# Patient Record
Sex: MALE | Race: WHITE | NOT HISPANIC OR LATINO | Employment: OTHER | URBAN - NONMETROPOLITAN AREA
[De-identification: names, ages, dates, MRNs, and addresses within clinical notes are randomized per-mention and may not be internally consistent; named-entity substitution may affect disease eponyms.]

---

## 2022-01-04 ENCOUNTER — TELEPHONE (OUTPATIENT)
Dept: NEUROSURGERY | Facility: CLINIC | Age: 77
End: 2022-01-04

## 2022-01-04 NOTE — TELEPHONE ENCOUNTER
Caller: Hai Hernandez    Relationship to patient: Self    Best call back number:    Patient is needing: PATIENT REFUSED TO ANSWER MORE QUESTIONS ABOUT REGISTRATION WHILE ON PHONE.  PLEASE UPDATE INS, REG, AND SSN WHEN POSSIBLE

## 2022-01-05 ENCOUNTER — OFFICE VISIT (OUTPATIENT)
Dept: NEUROSURGERY | Facility: CLINIC | Age: 77
End: 2022-01-05

## 2022-01-05 VITALS — BODY MASS INDEX: 35 KG/M2 | WEIGHT: 258.4 LBS | HEIGHT: 72 IN

## 2022-01-05 DIAGNOSIS — D32.9 MENINGIOMA: Primary | ICD-10-CM

## 2022-01-05 PROCEDURE — 99204 OFFICE O/P NEW MOD 45 MIN: CPT | Performed by: NEUROLOGICAL SURGERY

## 2022-01-05 RX ORDER — LEVOTHYROXINE SODIUM 0.12 MG/1
125 TABLET ORAL DAILY
COMMUNITY

## 2022-01-05 RX ORDER — BIMATOPROST 0.01 %
1 DROPS OPHTHALMIC (EYE) NIGHTLY
COMMUNITY
Start: 2021-10-12

## 2022-01-05 RX ORDER — ERGOCALCIFEROL 1.25 MG/1
50000 CAPSULE ORAL WEEKLY
COMMUNITY
Start: 2021-11-30

## 2022-01-05 NOTE — PATIENT INSTRUCTIONS
"https://www.cancer.gov/rare-brain-spine-tumor/tumors/meningioma\">   Meningioma    A meningioma is a growth (tumor) that occurs in the meninges, which is the thin tissue that covers the brain and spinal cord. Meningiomas are usually benign. Benign means they are not cancerous and do not spread to other areas. In rare cases, a meningioma may become cancerous (malignant).  What are the causes?  This condition may be caused by:  · Being exposed to ionizing radiation. This type of energy occurs naturally, and it has artificial sources, such as X-rays and some medical devices.  · Neurofibromatosis 2. This is a genetic disorder that causes multiple soft tumors.  · Genetic mutation. This is a change in certain genes.  In many cases, the cause of this condition is not known.  What increases the risk?  The following factors may make you more likely to develop this condition:  · Having been exposed to radiation, especially as a child.  · Being a woman. There may be a greater risk associated with having female hormones. Older women have a higher risk of meningiomas than men or children. However, men have a higher risk of malignant meningiomas.  · Obesity.  What are the signs or symptoms?  Common symptoms of this condition include:  · Headaches.  · Nausea and vomiting.  · Vision changes.  · Changes to hearing.  · Loss of your sense of smell.  Other symptoms include:  · Seizures.  · Weakness or numbness on one side of your body, or in an arm or a leg.  · Problems with memory or thinking.  · Mood or personality changes.  Symptoms of this condition usually begin very slowly. The symptoms may depend on the size and location of your tumor.  How is this diagnosed?  This condition is diagnosed based on:  · Results of brain imaging tests, such as a CT scan or an MRI.  · Removal of a tissue sample of the tumor to look at under a microscope (biopsy). This may be done to confirm the diagnosis and to help determine the best treatment for " your condition.  How is this treated?  This condition may be treated with:  · Steroids. These are medicines for lowering brain swelling and improving symptoms.  · Radiation therapy. This therapy uses high-energy rays to shrink or kill your tumor.  · Surgery to remove as much of your tumor as possible.  You may not have treatment until your symptoms start to affect your daily activities. This is because meningiomas grow very slowly. Your health care provider may prefer to watch for growth of your tumor before starting treatment.  Follow these instructions at home:  · Take over-the-counter and prescription medicines only as told by your health care provider.  · Follow instructions from your health care provider about eating or drinking restrictions.  · Drink enough fluid to keep your urine pale yellow.  · Return to your normal activities as told by your health care provider. Ask your health care provider what activities are safe for you.  · Keep all follow-up visits as told by your health care provider. This is important. You may need regular visits to watch the growth of your tumor.  Contact a health care provider if:  · You have symptoms that come back.  · You have diarrhea.  · You vomit.  · You have pain in your abdomen (abdominal pain).  · You cannot eat or drink as much as you need.  · You are weaker or more tired than usual.  · You are losing weight without trying.  Get help right away if:  · Your diarrhea, vomiting, or abdominal pain does not go away, or you cannot eat or drink without vomiting.  · You have sudden changes in vision.  · You have trouble walking.  · You have a seizure.  · You have bleeding that does not stop.  · You have trouble breathing.  · You have a fever.  · You have new weakness or numbness on one side of your body.  Summary  · A meningioma is a growth (tumor) that occurs in the meninges, which is the thin tissue that covers the brain and spinal cord.  · Meningiomas are usually benign.  Benign means they are not cancerous and do not spread to other areas.  · Symptoms of this condition usually begin very slowly. The symptoms may depend on the size and location of your tumor.  · You may not need treatment until your symptoms start to affect your daily activities. Your tumor may be watched over time.  This information is not intended to replace advice given to you by your health care provider. Make sure you discuss any questions you have with your health care provider.  Document Revised: 12/21/2020 Document Reviewed: 12/21/2020  [employer]vier Patient Education © 2021 [employer]    BMI for Adults  What is BMI?  Body mass index (BMI) is a number that is calculated from a person's weight and height. BMI can help estimate how much of a person's weight is composed of fat. BMI does not measure body fat directly. Rather, it is an alternative to procedures that directly measure body fat, which can be difficult and expensive.  BMI can help identify people who may be at higher risk for certain medical problems.  What are BMI measurements used for?  BMI is used as a screening tool to identify possible weight problems. It helps determine whether a person is obese, overweight, a healthy weight, or underweight.  BMI is useful for:  · Identifying a weight problem that may be related to a medical condition or may increase the risk for medical problems.  · Promoting changes, such as changes in diet and exercise, to help reach a healthy weight. BMI screening can be repeated to see if these changes are working.  How is BMI calculated?  BMI involves measuring your weight in relation to your height. Both height and weight are measured, and the BMI is calculated from those numbers. This can be done either in English ([place] or metric measurements. Note that charts and online BMI calculators are available to help you find your BMI quickly and easily without having to do these calculations yourself.  To calculate your BMI in  "English ([place] measurements:    1. Measure your weight in pounds (lb).  2. Multiply the number of pounds by 703.  ? For example, for a person who weighs 180 lb, multiply that number by 703, which equals 126,540.  3. Measure your height in inches. Then multiply that number by itself to get a measurement called \"inches squared.\"  ? For example, for a person who is 70 inches tall, the \"inches squared\" measurement is 70 inches x 70 inches, which equals 4,900 inches squared.  4. Divide the total from step 2 (number of lb x 703) by the total from step 3 (inches squared): 126,540 ÷ 4,900 = 25.8. This is your BMI.    To calculate your BMI in metric measurements:  1. Measure your weight in kilograms (kg).  2. Measure your height in meters (m). Then multiply that number by itself to get a measurement called \"meters squared.\"  ? For example, for a person who is 1.75 m tall, the \"meters squared\" measurement is 1.75 m x 1.75 m, which is equal to 3.1 meters squared.  3. Divide the number of kilograms (your weight) by the meters squared number. In this example: 70 ÷ 3.1 = 22.6. This is your BMI.  What do the results mean?  BMI charts are used to identify whether you are underweight, normal weight, overweight, or obese. The following guidelines will be used:  · Underweight: BMI less than 18.5.  · Normal weight: BMI between 18.5 and 24.9.  · Overweight: BMI between 25 and 29.9.  · Obese: BMI of 30 or above.  Keep these notes in mind:  · Weight includes both fat and muscle, so someone with a muscular build, such as an athlete, may have a BMI that is higher than 24.9. In cases like these, BMI is not an accurate measure of body fat.  · To determine if excess body fat is the cause of a BMI of 25 or higher, further assessments may need to be done by a health care provider.  · BMI is usually interpreted in the same way for men and women.  Where to find more information  For more information about BMI, including tools to quickly calculate " "your BMI, go to these websites:  · Centers for Disease Control and Prevention: www.cdc.gov  · American Heart Association: www.heart.org  · National Heart, Lung, and Blood [place]: www.nhlbi.nih.gov  Summary  · Body mass index (BMI) is a number that is calculated from a person's weight and height.  · BMI may help estimate how much of a person's weight is composed of fat. BMI can help identify those who may be at higher risk for certain medical problems.  · BMI can be measured using English measurements or metric measurements.  · BMI charts are used to identify whether you are underweight, normal weight, overweight, or obese.  This information is not intended to replace advice given to you by your health care provider. Make sure you discuss any questions you have with your health care provider.  Document Revised: 09/09/2020 Document Reviewed: 07/17/2020  [employer] Patient Education © 2021 [employer]    https://www.nhlbi.nih.gov/files/docs/public/heart/dash_brief.pdf\">   DASH Eating Plan  DASH stands for Dietary Approaches to Stop Hypertension. The DASH eating plan is a healthy eating plan that has been shown to:  · Reduce high blood pressure (hypertension).  · Reduce your risk for type 2 diabetes, heart disease, and stroke.  · Help with weight loss.  What are tips for following this plan?  Reading food labels  · Check food labels for the amount of salt (sodium) per serving. Choose foods with less than 5 percent of the Daily Value of sodium. Generally, foods with less than 300 milligrams (mg) of sodium per serving fit into this eating plan.  · To find whole grains, look for the word \"whole\" as the first word in the ingredient list.  Shopping  · Buy products labeled as \"low-sodium\" or \"no salt added.\"  · Buy fresh foods. Avoid canned foods and pre-made or frozen meals.  Cooking  · Avoid adding salt when cooking. Use salt-free seasonings or herbs instead of table salt or sea salt. Check with your health care provider " or pharmacist before using salt substitutes.  · Do not espinosa foods. Cook foods using healthy methods such as baking, boiling, grilling, roasting, and broiling instead.  · Cook with heart-healthy oils, such as olive, canola, avocado, soybean, or sunflower oil.  Meal planning    · Eat a balanced diet that includes:  ? 4 or more servings of fruits and 4 or more servings of vegetables each day. Try to fill one-half of your plate with fruits and vegetables.  ? 6-8 servings of whole grains each day.  ? Less than 6 oz (170 g) of lean meat, poultry, or fish each day. A 3-oz (85-g) serving of meat is about the same size as a deck of cards. One egg equals 1 oz (28 g).  ? 2-3 servings of low-fat dairy each day. One serving is 1 cup (237 mL).  ? 1 serving of nuts, seeds, or beans 5 times each week.  ? 2-3 servings of heart-healthy fats. Healthy fats called omega-3 fatty acids are found in foods such as walnuts, flaxseeds, fortified milks, and eggs. These fats are also found in cold-water fish, such as sardines, salmon, and mackerel.  · Limit how much you eat of:  ? Canned or prepackaged foods.  ? Food that is high in trans fat, such as some fried foods.  ? Food that is high in saturated fat, such as fatty meat.  ? Desserts and other sweets, sugary drinks, and other foods with added sugar.  ? Full-fat dairy products.  · Do not salt foods before eating.  · Do not eat more than 4 egg yolks a week.  · Try to eat at least 2 vegetarian meals a week.  · Eat more home-cooked food and less restaurant, buffet, and fast food.    Lifestyle  · When eating at a restaurant, ask that your food be prepared with less salt or no salt, if possible.  · If you drink alcohol:  ? Limit how much you use to:  § 0-1 drink a day for women who are not pregnant.  § 0-2 drinks a day for men.  ? Be aware of how much alcohol is in your drink. In the [place] one drink equals one 12 oz bottle of beer (355 mL), one 5 oz glass of wine (148 mL), or one 1½ oz glass of  hard liquor (44 mL).  General information  · Avoid eating more than 2,300 mg of salt a day. If you have hypertension, you may need to reduce your sodium intake to 1,500 mg a day.  · Work with your health care provider to maintain a healthy body weight or to lose weight. Ask what an ideal weight is for you.  · Get at least 30 minutes of exercise that causes your heart to beat faster (aerobic exercise) most days of the week. Activities may include walking, swimming, or biking.  · Work with your health care provider or dietitian to adjust your eating plan to your individual calorie needs.  What foods should I eat?  Fruits  All fresh, dried, or frozen fruit. Canned fruit in natural juice (without added sugar).  Vegetables  Fresh or frozen vegetables (raw, steamed, roasted, or grilled). Low-sodium or reduced-sodium tomato and vegetable juice. Low-sodium or reduced-sodium tomato sauce and tomato paste. Low-sodium or reduced-sodium canned vegetables.  Grains  Whole-grain or whole-wheat bread. Whole-grain or whole-wheat pasta. Brown rice. Oatmeal. Quinoa. Bulgur. Whole-grain and low-sodium cereals. Kareen bread. Low-fat, low-sodium crackers. Whole-wheat flour tortillas.  Meats and other proteins  Skinless chicken or turkey. Ground chicken or turkey. Pork with fat trimmed off. Fish and seafood. Egg whites. Dried beans, peas, or lentils. Unsalted nuts, nut butters, and seeds. Unsalted canned beans. Lean cuts of beef with fat trimmed off. Low-sodium, lean precooked or cured meat, such as sausages or meat loaves.  Dairy  Low-fat (1%) or fat-free (skim) milk. Reduced-fat, low-fat, or fat-free cheeses. Nonfat, low-sodium ricotta or cottage cheese. Low-fat or nonfat yogurt. Low-fat, low-sodium cheese.  Fats and oils  Soft margarine without trans fats. Vegetable oil. Reduced-fat, low-fat, or light mayonnaise and salad dressings (reduced-sodium). Canola, safflower, olive, avocado, soybean, and sunflower oils. Avocado.  Seasonings and  condiments  Herbs. Spices. Seasoning mixes without salt.  Other foods  Unsalted popcorn and pretzels. Fat-free sweets.  The items listed above may not be a complete list of foods and beverages you can eat. Contact a dietitian for more information.  What foods should I avoid?  Fruits  Canned fruit in a light or heavy syrup. Fried fruit. Fruit in cream or butter sauce.  Vegetables  Creamed or fried vegetables. Vegetables in a cheese sauce. Regular canned vegetables (not low-sodium or reduced-sodium). Regular canned tomato sauce and paste (not low-sodium or reduced-sodium). Regular tomato and vegetable juice (not low-sodium or reduced-sodium). Pickles. Olives.  Grains  Baked goods made with fat, such as croissants, muffins, or some breads. Dry pasta or rice meal packs.  Meats and other proteins  Fatty cuts of meat. Ribs. Fried meat. Tang. Bologna, salami, and other precooked or cured meats, such as sausages or meat loaves. Fat from the back of a pig (fatback). Bratwurst. Salted nuts and seeds. Canned beans with added salt. Canned or smoked fish. Whole eggs or egg yolks. Chicken or turkey with skin.  Dairy  Whole or 2% milk, cream, and half-and-half. Whole or full-fat cream cheese. Whole-fat or sweetened yogurt. Full-fat cheese. Nondairy creamers. Whipped toppings. Processed cheese and cheese spreads.  Fats and oils  Butter. Stick margarine. Lard. Shortening. Ghee. Tang fat. Tropical oils, such as coconut, palm kernel, or palm oil.  Seasonings and condiments  Onion salt, garlic salt, seasoned salt, table salt, and sea salt. Worcestershire sauce. Tartar sauce. Barbecue sauce. Teriyaki sauce. Soy sauce, including reduced-sodium. Steak sauce. Canned and packaged gravies. Fish sauce. Oyster sauce. Cocktail sauce. Store-bought horseradish. Ketchup. Mustard. Meat flavorings and tenderizers. Bouillon cubes. Hot sauces. Pre-made or packaged marinades. Pre-made or packaged taco seasonings. Relishes. Regular salad  dressings.  Other foods  Salted popcorn and pretzels.  The items listed above may not be a complete list of foods and beverages you should avoid. Contact a dietitian for more information.  Where to find more information  · National Heart, Lung, and Blood [place]: www.nhlbi.nih.gov  · American Heart Association: www.heart.org  · Academy of Nutrition and Dietetics: www.eatright.org  · National Kidney Foundation: www.kidney.org  Summary  · The DASH eating plan is a healthy eating plan that has been shown to reduce high blood pressure (hypertension). It may also reduce your risk for type 2 diabetes, heart disease, and stroke.  · When on the DASH eating plan, aim to eat more fresh fruits and vegetables, whole grains, lean proteins, low-fat dairy, and heart-healthy fats.  · With the DASH eating plan, you should limit salt (sodium) intake to 2,300 mg a day. If you have hypertension, you may need to reduce your sodium intake to 1,500 mg a day.  · Work with your health care provider or dietitian to adjust your eating plan to your individual calorie needs.  This information is not intended to replace advice given to you by your health care provider. Make sure you discuss any questions you have with your health care provider.  Document Revised: 11/20/2020 Document Reviewed: 11/20/2020  [employer] Patient Education © 2021 [employer]

## 2022-01-05 NOTE — PROGRESS NOTES
Primary Care Provider: Elisa Chacko MD    Chief Complaint:   Chief Complaint   Patient presents with   • meningioma     New patient here for evaluation of meningioma. States he has intermittent pain around eyes, sees floaters and some loss of peripheal vision.       History of Present Illness  Hai Hernandez is a 76 y.o. male is being seen for consultation today at the request of Dr. Chacko.    Hai is a very pleasant 76-year-old male who presented with a past medical history of acephalic migraines, basal cell carcinoma and one episode of squamous cell carcinoma of the skin.  He was in his normal state of health until [place].  He woke with a terrible headache that was located mostly behind his left eye.  This radiated into his right retro-orbital region as well.  He treated conservatively with 2 days with Aleve.  He did notice decreased vision in his right eye.  He states that with closing of his right eye his vision improved.  He described this as a scotoma and cloudy area or wall on the right side of his vision.  He also had hallucinations within this area as well as flashing lights.  He states that his headaches were nonpulsatile in nature but will get worse with movement of his head sneezing or coughing.  As stated above he does have a history of acephalic migraines but is not on any preventative medications.  He reported to outside emergency room where a CT of the head with and without contrast showed a 2 x 1.5 cm mass consistent with meningioma.  All symptoms resolved within the last day.    Currently he complains of 0-10 pain his vision is normal today.    Review of Systems   Constitutional: Positive for activity change.   HENT: Positive for tinnitus.    Eyes: Positive for pain.   Respiratory: Negative.    Cardiovascular: Negative.    Gastrointestinal: Negative.    Endocrine: Negative.    Genitourinary: Negative.    Musculoskeletal: Negative.    Skin: Negative.    Allergic/Immunologic:  Negative.    Neurological: Positive for dizziness.   Hematological: Negative.    Psychiatric/Behavioral: Negative.        Past Medical History:   Diagnosis Date   • Hearing loss    • Pneumonia    • Thyroid disease        Past Surgical History:   Procedure Laterality Date   • NECK SURGERY     • SKIN CANCER EXCISION         Family History: family history includes Cancer in his brother and sister.    Social History:  reports that he has never smoked. He has never used smokeless tobacco.    Medications:    Current Outpatient Medications:   •  levothyroxine (SYNTHROID, LEVOTHROID) 137 MCG tablet, Take 137 mcg by mouth Daily., Disp: , Rfl:   •  Lumigan 0.01 % ophthalmic drops, , Disp: , Rfl:   •  vitamin D (ERGOCALCIFEROL) 1.25 MG (60740 UT) capsule capsule, Take 50,000 Units by mouth 1 (One) Time Per Week., Disp: , Rfl:     Allergies:  Patient has no known allergies.    Objective   Physical Exam  Constitutional:       Appearance: He is well-developed.   HENT:      Head: Normocephalic and atraumatic.   Eyes:      Extraocular Movements: EOM normal.      Conjunctiva/sclera: Conjunctivae normal.      Pupils: Pupils are equal, round, and reactive to light.      Funduscopic exam:     Right eye: No hemorrhage, exudate or papilledema.         Left eye: No hemorrhage, exudate or papilledema.   Cardiovascular:      Pulses:           Dorsalis pedis pulses are 2+ on the right side and 2+ on the left side.        Posterior tibial pulses are 2+ on the right side and 2+ on the left side.   Pulmonary:      Effort: Pulmonary effort is normal. No respiratory distress.   Musculoskeletal:      Cervical back: Normal range of motion and neck supple.   Skin:     General: Skin is warm.      Findings: No erythema or rash.   Neurological:      Mental Status: He is oriented to person, place, and time.      Coordination: Finger-Nose-Finger Test and Heel to Shin Test normal.      Gait: Gait is intact. Tandem walk normal.      Deep Tendon Reflexes:       Reflex Scores:       Tricep reflexes are 2+ on the right side and 2+ on the left side.       Bicep reflexes are 2+ on the right side and 2+ on the left side.       Brachioradialis reflexes are 2+ on the right side and 2+ on the left side.       Patellar reflexes are 2+ on the right side and 2+ on the left side.       Achilles reflexes are 2+ on the right side and 2+ on the left side.  Psychiatric:         Speech: Speech normal.       Neurologic Exam     Mental Status   Oriented to person, place, and time.   Registration: recalls 3 of 3 objects. Recall at 5 minutes: recalls 3 of 3 objects.   Attention: normal. Concentration: normal.   Speech: speech is normal   Level of consciousness: alert  Knowledge: consistent with education.     Cranial Nerves     CN II   Visual acuity: normal    CN III, IV, VI   Pupils are equal, round, and reactive to light.  Extraocular motions are normal.   Diplopia: none    CN V   Facial sensation intact.   Right corneal reflex: normal  Left corneal reflex: normal    CN VII   Right facial weakness: none  Left facial weakness: none    CN VIII   Hearing: intact    CN IX, X   Palate: symmetric  Right gag reflex: normal  Left gag reflex: normal    CN XI   Right trapezius strength: normal  Left trapezius strength: normal    CN XII   Tongue deviation: none    Motor Exam   Right arm tone: normal  Left arm tone: normal  Right arm pronator drift: absent  Left arm pronator drift: absent  Right leg tone: normal  Left leg tone: normal    Strength   Right deltoid: 5/5  Left deltoid: 5/5  Right biceps: 5/5  Left biceps: 5/5  Right triceps: 5/5  Left triceps: 5/5  Right interossei: 5/5  Left interossei: 5/5  Right iliopsoas: 5/5  Left iliopsoas: 5/5  Right quadriceps: 5/5  Left quadriceps: 5/5  Right anterior tibial: 5/5  Left anterior tibial: 5/5  Right gastroc: 5/5  Left gastroc: 5/5Right EHL 5/5   Left EHL 5/5     Sensory Exam   Light touch normal.   Proprioception normal.     Gait, Coordination,  and Reflexes     Gait  Gait: normal    Coordination   Finger to nose coordination: normal  Heel to shin coordination: normal  Tandem walking coordination: normal    Reflexes   Right brachioradialis: 2+  Left brachioradialis: 2+  Right biceps: 2+  Left biceps: 2+  Right triceps: 2+  Left triceps: 2+  Right patellar: 2+  Left patellar: 2+  Right achilles: 2+  Left achilles: 2+  Right plantar: normal  Left plantar: normal  Right Ozuna: absent  Left Ozuna: absent  Right ankle clonus: absent  Left ankle clonus: absent        Imaging: (independent review and interpretation)  No radiology results for the last 30 days.                [id number] right planum sphenoidale meningioma.  Mild surrounding edema based on noncontrast MRI.    ASSESSMENT and PLAN  Hai Hernandez is a 76 y.o. male with a significant comorbidity of acephalic migraines, thyroid disease status post radiation as a child, basal cell and squamous cell carcinoma of the skin. He presents with a new problem of acute onset of 3-day headache and right visual field changes resulting in outside hospital ER visit. Physical exam findings of neurologically intact with resolution of all symptoms.  His imaging shows 22 x 24 x 13.5 mm right planum sphenoidale mass most suggestive of meningioma.    Meningioma, right planum sphenoidale meningioma  Differential diagnosis includes meningioma.  Without biopsy cannot exclude other neoplasms including hemangiopericytoma.      Prognosis:  These are typically slow growing extra-axial tumors.  They are usually benign and arise from the arachnoid.  32% of incidentally discovered meningiomas do not grow over a 3-year follow-up.  Meningiomas with calcification and or hypodensity on T2 weighted MRI appear to be slower growing.  These lesions typically grow at a rate of 1 mm per year.  5-year survival rate is 91.3%.    Treatment: Treatment options include serial observation versus surgical resection versus radiation.    Surgical  indications include   - documented growth on serial imaging   - symptoms referrable to the lesion,   - or suspicion of increased grade (WHO grade 2: Choroidal, clear cell, atypical, WHO grade 3: Papillary, rapidly, anaplastic) in the tumor as determined by surrounding edema.  Perioperative morbidity rate is statistically significantly higher in patients older than 70 (23%) versus younger than 70 (3.5%)    Radiotherapy is considered for patients who are not surgical candidates are of deep seeded lesions or for recurrent meningiomas or for atypical or malignant meningiomas after subtotal resection of first recurrence.  Retrospective analysis with follow-up of 5 to 15 years from [facility] revealed recurrence rate of 4% and totally resected meningiomas, 60% for partially resected meningiomas without XRT and 32% for partially resected meningiomas with XRT.    - MRI of the brain with and without contrast.  Return to clinic same day for testing    Right eye vision changes  Differential diagnosis includes acephalic migraines, amaurosis fugax, or direct optic nerve compression  Hai presents with intermittent right thigh scotomas and visual hallucinations.  He does have a history of acephalic migraines.  He states that his vision changes are isolated to the right eye and improved with closing the right eye suggesting direct optic nerve compression but his imaging is most characteristic for meningioma which grows slowly.  Therefore this could be an alternative pathology.  Symptomology also sounds very similar to amaurosis fugax associated with carotid stenosis.  -CTA of the neck to rule out carotid stenosis    Obesity Counseling  Never smoker  Hai's Body mass index is 35.05 kg/m²..  We spent 1 minutes in weight management counseling including discussing current weight, current diet, and exercise patterns.  Additional we set goals for weight reduction.  Therefore above normal parameters. Recommendations include: educational  material and exercise counseling.       Diagnoses and all orders for this visit:    1. Meningioma (HCC) (Primary)    2. Body mass index (BMI) of 35.0 to 35.9        Return in about 3 months (around 4/5/2022) for TEST RESULTS-DR OLEA.    Thank you for this Consultation and the opportunity to participate in Hai's care.    Sincerely,  Martell Olea MD    I spent 33 minutes caring for Hai on this date of service. This time includes time spent by me in the following activities: preparing for the visit, reviewing tests, obtaining and/or reviewing a separately obtained history, performing a medically appropriate examination and/or evaluation, counseling and educating the patient/family/caregiver, ordering medications, tests, or procedures, referring and communicating with other health care professionals, documenting information in the medical record, independently interpreting results and communicating that information with the patient/family/caregiver, and/or care coordination.     Medical Decision Making (2/3)  Problem Points (2,3,4 or more)  Established Problem, stable = 1x2  New Problem, additional workup = 4x2  Data Points (2,3,4 or more)  Independent review of imaging or specimen = 2x2  Risk (Low, Mod, High)  Abrupt change in neurological status (High)    E/M = MDM 3 out of 3   or   TIME  New Level 5 - 07799 = High (4>, 4, High)   or   60+ minutes

## 2022-01-21 ENCOUNTER — HOSPITAL ENCOUNTER (OUTPATIENT)
Dept: MRI IMAGING | Facility: HOSPITAL | Age: 77
Discharge: HOME | End: 2022-01-21

## 2022-01-21 ENCOUNTER — HOSPITAL ENCOUNTER (OUTPATIENT)
Dept: CT IMAGING | Facility: HOSPITAL | Age: 77
Discharge: HOME | End: 2022-01-21

## 2022-01-21 DIAGNOSIS — D32.9 MENINGIOMA: ICD-10-CM

## 2022-01-21 PROCEDURE — 70498 CT ANGIOGRAPHY NECK: CPT

## 2022-01-21 PROCEDURE — A9577 INJ MULTIHANCE: HCPCS | Performed by: NEUROLOGICAL SURGERY

## 2022-01-21 PROCEDURE — 0 GADOBENATE DIMEGLUMINE 529 MG/ML SOLUTION: Performed by: NEUROLOGICAL SURGERY

## 2022-01-21 PROCEDURE — 82565 ASSAY OF CREATININE: CPT

## 2022-01-21 PROCEDURE — 70553 MRI BRAIN STEM W/O & W/DYE: CPT

## 2022-01-21 PROCEDURE — 0 IOPAMIDOL PER 1 ML: Performed by: NEUROLOGICAL SURGERY

## 2022-01-21 RX ADMIN — GADOBENATE DIMEGLUMINE 20 ML: 529 INJECTION, SOLUTION INTRAVENOUS at 16:43

## 2022-01-21 RX ADMIN — IOPAMIDOL 100 ML: 755 INJECTION, SOLUTION INTRAVENOUS at 15:29

## 2022-01-24 ENCOUNTER — TELEPHONE (OUTPATIENT)
Dept: NEUROSURGERY | Facility: CLINIC | Age: 77
End: 2022-01-24

## 2022-01-24 LAB — CREAT BLDA-MCNC: 1 MG/DL (ref 0.6–1.3)

## 2022-01-24 NOTE — TELEPHONE ENCOUNTER
Patient calling for results of MRI and CTA from last week. Advised I would call him after Dr Downs had reviewed them.

## 2022-02-01 ENCOUNTER — TELEPHONE (OUTPATIENT)
Dept: NEUROSURGERY | Facility: CLINIC | Age: 77
End: 2022-02-01

## 2022-02-01 NOTE — TELEPHONE ENCOUNTER
Patient left voicemail yesterday, asking for MRI results. I called him back this morning to let him know that Dr Downs did try to call him last week with the results. I left results on voicemail and advised he keep f/u and he may call with any additional questions

## 2022-02-08 ENCOUNTER — TELEPHONE (OUTPATIENT)
Dept: NEUROSURGERY | Facility: CLINIC | Age: 77
End: 2022-02-08

## 2022-02-08 NOTE — TELEPHONE ENCOUNTER
Caller: Hai Hernandez    Relationship to patient: Self    Best call back number: [phone]    Chief complaint: MENINGIOMA    Type of visit: FOLLOW UP EXTENDED    Requested date: NEXT AVAIL CANCELLATION     If rescheduling, when is the original appointment: 04/06/22     Additional notes: PATIENT STATES HE SHOULD BE FOLLOWING UP WITH DR OLEA 2 WEEKS AFTER MRI ON 01/21/22. PER STANISLAW, PATIENT IS ON THE CANCELLATION LIST AND SHE WILL CALL WHEN APPT IS AVAILABLE. I ADVISED PATIENT, WHO STATES IF HE DOES NOT ANSWER THE CALL TO PLEASE LEAVE A VOICE MESSAGE. DOCUMENTING PER PARISA.

## 2022-04-06 ENCOUNTER — OFFICE VISIT (OUTPATIENT)
Dept: NEUROSURGERY | Facility: CLINIC | Age: 77
End: 2022-04-06

## 2022-04-06 VITALS — HEIGHT: 72 IN | BODY MASS INDEX: 34.73 KG/M2 | WEIGHT: 256.4 LBS

## 2022-04-06 DIAGNOSIS — D32.9 MENINGIOMA: Primary | ICD-10-CM

## 2022-04-06 DIAGNOSIS — E66.09 CLASS 1 OBESITY DUE TO EXCESS CALORIES WITH SERIOUS COMORBIDITY AND BODY MASS INDEX (BMI) OF 34.0 TO 34.9 IN ADULT: ICD-10-CM

## 2022-04-06 DIAGNOSIS — Z78.9 NONSMOKER: ICD-10-CM

## 2022-04-06 PROCEDURE — G2212 PROLONG OUTPT/OFFICE VIS: HCPCS | Performed by: NEUROLOGICAL SURGERY

## 2022-04-06 PROCEDURE — 99215 OFFICE O/P EST HI 40 MIN: CPT | Performed by: NEUROLOGICAL SURGERY

## 2022-04-06 RX ORDER — CHLORHEXIDINE GLUCONATE 4 G/100ML
SOLUTION TOPICAL
Qty: 120 ML | Refills: 0 | Status: SHIPPED | OUTPATIENT
Start: 2022-04-06 | End: 2022-04-07

## 2022-04-06 NOTE — PATIENT INSTRUCTIONS
"https://www.nhlbi.nih.gov/files/docs/public/heart/dash_brief.pdf\">   DASH Eating Plan  DASH stands for Dietary Approaches to Stop Hypertension. The DASH eating plan is a healthy eating plan that has been shown to:  Reduce high blood pressure (hypertension).  Reduce your risk for type 2 diabetes, heart disease, and stroke.  Help with weight loss.  What are tips for following this plan?  Reading food labels  Check food labels for the amount of salt (sodium) per serving. Choose foods with less than 5 percent of the Daily Value of sodium. Generally, foods with less than 300 milligrams (mg) of sodium per serving fit into this eating plan.  To find whole grains, look for the word \"whole\" as the first word in the ingredient list.  Shopping  Buy products labeled as \"low-sodium\" or \"no salt added.\"  Buy fresh foods. Avoid canned foods and pre-made or frozen meals.  Cooking  Avoid adding salt when cooking. Use salt-free seasonings or herbs instead of table salt or sea salt. Check with your health care provider or pharmacist before using salt substitutes.  Do not espinosa foods. Cook foods using healthy methods such as baking, boiling, grilling, roasting, and broiling instead.  Cook with heart-healthy oils, such as olive, canola, avocado, soybean, or sunflower oil.  Meal planning    Eat a balanced diet that includes:  4 or more servings of fruits and 4 or more servings of vegetables each day. Try to fill one-half of your plate with fruits and vegetables.  6-8 servings of whole grains each day.  Less than 6 oz (170 g) of lean meat, poultry, or fish each day. A 3-oz (85-g) serving of meat is about the same size as a deck of cards. One egg equals 1 oz (28 g).  2-3 servings of low-fat dairy each day. One serving is 1 cup (237 mL).  1 serving of nuts, seeds, or beans 5 times each week.  2-3 servings of heart-healthy fats. Healthy fats called omega-3 fatty acids are found in foods such as walnuts, flaxseeds, fortified milks, and eggs. " These fats are also found in cold-water fish, such as sardines, salmon, and mackerel.  Limit how much you eat of:  Canned or prepackaged foods.  Food that is high in trans fat, such as some fried foods.  Food that is high in saturated fat, such as fatty meat.  Desserts and other sweets, sugary drinks, and other foods with added sugar.  Full-fat dairy products.  Do not salt foods before eating.  Do not eat more than 4 egg yolks a week.  Try to eat at least 2 vegetarian meals a week.  Eat more home-cooked food and less restaurant, buffet, and fast food.    Lifestyle  When eating at a restaurant, ask that your food be prepared with less salt or no salt, if possible.  If you drink alcohol:  Limit how much you use to:  0-1 drink a day for women who are not pregnant.  0-2 drinks a day for men.  Be aware of how much alcohol is in your drink. In the [place] one drink equals one 12 oz bottle of beer (355 mL), one 5 oz glass of wine (148 mL), or one 1½ oz glass of hard liquor (44 mL).  General information  Avoid eating more than 2,300 mg of salt a day. If you have hypertension, you may need to reduce your sodium intake to 1,500 mg a day.  Work with your health care provider to maintain a healthy body weight or to lose weight. Ask what an ideal weight is for you.  Get at least 30 minutes of exercise that causes your heart to beat faster (aerobic exercise) most days of the week. Activities may include walking, swimming, or biking.  Work with your health care provider or dietitian to adjust your eating plan to your individual calorie needs.  What foods should I eat?  Fruits  All fresh, dried, or frozen fruit. Canned fruit in natural juice (without added sugar).  Vegetables  Fresh or frozen vegetables (raw, steamed, roasted, or grilled). Low-sodium or reduced-sodium tomato and vegetable juice. Low-sodium or reduced-sodium tomato sauce and tomato paste. Low-sodium or reduced-sodium canned vegetables.  Grains  Whole-grain or  whole-wheat bread. Whole-grain or whole-wheat pasta. Brown rice. Oatmeal. Quinoa. Bulgur. Whole-grain and low-sodium cereals. Kareen bread. Low-fat, low-sodium crackers. Whole-wheat flour tortillas.  Meats and other proteins  Skinless chicken or turkey. Ground chicken or turkey. Pork with fat trimmed off. Fish and seafood. Egg whites. Dried beans, peas, or lentils. Unsalted nuts, nut butters, and seeds. Unsalted canned beans. Lean cuts of beef with fat trimmed off. Low-sodium, lean precooked or cured meat, such as sausages or meat loaves.  Dairy  Low-fat (1%) or fat-free (skim) milk. Reduced-fat, low-fat, or fat-free cheeses. Nonfat, low-sodium ricotta or cottage cheese. Low-fat or nonfat yogurt. Low-fat, low-sodium cheese.  Fats and oils  Soft margarine without trans fats. Vegetable oil. Reduced-fat, low-fat, or light mayonnaise and salad dressings (reduced-sodium). Canola, safflower, olive, avocado, soybean, and sunflower oils. Avocado.  Seasonings and condiments  Herbs. Spices. Seasoning mixes without salt.  Other foods  Unsalted popcorn and pretzels. Fat-free sweets.  The items listed above may not be a complete list of foods and beverages you can eat. Contact a dietitian for more information.  What foods should I avoid?  Fruits  Canned fruit in a light or heavy syrup. Fried fruit. Fruit in cream or butter sauce.  Vegetables  Creamed or fried vegetables. Vegetables in a cheese sauce. Regular canned vegetables (not low-sodium or reduced-sodium). Regular canned tomato sauce and paste (not low-sodium or reduced-sodium). Regular tomato and vegetable juice (not low-sodium or reduced-sodium). Pickles. Olives.  Grains  Baked goods made with fat, such as croissants, muffins, or some breads. Dry pasta or rice meal packs.  Meats and other proteins  Fatty cuts of meat. Ribs. Fried meat. Tang. Bologna, salami, and other precooked or cured meats, such as sausages or meat loaves. Fat from the back of a pig (fatback).  Bratwurst. Salted nuts and seeds. Canned beans with added salt. Canned or smoked fish. Whole eggs or egg yolks. Chicken or turkey with skin.  Dairy  Whole or 2% milk, cream, and half-and-half. Whole or full-fat cream cheese. Whole-fat or sweetened yogurt. Full-fat cheese. Nondairy creamers. Whipped toppings. Processed cheese and cheese spreads.  Fats and oils  Butter. Stick margarine. Lard. Shortening. Ghee. Tang fat. Tropical oils, such as coconut, palm kernel, or palm oil.  Seasonings and condiments  Onion salt, garlic salt, seasoned salt, table salt, and sea salt. Worcestershire sauce. Tartar sauce. Barbecue sauce. Teriyaki sauce. Soy sauce, including reduced-sodium. Steak sauce. Canned and packaged gravies. Fish sauce. Oyster sauce. Cocktail sauce. Store-bought horseradish. Ketchup. Mustard. Meat flavorings and tenderizers. Bouillon cubes. Hot sauces. Pre-made or packaged marinades. Pre-made or packaged taco seasonings. Relishes. Regular salad dressings.  Other foods  Salted popcorn and pretzels.  The items listed above may not be a complete list of foods and beverages you should avoid. Contact a dietitian for more information.  Where to find more information  National Heart, Lung, and Blood [place]: www.nhlbi.nih.gov  American Heart Association: www.heart.org  Academy of Nutrition and Dietetics: www.eatright.org  National Kidney Foundation: www.kidney.org  Summary  The DASH eating plan is a healthy eating plan that has been shown to reduce high blood pressure (hypertension). It may also reduce your risk for type 2 diabetes, heart disease, and stroke.  When on the DASH eating plan, aim to eat more fresh fruits and vegetables, whole grains, lean proteins, low-fat dairy, and heart-healthy fats.  With the DASH eating plan, you should limit salt (sodium) intake to 2,300 mg a day. If you have hypertension, you may need to reduce your sodium intake to 1,500 mg a day.  Work with your health care provider or  dietitian to adjust your eating plan to your individual calorie needs.  This information is not intended to replace advice given to you by your health care provider. Make sure you discuss any questions you have with your health care provider.  Document Revised: 11/20/2020 Document Reviewed: 11/20/2020  [employer] Patient Education © 2021 [employer]    Advance Care Planning and Advance Directives     You make decisions on a daily basis - decisions about where you want to live, your career, your home, your life. Perhaps one of the most important decisions you face is your choice for future medical care. Take time to talk with your family and your healthcare team and start planning today.  Advance Care Planning is a process that can help you:  Understand possible future healthcare decisions in light of your own experiences  Reflect on those decision in light of your goals and values  Discuss your decisions with those closest to you and the healthcare professionals that care for you  Make a plan by creating a document that reflects your wishes    Surrogate Decision Maker  In the event of a medical emergency, which has left you unable to communicate or to make your own decisions, you would need someone to make decisions for you.  It is important to discuss your preferences for medical treatment with this person while you are in good health.     Qualities of a surrogate decision maker:  Willing to take on this role and responsibility  Knows what you want for future medical care  Willing to follow your wishes even if they don't agree with them  Able to make difficult medical decisions under stressful circumstances    Advance Directives  These are legal documents you can create that will guide your healthcare team and decision maker(s) when needed. These documents can be stored in the electronic medical record.    Living Will - a legal document to guide your care if you have a terminal condition or a serious illness and  are unable to communicate. States vary by statute in document names/types, but most forms may include one or more of the following:        -  Directions regarding life-prolonging treatments        -  Directions regarding artificially provided nutrition/hydration        -  Choosing a healthcare decision maker        -  Direction regarding organ/tissue donation    Durable Power of  for Healthcare - this document names an -in-fact to make medical decisions for you, but it may also allow this person to make personal and financial decisions for you. Please seek the advice of an  if you need this type of document.    **Advance Directives are not required and no one may discriminate against you if you do not sign one.    Medical Orders  Many states allow specific forms/orders signed by your physician to record your wishes for medical treatment in your current state of health. This form, signed in personal communication with your physician, addresses resuscitation and other medical interventions that you may or may not want.      For more information or to schedule a time with a [facility] Advance Care Planning Facilitator contact: [email or web address] or call [phone] and someone will contact you directly.

## 2022-04-06 NOTE — PROGRESS NOTES
Chief complaint:   Chief Complaint   Patient presents with   • Meningioma     Pt is here for followup for Meningioma.          Subjective     HPI:   Interval History: Hai returns today for follow-up regarding MRI and known meningioma.  He has been doing well since I saw him last.  His pain today is 0-10.  He does have off-and-on headaches that are retro-orbital in nature and sometimes will be right and sometimes left.  He is in the interim had cataract surgery which improved his vision significantly.  However his vision in his right eye is not 20/20.  He is recently been seen by Dr. Gutiérrez.  His notes are not available for review right now.  He has been noting some decreased concentration.  Denies any weakness numbness tingling.  No seizure-like activity.  No other difficulties with memory.    Review of Systems   Constitutional: Negative.    Eyes: Positive for visual disturbance.   Respiratory: Negative.    Cardiovascular: Negative.    Gastrointestinal: Negative.    Endocrine: Negative.    Genitourinary: Negative.    Musculoskeletal: Negative.    Skin: Negative.    Allergic/Immunologic: Negative.    Neurological: Positive for headaches.   Hematological: Negative.    Psychiatric/Behavioral: Positive for confusion.       PFSH:  Past Medical History:   Diagnosis Date   • Hearing loss    • Pneumonia    • Thyroid disease        Past Surgical History:   Procedure Laterality Date   • NECK SURGERY     • SKIN CANCER EXCISION         Objective      Current Outpatient Medications   Medication Sig Dispense Refill   • levothyroxine (SYNTHROID, LEVOTHROID) 137 MCG tablet Take 137 mcg by mouth Daily.     • Lumigan 0.01 % ophthalmic drops      • vitamin D (ERGOCALCIFEROL) 1.25 MG (10678 UT) capsule capsule Take 50,000 Units by mouth 1 (One) Time Per Week.     • chlorhexidine (HIBICLENS) 4 % external liquid Shower each day with solution for 5 days beginning 5 days before surgery. 120 mL 0     No current facility-administered  "medications for this visit.       Vital Signs  Ht 182.9 cm (72\")   Wt 116 kg (256 lb 6.4 oz)   BMI 34.77 kg/m²   Physical Exam  Eyes:      Extraocular Movements: EOM normal.      Pupils: Pupils are equal, round, and reactive to light.   Neurological:      Mental Status: He is oriented to person, place, and time.      Coordination: Finger-Nose-Finger Test and Heel to Shin Test normal.      Gait: Gait is intact. Tandem walk normal.      Deep Tendon Reflexes:      Reflex Scores:       Tricep reflexes are 2+ on the right side and 2+ on the left side.       Bicep reflexes are 2+ on the right side and 2+ on the left side.       Brachioradialis reflexes are 2+ on the right side and 2+ on the left side.       Patellar reflexes are 2+ on the right side and 2+ on the left side.       Achilles reflexes are 2+ on the right side and 2+ on the left side.  Psychiatric:         Speech: Speech normal.       Neurologic Exam     Mental Status   Oriented to person, place, and time.   Registration: recalls 3 of 3 objects. Recall at 5 minutes: recalls 3 of 3 objects.   Attention: normal. Concentration: normal.   Speech: speech is normal   Level of consciousness: alert  Knowledge: consistent with education.     Cranial Nerves     CN II   Visual acuity: normal    CN III, IV, VI   Pupils are equal, round, and reactive to light.  Extraocular motions are normal.   Diplopia: none    CN V   Facial sensation intact.   Right corneal reflex: normal  Left corneal reflex: normal    CN VII   Right facial weakness: none  Left facial weakness: none    CN VIII   Hearing: intact    CN IX, X   Palate: symmetric  Right gag reflex: normal  Left gag reflex: normal    CN XI   Right trapezius strength: normal  Left trapezius strength: normal    CN XII   Tongue deviation: none    Motor Exam   Muscle bulk: normal  Right arm tone: normal  Left arm tone: normal  Right arm pronator drift: absent  Left arm pronator drift: absent  Right leg tone: normal  Left leg " tone: normal    Strength   Right deltoid: 5/5  Left deltoid: 5/5  Right biceps: 5/5  Left biceps: 5/5  Right triceps: 5/5  Left triceps: 5/5  Right interossei: 5/5  Left interossei: 5/5  Right iliopsoas: 5/5  Left iliopsoas: 5/5  Right quadriceps: 5/5  Left quadriceps: 5/5  Right anterior tibial: 5/5  Left anterior tibial: 5/5  Right gastroc: 5/5  Left gastroc: 5/5Right EHL 5/5   Left EHL 5/5     Sensory Exam   Light touch normal.   Proprioception normal.     Gait, Coordination, and Reflexes     Gait  Gait: normal    Coordination   Finger to nose coordination: normal  Heel to shin coordination: normal  Tandem walking coordination: normal    Reflexes   Right brachioradialis: 2+  Left brachioradialis: 2+  Right biceps: 2+  Left biceps: 2+  Right triceps: 2+  Left triceps: 2+  Right patellar: 2+  Left patellar: 2+  Right achilles: 2+  Left achilles: 2+  Right plantar: normal  Left plantar: normal  Right Ozuna: absent  Left Ozuna: absent  Right ankle clonus: absent  Left ankle clonus: absent    (12 bullet pts)    Results Review:   No radiology results for the last 30 days.                       [id number] right planum sphenoidale meningioma.  Mild surrounding edema based on noncontrast MRI.     ASSESSMENT and PLAN  Hai Hernandez is a 77 y.o. male with a significant comorbidity of acephalic migraines, thyroid disease status post radiation as a child, basal cell and squamous cell carcinoma of the skin. He presents in FU for known meningioma found on workup for right visual field changes. Physical exam findings of neurologically intact with resolution of all symptoms and mild decreased vision in right eye.  His imaging shows 22 x 24 x 13.5 mm right planum sphenoidale mass most suggestive of meningioma.     Meningioma, right planum sphenoidale meningioma  Right progressive vision loss  Differential diagnosis includes meningioma.  Without biopsy cannot exclude other neoplasms including hemangiopericytoma.        Prognosis:  These are typically slow growing extra-axial tumors.  They are usually benign and arise from the arachnoid.  32% of incidentally discovered meningiomas do not grow over a 3-year follow-up.  Meningiomas with calcification and or hypodensity on T2 weighted MRI appear to be slower growing.  These lesions typically grow at a rate of 1 mm per year.  5-year survival rate is 91.3%.     Treatment: Treatment options include serial observation versus surgical resection versus radiation.     Surgical indications include   - documented growth on serial imaging   - symptoms referrable to the lesion,   - or suspicion of increased grade (WHO grade 2: Choroidal, clear cell, atypical, WHO grade 3: Papillary, rapidly, anaplastic) in the tumor as determined by surrounding edema.  Perioperative morbidity rate is statistically significantly higher in patients older than 70 (23%) versus younger than 70 (3.5%)     Radiotherapy is considered for patients who are not surgical candidates are of deep seeded lesions or for recurrent meningiomas or for atypical or malignant meningiomas after subtotal resection of first recurrence.  Retrospective analysis with follow-up of 5 to 15 years from [facility] revealed recurrence rate of 4% and totally resected meningiomas, 60% for partially resected meningiomas without XRT and 32% for partially resected meningiomas with XRT.     - Return I discussed the risk benefits and possible complications of surgical intervention versus serial management with MRIs.  Unfortunately the proximity to the optic nerve he is not a SRS candidate.  Given his decreased vision in his right I am most concerned for neoplastic compression of his right optic nerve.  However I would like confirmation by Dr. Gutiérrez.  Regardless he still meets the criteria for surgery based on his perilesional edema which gives me concerned that this might be a WHO grade 2 atypical meningioma.  Furthermore his current meningioma lacks  calcifications which suggest a more rapid growth curve.  Based on these findings he would like to proceed with surgical intervention we will plan for right craniotomy with stereotactic resection and decompression of the optic nerve.  In the interim I would like to obtain Dr. Gutiérrez notes particularly in regards to any visual fields testing that might have occurred.     Obesity Counseling  Never smoker  Hai's Body mass index is 35.05 kg/m²..  We spent 1 minutes in weight management counseling including discussing current weight, current diet, and exercise patterns.  Additional we set goals for weight reduction.  Therefore above normal parameters. Recommendations include: educational material and exercise counseling.       1. Meningioma (HCC)    2. Class 1 obesity due to excess calories with serious comorbidity and body mass index (BMI) of 34.0 to 34.9 in adult    3. Nonsmoker        Recommendations:  Diagnoses and all orders for this visit:    1. Meningioma (HCC) (Primary)  -     Ambulatory Referral to Family Practice  -     COVID PRE-OP / PRE-PROCEDURE SCREENING ORDER (NO ISOLATION) - Swab, Nasopharynx; Future  -     Case Request; Standing  -     CBC (No Diff); Future  -     Comprehensive Metabolic Panel; Future  -     Type & Screen; Future  -     ECG 12 Lead; Future  -     XR Chest 1 View; Future  -     Case Request    2. Class 1 obesity due to excess calories with serious comorbidity and body mass index (BMI) of 34.0 to 34.9 in adult    3. Nonsmoker    Other orders  -     Follow Anesthesia Guidelines / Protocol; Future  -     Obtain Informed Consent; Future  -     Provide NPO Instructions to Patient; Future  -     Chlorhexidine Skin Prep; Future  -     chlorhexidine (HIBICLENS) 4 % external liquid; Shower each day with solution for 5 days beginning 5 days before surgery.  Dispense: 120 mL; Refill: 0        Return for FOLLOWUP AFTER SURGERY.    I spent 85 minutes caring for Hai on this date of service. This  time includes time spent by me in the following activities: preparing for the visit, reviewing tests, obtaining and/or reviewing a separately obtained history, performing a medically appropriate examination and/or evaluation, counseling and educating the patient/family/caregiver, ordering medications, tests, or procedures, referring and communicating with other health care professionals, documenting information in the medical record, independently interpreting results and communicating that information with the patient/family/caregiver, and/or care coordination.       Thank you, for allowing me to continue to participate in the care of this patient.    Sincerely,  Martell Downs MD

## 2022-04-07 RX ORDER — LISINOPRIL 20 MG/1
20 TABLET ORAL DAILY
COMMUNITY

## 2022-04-07 RX ORDER — CHLORHEXIDINE GLUCONATE 4 G/100ML
SOLUTION TOPICAL
Qty: 120 ML | Refills: 0 | Status: ON HOLD | OUTPATIENT
Start: 2022-04-07 | End: 2022-05-10

## 2022-04-18 RX ORDER — DEXAMETHASONE 4 MG/1
4 TABLET ORAL EVERY 12 HOURS
Qty: 60 TABLET | Refills: 0 | Status: ON HOLD | OUTPATIENT
Start: 2022-04-18 | End: 2022-05-10

## 2022-04-20 ENCOUNTER — PRE-ADMISSION TESTING (OUTPATIENT)
Dept: PREADMISSION TESTING | Facility: HOSPITAL | Age: 77
End: 2022-04-20

## 2022-04-20 ENCOUNTER — HOSPITAL ENCOUNTER (OUTPATIENT)
Dept: GENERAL RADIOLOGY | Facility: HOSPITAL | Age: 77
Discharge: HOME | End: 2022-04-20

## 2022-04-20 VITALS
BODY MASS INDEX: 35.96 KG/M2 | WEIGHT: 256.84 LBS | OXYGEN SATURATION: 97 % | RESPIRATION RATE: 20 BRPM | DIASTOLIC BLOOD PRESSURE: 87 MMHG | HEIGHT: 71 IN | SYSTOLIC BLOOD PRESSURE: 163 MMHG | HEART RATE: 102 BPM

## 2022-04-20 DIAGNOSIS — D32.9 MENINGIOMA: ICD-10-CM

## 2022-04-20 LAB
ALBUMIN SERPL-MCNC: 4 G/DL (ref 3.5–5.2)
ALBUMIN/GLOB SERPL: 1.1 G/DL
ALP SERPL-CCNC: 71 U/L (ref 39–117)
ALT SERPL W P-5'-P-CCNC: 21 U/L (ref 1–41)
ANION GAP SERPL CALCULATED.3IONS-SCNC: 15 MMOL/L (ref 5–15)
AST SERPL-CCNC: 22 U/L (ref 1–40)
BILIRUB SERPL-MCNC: 0.4 MG/DL (ref 0–1.2)
BUN SERPL-MCNC: 14 MG/DL (ref 8–23)
BUN/CREAT SERPL: 13.6 (ref 7–25)
CALCIUM SPEC-SCNC: 9.3 MG/DL (ref 8.6–10.5)
CHLORIDE SERPL-SCNC: 101 MMOL/L (ref 98–107)
CO2 SERPL-SCNC: 22 MMOL/L (ref 22–29)
CREAT SERPL-MCNC: 1.03 MG/DL (ref 0.76–1.27)
DEPRECATED RDW RBC AUTO: 41.7 FL (ref 37–54)
EGFRCR SERPLBLD CKD-EPI 2021: 74.8 ML/MIN/1.73
ERYTHROCYTE [DISTWIDTH] IN BLOOD BY AUTOMATED COUNT: 12.4 % (ref 12.3–15.4)
GLOBULIN UR ELPH-MCNC: 3.7 GM/DL
GLUCOSE SERPL-MCNC: 288 MG/DL (ref 65–99)
HCT VFR BLD AUTO: 45.8 % (ref 37.5–51)
HGB BLD-MCNC: 15.6 G/DL (ref 13–17.7)
MCH RBC QN AUTO: 31.5 PG (ref 26.6–33)
MCHC RBC AUTO-ENTMCNC: 34.1 G/DL (ref 31.5–35.7)
MCV RBC AUTO: 92.3 FL (ref 79–97)
PLATELET # BLD AUTO: 217 10*3/MM3 (ref 140–450)
PMV BLD AUTO: 9.9 FL (ref 6–12)
POTASSIUM SERPL-SCNC: 4.3 MMOL/L (ref 3.5–5.2)
PROT SERPL-MCNC: 7.7 G/DL (ref 6–8.5)
RBC # BLD AUTO: 4.96 10*6/MM3 (ref 4.14–5.8)
SODIUM SERPL-SCNC: 138 MMOL/L (ref 136–145)
WBC NRBC COR # BLD: 5.39 10*3/MM3 (ref 3.4–10.8)

## 2022-04-20 PROCEDURE — 80053 COMPREHEN METABOLIC PANEL: CPT

## 2022-04-20 PROCEDURE — 93010 ELECTROCARDIOGRAM REPORT: CPT | Performed by: INTERNAL MEDICINE

## 2022-04-20 PROCEDURE — 85027 COMPLETE CBC AUTOMATED: CPT

## 2022-04-20 PROCEDURE — 93005 ELECTROCARDIOGRAM TRACING: CPT

## 2022-04-20 PROCEDURE — 71045 X-RAY EXAM CHEST 1 VIEW: CPT

## 2022-04-20 PROCEDURE — 36415 COLL VENOUS BLD VENIPUNCTURE: CPT

## 2022-04-20 NOTE — DISCHARGE INSTRUCTIONS
Before you come to the hospital        Arrival time: AS DIRECTED BY OFFICE     YOU MAY TAKE THE FOLLOWING MEDICATION(S) THE MORNING OF SURGERY WITH A SIP OF WATER: NONE    **HOLD LISINOPRIL FOR 24 HOURS PRIOR TO SURGERY**     ALL OTHER HOME MEDICATION CHECK WITH YOUR PHYSICIAN (especially if you are taking diabetes medicines or blood thinners)    Do not take any Erectile Dysfunction medications (EX: CIALIS, VIAGRA) 24 hours prior to surgery      If you were given and instructed to use a germ- killing soap, use as directed the night before surgery and the morning of surgery before coming to the hospital.       Eating and drinking restrictions  (It is extremely important that you follow these guidelines to prevent delay or cancelation of their procedure)   Up to 2 hours prior to the time you are told to arrive to the hospital - you may continue to drink clear liquids, such as water, clear fruit juice (no pulp), black coffee, and plain tea.          Eating and drinking restrictions prior to scheduled arrival time  Clear liquids (water, apple juice-no pulp)                       2 hours   Breast milk                           4 hours   Milk or drinks that contain milk, full liquids           6 hours   Light meals or foods, such as toast or cereal                6 hours   Heavy foods, such as meat, fried foods or fatty foods   8 hours       Clear Liquids  Water and flavored water                                                                       Sugar water.  Ice or frozen ice pops.  Clear Fruit juices, such as cranberry juice and apple juice.  Black coffee.  Plain tea  Clear bouillon or broth.  Broth-based soups that have been strained.  Flavored gelatin.  Soda.  Gatorade or Powerade.  Full liquid examples  Juices that have pulp.  Frozen ice pops that contain fruit pieces.  Coffee with creamer  Milk.  Cream or cream-based soups.  Yogurt.              MANAGING PAIN AFTER SURGERY    We know you are probably wondering  what your pain will be like after surgery.  Following surgery it is unrealistic to expect you will not have pain.   Pain is how our bodies let us know that something is wrong or cautions us to be careful.  That said, our goal is to make your pain tolerable.    Methods we may use to treat your pain include (oral or IV medications, PCAs, epidurals, nerve blocks, etc.)   While some procedures require IV pain medications for a short time after surgery, transitioning to pain medications by mouth allows for better management of pain.   Your nurse will encourage you to take oral pain medications whenever possible.  IV medications work almost immediately, but only last a short while.  Taking medications by mouth allows for a more constant level of medication in your blood stream for a longer period of time.      Once your pain is out of control it is harder to get back under control.  It is important you are aware when your next dose of pain medication is due.  If you are admitted, your nurse may write the time of your next dose on the white board in your room to help you remember.      We are interested in your pain and encourage you to inform us about aggravating factors during your visit.   Many times a simple repositioning every few hours can make a big difference.    If your physician says it is okay, do not let your pain prevent you from getting out of bed. Be sure to call your nurse for assistance prior to getting up so you do not fall.      Before surgery, please decide your tolerable pain goal.  These faces help describe the pain ratings we use on a 0-10 scale.   Be prepared to tell us your goal and whether or not you take pain or anxiety medications at home.

## 2022-04-22 LAB
QT INTERVAL: 342 MS
QTC INTERVAL: 425 MS

## 2022-05-04 DIAGNOSIS — Z01.818 PREOP TESTING: Primary | ICD-10-CM

## 2022-05-05 ENCOUNTER — TRANSCRIBE ORDERS (OUTPATIENT)
Dept: ADMINISTRATIVE | Facility: HOSPITAL | Age: 77
End: 2022-05-05

## 2022-05-05 DIAGNOSIS — I10 UNCONTROLLED HYPERTENSION: ICD-10-CM

## 2022-05-05 DIAGNOSIS — I44.0 FIRST DEGREE ATRIOVENTRICULAR BLOCK: ICD-10-CM

## 2022-05-05 DIAGNOSIS — R06.02 SOB (SHORTNESS OF BREATH): ICD-10-CM

## 2022-05-05 DIAGNOSIS — Z01.818 OTHER SPECIFIED PRE-OPERATIVE EXAMINATION: Primary | ICD-10-CM

## 2022-05-06 ENCOUNTER — HOSPITAL ENCOUNTER (OUTPATIENT)
Dept: CARDIOLOGY | Facility: HOSPITAL | Age: 77
Discharge: HOME | End: 2022-05-06

## 2022-05-06 VITALS
DIASTOLIC BLOOD PRESSURE: 88 MMHG | WEIGHT: 255.73 LBS | BODY MASS INDEX: 35.8 KG/M2 | SYSTOLIC BLOOD PRESSURE: 153 MMHG | HEIGHT: 71 IN | HEART RATE: 55 BPM

## 2022-05-06 DIAGNOSIS — I44.0 FIRST DEGREE ATRIOVENTRICULAR BLOCK: ICD-10-CM

## 2022-05-06 DIAGNOSIS — R06.02 SOB (SHORTNESS OF BREATH): ICD-10-CM

## 2022-05-06 DIAGNOSIS — Z01.818 OTHER SPECIFIED PRE-OPERATIVE EXAMINATION: ICD-10-CM

## 2022-05-06 DIAGNOSIS — I10 UNCONTROLLED HYPERTENSION: ICD-10-CM

## 2022-05-06 LAB
BH CV NUCLEAR PRIOR STUDY: 3
BH CV REST NUCLEAR ISOTOPE DOSE: 11.2 MCI
BH CV STRESS BP STAGE 1: NORMAL
BH CV STRESS COMMENTS STAGE 1: NORMAL
BH CV STRESS DOSE REGADENOSON STAGE 1: 0.4
BH CV STRESS DURATION MIN STAGE 1: 0
BH CV STRESS DURATION SEC STAGE 1: 10
BH CV STRESS HR STAGE 1: 84
BH CV STRESS NUCLEAR ISOTOPE DOSE: 34 MCI
BH CV STRESS PROTOCOL 1: NORMAL
BH CV STRESS RECOVERY BP: NORMAL MMHG
BH CV STRESS RECOVERY HR: 64 BPM
BH CV STRESS STAGE 1: 1
LV EF NUC BP: 75 %
MAXIMAL PREDICTED HEART RATE: 143 BPM
PERCENT MAX PREDICTED HR: 58.74 %
STRESS BASELINE BP: NORMAL MMHG
STRESS BASELINE HR: 55 BPM
STRESS PERCENT HR: 69 %
STRESS POST EXERCISE DUR MIN: 0 MIN
STRESS POST EXERCISE DUR SEC: 10 SEC
STRESS POST PEAK BP: NORMAL MMHG
STRESS POST PEAK HR: 84 BPM
STRESS TARGET HR: 122 BPM

## 2022-05-06 PROCEDURE — 93017 CV STRESS TEST TRACING ONLY: CPT

## 2022-05-06 PROCEDURE — 25010000002 REGADENOSON 0.4 MG/5ML SOLUTION: Performed by: INTERNAL MEDICINE

## 2022-05-06 PROCEDURE — 93018 CV STRESS TEST I&R ONLY: CPT | Performed by: INTERNAL MEDICINE

## 2022-05-06 PROCEDURE — 78452 HT MUSCLE IMAGE SPECT MULT: CPT

## 2022-05-06 PROCEDURE — A9500 TC99M SESTAMIBI: HCPCS | Performed by: INTERNAL MEDICINE

## 2022-05-06 PROCEDURE — 78452 HT MUSCLE IMAGE SPECT MULT: CPT | Performed by: INTERNAL MEDICINE

## 2022-05-06 PROCEDURE — 0 TECHNETIUM SESTAMIBI: Performed by: INTERNAL MEDICINE

## 2022-05-06 RX ADMIN — TECHNETIUM TC 99M SESTAMIBI 1 DOSE: 1 INJECTION INTRAVENOUS at 11:04

## 2022-05-06 RX ADMIN — TECHNETIUM TC 99M SESTAMIBI 1 DOSE: 1 INJECTION INTRAVENOUS at 12:22

## 2022-05-06 RX ADMIN — REGADENOSON 0.4 MG: 0.08 INJECTION, SOLUTION INTRAVENOUS at 12:14

## 2022-05-10 ENCOUNTER — HOSPITAL ENCOUNTER (INPATIENT)
Facility: HOSPITAL | Age: 77
LOS: 41 days | Discharge: NURSING, REHAB OR LONG-TERM CARE | End: 2022-06-20
Attending: NEUROLOGICAL SURGERY | Admitting: NEUROLOGICAL SURGERY

## 2022-05-10 ENCOUNTER — ANESTHESIA EVENT (OUTPATIENT)
Dept: PERIOP | Facility: HOSPITAL | Age: 77
End: 2022-05-10

## 2022-05-10 ENCOUNTER — APPOINTMENT (OUTPATIENT)
Dept: GENERAL RADIOLOGY | Facility: HOSPITAL | Age: 77
End: 2022-05-10

## 2022-05-10 ENCOUNTER — APPOINTMENT (OUTPATIENT)
Dept: CT IMAGING | Facility: HOSPITAL | Age: 77
End: 2022-05-10

## 2022-05-10 ENCOUNTER — ANESTHESIA (OUTPATIENT)
Dept: PERIOP | Facility: HOSPITAL | Age: 77
End: 2022-05-10

## 2022-05-10 DIAGNOSIS — R13.10 DYSPHAGIA, UNSPECIFIED TYPE: Primary | ICD-10-CM

## 2022-05-10 DIAGNOSIS — G91.0 COMMUNICATING HYDROCEPHALUS: ICD-10-CM

## 2022-05-10 DIAGNOSIS — Z78.9 DECREASED ACTIVITIES OF DAILY LIVING (ADL): ICD-10-CM

## 2022-05-10 DIAGNOSIS — J96.01 ACUTE RESPIRATORY FAILURE WITH HYPOXIA: ICD-10-CM

## 2022-05-10 DIAGNOSIS — Z74.09 IMPAIRED MOBILITY: ICD-10-CM

## 2022-05-10 DIAGNOSIS — D32.9 MENINGIOMA: ICD-10-CM

## 2022-05-10 DIAGNOSIS — G40.901 STATUS EPILEPTICUS: ICD-10-CM

## 2022-05-10 DIAGNOSIS — T81.40XA POSTOPERATIVE INFECTION, UNSPECIFIED TYPE, INITIAL ENCOUNTER: ICD-10-CM

## 2022-05-10 DIAGNOSIS — Z78.9 DIFFICULT INTRAVENOUS ACCESS: ICD-10-CM

## 2022-05-10 DIAGNOSIS — Z01.818 PREOP TESTING: ICD-10-CM

## 2022-05-10 LAB
A-A DO2: 323.9 MMHG
ABO GROUP BLD: NORMAL
ARTERIAL PATENCY WRIST A: ABNORMAL
ATMOSPHERIC PRESS: 753 MMHG
BASE EXCESS BLDA CALC-SCNC: 0.3 MMOL/L (ref 0–2)
BDY SITE: ABNORMAL
BLD GP AB SCN SERPL QL: NEGATIVE
BODY TEMPERATURE: 37.7 C
CA-I BLD-MCNC: 4.28 MG/DL (ref 4.6–5.4)
COHGB MFR BLD: 1.4 % (ref 0–5)
HCO3 BLDA-SCNC: 24.1 MMOL/L (ref 20–26)
HCT VFR BLD CALC: 40.6 % (ref 38–51)
HGB BLDA-MCNC: 13.3 G/DL (ref 14–18)
INHALED O2 CONCENTRATION: 100 %
Lab: ABNORMAL
METHGB BLD QL: 0.9 % (ref 0–3)
MODALITY: ABNORMAL
NOTE: ABNORMAL
OXYHGB MFR BLDV: 98.3 % (ref 94–99)
PCO2 BLDA: 35.6 MM HG (ref 35–45)
PCO2 TEMP ADJ BLD: 36.7 MM HG (ref 35–45)
PH BLDA: 7.44 PH UNITS (ref 7.35–7.45)
PH, TEMP CORRECTED: 7.43 PH UNITS (ref 7.35–7.45)
PO2 BLDA: 347 MM HG (ref 83–108)
PO2 TEMP ADJ BLD: 350 MM HG (ref 83–108)
POTASSIUM BLDA-SCNC: 4.9 MMOL/L (ref 3.5–5.2)
RH BLD: POSITIVE
SAO2 % BLDCOA: >100.1 % (ref 94–99)
SARS-COV-2 RNA PNL SPEC NAA+PROBE: NOT DETECTED
SODIUM BLDA-SCNC: 133 MMOL/L (ref 136–145)
T&S EXPIRATION DATE: NORMAL
VENTILATOR MODE: ABNORMAL

## 2022-05-10 PROCEDURE — 25010000002 HYDRALAZINE PER 20 MG: Performed by: NURSE PRACTITIONER

## 2022-05-10 PROCEDURE — 25010000002 DEXAMETHASONE PER 1 MG: Performed by: ANESTHESIOLOGY

## 2022-05-10 PROCEDURE — 61781 SCAN PROC CRANIAL INTRA: CPT | Performed by: NEUROLOGICAL SURGERY

## 2022-05-10 PROCEDURE — 82375 ASSAY CARBOXYHB QUANT: CPT

## 2022-05-10 PROCEDURE — C1713 ANCHOR/SCREW BN/BN,TIS/BN: HCPCS | Performed by: NEUROLOGICAL SURGERY

## 2022-05-10 PROCEDURE — 83050 HGB METHEMOGLOBIN QUAN: CPT

## 2022-05-10 PROCEDURE — 25010000002 HYDROMORPHONE 1 MG/ML SOLUTION: Performed by: NURSE ANESTHETIST, CERTIFIED REGISTERED

## 2022-05-10 PROCEDURE — C1751 CATH, INF, PER/CENT/MIDLINE: HCPCS | Performed by: NURSE ANESTHETIST, CERTIFIED REGISTERED

## 2022-05-10 PROCEDURE — 25010000002 EPINEPHRINE 1 MG/ML SOLUTION 1 ML AMPULE: Performed by: NEUROLOGICAL SURGERY

## 2022-05-10 PROCEDURE — 0 LEVETIRACETAM IN NACL 0.75% 1000 MG/100ML SOLUTION: Performed by: NURSE ANESTHETIST, CERTIFIED REGISTERED

## 2022-05-10 PROCEDURE — 25010000002 DEXAMETHASONE PER 1 MG: Performed by: NURSE ANESTHETIST, CERTIFIED REGISTERED

## 2022-05-10 PROCEDURE — 87635 SARS-COV-2 COVID-19 AMP PRB: CPT | Performed by: NURSE PRACTITIONER

## 2022-05-10 PROCEDURE — 25010000002 CEFAZOLIN PER 500 MG: Performed by: NURSE PRACTITIONER

## 2022-05-10 PROCEDURE — 69990 MICROSURGERY ADD-ON: CPT | Performed by: NEUROLOGICAL SURGERY

## 2022-05-10 PROCEDURE — 32551 INSERTION OF CHEST TUBE: CPT | Performed by: SURGERY

## 2022-05-10 PROCEDURE — 25010000002 SODIUM CHLORIDE 0.9 % WITH KCL 20 MEQ 20-0.9 MEQ/L-% SOLUTION: Performed by: NURSE PRACTITIONER

## 2022-05-10 PROCEDURE — 86901 BLOOD TYPING SEROLOGIC RH(D): CPT | Performed by: NEUROLOGICAL SURGERY

## 2022-05-10 PROCEDURE — 88307 TISSUE EXAM BY PATHOLOGIST: CPT | Performed by: NEUROLOGICAL SURGERY

## 2022-05-10 PROCEDURE — 25010000002 LEVETIRACETAM IN NACL 0.82% 500 MG/100ML SOLUTION: Performed by: NURSE PRACTITIONER

## 2022-05-10 PROCEDURE — 88325 CONSLTJ COMPRE RVW REC REPRT: CPT

## 2022-05-10 PROCEDURE — 70450 CT HEAD/BRAIN W/O DYE: CPT

## 2022-05-10 PROCEDURE — 25010000002 IOPAMIDOL 61 % SOLUTION: Performed by: NEUROLOGICAL SURGERY

## 2022-05-10 PROCEDURE — 86850 RBC ANTIBODY SCREEN: CPT | Performed by: NEUROLOGICAL SURGERY

## 2022-05-10 PROCEDURE — 00C00ZZ EXTIRPATION OF MATTER FROM BRAIN, OPEN APPROACH: ICD-10-PCS | Performed by: NEUROLOGICAL SURGERY

## 2022-05-10 PROCEDURE — 25010000002 CEFAZOLIN PER 500 MG: Performed by: NEUROLOGICAL SURGERY

## 2022-05-10 PROCEDURE — 86900 BLOOD TYPING SEROLOGIC ABO: CPT | Performed by: NEUROLOGICAL SURGERY

## 2022-05-10 PROCEDURE — 61512 CRNEC TREPH EXC MNGIOMA STTL: CPT | Performed by: NEUROLOGICAL SURGERY

## 2022-05-10 PROCEDURE — 82805 BLOOD GASES W/O2 SATURATION: CPT

## 2022-05-10 PROCEDURE — 94799 UNLISTED PULMONARY SVC/PX: CPT

## 2022-05-10 PROCEDURE — 25010000002 DEXAMETHASONE PER 1 MG: Performed by: NURSE PRACTITIONER

## 2022-05-10 PROCEDURE — 25010000002 ONDANSETRON PER 1 MG: Performed by: NURSE ANESTHETIST, CERTIFIED REGISTERED

## 2022-05-10 PROCEDURE — 94761 N-INVAS EAR/PLS OXIMETRY MLT: CPT

## 2022-05-10 PROCEDURE — S0260 H&P FOR SURGERY: HCPCS | Performed by: NEUROLOGICAL SURGERY

## 2022-05-10 PROCEDURE — 70496 CT ANGIOGRAPHY HEAD: CPT

## 2022-05-10 PROCEDURE — 88321 CONSLTJ&REPRT SLD PREP ELSWR: CPT | Performed by: NEUROLOGICAL SURGERY

## 2022-05-10 PROCEDURE — 99024 POSTOP FOLLOW-UP VISIT: CPT | Performed by: NURSE PRACTITIONER

## 2022-05-10 PROCEDURE — 71045 X-RAY EXAM CHEST 1 VIEW: CPT

## 2022-05-10 PROCEDURE — 25010000002 PROPOFOL 10 MG/ML EMULSION: Performed by: NURSE ANESTHETIST, CERTIFIED REGISTERED

## 2022-05-10 PROCEDURE — C1769 GUIDE WIRE: HCPCS | Performed by: NEUROLOGICAL SURGERY

## 2022-05-10 PROCEDURE — 25010000002 PAPAVERINE PER 60 MG: Performed by: NEUROLOGICAL SURGERY

## 2022-05-10 PROCEDURE — C1889 IMPLANT/INSERT DEVICE, NOC: HCPCS | Performed by: NEUROLOGICAL SURGERY

## 2022-05-10 DEVICE — KT HEMOST ABS SURGIFOAM PORCN 1GRAM: Type: IMPLANTABLE DEVICE | Site: CRANIAL | Status: FUNCTIONAL

## 2022-05-10 DEVICE — SCRW NEURO LEFORTE SLF/DRL 1.5X4MM: Type: IMPLANTABLE DEVICE | Site: CRANIAL | Status: FUNCTIONAL

## 2022-05-10 DEVICE — IMPLANTABLE DEVICE: Type: IMPLANTABLE DEVICE | Site: CRANIAL | Status: FUNCTIONAL

## 2022-05-10 DEVICE — DURAGEN® PLUS DURAL REGENERATION MATRIX, 3 IN X 3 IN (7.5 CM X 7.5 CM)
Type: IMPLANTABLE DEVICE | Site: CRANIAL | Status: FUNCTIONAL
Brand: DURAGEN® PLUS

## 2022-05-10 RX ORDER — LIDOCAINE HYDROCHLORIDE 10 MG/ML
0.5 INJECTION, SOLUTION EPIDURAL; INFILTRATION; INTRACAUDAL; PERINEURAL ONCE AS NEEDED
Status: DISCONTINUED | OUTPATIENT
Start: 2022-05-10 | End: 2022-05-10 | Stop reason: HOSPADM

## 2022-05-10 RX ORDER — HYDRALAZINE HYDROCHLORIDE 20 MG/ML
10 INJECTION INTRAMUSCULAR; INTRAVENOUS EVERY 6 HOURS PRN
Status: DISCONTINUED | OUTPATIENT
Start: 2022-05-10 | End: 2022-06-20 | Stop reason: HOSPADM

## 2022-05-10 RX ORDER — SODIUM CHLORIDE 0.9 % (FLUSH) 0.9 %
3 SYRINGE (ML) INJECTION EVERY 12 HOURS SCHEDULED
Status: DISCONTINUED | OUTPATIENT
Start: 2022-05-10 | End: 2022-05-10 | Stop reason: HOSPADM

## 2022-05-10 RX ORDER — SODIUM CHLORIDE 0.9 % (FLUSH) 0.9 %
10 SYRINGE (ML) INJECTION AS NEEDED
Status: DISCONTINUED | OUTPATIENT
Start: 2022-05-10 | End: 2022-05-10 | Stop reason: HOSPADM

## 2022-05-10 RX ORDER — BUTALBITAL, ACETAMINOPHEN AND CAFFEINE 50; 325; 40 MG/1; MG/1; MG/1
1 TABLET ORAL EVERY 4 HOURS PRN
Status: DISCONTINUED | OUTPATIENT
Start: 2022-05-10 | End: 2022-06-10

## 2022-05-10 RX ORDER — SODIUM CHLORIDE 0.9 % (FLUSH) 0.9 %
3-10 SYRINGE (ML) INJECTION AS NEEDED
Status: DISCONTINUED | OUTPATIENT
Start: 2022-05-10 | End: 2022-05-10 | Stop reason: HOSPADM

## 2022-05-10 RX ORDER — PAPAVERINE HYDROCHLORIDE 30 MG/ML
INJECTION INTRAMUSCULAR; INTRAVENOUS AS NEEDED
Status: DISCONTINUED | OUTPATIENT
Start: 2022-05-10 | End: 2022-05-10 | Stop reason: HOSPADM

## 2022-05-10 RX ORDER — MANNITOL 20 G/100ML
INJECTION, SOLUTION INTRAVENOUS CONTINUOUS PRN
Status: DISCONTINUED | OUTPATIENT
Start: 2022-05-10 | End: 2022-05-10 | Stop reason: SURG

## 2022-05-10 RX ORDER — DEXAMETHASONE SODIUM PHOSPHATE 4 MG/ML
4 INJECTION, SOLUTION INTRA-ARTICULAR; INTRALESIONAL; INTRAMUSCULAR; INTRAVENOUS; SOFT TISSUE EVERY 6 HOURS
Status: DISCONTINUED | OUTPATIENT
Start: 2022-05-10 | End: 2022-05-11

## 2022-05-10 RX ORDER — OXYCODONE AND ACETAMINOPHEN 10; 325 MG/1; MG/1
1 TABLET ORAL ONCE AS NEEDED
Status: DISCONTINUED | OUTPATIENT
Start: 2022-05-10 | End: 2022-05-10 | Stop reason: HOSPADM

## 2022-05-10 RX ORDER — FENTANYL CITRATE 50 UG/ML
25 INJECTION, SOLUTION INTRAMUSCULAR; INTRAVENOUS
Status: DISCONTINUED | OUTPATIENT
Start: 2022-05-10 | End: 2022-05-10 | Stop reason: HOSPADM

## 2022-05-10 RX ORDER — SODIUM CHLORIDE 0.9 % (FLUSH) 0.9 %
3 SYRINGE (ML) INJECTION AS NEEDED
Status: DISCONTINUED | OUTPATIENT
Start: 2022-05-10 | End: 2022-05-10 | Stop reason: HOSPADM

## 2022-05-10 RX ORDER — LABETALOL HYDROCHLORIDE 5 MG/ML
10 INJECTION, SOLUTION INTRAVENOUS EVERY 6 HOURS PRN
Status: COMPLETED | OUTPATIENT
Start: 2022-05-10 | End: 2022-05-12

## 2022-05-10 RX ORDER — ROCURONIUM BROMIDE 10 MG/ML
INJECTION, SOLUTION INTRAVENOUS AS NEEDED
Status: DISCONTINUED | OUTPATIENT
Start: 2022-05-10 | End: 2022-05-10 | Stop reason: SURG

## 2022-05-10 RX ORDER — NALOXONE HCL 0.4 MG/ML
0.04 VIAL (ML) INJECTION AS NEEDED
Status: DISCONTINUED | OUTPATIENT
Start: 2022-05-10 | End: 2022-05-10 | Stop reason: HOSPADM

## 2022-05-10 RX ORDER — SODIUM CHLORIDE, SODIUM LACTATE, POTASSIUM CHLORIDE, CALCIUM CHLORIDE 600; 310; 30; 20 MG/100ML; MG/100ML; MG/100ML; MG/100ML
1000 INJECTION, SOLUTION INTRAVENOUS CONTINUOUS
Status: DISCONTINUED | OUTPATIENT
Start: 2022-05-10 | End: 2022-05-10

## 2022-05-10 RX ORDER — LEVOTHYROXINE SODIUM 0.12 MG/1
125 TABLET ORAL
Status: DISCONTINUED | OUTPATIENT
Start: 2022-05-11 | End: 2022-05-14

## 2022-05-10 RX ORDER — SODIUM CHLORIDE AND POTASSIUM CHLORIDE 150; 900 MG/100ML; MG/100ML
75 INJECTION, SOLUTION INTRAVENOUS CONTINUOUS
Status: DISCONTINUED | OUTPATIENT
Start: 2022-05-10 | End: 2022-05-11

## 2022-05-10 RX ORDER — LIDOCAINE HYDROCHLORIDE 40 MG/ML
SOLUTION TOPICAL AS NEEDED
Status: DISCONTINUED | OUTPATIENT
Start: 2022-05-10 | End: 2022-05-10 | Stop reason: SURG

## 2022-05-10 RX ORDER — ACETAMINOPHEN 325 MG/1
650 TABLET ORAL EVERY 4 HOURS PRN
Status: DISCONTINUED | OUTPATIENT
Start: 2022-05-10 | End: 2022-06-20 | Stop reason: HOSPADM

## 2022-05-10 RX ORDER — MAGNESIUM HYDROXIDE 1200 MG/15ML
LIQUID ORAL AS NEEDED
Status: DISCONTINUED | OUTPATIENT
Start: 2022-05-10 | End: 2022-05-10 | Stop reason: HOSPADM

## 2022-05-10 RX ORDER — OXYCODONE AND ACETAMINOPHEN 7.5; 325 MG/1; MG/1
1 TABLET ORAL EVERY 4 HOURS PRN
Status: ACTIVE | OUTPATIENT
Start: 2022-05-10 | End: 2022-05-17

## 2022-05-10 RX ORDER — LABETALOL HYDROCHLORIDE 5 MG/ML
5 INJECTION, SOLUTION INTRAVENOUS
Status: DISCONTINUED | OUTPATIENT
Start: 2022-05-10 | End: 2022-05-10 | Stop reason: HOSPADM

## 2022-05-10 RX ORDER — HYDROMORPHONE HYDROCHLORIDE 1 MG/ML
0.5 INJECTION, SOLUTION INTRAMUSCULAR; INTRAVENOUS; SUBCUTANEOUS
Status: DISCONTINUED | OUTPATIENT
Start: 2022-05-10 | End: 2022-05-10 | Stop reason: HOSPADM

## 2022-05-10 RX ORDER — ONDANSETRON 2 MG/ML
INJECTION INTRAMUSCULAR; INTRAVENOUS AS NEEDED
Status: DISCONTINUED | OUTPATIENT
Start: 2022-05-10 | End: 2022-05-10 | Stop reason: SURG

## 2022-05-10 RX ORDER — ONDANSETRON 2 MG/ML
4 INJECTION INTRAMUSCULAR; INTRAVENOUS EVERY 6 HOURS PRN
Status: DISCONTINUED | OUTPATIENT
Start: 2022-05-10 | End: 2022-06-20 | Stop reason: HOSPADM

## 2022-05-10 RX ORDER — NEOSTIGMINE METHYLSULFATE 5 MG/5 ML
SYRINGE (ML) INTRAVENOUS AS NEEDED
Status: DISCONTINUED | OUTPATIENT
Start: 2022-05-10 | End: 2022-05-10 | Stop reason: SURG

## 2022-05-10 RX ORDER — OXYCODONE AND ACETAMINOPHEN 10; 325 MG/1; MG/1
1 TABLET ORAL EVERY 4 HOURS PRN
Status: DISPENSED | OUTPATIENT
Start: 2022-05-10 | End: 2022-05-17

## 2022-05-10 RX ORDER — LISINOPRIL 20 MG/1
20 TABLET ORAL DAILY
Status: DISCONTINUED | OUTPATIENT
Start: 2022-05-10 | End: 2022-05-15

## 2022-05-10 RX ORDER — PROPOFOL 10 MG/ML
VIAL (ML) INTRAVENOUS AS NEEDED
Status: DISCONTINUED | OUTPATIENT
Start: 2022-05-10 | End: 2022-05-10 | Stop reason: SURG

## 2022-05-10 RX ORDER — LEVETIRACETAM 5 MG/ML
500 INJECTION INTRAVASCULAR EVERY 12 HOURS SCHEDULED
Status: DISCONTINUED | OUTPATIENT
Start: 2022-05-10 | End: 2022-05-13 | Stop reason: ALTCHOICE

## 2022-05-10 RX ORDER — LEVETIRACETAM 10 MG/ML
INJECTION INTRAVASCULAR AS NEEDED
Status: DISCONTINUED | OUTPATIENT
Start: 2022-05-10 | End: 2022-05-10 | Stop reason: SURG

## 2022-05-10 RX ORDER — DEXAMETHASONE SODIUM PHOSPHATE 4 MG/ML
INJECTION, SOLUTION INTRA-ARTICULAR; INTRALESIONAL; INTRAMUSCULAR; INTRAVENOUS; SOFT TISSUE AS NEEDED
Status: DISCONTINUED | OUTPATIENT
Start: 2022-05-10 | End: 2022-05-10 | Stop reason: SURG

## 2022-05-10 RX ORDER — ACETAMINOPHEN 650 MG/1
650 SUPPOSITORY RECTAL EVERY 4 HOURS PRN
Status: DISCONTINUED | OUTPATIENT
Start: 2022-05-10 | End: 2022-06-20 | Stop reason: HOSPADM

## 2022-05-10 RX ORDER — DROPERIDOL 2.5 MG/ML
0.62 INJECTION, SOLUTION INTRAMUSCULAR; INTRAVENOUS ONCE AS NEEDED
Status: DISCONTINUED | OUTPATIENT
Start: 2022-05-10 | End: 2022-05-10 | Stop reason: HOSPADM

## 2022-05-10 RX ORDER — ACETAMINOPHEN 500 MG
1000 TABLET ORAL ONCE
Status: COMPLETED | OUTPATIENT
Start: 2022-05-10 | End: 2022-05-10

## 2022-05-10 RX ORDER — DEXAMETHASONE SODIUM PHOSPHATE 4 MG/ML
4 INJECTION, SOLUTION INTRA-ARTICULAR; INTRALESIONAL; INTRAMUSCULAR; INTRAVENOUS; SOFT TISSUE ONCE AS NEEDED
Status: COMPLETED | OUTPATIENT
Start: 2022-05-10 | End: 2022-05-10

## 2022-05-10 RX ORDER — FLUMAZENIL 0.1 MG/ML
0.2 INJECTION INTRAVENOUS AS NEEDED
Status: DISCONTINUED | OUTPATIENT
Start: 2022-05-10 | End: 2022-05-10 | Stop reason: HOSPADM

## 2022-05-10 RX ORDER — BACITRACIN ZINC 500 [USP'U]/G
OINTMENT TOPICAL AS NEEDED
Status: DISCONTINUED | OUTPATIENT
Start: 2022-05-10 | End: 2022-05-10 | Stop reason: HOSPADM

## 2022-05-10 RX ORDER — ONDANSETRON 4 MG/1
4 TABLET, FILM COATED ORAL EVERY 6 HOURS PRN
Status: DISCONTINUED | OUTPATIENT
Start: 2022-05-10 | End: 2022-06-20 | Stop reason: HOSPADM

## 2022-05-10 RX ORDER — SODIUM CHLORIDE, SODIUM LACTATE, POTASSIUM CHLORIDE, CALCIUM CHLORIDE 600; 310; 30; 20 MG/100ML; MG/100ML; MG/100ML; MG/100ML
100 INJECTION, SOLUTION INTRAVENOUS CONTINUOUS
Status: DISCONTINUED | OUTPATIENT
Start: 2022-05-10 | End: 2022-05-10

## 2022-05-10 RX ORDER — LIDOCAINE HYDROCHLORIDE 20 MG/ML
INJECTION, SOLUTION EPIDURAL; INFILTRATION; INTRACAUDAL; PERINEURAL AS NEEDED
Status: DISCONTINUED | OUTPATIENT
Start: 2022-05-10 | End: 2022-05-10 | Stop reason: SURG

## 2022-05-10 RX ORDER — ONDANSETRON 2 MG/ML
4 INJECTION INTRAMUSCULAR; INTRAVENOUS AS NEEDED
Status: DISCONTINUED | OUTPATIENT
Start: 2022-05-10 | End: 2022-05-10 | Stop reason: HOSPADM

## 2022-05-10 RX ADMIN — HYDRALAZINE HYDROCHLORIDE 10 MG: 20 INJECTION INTRAMUSCULAR; INTRAVENOUS at 22:11

## 2022-05-10 RX ADMIN — MANNITOL: 20 INJECTION, SOLUTION INTRAVENOUS at 09:00

## 2022-05-10 RX ADMIN — REMIFENTANIL HYDROCHLORIDE 0.15 MCG/KG/MIN: 1 INJECTION, POWDER, LYOPHILIZED, FOR SOLUTION INTRAVENOUS at 08:17

## 2022-05-10 RX ADMIN — SODIUM CHLORIDE 7.5 MG/HR: 9 INJECTION, SOLUTION INTRAVENOUS at 19:54

## 2022-05-10 RX ADMIN — PROPOFOL 140 MG: 10 INJECTION, EMULSION INTRAVENOUS at 08:19

## 2022-05-10 RX ADMIN — LIDOCAINE HYDROCHLORIDE 60 MG: 20 INJECTION, SOLUTION EPIDURAL; INFILTRATION; INTRACAUDAL; PERINEURAL at 08:19

## 2022-05-10 RX ADMIN — ROCURONIUM BROMIDE 50 MG: 10 SOLUTION INTRAVENOUS at 08:19

## 2022-05-10 RX ADMIN — LIDOCAINE HYDROCHLORIDE 1 EACH: 40 SOLUTION TOPICAL at 08:21

## 2022-05-10 RX ADMIN — ROCURONIUM BROMIDE 25 MG: 10 SOLUTION INTRAVENOUS at 09:08

## 2022-05-10 RX ADMIN — ACETAMINOPHEN 1000 MG: 500 TABLET ORAL at 07:23

## 2022-05-10 RX ADMIN — OXYCODONE HYDROCHLORIDE AND ACETAMINOPHEN 1 TABLET: 10; 325 TABLET ORAL at 18:15

## 2022-05-10 RX ADMIN — DEXAMETHASONE SODIUM PHOSPHATE 10 MG: 4 INJECTION, SOLUTION INTRA-ARTICULAR; INTRALESIONAL; INTRAMUSCULAR; INTRAVENOUS; SOFT TISSUE at 09:00

## 2022-05-10 RX ADMIN — POTASSIUM CHLORIDE AND SODIUM CHLORIDE 75 ML/HR: 900; 150 INJECTION, SOLUTION INTRAVENOUS at 16:13

## 2022-05-10 RX ADMIN — SODIUM CHLORIDE 5 MG/HR: 9 INJECTION, SOLUTION INTRAVENOUS at 13:57

## 2022-05-10 RX ADMIN — HYDROMORPHONE HYDROCHLORIDE 1 MG: 1 INJECTION, SOLUTION INTRAMUSCULAR; INTRAVENOUS; SUBCUTANEOUS at 13:30

## 2022-05-10 RX ADMIN — IOPAMIDOL 100 ML: 612 INJECTION, SOLUTION INTRAVENOUS at 08:00

## 2022-05-10 RX ADMIN — SODIUM CHLORIDE, POTASSIUM CHLORIDE, SODIUM LACTATE AND CALCIUM CHLORIDE 1000 ML: 600; 310; 30; 20 INJECTION, SOLUTION INTRAVENOUS at 06:23

## 2022-05-10 RX ADMIN — Medication 2 G: at 08:33

## 2022-05-10 RX ADMIN — SODIUM CHLORIDE 7.5 MG/HR: 9 INJECTION, SOLUTION INTRAVENOUS at 17:48

## 2022-05-10 RX ADMIN — ONDANSETRON 4 MG: 2 INJECTION INTRAMUSCULAR; INTRAVENOUS at 13:06

## 2022-05-10 RX ADMIN — LISINOPRIL 20 MG: 20 TABLET ORAL at 16:17

## 2022-05-10 RX ADMIN — OXYCODONE HYDROCHLORIDE AND ACETAMINOPHEN 1 TABLET: 10; 325 TABLET ORAL at 22:11

## 2022-05-10 RX ADMIN — ROCURONIUM BROMIDE 10 MG: 10 SOLUTION INTRAVENOUS at 10:38

## 2022-05-10 RX ADMIN — HYDROMORPHONE HYDROCHLORIDE 0.5 MG: 1 INJECTION, SOLUTION INTRAMUSCULAR; INTRAVENOUS; SUBCUTANEOUS at 13:20

## 2022-05-10 RX ADMIN — CEFAZOLIN SODIUM 2 G: 10 INJECTION, POWDER, FOR SOLUTION INTRAVENOUS at 19:54

## 2022-05-10 RX ADMIN — Medication 2 G: at 12:31

## 2022-05-10 RX ADMIN — Medication 3 MG: at 13:33

## 2022-05-10 RX ADMIN — ROCURONIUM BROMIDE 5 MG: 10 SOLUTION INTRAVENOUS at 12:49

## 2022-05-10 RX ADMIN — LEVETIRACETAM 1000 MG: 10 INJECTION INTRAVENOUS at 09:55

## 2022-05-10 RX ADMIN — HYDROMORPHONE HYDROCHLORIDE 0.5 MG: 1 INJECTION, SOLUTION INTRAMUSCULAR; INTRAVENOUS; SUBCUTANEOUS at 13:19

## 2022-05-10 RX ADMIN — DEXAMETHASONE SODIUM PHOSPHATE 4 MG: 4 INJECTION, SOLUTION INTRAMUSCULAR; INTRAVENOUS at 07:23

## 2022-05-10 RX ADMIN — GLYCOPYRROLATE 0.4 MG: 0.2 INJECTION INTRAMUSCULAR; INTRAVENOUS at 13:33

## 2022-05-10 RX ADMIN — ROCURONIUM BROMIDE 10 MG: 10 SOLUTION INTRAVENOUS at 10:12

## 2022-05-10 RX ADMIN — ROCURONIUM BROMIDE 20 MG: 10 SOLUTION INTRAVENOUS at 12:28

## 2022-05-10 RX ADMIN — DEXAMETHASONE SODIUM PHOSPHATE 4 MG: 4 INJECTION, SOLUTION INTRA-ARTICULAR; INTRALESIONAL; INTRAMUSCULAR; INTRAVENOUS; SOFT TISSUE at 21:15

## 2022-05-10 RX ADMIN — DEXAMETHASONE SODIUM PHOSPHATE 4 MG: 4 INJECTION, SOLUTION INTRA-ARTICULAR; INTRALESIONAL; INTRAMUSCULAR; INTRAVENOUS; SOFT TISSUE at 15:25

## 2022-05-10 RX ADMIN — ROCURONIUM BROMIDE 5 MG: 10 SOLUTION INTRAVENOUS at 13:01

## 2022-05-10 RX ADMIN — LEVETIRACETAM 500 MG: 5 INJECTION INTRAVASCULAR at 20:01

## 2022-05-10 NOTE — ANESTHESIA PROCEDURE NOTES
Central Line      Patient reassessed immediately prior to procedure    Patient location during procedure: OR  Start time: 5/10/2022 8:25 AM  Indications: vascular access, MD/Surgeon request and central pressure monitoring  Staff  Anesthesiologist: Priyanka Fabian MD  Preanesthetic Checklist  Completed: patient identified, IV checked, site marked, risks and benefits discussed, surgical consent, monitors and equipment checked, pre-op evaluation and timeout performed  Central Line Prep  Sterile Tech:cap, gloves, gown, mask and sterile barriers  Prep: chloraprep  Patient monitoring: blood pressure monitoring, continuous pulse oximetry and EKG  Central Line Procedure  Laterality:left  Location:internal jugular  Catheter Type:triple lumen  Catheter Size:7 Fr  Guidance:ultrasound guided and wire visualized in collapsible vessel prior to dilation  PROCEDURE NOTE/ULTRASOUND INTERPRETATION.  Using ultrasound guidance the potential vascular sites for insertion of the catheter were visualized to determine the patency of the vessel to be used for vascular access.  After selecting the appropriate site for insertion, the needle was visualized under ultrasound being inserted into the internal jugular vein, followed by ultrasound confirmation of wire and catheter placement. There were no abnormalities seen on ultrasound; an image was taken; and the patient tolerated the procedure with no complications.   Assessment  Post procedure:biopatch applied, line sutured and occlusive dressing applied  Assessement:blood return through all ports, chest x-ray ordered and free fluid flow  Complications:no  Patient Tolerance:patient tolerated the procedure well with no apparent complications

## 2022-05-10 NOTE — ANESTHESIA POSTPROCEDURE EVALUATION
Patient: Hai Hernandez    Procedure Summary     Date: 05/10/22 Room / Location:  [facility] OR  /  [facility]    Anesthesia Start: 0813 Anesthesia Stop: 1351    Procedure: CRANIOTOMY FOR TUMOR STERIOTACTIC WITH BRAIN LAB, right (Right Head) Diagnosis:       Meningioma (HCC)      (Meningioma (HCC) [D32.9])    Surgeons: Martell Downs MD Provider: Carie Noonan CRNA    Anesthesia Type: general, Ciara ASA Status: 3          Anesthesia Type: general, Ciara    Vitals  Vitals Value Taken Time   /70 05/10/22 1435   Temp 97.3 °F (36.3 °C) 05/10/22 1440   Pulse 60 05/10/22 1441   Resp 14 05/10/22 1440   SpO2 92 % 05/10/22 1441   Vitals shown include unvalidated device data.        Post Anesthesia Care and Evaluation    PONV Status: none  Comments: Patient d/c from PACU prior to anes eval based on Kel score.  Please see RN notes for details of d/c criteria.    Blood pressure 134/70, pulse 61, temperature 97.3 °F (36.3 °C), temperature source Temporal, resp. rate 14, SpO2 93 %.

## 2022-05-10 NOTE — ANESTHESIA PREPROCEDURE EVALUATION
Anesthesia Evaluation     Patient summary reviewed   no history of anesthetic complications:  NPO Solid Status: > 8 hours             Airway   Mallampati: I  TM distance: >3 FB  Neck ROM: full  No difficulty expected  Dental          Pulmonary    (-) COPD, asthma, sleep apnea, not a smoker  Cardiovascular   Exercise tolerance: good (4-7 METS)    ECG reviewed    (+) hypertension,   (-) past MI, CAD, cardiac stents, hyperlipidemia      Neuro/Psych  (+) headaches,      ROS Comment: Meningioma  GI/Hepatic/Renal/Endo    (+) obesity,   thyroid problem   (-) liver disease, no renal disease, diabetes    Musculoskeletal     Abdominal    Substance History      OB/GYN          Other                        Anesthesia Plan    ASA 3     general and Ciara     intravenous induction     Anesthetic plan, all risks, benefits, and alternatives have been provided, discussed and informed consent has been obtained with: patient.        CODE STATUS:

## 2022-05-10 NOTE — ANESTHESIA PROCEDURE NOTES
Airway  Urgency: elective    Date/Time: 5/10/2022 8:21 AM  Airway not difficult    General Information and Staff    Patient location during procedure: OR  CRNA/CAA: Carie Noonan CRNA    Indications and Patient Condition  Indications for airway management: airway protection    Preoxygenated: yes  Mask difficulty assessment: 1 - vent by mask    Final Airway Details  Final airway type: endotracheal airway      Successful airway: ETT  Cuffed: yes   Successful intubation technique: direct laryngoscopy  Facilitating devices/methods: intubating stylet  Endotracheal tube insertion site: oral  Blade: Ramos  Blade size: 2 (3.5)  ETT size (mm): 7.5  Cormack-Lehane Classification: grade I - full view of glottis  Placement verified by: chest auscultation and capnometry   Measured from: lips  ETT/EBT  to lips (cm): 22  Number of attempts at approach: 1  Assessment: lips, teeth, and gum same as pre-op and atraumatic intubation

## 2022-05-11 ENCOUNTER — APPOINTMENT (OUTPATIENT)
Dept: MRI IMAGING | Facility: HOSPITAL | Age: 77
End: 2022-05-11

## 2022-05-11 LAB
ANION GAP SERPL CALCULATED.3IONS-SCNC: 10 MMOL/L (ref 5–15)
BASOPHILS # BLD AUTO: 0.05 10*3/MM3 (ref 0–0.2)
BASOPHILS NFR BLD AUTO: 0.3 % (ref 0–1.5)
BUN SERPL-MCNC: 26 MG/DL (ref 8–23)
BUN/CREAT SERPL: 28.3 (ref 7–25)
CALCIUM SPEC-SCNC: 8 MG/DL (ref 8.6–10.5)
CHLORIDE SERPL-SCNC: 100 MMOL/L (ref 98–107)
CO2 SERPL-SCNC: 23 MMOL/L (ref 22–29)
CREAT SERPL-MCNC: 0.92 MG/DL (ref 0.76–1.27)
DEPRECATED RDW RBC AUTO: 46 FL (ref 37–54)
EGFRCR SERPLBLD CKD-EPI 2021: 85.7 ML/MIN/1.73
EOSINOPHIL # BLD AUTO: 0 10*3/MM3 (ref 0–0.4)
EOSINOPHIL NFR BLD AUTO: 0 % (ref 0.3–6.2)
ERYTHROCYTE [DISTWIDTH] IN BLOOD BY AUTOMATED COUNT: 13 % (ref 12.3–15.4)
GLUCOSE BLDC GLUCOMTR-MCNC: 241 MG/DL (ref 70–130)
GLUCOSE BLDC GLUCOMTR-MCNC: 266 MG/DL (ref 70–130)
GLUCOSE BLDC GLUCOMTR-MCNC: 285 MG/DL (ref 70–130)
GLUCOSE SERPL-MCNC: 267 MG/DL (ref 65–99)
HCT VFR BLD AUTO: 40.2 % (ref 37.5–51)
HGB BLD-MCNC: 13.5 G/DL (ref 13–17.7)
LYMPHOCYTES # BLD AUTO: 0.59 10*3/MM3 (ref 0.7–3.1)
LYMPHOCYTES NFR BLD AUTO: 3 % (ref 19.6–45.3)
MCH RBC QN AUTO: 32.1 PG (ref 26.6–33)
MCHC RBC AUTO-ENTMCNC: 33.6 G/DL (ref 31.5–35.7)
MCV RBC AUTO: 95.5 FL (ref 79–97)
MONOCYTES # BLD AUTO: 1.39 10*3/MM3 (ref 0.1–0.9)
MONOCYTES NFR BLD AUTO: 7 % (ref 5–12)
NEUTROPHILS NFR BLD AUTO: 17.48 10*3/MM3 (ref 1.7–7)
NEUTROPHILS NFR BLD AUTO: 87.7 % (ref 42.7–76)
PLATELET # BLD AUTO: 114 10*3/MM3 (ref 140–450)
PMV BLD AUTO: 9.3 FL (ref 6–12)
POTASSIUM SERPL-SCNC: 4.8 MMOL/L (ref 3.5–5.2)
RBC # BLD AUTO: 4.21 10*6/MM3 (ref 4.14–5.8)
SODIUM SERPL-SCNC: 133 MMOL/L (ref 136–145)
WBC NRBC COR # BLD: 19.9 10*3/MM3 (ref 3.4–10.8)

## 2022-05-11 PROCEDURE — 97165 OT EVAL LOW COMPLEX 30 MIN: CPT | Performed by: OCCUPATIONAL THERAPIST

## 2022-05-11 PROCEDURE — 63710000001 DEXAMETHASONE PER 0.25 MG: Performed by: NURSE PRACTITIONER

## 2022-05-11 PROCEDURE — 99024 POSTOP FOLLOW-UP VISIT: CPT | Performed by: NURSE PRACTITIONER

## 2022-05-11 PROCEDURE — 63710000001 INSULIN LISPRO (HUMAN) PER 5 UNITS: Performed by: NURSE PRACTITIONER

## 2022-05-11 PROCEDURE — 85025 COMPLETE CBC W/AUTO DIFF WBC: CPT | Performed by: NURSE PRACTITIONER

## 2022-05-11 PROCEDURE — 80048 BASIC METABOLIC PNL TOTAL CA: CPT | Performed by: NURSE PRACTITIONER

## 2022-05-11 PROCEDURE — 25010000002 DEXAMETHASONE PER 1 MG: Performed by: NURSE PRACTITIONER

## 2022-05-11 PROCEDURE — 0 GADOBENATE DIMEGLUMINE 529 MG/ML SOLUTION: Performed by: NEUROLOGICAL SURGERY

## 2022-05-11 PROCEDURE — 25010000002 CEFAZOLIN PER 500 MG: Performed by: NURSE PRACTITIONER

## 2022-05-11 PROCEDURE — 70553 MRI BRAIN STEM W/O & W/DYE: CPT

## 2022-05-11 PROCEDURE — 25010000002 SODIUM CHLORIDE 0.9 % WITH KCL 20 MEQ 20-0.9 MEQ/L-% SOLUTION: Performed by: NURSE PRACTITIONER

## 2022-05-11 PROCEDURE — 97116 GAIT TRAINING THERAPY: CPT

## 2022-05-11 PROCEDURE — 25010000002 LEVETIRACETAM IN NACL 0.82% 500 MG/100ML SOLUTION: Performed by: NURSE PRACTITIONER

## 2022-05-11 PROCEDURE — 82962 GLUCOSE BLOOD TEST: CPT

## 2022-05-11 PROCEDURE — A9577 INJ MULTIHANCE: HCPCS | Performed by: NEUROLOGICAL SURGERY

## 2022-05-11 PROCEDURE — 97162 PT EVAL MOD COMPLEX 30 MIN: CPT

## 2022-05-11 RX ORDER — DEXAMETHASONE 4 MG/1
4 TABLET ORAL EVERY 6 HOURS SCHEDULED
Status: COMPLETED | OUTPATIENT
Start: 2022-05-11 | End: 2022-05-12

## 2022-05-11 RX ORDER — DEXAMETHASONE 4 MG/1
4 TABLET ORAL EVERY 8 HOURS SCHEDULED
Status: DISPENSED | OUTPATIENT
Start: 2022-05-12 | End: 2022-05-14

## 2022-05-11 RX ORDER — INSULIN LISPRO 100 [IU]/ML
0-9 INJECTION, SOLUTION INTRAVENOUS; SUBCUTANEOUS
Status: DISCONTINUED | OUTPATIENT
Start: 2022-05-11 | End: 2022-05-14

## 2022-05-11 RX ORDER — DEXAMETHASONE 0.5 MG/1
0.5 TABLET ORAL
Status: DISCONTINUED | OUTPATIENT
Start: 2022-05-19 | End: 2022-05-18

## 2022-05-11 RX ORDER — DEXTROSE MONOHYDRATE 25 G/50ML
25 INJECTION, SOLUTION INTRAVENOUS
Status: DISCONTINUED | OUTPATIENT
Start: 2022-05-11 | End: 2022-05-14

## 2022-05-11 RX ORDER — DEXAMETHASONE 2 MG/1
3 TABLET ORAL EVERY 12 HOURS SCHEDULED
Status: COMPLETED | OUTPATIENT
Start: 2022-05-14 | End: 2022-05-16

## 2022-05-11 RX ORDER — DEXAMETHASONE 2 MG/1
3 TABLET ORAL
Status: DISCONTINUED | OUTPATIENT
Start: 2022-05-17 | End: 2022-05-18

## 2022-05-11 RX ORDER — NICOTINE POLACRILEX 4 MG
15 LOZENGE BUCCAL
Status: DISCONTINUED | OUTPATIENT
Start: 2022-05-11 | End: 2022-05-14

## 2022-05-11 RX ADMIN — DEXAMETHASONE 4 MG: 4 TABLET ORAL at 18:18

## 2022-05-11 RX ADMIN — CEFAZOLIN SODIUM 2 G: 10 INJECTION, POWDER, FOR SOLUTION INTRAVENOUS at 12:50

## 2022-05-11 RX ADMIN — GADOBENATE DIMEGLUMINE 20 ML: 529 INJECTION, SOLUTION INTRAVENOUS at 12:07

## 2022-05-11 RX ADMIN — LABETALOL HYDROCHLORIDE 10 MG: 5 INJECTION INTRAVENOUS at 23:42

## 2022-05-11 RX ADMIN — LEVETIRACETAM 500 MG: 5 INJECTION INTRAVASCULAR at 08:40

## 2022-05-11 RX ADMIN — INSULIN LISPRO 4 UNITS: 100 INJECTION, SOLUTION INTRAVENOUS; SUBCUTANEOUS at 08:40

## 2022-05-11 RX ADMIN — DEXAMETHASONE SODIUM PHOSPHATE 4 MG: 4 INJECTION, SOLUTION INTRA-ARTICULAR; INTRALESIONAL; INTRAMUSCULAR; INTRAVENOUS; SOFT TISSUE at 10:30

## 2022-05-11 RX ADMIN — OXYCODONE HYDROCHLORIDE AND ACETAMINOPHEN 1 TABLET: 10; 325 TABLET ORAL at 02:07

## 2022-05-11 RX ADMIN — INSULIN LISPRO 4 UNITS: 100 INJECTION, SOLUTION INTRAVENOUS; SUBCUTANEOUS at 10:52

## 2022-05-11 RX ADMIN — OXYCODONE HYDROCHLORIDE AND ACETAMINOPHEN 1 TABLET: 10; 325 TABLET ORAL at 06:07

## 2022-05-11 RX ADMIN — POTASSIUM CHLORIDE AND SODIUM CHLORIDE 75 ML/HR: 900; 150 INJECTION, SOLUTION INTRAVENOUS at 04:46

## 2022-05-11 RX ADMIN — LEVETIRACETAM 500 MG: 5 INJECTION INTRAVASCULAR at 20:35

## 2022-05-11 RX ADMIN — LISINOPRIL 20 MG: 20 TABLET ORAL at 08:40

## 2022-05-11 RX ADMIN — OXYCODONE HYDROCHLORIDE AND ACETAMINOPHEN 1 TABLET: 10; 325 TABLET ORAL at 20:35

## 2022-05-11 RX ADMIN — DEXAMETHASONE SODIUM PHOSPHATE 4 MG: 4 INJECTION, SOLUTION INTRA-ARTICULAR; INTRALESIONAL; INTRAMUSCULAR; INTRAVENOUS; SOFT TISSUE at 04:23

## 2022-05-11 RX ADMIN — LEVOTHYROXINE SODIUM 125 MCG: 125 TABLET ORAL at 06:07

## 2022-05-11 RX ADMIN — CEFAZOLIN SODIUM 2 G: 10 INJECTION, POWDER, FOR SOLUTION INTRAVENOUS at 04:23

## 2022-05-12 LAB
ANION GAP SERPL CALCULATED.3IONS-SCNC: 10 MMOL/L (ref 5–15)
BASOPHILS # BLD AUTO: 0.04 10*3/MM3 (ref 0–0.2)
BASOPHILS NFR BLD AUTO: 0.2 % (ref 0–1.5)
BUN SERPL-MCNC: 22 MG/DL (ref 8–23)
BUN/CREAT SERPL: 24.4 (ref 7–25)
CALCIUM SPEC-SCNC: 8.4 MG/DL (ref 8.6–10.5)
CHLORIDE SERPL-SCNC: 95 MMOL/L (ref 98–107)
CO2 SERPL-SCNC: 25 MMOL/L (ref 22–29)
CREAT SERPL-MCNC: 0.9 MG/DL (ref 0.76–1.27)
DEPRECATED RDW RBC AUTO: 45.3 FL (ref 37–54)
EGFRCR SERPLBLD CKD-EPI 2021: 88 ML/MIN/1.73
EOSINOPHIL # BLD AUTO: 0 10*3/MM3 (ref 0–0.4)
EOSINOPHIL NFR BLD AUTO: 0 % (ref 0.3–6.2)
ERYTHROCYTE [DISTWIDTH] IN BLOOD BY AUTOMATED COUNT: 13 % (ref 12.3–15.4)
GLUCOSE BLDC GLUCOMTR-MCNC: 252 MG/DL (ref 70–130)
GLUCOSE BLDC GLUCOMTR-MCNC: 265 MG/DL (ref 70–130)
GLUCOSE BLDC GLUCOMTR-MCNC: 303 MG/DL (ref 70–130)
GLUCOSE BLDC GLUCOMTR-MCNC: 303 MG/DL (ref 70–130)
GLUCOSE SERPL-MCNC: 296 MG/DL (ref 65–99)
HCT VFR BLD AUTO: 39 % (ref 37.5–51)
HGB BLD-MCNC: 13.1 G/DL (ref 13–17.7)
LYMPHOCYTES # BLD AUTO: 0.6 10*3/MM3 (ref 0.7–3.1)
LYMPHOCYTES NFR BLD AUTO: 2.7 % (ref 19.6–45.3)
MCH RBC QN AUTO: 32 PG (ref 26.6–33)
MCHC RBC AUTO-ENTMCNC: 33.6 G/DL (ref 31.5–35.7)
MCV RBC AUTO: 95.1 FL (ref 79–97)
MONOCYTES # BLD AUTO: 1.1 10*3/MM3 (ref 0.1–0.9)
MONOCYTES NFR BLD AUTO: 4.9 % (ref 5–12)
NEUTROPHILS NFR BLD AUTO: 20.16 10*3/MM3 (ref 1.7–7)
NEUTROPHILS NFR BLD AUTO: 90 % (ref 42.7–76)
PLATELET # BLD AUTO: 103 10*3/MM3 (ref 140–450)
PMV BLD AUTO: 9.4 FL (ref 6–12)
POTASSIUM SERPL-SCNC: 4.9 MMOL/L (ref 3.5–5.2)
RBC # BLD AUTO: 4.1 10*6/MM3 (ref 4.14–5.8)
SODIUM SERPL-SCNC: 130 MMOL/L (ref 136–145)
WBC NRBC COR # BLD: 22.4 10*3/MM3 (ref 3.4–10.8)

## 2022-05-12 PROCEDURE — 63710000001 DEXAMETHASONE PER 0.25 MG: Performed by: NURSE PRACTITIONER

## 2022-05-12 PROCEDURE — 63710000001 INSULIN LISPRO (HUMAN) PER 5 UNITS: Performed by: NURSE PRACTITIONER

## 2022-05-12 PROCEDURE — 97535 SELF CARE MNGMENT TRAINING: CPT

## 2022-05-12 PROCEDURE — 80048 BASIC METABOLIC PNL TOTAL CA: CPT | Performed by: NURSE PRACTITIONER

## 2022-05-12 PROCEDURE — 97110 THERAPEUTIC EXERCISES: CPT

## 2022-05-12 PROCEDURE — 97116 GAIT TRAINING THERAPY: CPT

## 2022-05-12 PROCEDURE — 25010000002 LEVETIRACETAM IN NACL 0.82% 500 MG/100ML SOLUTION: Performed by: NURSE PRACTITIONER

## 2022-05-12 PROCEDURE — 25010000002 HYDRALAZINE PER 20 MG: Performed by: NURSE PRACTITIONER

## 2022-05-12 PROCEDURE — 82962 GLUCOSE BLOOD TEST: CPT

## 2022-05-12 PROCEDURE — 85025 COMPLETE CBC W/AUTO DIFF WBC: CPT | Performed by: NURSE PRACTITIONER

## 2022-05-12 RX ADMIN — HYDRALAZINE HYDROCHLORIDE 10 MG: 20 INJECTION INTRAMUSCULAR; INTRAVENOUS at 10:50

## 2022-05-12 RX ADMIN — INSULIN LISPRO 6 UNITS: 100 INJECTION, SOLUTION INTRAVENOUS; SUBCUTANEOUS at 08:46

## 2022-05-12 RX ADMIN — BUTALBITAL, ACETAMINOPHEN AND CAFFEINE 1 TABLET: 50; 325; 40 TABLET ORAL at 16:43

## 2022-05-12 RX ADMIN — LEVETIRACETAM 500 MG: 5 INJECTION INTRAVASCULAR at 08:46

## 2022-05-12 RX ADMIN — LABETALOL HYDROCHLORIDE 10 MG: 5 INJECTION INTRAVENOUS at 15:41

## 2022-05-12 RX ADMIN — DEXAMETHASONE 4 MG: 4 TABLET ORAL at 11:54

## 2022-05-12 RX ADMIN — DEXAMETHASONE 4 MG: 4 TABLET ORAL at 05:43

## 2022-05-12 RX ADMIN — INSULIN LISPRO 6 UNITS: 100 INJECTION, SOLUTION INTRAVENOUS; SUBCUTANEOUS at 11:54

## 2022-05-12 RX ADMIN — HYDRALAZINE HYDROCHLORIDE 10 MG: 20 INJECTION INTRAMUSCULAR; INTRAVENOUS at 16:43

## 2022-05-12 RX ADMIN — HYDRALAZINE HYDROCHLORIDE 10 MG: 20 INJECTION INTRAMUSCULAR; INTRAVENOUS at 23:07

## 2022-05-12 RX ADMIN — DEXAMETHASONE 4 MG: 4 TABLET ORAL at 17:31

## 2022-05-12 RX ADMIN — DEXAMETHASONE 4 MG: 4 TABLET ORAL at 00:09

## 2022-05-12 RX ADMIN — LISINOPRIL 20 MG: 20 TABLET ORAL at 08:46

## 2022-05-12 RX ADMIN — LEVETIRACETAM 500 MG: 5 INJECTION INTRAVASCULAR at 21:46

## 2022-05-12 RX ADMIN — LEVOTHYROXINE SODIUM 125 MCG: 125 TABLET ORAL at 05:43

## 2022-05-12 RX ADMIN — LABETALOL HYDROCHLORIDE 10 MG: 5 INJECTION INTRAVENOUS at 07:55

## 2022-05-12 RX ADMIN — INSULIN LISPRO 6 UNITS: 100 INJECTION, SOLUTION INTRAVENOUS; SUBCUTANEOUS at 17:31

## 2022-05-13 LAB
ANION GAP SERPL CALCULATED.3IONS-SCNC: 11 MMOL/L (ref 5–15)
BASOPHILS # BLD AUTO: 0.03 10*3/MM3 (ref 0–0.2)
BASOPHILS NFR BLD AUTO: 0.2 % (ref 0–1.5)
BUN SERPL-MCNC: 22 MG/DL (ref 8–23)
BUN/CREAT SERPL: 32.4 (ref 7–25)
CALCIUM SPEC-SCNC: 8.5 MG/DL (ref 8.6–10.5)
CHLORIDE SERPL-SCNC: 95 MMOL/L (ref 98–107)
CO2 SERPL-SCNC: 26 MMOL/L (ref 22–29)
CREAT SERPL-MCNC: 0.68 MG/DL (ref 0.76–1.27)
DEPRECATED RDW RBC AUTO: 44.2 FL (ref 37–54)
EGFRCR SERPLBLD CKD-EPI 2021: 95.7 ML/MIN/1.73
EOSINOPHIL # BLD AUTO: 0 10*3/MM3 (ref 0–0.4)
EOSINOPHIL NFR BLD AUTO: 0 % (ref 0.3–6.2)
ERYTHROCYTE [DISTWIDTH] IN BLOOD BY AUTOMATED COUNT: 12.9 % (ref 12.3–15.4)
GLUCOSE BLDC GLUCOMTR-MCNC: 254 MG/DL (ref 70–130)
GLUCOSE BLDC GLUCOMTR-MCNC: 270 MG/DL (ref 70–130)
GLUCOSE BLDC GLUCOMTR-MCNC: 286 MG/DL (ref 70–130)
GLUCOSE SERPL-MCNC: 245 MG/DL (ref 65–99)
HCT VFR BLD AUTO: 38.2 % (ref 37.5–51)
HGB BLD-MCNC: 12.9 G/DL (ref 13–17.7)
IMM GRANULOCYTES # BLD AUTO: 0.28 10*3/MM3 (ref 0–0.05)
IMM GRANULOCYTES NFR BLD AUTO: 1.9 % (ref 0–0.5)
LYMPHOCYTES # BLD AUTO: 0.75 10*3/MM3 (ref 0.7–3.1)
LYMPHOCYTES NFR BLD AUTO: 5 % (ref 19.6–45.3)
MCH RBC QN AUTO: 31.9 PG (ref 26.6–33)
MCHC RBC AUTO-ENTMCNC: 33.8 G/DL (ref 31.5–35.7)
MCV RBC AUTO: 94.3 FL (ref 79–97)
MONOCYTES # BLD AUTO: 1.02 10*3/MM3 (ref 0.1–0.9)
MONOCYTES NFR BLD AUTO: 6.8 % (ref 5–12)
NEUTROPHILS NFR BLD AUTO: 13.01 10*3/MM3 (ref 1.7–7)
NEUTROPHILS NFR BLD AUTO: 86.1 % (ref 42.7–76)
NRBC BLD AUTO-RTO: 0 /100 WBC (ref 0–0.2)
PLATELET # BLD AUTO: 102 10*3/MM3 (ref 140–450)
PMV BLD AUTO: 9.8 FL (ref 6–12)
POTASSIUM SERPL-SCNC: 4.5 MMOL/L (ref 3.5–5.2)
RBC # BLD AUTO: 4.05 10*6/MM3 (ref 4.14–5.8)
SODIUM SERPL-SCNC: 132 MMOL/L (ref 136–145)
WBC NRBC COR # BLD: 15.09 10*3/MM3 (ref 3.4–10.8)

## 2022-05-13 PROCEDURE — 63710000001 DEXAMETHASONE PER 0.25 MG: Performed by: NURSE PRACTITIONER

## 2022-05-13 PROCEDURE — 25010000002 LEVETIRACETAM IN NACL 0.82% 500 MG/100ML SOLUTION: Performed by: NURSE PRACTITIONER

## 2022-05-13 PROCEDURE — 85025 COMPLETE CBC W/AUTO DIFF WBC: CPT | Performed by: NURSE PRACTITIONER

## 2022-05-13 PROCEDURE — 82962 GLUCOSE BLOOD TEST: CPT

## 2022-05-13 PROCEDURE — 97110 THERAPEUTIC EXERCISES: CPT

## 2022-05-13 PROCEDURE — 80048 BASIC METABOLIC PNL TOTAL CA: CPT | Performed by: NURSE PRACTITIONER

## 2022-05-13 PROCEDURE — 63710000001 INSULIN LISPRO (HUMAN) PER 5 UNITS: Performed by: NURSE PRACTITIONER

## 2022-05-13 PROCEDURE — 97116 GAIT TRAINING THERAPY: CPT

## 2022-05-13 RX ORDER — DEXAMETHASONE 2 MG/1
TABLET ORAL
Qty: 14 TABLET | Refills: 0 | Status: SHIPPED | OUTPATIENT
Start: 2022-05-13 | End: 2022-05-19

## 2022-05-13 RX ORDER — PANTOPRAZOLE SODIUM 40 MG/1
40 TABLET, DELAYED RELEASE ORAL DAILY
Qty: 14 TABLET | Refills: 0 | Status: SHIPPED | OUTPATIENT
Start: 2022-05-13

## 2022-05-13 RX ORDER — LEVETIRACETAM 500 MG/1
500 TABLET ORAL 2 TIMES DAILY
Status: DISCONTINUED | OUTPATIENT
Start: 2022-05-13 | End: 2022-05-14

## 2022-05-13 RX ORDER — BUTALBITAL, ACETAMINOPHEN AND CAFFEINE 50; 325; 40 MG/1; MG/1; MG/1
1 TABLET ORAL EVERY 4 HOURS PRN
Qty: 25 TABLET | Refills: 0 | Status: SHIPPED | OUTPATIENT
Start: 2022-05-13

## 2022-05-13 RX ORDER — LABETALOL HYDROCHLORIDE 5 MG/ML
10 INJECTION, SOLUTION INTRAVENOUS ONCE
Status: COMPLETED | OUTPATIENT
Start: 2022-05-13 | End: 2022-05-13

## 2022-05-13 RX ORDER — LEVETIRACETAM 500 MG/1
500 TABLET ORAL 2 TIMES DAILY
Qty: 60 TABLET | Refills: 0 | Status: SHIPPED | OUTPATIENT
Start: 2022-05-13 | End: 2022-06-12

## 2022-05-13 RX ORDER — OXYCODONE AND ACETAMINOPHEN 7.5; 325 MG/1; MG/1
1 TABLET ORAL EVERY 6 HOURS PRN
Qty: 28 TABLET | Refills: 0 | Status: SHIPPED | OUTPATIENT
Start: 2022-05-13 | End: 2022-05-17

## 2022-05-13 RX ADMIN — INSULIN LISPRO 6 UNITS: 100 INJECTION, SOLUTION INTRAVENOUS; SUBCUTANEOUS at 12:25

## 2022-05-13 RX ADMIN — LISINOPRIL 20 MG: 20 TABLET ORAL at 08:43

## 2022-05-13 RX ADMIN — LEVETIRACETAM 500 MG: 500 TABLET, FILM COATED ORAL at 20:37

## 2022-05-13 RX ADMIN — LEVETIRACETAM 500 MG: 5 INJECTION INTRAVASCULAR at 08:43

## 2022-05-13 RX ADMIN — LEVOTHYROXINE SODIUM 125 MCG: 125 TABLET ORAL at 06:03

## 2022-05-13 RX ADMIN — DEXAMETHASONE 4 MG: 4 TABLET ORAL at 15:15

## 2022-05-13 RX ADMIN — DEXAMETHASONE 4 MG: 4 TABLET ORAL at 06:03

## 2022-05-13 RX ADMIN — LABETALOL HYDROCHLORIDE 10 MG: 5 INJECTION INTRAVENOUS at 01:10

## 2022-05-13 RX ADMIN — INSULIN LISPRO 6 UNITS: 100 INJECTION, SOLUTION INTRAVENOUS; SUBCUTANEOUS at 08:43

## 2022-05-13 RX ADMIN — DEXAMETHASONE 4 MG: 4 TABLET ORAL at 20:37

## 2022-05-13 RX ADMIN — INSULIN LISPRO 6 UNITS: 100 INJECTION, SOLUTION INTRAVENOUS; SUBCUTANEOUS at 17:02

## 2022-05-14 ENCOUNTER — APPOINTMENT (OUTPATIENT)
Dept: NEUROLOGY | Facility: HOSPITAL | Age: 77
End: 2022-05-14

## 2022-05-14 ENCOUNTER — APPOINTMENT (OUTPATIENT)
Dept: GENERAL RADIOLOGY | Facility: HOSPITAL | Age: 77
End: 2022-05-14

## 2022-05-14 ENCOUNTER — APPOINTMENT (OUTPATIENT)
Dept: CT IMAGING | Facility: HOSPITAL | Age: 77
End: 2022-05-14

## 2022-05-14 PROBLEM — G40.109 LOCALIZATION-RELATED FOCAL EPILEPSY WITH SIMPLE PARTIAL SEIZURES: Status: ACTIVE | Noted: 2022-05-14

## 2022-05-14 PROBLEM — G40.901 STATUS EPILEPTICUS: Status: ACTIVE | Noted: 2022-05-14

## 2022-05-14 LAB
ALBUMIN SERPL-MCNC: 3.2 G/DL (ref 3.5–5.2)
ALBUMIN/GLOB SERPL: 1.1 G/DL
ALP SERPL-CCNC: 44 U/L (ref 39–117)
ALT SERPL W P-5'-P-CCNC: 120 U/L (ref 1–41)
ANION GAP SERPL CALCULATED.3IONS-SCNC: 10 MMOL/L (ref 5–15)
ANION GAP SERPL CALCULATED.3IONS-SCNC: 9 MMOL/L (ref 5–15)
ARTERIAL PATENCY WRIST A: POSITIVE
AST SERPL-CCNC: 23 U/L (ref 1–40)
ATMOSPHERIC PRESS: 747 MMHG
BASE EXCESS BLDA CALC-SCNC: 2.7 MMOL/L (ref 0–2)
BASOPHILS # BLD AUTO: 0.03 10*3/MM3 (ref 0–0.2)
BASOPHILS NFR BLD AUTO: 0.2 % (ref 0–1.5)
BDY SITE: ABNORMAL
BILIRUB SERPL-MCNC: 1.3 MG/DL (ref 0–1.2)
BODY TEMPERATURE: 37 C
BUN SERPL-MCNC: 28 MG/DL (ref 8–23)
BUN SERPL-MCNC: 28 MG/DL (ref 8–23)
BUN/CREAT SERPL: 32.9 (ref 7–25)
BUN/CREAT SERPL: 34.6 (ref 7–25)
CALCIUM SPEC-SCNC: 8.3 MG/DL (ref 8.6–10.5)
CALCIUM SPEC-SCNC: 8.5 MG/DL (ref 8.6–10.5)
CHLORIDE SERPL-SCNC: 97 MMOL/L (ref 98–107)
CHLORIDE SERPL-SCNC: 99 MMOL/L (ref 98–107)
CO2 SERPL-SCNC: 25 MMOL/L (ref 22–29)
CO2 SERPL-SCNC: 25 MMOL/L (ref 22–29)
CREAT SERPL-MCNC: 0.81 MG/DL (ref 0.76–1.27)
CREAT SERPL-MCNC: 0.85 MG/DL (ref 0.76–1.27)
DEPRECATED RDW RBC AUTO: 45.6 FL (ref 37–54)
EGFRCR SERPLBLD CKD-EPI 2021: 89.5 ML/MIN/1.73
EGFRCR SERPLBLD CKD-EPI 2021: 90.8 ML/MIN/1.73
EOSINOPHIL # BLD AUTO: 0 10*3/MM3 (ref 0–0.4)
EOSINOPHIL NFR BLD AUTO: 0 % (ref 0.3–6.2)
ERYTHROCYTE [DISTWIDTH] IN BLOOD BY AUTOMATED COUNT: 12.9 % (ref 12.3–15.4)
GLOBULIN UR ELPH-MCNC: 2.8 GM/DL
GLUCOSE BLDC GLUCOMTR-MCNC: 199 MG/DL (ref 70–130)
GLUCOSE BLDC GLUCOMTR-MCNC: 220 MG/DL (ref 70–130)
GLUCOSE BLDC GLUCOMTR-MCNC: 255 MG/DL (ref 70–130)
GLUCOSE SERPL-MCNC: 223 MG/DL (ref 65–99)
GLUCOSE SERPL-MCNC: 250 MG/DL (ref 65–99)
HCO3 BLDA-SCNC: 28.3 MMOL/L (ref 20–26)
HCT VFR BLD AUTO: 40.1 % (ref 37.5–51)
HGB BLD-MCNC: 12.9 G/DL (ref 13–17.7)
IMM GRANULOCYTES # BLD AUTO: 0.18 10*3/MM3 (ref 0–0.05)
IMM GRANULOCYTES NFR BLD AUTO: 1.5 % (ref 0–0.5)
INHALED O2 CONCENTRATION: 50 %
LYMPHOCYTES # BLD AUTO: 0.61 10*3/MM3 (ref 0.7–3.1)
LYMPHOCYTES NFR BLD AUTO: 5.1 % (ref 19.6–45.3)
Lab: ABNORMAL
MAGNESIUM SERPL-MCNC: 2.1 MG/DL (ref 1.6–2.4)
MCH RBC QN AUTO: 31.2 PG (ref 26.6–33)
MCHC RBC AUTO-ENTMCNC: 32.2 G/DL (ref 31.5–35.7)
MCV RBC AUTO: 97.1 FL (ref 79–97)
MODALITY: ABNORMAL
MONOCYTES # BLD AUTO: 0.92 10*3/MM3 (ref 0.1–0.9)
MONOCYTES NFR BLD AUTO: 7.6 % (ref 5–12)
NEUTROPHILS NFR BLD AUTO: 10.29 10*3/MM3 (ref 1.7–7)
NEUTROPHILS NFR BLD AUTO: 85.6 % (ref 42.7–76)
NRBC BLD AUTO-RTO: 0 /100 WBC (ref 0–0.2)
OSMOLALITY SERPL: 293 MOSM/KG (ref 280–301)
OSMOLALITY SERPL: 298 MOSM/KG (ref 280–301)
OSMOLALITY SERPL: 299 MOSM/KG (ref 280–301)
OSMOLALITY UR: 914 MOSM/KG (ref 50–1400)
PCO2 BLDA: 46.7 MM HG (ref 35–45)
PCO2 TEMP ADJ BLD: 46.7 MM HG (ref 35–45)
PEEP RESPIRATORY: 5 CM[H2O]
PH BLDA: 7.39 PH UNITS (ref 7.35–7.45)
PH, TEMP CORRECTED: 7.39 PH UNITS (ref 7.35–7.45)
PHENYTOIN SERPL-MCNC: 11.6 MCG/ML (ref 10–20)
PLATELET # BLD AUTO: 109 10*3/MM3 (ref 140–450)
PMV BLD AUTO: 9.6 FL (ref 6–12)
PO2 BLDA: 132 MM HG (ref 83–108)
PO2 TEMP ADJ BLD: 132 MM HG (ref 83–108)
POTASSIUM SERPL-SCNC: 4.3 MMOL/L (ref 3.5–5.2)
POTASSIUM SERPL-SCNC: 4.4 MMOL/L (ref 3.5–5.2)
PROT SERPL-MCNC: 6 G/DL (ref 6–8.5)
RBC # BLD AUTO: 4.13 10*6/MM3 (ref 4.14–5.8)
SAO2 % BLDCOA: 99.3 % (ref 94–99)
SET MECH RESP RATE: 14
SODIUM SERPL-SCNC: 131 MMOL/L (ref 136–145)
SODIUM SERPL-SCNC: 133 MMOL/L (ref 136–145)
SODIUM SERPL-SCNC: 134 MMOL/L (ref 136–145)
SODIUM SERPL-SCNC: 134 MMOL/L (ref 136–145)
SODIUM SERPL-SCNC: 138 MMOL/L (ref 136–145)
SODIUM UR-SCNC: 113 MMOL/L
TSH SERPL DL<=0.05 MIU/L-ACNC: 0.04 UIU/ML (ref 0.27–4.2)
VENTILATOR MODE: AC
VT ON VENT VENT: 550 ML
WBC NRBC COR # BLD: 12.03 10*3/MM3 (ref 3.4–10.8)

## 2022-05-14 PROCEDURE — 25010000002 FOSPHENYTOIN 500 MG PE/10ML SOLUTION 10 ML VIAL: Performed by: NEUROLOGICAL SURGERY

## 2022-05-14 PROCEDURE — 63710000001 INSULIN LISPRO (HUMAN) PER 5 UNITS: Performed by: NEUROLOGICAL SURGERY

## 2022-05-14 PROCEDURE — 0 LEVETIRACETAM IN NACL 0.75% 1000 MG/100ML SOLUTION: Performed by: NEUROLOGICAL SURGERY

## 2022-05-14 PROCEDURE — 93005 ELECTROCARDIOGRAM TRACING: CPT | Performed by: NEUROLOGICAL SURGERY

## 2022-05-14 PROCEDURE — 94799 UNLISTED PULMONARY SVC/PX: CPT

## 2022-05-14 PROCEDURE — 02HV33Z INSERTION OF INFUSION DEVICE INTO SUPERIOR VENA CAVA, PERCUTANEOUS APPROACH: ICD-10-PCS | Performed by: NEUROLOGICAL SURGERY

## 2022-05-14 PROCEDURE — 36600 WITHDRAWAL OF ARTERIAL BLOOD: CPT

## 2022-05-14 PROCEDURE — 70450 CT HEAD/BRAIN W/O DYE: CPT

## 2022-05-14 PROCEDURE — 99024 POSTOP FOLLOW-UP VISIT: CPT | Performed by: NEUROLOGICAL SURGERY

## 2022-05-14 PROCEDURE — 25010000002 LORAZEPAM PER 2 MG: Performed by: NEUROLOGICAL SURGERY

## 2022-05-14 PROCEDURE — 31500 INSERT EMERGENCY AIRWAY: CPT | Performed by: INTERNAL MEDICINE

## 2022-05-14 PROCEDURE — 70498 CT ANGIOGRAPHY NECK: CPT

## 2022-05-14 PROCEDURE — 5A1955Z RESPIRATORY VENTILATION, GREATER THAN 96 CONSECUTIVE HOURS: ICD-10-PCS | Performed by: INTERNAL MEDICINE

## 2022-05-14 PROCEDURE — 25010000002 FOSPHENYTOIN 100 MG PE/2ML SOLUTION 2 ML VIAL: Performed by: PSYCHIATRY & NEUROLOGY

## 2022-05-14 PROCEDURE — 95819 EEG AWAKE AND ASLEEP: CPT

## 2022-05-14 PROCEDURE — 0W9B30Z DRAINAGE OF LEFT PLEURAL CAVITY WITH DRAINAGE DEVICE, PERCUTANEOUS APPROACH: ICD-10-PCS | Performed by: THORACIC SURGERY (CARDIOTHORACIC VASCULAR SURGERY)

## 2022-05-14 PROCEDURE — 83935 ASSAY OF URINE OSMOLALITY: CPT | Performed by: NEUROLOGICAL SURGERY

## 2022-05-14 PROCEDURE — 84443 ASSAY THYROID STIM HORMONE: CPT | Performed by: PHYSICIAN ASSISTANT

## 2022-05-14 PROCEDURE — 25010000002 LEVETIRACETAM IN NACL 0.82% 500 MG/100ML SOLUTION: Performed by: PSYCHIATRY & NEUROLOGY

## 2022-05-14 PROCEDURE — 84300 ASSAY OF URINE SODIUM: CPT | Performed by: NEUROLOGICAL SURGERY

## 2022-05-14 PROCEDURE — 25010000002 PROPOFOL 10 MG/ML EMULSION: Performed by: PSYCHIATRY & NEUROLOGY

## 2022-05-14 PROCEDURE — 70496 CT ANGIOGRAPHY HEAD: CPT

## 2022-05-14 PROCEDURE — 83930 ASSAY OF BLOOD OSMOLALITY: CPT | Performed by: NEUROLOGICAL SURGERY

## 2022-05-14 PROCEDURE — 25010000002 LEVETIRACETAM IN NACL 0.82% 500 MG/100ML SOLUTION: Performed by: NEUROLOGICAL SURGERY

## 2022-05-14 PROCEDURE — 94002 VENT MGMT INPAT INIT DAY: CPT

## 2022-05-14 PROCEDURE — 25010000002 PROPOFOL 1000 MG/100ML EMULSION

## 2022-05-14 PROCEDURE — C1751 CATH, INF, PER/CENT/MIDLINE: HCPCS

## 2022-05-14 PROCEDURE — 97535 SELF CARE MNGMENT TRAINING: CPT

## 2022-05-14 PROCEDURE — 25010000002 LORAZEPAM PER 2 MG

## 2022-05-14 PROCEDURE — 80053 COMPREHEN METABOLIC PANEL: CPT | Performed by: NURSE PRACTITIONER

## 2022-05-14 PROCEDURE — 93010 ELECTROCARDIOGRAM REPORT: CPT | Performed by: INTERNAL MEDICINE

## 2022-05-14 PROCEDURE — 85025 COMPLETE CBC W/AUTO DIFF WBC: CPT | Performed by: NURSE PRACTITIONER

## 2022-05-14 PROCEDURE — 82803 BLOOD GASES ANY COMBINATION: CPT

## 2022-05-14 PROCEDURE — 82962 GLUCOSE BLOOD TEST: CPT

## 2022-05-14 PROCEDURE — 83735 ASSAY OF MAGNESIUM: CPT | Performed by: PHYSICIAN ASSISTANT

## 2022-05-14 PROCEDURE — 99292 CRITICAL CARE ADDL 30 MIN: CPT | Performed by: PSYCHIATRY & NEUROLOGY

## 2022-05-14 PROCEDURE — 92610 EVALUATE SWALLOWING FUNCTION: CPT

## 2022-05-14 PROCEDURE — 0BH18EZ INSERTION OF ENDOTRACHEAL AIRWAY INTO TRACHEA, VIA NATURAL OR ARTIFICIAL OPENING ENDOSCOPIC: ICD-10-PCS | Performed by: INTERNAL MEDICINE

## 2022-05-14 PROCEDURE — 25010000002 LORAZEPAM PER 2 MG: Performed by: PSYCHIATRY & NEUROLOGY

## 2022-05-14 PROCEDURE — 95711 VEEG 2-12 HR UNMONITORED: CPT

## 2022-05-14 PROCEDURE — 99291 CRITICAL CARE FIRST HOUR: CPT | Performed by: PSYCHIATRY & NEUROLOGY

## 2022-05-14 PROCEDURE — 80185 ASSAY OF PHENYTOIN TOTAL: CPT | Performed by: PHYSICIAN ASSISTANT

## 2022-05-14 PROCEDURE — 0 IOPAMIDOL PER 1 ML: Performed by: NEUROLOGICAL SURGERY

## 2022-05-14 PROCEDURE — 25010000002 SUCCINYLCHOLINE PER 20 MG: Performed by: INTERNAL MEDICINE

## 2022-05-14 PROCEDURE — 74018 RADEX ABDOMEN 1 VIEW: CPT

## 2022-05-14 PROCEDURE — 95718 EEG PHYS/QHP 2-12 HR W/VEEG: CPT | Performed by: PSYCHIATRY & NEUROLOGY

## 2022-05-14 PROCEDURE — 97530 THERAPEUTIC ACTIVITIES: CPT

## 2022-05-14 PROCEDURE — 63710000001 DEXAMETHASONE PER 0.25 MG: Performed by: NURSE PRACTITIONER

## 2022-05-14 PROCEDURE — 84295 ASSAY OF SERUM SODIUM: CPT | Performed by: NEUROLOGICAL SURGERY

## 2022-05-14 PROCEDURE — 71045 X-RAY EXAM CHEST 1 VIEW: CPT

## 2022-05-14 RX ORDER — 3% SODIUM CHLORIDE 3 G/100ML
40 INJECTION, SOLUTION INTRAVENOUS CONTINUOUS
Status: DISCONTINUED | OUTPATIENT
Start: 2022-05-14 | End: 2022-05-15 | Stop reason: DRUGHIGH

## 2022-05-14 RX ORDER — INSULIN LISPRO 100 [IU]/ML
0-14 INJECTION, SOLUTION INTRAVENOUS; SUBCUTANEOUS
Status: DISCONTINUED | OUTPATIENT
Start: 2022-05-14 | End: 2022-05-18

## 2022-05-14 RX ORDER — LACOSAMIDE 10 MG/ML
150 INJECTION, SOLUTION INTRAVENOUS EVERY 12 HOURS SCHEDULED
Status: DISCONTINUED | OUTPATIENT
Start: 2022-05-14 | End: 2022-05-15

## 2022-05-14 RX ORDER — ETOMIDATE 2 MG/ML
10 INJECTION INTRAVENOUS ONCE
Status: COMPLETED | OUTPATIENT
Start: 2022-05-14 | End: 2022-05-14

## 2022-05-14 RX ORDER — LORAZEPAM 2 MG/ML
INJECTION INTRAMUSCULAR
Status: COMPLETED
Start: 2022-05-14 | End: 2022-05-14

## 2022-05-14 RX ORDER — CHLORHEXIDINE GLUCONATE 0.12 MG/ML
15 RINSE ORAL EVERY 12 HOURS SCHEDULED
Status: DISCONTINUED | OUTPATIENT
Start: 2022-05-14 | End: 2022-06-20 | Stop reason: HOSPADM

## 2022-05-14 RX ORDER — NOREPINEPHRINE BIT/0.9 % NACL 8 MG/250ML
.02-.3 INFUSION BOTTLE (ML) INTRAVENOUS
Status: DISCONTINUED | OUTPATIENT
Start: 2022-05-14 | End: 2022-05-18

## 2022-05-14 RX ORDER — LEVETIRACETAM 5 MG/ML
500 INJECTION INTRAVASCULAR ONCE
Status: COMPLETED | OUTPATIENT
Start: 2022-05-14 | End: 2022-05-14

## 2022-05-14 RX ORDER — FOSPHENYTOIN SODIUM 50 MG/ML
15 INJECTION, SOLUTION INTRAMUSCULAR; INTRAVENOUS ONCE
Status: DISCONTINUED | OUTPATIENT
Start: 2022-05-14 | End: 2022-05-14

## 2022-05-14 RX ORDER — LEVETIRACETAM 10 MG/ML
1000 INJECTION INTRAVASCULAR ONCE
Status: COMPLETED | OUTPATIENT
Start: 2022-05-14 | End: 2022-05-14

## 2022-05-14 RX ORDER — LORAZEPAM 2 MG/ML
1 INJECTION INTRAMUSCULAR ONCE
Status: COMPLETED | OUTPATIENT
Start: 2022-05-14 | End: 2022-05-14

## 2022-05-14 RX ORDER — PHENYTOIN 50 MG/1
100 TABLET, CHEWABLE ORAL EVERY 8 HOURS SCHEDULED
Status: DISCONTINUED | OUTPATIENT
Start: 2022-05-14 | End: 2022-05-15

## 2022-05-14 RX ORDER — PROPOFOL 10 MG/ML
INJECTION, EMULSION INTRAVENOUS
Status: COMPLETED
Start: 2022-05-14 | End: 2022-05-14

## 2022-05-14 RX ORDER — LIDOCAINE HYDROCHLORIDE 10 MG/ML
1 INJECTION, SOLUTION EPIDURAL; INFILTRATION; INTRACAUDAL; PERINEURAL ONCE
Status: COMPLETED | OUTPATIENT
Start: 2022-05-14 | End: 2022-05-14

## 2022-05-14 RX ORDER — 3% SODIUM CHLORIDE 3 G/100ML
40 INJECTION, SOLUTION INTRAVENOUS CONTINUOUS
Status: DISCONTINUED | OUTPATIENT
Start: 2022-05-14 | End: 2022-05-14

## 2022-05-14 RX ORDER — LACOSAMIDE 10 MG/ML
100 INJECTION, SOLUTION INTRAVENOUS EVERY 12 HOURS
Status: DISCONTINUED | OUTPATIENT
Start: 2022-05-14 | End: 2022-05-14

## 2022-05-14 RX ORDER — LEVETIRACETAM 5 MG/ML
500 INJECTION INTRAVASCULAR EVERY 12 HOURS SCHEDULED
Status: DISCONTINUED | OUTPATIENT
Start: 2022-05-14 | End: 2022-06-05

## 2022-05-14 RX ORDER — FOSPHENYTOIN SODIUM 50 MG/ML
INJECTION, SOLUTION INTRAMUSCULAR; INTRAVENOUS
Status: CANCELLED | OUTPATIENT
Start: 2022-05-14

## 2022-05-14 RX ORDER — IPRATROPIUM BROMIDE AND ALBUTEROL SULFATE 2.5; .5 MG/3ML; MG/3ML
3 SOLUTION RESPIRATORY (INHALATION) EVERY 4 HOURS PRN
Status: DISCONTINUED | OUTPATIENT
Start: 2022-05-14 | End: 2022-06-20 | Stop reason: HOSPADM

## 2022-05-14 RX ORDER — NICOTINE POLACRILEX 4 MG
15 LOZENGE BUCCAL
Status: DISCONTINUED | OUTPATIENT
Start: 2022-05-14 | End: 2022-06-20 | Stop reason: HOSPADM

## 2022-05-14 RX ORDER — FAMOTIDINE 10 MG/ML
20 INJECTION, SOLUTION INTRAVENOUS 2 TIMES DAILY
Status: DISCONTINUED | OUTPATIENT
Start: 2022-05-14 | End: 2022-05-16

## 2022-05-14 RX ORDER — DEXTROSE MONOHYDRATE 25 G/50ML
25 INJECTION, SOLUTION INTRAVENOUS
Status: DISCONTINUED | OUTPATIENT
Start: 2022-05-14 | End: 2022-06-20 | Stop reason: HOSPADM

## 2022-05-14 RX ORDER — LACOSAMIDE 10 MG/ML
50 INJECTION, SOLUTION INTRAVENOUS ONCE
Status: COMPLETED | OUTPATIENT
Start: 2022-05-14 | End: 2022-05-14

## 2022-05-14 RX ORDER — SUCCINYLCHOLINE CHLORIDE 20 MG/ML
25 INJECTION INTRAMUSCULAR; INTRAVENOUS ONCE
Status: COMPLETED | OUTPATIENT
Start: 2022-05-14 | End: 2022-05-14

## 2022-05-14 RX ADMIN — PROPOFOL 60 MCG/KG/MIN: 10 INJECTION, EMULSION INTRAVENOUS at 21:25

## 2022-05-14 RX ADMIN — LORAZEPAM 1 MG: 2 INJECTION INTRAMUSCULAR; INTRAVENOUS at 11:12

## 2022-05-14 RX ADMIN — LEVETIRACETAM 1000 MG: 1000 INJECTION, SOLUTION INTRAVENOUS at 05:28

## 2022-05-14 RX ADMIN — INSULIN LISPRO 8 UNITS: 100 INJECTION, SOLUTION INTRAVENOUS; SUBCUTANEOUS at 12:24

## 2022-05-14 RX ADMIN — FAMOTIDINE 20 MG: 10 INJECTION INTRAVENOUS at 21:08

## 2022-05-14 RX ADMIN — LACOSAMIDE 100 MG: 10 INJECTION INTRAVENOUS at 12:51

## 2022-05-14 RX ADMIN — LEVETIRACETAM 500 MG: 5 INJECTION INTRAVASCULAR at 20:57

## 2022-05-14 RX ADMIN — ETOMIDATE 10 MG: 2 INJECTION, SOLUTION INTRAVENOUS at 16:05

## 2022-05-14 RX ADMIN — DEXAMETHASONE 4 MG: 4 TABLET ORAL at 05:30

## 2022-05-14 RX ADMIN — PROPOFOL 50 MCG/KG/MIN: 10 INJECTION, EMULSION INTRAVENOUS at 16:15

## 2022-05-14 RX ADMIN — SODIUM CHLORIDE 500 ML: 9 INJECTION, SOLUTION INTRAVENOUS at 18:18

## 2022-05-14 RX ADMIN — LACOSAMIDE 150 MG: 10 INJECTION INTRAVENOUS at 23:52

## 2022-05-14 RX ADMIN — DEXAMETHASONE 3 MG: 2 TABLET ORAL at 21:28

## 2022-05-14 RX ADMIN — PROPOFOL 55 MCG/KG/MIN: 10 INJECTION, EMULSION INTRAVENOUS at 18:31

## 2022-05-14 RX ADMIN — FOSPHENYTOIN SODIUM 100 MG PE: 50 INJECTION, SOLUTION INTRAMUSCULAR; INTRAVENOUS at 21:08

## 2022-05-14 RX ADMIN — IOPAMIDOL 100 ML: 755 INJECTION, SOLUTION INTRAVENOUS at 06:29

## 2022-05-14 RX ADMIN — Medication 0.02 MCG/KG/MIN: at 19:40

## 2022-05-14 RX ADMIN — PROPOFOL 60 MCG/KG/MIN: 10 INJECTION, EMULSION INTRAVENOUS at 23:17

## 2022-05-14 RX ADMIN — LORAZEPAM 1 MG: 2 INJECTION INTRAMUSCULAR; INTRAVENOUS at 11:08

## 2022-05-14 RX ADMIN — SODIUM CHLORIDE 70 ML/HR: 3 INJECTION, SOLUTION INTRAVENOUS at 17:58

## 2022-05-14 RX ADMIN — LEVETIRACETAM 500 MG: 5 INJECTION INTRAVASCULAR at 11:25

## 2022-05-14 RX ADMIN — FOSPHENYTOIN SODIUM 200 MG PE: 50 INJECTION, SOLUTION INTRAMUSCULAR; INTRAVENOUS at 15:19

## 2022-05-14 RX ADMIN — CHLORHEXIDINE GLUCONATE 15 ML: 1.2 RINSE ORAL at 21:08

## 2022-05-14 RX ADMIN — LIDOCAINE HYDROCHLORIDE ANHYDROUS 1 ML: 10 INJECTION, SOLUTION INFILTRATION at 17:00

## 2022-05-14 RX ADMIN — LEVOTHYROXINE SODIUM 125 MCG: 125 TABLET ORAL at 05:30

## 2022-05-14 RX ADMIN — LORAZEPAM 1 MG: 2 INJECTION INTRAMUSCULAR; INTRAVENOUS at 09:04

## 2022-05-14 RX ADMIN — SODIUM CHLORIDE 1680 MG PE: 9 INJECTION, SOLUTION INTRAVENOUS at 11:15

## 2022-05-14 RX ADMIN — SUCCINYLCHOLINE CHLORIDE 25 MG: 20 INJECTION, SOLUTION INTRAMUSCULAR; INTRAVENOUS at 16:07

## 2022-05-14 RX ADMIN — INSULIN LISPRO 5 UNITS: 100 INJECTION, SOLUTION INTRAVENOUS; SUBCUTANEOUS at 18:21

## 2022-05-14 RX ADMIN — SODIUM CHLORIDE 40 ML/HR: 3 INJECTION, SOLUTION INTRAVENOUS at 12:20

## 2022-05-14 RX ADMIN — FOSPHENYTOIN SODIUM 100 MG PE: 50 INJECTION, SOLUTION INTRAMUSCULAR; INTRAVENOUS at 18:17

## 2022-05-14 RX ADMIN — PHENYTOIN 100 MG: 50 TABLET, CHEWABLE ORAL at 21:08

## 2022-05-14 RX ADMIN — LACOSAMIDE 50 MG: 10 INJECTION INTRAVENOUS at 15:13

## 2022-05-15 ENCOUNTER — APPOINTMENT (OUTPATIENT)
Dept: CT IMAGING | Facility: HOSPITAL | Age: 77
End: 2022-05-15

## 2022-05-15 ENCOUNTER — APPOINTMENT (OUTPATIENT)
Dept: NEUROLOGY | Facility: HOSPITAL | Age: 77
End: 2022-05-15

## 2022-05-15 ENCOUNTER — APPOINTMENT (OUTPATIENT)
Dept: GENERAL RADIOLOGY | Facility: HOSPITAL | Age: 77
End: 2022-05-15

## 2022-05-15 LAB
ANION GAP SERPL CALCULATED.3IONS-SCNC: 9 MMOL/L (ref 5–15)
ARTERIAL PATENCY WRIST A: POSITIVE
ATMOSPHERIC PRESS: 748 MMHG
BASE EXCESS BLDA CALC-SCNC: -1 MMOL/L (ref 0–2)
BASOPHILS # BLD AUTO: 0.02 10*3/MM3 (ref 0–0.2)
BASOPHILS NFR BLD AUTO: 0.2 % (ref 0–1.5)
BDY SITE: ABNORMAL
BODY TEMPERATURE: 37 C
BUN SERPL-MCNC: 30 MG/DL (ref 8–23)
BUN/CREAT SERPL: 35.3 (ref 7–25)
CALCIUM SPEC-SCNC: 8.2 MG/DL (ref 8.6–10.5)
CHLORIDE SERPL-SCNC: 106 MMOL/L (ref 98–107)
CO2 SERPL-SCNC: 23 MMOL/L (ref 22–29)
CREAT SERPL-MCNC: 0.85 MG/DL (ref 0.76–1.27)
DEPRECATED RDW RBC AUTO: 46.5 FL (ref 37–54)
EGFRCR SERPLBLD CKD-EPI 2021: 89.5 ML/MIN/1.73
EOSINOPHIL # BLD AUTO: 0 10*3/MM3 (ref 0–0.4)
EOSINOPHIL NFR BLD AUTO: 0 % (ref 0.3–6.2)
ERYTHROCYTE [DISTWIDTH] IN BLOOD BY AUTOMATED COUNT: 12.9 % (ref 12.3–15.4)
GLUCOSE BLDC GLUCOMTR-MCNC: 196 MG/DL (ref 70–130)
GLUCOSE BLDC GLUCOMTR-MCNC: 218 MG/DL (ref 70–130)
GLUCOSE BLDC GLUCOMTR-MCNC: 262 MG/DL (ref 70–130)
GLUCOSE SERPL-MCNC: 265 MG/DL (ref 65–99)
HBA1C MFR BLD: 8.2 % (ref 4.8–5.6)
HCO3 BLDA-SCNC: 24.7 MMOL/L (ref 20–26)
HCT VFR BLD AUTO: 38.5 % (ref 37.5–51)
HGB BLD-MCNC: 12.4 G/DL (ref 13–17.7)
IMM GRANULOCYTES # BLD AUTO: 0.2 10*3/MM3 (ref 0–0.05)
IMM GRANULOCYTES NFR BLD AUTO: 1.8 % (ref 0–0.5)
INHALED O2 CONCENTRATION: 30 %
LYMPHOCYTES # BLD AUTO: 0.65 10*3/MM3 (ref 0.7–3.1)
LYMPHOCYTES NFR BLD AUTO: 5.8 % (ref 19.6–45.3)
Lab: ABNORMAL
MCH RBC QN AUTO: 31.7 PG (ref 26.6–33)
MCHC RBC AUTO-ENTMCNC: 32.2 G/DL (ref 31.5–35.7)
MCV RBC AUTO: 98.5 FL (ref 79–97)
MODALITY: ABNORMAL
MONOCYTES # BLD AUTO: 1.03 10*3/MM3 (ref 0.1–0.9)
MONOCYTES NFR BLD AUTO: 9.1 % (ref 5–12)
NEUTROPHILS NFR BLD AUTO: 83.1 % (ref 42.7–76)
NEUTROPHILS NFR BLD AUTO: 9.4 10*3/MM3 (ref 1.7–7)
NRBC BLD AUTO-RTO: 0 /100 WBC (ref 0–0.2)
OSMOLALITY SERPL: 301 MOSM/KG (ref 280–301)
OSMOLALITY SERPL: 304 MOSM/KG (ref 280–301)
OSMOLALITY SERPL: 307 MOSM/KG (ref 280–301)
OSMOLALITY SERPL: 309 MOSM/KG (ref 280–301)
PCO2 BLDA: 44.3 MM HG (ref 35–45)
PCO2 TEMP ADJ BLD: 44.3 MM HG (ref 35–45)
PEEP RESPIRATORY: 5 CM[H2O]
PH BLDA: 7.36 PH UNITS (ref 7.35–7.45)
PH, TEMP CORRECTED: 7.36 PH UNITS (ref 7.35–7.45)
PHENYTOIN SERPL-MCNC: 11.7 MCG/ML (ref 10–20)
PHENYTOIN SERPL-MCNC: 12.3 MCG/ML (ref 10–20)
PHENYTOIN SERPL-MCNC: 15.4 MCG/ML (ref 10–20)
PLATELET # BLD AUTO: 111 10*3/MM3 (ref 140–450)
PMV BLD AUTO: 9.3 FL (ref 6–12)
PO2 BLDA: 128 MM HG (ref 83–108)
PO2 TEMP ADJ BLD: 128 MM HG (ref 83–108)
POTASSIUM SERPL-SCNC: 4.7 MMOL/L (ref 3.5–5.2)
RBC # BLD AUTO: 3.91 10*6/MM3 (ref 4.14–5.8)
SAO2 % BLDCOA: 99.1 % (ref 94–99)
SET MECH RESP RATE: 14
SODIUM SERPL-SCNC: 138 MMOL/L (ref 136–145)
SODIUM SERPL-SCNC: 138 MMOL/L (ref 136–145)
SODIUM SERPL-SCNC: 141 MMOL/L (ref 136–145)
SODIUM SERPL-SCNC: 141 MMOL/L (ref 136–145)
VENTILATOR MODE: AC
VT ON VENT VENT: 550 ML
WBC NRBC COR # BLD: 11.3 10*3/MM3 (ref 3.4–10.8)

## 2022-05-15 PROCEDURE — 95714 VEEG EA 12-26 HR UNMNTR: CPT

## 2022-05-15 PROCEDURE — 71045 X-RAY EXAM CHEST 1 VIEW: CPT

## 2022-05-15 PROCEDURE — 95720 EEG PHY/QHP EA INCR W/VEEG: CPT | Performed by: PSYCHIATRY & NEUROLOGY

## 2022-05-15 PROCEDURE — 36600 WITHDRAWAL OF ARTERIAL BLOOD: CPT

## 2022-05-15 PROCEDURE — 99024 POSTOP FOLLOW-UP VISIT: CPT | Performed by: NEUROLOGICAL SURGERY

## 2022-05-15 PROCEDURE — 82803 BLOOD GASES ANY COMBINATION: CPT

## 2022-05-15 PROCEDURE — 70450 CT HEAD/BRAIN W/O DYE: CPT

## 2022-05-15 PROCEDURE — C1769 GUIDE WIRE: HCPCS

## 2022-05-15 PROCEDURE — 80185 ASSAY OF PHENYTOIN TOTAL: CPT | Performed by: PSYCHIATRY & NEUROLOGY

## 2022-05-15 PROCEDURE — 94799 UNLISTED PULMONARY SVC/PX: CPT

## 2022-05-15 PROCEDURE — 84295 ASSAY OF SERUM SODIUM: CPT | Performed by: NEUROLOGICAL SURGERY

## 2022-05-15 PROCEDURE — 82962 GLUCOSE BLOOD TEST: CPT

## 2022-05-15 PROCEDURE — 63710000001 INSULIN LISPRO (HUMAN) PER 5 UNITS: Performed by: NEUROLOGICAL SURGERY

## 2022-05-15 PROCEDURE — 99291 CRITICAL CARE FIRST HOUR: CPT | Performed by: PSYCHIATRY & NEUROLOGY

## 2022-05-15 PROCEDURE — 94003 VENT MGMT INPAT SUBQ DAY: CPT

## 2022-05-15 PROCEDURE — 25010000002 PROPOFOL 10 MG/ML EMULSION: Performed by: PSYCHIATRY & NEUROLOGY

## 2022-05-15 PROCEDURE — 83930 ASSAY OF BLOOD OSMOLALITY: CPT | Performed by: NEUROLOGICAL SURGERY

## 2022-05-15 PROCEDURE — 85025 COMPLETE CBC W/AUTO DIFF WBC: CPT | Performed by: NEUROLOGICAL SURGERY

## 2022-05-15 PROCEDURE — 80048 BASIC METABOLIC PNL TOTAL CA: CPT | Performed by: NEUROLOGICAL SURGERY

## 2022-05-15 PROCEDURE — 25010000002 FOSPHENYTOIN 100 MG PE/2ML SOLUTION 2 ML VIAL: Performed by: PSYCHIATRY & NEUROLOGY

## 2022-05-15 PROCEDURE — 83036 HEMOGLOBIN GLYCOSYLATED A1C: CPT | Performed by: INTERNAL MEDICINE

## 2022-05-15 PROCEDURE — 99223 1ST HOSP IP/OBS HIGH 75: CPT | Performed by: INTERNAL MEDICINE

## 2022-05-15 PROCEDURE — 25010000002 LEVETIRACETAM IN NACL 0.82% 500 MG/100ML SOLUTION: Performed by: NEUROLOGICAL SURGERY

## 2022-05-15 PROCEDURE — 94640 AIRWAY INHALATION TREATMENT: CPT

## 2022-05-15 PROCEDURE — 74018 RADEX ABDOMEN 1 VIEW: CPT

## 2022-05-15 RX ORDER — LIDOCAINE HYDROCHLORIDE 10 MG/ML
1 INJECTION, SOLUTION EPIDURAL; INFILTRATION; INTRACAUDAL; PERINEURAL ONCE
Status: COMPLETED | OUTPATIENT
Start: 2022-05-15 | End: 2022-05-15

## 2022-05-15 RX ORDER — 3% SODIUM CHLORIDE 3 G/100ML
60 INJECTION, SOLUTION INTRAVENOUS CONTINUOUS
Status: DISPENSED | OUTPATIENT
Start: 2022-05-15 | End: 2022-05-16

## 2022-05-15 RX ORDER — AMINO AC/PROTEIN HYDR/WHEY PRO 10G-100/30
1 LIQUID (ML) ORAL 3 TIMES DAILY
Status: DISCONTINUED | OUTPATIENT
Start: 2022-05-15 | End: 2022-05-18

## 2022-05-15 RX ORDER — LACOSAMIDE 10 MG/ML
50 INJECTION, SOLUTION INTRAVENOUS ONCE
Status: COMPLETED | OUTPATIENT
Start: 2022-05-15 | End: 2022-05-15

## 2022-05-15 RX ORDER — ALBUTEROL SULFATE 2.5 MG/3ML
2.5 SOLUTION RESPIRATORY (INHALATION)
Status: DISCONTINUED | OUTPATIENT
Start: 2022-05-15 | End: 2022-06-11

## 2022-05-15 RX ORDER — LACOSAMIDE 10 MG/ML
200 INJECTION, SOLUTION INTRAVENOUS EVERY 12 HOURS
Status: DISCONTINUED | OUTPATIENT
Start: 2022-05-15 | End: 2022-06-20 | Stop reason: HOSPADM

## 2022-05-15 RX ADMIN — PROPOFOL 60 MCG/KG/MIN: 10 INJECTION, EMULSION INTRAVENOUS at 09:13

## 2022-05-15 RX ADMIN — LACOSAMIDE 50 MG: 10 INJECTION INTRAVENOUS at 11:01

## 2022-05-15 RX ADMIN — PROPOFOL 70 MCG/KG/MIN: 10 INJECTION, EMULSION INTRAVENOUS at 17:40

## 2022-05-15 RX ADMIN — Medication 45 ML: at 20:30

## 2022-05-15 RX ADMIN — LEVETIRACETAM 500 MG: 5 INJECTION INTRAVASCULAR at 20:30

## 2022-05-15 RX ADMIN — INSULIN LISPRO 3 UNITS: 100 INJECTION, SOLUTION INTRAVENOUS; SUBCUTANEOUS at 17:39

## 2022-05-15 RX ADMIN — SODIUM CHLORIDE 60 ML/HR: 3 INJECTION, SOLUTION INTRAVENOUS at 16:04

## 2022-05-15 RX ADMIN — LACOSAMIDE 200 MG: 10 INJECTION, SOLUTION INTRAVENOUS at 20:29

## 2022-05-15 RX ADMIN — FOSPHENYTOIN SODIUM 200 MG PE: 50 INJECTION, SOLUTION INTRAMUSCULAR; INTRAVENOUS at 22:32

## 2022-05-15 RX ADMIN — LACOSAMIDE 150 MG: 10 INJECTION INTRAVENOUS at 09:11

## 2022-05-15 RX ADMIN — PROPOFOL 60 MCG/KG/MIN: 10 INJECTION, EMULSION INTRAVENOUS at 01:35

## 2022-05-15 RX ADMIN — PROPOFOL 60 MCG/KG/MIN: 10 INJECTION, EMULSION INTRAVENOUS at 06:24

## 2022-05-15 RX ADMIN — SODIUM CHLORIDE 500 ML: 9 INJECTION, SOLUTION INTRAVENOUS at 09:13

## 2022-05-15 RX ADMIN — DEXAMETHASONE 3 MG: 2 TABLET ORAL at 09:15

## 2022-05-15 RX ADMIN — FOSPHENYTOIN SODIUM 100 MG PE: 50 INJECTION, SOLUTION INTRAMUSCULAR; INTRAVENOUS at 06:09

## 2022-05-15 RX ADMIN — FOSPHENYTOIN SODIUM 100 MG PE: 50 INJECTION, SOLUTION INTRAMUSCULAR; INTRAVENOUS at 15:22

## 2022-05-15 RX ADMIN — PROPOFOL 65 MCG/KG/MIN: 10 INJECTION, EMULSION INTRAVENOUS at 15:33

## 2022-05-15 RX ADMIN — DEXAMETHASONE 3 MG: 2 TABLET ORAL at 20:29

## 2022-05-15 RX ADMIN — SODIUM CHLORIDE 40 ML/HR: 3 INJECTION, SOLUTION INTRAVENOUS at 06:03

## 2022-05-15 RX ADMIN — Medication 45 ML: at 17:41

## 2022-05-15 RX ADMIN — ALBUTEROL SULFATE 2.5 MG: 2.5 SOLUTION RESPIRATORY (INHALATION) at 23:08

## 2022-05-15 RX ADMIN — PROPOFOL 70 MCG/KG/MIN: 10 INJECTION, EMULSION INTRAVENOUS at 22:31

## 2022-05-15 RX ADMIN — INSULIN LISPRO 3 UNITS: 100 INJECTION, SOLUTION INTRAVENOUS; SUBCUTANEOUS at 13:06

## 2022-05-15 RX ADMIN — PROPOFOL 70 MCG/KG/MIN: 10 INJECTION, EMULSION INTRAVENOUS at 20:08

## 2022-05-15 RX ADMIN — CHLORHEXIDINE GLUCONATE 15 ML: 1.2 RINSE ORAL at 09:13

## 2022-05-15 RX ADMIN — ALBUTEROL SULFATE 2.5 MG: 2.5 SOLUTION RESPIRATORY (INHALATION) at 14:36

## 2022-05-15 RX ADMIN — SODIUM CHLORIDE 60 ML/HR: 3 INJECTION, SOLUTION INTRAVENOUS at 23:10

## 2022-05-15 RX ADMIN — FAMOTIDINE 20 MG: 10 INJECTION INTRAVENOUS at 20:30

## 2022-05-15 RX ADMIN — LEVETIRACETAM 500 MG: 5 INJECTION INTRAVASCULAR at 09:12

## 2022-05-15 RX ADMIN — PROPOFOL 60 MCG/KG/MIN: 10 INJECTION, EMULSION INTRAVENOUS at 13:02

## 2022-05-15 RX ADMIN — FOSPHENYTOIN SODIUM 300 MG PE: 50 INJECTION, SOLUTION INTRAMUSCULAR; INTRAVENOUS at 11:01

## 2022-05-15 RX ADMIN — PHENYTOIN 100 MG: 50 TABLET, CHEWABLE ORAL at 06:11

## 2022-05-15 RX ADMIN — FAMOTIDINE 20 MG: 10 INJECTION INTRAVENOUS at 09:11

## 2022-05-15 RX ADMIN — CHLORHEXIDINE GLUCONATE 15 ML: 1.2 RINSE ORAL at 20:29

## 2022-05-15 RX ADMIN — LIDOCAINE HYDROCHLORIDE 1 ML: 10 INJECTION, SOLUTION EPIDURAL; INFILTRATION; INTRACAUDAL; PERINEURAL at 14:07

## 2022-05-15 RX ADMIN — PROPOFOL 60 MCG/KG/MIN: 10 INJECTION, EMULSION INTRAVENOUS at 04:09

## 2022-05-15 RX ADMIN — PROPOFOL 60 MCG/KG/MIN: 10 INJECTION, EMULSION INTRAVENOUS at 10:27

## 2022-05-15 RX ADMIN — INSULIN LISPRO 8 UNITS: 100 INJECTION, SOLUTION INTRAVENOUS; SUBCUTANEOUS at 09:12

## 2022-05-16 ENCOUNTER — APPOINTMENT (OUTPATIENT)
Dept: NEUROLOGY | Facility: HOSPITAL | Age: 77
End: 2022-05-16

## 2022-05-16 ENCOUNTER — APPOINTMENT (OUTPATIENT)
Dept: GENERAL RADIOLOGY | Facility: HOSPITAL | Age: 77
End: 2022-05-16

## 2022-05-16 LAB
ALBUMIN SERPL-MCNC: 2.5 G/DL (ref 3.5–5.2)
ALBUMIN/GLOB SERPL: 1 G/DL
ALP SERPL-CCNC: 40 U/L (ref 39–117)
ALT SERPL W P-5'-P-CCNC: 69 U/L (ref 1–41)
ANION GAP SERPL CALCULATED.3IONS-SCNC: 7 MMOL/L (ref 5–15)
ARTERIAL PATENCY WRIST A: ABNORMAL
AST SERPL-CCNC: 20 U/L (ref 1–40)
ATMOSPHERIC PRESS: 748 MMHG
BASE EXCESS BLDA CALC-SCNC: -1.5 MMOL/L (ref 0–2)
BDY SITE: ABNORMAL
BILIRUB SERPL-MCNC: 0.5 MG/DL (ref 0–1.2)
BODY TEMPERATURE: 37 C
BUN SERPL-MCNC: 27 MG/DL (ref 8–23)
BUN/CREAT SERPL: 31.4 (ref 7–25)
CALCIUM SPEC-SCNC: 7.8 MG/DL (ref 8.6–10.5)
CHLORIDE SERPL-SCNC: 114 MMOL/L (ref 98–107)
CO2 SERPL-SCNC: 22 MMOL/L (ref 22–29)
CREAT SERPL-MCNC: 0.86 MG/DL (ref 0.76–1.27)
DEPRECATED RDW RBC AUTO: 48.5 FL (ref 37–54)
EGFRCR SERPLBLD CKD-EPI 2021: 89.2 ML/MIN/1.73
ERYTHROCYTE [DISTWIDTH] IN BLOOD BY AUTOMATED COUNT: 13.3 % (ref 12.3–15.4)
GLOBULIN UR ELPH-MCNC: 2.4 GM/DL
GLUCOSE BLDC GLUCOMTR-MCNC: 168 MG/DL (ref 70–130)
GLUCOSE BLDC GLUCOMTR-MCNC: 204 MG/DL (ref 70–130)
GLUCOSE BLDC GLUCOMTR-MCNC: 206 MG/DL (ref 70–130)
GLUCOSE BLDC GLUCOMTR-MCNC: 269 MG/DL (ref 70–130)
GLUCOSE SERPL-MCNC: 276 MG/DL (ref 65–99)
HCO3 BLDA-SCNC: 23.4 MMOL/L (ref 20–26)
HCT VFR BLD AUTO: 38 % (ref 37.5–51)
HGB BLD-MCNC: 12.2 G/DL (ref 13–17.7)
INHALED O2 CONCENTRATION: 40 %
LYMPHOCYTES # BLD MANUAL: 0.78 10*3/MM3 (ref 0.7–3.1)
LYMPHOCYTES NFR BLD MANUAL: 3.1 % (ref 5–12)
Lab: ABNORMAL
MAGNESIUM SERPL-MCNC: 1.8 MG/DL (ref 1.6–2.4)
MCH RBC QN AUTO: 31.8 PG (ref 26.6–33)
MCHC RBC AUTO-ENTMCNC: 32.1 G/DL (ref 31.5–35.7)
MCV RBC AUTO: 99 FL (ref 79–97)
MODALITY: ABNORMAL
MONOCYTES # BLD: 0.47 10*3/MM3 (ref 0.1–0.9)
NEUTROPHILS # BLD AUTO: 13.97 10*3/MM3 (ref 1.7–7)
NEUTROPHILS NFR BLD MANUAL: 90.8 % (ref 42.7–76)
NEUTS BAND NFR BLD MANUAL: 1 % (ref 0–5)
NEUTS VAC BLD QL SMEAR: ABNORMAL
OSMOLALITY SERPL: 305 MOSM/KG (ref 280–301)
OSMOLALITY SERPL: 311 MOSM/KG (ref 280–301)
OSMOLALITY SERPL: 318 MOSM/KG (ref 280–301)
OSMOLALITY SERPL: 323 MOSM/KG (ref 280–301)
PCO2 BLDA: 39 MM HG (ref 35–45)
PCO2 TEMP ADJ BLD: 39 MM HG (ref 35–45)
PEEP RESPIRATORY: 5 CM[H2O]
PH BLDA: 7.39 PH UNITS (ref 7.35–7.45)
PH, TEMP CORRECTED: 7.39 PH UNITS (ref 7.35–7.45)
PHENYTOIN SERPL-MCNC: 13.7 MCG/ML (ref 10–20)
PHENYTOIN SERPL-MCNC: 16.6 MCG/ML (ref 10–20)
PLATELET # BLD AUTO: 74 10*3/MM3 (ref 140–450)
PMV BLD AUTO: 9.7 FL (ref 6–12)
PO2 BLDA: 66.4 MM HG (ref 83–108)
PO2 TEMP ADJ BLD: 66.4 MM HG (ref 83–108)
POTASSIUM SERPL-SCNC: 4.3 MMOL/L (ref 3.5–5.2)
PROT SERPL-MCNC: 4.9 G/DL (ref 6–8.5)
RBC # BLD AUTO: 3.84 10*6/MM3 (ref 4.14–5.8)
RBC MORPH BLD: NORMAL
SAO2 % BLDCOA: 94.3 % (ref 94–99)
SET MECH RESP RATE: 14
SMALL PLATELETS BLD QL SMEAR: ABNORMAL
SODIUM SERPL-SCNC: 143 MMOL/L (ref 136–145)
SODIUM SERPL-SCNC: 143 MMOL/L (ref 136–145)
SODIUM SERPL-SCNC: 146 MMOL/L (ref 136–145)
SODIUM SERPL-SCNC: 148 MMOL/L (ref 136–145)
TOXIC GRANULATION: ABNORMAL
VARIANT LYMPHS NFR BLD MANUAL: 2 % (ref 0–5)
VARIANT LYMPHS NFR BLD MANUAL: 3.1 % (ref 19.6–45.3)
VENTILATOR MODE: AC
VT ON VENT VENT: 550 ML
WBC NRBC COR # BLD: 15.21 10*3/MM3 (ref 3.4–10.8)

## 2022-05-16 PROCEDURE — 94003 VENT MGMT INPAT SUBQ DAY: CPT

## 2022-05-16 PROCEDURE — 71045 X-RAY EXAM CHEST 1 VIEW: CPT

## 2022-05-16 PROCEDURE — 82962 GLUCOSE BLOOD TEST: CPT

## 2022-05-16 PROCEDURE — 84443 ASSAY THYROID STIM HORMONE: CPT | Performed by: INTERNAL MEDICINE

## 2022-05-16 PROCEDURE — 25010000002 LEVETIRACETAM IN NACL 0.82% 500 MG/100ML SOLUTION: Performed by: NEUROLOGICAL SURGERY

## 2022-05-16 PROCEDURE — 82803 BLOOD GASES ANY COMBINATION: CPT

## 2022-05-16 PROCEDURE — 80185 ASSAY OF PHENYTOIN TOTAL: CPT | Performed by: PSYCHIATRY & NEUROLOGY

## 2022-05-16 PROCEDURE — 84295 ASSAY OF SERUM SODIUM: CPT | Performed by: NEUROLOGICAL SURGERY

## 2022-05-16 PROCEDURE — 25010000002 THIAMINE PER 100 MG: Performed by: PSYCHIATRY & NEUROLOGY

## 2022-05-16 PROCEDURE — 80053 COMPREHEN METABOLIC PANEL: CPT | Performed by: PSYCHIATRY & NEUROLOGY

## 2022-05-16 PROCEDURE — 0 MAGNESIUM SULFATE 4 GM/100ML SOLUTION: Performed by: PSYCHIATRY & NEUROLOGY

## 2022-05-16 PROCEDURE — 85007 BL SMEAR W/DIFF WBC COUNT: CPT | Performed by: NEUROLOGICAL SURGERY

## 2022-05-16 PROCEDURE — 95716 VEEG EA 12-26HR CONT MNTR: CPT

## 2022-05-16 PROCEDURE — 25010000002 PROPOFOL 10 MG/ML EMULSION: Performed by: PSYCHIATRY & NEUROLOGY

## 2022-05-16 PROCEDURE — 83930 ASSAY OF BLOOD OSMOLALITY: CPT | Performed by: NEUROLOGICAL SURGERY

## 2022-05-16 PROCEDURE — 99024 POSTOP FOLLOW-UP VISIT: CPT | Performed by: NURSE PRACTITIONER

## 2022-05-16 PROCEDURE — 94799 UNLISTED PULMONARY SVC/PX: CPT

## 2022-05-16 PROCEDURE — 25010000002 FOSPHENYTOIN 100 MG PE/2ML SOLUTION 2 ML VIAL: Performed by: PSYCHIATRY & NEUROLOGY

## 2022-05-16 PROCEDURE — 63710000001 INSULIN LISPRO (HUMAN) PER 5 UNITS: Performed by: NEUROLOGICAL SURGERY

## 2022-05-16 PROCEDURE — 63710000001 INSULIN DETEMIR PER 5 UNITS: Performed by: INTERNAL MEDICINE

## 2022-05-16 PROCEDURE — 83735 ASSAY OF MAGNESIUM: CPT | Performed by: PSYCHIATRY & NEUROLOGY

## 2022-05-16 PROCEDURE — 85025 COMPLETE CBC W/AUTO DIFF WBC: CPT | Performed by: NEUROLOGICAL SURGERY

## 2022-05-16 PROCEDURE — 99291 CRITICAL CARE FIRST HOUR: CPT | Performed by: PSYCHIATRY & NEUROLOGY

## 2022-05-16 PROCEDURE — 95720 EEG PHY/QHP EA INCR W/VEEG: CPT | Performed by: PSYCHIATRY & NEUROLOGY

## 2022-05-16 PROCEDURE — 99233 SBSQ HOSP IP/OBS HIGH 50: CPT | Performed by: INTERNAL MEDICINE

## 2022-05-16 RX ORDER — MAGNESIUM SULFATE HEPTAHYDRATE 40 MG/ML
4 INJECTION, SOLUTION INTRAVENOUS ONCE
Status: COMPLETED | OUTPATIENT
Start: 2022-05-16 | End: 2022-05-16

## 2022-05-16 RX ORDER — PANTOPRAZOLE SODIUM 40 MG/10ML
40 INJECTION, POWDER, LYOPHILIZED, FOR SOLUTION INTRAVENOUS
Status: DISCONTINUED | OUTPATIENT
Start: 2022-05-16 | End: 2022-06-11

## 2022-05-16 RX ORDER — 3% SODIUM CHLORIDE 3 G/100ML
60 INJECTION, SOLUTION INTRAVENOUS CONTINUOUS
Status: DISCONTINUED | OUTPATIENT
Start: 2022-05-16 | End: 2022-05-16

## 2022-05-16 RX ADMIN — PROPOFOL 70 MCG/KG/MIN: 10 INJECTION, EMULSION INTRAVENOUS at 00:39

## 2022-05-16 RX ADMIN — LEVETIRACETAM 500 MG: 5 INJECTION INTRAVASCULAR at 09:38

## 2022-05-16 RX ADMIN — PROPOFOL 70 MCG/KG/MIN: 10 INJECTION, EMULSION INTRAVENOUS at 09:37

## 2022-05-16 RX ADMIN — FOSPHENYTOIN SODIUM 200 MG PE: 50 INJECTION, SOLUTION INTRAMUSCULAR; INTRAVENOUS at 06:03

## 2022-05-16 RX ADMIN — Medication 0.02 MCG/KG/MIN: at 07:05

## 2022-05-16 RX ADMIN — CHLORHEXIDINE GLUCONATE 15 ML: 1.2 RINSE ORAL at 09:38

## 2022-05-16 RX ADMIN — INSULIN LISPRO 5 UNITS: 100 INJECTION, SOLUTION INTRAVENOUS; SUBCUTANEOUS at 17:39

## 2022-05-16 RX ADMIN — LACOSAMIDE 200 MG: 10 INJECTION, SOLUTION INTRAVENOUS at 09:37

## 2022-05-16 RX ADMIN — CHLORHEXIDINE GLUCONATE 15 ML: 1.2 RINSE ORAL at 21:38

## 2022-05-16 RX ADMIN — PROPOFOL 70 MCG/KG/MIN: 10 INJECTION, EMULSION INTRAVENOUS at 07:23

## 2022-05-16 RX ADMIN — LEVETIRACETAM 500 MG: 5 INJECTION INTRAVASCULAR at 21:37

## 2022-05-16 RX ADMIN — PROPOFOL 70 MCG/KG/MIN: 10 INJECTION, EMULSION INTRAVENOUS at 21:04

## 2022-05-16 RX ADMIN — PROPOFOL 70 MCG/KG/MIN: 10 INJECTION, EMULSION INTRAVENOUS at 15:03

## 2022-05-16 RX ADMIN — PROPOFOL 70 MCG/KG/MIN: 10 INJECTION, EMULSION INTRAVENOUS at 18:53

## 2022-05-16 RX ADMIN — PROPOFOL 70 MCG/KG/MIN: 10 INJECTION, EMULSION INTRAVENOUS at 03:25

## 2022-05-16 RX ADMIN — ALBUTEROL SULFATE 2.5 MG: 2.5 SOLUTION RESPIRATORY (INHALATION) at 14:23

## 2022-05-16 RX ADMIN — ALBUTEROL SULFATE 2.5 MG: 2.5 SOLUTION RESPIRATORY (INHALATION) at 06:32

## 2022-05-16 RX ADMIN — INSULIN LISPRO 8 UNITS: 100 INJECTION, SOLUTION INTRAVENOUS; SUBCUTANEOUS at 09:37

## 2022-05-16 RX ADMIN — FOSPHENYTOIN SODIUM 200 MG PE: 50 INJECTION, SOLUTION INTRAMUSCULAR; INTRAVENOUS at 14:20

## 2022-05-16 RX ADMIN — DEXAMETHASONE 3 MG: 2 TABLET ORAL at 09:37

## 2022-05-16 RX ADMIN — ALBUTEROL SULFATE 2.5 MG: 2.5 SOLUTION RESPIRATORY (INHALATION) at 23:13

## 2022-05-16 RX ADMIN — PROPOFOL 70 MCG/KG/MIN: 10 INJECTION, EMULSION INTRAVENOUS at 16:42

## 2022-05-16 RX ADMIN — FAMOTIDINE 20 MG: 10 INJECTION INTRAVENOUS at 09:37

## 2022-05-16 RX ADMIN — Medication 45 ML: at 21:38

## 2022-05-16 RX ADMIN — Medication 45 ML: at 09:38

## 2022-05-16 RX ADMIN — LACOSAMIDE 200 MG: 10 INJECTION, SOLUTION INTRAVENOUS at 21:38

## 2022-05-16 RX ADMIN — THIAMINE HYDROCHLORIDE 200 MG: 100 INJECTION, SOLUTION INTRAMUSCULAR; INTRAVENOUS at 17:31

## 2022-05-16 RX ADMIN — SODIUM CHLORIDE 60 ML/HR: 3 INJECTION, SOLUTION INTRAVENOUS at 15:03

## 2022-05-16 RX ADMIN — PROPOFOL 70 MCG/KG/MIN: 10 INJECTION, EMULSION INTRAVENOUS at 22:51

## 2022-05-16 RX ADMIN — SODIUM CHLORIDE 60 ML/HR: 3 INJECTION, SOLUTION INTRAVENOUS at 05:11

## 2022-05-16 RX ADMIN — SODIUM CHLORIDE 200 MG PE: 9 INJECTION, SOLUTION INTRAVENOUS at 12:19

## 2022-05-16 RX ADMIN — Medication 45 ML: at 17:39

## 2022-05-16 RX ADMIN — INSULIN DETEMIR 10 UNITS: 100 INJECTION, SOLUTION SUBCUTANEOUS at 21:37

## 2022-05-16 RX ADMIN — INSULIN LISPRO 5 UNITS: 100 INJECTION, SOLUTION INTRAVENOUS; SUBCUTANEOUS at 13:43

## 2022-05-16 RX ADMIN — MAGNESIUM SULFATE HEPTAHYDRATE 4 G: 40 INJECTION, SOLUTION INTRAVENOUS at 15:03

## 2022-05-16 RX ADMIN — PROPOFOL 70 MCG/KG/MIN: 10 INJECTION, EMULSION INTRAVENOUS at 04:59

## 2022-05-16 RX ADMIN — PROPOFOL 70 MCG/KG/MIN: 10 INJECTION, EMULSION INTRAVENOUS at 12:32

## 2022-05-17 ENCOUNTER — APPOINTMENT (OUTPATIENT)
Dept: NEUROLOGY | Facility: HOSPITAL | Age: 77
End: 2022-05-17

## 2022-05-17 ENCOUNTER — APPOINTMENT (OUTPATIENT)
Dept: GENERAL RADIOLOGY | Facility: HOSPITAL | Age: 77
End: 2022-05-17

## 2022-05-17 ENCOUNTER — APPOINTMENT (OUTPATIENT)
Dept: CT IMAGING | Facility: HOSPITAL | Age: 77
End: 2022-05-17

## 2022-05-17 LAB
ALBUMIN SERPL-MCNC: 2.3 G/DL (ref 3.5–5.2)
ALBUMIN/GLOB SERPL: 0.9 G/DL
ALP SERPL-CCNC: 44 U/L (ref 39–117)
ALT SERPL W P-5'-P-CCNC: 57 U/L (ref 1–41)
ANION GAP SERPL CALCULATED.3IONS-SCNC: 7 MMOL/L (ref 5–15)
APPEARANCE CSF: ABNORMAL
ARTERIAL PATENCY WRIST A: ABNORMAL
AST SERPL-CCNC: 23 U/L (ref 1–40)
ATMOSPHERIC PRESS: 750 MMHG
BACTERIA UR QL AUTO: ABNORMAL /HPF
BASE EXCESS BLDA CALC-SCNC: -5.5 MMOL/L (ref 0–2)
BDY SITE: ABNORMAL
BILIRUB SERPL-MCNC: 0.4 MG/DL (ref 0–1.2)
BILIRUB UR QL STRIP: ABNORMAL
BODY TEMPERATURE: 37 C
BUN SERPL-MCNC: 34 MG/DL (ref 8–23)
BUN/CREAT SERPL: 27.9 (ref 7–25)
CALCIUM SPEC-SCNC: 8.2 MG/DL (ref 8.6–10.5)
CHLORIDE SERPL-SCNC: 117 MMOL/L (ref 98–107)
CLARITY UR: ABNORMAL
CO2 SERPL-SCNC: 23 MMOL/L (ref 22–29)
COLOR CSF: ABNORMAL
COLOR SPUN CSF: ABNORMAL
COLOR UR: ABNORMAL
CREAT SERPL-MCNC: 1.22 MG/DL (ref 0.76–1.27)
CYTO UR: NORMAL
CYTOLOGIST CVX/VAG CYTO: NORMAL
DEPRECATED RDW RBC AUTO: 53 FL (ref 37–54)
DX PRELIMINARY: NORMAL
EGFRCR SERPLBLD CKD-EPI 2021: 61.1 ML/MIN/1.73
EOSINOPHIL # BLD MANUAL: 0.16 10*3/MM3 (ref 0–0.4)
EOSINOPHIL NFR BLD MANUAL: 1 % (ref 0.3–6.2)
ERYTHROCYTE [DISTWIDTH] IN BLOOD BY AUTOMATED COUNT: 14 % (ref 12.3–15.4)
GLOBULIN UR ELPH-MCNC: 2.7 GM/DL
GLUCOSE BLDC GLUCOMTR-MCNC: 192 MG/DL (ref 70–130)
GLUCOSE BLDC GLUCOMTR-MCNC: 212 MG/DL (ref 70–130)
GLUCOSE BLDC GLUCOMTR-MCNC: 244 MG/DL (ref 70–130)
GLUCOSE BLDC GLUCOMTR-MCNC: 262 MG/DL (ref 70–130)
GLUCOSE CSF-MCNC: 152 MG/DL (ref 40–70)
GLUCOSE SERPL-MCNC: 237 MG/DL (ref 65–99)
GLUCOSE UR STRIP-MCNC: ABNORMAL MG/DL
HCO3 BLDA-SCNC: 20.8 MMOL/L (ref 20–26)
HCT VFR BLD AUTO: 38.8 % (ref 37.5–51)
HGB BLD-MCNC: 12.1 G/DL (ref 13–17.7)
HGB UR QL STRIP.AUTO: NEGATIVE
HYALINE CASTS UR QL AUTO: ABNORMAL /LPF
INHALED O2 CONCENTRATION: 50 %
KETONES UR QL STRIP: ABNORMAL
LAB AP CASE REPORT: NORMAL
LAB AP DIAGNOSIS COMMENT: NORMAL
LAB AP SYNOPTIC CHECKLIST: NORMAL
LEUKOCYTE ESTERASE UR QL STRIP.AUTO: ABNORMAL
LYMPHOCYTES # BLD MANUAL: 0.68 10*3/MM3 (ref 0.7–3.1)
LYMPHOCYTES NFR BLD MANUAL: 1 % (ref 5–12)
Lab: ABNORMAL
Lab: NORMAL
MCH RBC QN AUTO: 32 PG (ref 26.6–33)
MCHC RBC AUTO-ENTMCNC: 31.2 G/DL (ref 31.5–35.7)
MCV RBC AUTO: 102.6 FL (ref 79–97)
METHOD: ABNORMAL
MODALITY: ABNORMAL
MONOCYTES # BLD: 0.16 10*3/MM3 (ref 0.1–0.9)
NEUTROPHILS # BLD AUTO: 15.1 10*3/MM3 (ref 1.7–7)
NEUTROPHILS NFR BLD MANUAL: 91.7 % (ref 42.7–76)
NEUTS BAND NFR BLD MANUAL: 2.1 % (ref 0–5)
NITRITE UR QL STRIP: NEGATIVE
NUC CELL # CSF MANUAL: 2 /MM3 (ref 0–8)
OSMOLALITY SERPL: 321 MOSM/KG (ref 280–301)
OSMOLALITY SERPL: 324 MOSM/KG (ref 280–301)
OSMOLALITY SERPL: 324 MOSM/KG (ref 280–301)
OSMOLALITY SERPL: 325 MOSM/KG (ref 280–301)
PATH INTERP BLD-IMP: NORMAL
PATH REPORT.FINAL DX SPEC: NORMAL
PATH REPORT.GROSS SPEC: NORMAL
PCO2 BLDA: 42.7 MM HG (ref 35–45)
PCO2 TEMP ADJ BLD: 42.7 MM HG (ref 35–45)
PEEP RESPIRATORY: 8 CM[H2O]
PH BLDA: 7.29 PH UNITS (ref 7.35–7.45)
PH UR STRIP.AUTO: <=5 [PH] (ref 5–8)
PH, TEMP CORRECTED: 7.29 PH UNITS (ref 7.35–7.45)
PHENYTOIN SERPL-MCNC: 14 MCG/ML (ref 10–20)
PHENYTOIN SERPL-MCNC: 15.2 MCG/ML (ref 10–20)
PLATELET # BLD AUTO: 72 10*3/MM3 (ref 140–450)
PMV BLD AUTO: 10.1 FL (ref 6–12)
PO2 BLDA: 107 MM HG (ref 83–108)
PO2 TEMP ADJ BLD: 107 MM HG (ref 83–108)
POTASSIUM SERPL-SCNC: 4.3 MMOL/L (ref 3.5–5.2)
PROT CSF-MCNC: 41.8 MG/DL (ref 15–45)
PROT SERPL-MCNC: 5 G/DL (ref 6–8.5)
PROT UR QL STRIP: ABNORMAL
QT INTERVAL: 434 MS
QTC INTERVAL: 411 MS
RBC # BLD AUTO: 3.78 10*6/MM3 (ref 4.14–5.8)
RBC # CSF MANUAL: 5000 /MM3 (ref 0–0)
RBC # UR STRIP: ABNORMAL /HPF
RBC MORPH BLD: NORMAL
REF LAB TEST METHOD: ABNORMAL
SAO2 % BLDCOA: 98.4 % (ref 94–99)
SET MECH RESP RATE: 14
SMALL PLATELETS BLD QL SMEAR: ABNORMAL
SODIUM SERPL-SCNC: 146 MMOL/L (ref 136–145)
SODIUM SERPL-SCNC: 147 MMOL/L (ref 136–145)
SODIUM SERPL-SCNC: 147 MMOL/L (ref 136–145)
SODIUM SERPL-SCNC: 148 MMOL/L (ref 136–145)
SP GR UR STRIP: >1.03 (ref 1–1.03)
SPECIMEN VOL CSF: 24 ML
SQUAMOUS #/AREA URNS HPF: ABNORMAL /HPF
TOXIC GRANULATION: ABNORMAL
TSH SERPL DL<=0.05 MIU/L-ACNC: 0.2 UIU/ML (ref 0.27–4.2)
TUBE # CSF: 2
URATE CRY URNS QL MICRO: ABNORMAL /HPF
UROBILINOGEN UR QL STRIP: ABNORMAL
VARIANT LYMPHS NFR BLD MANUAL: 4.2 % (ref 19.6–45.3)
VENTILATOR MODE: AC
VT ON VENT VENT: 550 ML
WBC # UR STRIP: ABNORMAL /HPF
WBC NRBC COR # BLD: 16.11 10*3/MM3 (ref 3.4–10.8)

## 2022-05-17 PROCEDURE — 25010000002 PROPOFOL 10 MG/ML EMULSION: Performed by: PSYCHIATRY & NEUROLOGY

## 2022-05-17 PROCEDURE — 99233 SBSQ HOSP IP/OBS HIGH 50: CPT | Performed by: INTERNAL MEDICINE

## 2022-05-17 PROCEDURE — 99024 POSTOP FOLLOW-UP VISIT: CPT | Performed by: NURSE PRACTITIONER

## 2022-05-17 PROCEDURE — 84157 ASSAY OF PROTEIN OTHER: CPT | Performed by: NURSE PRACTITIONER

## 2022-05-17 PROCEDURE — 94799 UNLISTED PULMONARY SVC/PX: CPT

## 2022-05-17 PROCEDURE — 63710000001 INSULIN DETEMIR PER 5 UNITS: Performed by: INTERNAL MEDICINE

## 2022-05-17 PROCEDURE — 99291 CRITICAL CARE FIRST HOUR: CPT | Performed by: PSYCHIATRY & NEUROLOGY

## 2022-05-17 PROCEDURE — 25010000002 FOSPHENYTOIN 100 MG PE/2ML SOLUTION 2 ML VIAL: Performed by: PSYCHIATRY & NEUROLOGY

## 2022-05-17 PROCEDURE — 82803 BLOOD GASES ANY COMBINATION: CPT

## 2022-05-17 PROCEDURE — 82945 GLUCOSE OTHER FLUID: CPT | Performed by: NURSE PRACTITIONER

## 2022-05-17 PROCEDURE — 25010000002 THIAMINE PER 100 MG: Performed by: PSYCHIATRY & NEUROLOGY

## 2022-05-17 PROCEDURE — 87205 SMEAR GRAM STAIN: CPT | Performed by: NURSE PRACTITIONER

## 2022-05-17 PROCEDURE — 94003 VENT MGMT INPAT SUBQ DAY: CPT

## 2022-05-17 PROCEDURE — 63710000001 INSULIN LISPRO (HUMAN) PER 5 UNITS: Performed by: NEUROLOGICAL SURGERY

## 2022-05-17 PROCEDURE — 83930 ASSAY OF BLOOD OSMOLALITY: CPT | Performed by: NEUROLOGICAL SURGERY

## 2022-05-17 PROCEDURE — 71045 X-RAY EXAM CHEST 1 VIEW: CPT

## 2022-05-17 PROCEDURE — 87070 CULTURE OTHR SPECIMN AEROBIC: CPT | Performed by: NURSE PRACTITIONER

## 2022-05-17 PROCEDURE — 70450 CT HEAD/BRAIN W/O DYE: CPT

## 2022-05-17 PROCEDURE — 95720 EEG PHY/QHP EA INCR W/VEEG: CPT | Performed by: PSYCHIATRY & NEUROLOGY

## 2022-05-17 PROCEDURE — 95716 VEEG EA 12-26HR CONT MNTR: CPT

## 2022-05-17 PROCEDURE — 89050 BODY FLUID CELL COUNT: CPT | Performed by: NURSE PRACTITIONER

## 2022-05-17 PROCEDURE — 25010000002 LEVETIRACETAM IN NACL 0.82% 500 MG/100ML SOLUTION: Performed by: NEUROLOGICAL SURGERY

## 2022-05-17 PROCEDURE — 85007 BL SMEAR W/DIFF WBC COUNT: CPT | Performed by: INTERNAL MEDICINE

## 2022-05-17 PROCEDURE — 85025 COMPLETE CBC W/AUTO DIFF WBC: CPT | Performed by: NEUROLOGICAL SURGERY

## 2022-05-17 PROCEDURE — 82962 GLUCOSE BLOOD TEST: CPT

## 2022-05-17 PROCEDURE — 84295 ASSAY OF SERUM SODIUM: CPT | Performed by: NEUROLOGICAL SURGERY

## 2022-05-17 PROCEDURE — 80053 COMPREHEN METABOLIC PANEL: CPT | Performed by: PSYCHIATRY & NEUROLOGY

## 2022-05-17 PROCEDURE — 80185 ASSAY OF PHENYTOIN TOTAL: CPT | Performed by: PHYSICIAN ASSISTANT

## 2022-05-17 PROCEDURE — 81001 URINALYSIS AUTO W/SCOPE: CPT | Performed by: NEUROLOGICAL SURGERY

## 2022-05-17 PROCEDURE — 87015 SPECIMEN INFECT AGNT CONCNTJ: CPT | Performed by: NURSE PRACTITIONER

## 2022-05-17 PROCEDURE — 80185 ASSAY OF PHENYTOIN TOTAL: CPT | Performed by: PSYCHIATRY & NEUROLOGY

## 2022-05-17 PROCEDURE — 85060 BLOOD SMEAR INTERPRETATION: CPT | Performed by: INTERNAL MEDICINE

## 2022-05-17 PROCEDURE — 94761 N-INVAS EAR/PLS OXIMETRY MLT: CPT

## 2022-05-17 PROCEDURE — 25010000002 PROPOFOL 1000 MG/100ML EMULSION: Performed by: PSYCHIATRY & NEUROLOGY

## 2022-05-17 RX ORDER — LIDOCAINE HYDROCHLORIDE 10 MG/ML
10 INJECTION, SOLUTION INFILTRATION; PERINEURAL ONCE
Status: DISCONTINUED | OUTPATIENT
Start: 2022-05-17 | End: 2022-06-20 | Stop reason: HOSPADM

## 2022-05-17 RX ADMIN — INSULIN DETEMIR 20 UNITS: 100 INJECTION, SOLUTION SUBCUTANEOUS at 21:12

## 2022-05-17 RX ADMIN — INSULIN LISPRO 5 UNITS: 100 INJECTION, SOLUTION INTRAVENOUS; SUBCUTANEOUS at 08:29

## 2022-05-17 RX ADMIN — THIAMINE HYDROCHLORIDE 200 MG: 100 INJECTION, SOLUTION INTRAMUSCULAR; INTRAVENOUS at 08:28

## 2022-05-17 RX ADMIN — ALBUTEROL SULFATE 2.5 MG: 2.5 SOLUTION RESPIRATORY (INHALATION) at 07:02

## 2022-05-17 RX ADMIN — LACOSAMIDE 200 MG: 10 INJECTION, SOLUTION INTRAVENOUS at 20:57

## 2022-05-17 RX ADMIN — INSULIN LISPRO 5 UNITS: 100 INJECTION, SOLUTION INTRAVENOUS; SUBCUTANEOUS at 18:03

## 2022-05-17 RX ADMIN — LACOSAMIDE 200 MG: 10 INJECTION, SOLUTION INTRAVENOUS at 08:29

## 2022-05-17 RX ADMIN — FOSPHENYTOIN SODIUM 200 MG PE: 50 INJECTION, SOLUTION INTRAMUSCULAR; INTRAVENOUS at 15:14

## 2022-05-17 RX ADMIN — LEVETIRACETAM 500 MG: 5 INJECTION INTRAVASCULAR at 20:54

## 2022-05-17 RX ADMIN — INSULIN LISPRO 2 UNITS: 100 INJECTION, SOLUTION INTRAVENOUS; SUBCUTANEOUS at 12:00

## 2022-05-17 RX ADMIN — PROPOFOL 30 MCG/KG/MIN: 10 INJECTION, EMULSION INTRAVENOUS at 22:33

## 2022-05-17 RX ADMIN — PANTOPRAZOLE SODIUM 40 MG: 40 INJECTION, POWDER, FOR SOLUTION INTRAVENOUS at 06:28

## 2022-05-17 RX ADMIN — THIAMINE HYDROCHLORIDE 200 MG: 100 INJECTION, SOLUTION INTRAMUSCULAR; INTRAVENOUS at 00:12

## 2022-05-17 RX ADMIN — FOSPHENYTOIN SODIUM 200 MG PE: 50 INJECTION, SOLUTION INTRAMUSCULAR; INTRAVENOUS at 06:57

## 2022-05-17 RX ADMIN — PROPOFOL 70 MCG/KG/MIN: 10 INJECTION, EMULSION INTRAVENOUS at 01:02

## 2022-05-17 RX ADMIN — Medication 45 ML: at 08:28

## 2022-05-17 RX ADMIN — ALBUTEROL SULFATE 2.5 MG: 2.5 SOLUTION RESPIRATORY (INHALATION) at 14:33

## 2022-05-17 RX ADMIN — CHLORHEXIDINE GLUCONATE 15 ML: 1.2 RINSE ORAL at 08:29

## 2022-05-17 RX ADMIN — PROPOFOL 50 MCG/KG/MIN: 10 INJECTION, EMULSION INTRAVENOUS at 18:51

## 2022-05-17 RX ADMIN — THIAMINE HYDROCHLORIDE 200 MG: 100 INJECTION, SOLUTION INTRAMUSCULAR; INTRAVENOUS at 15:14

## 2022-05-17 RX ADMIN — PROPOFOL 50 MCG/KG/MIN: 10 INJECTION, EMULSION INTRAVENOUS at 15:49

## 2022-05-17 RX ADMIN — CHLORHEXIDINE GLUCONATE 15 ML: 1.2 RINSE ORAL at 20:49

## 2022-05-17 RX ADMIN — FOSPHENYTOIN SODIUM 200 MG PE: 50 INJECTION, SOLUTION INTRAMUSCULAR; INTRAVENOUS at 21:16

## 2022-05-17 RX ADMIN — Medication 45 ML: at 16:42

## 2022-05-17 RX ADMIN — ALBUTEROL SULFATE 2.5 MG: 2.5 SOLUTION RESPIRATORY (INHALATION) at 23:15

## 2022-05-17 RX ADMIN — Medication 45 ML: at 20:59

## 2022-05-17 RX ADMIN — PROPOFOL 70 MCG/KG/MIN: 10 INJECTION, EMULSION INTRAVENOUS at 09:05

## 2022-05-17 RX ADMIN — PROPOFOL 70 MCG/KG/MIN: 10 INJECTION, EMULSION INTRAVENOUS at 07:10

## 2022-05-17 RX ADMIN — PROPOFOL 70 MCG/KG/MIN: 10 INJECTION, EMULSION INTRAVENOUS at 11:21

## 2022-05-17 RX ADMIN — DEXAMETHASONE 3 MG: 2 TABLET ORAL at 08:28

## 2022-05-17 RX ADMIN — PROPOFOL 70 MCG/KG/MIN: 10 INJECTION, EMULSION INTRAVENOUS at 13:36

## 2022-05-17 RX ADMIN — PROPOFOL 70 MCG/KG/MIN: 10 INJECTION, EMULSION INTRAVENOUS at 04:52

## 2022-05-17 RX ADMIN — LEVETIRACETAM 500 MG: 5 INJECTION INTRAVASCULAR at 08:28

## 2022-05-17 RX ADMIN — PROPOFOL 70 MCG/KG/MIN: 10 INJECTION, EMULSION INTRAVENOUS at 03:41

## 2022-05-18 ENCOUNTER — APPOINTMENT (OUTPATIENT)
Dept: GENERAL RADIOLOGY | Facility: HOSPITAL | Age: 77
End: 2022-05-18

## 2022-05-18 ENCOUNTER — APPOINTMENT (OUTPATIENT)
Dept: NEUROLOGY | Facility: HOSPITAL | Age: 77
End: 2022-05-18

## 2022-05-18 PROBLEM — I10 ESSENTIAL HYPERTENSION: Status: ACTIVE | Noted: 2022-05-18

## 2022-05-18 PROBLEM — J96.00 ACUTE RESPIRATORY FAILURE: Status: ACTIVE | Noted: 2022-05-18

## 2022-05-18 PROBLEM — E11.65 TYPE 2 DIABETES MELLITUS WITH HYPERGLYCEMIA, WITHOUT LONG-TERM CURRENT USE OF INSULIN: Status: ACTIVE | Noted: 2022-05-18

## 2022-05-18 PROBLEM — D69.6 THROMBOCYTOPENIA: Status: ACTIVE | Noted: 2022-05-18

## 2022-05-18 PROBLEM — E03.9 HYPOTHYROIDISM (ACQUIRED): Status: ACTIVE | Noted: 2022-05-18

## 2022-05-18 LAB
ABO GROUP BLD: NORMAL
ALBUMIN SERPL-MCNC: 2.3 G/DL (ref 3.5–5.2)
ALBUMIN/GLOB SERPL: 0.8 G/DL
ALP SERPL-CCNC: 61 U/L (ref 39–117)
ALT SERPL W P-5'-P-CCNC: 49 U/L (ref 1–41)
ANION GAP SERPL CALCULATED.3IONS-SCNC: 5 MMOL/L (ref 5–15)
APTT PPP: 28.1 SECONDS (ref 24.1–35)
ARTERIAL PATENCY WRIST A: POSITIVE
AST SERPL-CCNC: 29 U/L (ref 1–40)
ATMOSPHERIC PRESS: 749 MMHG
BASE EXCESS BLDA CALC-SCNC: -0.6 MMOL/L (ref 0–2)
BASOPHILS # BLD AUTO: 0.04 10*3/MM3 (ref 0–0.2)
BASOPHILS NFR BLD AUTO: 0.3 % (ref 0–1.5)
BDY SITE: ABNORMAL
BILIRUB SERPL-MCNC: 0.4 MG/DL (ref 0–1.2)
BLD GP AB SCN SERPL QL: NEGATIVE
BODY TEMPERATURE: 37 C
BUN SERPL-MCNC: 32 MG/DL (ref 8–23)
BUN/CREAT SERPL: 31.7 (ref 7–25)
CALCIUM SPEC-SCNC: 8.3 MG/DL (ref 8.6–10.5)
CHLORIDE SERPL-SCNC: 115 MMOL/L (ref 98–107)
CO2 SERPL-SCNC: 26 MMOL/L (ref 22–29)
CREAT SERPL-MCNC: 1.01 MG/DL (ref 0.76–1.27)
DEPRECATED RDW RBC AUTO: 53.4 FL (ref 37–54)
EGFRCR SERPLBLD CKD-EPI 2021: 76.6 ML/MIN/1.73
EOSINOPHIL # BLD AUTO: 0.15 10*3/MM3 (ref 0–0.4)
EOSINOPHIL NFR BLD AUTO: 1.1 % (ref 0.3–6.2)
ERYTHROCYTE [DISTWIDTH] IN BLOOD BY AUTOMATED COUNT: 14 % (ref 12.3–15.4)
FIBRINOGEN PPP-MCNC: 654 MG/DL (ref 240–460)
FSP PPP LA-ACNC: ABNORMAL
GLOBULIN UR ELPH-MCNC: 2.8 GM/DL
GLUCOSE BLDC GLUCOMTR-MCNC: 166 MG/DL (ref 70–130)
GLUCOSE BLDC GLUCOMTR-MCNC: 168 MG/DL (ref 70–130)
GLUCOSE BLDC GLUCOMTR-MCNC: 173 MG/DL (ref 70–130)
GLUCOSE BLDC GLUCOMTR-MCNC: 200 MG/DL (ref 70–130)
GLUCOSE BLDC GLUCOMTR-MCNC: 217 MG/DL (ref 70–130)
GLUCOSE SERPL-MCNC: 249 MG/DL (ref 65–99)
HCO3 BLDA-SCNC: 24 MMOL/L (ref 20–26)
HCT VFR BLD AUTO: 36.4 % (ref 37.5–51)
HGB BLD-MCNC: 11.2 G/DL (ref 13–17.7)
IMM GRANULOCYTES # BLD AUTO: 0.49 10*3/MM3 (ref 0–0.05)
IMM GRANULOCYTES NFR BLD AUTO: 3.7 % (ref 0–0.5)
INHALED O2 CONCENTRATION: 30 %
INR PPP: 1.23 (ref 0.91–1.09)
LYMPHOCYTES # BLD AUTO: 0.62 10*3/MM3 (ref 0.7–3.1)
LYMPHOCYTES NFR BLD AUTO: 4.6 % (ref 19.6–45.3)
Lab: ABNORMAL
MCH RBC QN AUTO: 31.6 PG (ref 26.6–33)
MCHC RBC AUTO-ENTMCNC: 30.8 G/DL (ref 31.5–35.7)
MCV RBC AUTO: 102.8 FL (ref 79–97)
MODALITY: ABNORMAL
MONOCYTES # BLD AUTO: 0.96 10*3/MM3 (ref 0.1–0.9)
MONOCYTES NFR BLD AUTO: 7.2 % (ref 5–12)
NEUTROPHILS NFR BLD AUTO: 11.11 10*3/MM3 (ref 1.7–7)
NEUTROPHILS NFR BLD AUTO: 83.1 % (ref 42.7–76)
NRBC BLD AUTO-RTO: 0 /100 WBC (ref 0–0.2)
OSMOLALITY SERPL: 317 MOSM/KG (ref 280–301)
OSMOLALITY SERPL: 318 MOSM/KG (ref 280–301)
OSMOLALITY SERPL: 319 MOSM/KG (ref 280–301)
OSMOLALITY SERPL: 321 MOSM/KG (ref 280–301)
PCO2 BLDA: 38.4 MM HG (ref 35–45)
PCO2 TEMP ADJ BLD: 38.4 MM HG (ref 35–45)
PEEP RESPIRATORY: 8 CM[H2O]
PH BLDA: 7.4 PH UNITS (ref 7.35–7.45)
PH, TEMP CORRECTED: 7.4 PH UNITS (ref 7.35–7.45)
PHENYTOIN SERPL-MCNC: 15.8 MCG/ML (ref 10–20)
PLATELET # BLD AUTO: 82 10*3/MM3 (ref 140–450)
PMV BLD AUTO: 10.5 FL (ref 6–12)
PO2 BLDA: 113 MM HG (ref 83–108)
PO2 TEMP ADJ BLD: 113 MM HG (ref 83–108)
POTASSIUM SERPL-SCNC: 4.4 MMOL/L (ref 3.5–5.2)
PROT SERPL-MCNC: 5.1 G/DL (ref 6–8.5)
PROTHROMBIN TIME: 15 SECONDS (ref 11.9–14.6)
RBC # BLD AUTO: 3.54 10*6/MM3 (ref 4.14–5.8)
RH BLD: POSITIVE
SAO2 % BLDCOA: 99.3 % (ref 94–99)
SET MECH RESP RATE: 16
SODIUM SERPL-SCNC: 144 MMOL/L (ref 136–145)
SODIUM SERPL-SCNC: 146 MMOL/L (ref 136–145)
SODIUM SERPL-SCNC: 147 MMOL/L (ref 136–145)
SODIUM SERPL-SCNC: 147 MMOL/L (ref 136–145)
T&S EXPIRATION DATE: NORMAL
T4 FREE SERPL-MCNC: 0.31 NG/DL (ref 0.93–1.7)
VENTILATOR MODE: AC
VT ON VENT VENT: 550 ML
WBC NRBC COR # BLD: 13.37 10*3/MM3 (ref 3.4–10.8)

## 2022-05-18 PROCEDURE — 25010000002 FOSPHENYTOIN 100 MG PE/2ML SOLUTION 2 ML VIAL: Performed by: PSYCHIATRY & NEUROLOGY

## 2022-05-18 PROCEDURE — 94799 UNLISTED PULMONARY SVC/PX: CPT

## 2022-05-18 PROCEDURE — 71045 X-RAY EXAM CHEST 1 VIEW: CPT

## 2022-05-18 PROCEDURE — 82962 GLUCOSE BLOOD TEST: CPT

## 2022-05-18 PROCEDURE — 83930 ASSAY OF BLOOD OSMOLALITY: CPT | Performed by: NEUROLOGICAL SURGERY

## 2022-05-18 PROCEDURE — 84439 ASSAY OF FREE THYROXINE: CPT | Performed by: INTERNAL MEDICINE

## 2022-05-18 PROCEDURE — 84481 FREE ASSAY (FT-3): CPT | Performed by: INTERNAL MEDICINE

## 2022-05-18 PROCEDURE — 85610 PROTHROMBIN TIME: CPT | Performed by: INTERNAL MEDICINE

## 2022-05-18 PROCEDURE — 63710000001 INSULIN LISPRO (HUMAN) PER 5 UNITS: Performed by: NEUROLOGICAL SURGERY

## 2022-05-18 PROCEDURE — 95720 EEG PHY/QHP EA INCR W/VEEG: CPT | Performed by: PSYCHIATRY & NEUROLOGY

## 2022-05-18 PROCEDURE — 85362 FIBRIN DEGRADATION PRODUCTS: CPT | Performed by: INTERNAL MEDICINE

## 2022-05-18 PROCEDURE — 25010000002 LEVETIRACETAM IN NACL 0.82% 500 MG/100ML SOLUTION: Performed by: NEUROLOGICAL SURGERY

## 2022-05-18 PROCEDURE — 84295 ASSAY OF SERUM SODIUM: CPT | Performed by: NEUROLOGICAL SURGERY

## 2022-05-18 PROCEDURE — 25010000002 THIAMINE PER 100 MG: Performed by: PSYCHIATRY & NEUROLOGY

## 2022-05-18 PROCEDURE — 99222 1ST HOSP IP/OBS MODERATE 55: CPT | Performed by: NURSE PRACTITIONER

## 2022-05-18 PROCEDURE — P9035 PLATELET PHERES LEUKOREDUCED: HCPCS

## 2022-05-18 PROCEDURE — 85025 COMPLETE CBC W/AUTO DIFF WBC: CPT | Performed by: NEUROLOGICAL SURGERY

## 2022-05-18 PROCEDURE — 86901 BLOOD TYPING SEROLOGIC RH(D): CPT | Performed by: NURSE PRACTITIONER

## 2022-05-18 PROCEDURE — 86850 RBC ANTIBODY SCREEN: CPT | Performed by: NURSE PRACTITIONER

## 2022-05-18 PROCEDURE — 85384 FIBRINOGEN ACTIVITY: CPT | Performed by: INTERNAL MEDICINE

## 2022-05-18 PROCEDURE — 36600 WITHDRAWAL OF ARTERIAL BLOOD: CPT

## 2022-05-18 PROCEDURE — 99024 POSTOP FOLLOW-UP VISIT: CPT | Performed by: NURSE PRACTITIONER

## 2022-05-18 PROCEDURE — 85730 THROMBOPLASTIN TIME PARTIAL: CPT | Performed by: INTERNAL MEDICINE

## 2022-05-18 PROCEDURE — 95716 VEEG EA 12-26HR CONT MNTR: CPT

## 2022-05-18 PROCEDURE — 80053 COMPREHEN METABOLIC PANEL: CPT | Performed by: PSYCHIATRY & NEUROLOGY

## 2022-05-18 PROCEDURE — 94003 VENT MGMT INPAT SUBQ DAY: CPT

## 2022-05-18 PROCEDURE — 86022 PLATELET ANTIBODIES: CPT | Performed by: NURSE PRACTITIONER

## 2022-05-18 PROCEDURE — 86900 BLOOD TYPING SEROLOGIC ABO: CPT | Performed by: NURSE PRACTITIONER

## 2022-05-18 PROCEDURE — 94761 N-INVAS EAR/PLS OXIMETRY MLT: CPT

## 2022-05-18 PROCEDURE — 82803 BLOOD GASES ANY COMBINATION: CPT

## 2022-05-18 PROCEDURE — P9100 PATHOGEN TEST FOR PLATELETS: HCPCS

## 2022-05-18 PROCEDURE — 25010000002 PROPOFOL 10 MG/ML EMULSION: Performed by: PSYCHIATRY & NEUROLOGY

## 2022-05-18 PROCEDURE — 36430 TRANSFUSION BLD/BLD COMPNT: CPT

## 2022-05-18 PROCEDURE — 63710000001 INSULIN DETEMIR PER 5 UNITS: Performed by: INTERNAL MEDICINE

## 2022-05-18 PROCEDURE — 99233 SBSQ HOSP IP/OBS HIGH 50: CPT | Performed by: INTERNAL MEDICINE

## 2022-05-18 PROCEDURE — 80185 ASSAY OF PHENYTOIN TOTAL: CPT | Performed by: PSYCHIATRY & NEUROLOGY

## 2022-05-18 PROCEDURE — 63710000001 INSULIN REGULAR HUMAN PER 5 UNITS: Performed by: INTERNAL MEDICINE

## 2022-05-18 PROCEDURE — 99291 CRITICAL CARE FIRST HOUR: CPT | Performed by: PSYCHIATRY & NEUROLOGY

## 2022-05-18 RX ORDER — AMINO AC/PROTEIN HYDR/WHEY PRO 10G-100/30
1 LIQUID (ML) ORAL DAILY
Status: DISCONTINUED | OUTPATIENT
Start: 2022-05-19 | End: 2022-05-20

## 2022-05-18 RX ORDER — DEXAMETHASONE 2 MG/1
3 TABLET ORAL
Status: COMPLETED | OUTPATIENT
Start: 2022-05-18 | End: 2022-05-18

## 2022-05-18 RX ORDER — LISINOPRIL 5 MG/1
5 TABLET ORAL DAILY
Status: DISCONTINUED | OUTPATIENT
Start: 2022-05-18 | End: 2022-05-19

## 2022-05-18 RX ORDER — DEXAMETHASONE 0.5 MG/1
0.5 TABLET ORAL
Status: COMPLETED | OUTPATIENT
Start: 2022-05-19 | End: 2022-05-20

## 2022-05-18 RX ORDER — LISINOPRIL 10 MG/1
10 TABLET ORAL DAILY
Status: DISCONTINUED | OUTPATIENT
Start: 2022-05-18 | End: 2022-05-18

## 2022-05-18 RX ADMIN — INSULIN HUMAN 4 UNITS: 100 INJECTION, SOLUTION PARENTERAL at 12:29

## 2022-05-18 RX ADMIN — INSULIN HUMAN 5 UNITS: 100 INJECTION, SOLUTION PARENTERAL at 12:30

## 2022-05-18 RX ADMIN — PROPOFOL 30 MCG/KG/MIN: 10 INJECTION, EMULSION INTRAVENOUS at 07:55

## 2022-05-18 RX ADMIN — INSULIN HUMAN 5 UNITS: 100 INJECTION, SOLUTION PARENTERAL at 18:13

## 2022-05-18 RX ADMIN — Medication 45 ML: at 08:16

## 2022-05-18 RX ADMIN — FOSPHENYTOIN SODIUM 200 MG PE: 50 INJECTION, SOLUTION INTRAMUSCULAR; INTRAVENOUS at 14:30

## 2022-05-18 RX ADMIN — ALBUTEROL SULFATE 2.5 MG: 2.5 SOLUTION RESPIRATORY (INHALATION) at 23:33

## 2022-05-18 RX ADMIN — FOSPHENYTOIN SODIUM 200 MG PE: 50 INJECTION, SOLUTION INTRAMUSCULAR; INTRAVENOUS at 21:01

## 2022-05-18 RX ADMIN — PROPOFOL 30 MCG/KG/MIN: 10 INJECTION, EMULSION INTRAVENOUS at 03:55

## 2022-05-18 RX ADMIN — FOSPHENYTOIN SODIUM 200 MG PE: 50 INJECTION, SOLUTION INTRAMUSCULAR; INTRAVENOUS at 06:19

## 2022-05-18 RX ADMIN — LACOSAMIDE 200 MG: 10 INJECTION, SOLUTION INTRAVENOUS at 08:23

## 2022-05-18 RX ADMIN — LEVETIRACETAM 500 MG: 5 INJECTION INTRAVASCULAR at 08:16

## 2022-05-18 RX ADMIN — THIAMINE HYDROCHLORIDE 200 MG: 100 INJECTION, SOLUTION INTRAMUSCULAR; INTRAVENOUS at 01:00

## 2022-05-18 RX ADMIN — INSULIN HUMAN 5 UNITS: 100 INJECTION, SOLUTION PARENTERAL at 23:36

## 2022-05-18 RX ADMIN — MUPIROCIN 1 APPLICATION: 20 OINTMENT TOPICAL at 18:14

## 2022-05-18 RX ADMIN — INSULIN HUMAN 2 UNITS: 100 INJECTION, SOLUTION PARENTERAL at 18:12

## 2022-05-18 RX ADMIN — ALBUTEROL SULFATE 2.5 MG: 2.5 SOLUTION RESPIRATORY (INHALATION) at 07:14

## 2022-05-18 RX ADMIN — INSULIN DETEMIR 20 UNITS: 100 INJECTION, SOLUTION SUBCUTANEOUS at 20:36

## 2022-05-18 RX ADMIN — INSULIN LISPRO 5 UNITS: 100 INJECTION, SOLUTION INTRAVENOUS; SUBCUTANEOUS at 08:25

## 2022-05-18 RX ADMIN — ALBUTEROL SULFATE 2.5 MG: 2.5 SOLUTION RESPIRATORY (INHALATION) at 14:53

## 2022-05-18 RX ADMIN — MUPIROCIN 1 APPLICATION: 20 OINTMENT TOPICAL at 20:36

## 2022-05-18 RX ADMIN — THIAMINE HYDROCHLORIDE 200 MG: 100 INJECTION, SOLUTION INTRAMUSCULAR; INTRAVENOUS at 08:14

## 2022-05-18 RX ADMIN — PANTOPRAZOLE SODIUM 40 MG: 40 INJECTION, POWDER, FOR SOLUTION INTRAVENOUS at 06:21

## 2022-05-18 RX ADMIN — LISINOPRIL 5 MG: 5 TABLET ORAL at 10:30

## 2022-05-18 RX ADMIN — LACOSAMIDE 200 MG: 10 INJECTION, SOLUTION INTRAVENOUS at 20:35

## 2022-05-18 RX ADMIN — CHLORHEXIDINE GLUCONATE 15 ML: 1.2 RINSE ORAL at 20:35

## 2022-05-18 RX ADMIN — INSULIN HUMAN 2 UNITS: 100 INJECTION, SOLUTION PARENTERAL at 23:35

## 2022-05-18 RX ADMIN — DEXAMETHASONE 3 MG: 2 TABLET ORAL at 08:44

## 2022-05-18 RX ADMIN — LEVETIRACETAM 500 MG: 5 INJECTION INTRAVASCULAR at 20:35

## 2022-05-18 RX ADMIN — CHLORHEXIDINE GLUCONATE 15 ML: 1.2 RINSE ORAL at 08:23

## 2022-05-19 ENCOUNTER — APPOINTMENT (OUTPATIENT)
Dept: CT IMAGING | Facility: HOSPITAL | Age: 77
End: 2022-05-19

## 2022-05-19 ENCOUNTER — APPOINTMENT (OUTPATIENT)
Dept: GENERAL RADIOLOGY | Facility: HOSPITAL | Age: 77
End: 2022-05-19

## 2022-05-19 LAB
ALBUMIN SERPL-MCNC: 2.3 G/DL (ref 3.5–5.2)
ALBUMIN/GLOB SERPL: 0.8 G/DL
ALP SERPL-CCNC: 79 U/L (ref 39–117)
ALT SERPL W P-5'-P-CCNC: 44 U/L (ref 1–41)
ANION GAP SERPL CALCULATED.3IONS-SCNC: 6 MMOL/L (ref 5–15)
ARTERIAL PATENCY WRIST A: POSITIVE
AST SERPL-CCNC: 28 U/L (ref 1–40)
ATMOSPHERIC PRESS: 745 MMHG
BASE EXCESS BLDA CALC-SCNC: 2.7 MMOL/L (ref 0–2)
BDY SITE: ABNORMAL
BH BB BLOOD EXPIRATION DATE: NORMAL
BH BB BLOOD TYPE BARCODE: 6200
BH BB DISPENSE STATUS: NORMAL
BH BB PRODUCT CODE: NORMAL
BH BB UNIT NUMBER: NORMAL
BILIRUB SERPL-MCNC: 0.5 MG/DL (ref 0–1.2)
BODY TEMPERATURE: 37 C
BUN SERPL-MCNC: 29 MG/DL (ref 8–23)
BUN/CREAT SERPL: 40.3 (ref 7–25)
CALCIUM SPEC-SCNC: 8.2 MG/DL (ref 8.6–10.5)
CHLORIDE SERPL-SCNC: 113 MMOL/L (ref 98–107)
CO2 SERPL-SCNC: 25 MMOL/L (ref 22–29)
CREAT SERPL-MCNC: 0.72 MG/DL (ref 0.76–1.27)
DEPRECATED RDW RBC AUTO: 51.7 FL (ref 37–54)
EGFRCR SERPLBLD CKD-EPI 2021: 94.1 ML/MIN/1.73
EOSINOPHIL # BLD MANUAL: 0.11 10*3/MM3 (ref 0–0.4)
EOSINOPHIL NFR BLD MANUAL: 1 % (ref 0.3–6.2)
ERYTHROCYTE [DISTWIDTH] IN BLOOD BY AUTOMATED COUNT: 13.9 % (ref 12.3–15.4)
GLOBULIN UR ELPH-MCNC: 2.9 GM/DL
GLUCOSE BLDC GLUCOMTR-MCNC: 194 MG/DL (ref 70–130)
GLUCOSE BLDC GLUCOMTR-MCNC: 213 MG/DL (ref 70–130)
GLUCOSE BLDC GLUCOMTR-MCNC: 226 MG/DL (ref 70–130)
GLUCOSE BLDC GLUCOMTR-MCNC: 254 MG/DL (ref 70–130)
GLUCOSE SERPL-MCNC: 189 MG/DL (ref 65–99)
HCO3 BLDA-SCNC: 26.6 MMOL/L (ref 20–26)
HCT VFR BLD AUTO: 33.2 % (ref 37.5–51)
HGB BLD-MCNC: 10.3 G/DL (ref 13–17.7)
INHALED O2 CONCENTRATION: 30 %
LYMPHOCYTES # BLD MANUAL: 0.34 10*3/MM3 (ref 0.7–3.1)
LYMPHOCYTES NFR BLD MANUAL: 3.1 % (ref 5–12)
Lab: ABNORMAL
MCH RBC QN AUTO: 31.4 PG (ref 26.6–33)
MCHC RBC AUTO-ENTMCNC: 31 G/DL (ref 31.5–35.7)
MCV RBC AUTO: 101.2 FL (ref 79–97)
MODALITY: ABNORMAL
MONOCYTES # BLD: 0.34 10*3/MM3 (ref 0.1–0.9)
NEUTROPHILS # BLD AUTO: 10.24 10*3/MM3 (ref 1.7–7)
NEUTROPHILS NFR BLD MANUAL: 90.8 % (ref 42.7–76)
NEUTS BAND NFR BLD MANUAL: 2 % (ref 0–5)
OSMOLALITY SERPL: 307 MOSM/KG (ref 280–301)
OSMOLALITY SERPL: 308 MOSM/KG (ref 280–301)
OSMOLALITY SERPL: 309 MOSM/KG (ref 280–301)
OSMOLALITY SERPL: 309 MOSM/KG (ref 280–301)
OSMOLALITY SERPL: 311 MOSM/KG (ref 280–301)
PCO2 BLDA: 37.6 MM HG (ref 35–45)
PCO2 TEMP ADJ BLD: 37.6 MM HG (ref 35–45)
PH BLDA: 7.46 PH UNITS (ref 7.35–7.45)
PH, TEMP CORRECTED: 7.46 PH UNITS (ref 7.35–7.45)
PHENYTOIN SERPL-MCNC: 16.3 MCG/ML (ref 10–20)
PLATELET # BLD AUTO: 121 10*3/MM3 (ref 140–450)
PMV BLD AUTO: 10 FL (ref 6–12)
PO2 BLDA: 64.3 MM HG (ref 83–108)
PO2 TEMP ADJ BLD: 64.3 MM HG (ref 83–108)
POTASSIUM SERPL-SCNC: 3.6 MMOL/L (ref 3.5–5.2)
PROT SERPL-MCNC: 5.2 G/DL (ref 6–8.5)
RBC # BLD AUTO: 3.28 10*6/MM3 (ref 4.14–5.8)
RBC MORPH BLD: NORMAL
SAO2 % BLDCOA: 94.8 % (ref 94–99)
SET MECH RESP RATE: 16
SMALL PLATELETS BLD QL SMEAR: ABNORMAL
SODIUM SERPL-SCNC: 141 MMOL/L (ref 136–145)
SODIUM SERPL-SCNC: 141 MMOL/L (ref 136–145)
SODIUM SERPL-SCNC: 142 MMOL/L (ref 136–145)
SODIUM SERPL-SCNC: 144 MMOL/L (ref 136–145)
SODIUM SERPL-SCNC: 146 MMOL/L (ref 136–145)
T3FREE SERPL-MCNC: <0.4 PG/ML (ref 2–4.4)
UNIT  ABO: NORMAL
UNIT  RH: NORMAL
VARIANT LYMPHS NFR BLD MANUAL: 3.1 % (ref 19.6–45.3)
VENTILATOR MODE: AC
VT ON VENT VENT: 550 ML
WBC MORPH BLD: NORMAL
WBC NRBC COR # BLD: 11.03 10*3/MM3 (ref 3.4–10.8)

## 2022-05-19 PROCEDURE — 85007 BL SMEAR W/DIFF WBC COUNT: CPT | Performed by: NEUROLOGICAL SURGERY

## 2022-05-19 PROCEDURE — 80185 ASSAY OF PHENYTOIN TOTAL: CPT | Performed by: PSYCHIATRY & NEUROLOGY

## 2022-05-19 PROCEDURE — 82803 BLOOD GASES ANY COMBINATION: CPT

## 2022-05-19 PROCEDURE — 80053 COMPREHEN METABOLIC PANEL: CPT | Performed by: PSYCHIATRY & NEUROLOGY

## 2022-05-19 PROCEDURE — 71045 X-RAY EXAM CHEST 1 VIEW: CPT

## 2022-05-19 PROCEDURE — 94799 UNLISTED PULMONARY SVC/PX: CPT

## 2022-05-19 PROCEDURE — 99024 POSTOP FOLLOW-UP VISIT: CPT | Performed by: NURSE PRACTITIONER

## 2022-05-19 PROCEDURE — 99291 CRITICAL CARE FIRST HOUR: CPT | Performed by: PSYCHIATRY & NEUROLOGY

## 2022-05-19 PROCEDURE — 63710000001 INSULIN REGULAR HUMAN PER 5 UNITS: Performed by: INTERNAL MEDICINE

## 2022-05-19 PROCEDURE — 25010000002 LEVETIRACETAM IN NACL 0.82% 500 MG/100ML SOLUTION: Performed by: NEUROLOGICAL SURGERY

## 2022-05-19 PROCEDURE — 94003 VENT MGMT INPAT SUBQ DAY: CPT

## 2022-05-19 PROCEDURE — 83930 ASSAY OF BLOOD OSMOLALITY: CPT | Performed by: NEUROLOGICAL SURGERY

## 2022-05-19 PROCEDURE — 36600 WITHDRAWAL OF ARTERIAL BLOOD: CPT

## 2022-05-19 PROCEDURE — 84295 ASSAY OF SERUM SODIUM: CPT | Performed by: NEUROLOGICAL SURGERY

## 2022-05-19 PROCEDURE — 70450 CT HEAD/BRAIN W/O DYE: CPT

## 2022-05-19 PROCEDURE — 94761 N-INVAS EAR/PLS OXIMETRY MLT: CPT

## 2022-05-19 PROCEDURE — 82962 GLUCOSE BLOOD TEST: CPT

## 2022-05-19 PROCEDURE — 63710000001 INSULIN DETEMIR PER 5 UNITS: Performed by: INTERNAL MEDICINE

## 2022-05-19 PROCEDURE — 99233 SBSQ HOSP IP/OBS HIGH 50: CPT | Performed by: INTERNAL MEDICINE

## 2022-05-19 PROCEDURE — 25010000002 FOSPHENYTOIN 100 MG PE/2ML SOLUTION 2 ML VIAL: Performed by: PSYCHIATRY & NEUROLOGY

## 2022-05-19 PROCEDURE — 83519 RIA NONANTIBODY: CPT | Performed by: INTERNAL MEDICINE

## 2022-05-19 PROCEDURE — 94664 DEMO&/EVAL PT USE INHALER: CPT

## 2022-05-19 PROCEDURE — 85025 COMPLETE CBC W/AUTO DIFF WBC: CPT | Performed by: NEUROLOGICAL SURGERY

## 2022-05-19 PROCEDURE — 25010000002 HYDRALAZINE PER 20 MG: Performed by: NEUROLOGICAL SURGERY

## 2022-05-19 RX ORDER — CARVEDILOL 3.12 MG/1
3.12 TABLET ORAL 2 TIMES DAILY WITH MEALS
Status: DISCONTINUED | OUTPATIENT
Start: 2022-05-19 | End: 2022-05-20

## 2022-05-19 RX ORDER — LISINOPRIL 5 MG/1
5 TABLET ORAL ONCE
Status: COMPLETED | OUTPATIENT
Start: 2022-05-19 | End: 2022-05-19

## 2022-05-19 RX ORDER — LISINOPRIL 10 MG/1
10 TABLET ORAL
Status: DISCONTINUED | OUTPATIENT
Start: 2022-05-20 | End: 2022-05-20

## 2022-05-19 RX ADMIN — FOSPHENYTOIN SODIUM 200 MG PE: 50 INJECTION, SOLUTION INTRAMUSCULAR; INTRAVENOUS at 05:28

## 2022-05-19 RX ADMIN — LISINOPRIL 5 MG: 5 TABLET ORAL at 08:20

## 2022-05-19 RX ADMIN — FOSPHENYTOIN SODIUM 200 MG PE: 50 INJECTION, SOLUTION INTRAMUSCULAR; INTRAVENOUS at 13:00

## 2022-05-19 RX ADMIN — LISINOPRIL 5 MG: 5 TABLET ORAL at 12:59

## 2022-05-19 RX ADMIN — LEVETIRACETAM 500 MG: 5 INJECTION INTRAVASCULAR at 20:56

## 2022-05-19 RX ADMIN — INSULIN HUMAN 5 UNITS: 100 INJECTION, SOLUTION PARENTERAL at 13:01

## 2022-05-19 RX ADMIN — CHLORHEXIDINE GLUCONATE 15 ML: 1.2 RINSE ORAL at 08:20

## 2022-05-19 RX ADMIN — INSULIN HUMAN 5 UNITS: 100 INJECTION, SOLUTION PARENTERAL at 23:52

## 2022-05-19 RX ADMIN — INSULIN HUMAN 2 UNITS: 100 INJECTION, SOLUTION PARENTERAL at 05:58

## 2022-05-19 RX ADMIN — FOSPHENYTOIN SODIUM 200 MG PE: 50 INJECTION, SOLUTION INTRAMUSCULAR; INTRAVENOUS at 21:05

## 2022-05-19 RX ADMIN — SODIUM CHLORIDE 5 MG/HR: 9 INJECTION, SOLUTION INTRAVENOUS at 16:03

## 2022-05-19 RX ADMIN — INSULIN HUMAN 4 UNITS: 100 INJECTION, SOLUTION PARENTERAL at 23:52

## 2022-05-19 RX ADMIN — LACOSAMIDE 200 MG: 10 INJECTION, SOLUTION INTRAVENOUS at 21:10

## 2022-05-19 RX ADMIN — INSULIN HUMAN 4 UNITS: 100 INJECTION, SOLUTION PARENTERAL at 13:01

## 2022-05-19 RX ADMIN — PANTOPRAZOLE SODIUM 40 MG: 40 INJECTION, POWDER, FOR SOLUTION INTRAVENOUS at 05:29

## 2022-05-19 RX ADMIN — INSULIN HUMAN 6 UNITS: 100 INJECTION, SOLUTION PARENTERAL at 18:13

## 2022-05-19 RX ADMIN — HYDRALAZINE HYDROCHLORIDE 10 MG: 20 INJECTION INTRAMUSCULAR; INTRAVENOUS at 21:10

## 2022-05-19 RX ADMIN — MUPIROCIN 1 APPLICATION: 20 OINTMENT TOPICAL at 21:03

## 2022-05-19 RX ADMIN — Medication 45 ML: at 08:21

## 2022-05-19 RX ADMIN — ALBUTEROL SULFATE 2.5 MG: 2.5 SOLUTION RESPIRATORY (INHALATION) at 22:09

## 2022-05-19 RX ADMIN — SODIUM CHLORIDE 12.5 MG/HR: 9 INJECTION, SOLUTION INTRAVENOUS at 19:03

## 2022-05-19 RX ADMIN — ALBUTEROL SULFATE 2.5 MG: 2.5 SOLUTION RESPIRATORY (INHALATION) at 13:31

## 2022-05-19 RX ADMIN — HYDRALAZINE HYDROCHLORIDE 10 MG: 20 INJECTION INTRAMUSCULAR; INTRAVENOUS at 12:59

## 2022-05-19 RX ADMIN — INSULIN HUMAN 5 UNITS: 100 INJECTION, SOLUTION PARENTERAL at 05:58

## 2022-05-19 RX ADMIN — DEXAMETHASONE 0.5 MG: 0.5 TABLET ORAL at 08:20

## 2022-05-19 RX ADMIN — LEVETIRACETAM 500 MG: 5 INJECTION INTRAVASCULAR at 08:20

## 2022-05-19 RX ADMIN — CHLORHEXIDINE GLUCONATE 15 ML: 1.2 RINSE ORAL at 20:56

## 2022-05-19 RX ADMIN — MUPIROCIN 1 APPLICATION: 20 OINTMENT TOPICAL at 08:20

## 2022-05-19 RX ADMIN — SODIUM CHLORIDE 15 MG/HR: 9 INJECTION, SOLUTION INTRAVENOUS at 22:15

## 2022-05-19 RX ADMIN — LACOSAMIDE 200 MG: 10 INJECTION, SOLUTION INTRAVENOUS at 08:20

## 2022-05-19 RX ADMIN — INSULIN HUMAN 5 UNITS: 100 INJECTION, SOLUTION PARENTERAL at 18:14

## 2022-05-19 RX ADMIN — CARVEDILOL 3.12 MG: 3.12 TABLET, FILM COATED ORAL at 16:24

## 2022-05-19 RX ADMIN — SODIUM CHLORIDE 15 MG/HR: 9 INJECTION, SOLUTION INTRAVENOUS at 20:39

## 2022-05-19 RX ADMIN — INSULIN DETEMIR 20 UNITS: 100 INJECTION, SOLUTION SUBCUTANEOUS at 20:56

## 2022-05-19 RX ADMIN — ALBUTEROL SULFATE 2.5 MG: 2.5 SOLUTION RESPIRATORY (INHALATION) at 06:20

## 2022-05-20 ENCOUNTER — APPOINTMENT (OUTPATIENT)
Dept: GENERAL RADIOLOGY | Facility: HOSPITAL | Age: 77
End: 2022-05-20

## 2022-05-20 PROBLEM — I61.9 CEREBRAL PARENCHYMAL HEMORRHAGE: Status: ACTIVE | Noted: 2022-05-20

## 2022-05-20 LAB
ALBUMIN SERPL-MCNC: 2.3 G/DL (ref 3.5–5.2)
ALBUMIN/GLOB SERPL: 0.8 G/DL
ALP SERPL-CCNC: 82 U/L (ref 39–117)
ALT SERPL W P-5'-P-CCNC: 44 U/L (ref 1–41)
ANION GAP SERPL CALCULATED.3IONS-SCNC: 7 MMOL/L (ref 5–15)
ANISOCYTOSIS BLD QL: ABNORMAL
ARTERIAL PATENCY WRIST A: ABNORMAL
AST SERPL-CCNC: 28 U/L (ref 1–40)
ATMOSPHERIC PRESS: 740 MMHG
BACTERIA SPEC AEROBE CULT: NORMAL
BASE EXCESS BLDA CALC-SCNC: 2.3 MMOL/L (ref 0–2)
BDY SITE: ABNORMAL
BILIRUB SERPL-MCNC: 0.5 MG/DL (ref 0–1.2)
BODY TEMPERATURE: 37 C
BUN SERPL-MCNC: 32 MG/DL (ref 8–23)
BUN/CREAT SERPL: 41 (ref 7–25)
CALCIUM SPEC-SCNC: 8.1 MG/DL (ref 8.6–10.5)
CHLORIDE SERPL-SCNC: 112 MMOL/L (ref 98–107)
CO2 SERPL-SCNC: 26 MMOL/L (ref 22–29)
CREAT SERPL-MCNC: 0.78 MG/DL (ref 0.76–1.27)
DEPRECATED RDW RBC AUTO: 50.1 FL (ref 37–54)
DEPRECATED RDW RBC AUTO: 50.7 FL (ref 37–54)
EGFRCR SERPLBLD CKD-EPI 2021: 91.9 ML/MIN/1.73
ERYTHROCYTE [DISTWIDTH] IN BLOOD BY AUTOMATED COUNT: 13.7 % (ref 12.3–15.4)
ERYTHROCYTE [DISTWIDTH] IN BLOOD BY AUTOMATED COUNT: 14 % (ref 12.3–15.4)
GLOBULIN UR ELPH-MCNC: 3 GM/DL
GLUCOSE BLDC GLUCOMTR-MCNC: 128 MG/DL (ref 70–130)
GLUCOSE BLDC GLUCOMTR-MCNC: 137 MG/DL (ref 70–130)
GLUCOSE BLDC GLUCOMTR-MCNC: 145 MG/DL (ref 70–130)
GLUCOSE BLDC GLUCOMTR-MCNC: 167 MG/DL (ref 70–130)
GLUCOSE BLDC GLUCOMTR-MCNC: 174 MG/DL (ref 70–130)
GLUCOSE BLDC GLUCOMTR-MCNC: 204 MG/DL (ref 70–130)
GLUCOSE SERPL-MCNC: 155 MG/DL (ref 65–99)
GRAM STN SPEC: NORMAL
GRAM STN SPEC: NORMAL
HCO3 BLDA-SCNC: 27.9 MMOL/L (ref 20–26)
HCT VFR BLD AUTO: 33.5 % (ref 37.5–51)
HCT VFR BLD AUTO: 34.9 % (ref 37.5–51)
HGB BLD-MCNC: 10.6 G/DL (ref 13–17.7)
HGB BLD-MCNC: 11.2 G/DL (ref 13–17.7)
INHALED O2 CONCENTRATION: 30 %
LYMPHOCYTES # BLD MANUAL: 0.12 10*3/MM3 (ref 0.7–3.1)
LYMPHOCYTES NFR BLD MANUAL: 7.1 % (ref 5–12)
Lab: ABNORMAL
MACROCYTES BLD QL SMEAR: ABNORMAL
MCH RBC QN AUTO: 31.5 PG (ref 26.6–33)
MCH RBC QN AUTO: 31.8 PG (ref 26.6–33)
MCHC RBC AUTO-ENTMCNC: 31.6 G/DL (ref 31.5–35.7)
MCHC RBC AUTO-ENTMCNC: 32.1 G/DL (ref 31.5–35.7)
MCV RBC AUTO: 99.1 FL (ref 79–97)
MCV RBC AUTO: 99.4 FL (ref 79–97)
METAMYELOCYTES NFR BLD MANUAL: 1 % (ref 0–0)
MODALITY: ABNORMAL
MONOCYTES # BLD: 0.89 10*3/MM3 (ref 0.1–0.9)
MYELOCYTES NFR BLD MANUAL: 1 % (ref 0–0)
NEUTROPHILS # BLD AUTO: 11.21 10*3/MM3 (ref 1.7–7)
NEUTROPHILS NFR BLD MANUAL: 88.9 % (ref 42.7–76)
NEUTS BAND NFR BLD MANUAL: 1 % (ref 0–5)
NRBC BLD AUTO-RTO: 0 /100 WBC (ref 0–0.2)
OSMOLALITY SERPL: 305 MOSM/KG (ref 280–301)
PCO2 BLDA: 46.8 MM HG (ref 35–45)
PCO2 TEMP ADJ BLD: 46.8 MM HG (ref 35–45)
PEEP RESPIRATORY: 5 CM[H2O]
PF4 HEPARIN CMPLX IGG SERPL IA: 0.1 OD (ref 0–0.4)
PH BLDA: 7.38 PH UNITS (ref 7.35–7.45)
PH, TEMP CORRECTED: 7.38 PH UNITS (ref 7.35–7.45)
PHENYTOIN SERPL-MCNC: 19.1 MCG/ML (ref 10–20)
PLATELET # BLD AUTO: 117 10*3/MM3 (ref 140–450)
PLATELET # BLD AUTO: 119 10*3/MM3 (ref 140–450)
PMV BLD AUTO: 9.5 FL (ref 6–12)
PMV BLD AUTO: 9.5 FL (ref 6–12)
PO2 BLDA: 88.9 MM HG (ref 83–108)
PO2 TEMP ADJ BLD: 88.9 MM HG (ref 83–108)
POIKILOCYTOSIS BLD QL SMEAR: ABNORMAL
POTASSIUM SERPL-SCNC: 3.9 MMOL/L (ref 3.5–5.2)
PROT SERPL-MCNC: 5.3 G/DL (ref 6–8.5)
QT INTERVAL: 288 MS
QTC INTERVAL: 389 MS
RBC # BLD AUTO: 3.37 10*6/MM3 (ref 4.14–5.8)
RBC # BLD AUTO: 3.52 10*6/MM3 (ref 4.14–5.8)
SAO2 % BLDCOA: 97.2 % (ref 94–99)
SET MECH RESP RATE: 16
SMALL PLATELETS BLD QL SMEAR: ABNORMAL
SODIUM SERPL-SCNC: 145 MMOL/L (ref 136–145)
TOXIC GRANULATION: ABNORMAL
VARIANT LYMPHS NFR BLD MANUAL: 1 % (ref 19.6–45.3)
VENTILATOR MODE: AC
VT ON VENT VENT: 550 ML
WBC NRBC COR # BLD: 12.47 10*3/MM3 (ref 3.4–10.8)
WBC NRBC COR # BLD: 13.93 10*3/MM3 (ref 3.4–10.8)

## 2022-05-20 PROCEDURE — 93005 ELECTROCARDIOGRAM TRACING: CPT | Performed by: INTERNAL MEDICINE

## 2022-05-20 PROCEDURE — 25010000002 LEVETIRACETAM IN NACL 0.82% 500 MG/100ML SOLUTION: Performed by: NEUROLOGICAL SURGERY

## 2022-05-20 PROCEDURE — 94664 DEMO&/EVAL PT USE INHALER: CPT

## 2022-05-20 PROCEDURE — 99024 POSTOP FOLLOW-UP VISIT: CPT | Performed by: NEUROLOGICAL SURGERY

## 2022-05-20 PROCEDURE — 94761 N-INVAS EAR/PLS OXIMETRY MLT: CPT

## 2022-05-20 PROCEDURE — 93010 ELECTROCARDIOGRAM REPORT: CPT | Performed by: INTERNAL MEDICINE

## 2022-05-20 PROCEDURE — 85025 COMPLETE CBC W/AUTO DIFF WBC: CPT | Performed by: NEUROLOGICAL SURGERY

## 2022-05-20 PROCEDURE — 85027 COMPLETE CBC AUTOMATED: CPT | Performed by: NEUROLOGICAL SURGERY

## 2022-05-20 PROCEDURE — 83930 ASSAY OF BLOOD OSMOLALITY: CPT | Performed by: NEUROLOGICAL SURGERY

## 2022-05-20 PROCEDURE — 99232 SBSQ HOSP IP/OBS MODERATE 35: CPT | Performed by: NURSE PRACTITIONER

## 2022-05-20 PROCEDURE — 94799 UNLISTED PULMONARY SVC/PX: CPT

## 2022-05-20 PROCEDURE — 80185 ASSAY OF PHENYTOIN TOTAL: CPT | Performed by: PSYCHIATRY & NEUROLOGY

## 2022-05-20 PROCEDURE — 99233 SBSQ HOSP IP/OBS HIGH 50: CPT | Performed by: PSYCHIATRY & NEUROLOGY

## 2022-05-20 PROCEDURE — 85007 BL SMEAR W/DIFF WBC COUNT: CPT | Performed by: NEUROLOGICAL SURGERY

## 2022-05-20 PROCEDURE — 80053 COMPREHEN METABOLIC PANEL: CPT | Performed by: PSYCHIATRY & NEUROLOGY

## 2022-05-20 PROCEDURE — 25010000002 FOSPHENYTOIN 100 MG PE/2ML SOLUTION 2 ML VIAL: Performed by: PSYCHIATRY & NEUROLOGY

## 2022-05-20 PROCEDURE — 63710000001 INSULIN REGULAR HUMAN PER 5 UNITS: Performed by: INTERNAL MEDICINE

## 2022-05-20 PROCEDURE — 94003 VENT MGMT INPAT SUBQ DAY: CPT

## 2022-05-20 PROCEDURE — 63710000001 INSULIN DETEMIR PER 5 UNITS: Performed by: INTERNAL MEDICINE

## 2022-05-20 PROCEDURE — 82803 BLOOD GASES ANY COMBINATION: CPT

## 2022-05-20 PROCEDURE — 71045 X-RAY EXAM CHEST 1 VIEW: CPT

## 2022-05-20 PROCEDURE — 25010000002 FUROSEMIDE PER 20 MG: Performed by: INTERNAL MEDICINE

## 2022-05-20 PROCEDURE — 36600 WITHDRAWAL OF ARTERIAL BLOOD: CPT

## 2022-05-20 PROCEDURE — 82962 GLUCOSE BLOOD TEST: CPT

## 2022-05-20 PROCEDURE — 25010000002 HYDRALAZINE PER 20 MG: Performed by: NEUROLOGICAL SURGERY

## 2022-05-20 PROCEDURE — 99233 SBSQ HOSP IP/OBS HIGH 50: CPT | Performed by: INTERNAL MEDICINE

## 2022-05-20 RX ORDER — LIOTHYRONINE SODIUM 25 UG/1
25 TABLET ORAL DAILY
Status: DISCONTINUED | OUTPATIENT
Start: 2022-05-21 | End: 2022-06-20 | Stop reason: HOSPADM

## 2022-05-20 RX ORDER — LIOTHYRONINE SODIUM 10 UG/ML
25 INJECTION, SOLUTION INTRAVENOUS DAILY
Status: DISCONTINUED | OUTPATIENT
Start: 2022-05-20 | End: 2022-05-20

## 2022-05-20 RX ORDER — LEVOTHYROXINE SODIUM ANHYDROUS 100 UG/5ML
50 INJECTION, POWDER, LYOPHILIZED, FOR SOLUTION INTRAVENOUS
Status: DISCONTINUED | OUTPATIENT
Start: 2022-05-20 | End: 2022-05-24

## 2022-05-20 RX ORDER — CARVEDILOL 6.25 MG/1
12.5 TABLET ORAL 2 TIMES DAILY WITH MEALS
Status: DISCONTINUED | OUTPATIENT
Start: 2022-05-20 | End: 2022-05-21

## 2022-05-20 RX ORDER — LABETALOL HYDROCHLORIDE 5 MG/ML
10 INJECTION, SOLUTION INTRAVENOUS EVERY 6 HOURS PRN
Status: DISCONTINUED | OUTPATIENT
Start: 2022-05-20 | End: 2022-06-20 | Stop reason: HOSPADM

## 2022-05-20 RX ORDER — LISINOPRIL 20 MG/1
20 TABLET ORAL
Status: DISCONTINUED | OUTPATIENT
Start: 2022-05-20 | End: 2022-05-21

## 2022-05-20 RX ORDER — AMINO AC/PROTEIN HYDR/WHEY PRO 10G-100/30
1 LIQUID (ML) ORAL 2 TIMES DAILY
Status: DISCONTINUED | OUTPATIENT
Start: 2022-05-20 | End: 2022-05-29

## 2022-05-20 RX ORDER — FUROSEMIDE 10 MG/ML
20 INJECTION INTRAMUSCULAR; INTRAVENOUS ONCE
Status: COMPLETED | OUTPATIENT
Start: 2022-05-20 | End: 2022-05-20

## 2022-05-20 RX ADMIN — Medication 45 ML: at 20:02

## 2022-05-20 RX ADMIN — LACOSAMIDE 200 MG: 10 INJECTION, SOLUTION INTRAVENOUS at 08:35

## 2022-05-20 RX ADMIN — INSULIN HUMAN 8 UNITS: 100 INJECTION, SOLUTION PARENTERAL at 17:14

## 2022-05-20 RX ADMIN — HYDRALAZINE HYDROCHLORIDE 10 MG: 20 INJECTION INTRAMUSCULAR; INTRAVENOUS at 04:02

## 2022-05-20 RX ADMIN — SODIUM CHLORIDE 15 MG/HR: 9 INJECTION, SOLUTION INTRAVENOUS at 04:09

## 2022-05-20 RX ADMIN — CHLORHEXIDINE GLUCONATE 15 ML: 1.2 RINSE ORAL at 20:02

## 2022-05-20 RX ADMIN — SODIUM CHLORIDE 15 MG/HR: 9 INJECTION, SOLUTION INTRAVENOUS at 00:12

## 2022-05-20 RX ADMIN — SODIUM CHLORIDE 15 MG/HR: 9 INJECTION, SOLUTION INTRAVENOUS at 02:56

## 2022-05-20 RX ADMIN — CARVEDILOL 12.5 MG: 6.25 TABLET, FILM COATED ORAL at 09:30

## 2022-05-20 RX ADMIN — INSULIN HUMAN 8 UNITS: 100 INJECTION, SOLUTION PARENTERAL at 23:15

## 2022-05-20 RX ADMIN — LEVETIRACETAM 500 MG: 5 INJECTION INTRAVASCULAR at 20:01

## 2022-05-20 RX ADMIN — LEVOTHYROXINE SODIUM ANHYDROUS 50 MCG: 100 INJECTION, POWDER, LYOPHILIZED, FOR SOLUTION INTRAVENOUS at 09:30

## 2022-05-20 RX ADMIN — DEXAMETHASONE 0.5 MG: 0.5 TABLET ORAL at 09:38

## 2022-05-20 RX ADMIN — LACOSAMIDE 200 MG: 10 INJECTION, SOLUTION INTRAVENOUS at 20:02

## 2022-05-20 RX ADMIN — LIOTHYRONINE SODIUM 25 MCG: 10 INJECTION, SOLUTION INTRAVENOUS at 09:30

## 2022-05-20 RX ADMIN — ALBUTEROL SULFATE 2.5 MG: 2.5 SOLUTION RESPIRATORY (INHALATION) at 06:05

## 2022-05-20 RX ADMIN — FOSPHENYTOIN SODIUM 200 MG PE: 50 INJECTION, SOLUTION INTRAMUSCULAR; INTRAVENOUS at 05:01

## 2022-05-20 RX ADMIN — INSULIN HUMAN 2 UNITS: 100 INJECTION, SOLUTION PARENTERAL at 05:00

## 2022-05-20 RX ADMIN — INSULIN HUMAN 5 UNITS: 100 INJECTION, SOLUTION PARENTERAL at 05:00

## 2022-05-20 RX ADMIN — INSULIN DETEMIR 25 UNITS: 100 INJECTION, SOLUTION SUBCUTANEOUS at 20:00

## 2022-05-20 RX ADMIN — LABETALOL HYDROCHLORIDE 10 MG: 5 INJECTION INTRAVENOUS at 04:50

## 2022-05-20 RX ADMIN — SODIUM CHLORIDE 12.5 MG/HR: 9 INJECTION, SOLUTION INTRAVENOUS at 08:49

## 2022-05-20 RX ADMIN — ALBUTEROL SULFATE 2.5 MG: 2.5 SOLUTION RESPIRATORY (INHALATION) at 12:51

## 2022-05-20 RX ADMIN — INSULIN HUMAN 2 UNITS: 100 INJECTION, SOLUTION PARENTERAL at 17:14

## 2022-05-20 RX ADMIN — INSULIN HUMAN 8 UNITS: 100 INJECTION, SOLUTION PARENTERAL at 13:10

## 2022-05-20 RX ADMIN — CHLORHEXIDINE GLUCONATE 15 ML: 1.2 RINSE ORAL at 08:35

## 2022-05-20 RX ADMIN — PANTOPRAZOLE SODIUM 40 MG: 40 INJECTION, POWDER, FOR SOLUTION INTRAVENOUS at 05:01

## 2022-05-20 RX ADMIN — MUPIROCIN 1 APPLICATION: 20 OINTMENT TOPICAL at 08:35

## 2022-05-20 RX ADMIN — FOSPHENYTOIN SODIUM 200 MG PE: 50 INJECTION, SOLUTION INTRAMUSCULAR; INTRAVENOUS at 21:15

## 2022-05-20 RX ADMIN — ALBUTEROL SULFATE 2.5 MG: 2.5 SOLUTION RESPIRATORY (INHALATION) at 23:00

## 2022-05-20 RX ADMIN — CARVEDILOL 12.5 MG: 6.25 TABLET, FILM COATED ORAL at 17:14

## 2022-05-20 RX ADMIN — Medication 45 ML: at 09:35

## 2022-05-20 RX ADMIN — LISINOPRIL 20 MG: 20 TABLET ORAL at 09:30

## 2022-05-20 RX ADMIN — FOSPHENYTOIN SODIUM 200 MG PE: 50 INJECTION, SOLUTION INTRAMUSCULAR; INTRAVENOUS at 14:23

## 2022-05-20 RX ADMIN — FUROSEMIDE 20 MG: 10 INJECTION INTRAMUSCULAR; INTRAVENOUS at 08:36

## 2022-05-20 RX ADMIN — LEVETIRACETAM 500 MG: 5 INJECTION INTRAVASCULAR at 08:35

## 2022-05-20 RX ADMIN — MUPIROCIN 1 APPLICATION: 20 OINTMENT TOPICAL at 20:02

## 2022-05-20 RX ADMIN — SODIUM CHLORIDE 10 MG/HR: 9 INJECTION, SOLUTION INTRAVENOUS at 06:11

## 2022-05-21 ENCOUNTER — APPOINTMENT (OUTPATIENT)
Dept: CARDIOLOGY | Facility: HOSPITAL | Age: 77
End: 2022-05-21

## 2022-05-21 ENCOUNTER — APPOINTMENT (OUTPATIENT)
Dept: MRI IMAGING | Facility: HOSPITAL | Age: 77
End: 2022-05-21

## 2022-05-21 ENCOUNTER — APPOINTMENT (OUTPATIENT)
Dept: GENERAL RADIOLOGY | Facility: HOSPITAL | Age: 77
End: 2022-05-21

## 2022-05-21 PROBLEM — I48.0 PAF (PAROXYSMAL ATRIAL FIBRILLATION): Status: ACTIVE | Noted: 2022-05-21

## 2022-05-21 LAB
ALBUMIN SERPL-MCNC: 2.5 G/DL (ref 3.5–5.2)
ALBUMIN/GLOB SERPL: 0.8 G/DL
ALP SERPL-CCNC: 121 U/L (ref 39–117)
ALT SERPL W P-5'-P-CCNC: 59 U/L (ref 1–41)
ANION GAP SERPL CALCULATED.3IONS-SCNC: 6 MMOL/L (ref 5–15)
ARTERIAL PATENCY WRIST A: POSITIVE
ARTERIAL PATENCY WRIST A: POSITIVE
AST SERPL-CCNC: 61 U/L (ref 1–40)
ATMOSPHERIC PRESS: 747 MMHG
ATMOSPHERIC PRESS: 749 MMHG
BACTERIA UR QL AUTO: ABNORMAL /HPF
BASE EXCESS BLDA CALC-SCNC: 5.3 MMOL/L (ref 0–2)
BASE EXCESS BLDA CALC-SCNC: 5.6 MMOL/L (ref 0–2)
BDY SITE: ABNORMAL
BDY SITE: ABNORMAL
BH CV ECHO MEAS - AO MAX PG: 9.9 MMHG
BH CV ECHO MEAS - AO MEAN PG: 5 MMHG
BH CV ECHO MEAS - AO ROOT DIAM: 3.1 CM
BH CV ECHO MEAS - AO V2 MAX: 157 CM/SEC
BH CV ECHO MEAS - AO V2 VTI: 31 CM
BH CV ECHO MEAS - AVA(I,D): 2.5 CM2
BH CV ECHO MEAS - EDV(CUBED): 151.4 ML
BH CV ECHO MEAS - EDV(MOD-SP4): 130 ML
BH CV ECHO MEAS - EF(MOD-SP4): 71.5 %
BH CV ECHO MEAS - ESV(CUBED): 32.8 ML
BH CV ECHO MEAS - ESV(MOD-SP4): 37.1 ML
BH CV ECHO MEAS - FS: 40 %
BH CV ECHO MEAS - IVS/LVPW: 0.97 CM
BH CV ECHO MEAS - IVSD: 1.17 CM
BH CV ECHO MEAS - LA DIMENSION: 3.2 CM
BH CV ECHO MEAS - LAT PEAK E' VEL: 15.1 CM/SEC
BH CV ECHO MEAS - LV DIASTOLIC VOL/BSA (35-75): 59.8 CM2
BH CV ECHO MEAS - LV MASS(C)D: 255.9 GRAMS
BH CV ECHO MEAS - LV MAX PG: 6 MMHG
BH CV ECHO MEAS - LV MEAN PG: 3 MMHG
BH CV ECHO MEAS - LV SYSTOLIC VOL/BSA (12-30): 17.1 CM2
BH CV ECHO MEAS - LV V1 MAX: 122 CM/SEC
BH CV ECHO MEAS - LV V1 VTI: 22.5 CM
BH CV ECHO MEAS - LVIDD: 5.3 CM
BH CV ECHO MEAS - LVIDS: 3.2 CM
BH CV ECHO MEAS - LVOT AREA: 3.5 CM2
BH CV ECHO MEAS - LVOT DIAM: 2.1 CM
BH CV ECHO MEAS - LVPWD: 1.21 CM
BH CV ECHO MEAS - MED PEAK E' VEL: 12.6 CM/SEC
BH CV ECHO MEAS - MV DEC SLOPE: 746 CM/SEC2
BH CV ECHO MEAS - MV DEC TIME: 0.23 MSEC
BH CV ECHO MEAS - MV E MAX VEL: 134 CM/SEC
BH CV ECHO MEAS - MV P1/2T: 56.5 MSEC
BH CV ECHO MEAS - MVA(P1/2T): 3.9 CM2
BH CV ECHO MEAS - RAP SYSTOLE: 10 MMHG
BH CV ECHO MEAS - RVDD: 2.9 CM
BH CV ECHO MEAS - RVSP: 18.4 MMHG
BH CV ECHO MEAS - SI(MOD-SP4): 42.7 ML/M2
BH CV ECHO MEAS - SV(LVOT): 77.9 ML
BH CV ECHO MEAS - SV(MOD-SP4): 92.9 ML
BH CV ECHO MEAS - TR MAX PG: 8.4 MMHG
BH CV ECHO MEAS - TR MAX VEL: 145 CM/SEC
BH CV ECHO MEASUREMENTS AVERAGE E/E' RATIO: 9.68
BH CV XLRA - RV BASE: 5.1 CM
BH CV XLRA - RV LENGTH: 8 CM
BH CV XLRA - RV MID: 3.7 CM
BILIRUB SERPL-MCNC: 0.5 MG/DL (ref 0–1.2)
BILIRUB UR QL STRIP: NEGATIVE
BODY TEMPERATURE: 37 C
BODY TEMPERATURE: 37 C
BUN SERPL-MCNC: 35 MG/DL (ref 8–23)
BUN/CREAT SERPL: 43.8 (ref 7–25)
CALCIUM SPEC-SCNC: 8.1 MG/DL (ref 8.6–10.5)
CHLORIDE SERPL-SCNC: 107 MMOL/L (ref 98–107)
CLARITY UR: ABNORMAL
CO2 SERPL-SCNC: 29 MMOL/L (ref 22–29)
COLOR UR: ABNORMAL
CREAT SERPL-MCNC: 0.8 MG/DL (ref 0.76–1.27)
DEPRECATED RDW RBC AUTO: 49.7 FL (ref 37–54)
EGFRCR SERPLBLD CKD-EPI 2021: 91.2 ML/MIN/1.73
EOSINOPHIL # BLD MANUAL: 0.29 10*3/MM3 (ref 0–0.4)
EOSINOPHIL NFR BLD MANUAL: 2 % (ref 0.3–6.2)
ERYTHROCYTE [DISTWIDTH] IN BLOOD BY AUTOMATED COUNT: 13.6 % (ref 12.3–15.4)
GIANT PLATELETS: ABNORMAL
GLOBULIN UR ELPH-MCNC: 3.2 GM/DL
GLUCOSE BLDC GLUCOMTR-MCNC: 118 MG/DL (ref 70–130)
GLUCOSE BLDC GLUCOMTR-MCNC: 123 MG/DL (ref 70–130)
GLUCOSE BLDC GLUCOMTR-MCNC: 149 MG/DL (ref 70–130)
GLUCOSE BLDC GLUCOMTR-MCNC: 153 MG/DL (ref 70–130)
GLUCOSE BLDC GLUCOMTR-MCNC: 160 MG/DL (ref 70–130)
GLUCOSE BLDC GLUCOMTR-MCNC: 57 MG/DL (ref 70–130)
GLUCOSE SERPL-MCNC: 140 MG/DL (ref 65–99)
GLUCOSE UR STRIP-MCNC: NEGATIVE MG/DL
HCO3 BLDA-SCNC: 29.1 MMOL/L (ref 20–26)
HCO3 BLDA-SCNC: 29.2 MMOL/L (ref 20–26)
HCT VFR BLD AUTO: 35.2 % (ref 37.5–51)
HGB BLD-MCNC: 11.2 G/DL (ref 13–17.7)
HGB UR QL STRIP.AUTO: ABNORMAL
INHALED O2 CONCENTRATION: 30 %
INHALED O2 CONCENTRATION: 30 %
KETONES UR QL STRIP: NEGATIVE
LEFT ATRIUM VOLUME INDEX: 17.6 ML/M2
LEFT ATRIUM VOLUME: 38.4 CM3
LEUKOCYTE ESTERASE UR QL STRIP.AUTO: ABNORMAL
LYMPHOCYTES # BLD MANUAL: 0.72 10*3/MM3 (ref 0.7–3.1)
LYMPHOCYTES NFR BLD MANUAL: 12 % (ref 5–12)
Lab: ABNORMAL
Lab: ABNORMAL
MAXIMAL PREDICTED HEART RATE: 143 BPM
MCH RBC QN AUTO: 31.5 PG (ref 26.6–33)
MCHC RBC AUTO-ENTMCNC: 31.8 G/DL (ref 31.5–35.7)
MCV RBC AUTO: 99.2 FL (ref 79–97)
METAMYELOCYTES NFR BLD MANUAL: 1 % (ref 0–0)
MODALITY: ABNORMAL
MODALITY: ABNORMAL
MONOCYTES # BLD: 1.73 10*3/MM3 (ref 0.1–0.9)
NEUTROPHILS # BLD AUTO: 11.51 10*3/MM3 (ref 1.7–7)
NEUTROPHILS NFR BLD MANUAL: 78 % (ref 42.7–76)
NEUTS BAND NFR BLD MANUAL: 2 % (ref 0–5)
NITRITE UR QL STRIP: NEGATIVE
NRBC BLD AUTO-RTO: 0.2 /100 WBC (ref 0–0.2)
PAW @ PEAK INSP FLOW SETTING VENT: 12 CMH2O
PAW @ PEAK INSP FLOW SETTING VENT: 12 CMH2O
PCO2 BLDA: 37.7 MM HG (ref 35–45)
PCO2 BLDA: 38.8 MM HG (ref 35–45)
PCO2 TEMP ADJ BLD: 37.7 MM HG (ref 35–45)
PCO2 TEMP ADJ BLD: 38.8 MM HG (ref 35–45)
PEEP RESPIRATORY: 5 CM[H2O]
PEEP RESPIRATORY: 5 CM[H2O]
PH BLDA: 7.48 PH UNITS (ref 7.35–7.45)
PH BLDA: 7.5 PH UNITS (ref 7.35–7.45)
PH UR STRIP.AUTO: 5.5 [PH] (ref 5–8)
PH, TEMP CORRECTED: 7.48 PH UNITS (ref 7.35–7.45)
PH, TEMP CORRECTED: 7.5 PH UNITS (ref 7.35–7.45)
PHENYTOIN SERPL-MCNC: 17.1 MCG/ML (ref 10–20)
PLATELET # BLD AUTO: 146 10*3/MM3 (ref 140–450)
PMV BLD AUTO: 10.2 FL (ref 6–12)
PO2 BLDA: 71.2 MM HG (ref 83–108)
PO2 BLDA: 83.1 MM HG (ref 83–108)
PO2 TEMP ADJ BLD: 71.2 MM HG (ref 83–108)
PO2 TEMP ADJ BLD: 83.1 MM HG (ref 83–108)
POIKILOCYTOSIS BLD QL SMEAR: ABNORMAL
POLYCHROMASIA BLD QL SMEAR: ABNORMAL
POTASSIUM SERPL-SCNC: 3.5 MMOL/L (ref 3.5–5.2)
PROT SERPL-MCNC: 5.7 G/DL (ref 6–8.5)
PROT UR QL STRIP: ABNORMAL
RBC # BLD AUTO: 3.55 10*6/MM3 (ref 4.14–5.8)
RBC # UR STRIP: ABNORMAL /HPF
REF LAB TEST METHOD: ABNORMAL
SAO2 % BLDCOA: 96 % (ref 94–99)
SAO2 % BLDCOA: 97.8 % (ref 94–99)
SET MECH RESP RATE: 16
SET MECH RESP RATE: 16
SODIUM SERPL-SCNC: 142 MMOL/L (ref 136–145)
SP GR UR STRIP: 1.02 (ref 1–1.03)
SQUAMOUS #/AREA URNS HPF: ABNORMAL /HPF
STRESS TARGET HR: 122 BPM
UROBILINOGEN UR QL STRIP: ABNORMAL
VARIANT LYMPHS NFR BLD MANUAL: 1 % (ref 0–5)
VARIANT LYMPHS NFR BLD MANUAL: 4 % (ref 19.6–45.3)
VENTILATOR MODE: ABNORMAL
VENTILATOR MODE: ABNORMAL
WBC # UR STRIP: ABNORMAL /HPF
WBC MORPH BLD: NORMAL
WBC NRBC COR # BLD: 14.39 10*3/MM3 (ref 3.4–10.8)

## 2022-05-21 PROCEDURE — 36600 WITHDRAWAL OF ARTERIAL BLOOD: CPT

## 2022-05-21 PROCEDURE — 80185 ASSAY OF PHENYTOIN TOTAL: CPT | Performed by: PSYCHIATRY & NEUROLOGY

## 2022-05-21 PROCEDURE — 87070 CULTURE OTHR SPECIMN AEROBIC: CPT | Performed by: NEUROLOGICAL SURGERY

## 2022-05-21 PROCEDURE — 25010000002 FOSPHENYTOIN 100 MG PE/2ML SOLUTION 2 ML VIAL: Performed by: PSYCHIATRY & NEUROLOGY

## 2022-05-21 PROCEDURE — 99024 POSTOP FOLLOW-UP VISIT: CPT | Performed by: NEUROLOGICAL SURGERY

## 2022-05-21 PROCEDURE — 94799 UNLISTED PULMONARY SVC/PX: CPT

## 2022-05-21 PROCEDURE — 94761 N-INVAS EAR/PLS OXIMETRY MLT: CPT

## 2022-05-21 PROCEDURE — 63710000001 INSULIN DETEMIR PER 5 UNITS: Performed by: INTERNAL MEDICINE

## 2022-05-21 PROCEDURE — 93306 TTE W/DOPPLER COMPLETE: CPT | Performed by: INTERNAL MEDICINE

## 2022-05-21 PROCEDURE — 71045 X-RAY EXAM CHEST 1 VIEW: CPT

## 2022-05-21 PROCEDURE — 93306 TTE W/DOPPLER COMPLETE: CPT

## 2022-05-21 PROCEDURE — 25010000002 HYDRALAZINE PER 20 MG: Performed by: NEUROLOGICAL SURGERY

## 2022-05-21 PROCEDURE — 0 GADOBENATE DIMEGLUMINE 529 MG/ML SOLUTION: Performed by: NEUROLOGICAL SURGERY

## 2022-05-21 PROCEDURE — 99233 SBSQ HOSP IP/OBS HIGH 50: CPT | Performed by: PSYCHIATRY & NEUROLOGY

## 2022-05-21 PROCEDURE — 87086 URINE CULTURE/COLONY COUNT: CPT | Performed by: NEUROLOGICAL SURGERY

## 2022-05-21 PROCEDURE — 25010000002 CEFEPIME PER 500 MG: Performed by: NEUROLOGICAL SURGERY

## 2022-05-21 PROCEDURE — 80053 COMPREHEN METABOLIC PANEL: CPT | Performed by: PSYCHIATRY & NEUROLOGY

## 2022-05-21 PROCEDURE — 87077 CULTURE AEROBIC IDENTIFY: CPT | Performed by: NEUROLOGICAL SURGERY

## 2022-05-21 PROCEDURE — 70553 MRI BRAIN STEM W/O & W/DYE: CPT

## 2022-05-21 PROCEDURE — 82962 GLUCOSE BLOOD TEST: CPT

## 2022-05-21 PROCEDURE — 87186 SC STD MICRODIL/AGAR DIL: CPT | Performed by: NEUROLOGICAL SURGERY

## 2022-05-21 PROCEDURE — 85007 BL SMEAR W/DIFF WBC COUNT: CPT | Performed by: NEUROLOGICAL SURGERY

## 2022-05-21 PROCEDURE — A9577 INJ MULTIHANCE: HCPCS | Performed by: NEUROLOGICAL SURGERY

## 2022-05-21 PROCEDURE — 82803 BLOOD GASES ANY COMBINATION: CPT

## 2022-05-21 PROCEDURE — 81001 URINALYSIS AUTO W/SCOPE: CPT | Performed by: NEUROLOGICAL SURGERY

## 2022-05-21 PROCEDURE — 87040 BLOOD CULTURE FOR BACTERIA: CPT | Performed by: NEUROLOGICAL SURGERY

## 2022-05-21 PROCEDURE — 87205 SMEAR GRAM STAIN: CPT | Performed by: NEUROLOGICAL SURGERY

## 2022-05-21 PROCEDURE — 63710000001 INSULIN REGULAR HUMAN PER 5 UNITS: Performed by: INTERNAL MEDICINE

## 2022-05-21 PROCEDURE — 25010000002 VANCOMYCIN 10 G RECONSTITUTED SOLUTION: Performed by: NEUROLOGICAL SURGERY

## 2022-05-21 PROCEDURE — 94003 VENT MGMT INPAT SUBQ DAY: CPT

## 2022-05-21 PROCEDURE — 25010000002 LEVETIRACETAM IN NACL 0.82% 500 MG/100ML SOLUTION: Performed by: NEUROLOGICAL SURGERY

## 2022-05-21 PROCEDURE — 85025 COMPLETE CBC W/AUTO DIFF WBC: CPT | Performed by: NEUROLOGICAL SURGERY

## 2022-05-21 PROCEDURE — 25010000002 PERFLUTREN 6.52 MG/ML SUSPENSION: Performed by: NEUROLOGICAL SURGERY

## 2022-05-21 RX ORDER — DIAZEPAM 5 MG/1
5 TABLET ORAL EVERY 8 HOURS SCHEDULED
Status: DISCONTINUED | OUTPATIENT
Start: 2022-05-21 | End: 2022-05-21

## 2022-05-21 RX ORDER — NOREPINEPHRINE BIT/0.9 % NACL 8 MG/250ML
.02-.3 INFUSION BOTTLE (ML) INTRAVENOUS
Status: DISCONTINUED | OUTPATIENT
Start: 2022-05-21 | End: 2022-05-22

## 2022-05-21 RX ORDER — LISINOPRIL 20 MG/1
20 TABLET ORAL EVERY 12 HOURS SCHEDULED
Status: DISCONTINUED | OUTPATIENT
Start: 2022-05-21 | End: 2022-05-25

## 2022-05-21 RX ORDER — CARVEDILOL 25 MG/1
25 TABLET ORAL 2 TIMES DAILY WITH MEALS
Status: DISCONTINUED | OUTPATIENT
Start: 2022-05-21 | End: 2022-05-21

## 2022-05-21 RX ORDER — CARVEDILOL 6.25 MG/1
12.5 TABLET ORAL 2 TIMES DAILY WITH MEALS
Status: DISCONTINUED | OUTPATIENT
Start: 2022-05-21 | End: 2022-05-25

## 2022-05-21 RX ORDER — NOREPINEPHRINE BIT/0.9 % NACL 8 MG/250ML
INFUSION BOTTLE (ML) INTRAVENOUS
Status: COMPLETED
Start: 2022-05-21 | End: 2022-05-21

## 2022-05-21 RX ADMIN — CHLORHEXIDINE GLUCONATE 15 ML: 1.2 RINSE ORAL at 08:47

## 2022-05-21 RX ADMIN — Medication 45 ML: at 20:18

## 2022-05-21 RX ADMIN — GADOBENATE DIMEGLUMINE 20 ML: 529 INJECTION, SOLUTION INTRAVENOUS at 16:05

## 2022-05-21 RX ADMIN — MUPIROCIN 1 APPLICATION: 20 OINTMENT TOPICAL at 09:27

## 2022-05-21 RX ADMIN — FOSPHENYTOIN SODIUM 150 MG PE: 50 INJECTION INTRAMUSCULAR; INTRAVENOUS at 22:29

## 2022-05-21 RX ADMIN — LEVOTHYROXINE SODIUM ANHYDROUS 50 MCG: 100 INJECTION, POWDER, LYOPHILIZED, FOR SOLUTION INTRAVENOUS at 07:16

## 2022-05-21 RX ADMIN — CEFEPIME HYDROCHLORIDE 2 G: 2 INJECTION, POWDER, FOR SOLUTION INTRAVENOUS at 22:28

## 2022-05-21 RX ADMIN — MUPIROCIN 1 APPLICATION: 20 OINTMENT TOPICAL at 20:13

## 2022-05-21 RX ADMIN — HYDRALAZINE HYDROCHLORIDE 10 MG: 20 INJECTION INTRAMUSCULAR; INTRAVENOUS at 02:05

## 2022-05-21 RX ADMIN — Medication 0.02 MCG/KG/MIN: at 21:10

## 2022-05-21 RX ADMIN — CHLORHEXIDINE GLUCONATE 15 ML: 1.2 RINSE ORAL at 20:09

## 2022-05-21 RX ADMIN — CARVEDILOL 12.5 MG: 6.25 TABLET, FILM COATED ORAL at 08:47

## 2022-05-21 RX ADMIN — VANCOMYCIN HYDROCHLORIDE 1250 MG: 10 INJECTION, POWDER, LYOPHILIZED, FOR SOLUTION INTRAVENOUS at 15:37

## 2022-05-21 RX ADMIN — DEXTROSE MONOHYDRATE 25 G: 25 INJECTION, SOLUTION INTRAVENOUS at 17:39

## 2022-05-21 RX ADMIN — INSULIN HUMAN 10 UNITS: 100 INJECTION, SOLUTION PARENTERAL at 11:43

## 2022-05-21 RX ADMIN — CEFEPIME HYDROCHLORIDE 2 G: 2 INJECTION, POWDER, FOR SOLUTION INTRAVENOUS at 15:37

## 2022-05-21 RX ADMIN — LEVETIRACETAM 500 MG: 5 INJECTION INTRAVASCULAR at 20:09

## 2022-05-21 RX ADMIN — Medication 45 ML: at 08:47

## 2022-05-21 RX ADMIN — ALBUTEROL SULFATE 2.5 MG: 2.5 SOLUTION RESPIRATORY (INHALATION) at 22:18

## 2022-05-21 RX ADMIN — LACOSAMIDE 200 MG: 10 INJECTION, SOLUTION INTRAVENOUS at 20:09

## 2022-05-21 RX ADMIN — LEVETIRACETAM 500 MG: 5 INJECTION INTRAVASCULAR at 08:47

## 2022-05-21 RX ADMIN — ACETAMINOPHEN 650 MG: 325 TABLET ORAL at 04:09

## 2022-05-21 RX ADMIN — LISINOPRIL 20 MG: 20 TABLET ORAL at 20:13

## 2022-05-21 RX ADMIN — SODIUM CHLORIDE 500 ML: 9 INJECTION, SOLUTION INTRAVENOUS at 21:10

## 2022-05-21 RX ADMIN — LABETALOL HYDROCHLORIDE 10 MG: 5 INJECTION INTRAVENOUS at 05:56

## 2022-05-21 RX ADMIN — FOSPHENYTOIN SODIUM 200 MG PE: 50 INJECTION, SOLUTION INTRAMUSCULAR; INTRAVENOUS at 15:11

## 2022-05-21 RX ADMIN — INSULIN HUMAN 2 UNITS: 100 INJECTION, SOLUTION PARENTERAL at 05:19

## 2022-05-21 RX ADMIN — CARVEDILOL 12.5 MG: 6.25 TABLET, FILM COATED ORAL at 17:42

## 2022-05-21 RX ADMIN — ACETAMINOPHEN 650 MG: 650 SUPPOSITORY RECTAL at 18:39

## 2022-05-21 RX ADMIN — INSULIN HUMAN 10 UNITS: 100 INJECTION, SOLUTION PARENTERAL at 23:42

## 2022-05-21 RX ADMIN — PANTOPRAZOLE SODIUM 40 MG: 40 INJECTION, POWDER, FOR SOLUTION INTRAVENOUS at 05:20

## 2022-05-21 RX ADMIN — ACETAMINOPHEN 650 MG: 650 SUPPOSITORY RECTAL at 11:37

## 2022-05-21 RX ADMIN — FOSPHENYTOIN SODIUM 200 MG PE: 50 INJECTION, SOLUTION INTRAMUSCULAR; INTRAVENOUS at 05:20

## 2022-05-21 RX ADMIN — INSULIN DETEMIR 25 UNITS: 100 INJECTION, SOLUTION SUBCUTANEOUS at 20:07

## 2022-05-21 RX ADMIN — LIOTHYRONINE SODIUM 25 MCG: 25 TABLET ORAL at 08:47

## 2022-05-21 RX ADMIN — LISINOPRIL 20 MG: 20 TABLET ORAL at 08:47

## 2022-05-21 RX ADMIN — INSULIN HUMAN 8 UNITS: 100 INJECTION, SOLUTION PARENTERAL at 05:19

## 2022-05-21 RX ADMIN — LACOSAMIDE 200 MG: 10 INJECTION, SOLUTION INTRAVENOUS at 09:27

## 2022-05-21 RX ADMIN — ALBUTEROL SULFATE 2.5 MG: 2.5 SOLUTION RESPIRATORY (INHALATION) at 14:52

## 2022-05-21 RX ADMIN — PERFLUTREN 8.48 MG: 6.52 INJECTION, SUSPENSION INTRAVENOUS at 08:54

## 2022-05-21 RX ADMIN — SODIUM CHLORIDE 5 MG/HR: 9 INJECTION, SOLUTION INTRAVENOUS at 04:19

## 2022-05-21 RX ADMIN — ALBUTEROL SULFATE 2.5 MG: 2.5 SOLUTION RESPIRATORY (INHALATION) at 06:42

## 2022-05-22 ENCOUNTER — APPOINTMENT (OUTPATIENT)
Dept: GENERAL RADIOLOGY | Facility: HOSPITAL | Age: 77
End: 2022-05-22

## 2022-05-22 PROBLEM — R50.9 FEVER: Status: ACTIVE | Noted: 2022-05-22

## 2022-05-22 LAB
ALBUMIN SERPL-MCNC: 2.4 G/DL (ref 3.5–5.2)
ALBUMIN/GLOB SERPL: 0.7 G/DL
ALP SERPL-CCNC: 114 U/L (ref 39–117)
ALT SERPL W P-5'-P-CCNC: 59 U/L (ref 1–41)
ANION GAP SERPL CALCULATED.3IONS-SCNC: 8 MMOL/L (ref 5–15)
ARTERIAL PATENCY WRIST A: POSITIVE
ARTERIAL PATENCY WRIST A: POSITIVE
AST SERPL-CCNC: 59 U/L (ref 1–40)
ATMOSPHERIC PRESS: 753 MMHG
ATMOSPHERIC PRESS: 757 MMHG
BASE EXCESS BLDA CALC-SCNC: 5.2 MMOL/L (ref 0–2)
BASE EXCESS BLDA CALC-SCNC: 5.6 MMOL/L (ref 0–2)
BDY SITE: ABNORMAL
BDY SITE: ABNORMAL
BILIRUB SERPL-MCNC: 0.5 MG/DL (ref 0–1.2)
BODY TEMPERATURE: 37 C
BODY TEMPERATURE: 37 C
BUN SERPL-MCNC: 32 MG/DL (ref 8–23)
BUN/CREAT SERPL: 42.1 (ref 7–25)
CALCIUM SPEC-SCNC: 8 MG/DL (ref 8.6–10.5)
CHLORIDE SERPL-SCNC: 106 MMOL/L (ref 98–107)
CO2 SERPL-SCNC: 29 MMOL/L (ref 22–29)
CREAT SERPL-MCNC: 0.76 MG/DL (ref 0.76–1.27)
CRP SERPL-MCNC: 28.13 MG/DL (ref 0–0.5)
DEPRECATED RDW RBC AUTO: 50.8 FL (ref 37–54)
EGFRCR SERPLBLD CKD-EPI 2021: 92.6 ML/MIN/1.73
ERYTHROCYTE [DISTWIDTH] IN BLOOD BY AUTOMATED COUNT: 13.7 % (ref 12.3–15.4)
GLOBULIN UR ELPH-MCNC: 3.6 GM/DL
GLUCOSE BLDC GLUCOMTR-MCNC: 115 MG/DL (ref 70–130)
GLUCOSE BLDC GLUCOMTR-MCNC: 67 MG/DL (ref 70–130)
GLUCOSE BLDC GLUCOMTR-MCNC: 76 MG/DL (ref 70–130)
GLUCOSE BLDC GLUCOMTR-MCNC: 93 MG/DL (ref 70–130)
GLUCOSE BLDC GLUCOMTR-MCNC: 94 MG/DL (ref 70–130)
GLUCOSE BLDC GLUCOMTR-MCNC: 94 MG/DL (ref 70–130)
GLUCOSE SERPL-MCNC: 69 MG/DL (ref 65–99)
HCO3 BLDA-SCNC: 29.2 MMOL/L (ref 20–26)
HCO3 BLDA-SCNC: 29.7 MMOL/L (ref 20–26)
HCT VFR BLD AUTO: 36.5 % (ref 37.5–51)
HGB BLD-MCNC: 11.5 G/DL (ref 13–17.7)
INHALED O2 CONCENTRATION: 30 %
INHALED O2 CONCENTRATION: 30 %
LYMPHOCYTES # BLD MANUAL: 0.18 10*3/MM3 (ref 0.7–3.1)
LYMPHOCYTES NFR BLD MANUAL: 6 % (ref 5–12)
Lab: ABNORMAL
Lab: ABNORMAL
MCH RBC QN AUTO: 31.8 PG (ref 26.6–33)
MCHC RBC AUTO-ENTMCNC: 31.5 G/DL (ref 31.5–35.7)
MCV RBC AUTO: 100.8 FL (ref 79–97)
MODALITY: ABNORMAL
MODALITY: ABNORMAL
MONOCYTES # BLD: 1.09 10*3/MM3 (ref 0.1–0.9)
NEUTROPHILS # BLD AUTO: 16.92 10*3/MM3 (ref 1.7–7)
NEUTROPHILS NFR BLD MANUAL: 93 % (ref 42.7–76)
PAW @ PEAK INSP FLOW SETTING VENT: 12 CMH2O
PCO2 BLDA: 38.1 MM HG (ref 35–45)
PCO2 BLDA: 42.3 MM HG (ref 35–45)
PCO2 TEMP ADJ BLD: 38.1 MM HG (ref 35–45)
PCO2 TEMP ADJ BLD: 42.3 MM HG (ref 35–45)
PEEP RESPIRATORY: 5 CM[H2O]
PEEP RESPIRATORY: 5 CM[H2O]
PH BLDA: 7.46 PH UNITS (ref 7.35–7.45)
PH BLDA: 7.49 PH UNITS (ref 7.35–7.45)
PH, TEMP CORRECTED: 7.46 PH UNITS (ref 7.35–7.45)
PH, TEMP CORRECTED: 7.49 PH UNITS (ref 7.35–7.45)
PHENYTOIN SERPL-MCNC: 14.7 MCG/ML (ref 10–20)
PLAT MORPH BLD: NORMAL
PLATELET # BLD AUTO: 155 10*3/MM3 (ref 140–450)
PMV BLD AUTO: 9.7 FL (ref 6–12)
PO2 BLDA: 77 MM HG (ref 83–108)
PO2 BLDA: 78.5 MM HG (ref 83–108)
PO2 TEMP ADJ BLD: 77 MM HG (ref 83–108)
PO2 TEMP ADJ BLD: 78.5 MM HG (ref 83–108)
POLYCHROMASIA BLD QL SMEAR: ABNORMAL
POTASSIUM SERPL-SCNC: 3.6 MMOL/L (ref 3.5–5.2)
PROT SERPL-MCNC: 6 G/DL (ref 6–8.5)
PSV: 8 CMH2O
RBC # BLD AUTO: 3.62 10*6/MM3 (ref 4.14–5.8)
SAO2 % BLDCOA: 96.6 % (ref 94–99)
SAO2 % BLDCOA: 97 % (ref 94–99)
SET MECH RESP RATE: 16
SODIUM SERPL-SCNC: 143 MMOL/L (ref 136–145)
TOXIC GRANULATION: ABNORMAL
VANCOMYCIN TROUGH SERPL-MCNC: 6.6 MCG/ML (ref 5–20)
VARIANT LYMPHS NFR BLD MANUAL: 1 % (ref 19.6–45.3)
VENTILATOR MODE: ABNORMAL
VENTILATOR MODE: ABNORMAL
WBC NRBC COR # BLD: 18.19 10*3/MM3 (ref 3.4–10.8)

## 2022-05-22 PROCEDURE — 80202 ASSAY OF VANCOMYCIN: CPT | Performed by: INTERNAL MEDICINE

## 2022-05-22 PROCEDURE — 82962 GLUCOSE BLOOD TEST: CPT

## 2022-05-22 PROCEDURE — 85025 COMPLETE CBC W/AUTO DIFF WBC: CPT | Performed by: NEUROLOGICAL SURGERY

## 2022-05-22 PROCEDURE — 87205 SMEAR GRAM STAIN: CPT | Performed by: PSYCHIATRY & NEUROLOGY

## 2022-05-22 PROCEDURE — 87015 SPECIMEN INFECT AGNT CONCNTJ: CPT | Performed by: PSYCHIATRY & NEUROLOGY

## 2022-05-22 PROCEDURE — 80185 ASSAY OF PHENYTOIN TOTAL: CPT | Performed by: PSYCHIATRY & NEUROLOGY

## 2022-05-22 PROCEDURE — 94799 UNLISTED PULMONARY SVC/PX: CPT

## 2022-05-22 PROCEDURE — 36600 WITHDRAWAL OF ARTERIAL BLOOD: CPT

## 2022-05-22 PROCEDURE — 80053 COMPREHEN METABOLIC PANEL: CPT | Performed by: PSYCHIATRY & NEUROLOGY

## 2022-05-22 PROCEDURE — 94761 N-INVAS EAR/PLS OXIMETRY MLT: CPT

## 2022-05-22 PROCEDURE — 99024 POSTOP FOLLOW-UP VISIT: CPT | Performed by: NEUROLOGICAL SURGERY

## 2022-05-22 PROCEDURE — 25010000002 CEFEPIME PER 500 MG: Performed by: NEUROLOGICAL SURGERY

## 2022-05-22 PROCEDURE — 87070 CULTURE OTHR SPECIMN AEROBIC: CPT | Performed by: PSYCHIATRY & NEUROLOGY

## 2022-05-22 PROCEDURE — 25010000002 FOSPHENYTOIN 100 MG PE/2ML SOLUTION 2 ML VIAL: Performed by: PSYCHIATRY & NEUROLOGY

## 2022-05-22 PROCEDURE — 25010000002 VANCOMYCIN 1 G RECONSTITUTED SOLUTION 1 EACH VIAL: Performed by: NEUROLOGICAL SURGERY

## 2022-05-22 PROCEDURE — 94003 VENT MGMT INPAT SUBQ DAY: CPT

## 2022-05-22 PROCEDURE — 99291 CRITICAL CARE FIRST HOUR: CPT | Performed by: PSYCHIATRY & NEUROLOGY

## 2022-05-22 PROCEDURE — 71045 X-RAY EXAM CHEST 1 VIEW: CPT

## 2022-05-22 PROCEDURE — 25010000002 LEVETIRACETAM IN NACL 0.82% 500 MG/100ML SOLUTION: Performed by: NEUROLOGICAL SURGERY

## 2022-05-22 PROCEDURE — 25010000002 HYDRALAZINE PER 20 MG: Performed by: NEUROLOGICAL SURGERY

## 2022-05-22 PROCEDURE — 86140 C-REACTIVE PROTEIN: CPT | Performed by: NEUROLOGICAL SURGERY

## 2022-05-22 PROCEDURE — 85007 BL SMEAR W/DIFF WBC COUNT: CPT | Performed by: NEUROLOGICAL SURGERY

## 2022-05-22 PROCEDURE — 82803 BLOOD GASES ANY COMBINATION: CPT

## 2022-05-22 RX ORDER — MORPHINE SULFATE 2 MG/ML
2 INJECTION, SOLUTION INTRAMUSCULAR; INTRAVENOUS
Status: DISPENSED | OUTPATIENT
Start: 2022-05-22 | End: 2022-05-29

## 2022-05-22 RX ADMIN — ALBUTEROL SULFATE 2.5 MG: 2.5 SOLUTION RESPIRATORY (INHALATION) at 06:29

## 2022-05-22 RX ADMIN — CEFEPIME HYDROCHLORIDE 2 G: 2 INJECTION, POWDER, FOR SOLUTION INTRAVENOUS at 21:32

## 2022-05-22 RX ADMIN — VANCOMYCIN HYDROCHLORIDE 1000 MG: 1 INJECTION, POWDER, LYOPHILIZED, FOR SOLUTION INTRAVENOUS at 14:50

## 2022-05-22 RX ADMIN — ALBUTEROL SULFATE 2.5 MG: 2.5 SOLUTION RESPIRATORY (INHALATION) at 14:39

## 2022-05-22 RX ADMIN — LISINOPRIL 20 MG: 20 TABLET ORAL at 21:33

## 2022-05-22 RX ADMIN — CARVEDILOL 12.5 MG: 6.25 TABLET, FILM COATED ORAL at 17:29

## 2022-05-22 RX ADMIN — MUPIROCIN 1 APPLICATION: 20 OINTMENT TOPICAL at 21:34

## 2022-05-22 RX ADMIN — ACETAMINOPHEN 650 MG: 325 TABLET ORAL at 13:26

## 2022-05-22 RX ADMIN — PANTOPRAZOLE SODIUM 40 MG: 40 INJECTION, POWDER, FOR SOLUTION INTRAVENOUS at 05:09

## 2022-05-22 RX ADMIN — CHLORHEXIDINE GLUCONATE 15 ML: 1.2 RINSE ORAL at 21:18

## 2022-05-22 RX ADMIN — CEFEPIME HYDROCHLORIDE 2 G: 2 INJECTION, POWDER, FOR SOLUTION INTRAVENOUS at 05:08

## 2022-05-22 RX ADMIN — LEVOTHYROXINE SODIUM ANHYDROUS 50 MCG: 100 INJECTION, POWDER, LYOPHILIZED, FOR SOLUTION INTRAVENOUS at 05:09

## 2022-05-22 RX ADMIN — LACOSAMIDE 200 MG: 10 INJECTION, SOLUTION INTRAVENOUS at 09:16

## 2022-05-22 RX ADMIN — FOSPHENYTOIN SODIUM 150 MG PE: 50 INJECTION INTRAMUSCULAR; INTRAVENOUS at 21:24

## 2022-05-22 RX ADMIN — Medication 45 ML: at 09:17

## 2022-05-22 RX ADMIN — CEFEPIME HYDROCHLORIDE 2 G: 2 INJECTION, POWDER, FOR SOLUTION INTRAVENOUS at 13:26

## 2022-05-22 RX ADMIN — MUPIROCIN 1 APPLICATION: 20 OINTMENT TOPICAL at 09:16

## 2022-05-22 RX ADMIN — LIOTHYRONINE SODIUM 25 MCG: 25 TABLET ORAL at 09:16

## 2022-05-22 RX ADMIN — CARVEDILOL 12.5 MG: 6.25 TABLET, FILM COATED ORAL at 07:40

## 2022-05-22 RX ADMIN — ACETAMINOPHEN 650 MG: 325 TABLET ORAL at 17:29

## 2022-05-22 RX ADMIN — ACETAMINOPHEN 650 MG: 325 TABLET ORAL at 00:28

## 2022-05-22 RX ADMIN — DEXTROSE 15 G: 15 GEL ORAL at 05:27

## 2022-05-22 RX ADMIN — LEVETIRACETAM 500 MG: 5 INJECTION INTRAVASCULAR at 21:19

## 2022-05-22 RX ADMIN — BUTALBITAL, ACETAMINOPHEN AND CAFFEINE 1 TABLET: 50; 325; 40 TABLET ORAL at 17:29

## 2022-05-22 RX ADMIN — ALBUTEROL SULFATE 2.5 MG: 2.5 SOLUTION RESPIRATORY (INHALATION) at 22:47

## 2022-05-22 RX ADMIN — Medication 45 ML: at 21:33

## 2022-05-22 RX ADMIN — HYDRALAZINE HYDROCHLORIDE 10 MG: 20 INJECTION INTRAMUSCULAR; INTRAVENOUS at 16:39

## 2022-05-22 RX ADMIN — LACOSAMIDE 200 MG: 10 INJECTION, SOLUTION INTRAVENOUS at 21:11

## 2022-05-22 RX ADMIN — LEVETIRACETAM 500 MG: 5 INJECTION INTRAVASCULAR at 09:16

## 2022-05-22 RX ADMIN — VANCOMYCIN HYDROCHLORIDE 1000 MG: 1 INJECTION, POWDER, LYOPHILIZED, FOR SOLUTION INTRAVENOUS at 03:38

## 2022-05-22 RX ADMIN — CHLORHEXIDINE GLUCONATE 15 ML: 1.2 RINSE ORAL at 09:16

## 2022-05-22 RX ADMIN — ACETAMINOPHEN 650 MG: 325 TABLET ORAL at 07:39

## 2022-05-22 RX ADMIN — FOSPHENYTOIN SODIUM 150 MG PE: 50 INJECTION INTRAMUSCULAR; INTRAVENOUS at 13:26

## 2022-05-22 RX ADMIN — FOSPHENYTOIN SODIUM 150 MG PE: 50 INJECTION INTRAMUSCULAR; INTRAVENOUS at 05:09

## 2022-05-23 ENCOUNTER — APPOINTMENT (OUTPATIENT)
Dept: GENERAL RADIOLOGY | Facility: HOSPITAL | Age: 77
End: 2022-05-23

## 2022-05-23 ENCOUNTER — APPOINTMENT (OUTPATIENT)
Dept: CT IMAGING | Facility: HOSPITAL | Age: 77
End: 2022-05-23

## 2022-05-23 ENCOUNTER — APPOINTMENT (OUTPATIENT)
Dept: NEUROLOGY | Facility: HOSPITAL | Age: 77
End: 2022-05-23

## 2022-05-23 LAB
ALBUMIN SERPL-MCNC: 2.3 G/DL (ref 3.5–5.2)
ALBUMIN/GLOB SERPL: 0.7 G/DL
ALP SERPL-CCNC: 103 U/L (ref 39–117)
ALT SERPL W P-5'-P-CCNC: 47 U/L (ref 1–41)
ANION GAP SERPL CALCULATED.3IONS-SCNC: 10 MMOL/L (ref 5–15)
ARTERIAL PATENCY WRIST A: POSITIVE
ARTERIAL PATENCY WRIST A: POSITIVE
AST SERPL-CCNC: 39 U/L (ref 1–40)
ATMOSPHERIC PRESS: 753 MMHG
ATMOSPHERIC PRESS: 755 MMHG
BACTERIA SPEC AEROBE CULT: ABNORMAL
BACTERIA SPEC RESP CULT: NORMAL
BASE EXCESS BLDA CALC-SCNC: 4.5 MMOL/L (ref 0–2)
BASE EXCESS BLDA CALC-SCNC: 5 MMOL/L (ref 0–2)
BDY SITE: ABNORMAL
BDY SITE: ABNORMAL
BILIRUB SERPL-MCNC: 0.4 MG/DL (ref 0–1.2)
BODY TEMPERATURE: 37 C
BODY TEMPERATURE: 37 C
BUN SERPL-MCNC: 37 MG/DL (ref 8–23)
BUN/CREAT SERPL: 48.7 (ref 7–25)
CALCIUM SPEC-SCNC: 8.1 MG/DL (ref 8.6–10.5)
CHLORIDE SERPL-SCNC: 103 MMOL/L (ref 98–107)
CO2 SERPL-SCNC: 28 MMOL/L (ref 22–29)
CREAT SERPL-MCNC: 0.76 MG/DL (ref 0.76–1.27)
DEPRECATED RDW RBC AUTO: 49.8 FL (ref 37–54)
EGFRCR SERPLBLD CKD-EPI 2021: 92.6 ML/MIN/1.73
ERYTHROCYTE [DISTWIDTH] IN BLOOD BY AUTOMATED COUNT: 13.6 % (ref 12.3–15.4)
GLOBULIN UR ELPH-MCNC: 3.5 GM/DL
GLUCOSE BLDC GLUCOMTR-MCNC: 127 MG/DL (ref 70–130)
GLUCOSE BLDC GLUCOMTR-MCNC: 145 MG/DL (ref 70–130)
GLUCOSE BLDC GLUCOMTR-MCNC: 149 MG/DL (ref 70–130)
GLUCOSE BLDC GLUCOMTR-MCNC: 152 MG/DL (ref 70–130)
GLUCOSE BLDC GLUCOMTR-MCNC: 193 MG/DL (ref 70–130)
GLUCOSE SERPL-MCNC: 158 MG/DL (ref 65–99)
GRAM STN SPEC: NORMAL
HCO3 BLDA-SCNC: 28.2 MMOL/L (ref 20–26)
HCO3 BLDA-SCNC: 29.4 MMOL/L (ref 20–26)
HCT VFR BLD AUTO: 35.4 % (ref 37.5–51)
HGB BLD-MCNC: 11.1 G/DL (ref 13–17.7)
INHALED O2 CONCENTRATION: 30 %
INHALED O2 CONCENTRATION: 30 %
LYMPHOCYTES # BLD MANUAL: 0.7 10*3/MM3 (ref 0.7–3.1)
LYMPHOCYTES NFR BLD MANUAL: 2 % (ref 5–12)
Lab: ABNORMAL
Lab: ABNORMAL
MCH RBC QN AUTO: 31.2 PG (ref 26.6–33)
MCHC RBC AUTO-ENTMCNC: 31.4 G/DL (ref 31.5–35.7)
MCV RBC AUTO: 99.4 FL (ref 79–97)
MODALITY: ABNORMAL
MODALITY: ABNORMAL
MONOCYTES # BLD: 0.46 10*3/MM3 (ref 0.1–0.9)
MRSA DNA SPEC QL NAA+PROBE: NORMAL
NEUTROPHILS # BLD AUTO: 21.85 10*3/MM3 (ref 1.7–7)
NEUTROPHILS NFR BLD MANUAL: 91 % (ref 42.7–76)
NEUTS BAND NFR BLD MANUAL: 3 % (ref 0–5)
PAW @ PEAK INSP FLOW SETTING VENT: 12 CMH2O
PAW @ PEAK INSP FLOW SETTING VENT: 12 CMH2O
PCO2 BLDA: 35.7 MM HG (ref 35–45)
PCO2 BLDA: 44.5 MM HG (ref 35–45)
PCO2 TEMP ADJ BLD: 35.7 MM HG (ref 35–45)
PCO2 TEMP ADJ BLD: 44.5 MM HG (ref 35–45)
PEEP RESPIRATORY: 5 CM[H2O]
PEEP RESPIRATORY: 8 CM[H2O]
PH BLDA: 7.43 PH UNITS (ref 7.35–7.45)
PH BLDA: 7.51 PH UNITS (ref 7.35–7.45)
PH, TEMP CORRECTED: 7.43 PH UNITS (ref 7.35–7.45)
PH, TEMP CORRECTED: 7.51 PH UNITS (ref 7.35–7.45)
PHENYTOIN SERPL-MCNC: 12.6 MCG/ML (ref 10–20)
PLASMA CELL PREC NFR BLD MANUAL: 1 % (ref 0–0)
PLAT MORPH BLD: NORMAL
PLATELET # BLD AUTO: 179 10*3/MM3 (ref 140–450)
PMV BLD AUTO: 9.7 FL (ref 6–12)
PO2 BLDA: 71.6 MM HG (ref 83–108)
PO2 BLDA: 76.5 MM HG (ref 83–108)
PO2 TEMP ADJ BLD: 71.6 MM HG (ref 83–108)
PO2 TEMP ADJ BLD: 76.5 MM HG (ref 83–108)
POLYCHROMASIA BLD QL SMEAR: ABNORMAL
POTASSIUM SERPL-SCNC: 3.6 MMOL/L (ref 3.5–5.2)
PROT SERPL-MCNC: 5.8 G/DL (ref 6–8.5)
RBC # BLD AUTO: 3.56 10*6/MM3 (ref 4.14–5.8)
SAO2 % BLDCOA: 94.9 % (ref 94–99)
SAO2 % BLDCOA: 96.8 % (ref 94–99)
SET MECH RESP RATE: 16
SET MECH RESP RATE: 16
SODIUM SERPL-SCNC: 141 MMOL/L (ref 136–145)
TOXIC GRANULATION: ABNORMAL
VARIANT LYMPHS NFR BLD MANUAL: 3 % (ref 19.6–45.3)
VENTILATOR MODE: ABNORMAL
VENTILATOR MODE: ABNORMAL
WBC NRBC COR # BLD: 23.24 10*3/MM3 (ref 3.4–10.8)

## 2022-05-23 PROCEDURE — 36600 WITHDRAWAL OF ARTERIAL BLOOD: CPT

## 2022-05-23 PROCEDURE — 94799 UNLISTED PULMONARY SVC/PX: CPT

## 2022-05-23 PROCEDURE — 71045 X-RAY EXAM CHEST 1 VIEW: CPT

## 2022-05-23 PROCEDURE — 25010000002 LEVETIRACETAM IN NACL 0.82% 500 MG/100ML SOLUTION: Performed by: NEUROLOGICAL SURGERY

## 2022-05-23 PROCEDURE — 82803 BLOOD GASES ANY COMBINATION: CPT

## 2022-05-23 PROCEDURE — 25010000002 MORPHINE SULFATE (PF) 2 MG/ML SOLUTION: Performed by: NEUROLOGICAL SURGERY

## 2022-05-23 PROCEDURE — 99232 SBSQ HOSP IP/OBS MODERATE 35: CPT | Performed by: PSYCHIATRY & NEUROLOGY

## 2022-05-23 PROCEDURE — 74177 CT ABD & PELVIS W/CONTRAST: CPT

## 2022-05-23 PROCEDURE — 80185 ASSAY OF PHENYTOIN TOTAL: CPT | Performed by: PSYCHIATRY & NEUROLOGY

## 2022-05-23 PROCEDURE — 71260 CT THORAX DX C+: CPT

## 2022-05-23 PROCEDURE — 95813 EEG EXTND MNTR 61-119 MIN: CPT

## 2022-05-23 PROCEDURE — 63710000001 INSULIN REGULAR HUMAN PER 5 UNITS: Performed by: INTERNAL MEDICINE

## 2022-05-23 PROCEDURE — 63710000001 INSULIN DETEMIR PER 5 UNITS: Performed by: INTERNAL MEDICINE

## 2022-05-23 PROCEDURE — 25010000002 IOPAMIDOL 61 % SOLUTION: Performed by: NEUROLOGICAL SURGERY

## 2022-05-23 PROCEDURE — 94003 VENT MGMT INPAT SUBQ DAY: CPT

## 2022-05-23 PROCEDURE — 25010000002 PROPOFOL 10 MG/ML EMULSION: Performed by: NEUROLOGICAL SURGERY

## 2022-05-23 PROCEDURE — 25010000002 VANCOMYCIN 10 G RECONSTITUTED SOLUTION: Performed by: NEUROLOGICAL SURGERY

## 2022-05-23 PROCEDURE — 95813 EEG EXTND MNTR 61-119 MIN: CPT | Performed by: PSYCHIATRY & NEUROLOGY

## 2022-05-23 PROCEDURE — 99024 POSTOP FOLLOW-UP VISIT: CPT | Performed by: NEUROLOGICAL SURGERY

## 2022-05-23 PROCEDURE — 25010000002 HYDRALAZINE PER 20 MG: Performed by: NEUROLOGICAL SURGERY

## 2022-05-23 PROCEDURE — 87641 MR-STAPH DNA AMP PROBE: CPT | Performed by: INTERNAL MEDICINE

## 2022-05-23 PROCEDURE — 25010000002 FOSPHENYTOIN 100 MG PE/2ML SOLUTION 2 ML VIAL: Performed by: PSYCHIATRY & NEUROLOGY

## 2022-05-23 PROCEDURE — 80053 COMPREHEN METABOLIC PANEL: CPT | Performed by: PSYCHIATRY & NEUROLOGY

## 2022-05-23 PROCEDURE — 85007 BL SMEAR W/DIFF WBC COUNT: CPT | Performed by: NEUROLOGICAL SURGERY

## 2022-05-23 PROCEDURE — 85025 COMPLETE CBC W/AUTO DIFF WBC: CPT | Performed by: NEUROLOGICAL SURGERY

## 2022-05-23 PROCEDURE — 25010000002 CEFEPIME PER 500 MG: Performed by: NEUROLOGICAL SURGERY

## 2022-05-23 PROCEDURE — 94761 N-INVAS EAR/PLS OXIMETRY MLT: CPT

## 2022-05-23 PROCEDURE — 82962 GLUCOSE BLOOD TEST: CPT

## 2022-05-23 RX ADMIN — LISINOPRIL 20 MG: 20 TABLET ORAL at 08:31

## 2022-05-23 RX ADMIN — HYDRALAZINE HYDROCHLORIDE 10 MG: 20 INJECTION INTRAMUSCULAR; INTRAVENOUS at 03:37

## 2022-05-23 RX ADMIN — PROPOFOL 5 MCG/KG/MIN: 10 INJECTION, EMULSION INTRAVENOUS at 13:01

## 2022-05-23 RX ADMIN — CARVEDILOL 12.5 MG: 6.25 TABLET, FILM COATED ORAL at 08:31

## 2022-05-23 RX ADMIN — LACOSAMIDE 200 MG: 10 INJECTION, SOLUTION INTRAVENOUS at 20:46

## 2022-05-23 RX ADMIN — MUPIROCIN 1 APPLICATION: 20 OINTMENT TOPICAL at 08:32

## 2022-05-23 RX ADMIN — INSULIN DETEMIR 20 UNITS: 100 INJECTION, SOLUTION SUBCUTANEOUS at 21:01

## 2022-05-23 RX ADMIN — INSULIN HUMAN 5 UNITS: 100 INJECTION, SOLUTION PARENTERAL at 06:22

## 2022-05-23 RX ADMIN — ALBUTEROL SULFATE 2.5 MG: 2.5 SOLUTION RESPIRATORY (INHALATION) at 14:52

## 2022-05-23 RX ADMIN — INSULIN HUMAN 5 UNITS: 100 INJECTION, SOLUTION PARENTERAL at 13:02

## 2022-05-23 RX ADMIN — LEVETIRACETAM 500 MG: 5 INJECTION INTRAVASCULAR at 20:49

## 2022-05-23 RX ADMIN — MUPIROCIN 1 APPLICATION: 20 OINTMENT TOPICAL at 21:02

## 2022-05-23 RX ADMIN — FOSPHENYTOIN SODIUM 150 MG PE: 50 INJECTION INTRAMUSCULAR; INTRAVENOUS at 22:26

## 2022-05-23 RX ADMIN — PANTOPRAZOLE SODIUM 40 MG: 40 INJECTION, POWDER, FOR SOLUTION INTRAVENOUS at 05:18

## 2022-05-23 RX ADMIN — ACETAMINOPHEN 650 MG: 650 SUPPOSITORY RECTAL at 00:16

## 2022-05-23 RX ADMIN — CHLORHEXIDINE GLUCONATE 15 ML: 1.2 RINSE ORAL at 20:48

## 2022-05-23 RX ADMIN — CEFEPIME HYDROCHLORIDE 2 G: 2 INJECTION, POWDER, FOR SOLUTION INTRAVENOUS at 13:08

## 2022-05-23 RX ADMIN — MORPHINE SULFATE 2 MG: 2 INJECTION, SOLUTION INTRAMUSCULAR; INTRAVENOUS at 10:54

## 2022-05-23 RX ADMIN — LEVOTHYROXINE SODIUM ANHYDROUS 50 MCG: 100 INJECTION, POWDER, LYOPHILIZED, FOR SOLUTION INTRAVENOUS at 05:18

## 2022-05-23 RX ADMIN — ALBUTEROL SULFATE 2.5 MG: 2.5 SOLUTION RESPIRATORY (INHALATION) at 22:11

## 2022-05-23 RX ADMIN — Medication 45 ML: at 08:31

## 2022-05-23 RX ADMIN — FOSPHENYTOIN SODIUM 150 MG PE: 50 INJECTION INTRAMUSCULAR; INTRAVENOUS at 05:11

## 2022-05-23 RX ADMIN — ALBUTEROL SULFATE 2.5 MG: 2.5 SOLUTION RESPIRATORY (INHALATION) at 05:58

## 2022-05-23 RX ADMIN — LISINOPRIL 20 MG: 20 TABLET ORAL at 20:51

## 2022-05-23 RX ADMIN — LEVETIRACETAM 500 MG: 5 INJECTION INTRAVASCULAR at 08:31

## 2022-05-23 RX ADMIN — CHLORHEXIDINE GLUCONATE 15 ML: 1.2 RINSE ORAL at 08:31

## 2022-05-23 RX ADMIN — LIOTHYRONINE SODIUM 25 MCG: 25 TABLET ORAL at 08:31

## 2022-05-23 RX ADMIN — PROPOFOL 20 MCG/KG/MIN: 10 INJECTION, EMULSION INTRAVENOUS at 20:43

## 2022-05-23 RX ADMIN — LACOSAMIDE 200 MG: 10 INJECTION, SOLUTION INTRAVENOUS at 08:31

## 2022-05-23 RX ADMIN — VANCOMYCIN HYDROCHLORIDE 1250 MG: 10 INJECTION, POWDER, LYOPHILIZED, FOR SOLUTION INTRAVENOUS at 14:37

## 2022-05-23 RX ADMIN — IOPAMIDOL 100 ML: 612 INJECTION, SOLUTION INTRAVENOUS at 11:52

## 2022-05-23 RX ADMIN — INSULIN DETEMIR 10 UNITS: 100 INJECTION, SOLUTION SUBCUTANEOUS at 01:03

## 2022-05-23 RX ADMIN — CARVEDILOL 12.5 MG: 6.25 TABLET, FILM COATED ORAL at 17:12

## 2022-05-23 RX ADMIN — CEFEPIME HYDROCHLORIDE 2 G: 2 INJECTION, POWDER, FOR SOLUTION INTRAVENOUS at 05:12

## 2022-05-23 RX ADMIN — CEFEPIME HYDROCHLORIDE 2 G: 2 INJECTION, POWDER, FOR SOLUTION INTRAVENOUS at 22:27

## 2022-05-23 RX ADMIN — LABETALOL HYDROCHLORIDE 10 MG: 5 INJECTION INTRAVENOUS at 07:33

## 2022-05-23 RX ADMIN — FOSPHENYTOIN SODIUM 150 MG PE: 50 INJECTION INTRAMUSCULAR; INTRAVENOUS at 13:08

## 2022-05-23 RX ADMIN — INSULIN HUMAN 5 UNITS: 100 INJECTION, SOLUTION PARENTERAL at 17:12

## 2022-05-23 RX ADMIN — Medication 45 ML: at 21:02

## 2022-05-23 RX ADMIN — VANCOMYCIN HYDROCHLORIDE 1250 MG: 10 INJECTION, POWDER, LYOPHILIZED, FOR SOLUTION INTRAVENOUS at 02:22

## 2022-05-24 ENCOUNTER — APPOINTMENT (OUTPATIENT)
Dept: GENERAL RADIOLOGY | Facility: HOSPITAL | Age: 77
End: 2022-05-24

## 2022-05-24 LAB
ALBUMIN SERPL-MCNC: 2.1 G/DL (ref 3.5–5.2)
ALBUMIN/GLOB SERPL: 0.7 G/DL
ALP SERPL-CCNC: 102 U/L (ref 39–117)
ALT SERPL W P-5'-P-CCNC: 34 U/L (ref 1–41)
ANION GAP SERPL CALCULATED.3IONS-SCNC: 9 MMOL/L (ref 5–15)
ARTERIAL PATENCY WRIST A: POSITIVE
AST SERPL-CCNC: 28 U/L (ref 1–40)
ATMOSPHERIC PRESS: 751 MMHG
BASE EXCESS BLDA CALC-SCNC: 5.8 MMOL/L (ref 0–2)
BASOPHILS # BLD AUTO: 0.09 10*3/MM3 (ref 0–0.2)
BASOPHILS NFR BLD AUTO: 0.4 % (ref 0–1.5)
BDY SITE: ABNORMAL
BILIRUB SERPL-MCNC: 0.3 MG/DL (ref 0–1.2)
BODY TEMPERATURE: 37 C
BUN SERPL-MCNC: 37 MG/DL (ref 8–23)
BUN/CREAT SERPL: 49.3 (ref 7–25)
CALCIUM SPEC-SCNC: 8.1 MG/DL (ref 8.6–10.5)
CHLORIDE SERPL-SCNC: 103 MMOL/L (ref 98–107)
CO2 SERPL-SCNC: 29 MMOL/L (ref 22–29)
CREAT SERPL-MCNC: 0.75 MG/DL (ref 0.76–1.27)
DEPRECATED RDW RBC AUTO: 49.2 FL (ref 37–54)
EGFRCR SERPLBLD CKD-EPI 2021: 92.9 ML/MIN/1.73
EOSINOPHIL # BLD AUTO: 0.11 10*3/MM3 (ref 0–0.4)
EOSINOPHIL NFR BLD AUTO: 0.5 % (ref 0.3–6.2)
ERYTHROCYTE [DISTWIDTH] IN BLOOD BY AUTOMATED COUNT: 13.7 % (ref 12.3–15.4)
GLOBULIN UR ELPH-MCNC: 3.2 GM/DL
GLUCOSE BLDC GLUCOMTR-MCNC: 130 MG/DL (ref 70–130)
GLUCOSE BLDC GLUCOMTR-MCNC: 153 MG/DL (ref 70–130)
GLUCOSE BLDC GLUCOMTR-MCNC: 177 MG/DL (ref 70–130)
GLUCOSE BLDC GLUCOMTR-MCNC: 180 MG/DL (ref 70–130)
GLUCOSE BLDC GLUCOMTR-MCNC: 197 MG/DL (ref 70–130)
GLUCOSE SERPL-MCNC: 193 MG/DL (ref 65–99)
HCO3 BLDA-SCNC: 30 MMOL/L (ref 20–26)
HCT VFR BLD AUTO: 33 % (ref 37.5–51)
HGB BLD-MCNC: 10.5 G/DL (ref 13–17.7)
INHALED O2 CONCENTRATION: 30 %
LYMPHOCYTES # BLD AUTO: 0.75 10*3/MM3 (ref 0.7–3.1)
LYMPHOCYTES NFR BLD AUTO: 3.3 % (ref 19.6–45.3)
Lab: ABNORMAL
MCH RBC QN AUTO: 31.3 PG (ref 26.6–33)
MCHC RBC AUTO-ENTMCNC: 31.8 G/DL (ref 31.5–35.7)
MCV RBC AUTO: 98.2 FL (ref 79–97)
MODALITY: ABNORMAL
MONOCYTES # BLD AUTO: 1.14 10*3/MM3 (ref 0.1–0.9)
MONOCYTES NFR BLD AUTO: 4.9 % (ref 5–12)
NEUTROPHILS NFR BLD AUTO: 20.51 10*3/MM3 (ref 1.7–7)
NEUTROPHILS NFR BLD AUTO: 89 % (ref 42.7–76)
PAW @ PEAK INSP FLOW SETTING VENT: 12 CMH2O
PCO2 BLDA: 41.2 MM HG (ref 35–45)
PCO2 TEMP ADJ BLD: 41.2 MM HG (ref 35–45)
PEEP RESPIRATORY: 8 CM[H2O]
PH BLDA: 7.47 PH UNITS (ref 7.35–7.45)
PH, TEMP CORRECTED: 7.47 PH UNITS (ref 7.35–7.45)
PHENYTOIN SERPL-MCNC: 10.6 MCG/ML (ref 10–20)
PLATELET # BLD AUTO: 193 10*3/MM3 (ref 140–450)
PMV BLD AUTO: 10 FL (ref 6–12)
PO2 BLDA: 69.4 MM HG (ref 83–108)
PO2 TEMP ADJ BLD: 69.4 MM HG (ref 83–108)
POTASSIUM SERPL-SCNC: 3.5 MMOL/L (ref 3.5–5.2)
PROCALCITONIN SERPL-MCNC: 0.87 NG/ML (ref 0–0.25)
PROT SERPL-MCNC: 5.3 G/DL (ref 6–8.5)
RBC # BLD AUTO: 3.36 10*6/MM3 (ref 4.14–5.8)
SAO2 % BLDCOA: 95.2 % (ref 94–99)
SET MECH RESP RATE: 16
SODIUM SERPL-SCNC: 141 MMOL/L (ref 136–145)
VANCOMYCIN TROUGH SERPL-MCNC: 10.1 MCG/ML (ref 5–20)
VENTILATOR MODE: ABNORMAL
WBC NRBC COR # BLD: 23.04 10*3/MM3 (ref 3.4–10.8)

## 2022-05-24 PROCEDURE — 25010000002 LEVETIRACETAM IN NACL 0.82% 500 MG/100ML SOLUTION: Performed by: NEUROLOGICAL SURGERY

## 2022-05-24 PROCEDURE — 82803 BLOOD GASES ANY COMBINATION: CPT

## 2022-05-24 PROCEDURE — 94799 UNLISTED PULMONARY SVC/PX: CPT

## 2022-05-24 PROCEDURE — 99233 SBSQ HOSP IP/OBS HIGH 50: CPT | Performed by: INTERNAL MEDICINE

## 2022-05-24 PROCEDURE — 94761 N-INVAS EAR/PLS OXIMETRY MLT: CPT

## 2022-05-24 PROCEDURE — 99024 POSTOP FOLLOW-UP VISIT: CPT | Performed by: NEUROLOGICAL SURGERY

## 2022-05-24 PROCEDURE — 82962 GLUCOSE BLOOD TEST: CPT

## 2022-05-24 PROCEDURE — 25010000002 MORPHINE SULFATE (PF) 2 MG/ML SOLUTION: Performed by: NEUROLOGICAL SURGERY

## 2022-05-24 PROCEDURE — 25010000002 FOSPHENYTOIN 100 MG PE/2ML SOLUTION 2 ML VIAL: Performed by: PSYCHIATRY & NEUROLOGY

## 2022-05-24 PROCEDURE — 94003 VENT MGMT INPAT SUBQ DAY: CPT

## 2022-05-24 PROCEDURE — 25010000002 VANCOMYCIN 10 G RECONSTITUTED SOLUTION: Performed by: NEUROLOGICAL SURGERY

## 2022-05-24 PROCEDURE — 99291 CRITICAL CARE FIRST HOUR: CPT | Performed by: PSYCHIATRY & NEUROLOGY

## 2022-05-24 PROCEDURE — 71045 X-RAY EXAM CHEST 1 VIEW: CPT

## 2022-05-24 PROCEDURE — 25010000002 CEFEPIME PER 500 MG: Performed by: NEUROLOGICAL SURGERY

## 2022-05-24 PROCEDURE — 36600 WITHDRAWAL OF ARTERIAL BLOOD: CPT

## 2022-05-24 PROCEDURE — 80053 COMPREHEN METABOLIC PANEL: CPT | Performed by: PSYCHIATRY & NEUROLOGY

## 2022-05-24 PROCEDURE — 63710000001 INSULIN REGULAR HUMAN PER 5 UNITS: Performed by: INTERNAL MEDICINE

## 2022-05-24 PROCEDURE — 94664 DEMO&/EVAL PT USE INHALER: CPT

## 2022-05-24 PROCEDURE — 80185 ASSAY OF PHENYTOIN TOTAL: CPT | Performed by: PSYCHIATRY & NEUROLOGY

## 2022-05-24 PROCEDURE — 63710000001 INSULIN DETEMIR PER 5 UNITS: Performed by: INTERNAL MEDICINE

## 2022-05-24 PROCEDURE — 84145 PROCALCITONIN (PCT): CPT | Performed by: INTERNAL MEDICINE

## 2022-05-24 PROCEDURE — 85025 COMPLETE CBC W/AUTO DIFF WBC: CPT | Performed by: NEUROLOGICAL SURGERY

## 2022-05-24 PROCEDURE — 80202 ASSAY OF VANCOMYCIN: CPT | Performed by: NEUROLOGICAL SURGERY

## 2022-05-24 PROCEDURE — 25010000002 PROPOFOL 10 MG/ML EMULSION: Performed by: NEUROLOGICAL SURGERY

## 2022-05-24 RX ORDER — BISACODYL 10 MG
10 SUPPOSITORY, RECTAL RECTAL DAILY PRN
Status: DISCONTINUED | OUTPATIENT
Start: 2022-05-24 | End: 2022-05-26

## 2022-05-24 RX ORDER — POLYETHYLENE GLYCOL 3350 17 G/17G
17 POWDER, FOR SOLUTION ORAL DAILY
Status: DISCONTINUED | OUTPATIENT
Start: 2022-05-24 | End: 2022-06-20 | Stop reason: HOSPADM

## 2022-05-24 RX ORDER — LEVOTHYROXINE SODIUM 0.12 MG/1
125 TABLET ORAL
Status: DISCONTINUED | OUTPATIENT
Start: 2022-05-24 | End: 2022-06-20 | Stop reason: HOSPADM

## 2022-05-24 RX ADMIN — CARVEDILOL 12.5 MG: 6.25 TABLET, FILM COATED ORAL at 08:01

## 2022-05-24 RX ADMIN — CEFEPIME HYDROCHLORIDE 2 G: 2 INJECTION, POWDER, FOR SOLUTION INTRAVENOUS at 13:40

## 2022-05-24 RX ADMIN — LISINOPRIL 20 MG: 20 TABLET ORAL at 08:01

## 2022-05-24 RX ADMIN — LEVOTHYROXINE SODIUM 125 MCG: 125 TABLET ORAL at 10:05

## 2022-05-24 RX ADMIN — LEVETIRACETAM 500 MG: 5 INJECTION INTRAVASCULAR at 08:02

## 2022-05-24 RX ADMIN — INSULIN HUMAN 5 UNITS: 100 INJECTION, SOLUTION PARENTERAL at 17:07

## 2022-05-24 RX ADMIN — MUPIROCIN 1 APPLICATION: 20 OINTMENT TOPICAL at 20:08

## 2022-05-24 RX ADMIN — ALBUTEROL SULFATE 2.5 MG: 2.5 SOLUTION RESPIRATORY (INHALATION) at 13:07

## 2022-05-24 RX ADMIN — LISINOPRIL 20 MG: 20 TABLET ORAL at 20:29

## 2022-05-24 RX ADMIN — FOSPHENYTOIN SODIUM 150 MG PE: 50 INJECTION INTRAMUSCULAR; INTRAVENOUS at 13:40

## 2022-05-24 RX ADMIN — INSULIN HUMAN 5 UNITS: 100 INJECTION, SOLUTION PARENTERAL at 23:53

## 2022-05-24 RX ADMIN — PROPOFOL 20 MCG/KG/MIN: 10 INJECTION, EMULSION INTRAVENOUS at 20:08

## 2022-05-24 RX ADMIN — ALBUTEROL SULFATE 2.5 MG: 2.5 SOLUTION RESPIRATORY (INHALATION) at 06:26

## 2022-05-24 RX ADMIN — CARVEDILOL 12.5 MG: 6.25 TABLET, FILM COATED ORAL at 17:07

## 2022-05-24 RX ADMIN — LEVETIRACETAM 500 MG: 5 INJECTION INTRAVASCULAR at 20:08

## 2022-05-24 RX ADMIN — INSULIN HUMAN 2 UNITS: 100 INJECTION, SOLUTION PARENTERAL at 06:18

## 2022-05-24 RX ADMIN — PROPOFOL 20 MCG/KG/MIN: 10 INJECTION, EMULSION INTRAVENOUS at 15:53

## 2022-05-24 RX ADMIN — LABETALOL HYDROCHLORIDE 10 MG: 5 INJECTION INTRAVENOUS at 21:03

## 2022-05-24 RX ADMIN — INSULIN HUMAN 2 UNITS: 100 INJECTION, SOLUTION PARENTERAL at 00:09

## 2022-05-24 RX ADMIN — INSULIN DETEMIR 20 UNITS: 100 INJECTION, SOLUTION SUBCUTANEOUS at 20:07

## 2022-05-24 RX ADMIN — POLYETHYLENE GLYCOL 3350 17 G: 17 POWDER, FOR SOLUTION ORAL at 10:05

## 2022-05-24 RX ADMIN — VANCOMYCIN HYDROCHLORIDE 1250 MG: 10 INJECTION, POWDER, LYOPHILIZED, FOR SOLUTION INTRAVENOUS at 03:12

## 2022-05-24 RX ADMIN — Medication 45 ML: at 08:02

## 2022-05-24 RX ADMIN — PANTOPRAZOLE SODIUM 40 MG: 40 INJECTION, POWDER, FOR SOLUTION INTRAVENOUS at 05:23

## 2022-05-24 RX ADMIN — FOSPHENYTOIN SODIUM 150 MG PE: 50 INJECTION INTRAMUSCULAR; INTRAVENOUS at 05:24

## 2022-05-24 RX ADMIN — INSULIN HUMAN 5 UNITS: 100 INJECTION, SOLUTION PARENTERAL at 00:10

## 2022-05-24 RX ADMIN — ALBUTEROL SULFATE 2.5 MG: 2.5 SOLUTION RESPIRATORY (INHALATION) at 22:44

## 2022-05-24 RX ADMIN — LEVOTHYROXINE SODIUM ANHYDROUS 50 MCG: 100 INJECTION, POWDER, LYOPHILIZED, FOR SOLUTION INTRAVENOUS at 05:23

## 2022-05-24 RX ADMIN — FOSPHENYTOIN SODIUM 150 MG PE: 50 INJECTION INTRAMUSCULAR; INTRAVENOUS at 21:03

## 2022-05-24 RX ADMIN — INSULIN HUMAN 5 UNITS: 100 INJECTION, SOLUTION PARENTERAL at 06:18

## 2022-05-24 RX ADMIN — MUPIROCIN 1 APPLICATION: 20 OINTMENT TOPICAL at 08:02

## 2022-05-24 RX ADMIN — CHLORHEXIDINE GLUCONATE 15 ML: 1.2 RINSE ORAL at 20:08

## 2022-05-24 RX ADMIN — INSULIN HUMAN 2 UNITS: 100 INJECTION, SOLUTION PARENTERAL at 23:53

## 2022-05-24 RX ADMIN — LIOTHYRONINE SODIUM 25 MCG: 25 TABLET ORAL at 08:02

## 2022-05-24 RX ADMIN — MORPHINE SULFATE 2 MG: 2 INJECTION, SOLUTION INTRAMUSCULAR; INTRAVENOUS at 02:58

## 2022-05-24 RX ADMIN — CEFEPIME HYDROCHLORIDE 2 G: 2 INJECTION, POWDER, FOR SOLUTION INTRAVENOUS at 05:25

## 2022-05-24 RX ADMIN — PROPOFOL 30 MCG/KG/MIN: 10 INJECTION, EMULSION INTRAVENOUS at 05:12

## 2022-05-24 RX ADMIN — INSULIN HUMAN 5 UNITS: 100 INJECTION, SOLUTION PARENTERAL at 12:04

## 2022-05-24 RX ADMIN — Medication 45 ML: at 20:32

## 2022-05-24 RX ADMIN — LACOSAMIDE 200 MG: 10 INJECTION, SOLUTION INTRAVENOUS at 20:08

## 2022-05-24 RX ADMIN — LACOSAMIDE 200 MG: 10 INJECTION, SOLUTION INTRAVENOUS at 08:02

## 2022-05-24 RX ADMIN — CHLORHEXIDINE GLUCONATE 15 ML: 1.2 RINSE ORAL at 08:01

## 2022-05-24 RX ADMIN — VANCOMYCIN HYDROCHLORIDE 1250 MG: 10 INJECTION, POWDER, LYOPHILIZED, FOR SOLUTION INTRAVENOUS at 16:17

## 2022-05-25 ENCOUNTER — APPOINTMENT (OUTPATIENT)
Dept: GENERAL RADIOLOGY | Facility: HOSPITAL | Age: 77
End: 2022-05-25

## 2022-05-25 ENCOUNTER — APPOINTMENT (OUTPATIENT)
Dept: CT IMAGING | Facility: HOSPITAL | Age: 77
End: 2022-05-25

## 2022-05-25 PROBLEM — J90 LOCULATED PLEURAL EFFUSION: Status: ACTIVE | Noted: 2022-05-25

## 2022-05-25 LAB
ALBUMIN SERPL-MCNC: 1.8 G/DL (ref 3.5–5.2)
ALBUMIN SERPL-MCNC: 1.8 G/DL (ref 3.5–5.2)
ALBUMIN/GLOB SERPL: 0.5 G/DL
ALBUMIN/GLOB SERPL: 0.5 G/DL
ALP SERPL-CCNC: 111 U/L (ref 39–117)
ALP SERPL-CCNC: 124 U/L (ref 39–117)
ALT SERPL W P-5'-P-CCNC: 34 U/L (ref 1–41)
ALT SERPL W P-5'-P-CCNC: 37 U/L (ref 1–41)
ANION GAP SERPL CALCULATED.3IONS-SCNC: 6 MMOL/L (ref 5–15)
ANION GAP SERPL CALCULATED.3IONS-SCNC: 8 MMOL/L (ref 5–15)
APPEARANCE FLD: ABNORMAL
ARTERIAL PATENCY WRIST A: ABNORMAL
ARTERIAL PATENCY WRIST A: POSITIVE
AST SERPL-CCNC: 34 U/L (ref 1–40)
AST SERPL-CCNC: 46 U/L (ref 1–40)
ATMOSPHERIC PRESS: 745 MMHG
ATMOSPHERIC PRESS: 747 MMHG
ATMOSPHERIC PRESS: 747 MMHG
ATMOSPHERIC PRESS: 748 MMHG
BACTERIA SPEC AEROBE CULT: NORMAL
BASE EXCESS BLDA CALC-SCNC: 2 MMOL/L (ref 0–2)
BASE EXCESS BLDA CALC-SCNC: 4 MMOL/L (ref 0–2)
BASE EXCESS BLDA CALC-SCNC: 5.4 MMOL/L (ref 0–2)
BASE EXCESS BLDA CALC-SCNC: 6.4 MMOL/L (ref 0–2)
BASO STIPL COARSE BLD QL SMEAR: ABNORMAL
BASOPHILS # BLD AUTO: 0.07 10*3/MM3 (ref 0–0.2)
BASOPHILS NFR BLD AUTO: 0.4 % (ref 0–1.5)
BDY SITE: ABNORMAL
BILIRUB SERPL-MCNC: 0.3 MG/DL (ref 0–1.2)
BILIRUB SERPL-MCNC: 0.4 MG/DL (ref 0–1.2)
BODY TEMPERATURE: 37 C
BUN SERPL-MCNC: 31 MG/DL (ref 8–23)
BUN SERPL-MCNC: 31 MG/DL (ref 8–23)
BUN/CREAT SERPL: 36 (ref 7–25)
BUN/CREAT SERPL: 54.4 (ref 7–25)
CALCIUM SPEC-SCNC: 7.7 MG/DL (ref 8.6–10.5)
CALCIUM SPEC-SCNC: 7.9 MG/DL (ref 8.6–10.5)
CHLORIDE SERPL-SCNC: 102 MMOL/L (ref 98–107)
CHLORIDE SERPL-SCNC: 105 MMOL/L (ref 98–107)
CO2 SERPL-SCNC: 29 MMOL/L (ref 22–29)
CO2 SERPL-SCNC: 29 MMOL/L (ref 22–29)
COLOR FLD: YELLOW
CREAT SERPL-MCNC: 0.57 MG/DL (ref 0.76–1.27)
CREAT SERPL-MCNC: 0.86 MG/DL (ref 0.76–1.27)
CRP SERPL-MCNC: 27.46 MG/DL (ref 0–0.5)
D-LACTATE SERPL-SCNC: 0.7 MMOL/L (ref 0.5–2)
DEPRECATED RDW RBC AUTO: 49.4 FL (ref 37–54)
DEPRECATED RDW RBC AUTO: 50.1 FL (ref 37–54)
EGFRCR SERPLBLD CKD-EPI 2021: 101 ML/MIN/1.73
EGFRCR SERPLBLD CKD-EPI 2021: 89.2 ML/MIN/1.73
EOSINOPHIL # BLD AUTO: 0.18 10*3/MM3 (ref 0–0.4)
EOSINOPHIL NFR BLD AUTO: 1 % (ref 0.3–6.2)
ERYTHROCYTE [DISTWIDTH] IN BLOOD BY AUTOMATED COUNT: 13.5 % (ref 12.3–15.4)
ERYTHROCYTE [DISTWIDTH] IN BLOOD BY AUTOMATED COUNT: 13.7 % (ref 12.3–15.4)
GLOBULIN UR ELPH-MCNC: 3.3 GM/DL
GLOBULIN UR ELPH-MCNC: 3.6 GM/DL
GLUCOSE BLDC GLUCOMTR-MCNC: 100 MG/DL (ref 70–130)
GLUCOSE BLDC GLUCOMTR-MCNC: 181 MG/DL (ref 70–130)
GLUCOSE BLDC GLUCOMTR-MCNC: 212 MG/DL (ref 70–130)
GLUCOSE BLDC GLUCOMTR-MCNC: 216 MG/DL (ref 70–130)
GLUCOSE BLDC GLUCOMTR-MCNC: 90 MG/DL (ref 70–130)
GLUCOSE FLD-MCNC: 188 MG/DL
GLUCOSE SERPL-MCNC: 197 MG/DL (ref 65–99)
GLUCOSE SERPL-MCNC: 214 MG/DL (ref 65–99)
GRAM STN SPEC: NORMAL
GRAM STN SPEC: NORMAL
HCO3 BLDA-SCNC: 31 MMOL/L (ref 20–26)
HCO3 BLDA-SCNC: 31.4 MMOL/L (ref 20–26)
HCO3 BLDA-SCNC: 31.5 MMOL/L (ref 20–26)
HCO3 BLDA-SCNC: 32.7 MMOL/L (ref 20–26)
HCT VFR BLD AUTO: 30.3 % (ref 37.5–51)
HCT VFR BLD AUTO: 32.7 % (ref 37.5–51)
HGB BLD-MCNC: 10.2 G/DL (ref 13–17.7)
HGB BLD-MCNC: 9.6 G/DL (ref 13–17.7)
IMM GRANULOCYTES # BLD AUTO: 0.37 10*3/MM3 (ref 0–0.05)
IMM GRANULOCYTES NFR BLD AUTO: 2 % (ref 0–0.5)
INHALED O2 CONCENTRATION: 30 %
INHALED O2 CONCENTRATION: 40 %
INHALED O2 CONCENTRATION: 50 %
INHALED O2 CONCENTRATION: 50 %
LDH FLD-CCNC: 590 U/L
LYMPHOCYTES # BLD AUTO: 0.76 10*3/MM3 (ref 0.7–3.1)
LYMPHOCYTES # BLD MANUAL: 0.19 10*3/MM3 (ref 0.7–3.1)
LYMPHOCYTES NFR BLD AUTO: 4.1 % (ref 19.6–45.3)
LYMPHOCYTES NFR BLD MANUAL: 4 % (ref 5–12)
Lab: ABNORMAL
MCH RBC QN AUTO: 31.4 PG (ref 26.6–33)
MCH RBC QN AUTO: 31.5 PG (ref 26.6–33)
MCHC RBC AUTO-ENTMCNC: 31.2 G/DL (ref 31.5–35.7)
MCHC RBC AUTO-ENTMCNC: 31.7 G/DL (ref 31.5–35.7)
MCV RBC AUTO: 100.6 FL (ref 79–97)
MCV RBC AUTO: 99.3 FL (ref 79–97)
MODALITY: ABNORMAL
MONOCYTES # BLD AUTO: 0.83 10*3/MM3 (ref 0.1–0.9)
MONOCYTES # BLD: 0.78 10*3/MM3 (ref 0.1–0.9)
MONOCYTES NFR BLD AUTO: 4.5 % (ref 5–12)
NEUTROPHILS # BLD AUTO: 18.47 10*3/MM3 (ref 1.7–7)
NEUTROPHILS NFR BLD AUTO: 16.13 10*3/MM3 (ref 1.7–7)
NEUTROPHILS NFR BLD AUTO: 88 % (ref 42.7–76)
NEUTROPHILS NFR BLD MANUAL: 85 % (ref 42.7–76)
NEUTROPHILS NFR FLD MANUAL: 100 %
NEUTS BAND NFR BLD MANUAL: 10 % (ref 0–5)
NOTIFIED BY: ABNORMAL
NOTIFIED WHO: ABNORMAL
NRBC BLD AUTO-RTO: 0.2 /100 WBC (ref 0–0.2)
PAW @ PEAK INSP FLOW SETTING VENT: 14 CMH2O
PAW @ PEAK INSP FLOW SETTING VENT: 14 CMH2O
PCO2 BLDA: 47 MM HG (ref 35–45)
PCO2 BLDA: 52.4 MM HG (ref 35–45)
PCO2 BLDA: 59.2 MM HG (ref 35–45)
PCO2 BLDA: 95.3 MM HG (ref 35–45)
PCO2 TEMP ADJ BLD: 47 MM HG (ref 35–45)
PCO2 TEMP ADJ BLD: 52.4 MM HG (ref 35–45)
PCO2 TEMP ADJ BLD: 59.2 MM HG (ref 35–45)
PCO2 TEMP ADJ BLD: 95.3 MM HG (ref 35–45)
PEEP RESPIRATORY: 5 CM[H2O]
PEEP RESPIRATORY: 8 CM[H2O]
PH BLDA: 7.14 PH UNITS (ref 7.35–7.45)
PH BLDA: 7.33 PH UNITS (ref 7.35–7.45)
PH BLDA: 7.39 PH UNITS (ref 7.35–7.45)
PH BLDA: 7.43 PH UNITS (ref 7.35–7.45)
PH FLD: 7.84 [PH]
PH, TEMP CORRECTED: 7.14 PH UNITS (ref 7.35–7.45)
PH, TEMP CORRECTED: 7.33 PH UNITS (ref 7.35–7.45)
PH, TEMP CORRECTED: 7.39 PH UNITS (ref 7.35–7.45)
PH, TEMP CORRECTED: 7.43 PH UNITS (ref 7.35–7.45)
PHENYTOIN SERPL-MCNC: 7.1 MCG/ML (ref 10–20)
PHENYTOIN SERPL-MCNC: 7.5 MCG/ML (ref 10–20)
PLAT MORPH BLD: NORMAL
PLATELET # BLD AUTO: 225 10*3/MM3 (ref 140–450)
PLATELET # BLD AUTO: 258 10*3/MM3 (ref 140–450)
PMV BLD AUTO: 10.1 FL (ref 6–12)
PMV BLD AUTO: 10.5 FL (ref 6–12)
PO2 BLDA: 110 MM HG (ref 83–108)
PO2 BLDA: 130 MM HG (ref 83–108)
PO2 BLDA: 75.1 MM HG (ref 83–108)
PO2 BLDA: 87.1 MM HG (ref 83–108)
PO2 TEMP ADJ BLD: 110 MM HG (ref 83–108)
PO2 TEMP ADJ BLD: 130 MM HG (ref 83–108)
PO2 TEMP ADJ BLD: 75.1 MM HG (ref 83–108)
PO2 TEMP ADJ BLD: 87.1 MM HG (ref 83–108)
POTASSIUM SERPL-SCNC: 3.5 MMOL/L (ref 3.5–5.2)
POTASSIUM SERPL-SCNC: 4 MMOL/L (ref 3.5–5.2)
PROT FLD-MCNC: 3.3 G/DL
PROT SERPL-MCNC: 5.1 G/DL (ref 6–8.5)
PROT SERPL-MCNC: 5.4 G/DL (ref 6–8.5)
RBC # BLD AUTO: 3.05 10*6/MM3 (ref 4.14–5.8)
RBC # BLD AUTO: 3.25 10*6/MM3 (ref 4.14–5.8)
RBC # FLD AUTO: <1000 /MM3
SAO2 % BLDCOA: 93.2 % (ref 94–99)
SAO2 % BLDCOA: 95.9 % (ref 94–99)
SAO2 % BLDCOA: 98.4 % (ref 94–99)
SAO2 % BLDCOA: 99.3 % (ref 94–99)
SET MECH RESP RATE: 16
SET MECH RESP RATE: 16
SET MECH RESP RATE: 24
SET MECH RESP RATE: 24
SODIUM SERPL-SCNC: 139 MMOL/L (ref 136–145)
SODIUM SERPL-SCNC: 140 MMOL/L (ref 136–145)
TOXIC GRANULATION: ABNORMAL
VARIANT LYMPHS NFR BLD MANUAL: 1 % (ref 19.6–45.3)
VENTILATOR MODE: ABNORMAL
VENTILATOR MODE: ABNORMAL
VENTILATOR MODE: AC
VENTILATOR MODE: AC
VT ON VENT VENT: 450 ML
VT ON VENT VENT: 470 ML
WBC # FLD AUTO: 163 /MM3
WBC NRBC COR # BLD: 18.34 10*3/MM3 (ref 3.4–10.8)
WBC NRBC COR # BLD: 19.44 10*3/MM3 (ref 3.4–10.8)

## 2022-05-25 PROCEDURE — 63710000001 INSULIN REGULAR HUMAN PER 5 UNITS: Performed by: INTERNAL MEDICINE

## 2022-05-25 PROCEDURE — 25010000002 MEROPENEM PER 100 MG: Performed by: INTERNAL MEDICINE

## 2022-05-25 PROCEDURE — 87206 SMEAR FLUORESCENT/ACID STAI: CPT | Performed by: THORACIC SURGERY (CARDIOTHORACIC VASCULAR SURGERY)

## 2022-05-25 PROCEDURE — 83605 ASSAY OF LACTIC ACID: CPT | Performed by: INTERNAL MEDICINE

## 2022-05-25 PROCEDURE — 25010000002 HYDRALAZINE PER 20 MG: Performed by: NEUROLOGICAL SURGERY

## 2022-05-25 PROCEDURE — 80053 COMPREHEN METABOLIC PANEL: CPT | Performed by: PSYCHIATRY & NEUROLOGY

## 2022-05-25 PROCEDURE — 83615 LACTATE (LD) (LDH) ENZYME: CPT | Performed by: THORACIC SURGERY (CARDIOTHORACIC VASCULAR SURGERY)

## 2022-05-25 PROCEDURE — 25010000002 MORPHINE PER 10 MG: Performed by: INTERNAL MEDICINE

## 2022-05-25 PROCEDURE — 82962 GLUCOSE BLOOD TEST: CPT

## 2022-05-25 PROCEDURE — 71045 X-RAY EXAM CHEST 1 VIEW: CPT

## 2022-05-25 PROCEDURE — 87015 SPECIMEN INFECT AGNT CONCNTJ: CPT | Performed by: THORACIC SURGERY (CARDIOTHORACIC VASCULAR SURGERY)

## 2022-05-25 PROCEDURE — 80185 ASSAY OF PHENYTOIN TOTAL: CPT | Performed by: CLINICAL NURSE SPECIALIST

## 2022-05-25 PROCEDURE — 82803 BLOOD GASES ANY COMBINATION: CPT

## 2022-05-25 PROCEDURE — 99233 SBSQ HOSP IP/OBS HIGH 50: CPT | Performed by: INTERNAL MEDICINE

## 2022-05-25 PROCEDURE — 85007 BL SMEAR W/DIFF WBC COUNT: CPT | Performed by: INTERNAL MEDICINE

## 2022-05-25 PROCEDURE — 80053 COMPREHEN METABOLIC PANEL: CPT | Performed by: INTERNAL MEDICINE

## 2022-05-25 PROCEDURE — 94664 DEMO&/EVAL PT USE INHALER: CPT

## 2022-05-25 PROCEDURE — C1769 GUIDE WIRE: HCPCS

## 2022-05-25 PROCEDURE — 94799 UNLISTED PULMONARY SVC/PX: CPT

## 2022-05-25 PROCEDURE — 71250 CT THORAX DX C-: CPT

## 2022-05-25 PROCEDURE — 87102 FUNGUS ISOLATION CULTURE: CPT | Performed by: THORACIC SURGERY (CARDIOTHORACIC VASCULAR SURGERY)

## 2022-05-25 PROCEDURE — 85025 COMPLETE CBC W/AUTO DIFF WBC: CPT | Performed by: NEUROLOGICAL SURGERY

## 2022-05-25 PROCEDURE — 25010000002 PROPOFOL 10 MG/ML EMULSION: Performed by: NEUROLOGICAL SURGERY

## 2022-05-25 PROCEDURE — 32551 INSERTION OF CHEST TUBE: CPT

## 2022-05-25 PROCEDURE — 86140 C-REACTIVE PROTEIN: CPT | Performed by: INTERNAL MEDICINE

## 2022-05-25 PROCEDURE — 25010000002 FOSPHENYTOIN 100 MG PE/2ML SOLUTION 2 ML VIAL: Performed by: CLINICAL NURSE SPECIALIST

## 2022-05-25 PROCEDURE — 82945 GLUCOSE OTHER FLUID: CPT | Performed by: THORACIC SURGERY (CARDIOTHORACIC VASCULAR SURGERY)

## 2022-05-25 PROCEDURE — 99024 POSTOP FOLLOW-UP VISIT: CPT | Performed by: NEUROLOGICAL SURGERY

## 2022-05-25 PROCEDURE — 25010000002 FOSPHENYTOIN 100 MG PE/2ML SOLUTION 2 ML VIAL: Performed by: PSYCHIATRY & NEUROLOGY

## 2022-05-25 PROCEDURE — 88305 TISSUE EXAM BY PATHOLOGIST: CPT | Performed by: THORACIC SURGERY (CARDIOTHORACIC VASCULAR SURGERY)

## 2022-05-25 PROCEDURE — 94003 VENT MGMT INPAT SUBQ DAY: CPT

## 2022-05-25 PROCEDURE — 87075 CULTR BACTERIA EXCEPT BLOOD: CPT | Performed by: THORACIC SURGERY (CARDIOTHORACIC VASCULAR SURGERY)

## 2022-05-25 PROCEDURE — 25010000002 EPINEPHRINE 1 MG/ML SOLUTION 30 ML VIAL: Performed by: INTERNAL MEDICINE

## 2022-05-25 PROCEDURE — 88112 CYTOPATH CELL ENHANCE TECH: CPT | Performed by: THORACIC SURGERY (CARDIOTHORACIC VASCULAR SURGERY)

## 2022-05-25 PROCEDURE — 87116 MYCOBACTERIA CULTURE: CPT | Performed by: THORACIC SURGERY (CARDIOTHORACIC VASCULAR SURGERY)

## 2022-05-25 PROCEDURE — 25010000002 LEVETIRACETAM IN NACL 0.82% 500 MG/100ML SOLUTION: Performed by: NEUROLOGICAL SURGERY

## 2022-05-25 PROCEDURE — 99233 SBSQ HOSP IP/OBS HIGH 50: CPT | Performed by: PSYCHIATRY & NEUROLOGY

## 2022-05-25 PROCEDURE — 87205 SMEAR GRAM STAIN: CPT | Performed by: THORACIC SURGERY (CARDIOTHORACIC VASCULAR SURGERY)

## 2022-05-25 PROCEDURE — 25010000002 VANCOMYCIN 10 G RECONSTITUTED SOLUTION: Performed by: NEUROLOGICAL SURGERY

## 2022-05-25 PROCEDURE — 32550 INSERT PLEURAL CATH: CPT | Performed by: THORACIC SURGERY (CARDIOTHORACIC VASCULAR SURGERY)

## 2022-05-25 PROCEDURE — 89051 BODY FLUID CELL COUNT: CPT | Performed by: THORACIC SURGERY (CARDIOTHORACIC VASCULAR SURGERY)

## 2022-05-25 PROCEDURE — 99221 1ST HOSP IP/OBS SF/LOW 40: CPT | Performed by: SURGERY

## 2022-05-25 PROCEDURE — 83986 ASSAY PH BODY FLUID NOS: CPT | Performed by: THORACIC SURGERY (CARDIOTHORACIC VASCULAR SURGERY)

## 2022-05-25 PROCEDURE — 94761 N-INVAS EAR/PLS OXIMETRY MLT: CPT

## 2022-05-25 PROCEDURE — 82664 ELECTROPHORETIC TEST: CPT | Performed by: NURSE PRACTITIONER

## 2022-05-25 PROCEDURE — 85025 COMPLETE CBC W/AUTO DIFF WBC: CPT | Performed by: INTERNAL MEDICINE

## 2022-05-25 PROCEDURE — 84157 ASSAY OF PROTEIN OTHER: CPT | Performed by: THORACIC SURGERY (CARDIOTHORACIC VASCULAR SURGERY)

## 2022-05-25 PROCEDURE — 80185 ASSAY OF PHENYTOIN TOTAL: CPT | Performed by: PSYCHIATRY & NEUROLOGY

## 2022-05-25 PROCEDURE — 25010000002 MORPHINE SULFATE (PF) 2 MG/ML SOLUTION: Performed by: NEUROLOGICAL SURGERY

## 2022-05-25 PROCEDURE — 36600 WITHDRAWAL OF ARTERIAL BLOOD: CPT

## 2022-05-25 PROCEDURE — 87070 CULTURE OTHR SPECIMN AEROBIC: CPT | Performed by: THORACIC SURGERY (CARDIOTHORACIC VASCULAR SURGERY)

## 2022-05-25 PROCEDURE — 25010000002 FOSPHENYTOIN 500 MG PE/10ML SOLUTION 10 ML VIAL: Performed by: CLINICAL NURSE SPECIALIST

## 2022-05-25 RX ORDER — POTASSIUM CHLORIDE 20MEQ/15ML
40 LIQUID (ML) ORAL ONCE
Status: COMPLETED | OUTPATIENT
Start: 2022-05-25 | End: 2022-05-25

## 2022-05-25 RX ORDER — DEXMEDETOMIDINE HYDROCHLORIDE 4 UG/ML
.2-1.5 INJECTION, SOLUTION INTRAVENOUS
Status: DISCONTINUED | OUTPATIENT
Start: 2022-05-25 | End: 2022-05-25

## 2022-05-25 RX ORDER — NOREPINEPHRINE BIT/0.9 % NACL 8 MG/250ML
INFUSION BOTTLE (ML) INTRAVENOUS
Status: COMPLETED
Start: 2022-05-25 | End: 2022-05-25

## 2022-05-25 RX ORDER — NOREPINEPHRINE BIT/0.9 % NACL 8 MG/250ML
.02-.3 INFUSION BOTTLE (ML) INTRAVENOUS
Status: DISCONTINUED | OUTPATIENT
Start: 2022-05-25 | End: 2022-05-27

## 2022-05-25 RX ORDER — DEXMEDETOMIDINE HYDROCHLORIDE 4 UG/ML
.2-1.5 INJECTION, SOLUTION INTRAVENOUS
Status: DISCONTINUED | OUTPATIENT
Start: 2022-05-25 | End: 2022-05-29

## 2022-05-25 RX ADMIN — ALBUTEROL SULFATE 2.5 MG: 2.5 SOLUTION RESPIRATORY (INHALATION) at 06:40

## 2022-05-25 RX ADMIN — Medication 0.3 MCG/KG/MIN: at 12:59

## 2022-05-25 RX ADMIN — LISINOPRIL 20 MG: 20 TABLET ORAL at 08:00

## 2022-05-25 RX ADMIN — Medication 0.2 MCG/KG/MIN: at 09:38

## 2022-05-25 RX ADMIN — MORPHINE SULFATE 2 MG: 2 INJECTION, SOLUTION INTRAMUSCULAR; INTRAVENOUS at 05:30

## 2022-05-25 RX ADMIN — PROPOFOL 35 MCG/KG/MIN: 10 INJECTION, EMULSION INTRAVENOUS at 17:45

## 2022-05-25 RX ADMIN — MEROPENEM 1 G: 1 INJECTION INTRAVENOUS at 12:09

## 2022-05-25 RX ADMIN — SODIUM CHLORIDE 1000 ML: 9 INJECTION, SOLUTION INTRAVENOUS at 09:41

## 2022-05-25 RX ADMIN — PROPOFOL 40 MCG/KG/MIN: 10 INJECTION, EMULSION INTRAVENOUS at 21:13

## 2022-05-25 RX ADMIN — MORPHINE SULFATE 2 MG: 2 INJECTION, SOLUTION INTRAMUSCULAR; INTRAVENOUS at 22:23

## 2022-05-25 RX ADMIN — IPRATROPIUM BROMIDE AND ALBUTEROL SULFATE 3 ML: 2.5; .5 SOLUTION RESPIRATORY (INHALATION) at 10:07

## 2022-05-25 RX ADMIN — CARVEDILOL 12.5 MG: 6.25 TABLET, FILM COATED ORAL at 07:57

## 2022-05-25 RX ADMIN — ALBUTEROL SULFATE 2.5 MG: 2.5 SOLUTION RESPIRATORY (INHALATION) at 13:50

## 2022-05-25 RX ADMIN — LIOTHYRONINE SODIUM 25 MCG: 25 TABLET ORAL at 08:00

## 2022-05-25 RX ADMIN — MORPHINE SULFATE 4 MG: 4 INJECTION, SOLUTION INTRAMUSCULAR; INTRAVENOUS at 10:50

## 2022-05-25 RX ADMIN — LACOSAMIDE 200 MG: 10 INJECTION, SOLUTION INTRAVENOUS at 08:00

## 2022-05-25 RX ADMIN — CHLORHEXIDINE GLUCONATE 15 ML: 1.2 RINSE ORAL at 08:00

## 2022-05-25 RX ADMIN — MEROPENEM 1 G: 1 INJECTION, POWDER, FOR SOLUTION INTRAVENOUS at 17:47

## 2022-05-25 RX ADMIN — MORPHINE SULFATE 2 MG: 2 INJECTION, SOLUTION INTRAMUSCULAR; INTRAVENOUS at 07:55

## 2022-05-25 RX ADMIN — Medication 45 ML: at 20:51

## 2022-05-25 RX ADMIN — PANTOPRAZOLE SODIUM 40 MG: 40 INJECTION, POWDER, FOR SOLUTION INTRAVENOUS at 05:22

## 2022-05-25 RX ADMIN — LABETALOL HYDROCHLORIDE 10 MG: 5 INJECTION INTRAVENOUS at 06:35

## 2022-05-25 RX ADMIN — DEXMEDETOMIDINE HYDROCHLORIDE 0.6 MCG/KG/HR: 4 INJECTION, SOLUTION INTRAVENOUS at 10:39

## 2022-05-25 RX ADMIN — LEVOTHYROXINE SODIUM 125 MCG: 125 TABLET ORAL at 05:22

## 2022-05-25 RX ADMIN — HYDRALAZINE HYDROCHLORIDE 10 MG: 20 INJECTION INTRAMUSCULAR; INTRAVENOUS at 07:57

## 2022-05-25 RX ADMIN — SODIUM CHLORIDE 175 MG PE: 9 INJECTION, SOLUTION INTRAVENOUS at 21:01

## 2022-05-25 RX ADMIN — ALBUTEROL SULFATE 2.5 MG: 2.5 SOLUTION RESPIRATORY (INHALATION) at 22:55

## 2022-05-25 RX ADMIN — INSULIN HUMAN 3 UNITS: 100 INJECTION, SOLUTION PARENTERAL at 05:30

## 2022-05-25 RX ADMIN — Medication 0.3 MCG/KG/MIN: at 13:27

## 2022-05-25 RX ADMIN — MORPHINE SULFATE 2 MG: 2 INJECTION, SOLUTION INTRAMUSCULAR; INTRAVENOUS at 01:28

## 2022-05-25 RX ADMIN — MUPIROCIN 1 APPLICATION: 20 OINTMENT TOPICAL at 20:49

## 2022-05-25 RX ADMIN — SODIUM CHLORIDE 175 MG PE: 9 INJECTION, SOLUTION INTRAVENOUS at 18:20

## 2022-05-25 RX ADMIN — EPINEPHRINE 0.02 MCG/KG/MIN: 1 INJECTION PARENTERAL at 09:55

## 2022-05-25 RX ADMIN — HYDRALAZINE HYDROCHLORIDE 10 MG: 20 INJECTION INTRAMUSCULAR; INTRAVENOUS at 01:28

## 2022-05-25 RX ADMIN — LEVETIRACETAM 500 MG: 5 INJECTION INTRAVASCULAR at 20:50

## 2022-05-25 RX ADMIN — LACOSAMIDE 200 MG: 10 INJECTION, SOLUTION INTRAVENOUS at 20:49

## 2022-05-25 RX ADMIN — INSULIN HUMAN 8 UNITS: 100 INJECTION, SOLUTION PARENTERAL at 12:00

## 2022-05-25 RX ADMIN — FOSPHENYTOIN SODIUM 200 MG PE: 50 INJECTION INTRAMUSCULAR; INTRAVENOUS at 12:51

## 2022-05-25 RX ADMIN — PROPOFOL 30 MCG/KG/MIN: 10 INJECTION, EMULSION INTRAVENOUS at 05:22

## 2022-05-25 RX ADMIN — MORPHINE SULFATE 2 MG: 2 INJECTION, SOLUTION INTRAMUSCULAR; INTRAVENOUS at 12:06

## 2022-05-25 RX ADMIN — POTASSIUM CHLORIDE 40 MEQ: 20 SOLUTION ORAL at 12:19

## 2022-05-25 RX ADMIN — LEVETIRACETAM 500 MG: 5 INJECTION INTRAVASCULAR at 08:00

## 2022-05-25 RX ADMIN — POLYETHYLENE GLYCOL 3350 17 G: 17 POWDER, FOR SOLUTION ORAL at 08:00

## 2022-05-25 RX ADMIN — INSULIN HUMAN 5 UNITS: 100 INJECTION, SOLUTION PARENTERAL at 05:30

## 2022-05-25 RX ADMIN — CHLORHEXIDINE GLUCONATE 15 ML: 1.2 RINSE ORAL at 20:49

## 2022-05-25 RX ADMIN — DEXMEDETOMIDINE HYDROCHLORIDE 0.2 MCG/KG/HR: 4 INJECTION, SOLUTION INTRAVENOUS at 06:40

## 2022-05-25 RX ADMIN — VANCOMYCIN HYDROCHLORIDE 1500 MG: 10 INJECTION, POWDER, LYOPHILIZED, FOR SOLUTION INTRAVENOUS at 03:15

## 2022-05-25 RX ADMIN — FOSPHENYTOIN SODIUM 150 MG PE: 50 INJECTION INTRAMUSCULAR; INTRAVENOUS at 06:35

## 2022-05-25 RX ADMIN — PROPOFOL 5 MCG/KG/MIN: 10 INJECTION, EMULSION INTRAVENOUS at 12:29

## 2022-05-25 RX ADMIN — SODIUM CHLORIDE, POTASSIUM CHLORIDE, SODIUM LACTATE AND CALCIUM CHLORIDE 1000 ML: 600; 310; 30; 20 INJECTION, SOLUTION INTRAVENOUS at 10:38

## 2022-05-25 RX ADMIN — VANCOMYCIN HYDROCHLORIDE 1500 MG: 10 INJECTION, POWDER, LYOPHILIZED, FOR SOLUTION INTRAVENOUS at 17:21

## 2022-05-25 RX ADMIN — INSULIN HUMAN 2 UNITS: 100 INJECTION, SOLUTION PARENTERAL at 11:54

## 2022-05-26 ENCOUNTER — APPOINTMENT (OUTPATIENT)
Dept: GENERAL RADIOLOGY | Facility: HOSPITAL | Age: 77
End: 2022-05-26

## 2022-05-26 LAB
ALBUMIN SERPL-MCNC: 1.7 G/DL (ref 3.5–5.2)
ALBUMIN/GLOB SERPL: 0.5 G/DL
ALP SERPL-CCNC: 108 U/L (ref 39–117)
ALT SERPL W P-5'-P-CCNC: 31 U/L (ref 1–41)
ANION GAP SERPL CALCULATED.3IONS-SCNC: 6 MMOL/L (ref 5–15)
ARTERIAL PATENCY WRIST A: ABNORMAL
AST SERPL-CCNC: 31 U/L (ref 1–40)
ATMOSPHERIC PRESS: 745 MMHG
BACTERIA SPEC AEROBE CULT: NORMAL
BACTERIA SPEC AEROBE CULT: NORMAL
BASE EXCESS BLDA CALC-SCNC: 6.1 MMOL/L (ref 0–2)
BDY SITE: ABNORMAL
BILIRUB SERPL-MCNC: 0.3 MG/DL (ref 0–1.2)
BODY TEMPERATURE: 37 C
BUN SERPL-MCNC: 31 MG/DL (ref 8–23)
BUN/CREAT SERPL: 44.9 (ref 7–25)
CALCIUM SPEC-SCNC: 7.6 MG/DL (ref 8.6–10.5)
CHLORIDE SERPL-SCNC: 104 MMOL/L (ref 98–107)
CLUMPED PLATELETS: PRESENT
CO2 SERPL-SCNC: 28 MMOL/L (ref 22–29)
CREAT SERPL-MCNC: 0.69 MG/DL (ref 0.76–1.27)
DEPRECATED RDW RBC AUTO: 50.7 FL (ref 37–54)
EGFRCR SERPLBLD CKD-EPI 2021: 95.3 ML/MIN/1.73
ERYTHROCYTE [DISTWIDTH] IN BLOOD BY AUTOMATED COUNT: 13.9 % (ref 12.3–15.4)
GLOBULIN UR ELPH-MCNC: 3.2 GM/DL
GLUCOSE BLDC GLUCOMTR-MCNC: 168 MG/DL (ref 70–130)
GLUCOSE BLDC GLUCOMTR-MCNC: 171 MG/DL (ref 70–130)
GLUCOSE BLDC GLUCOMTR-MCNC: 181 MG/DL (ref 70–130)
GLUCOSE BLDC GLUCOMTR-MCNC: 198 MG/DL (ref 70–130)
GLUCOSE BLDC GLUCOMTR-MCNC: 216 MG/DL (ref 70–130)
GLUCOSE SERPL-MCNC: 185 MG/DL (ref 65–99)
HCO3 BLDA-SCNC: 30.5 MMOL/L (ref 20–26)
HCT VFR BLD AUTO: 30.6 % (ref 37.5–51)
HGB BLD-MCNC: 9.7 G/DL (ref 13–17.7)
INHALED O2 CONCENTRATION: 30 %
LDH SERPL-CCNC: 222 U/L (ref 135–225)
LYMPHOCYTES # BLD MANUAL: 0.12 10*3/MM3 (ref 0.7–3.1)
LYMPHOCYTES NFR BLD MANUAL: 5.2 % (ref 5–12)
Lab: ABNORMAL
MACROCYTES BLD QL SMEAR: ABNORMAL
MCH RBC QN AUTO: 31.9 PG (ref 26.6–33)
MCHC RBC AUTO-ENTMCNC: 31.7 G/DL (ref 31.5–35.7)
MCV RBC AUTO: 100.7 FL (ref 79–97)
METAMYELOCYTES NFR BLD MANUAL: 1 % (ref 0–0)
MODALITY: ABNORMAL
MONOCYTES # BLD: 0.62 10*3/MM3 (ref 0.1–0.9)
NEUTROPHILS # BLD AUTO: 10.98 10*3/MM3 (ref 1.7–7)
NEUTROPHILS NFR BLD MANUAL: 89.6 % (ref 42.7–76)
NEUTS BAND NFR BLD MANUAL: 3.1 % (ref 0–5)
PCO2 BLDA: 42.8 MM HG (ref 35–45)
PCO2 TEMP ADJ BLD: 42.8 MM HG (ref 35–45)
PEEP RESPIRATORY: 5 CM[H2O]
PH BLDA: 7.46 PH UNITS (ref 7.35–7.45)
PH, TEMP CORRECTED: 7.46 PH UNITS (ref 7.35–7.45)
PHENYTOIN SERPL-MCNC: 7.5 MCG/ML (ref 10–20)
PHENYTOIN SERPL-MCNC: 8.3 MCG/ML (ref 10–20)
PLATELET # BLD AUTO: 222 10*3/MM3 (ref 140–450)
PMV BLD AUTO: 10.4 FL (ref 6–12)
PO2 BLDA: 66.8 MM HG (ref 83–108)
PO2 TEMP ADJ BLD: 66.8 MM HG (ref 83–108)
POLYCHROMASIA BLD QL SMEAR: ABNORMAL
POTASSIUM SERPL-SCNC: 4 MMOL/L (ref 3.5–5.2)
PROT SERPL-MCNC: 4.9 G/DL (ref 6–8.5)
RBC # BLD AUTO: 3.04 10*6/MM3 (ref 4.14–5.8)
SAO2 % BLDCOA: 95.2 % (ref 94–99)
SET MECH RESP RATE: 24
SODIUM SERPL-SCNC: 138 MMOL/L (ref 136–145)
TOXIC GRANULATION: ABNORMAL
VARIANT LYMPHS NFR BLD MANUAL: 1 % (ref 19.6–45.3)
VENTILATOR MODE: AC
VT ON VENT VENT: 450 ML
WBC NRBC COR # BLD: 11.84 10*3/MM3 (ref 3.4–10.8)

## 2022-05-26 PROCEDURE — 82803 BLOOD GASES ANY COMBINATION: CPT

## 2022-05-26 PROCEDURE — 99233 SBSQ HOSP IP/OBS HIGH 50: CPT | Performed by: INTERNAL MEDICINE

## 2022-05-26 PROCEDURE — 25010000002 FUROSEMIDE PER 20 MG: Performed by: INTERNAL MEDICINE

## 2022-05-26 PROCEDURE — 85007 BL SMEAR W/DIFF WBC COUNT: CPT | Performed by: NEUROLOGICAL SURGERY

## 2022-05-26 PROCEDURE — 82962 GLUCOSE BLOOD TEST: CPT

## 2022-05-26 PROCEDURE — 25010000002 PROPOFOL 10 MG/ML EMULSION: Performed by: NEUROLOGICAL SURGERY

## 2022-05-26 PROCEDURE — 25010000002 VANCOMYCIN 10 G RECONSTITUTED SOLUTION: Performed by: INTERNAL MEDICINE

## 2022-05-26 PROCEDURE — 94799 UNLISTED PULMONARY SVC/PX: CPT

## 2022-05-26 PROCEDURE — 85025 COMPLETE CBC W/AUTO DIFF WBC: CPT | Performed by: NEUROLOGICAL SURGERY

## 2022-05-26 PROCEDURE — 80185 ASSAY OF PHENYTOIN TOTAL: CPT | Performed by: PSYCHIATRY & NEUROLOGY

## 2022-05-26 PROCEDURE — 99024 POSTOP FOLLOW-UP VISIT: CPT | Performed by: NEUROLOGICAL SURGERY

## 2022-05-26 PROCEDURE — 83615 LACTATE (LD) (LDH) ENZYME: CPT | Performed by: INTERNAL MEDICINE

## 2022-05-26 PROCEDURE — 94761 N-INVAS EAR/PLS OXIMETRY MLT: CPT

## 2022-05-26 PROCEDURE — 80053 COMPREHEN METABOLIC PANEL: CPT | Performed by: PSYCHIATRY & NEUROLOGY

## 2022-05-26 PROCEDURE — 63710000001 INSULIN REGULAR HUMAN PER 5 UNITS: Performed by: INTERNAL MEDICINE

## 2022-05-26 PROCEDURE — 71045 X-RAY EXAM CHEST 1 VIEW: CPT

## 2022-05-26 PROCEDURE — 25010000002 LEVETIRACETAM IN NACL 0.82% 500 MG/100ML SOLUTION: Performed by: NEUROLOGICAL SURGERY

## 2022-05-26 PROCEDURE — 32551 INSERTION OF CHEST TUBE: CPT

## 2022-05-26 PROCEDURE — 25010000002 MEROPENEM PER 100 MG: Performed by: INTERNAL MEDICINE

## 2022-05-26 PROCEDURE — 25010000002 FOSPHENYTOIN 500 MG PE/10ML SOLUTION 10 ML VIAL: Performed by: CLINICAL NURSE SPECIALIST

## 2022-05-26 PROCEDURE — 25010000002 MORPHINE SULFATE (PF) 2 MG/ML SOLUTION: Performed by: NEUROLOGICAL SURGERY

## 2022-05-26 PROCEDURE — 80185 ASSAY OF PHENYTOIN TOTAL: CPT | Performed by: CLINICAL NURSE SPECIALIST

## 2022-05-26 PROCEDURE — 0W9B30Z DRAINAGE OF LEFT PLEURAL CAVITY WITH DRAINAGE DEVICE, PERCUTANEOUS APPROACH: ICD-10-PCS | Performed by: SURGERY

## 2022-05-26 PROCEDURE — 25010000002 VANCOMYCIN 10 G RECONSTITUTED SOLUTION: Performed by: NEUROLOGICAL SURGERY

## 2022-05-26 PROCEDURE — 25010000002 HEPARIN (PORCINE) PER 1000 UNITS: Performed by: NEUROLOGICAL SURGERY

## 2022-05-26 PROCEDURE — 94003 VENT MGMT INPAT SUBQ DAY: CPT

## 2022-05-26 PROCEDURE — 99233 SBSQ HOSP IP/OBS HIGH 50: CPT | Performed by: PSYCHIATRY & NEUROLOGY

## 2022-05-26 PROCEDURE — 25010000002 HYDRALAZINE PER 20 MG: Performed by: NEUROLOGICAL SURGERY

## 2022-05-26 PROCEDURE — 99231 SBSQ HOSP IP/OBS SF/LOW 25: CPT | Performed by: SURGERY

## 2022-05-26 PROCEDURE — 63710000001 INSULIN DETEMIR PER 5 UNITS: Performed by: INTERNAL MEDICINE

## 2022-05-26 RX ORDER — FUROSEMIDE 10 MG/ML
20 INJECTION INTRAMUSCULAR; INTRAVENOUS ONCE
Status: COMPLETED | OUTPATIENT
Start: 2022-05-26 | End: 2022-05-26

## 2022-05-26 RX ORDER — LISINOPRIL 20 MG/1
20 TABLET ORAL
Status: DISCONTINUED | OUTPATIENT
Start: 2022-05-26 | End: 2022-06-17

## 2022-05-26 RX ORDER — LACTULOSE 20 G/30ML
20 SOLUTION ORAL DAILY
Status: DISCONTINUED | OUTPATIENT
Start: 2022-05-26 | End: 2022-05-26

## 2022-05-26 RX ORDER — BISACODYL 10 MG
10 SUPPOSITORY, RECTAL RECTAL DAILY
Status: DISCONTINUED | OUTPATIENT
Start: 2022-05-26 | End: 2022-05-26

## 2022-05-26 RX ORDER — SODIUM CHLORIDE FOR INHALATION 3 %
4 VIAL, NEBULIZER (ML) INHALATION
Status: DISCONTINUED | OUTPATIENT
Start: 2022-05-26 | End: 2022-06-20 | Stop reason: HOSPADM

## 2022-05-26 RX ORDER — BISACODYL 10 MG
10 SUPPOSITORY, RECTAL RECTAL DAILY PRN
Status: DISCONTINUED | OUTPATIENT
Start: 2022-05-26 | End: 2022-06-15

## 2022-05-26 RX ORDER — HEPARIN SODIUM 5000 [USP'U]/ML
5000 INJECTION, SOLUTION INTRAVENOUS; SUBCUTANEOUS EVERY 12 HOURS SCHEDULED
Status: DISCONTINUED | OUTPATIENT
Start: 2022-05-26 | End: 2022-06-10 | Stop reason: SDUPTHER

## 2022-05-26 RX ORDER — CARVEDILOL 6.25 MG/1
6.25 TABLET ORAL 2 TIMES DAILY WITH MEALS
Status: DISCONTINUED | OUTPATIENT
Start: 2022-05-26 | End: 2022-06-18

## 2022-05-26 RX ADMIN — MORPHINE SULFATE 2 MG: 2 INJECTION, SOLUTION INTRAMUSCULAR; INTRAVENOUS at 05:06

## 2022-05-26 RX ADMIN — INSULIN HUMAN 8 UNITS: 100 INJECTION, SOLUTION PARENTERAL at 11:17

## 2022-05-26 RX ADMIN — POLYETHYLENE GLYCOL 3350 17 G: 17 POWDER, FOR SOLUTION ORAL at 09:23

## 2022-05-26 RX ADMIN — VANCOMYCIN HYDROCHLORIDE 1500 MG: 10 INJECTION, POWDER, LYOPHILIZED, FOR SOLUTION INTRAVENOUS at 03:30

## 2022-05-26 RX ADMIN — Medication 45 ML: at 20:32

## 2022-05-26 RX ADMIN — HYDRALAZINE HYDROCHLORIDE 10 MG: 20 INJECTION INTRAMUSCULAR; INTRAVENOUS at 16:19

## 2022-05-26 RX ADMIN — LACOSAMIDE 200 MG: 10 INJECTION, SOLUTION INTRAVENOUS at 08:01

## 2022-05-26 RX ADMIN — LABETALOL HYDROCHLORIDE 10 MG: 5 INJECTION INTRAVENOUS at 06:17

## 2022-05-26 RX ADMIN — LABETALOL HYDROCHLORIDE 10 MG: 5 INJECTION INTRAVENOUS at 17:24

## 2022-05-26 RX ADMIN — PANTOPRAZOLE SODIUM 40 MG: 40 INJECTION, POWDER, FOR SOLUTION INTRAVENOUS at 06:19

## 2022-05-26 RX ADMIN — ALBUTEROL SULFATE 2.5 MG: 2.5 SOLUTION RESPIRATORY (INHALATION) at 06:43

## 2022-05-26 RX ADMIN — MEROPENEM 1 G: 1 INJECTION, POWDER, FOR SOLUTION INTRAVENOUS at 10:08

## 2022-05-26 RX ADMIN — SODIUM CHLORIDE 175 MG PE: 9 INJECTION, SOLUTION INTRAVENOUS at 21:10

## 2022-05-26 RX ADMIN — INSULIN HUMAN 3 UNITS: 100 INJECTION, SOLUTION PARENTERAL at 11:17

## 2022-05-26 RX ADMIN — MORPHINE SULFATE 2 MG: 2 INJECTION, SOLUTION INTRAMUSCULAR; INTRAVENOUS at 18:33

## 2022-05-26 RX ADMIN — LACOSAMIDE 200 MG: 10 INJECTION, SOLUTION INTRAVENOUS at 20:12

## 2022-05-26 RX ADMIN — CHLORHEXIDINE GLUCONATE 15 ML: 1.2 RINSE ORAL at 08:01

## 2022-05-26 RX ADMIN — CHLORHEXIDINE GLUCONATE 15 ML: 1.2 RINSE ORAL at 20:13

## 2022-05-26 RX ADMIN — LEVOTHYROXINE SODIUM 125 MCG: 125 TABLET ORAL at 06:19

## 2022-05-26 RX ADMIN — MUPIROCIN 1 APPLICATION: 20 OINTMENT TOPICAL at 20:13

## 2022-05-26 RX ADMIN — INSULIN DETEMIR 20 UNITS: 100 INJECTION, SOLUTION SUBCUTANEOUS at 20:32

## 2022-05-26 RX ADMIN — PROPOFOL 20 MCG/KG/MIN: 10 INJECTION, EMULSION INTRAVENOUS at 14:13

## 2022-05-26 RX ADMIN — SODIUM CHLORIDE 175 MG PE: 9 INJECTION, SOLUTION INTRAVENOUS at 14:08

## 2022-05-26 RX ADMIN — PROPOFOL 20 MCG/KG/MIN: 10 INJECTION, EMULSION INTRAVENOUS at 09:22

## 2022-05-26 RX ADMIN — SODIUM CHLORIDE SOLN NEBU 3% 4 ML: 3 NEBU SOLN at 23:17

## 2022-05-26 RX ADMIN — INSULIN HUMAN 8 UNITS: 100 INJECTION, SOLUTION PARENTERAL at 18:23

## 2022-05-26 RX ADMIN — SODIUM CHLORIDE 200 MG PE: 9 INJECTION, SOLUTION INTRAVENOUS at 09:15

## 2022-05-26 RX ADMIN — HEPARIN SODIUM 5000 UNITS: 5000 INJECTION INTRAVENOUS; SUBCUTANEOUS at 20:12

## 2022-05-26 RX ADMIN — HEPARIN SODIUM 5000 UNITS: 5000 INJECTION INTRAVENOUS; SUBCUTANEOUS at 08:09

## 2022-05-26 RX ADMIN — PROPOFOL 40 MCG/KG/MIN: 10 INJECTION, EMULSION INTRAVENOUS at 01:57

## 2022-05-26 RX ADMIN — VANCOMYCIN HYDROCHLORIDE 1500 MG: 10 INJECTION, POWDER, LYOPHILIZED, FOR SOLUTION INTRAVENOUS at 15:54

## 2022-05-26 RX ADMIN — HYDRALAZINE HYDROCHLORIDE 10 MG: 20 INJECTION INTRAMUSCULAR; INTRAVENOUS at 05:10

## 2022-05-26 RX ADMIN — ALBUTEROL SULFATE 2.5 MG: 2.5 SOLUTION RESPIRATORY (INHALATION) at 23:17

## 2022-05-26 RX ADMIN — MEROPENEM 1 G: 1 INJECTION, POWDER, FOR SOLUTION INTRAVENOUS at 18:18

## 2022-05-26 RX ADMIN — LISINOPRIL 20 MG: 20 TABLET ORAL at 18:30

## 2022-05-26 RX ADMIN — PROPOFOL 30 MCG/KG/MIN: 10 INJECTION, EMULSION INTRAVENOUS at 18:14

## 2022-05-26 RX ADMIN — ACETAMINOPHEN 650 MG: 325 TABLET ORAL at 07:49

## 2022-05-26 RX ADMIN — MUPIROCIN 1 APPLICATION: 20 OINTMENT TOPICAL at 08:01

## 2022-05-26 RX ADMIN — PROPOFOL 30 MCG/KG/MIN: 10 INJECTION, EMULSION INTRAVENOUS at 22:51

## 2022-05-26 RX ADMIN — LIOTHYRONINE SODIUM 25 MCG: 25 TABLET ORAL at 08:01

## 2022-05-26 RX ADMIN — LEVETIRACETAM 500 MG: 5 INJECTION INTRAVASCULAR at 20:12

## 2022-05-26 RX ADMIN — ALBUTEROL SULFATE 2.5 MG: 2.5 SOLUTION RESPIRATORY (INHALATION) at 14:31

## 2022-05-26 RX ADMIN — FUROSEMIDE 20 MG: 10 INJECTION, SOLUTION INTRAVENOUS at 07:49

## 2022-05-26 RX ADMIN — INSULIN HUMAN 2 UNITS: 100 INJECTION, SOLUTION PARENTERAL at 06:23

## 2022-05-26 RX ADMIN — INSULIN HUMAN 2 UNITS: 100 INJECTION, SOLUTION PARENTERAL at 00:09

## 2022-05-26 RX ADMIN — CARVEDILOL 6.25 MG: 6.25 TABLET, FILM COATED ORAL at 18:30

## 2022-05-26 RX ADMIN — PROPOFOL 40 MCG/KG/MIN: 10 INJECTION, EMULSION INTRAVENOUS at 05:10

## 2022-05-26 RX ADMIN — SODIUM CHLORIDE 175 MG PE: 9 INJECTION, SOLUTION INTRAVENOUS at 06:01

## 2022-05-26 RX ADMIN — MEROPENEM 1 G: 1 INJECTION, POWDER, FOR SOLUTION INTRAVENOUS at 00:05

## 2022-05-26 RX ADMIN — Medication 45 ML: at 08:01

## 2022-05-26 RX ADMIN — LEVETIRACETAM 500 MG: 5 INJECTION INTRAVASCULAR at 08:01

## 2022-05-26 RX ADMIN — INSULIN HUMAN 2 UNITS: 100 INJECTION, SOLUTION PARENTERAL at 18:24

## 2022-05-26 RX ADMIN — INSULIN HUMAN 8 UNITS: 100 INJECTION, SOLUTION PARENTERAL at 00:09

## 2022-05-26 RX ADMIN — INSULIN HUMAN 8 UNITS: 100 INJECTION, SOLUTION PARENTERAL at 06:23

## 2022-05-27 ENCOUNTER — APPOINTMENT (OUTPATIENT)
Dept: NEUROLOGY | Facility: HOSPITAL | Age: 77
End: 2022-05-27

## 2022-05-27 ENCOUNTER — APPOINTMENT (OUTPATIENT)
Dept: GENERAL RADIOLOGY | Facility: HOSPITAL | Age: 77
End: 2022-05-27

## 2022-05-27 ENCOUNTER — APPOINTMENT (OUTPATIENT)
Dept: CT IMAGING | Facility: HOSPITAL | Age: 77
End: 2022-05-27

## 2022-05-27 LAB
ALBUMIN SERPL-MCNC: 1.6 G/DL (ref 3.5–5.2)
ALBUMIN/GLOB SERPL: 0.4 G/DL
ALP SERPL-CCNC: 134 U/L (ref 39–117)
ALT SERPL W P-5'-P-CCNC: 31 U/L (ref 1–41)
ANION GAP SERPL CALCULATED.3IONS-SCNC: 6 MMOL/L (ref 5–15)
ARTERIAL PATENCY WRIST A: ABNORMAL
ARTERIAL PATENCY WRIST A: POSITIVE
AST SERPL-CCNC: 29 U/L (ref 1–40)
ATMOSPHERIC PRESS: 745 MMHG
ATMOSPHERIC PRESS: 747 MMHG
BASE EXCESS BLDA CALC-SCNC: 7.3 MMOL/L (ref 0–2)
BASE EXCESS BLDA CALC-SCNC: 8 MMOL/L (ref 0–2)
BASOPHILS # BLD MANUAL: 0.11 10*3/MM3 (ref 0–0.2)
BASOPHILS NFR BLD MANUAL: 1 % (ref 0–1.5)
BDY SITE: ABNORMAL
BDY SITE: ABNORMAL
BILIRUB SERPL-MCNC: 0.4 MG/DL (ref 0–1.2)
BODY TEMPERATURE: 37 C
BODY TEMPERATURE: 37 C
BUN SERPL-MCNC: 30 MG/DL (ref 8–23)
BUN/CREAT SERPL: 43.5 (ref 7–25)
CALCIUM SPEC-SCNC: 7.5 MG/DL (ref 8.6–10.5)
CHLORIDE SERPL-SCNC: 103 MMOL/L (ref 98–107)
CLUMPED PLATELETS: PRESENT
CO2 SERPL-SCNC: 30 MMOL/L (ref 22–29)
CREAT SERPL-MCNC: 0.69 MG/DL (ref 0.76–1.27)
CYTO UR: NORMAL
DEPRECATED RDW RBC AUTO: 50 FL (ref 37–54)
EGFRCR SERPLBLD CKD-EPI 2021: 95.3 ML/MIN/1.73
ERYTHROCYTE [DISTWIDTH] IN BLOOD BY AUTOMATED COUNT: 13.8 % (ref 12.3–15.4)
GLOBULIN UR ELPH-MCNC: 3.6 GM/DL
GLUCOSE BLDC GLUCOMTR-MCNC: 155 MG/DL (ref 70–130)
GLUCOSE BLDC GLUCOMTR-MCNC: 163 MG/DL (ref 70–130)
GLUCOSE BLDC GLUCOMTR-MCNC: 190 MG/DL (ref 70–130)
GLUCOSE BLDC GLUCOMTR-MCNC: 203 MG/DL (ref 70–130)
GLUCOSE BLDC GLUCOMTR-MCNC: 89 MG/DL (ref 70–130)
GLUCOSE BLDC GLUCOMTR-MCNC: 93 MG/DL (ref 70–130)
GLUCOSE SERPL-MCNC: 196 MG/DL (ref 65–99)
HCO3 BLDA-SCNC: 31.2 MMOL/L (ref 20–26)
HCO3 BLDA-SCNC: 32.1 MMOL/L (ref 20–26)
HCT VFR BLD AUTO: 30.8 % (ref 37.5–51)
HGB BLD-MCNC: 9.8 G/DL (ref 13–17.7)
INHALED O2 CONCENTRATION: 30 %
INHALED O2 CONCENTRATION: 30 %
LAB AP CASE REPORT: NORMAL
LAB AP CLINICAL INFORMATION: NORMAL
LYMPHOCYTES # BLD MANUAL: 0.21 10*3/MM3 (ref 0.7–3.1)
LYMPHOCYTES NFR BLD MANUAL: 3 % (ref 5–12)
Lab: ABNORMAL
Lab: ABNORMAL
Lab: NORMAL
MCH RBC QN AUTO: 31.2 PG (ref 26.6–33)
MCHC RBC AUTO-ENTMCNC: 31.8 G/DL (ref 31.5–35.7)
MCV RBC AUTO: 98.1 FL (ref 79–97)
MODALITY: ABNORMAL
MODALITY: ABNORMAL
MONOCYTES # BLD: 0.32 10*3/MM3 (ref 0.1–0.9)
MYELOCYTES NFR BLD MANUAL: 1 % (ref 0–0)
NEUTROPHILS # BLD AUTO: 9.92 10*3/MM3 (ref 1.7–7)
NEUTROPHILS NFR BLD MANUAL: 88 % (ref 42.7–76)
NEUTS BAND NFR BLD MANUAL: 5 % (ref 0–5)
NRBC BLD AUTO-RTO: 0.2 /100 WBC (ref 0–0.2)
PATH REPORT.FINAL DX SPEC: NORMAL
PATH REPORT.GROSS SPEC: NORMAL
PCO2 BLDA: 37.2 MM HG (ref 35–45)
PCO2 BLDA: 45.7 MM HG (ref 35–45)
PCO2 TEMP ADJ BLD: 37.2 MM HG (ref 35–45)
PCO2 TEMP ADJ BLD: 45.7 MM HG (ref 35–45)
PEEP RESPIRATORY: 5 CM[H2O]
PEEP RESPIRATORY: 5 CM[H2O]
PH BLDA: 7.45 PH UNITS (ref 7.35–7.45)
PH BLDA: 7.53 PH UNITS (ref 7.35–7.45)
PH, TEMP CORRECTED: 7.45 PH UNITS (ref 7.35–7.45)
PH, TEMP CORRECTED: 7.53 PH UNITS (ref 7.35–7.45)
PHENYTOIN SERPL-MCNC: 8 MCG/ML (ref 10–20)
PLATELET # BLD AUTO: 274 10*3/MM3 (ref 140–450)
PMV BLD AUTO: 9.9 FL (ref 6–12)
PO2 BLDA: 64.8 MM HG (ref 83–108)
PO2 BLDA: 80 MM HG (ref 83–108)
PO2 TEMP ADJ BLD: 64.8 MM HG (ref 83–108)
PO2 TEMP ADJ BLD: 80 MM HG (ref 83–108)
POIKILOCYTOSIS BLD QL SMEAR: ABNORMAL
POLYCHROMASIA BLD QL SMEAR: ABNORMAL
POTASSIUM SERPL-SCNC: 3.6 MMOL/L (ref 3.5–5.2)
PREALB SERPL-MCNC: 8.9 MG/DL (ref 20–40)
PROT SERPL-MCNC: 5.2 G/DL (ref 6–8.5)
RBC # BLD AUTO: 3.14 10*6/MM3 (ref 4.14–5.8)
SAO2 % BLDCOA: 95.3 % (ref 94–99)
SAO2 % BLDCOA: 96.8 % (ref 94–99)
SET MECH RESP RATE: 20
SET MECH RESP RATE: 20
SMALL PLATELETS BLD QL SMEAR: ADEQUATE
SODIUM SERPL-SCNC: 139 MMOL/L (ref 136–145)
VANCOMYCIN TROUGH SERPL-MCNC: 19.1 MCG/ML (ref 5–20)
VARIANT LYMPHS NFR BLD MANUAL: 2 % (ref 19.6–45.3)
VENTILATOR MODE: AC
VENTILATOR MODE: AC
VT ON VENT VENT: 450 ML
VT ON VENT VENT: 450 ML
WBC MORPH BLD: NORMAL
WBC NRBC COR # BLD: 10.67 10*3/MM3 (ref 3.4–10.8)

## 2022-05-27 PROCEDURE — 25010000002 PROPOFOL 10 MG/ML EMULSION: Performed by: NEUROLOGICAL SURGERY

## 2022-05-27 PROCEDURE — 80053 COMPREHEN METABOLIC PANEL: CPT | Performed by: PSYCHIATRY & NEUROLOGY

## 2022-05-27 PROCEDURE — 63710000001 INSULIN REGULAR HUMAN PER 5 UNITS: Performed by: INTERNAL MEDICINE

## 2022-05-27 PROCEDURE — 94799 UNLISTED PULMONARY SVC/PX: CPT

## 2022-05-27 PROCEDURE — 94761 N-INVAS EAR/PLS OXIMETRY MLT: CPT

## 2022-05-27 PROCEDURE — 85025 COMPLETE CBC W/AUTO DIFF WBC: CPT | Performed by: NEUROLOGICAL SURGERY

## 2022-05-27 PROCEDURE — 95819 EEG AWAKE AND ASLEEP: CPT

## 2022-05-27 PROCEDURE — 82803 BLOOD GASES ANY COMBINATION: CPT

## 2022-05-27 PROCEDURE — 80185 ASSAY OF PHENYTOIN TOTAL: CPT | Performed by: PSYCHIATRY & NEUROLOGY

## 2022-05-27 PROCEDURE — 25010000002 FUROSEMIDE PER 20 MG: Performed by: INTERNAL MEDICINE

## 2022-05-27 PROCEDURE — 63710000001 INSULIN DETEMIR PER 5 UNITS: Performed by: INTERNAL MEDICINE

## 2022-05-27 PROCEDURE — 25010000002 MORPHINE PER 10 MG: Performed by: NEUROLOGICAL SURGERY

## 2022-05-27 PROCEDURE — 99231 SBSQ HOSP IP/OBS SF/LOW 25: CPT | Performed by: SURGERY

## 2022-05-27 PROCEDURE — 25010000002 MEROPENEM PER 100 MG: Performed by: INTERNAL MEDICINE

## 2022-05-27 PROCEDURE — 25010000002 LEVETIRACETAM IN NACL 0.82% 500 MG/100ML SOLUTION: Performed by: NEUROLOGICAL SURGERY

## 2022-05-27 PROCEDURE — 99233 SBSQ HOSP IP/OBS HIGH 50: CPT | Performed by: INTERNAL MEDICINE

## 2022-05-27 PROCEDURE — 99233 SBSQ HOSP IP/OBS HIGH 50: CPT | Performed by: PSYCHIATRY & NEUROLOGY

## 2022-05-27 PROCEDURE — 25010000002 ALTEPLASE 2 MG RECONSTITUTED SOLUTION 1 EACH VIAL: Performed by: NURSE PRACTITIONER

## 2022-05-27 PROCEDURE — 80202 ASSAY OF VANCOMYCIN: CPT | Performed by: INTERNAL MEDICINE

## 2022-05-27 PROCEDURE — 36600 WITHDRAWAL OF ARTERIAL BLOOD: CPT

## 2022-05-27 PROCEDURE — 25010000002 VANCOMYCIN 10 G RECONSTITUTED SOLUTION: Performed by: INTERNAL MEDICINE

## 2022-05-27 PROCEDURE — 82962 GLUCOSE BLOOD TEST: CPT

## 2022-05-27 PROCEDURE — 95819 EEG AWAKE AND ASLEEP: CPT | Performed by: PSYCHIATRY & NEUROLOGY

## 2022-05-27 PROCEDURE — 25010000002 HYDRALAZINE PER 20 MG: Performed by: NEUROLOGICAL SURGERY

## 2022-05-27 PROCEDURE — 94003 VENT MGMT INPAT SUBQ DAY: CPT

## 2022-05-27 PROCEDURE — 85007 BL SMEAR W/DIFF WBC COUNT: CPT | Performed by: NEUROLOGICAL SURGERY

## 2022-05-27 PROCEDURE — 25010000002 FOSPHENYTOIN 500 MG PE/10ML SOLUTION 10 ML VIAL: Performed by: CLINICAL NURSE SPECIALIST

## 2022-05-27 PROCEDURE — 70450 CT HEAD/BRAIN W/O DYE: CPT

## 2022-05-27 PROCEDURE — 25010000002 MORPHINE SULFATE (PF) 2 MG/ML SOLUTION: Performed by: NEUROLOGICAL SURGERY

## 2022-05-27 PROCEDURE — 71045 X-RAY EXAM CHEST 1 VIEW: CPT

## 2022-05-27 PROCEDURE — 25010000002 HEPARIN (PORCINE) PER 1000 UNITS: Performed by: NEUROLOGICAL SURGERY

## 2022-05-27 PROCEDURE — 84134 ASSAY OF PREALBUMIN: CPT | Performed by: NEUROLOGICAL SURGERY

## 2022-05-27 RX ORDER — POTASSIUM CHLORIDE 20MEQ/15ML
40 LIQUID (ML) ORAL ONCE
Status: COMPLETED | OUTPATIENT
Start: 2022-05-27 | End: 2022-05-27

## 2022-05-27 RX ORDER — FUROSEMIDE 10 MG/ML
40 INJECTION INTRAMUSCULAR; INTRAVENOUS ONCE
Status: COMPLETED | OUTPATIENT
Start: 2022-05-27 | End: 2022-05-27

## 2022-05-27 RX ORDER — NOREPINEPHRINE BIT/0.9 % NACL 8 MG/250ML
.02-.3 INFUSION BOTTLE (ML) INTRAVENOUS
Status: DISCONTINUED | OUTPATIENT
Start: 2022-05-27 | End: 2022-05-29

## 2022-05-27 RX ADMIN — MUPIROCIN 1 APPLICATION: 20 OINTMENT TOPICAL at 20:33

## 2022-05-27 RX ADMIN — ALTEPLASE 10 MG: 2.2 INJECTION, POWDER, LYOPHILIZED, FOR SOLUTION INTRAVENOUS at 22:12

## 2022-05-27 RX ADMIN — MORPHINE SULFATE 2 MG: 2 INJECTION, SOLUTION INTRAMUSCULAR; INTRAVENOUS at 18:00

## 2022-05-27 RX ADMIN — LEVOTHYROXINE SODIUM 125 MCG: 125 TABLET ORAL at 05:37

## 2022-05-27 RX ADMIN — LACOSAMIDE 200 MG: 10 INJECTION, SOLUTION INTRAVENOUS at 08:05

## 2022-05-27 RX ADMIN — ALBUTEROL SULFATE 2.5 MG: 2.5 SOLUTION RESPIRATORY (INHALATION) at 23:40

## 2022-05-27 RX ADMIN — INSULIN HUMAN 8 UNITS: 100 INJECTION, SOLUTION PARENTERAL at 05:37

## 2022-05-27 RX ADMIN — INSULIN HUMAN 2 UNITS: 100 INJECTION, SOLUTION PARENTERAL at 18:00

## 2022-05-27 RX ADMIN — Medication 0.02 MCG/KG/MIN: at 13:42

## 2022-05-27 RX ADMIN — PROPOFOL 30 MCG/KG/MIN: 10 INJECTION, EMULSION INTRAVENOUS at 20:48

## 2022-05-27 RX ADMIN — MUPIROCIN 1 APPLICATION: 20 OINTMENT TOPICAL at 08:05

## 2022-05-27 RX ADMIN — ALBUTEROL SULFATE 2.5 MG: 2.5 SOLUTION RESPIRATORY (INHALATION) at 06:48

## 2022-05-27 RX ADMIN — MORPHINE SULFATE 2 MG: 2 INJECTION, SOLUTION INTRAMUSCULAR; INTRAVENOUS at 12:47

## 2022-05-27 RX ADMIN — MEROPENEM 1 G: 1 INJECTION, POWDER, FOR SOLUTION INTRAVENOUS at 08:06

## 2022-05-27 RX ADMIN — SODIUM CHLORIDE SOLN NEBU 3% 4 ML: 3 NEBU SOLN at 06:48

## 2022-05-27 RX ADMIN — INSULIN HUMAN 8 UNITS: 100 INJECTION, SOLUTION PARENTERAL at 18:00

## 2022-05-27 RX ADMIN — Medication 45 ML: at 08:05

## 2022-05-27 RX ADMIN — MEROPENEM 1 G: 1 INJECTION, POWDER, FOR SOLUTION INTRAVENOUS at 17:46

## 2022-05-27 RX ADMIN — SODIUM CHLORIDE 175 MG PE: 9 INJECTION, SOLUTION INTRAVENOUS at 13:44

## 2022-05-27 RX ADMIN — INSULIN DETEMIR 20 UNITS: 100 INJECTION, SOLUTION SUBCUTANEOUS at 20:36

## 2022-05-27 RX ADMIN — LACOSAMIDE 200 MG: 10 INJECTION, SOLUTION INTRAVENOUS at 20:34

## 2022-05-27 RX ADMIN — POTASSIUM CHLORIDE 40 MEQ: 20 SOLUTION ORAL at 11:14

## 2022-05-27 RX ADMIN — MORPHINE SULFATE 2 MG: 2 INJECTION, SOLUTION INTRAMUSCULAR; INTRAVENOUS at 15:52

## 2022-05-27 RX ADMIN — ALBUTEROL SULFATE 2.5 MG: 2.5 SOLUTION RESPIRATORY (INHALATION) at 14:34

## 2022-05-27 RX ADMIN — DEXMEDETOMIDINE HYDROCHLORIDE 0.2 MCG/KG/HR: 4 INJECTION, SOLUTION INTRAVENOUS at 08:05

## 2022-05-27 RX ADMIN — PROPOFOL 30 MCG/KG/MIN: 10 INJECTION, EMULSION INTRAVENOUS at 03:15

## 2022-05-27 RX ADMIN — DORNASE ALFA 5 MG: 1 SOLUTION RESPIRATORY (INHALATION) at 22:12

## 2022-05-27 RX ADMIN — HEPARIN SODIUM 5000 UNITS: 5000 INJECTION INTRAVENOUS; SUBCUTANEOUS at 08:06

## 2022-05-27 RX ADMIN — DORNASE ALFA 5 MG: 1 SOLUTION RESPIRATORY (INHALATION) at 10:06

## 2022-05-27 RX ADMIN — ACETAMINOPHEN 650 MG: 325 TABLET ORAL at 08:05

## 2022-05-27 RX ADMIN — INSULIN HUMAN 3 UNITS: 100 INJECTION, SOLUTION PARENTERAL at 00:18

## 2022-05-27 RX ADMIN — PANTOPRAZOLE SODIUM 40 MG: 40 INJECTION, POWDER, FOR SOLUTION INTRAVENOUS at 05:37

## 2022-05-27 RX ADMIN — SODIUM CHLORIDE 175 MG PE: 9 INJECTION, SOLUTION INTRAVENOUS at 21:44

## 2022-05-27 RX ADMIN — Medication 45 ML: at 20:34

## 2022-05-27 RX ADMIN — VANCOMYCIN HYDROCHLORIDE 1500 MG: 10 INJECTION, POWDER, LYOPHILIZED, FOR SOLUTION INTRAVENOUS at 15:52

## 2022-05-27 RX ADMIN — MEROPENEM 1 G: 1 INJECTION, POWDER, FOR SOLUTION INTRAVENOUS at 01:59

## 2022-05-27 RX ADMIN — LIOTHYRONINE SODIUM 25 MCG: 25 TABLET ORAL at 08:05

## 2022-05-27 RX ADMIN — INSULIN HUMAN 8 UNITS: 100 INJECTION, SOLUTION PARENTERAL at 00:19

## 2022-05-27 RX ADMIN — CHLORHEXIDINE GLUCONATE 15 ML: 1.2 RINSE ORAL at 08:05

## 2022-05-27 RX ADMIN — LEVETIRACETAM 500 MG: 5 INJECTION INTRAVASCULAR at 08:06

## 2022-05-27 RX ADMIN — LABETALOL HYDROCHLORIDE 10 MG: 5 INJECTION INTRAVENOUS at 09:31

## 2022-05-27 RX ADMIN — MORPHINE SULFATE 2 MG: 2 INJECTION, SOLUTION INTRAMUSCULAR; INTRAVENOUS at 08:19

## 2022-05-27 RX ADMIN — SODIUM CHLORIDE SOLN NEBU 3% 4 ML: 3 NEBU SOLN at 19:00

## 2022-05-27 RX ADMIN — CHLORHEXIDINE GLUCONATE 15 ML: 1.2 RINSE ORAL at 20:49

## 2022-05-27 RX ADMIN — SODIUM CHLORIDE 175 MG PE: 9 INJECTION, SOLUTION INTRAVENOUS at 05:20

## 2022-05-27 RX ADMIN — VANCOMYCIN HYDROCHLORIDE 1500 MG: 10 INJECTION, POWDER, LYOPHILIZED, FOR SOLUTION INTRAVENOUS at 02:11

## 2022-05-27 RX ADMIN — POLYETHYLENE GLYCOL 3350 17 G: 17 POWDER, FOR SOLUTION ORAL at 08:06

## 2022-05-27 RX ADMIN — PROPOFOL 5 MCG/KG/MIN: 10 INJECTION, EMULSION INTRAVENOUS at 10:11

## 2022-05-27 RX ADMIN — PROPOFOL 40 MCG/KG/MIN: 10 INJECTION, EMULSION INTRAVENOUS at 17:15

## 2022-05-27 RX ADMIN — ALTEPLASE 10 MG: 2.2 INJECTION, POWDER, LYOPHILIZED, FOR SOLUTION INTRAVENOUS at 10:06

## 2022-05-27 RX ADMIN — HYDRALAZINE HYDROCHLORIDE 10 MG: 20 INJECTION INTRAMUSCULAR; INTRAVENOUS at 02:10

## 2022-05-27 RX ADMIN — HEPARIN SODIUM 5000 UNITS: 5000 INJECTION INTRAVENOUS; SUBCUTANEOUS at 20:34

## 2022-05-27 RX ADMIN — FUROSEMIDE 40 MG: 10 INJECTION, SOLUTION INTRAVENOUS at 09:31

## 2022-05-27 RX ADMIN — INSULIN HUMAN 2 UNITS: 100 INJECTION, SOLUTION PARENTERAL at 05:37

## 2022-05-27 RX ADMIN — LEVETIRACETAM 500 MG: 5 INJECTION INTRAVASCULAR at 20:34

## 2022-05-28 ENCOUNTER — APPOINTMENT (OUTPATIENT)
Dept: ULTRASOUND IMAGING | Facility: HOSPITAL | Age: 77
End: 2022-05-28

## 2022-05-28 ENCOUNTER — APPOINTMENT (OUTPATIENT)
Dept: GENERAL RADIOLOGY | Facility: HOSPITAL | Age: 77
End: 2022-05-28

## 2022-05-28 LAB
ALBUMIN SERPL-MCNC: 1.6 G/DL (ref 3.5–5.2)
ALBUMIN/GLOB SERPL: 0.5 G/DL
ALP SERPL-CCNC: 152 U/L (ref 39–117)
ALT SERPL W P-5'-P-CCNC: 28 U/L (ref 1–41)
ANION GAP SERPL CALCULATED.3IONS-SCNC: 4 MMOL/L (ref 5–15)
ANION GAP SERPL CALCULATED.3IONS-SCNC: 4 MMOL/L (ref 5–15)
ARTERIAL PATENCY WRIST A: POSITIVE
AST SERPL-CCNC: 34 U/L (ref 1–40)
ATMOSPHERIC PRESS: 749 MMHG
BACTERIA FLD CULT: NORMAL
BASE EXCESS BLDA CALC-SCNC: 7.1 MMOL/L (ref 0–2)
BASOPHILS # BLD MANUAL: 0.09 10*3/MM3 (ref 0–0.2)
BASOPHILS NFR BLD MANUAL: 1 % (ref 0–1.5)
BDY SITE: ABNORMAL
BILIRUB SERPL-MCNC: 0.3 MG/DL (ref 0–1.2)
BODY TEMPERATURE: 37 C
BUN SERPL-MCNC: 32 MG/DL (ref 8–23)
BUN SERPL-MCNC: 34 MG/DL (ref 8–23)
BUN/CREAT SERPL: 45.9 (ref 7–25)
BUN/CREAT SERPL: 53.3 (ref 7–25)
CALCIUM SPEC-SCNC: 7.5 MG/DL (ref 8.6–10.5)
CALCIUM SPEC-SCNC: 7.8 MG/DL (ref 8.6–10.5)
CHLORIDE SERPL-SCNC: 104 MMOL/L (ref 98–107)
CHLORIDE SERPL-SCNC: 90 MMOL/L (ref 98–107)
CO2 SERPL-SCNC: 30 MMOL/L (ref 22–29)
CO2 SERPL-SCNC: 31 MMOL/L (ref 22–29)
CREAT SERPL-MCNC: 0.6 MG/DL (ref 0.76–1.27)
CREAT SERPL-MCNC: 0.74 MG/DL (ref 0.76–1.27)
DEPRECATED RDW RBC AUTO: 49.8 FL (ref 37–54)
EGFRCR SERPLBLD CKD-EPI 2021: 93.3 ML/MIN/1.73
EGFRCR SERPLBLD CKD-EPI 2021: 99.4 ML/MIN/1.73
ERYTHROCYTE [DISTWIDTH] IN BLOOD BY AUTOMATED COUNT: 13.5 % (ref 12.3–15.4)
GLOBULIN UR ELPH-MCNC: 3.5 GM/DL
GLUCOSE BLDC GLUCOMTR-MCNC: 106 MG/DL (ref 70–130)
GLUCOSE BLDC GLUCOMTR-MCNC: 108 MG/DL (ref 70–130)
GLUCOSE BLDC GLUCOMTR-MCNC: 141 MG/DL (ref 70–130)
GLUCOSE BLDC GLUCOMTR-MCNC: 221 MG/DL (ref 70–130)
GLUCOSE BLDC GLUCOMTR-MCNC: 99 MG/DL (ref 70–130)
GLUCOSE SERPL-MCNC: 133 MG/DL (ref 65–99)
GLUCOSE SERPL-MCNC: 193 MG/DL (ref 65–99)
GRAM STN SPEC: NORMAL
GRAM STN SPEC: NORMAL
HCO3 BLDA-SCNC: 31.6 MMOL/L (ref 20–26)
HCT VFR BLD AUTO: 32.7 % (ref 37.5–51)
HGB BLD-MCNC: 10.1 G/DL (ref 13–17.7)
INHALED O2 CONCENTRATION: 30 %
LYMPHOCYTES # BLD MANUAL: 0.37 10*3/MM3 (ref 0.7–3.1)
LYMPHOCYTES NFR BLD MANUAL: 1 % (ref 5–12)
Lab: ABNORMAL
MAGNESIUM SERPL-MCNC: 2 MG/DL (ref 1.6–2.4)
MCH RBC QN AUTO: 31.2 PG (ref 26.6–33)
MCHC RBC AUTO-ENTMCNC: 30.9 G/DL (ref 31.5–35.7)
MCV RBC AUTO: 100.9 FL (ref 79–97)
MODALITY: ABNORMAL
MONOCYTES # BLD: 0.09 10*3/MM3 (ref 0.1–0.9)
NEUTROPHILS # BLD AUTO: 8.81 10*3/MM3 (ref 1.7–7)
NEUTROPHILS NFR BLD MANUAL: 94 % (ref 42.7–76)
OSMOLALITY SERPL: 297 MOSM/KG (ref 280–301)
OSMOLALITY SERPL: 297 MOSM/KG (ref 280–301)
OSMOLALITY SERPL: 302 MOSM/KG (ref 280–301)
PCO2 BLDA: 43.3 MM HG (ref 35–45)
PCO2 TEMP ADJ BLD: 43.3 MM HG (ref 35–45)
PEEP RESPIRATORY: 5 CM[H2O]
PH BLDA: 7.47 PH UNITS (ref 7.35–7.45)
PH, TEMP CORRECTED: 7.47 PH UNITS (ref 7.35–7.45)
PHENYTOIN SERPL-MCNC: 6.9 MCG/ML (ref 10–20)
PLAT MORPH BLD: NORMAL
PLATELET # BLD AUTO: 316 10*3/MM3 (ref 140–450)
PMV BLD AUTO: 10.3 FL (ref 6–12)
PO2 BLDA: 115 MM HG (ref 83–108)
PO2 TEMP ADJ BLD: 115 MM HG (ref 83–108)
POIKILOCYTOSIS BLD QL SMEAR: ABNORMAL
POLYCHROMASIA BLD QL SMEAR: ABNORMAL
POTASSIUM SERPL-SCNC: 3.7 MMOL/L (ref 3.5–5.2)
POTASSIUM SERPL-SCNC: 4.3 MMOL/L (ref 3.5–5.2)
PROT SERPL-MCNC: 5.1 G/DL (ref 6–8.5)
RBC # BLD AUTO: 3.24 10*6/MM3 (ref 4.14–5.8)
SAO2 % BLDCOA: 99.3 % (ref 94–99)
SET MECH RESP RATE: 20
SODIUM SERPL-SCNC: 124 MMOL/L (ref 136–145)
SODIUM SERPL-SCNC: 139 MMOL/L (ref 136–145)
SODIUM SERPL-SCNC: 139 MMOL/L (ref 136–145)
SODIUM SERPL-SCNC: 140 MMOL/L (ref 136–145)
VARIANT LYMPHS NFR BLD MANUAL: 4 % (ref 19.6–45.3)
VENTILATOR MODE: AC
VT ON VENT VENT: 450 ML
WBC MORPH BLD: NORMAL
WBC NRBC COR # BLD: 9.37 10*3/MM3 (ref 3.4–10.8)

## 2022-05-28 PROCEDURE — 94799 UNLISTED PULMONARY SVC/PX: CPT

## 2022-05-28 PROCEDURE — 93971 EXTREMITY STUDY: CPT

## 2022-05-28 PROCEDURE — 25010000002 FOSPHENYTOIN 500 MG PE/10ML SOLUTION 10 ML VIAL: Performed by: CLINICAL NURSE SPECIALIST

## 2022-05-28 PROCEDURE — 25010000002 LEVETIRACETAM IN NACL 0.82% 500 MG/100ML SOLUTION: Performed by: NEUROLOGICAL SURGERY

## 2022-05-28 PROCEDURE — 25010000002 VANCOMYCIN 10 G RECONSTITUTED SOLUTION: Performed by: INTERNAL MEDICINE

## 2022-05-28 PROCEDURE — 93971 EXTREMITY STUDY: CPT | Performed by: SURGERY

## 2022-05-28 PROCEDURE — 25010000002 MEROPENEM PER 100 MG: Performed by: INTERNAL MEDICINE

## 2022-05-28 PROCEDURE — 80053 COMPREHEN METABOLIC PANEL: CPT | Performed by: PSYCHIATRY & NEUROLOGY

## 2022-05-28 PROCEDURE — 99231 SBSQ HOSP IP/OBS SF/LOW 25: CPT | Performed by: SURGERY

## 2022-05-28 PROCEDURE — 63710000001 INSULIN REGULAR HUMAN PER 5 UNITS: Performed by: INTERNAL MEDICINE

## 2022-05-28 PROCEDURE — 36600 WITHDRAWAL OF ARTERIAL BLOOD: CPT

## 2022-05-28 PROCEDURE — 83930 ASSAY OF BLOOD OSMOLALITY: CPT | Performed by: NEUROLOGICAL SURGERY

## 2022-05-28 PROCEDURE — 63710000001 INSULIN DETEMIR PER 5 UNITS: Performed by: INTERNAL MEDICINE

## 2022-05-28 PROCEDURE — 84295 ASSAY OF SERUM SODIUM: CPT | Performed by: NEUROLOGICAL SURGERY

## 2022-05-28 PROCEDURE — 25010000002 FOSPHENYTOIN 500 MG PE/10ML SOLUTION 10 ML VIAL: Performed by: PSYCHIATRY & NEUROLOGY

## 2022-05-28 PROCEDURE — 83735 ASSAY OF MAGNESIUM: CPT | Performed by: PSYCHIATRY & NEUROLOGY

## 2022-05-28 PROCEDURE — 94003 VENT MGMT INPAT SUBQ DAY: CPT

## 2022-05-28 PROCEDURE — 25010000002 PROPOFOL 10 MG/ML EMULSION: Performed by: NEUROLOGICAL SURGERY

## 2022-05-28 PROCEDURE — 82962 GLUCOSE BLOOD TEST: CPT

## 2022-05-28 PROCEDURE — 71045 X-RAY EXAM CHEST 1 VIEW: CPT

## 2022-05-28 PROCEDURE — 99233 SBSQ HOSP IP/OBS HIGH 50: CPT | Performed by: INTERNAL MEDICINE

## 2022-05-28 PROCEDURE — 85025 COMPLETE CBC W/AUTO DIFF WBC: CPT | Performed by: NEUROLOGICAL SURGERY

## 2022-05-28 PROCEDURE — 99024 POSTOP FOLLOW-UP VISIT: CPT | Performed by: NEUROLOGICAL SURGERY

## 2022-05-28 PROCEDURE — 80185 ASSAY OF PHENYTOIN TOTAL: CPT | Performed by: PSYCHIATRY & NEUROLOGY

## 2022-05-28 PROCEDURE — 94761 N-INVAS EAR/PLS OXIMETRY MLT: CPT

## 2022-05-28 PROCEDURE — 82803 BLOOD GASES ANY COMBINATION: CPT

## 2022-05-28 PROCEDURE — 25010000002 ALTEPLASE 2 MG RECONSTITUTED SOLUTION: Performed by: INTERNAL MEDICINE

## 2022-05-28 PROCEDURE — 99233 SBSQ HOSP IP/OBS HIGH 50: CPT | Performed by: PSYCHIATRY & NEUROLOGY

## 2022-05-28 PROCEDURE — 85007 BL SMEAR W/DIFF WBC COUNT: CPT | Performed by: NEUROLOGICAL SURGERY

## 2022-05-28 PROCEDURE — 25010000002 ALTEPLASE 2 MG RECONSTITUTED SOLUTION 1 EACH VIAL: Performed by: SURGERY

## 2022-05-28 PROCEDURE — 25010000002 HEPARIN (PORCINE) PER 1000 UNITS: Performed by: NEUROLOGICAL SURGERY

## 2022-05-28 RX ORDER — 3% SODIUM CHLORIDE 3 G/100ML
50 INJECTION, SOLUTION INTRAVENOUS CONTINUOUS
Status: DISPENSED | OUTPATIENT
Start: 2022-05-28 | End: 2022-05-29

## 2022-05-28 RX ADMIN — INSULIN HUMAN 8 UNITS: 100 INJECTION, SOLUTION PARENTERAL at 18:24

## 2022-05-28 RX ADMIN — CHLORHEXIDINE GLUCONATE 15 ML: 1.2 RINSE ORAL at 20:06

## 2022-05-28 RX ADMIN — MUPIROCIN 1 APPLICATION: 20 OINTMENT TOPICAL at 21:26

## 2022-05-28 RX ADMIN — ALBUTEROL SULFATE 2.5 MG: 2.5 SOLUTION RESPIRATORY (INHALATION) at 06:38

## 2022-05-28 RX ADMIN — SODIUM CHLORIDE SOLN NEBU 3% 4 ML: 3 NEBU SOLN at 06:46

## 2022-05-28 RX ADMIN — ALTEPLASE: 2.2 INJECTION, POWDER, LYOPHILIZED, FOR SOLUTION INTRAVENOUS at 13:30

## 2022-05-28 RX ADMIN — LACOSAMIDE 200 MG: 10 INJECTION, SOLUTION INTRAVENOUS at 09:11

## 2022-05-28 RX ADMIN — INSULIN HUMAN 8 UNITS: 100 INJECTION, SOLUTION PARENTERAL at 00:16

## 2022-05-28 RX ADMIN — PROPOFOL 25 MCG/KG/MIN: 10 INJECTION, EMULSION INTRAVENOUS at 20:29

## 2022-05-28 RX ADMIN — LISINOPRIL 20 MG: 20 TABLET ORAL at 05:03

## 2022-05-28 RX ADMIN — Medication 45 ML: at 09:11

## 2022-05-28 RX ADMIN — POLYETHYLENE GLYCOL 3350 17 G: 17 POWDER, FOR SOLUTION ORAL at 12:07

## 2022-05-28 RX ADMIN — LEVETIRACETAM 500 MG: 5 INJECTION INTRAVASCULAR at 09:11

## 2022-05-28 RX ADMIN — MEROPENEM 1 G: 1 INJECTION, POWDER, FOR SOLUTION INTRAVENOUS at 18:05

## 2022-05-28 RX ADMIN — VANCOMYCIN HYDROCHLORIDE 1250 MG: 10 INJECTION, POWDER, LYOPHILIZED, FOR SOLUTION INTRAVENOUS at 02:01

## 2022-05-28 RX ADMIN — HEPARIN SODIUM 5000 UNITS: 5000 INJECTION INTRAVENOUS; SUBCUTANEOUS at 20:06

## 2022-05-28 RX ADMIN — HEPARIN SODIUM 5000 UNITS: 5000 INJECTION INTRAVENOUS; SUBCUTANEOUS at 09:11

## 2022-05-28 RX ADMIN — LEVOTHYROXINE SODIUM 125 MCG: 125 TABLET ORAL at 05:09

## 2022-05-28 RX ADMIN — PROPOFOL 25 MCG/KG/MIN: 10 INJECTION, EMULSION INTRAVENOUS at 02:01

## 2022-05-28 RX ADMIN — ALBUTEROL SULFATE 2.5 MG: 2.5 SOLUTION RESPIRATORY (INHALATION) at 14:22

## 2022-05-28 RX ADMIN — Medication 45 ML: at 20:21

## 2022-05-28 RX ADMIN — DORNASE ALFA 5 MG: 1 SOLUTION RESPIRATORY (INHALATION) at 21:21

## 2022-05-28 RX ADMIN — SODIUM CHLORIDE 200 MG PE: 9 INJECTION, SOLUTION INTRAVENOUS at 21:27

## 2022-05-28 RX ADMIN — ALTEPLASE 10 MG: 2.2 INJECTION, POWDER, LYOPHILIZED, FOR SOLUTION INTRAVENOUS at 10:25

## 2022-05-28 RX ADMIN — INSULIN DETEMIR 20 UNITS: 100 INJECTION, SOLUTION SUBCUTANEOUS at 20:14

## 2022-05-28 RX ADMIN — ALTEPLASE 10 MG: 2.2 INJECTION, POWDER, LYOPHILIZED, FOR SOLUTION INTRAVENOUS at 21:21

## 2022-05-28 RX ADMIN — INSULIN HUMAN 8 UNITS: 100 INJECTION, SOLUTION PARENTERAL at 05:14

## 2022-05-28 RX ADMIN — CHLORHEXIDINE GLUCONATE 15 ML: 1.2 RINSE ORAL at 09:12

## 2022-05-28 RX ADMIN — INSULIN HUMAN 3 UNITS: 100 INJECTION, SOLUTION PARENTERAL at 00:16

## 2022-05-28 RX ADMIN — PROPOFOL 25 MCG/KG/MIN: 10 INJECTION, EMULSION INTRAVENOUS at 09:34

## 2022-05-28 RX ADMIN — LACOSAMIDE 200 MG: 10 INJECTION, SOLUTION INTRAVENOUS at 20:00

## 2022-05-28 RX ADMIN — SODIUM CHLORIDE 175 MG PE: 9 INJECTION, SOLUTION INTRAVENOUS at 05:09

## 2022-05-28 RX ADMIN — LIOTHYRONINE SODIUM 25 MCG: 25 TABLET ORAL at 09:38

## 2022-05-28 RX ADMIN — DORNASE ALFA 5 MG: 1 SOLUTION RESPIRATORY (INHALATION) at 10:25

## 2022-05-28 RX ADMIN — MEROPENEM 1 G: 1 INJECTION, POWDER, FOR SOLUTION INTRAVENOUS at 09:11

## 2022-05-28 RX ADMIN — SODIUM CHLORIDE SOLN NEBU 3% 4 ML: 3 NEBU SOLN at 19:01

## 2022-05-28 RX ADMIN — LEVETIRACETAM 500 MG: 5 INJECTION INTRAVASCULAR at 20:11

## 2022-05-28 RX ADMIN — CARVEDILOL 6.25 MG: 6.25 TABLET, FILM COATED ORAL at 18:24

## 2022-05-28 RX ADMIN — PANTOPRAZOLE SODIUM 40 MG: 40 INJECTION, POWDER, FOR SOLUTION INTRAVENOUS at 05:09

## 2022-05-28 RX ADMIN — CARVEDILOL 6.25 MG: 6.25 TABLET, FILM COATED ORAL at 09:35

## 2022-05-28 RX ADMIN — MEROPENEM 1 G: 1 INJECTION, POWDER, FOR SOLUTION INTRAVENOUS at 00:07

## 2022-05-28 RX ADMIN — SODIUM CHLORIDE 200 MG PE: 9 INJECTION, SOLUTION INTRAVENOUS at 14:34

## 2022-05-28 RX ADMIN — ALBUTEROL SULFATE 2.5 MG: 2.5 SOLUTION RESPIRATORY (INHALATION) at 23:30

## 2022-05-28 RX ADMIN — MUPIROCIN 1 APPLICATION: 20 OINTMENT TOPICAL at 09:38

## 2022-05-28 RX ADMIN — VANCOMYCIN HYDROCHLORIDE 1250 MG: 10 INJECTION, POWDER, LYOPHILIZED, FOR SOLUTION INTRAVENOUS at 14:34

## 2022-05-29 ENCOUNTER — APPOINTMENT (OUTPATIENT)
Dept: GENERAL RADIOLOGY | Facility: HOSPITAL | Age: 77
End: 2022-05-29

## 2022-05-29 PROBLEM — I82.621 ACUTE DEEP VEIN THROMBOSIS (DVT) OF BRACHIAL VEIN OF RIGHT UPPER EXTREMITY: Status: ACTIVE | Noted: 2022-05-29

## 2022-05-29 LAB
ALBUMIN SERPL-MCNC: 1.5 G/DL (ref 3.5–5.2)
ALBUMIN/GLOB SERPL: 0.4 G/DL
ALP SERPL-CCNC: 138 U/L (ref 39–117)
ALT SERPL W P-5'-P-CCNC: 35 U/L (ref 1–41)
ANION GAP SERPL CALCULATED.3IONS-SCNC: 6 MMOL/L (ref 5–15)
AST SERPL-CCNC: 45 U/L (ref 1–40)
BILIRUB SERPL-MCNC: 0.2 MG/DL (ref 0–1.2)
BUN SERPL-MCNC: 33 MG/DL (ref 8–23)
BUN/CREAT SERPL: 47.8 (ref 7–25)
CALCIUM SPEC-SCNC: 7.6 MG/DL (ref 8.6–10.5)
CHLORIDE SERPL-SCNC: 102 MMOL/L (ref 98–107)
CO2 SERPL-SCNC: 31 MMOL/L (ref 22–29)
CREAT SERPL-MCNC: 0.69 MG/DL (ref 0.76–1.27)
CRP SERPL-MCNC: 20.35 MG/DL (ref 0–0.5)
DEPRECATED RDW RBC AUTO: 49.9 FL (ref 37–54)
EGFRCR SERPLBLD CKD-EPI 2021: 95.3 ML/MIN/1.73
ERYTHROCYTE [DISTWIDTH] IN BLOOD BY AUTOMATED COUNT: 13.6 % (ref 12.3–15.4)
GLOBULIN UR ELPH-MCNC: 3.8 GM/DL
GLUCOSE BLDC GLUCOMTR-MCNC: 129 MG/DL (ref 70–130)
GLUCOSE BLDC GLUCOMTR-MCNC: 151 MG/DL (ref 70–130)
GLUCOSE BLDC GLUCOMTR-MCNC: 167 MG/DL (ref 70–130)
GLUCOSE BLDC GLUCOMTR-MCNC: 209 MG/DL (ref 70–130)
GLUCOSE BLDC GLUCOMTR-MCNC: 83 MG/DL (ref 70–130)
GLUCOSE SERPL-MCNC: 119 MG/DL (ref 65–99)
HCT VFR BLD AUTO: 32.2 % (ref 37.5–51)
HGB BLD-MCNC: 10 G/DL (ref 13–17.7)
LYMPHOCYTES # BLD MANUAL: 0.71 10*3/MM3 (ref 0.7–3.1)
LYMPHOCYTES NFR BLD MANUAL: 9.4 % (ref 5–12)
MCH RBC QN AUTO: 31.2 PG (ref 26.6–33)
MCHC RBC AUTO-ENTMCNC: 31.1 G/DL (ref 31.5–35.7)
MCV RBC AUTO: 100.3 FL (ref 79–97)
MONOCYTES # BLD: 0.81 10*3/MM3 (ref 0.1–0.9)
MYELOCYTES NFR BLD MANUAL: 1 % (ref 0–0)
NEUTROPHILS # BLD AUTO: 7 10*3/MM3 (ref 1.7–7)
NEUTROPHILS NFR BLD MANUAL: 76 % (ref 42.7–76)
NEUTS BAND NFR BLD MANUAL: 5.2 % (ref 0–5)
PHENYTOIN SERPL-MCNC: 8 MCG/ML (ref 10–20)
PLAT MORPH BLD: NORMAL
PLATELET # BLD AUTO: 356 10*3/MM3 (ref 140–450)
PMV BLD AUTO: 10.2 FL (ref 6–12)
POIKILOCYTOSIS BLD QL SMEAR: ABNORMAL
POLYCHROMASIA BLD QL SMEAR: ABNORMAL
POTASSIUM SERPL-SCNC: 4 MMOL/L (ref 3.5–5.2)
PROT SERPL-MCNC: 5.3 G/DL (ref 6–8.5)
RBC # BLD AUTO: 3.21 10*6/MM3 (ref 4.14–5.8)
SODIUM SERPL-SCNC: 139 MMOL/L (ref 136–145)
T3FREE SERPL-MCNC: 1.6 PG/ML (ref 2–4.4)
T4 FREE SERPL-MCNC: 0.53 NG/DL (ref 0.93–1.7)
TSH SERPL DL<=0.05 MIU/L-ACNC: 3.95 UIU/ML (ref 0.27–4.2)
VARIANT LYMPHS NFR BLD MANUAL: 8.3 % (ref 19.6–45.3)
WBC MORPH BLD: NORMAL
WBC NRBC COR # BLD: 8.61 10*3/MM3 (ref 3.4–10.8)

## 2022-05-29 PROCEDURE — 94799 UNLISTED PULMONARY SVC/PX: CPT

## 2022-05-29 PROCEDURE — 63710000001 INSULIN REGULAR HUMAN PER 5 UNITS: Performed by: INTERNAL MEDICINE

## 2022-05-29 PROCEDURE — 99232 SBSQ HOSP IP/OBS MODERATE 35: CPT | Performed by: SURGERY

## 2022-05-29 PROCEDURE — 84443 ASSAY THYROID STIM HORMONE: CPT | Performed by: INTERNAL MEDICINE

## 2022-05-29 PROCEDURE — 99024 POSTOP FOLLOW-UP VISIT: CPT | Performed by: NEUROLOGICAL SURGERY

## 2022-05-29 PROCEDURE — 82962 GLUCOSE BLOOD TEST: CPT

## 2022-05-29 PROCEDURE — 25010000002 FOSPHENYTOIN 500 MG PE/10ML SOLUTION 10 ML VIAL: Performed by: PSYCHIATRY & NEUROLOGY

## 2022-05-29 PROCEDURE — 99232 SBSQ HOSP IP/OBS MODERATE 35: CPT | Performed by: PSYCHIATRY & NEUROLOGY

## 2022-05-29 PROCEDURE — 80053 COMPREHEN METABOLIC PANEL: CPT | Performed by: PSYCHIATRY & NEUROLOGY

## 2022-05-29 PROCEDURE — 94761 N-INVAS EAR/PLS OXIMETRY MLT: CPT

## 2022-05-29 PROCEDURE — 63710000001 INSULIN DETEMIR PER 5 UNITS: Performed by: INTERNAL MEDICINE

## 2022-05-29 PROCEDURE — 94003 VENT MGMT INPAT SUBQ DAY: CPT

## 2022-05-29 PROCEDURE — 25010000002 MORPHINE PER 10 MG: Performed by: NEUROLOGICAL SURGERY

## 2022-05-29 PROCEDURE — 99233 SBSQ HOSP IP/OBS HIGH 50: CPT | Performed by: INTERNAL MEDICINE

## 2022-05-29 PROCEDURE — 25010000002 LEVETIRACETAM IN NACL 0.82% 500 MG/100ML SOLUTION: Performed by: NEUROLOGICAL SURGERY

## 2022-05-29 PROCEDURE — 80185 ASSAY OF PHENYTOIN TOTAL: CPT | Performed by: PSYCHIATRY & NEUROLOGY

## 2022-05-29 PROCEDURE — 86140 C-REACTIVE PROTEIN: CPT | Performed by: NEUROLOGICAL SURGERY

## 2022-05-29 PROCEDURE — 85007 BL SMEAR W/DIFF WBC COUNT: CPT | Performed by: NEUROLOGICAL SURGERY

## 2022-05-29 PROCEDURE — 84481 FREE ASSAY (FT-3): CPT | Performed by: INTERNAL MEDICINE

## 2022-05-29 PROCEDURE — 25010000002 MEROPENEM PER 100 MG: Performed by: INTERNAL MEDICINE

## 2022-05-29 PROCEDURE — 25010000002 MORPHINE SULFATE (PF) 2 MG/ML SOLUTION: Performed by: NEUROLOGICAL SURGERY

## 2022-05-29 PROCEDURE — 84439 ASSAY OF FREE THYROXINE: CPT | Performed by: INTERNAL MEDICINE

## 2022-05-29 PROCEDURE — 85025 COMPLETE CBC W/AUTO DIFF WBC: CPT | Performed by: NEUROLOGICAL SURGERY

## 2022-05-29 PROCEDURE — 71045 X-RAY EXAM CHEST 1 VIEW: CPT

## 2022-05-29 PROCEDURE — 25010000002 HEPARIN (PORCINE) PER 1000 UNITS: Performed by: NEUROLOGICAL SURGERY

## 2022-05-29 PROCEDURE — 25010000002 ALTEPLASE 2 MG RECONSTITUTED SOLUTION 1 EACH VIAL: Performed by: SURGERY

## 2022-05-29 PROCEDURE — 25010000002 PROPOFOL 10 MG/ML EMULSION: Performed by: NEUROLOGICAL SURGERY

## 2022-05-29 RX ORDER — AMINO AC/PROTEIN HYDR/WHEY PRO 10G-100/30
1 LIQUID (ML) ORAL
Status: DISCONTINUED | OUTPATIENT
Start: 2022-05-29 | End: 2022-05-31

## 2022-05-29 RX ADMIN — INSULIN HUMAN 2 UNITS: 100 INJECTION, SOLUTION PARENTERAL at 00:28

## 2022-05-29 RX ADMIN — INSULIN HUMAN 8 UNITS: 100 INJECTION, SOLUTION PARENTERAL at 17:29

## 2022-05-29 RX ADMIN — SODIUM CHLORIDE 200 MG PE: 9 INJECTION, SOLUTION INTRAVENOUS at 21:25

## 2022-05-29 RX ADMIN — CHLORHEXIDINE GLUCONATE 15 ML: 1.2 RINSE ORAL at 20:46

## 2022-05-29 RX ADMIN — POLYETHYLENE GLYCOL 3350 17 G: 17 POWDER, FOR SOLUTION ORAL at 11:32

## 2022-05-29 RX ADMIN — Medication 45 ML: at 08:37

## 2022-05-29 RX ADMIN — Medication 45 ML: at 21:25

## 2022-05-29 RX ADMIN — MORPHINE SULFATE 2 MG: 2 INJECTION, SOLUTION INTRAMUSCULAR; INTRAVENOUS at 17:47

## 2022-05-29 RX ADMIN — PROPOFOL 35 MCG/KG/MIN: 10 INJECTION, EMULSION INTRAVENOUS at 18:26

## 2022-05-29 RX ADMIN — INSULIN HUMAN 8 UNITS: 100 INJECTION, SOLUTION PARENTERAL at 05:01

## 2022-05-29 RX ADMIN — Medication 45 ML: at 17:29

## 2022-05-29 RX ADMIN — ALBUTEROL SULFATE 2.5 MG: 2.5 SOLUTION RESPIRATORY (INHALATION) at 22:41

## 2022-05-29 RX ADMIN — LACOSAMIDE 200 MG: 10 INJECTION, SOLUTION INTRAVENOUS at 20:45

## 2022-05-29 RX ADMIN — LEVOTHYROXINE SODIUM 125 MCG: 125 TABLET ORAL at 05:01

## 2022-05-29 RX ADMIN — SODIUM CHLORIDE 200 MG PE: 9 INJECTION, SOLUTION INTRAVENOUS at 05:01

## 2022-05-29 RX ADMIN — DORNASE ALFA 5 MG: 1 SOLUTION RESPIRATORY (INHALATION) at 21:09

## 2022-05-29 RX ADMIN — PROPOFOL 25 MCG/KG/MIN: 10 INJECTION, EMULSION INTRAVENOUS at 13:42

## 2022-05-29 RX ADMIN — CHLORHEXIDINE GLUCONATE 15 ML: 1.2 RINSE ORAL at 08:37

## 2022-05-29 RX ADMIN — CARVEDILOL 6.25 MG: 6.25 TABLET, FILM COATED ORAL at 17:28

## 2022-05-29 RX ADMIN — PANTOPRAZOLE SODIUM 40 MG: 40 INJECTION, POWDER, FOR SOLUTION INTRAVENOUS at 05:01

## 2022-05-29 RX ADMIN — HEPARIN SODIUM 5000 UNITS: 5000 INJECTION INTRAVENOUS; SUBCUTANEOUS at 08:37

## 2022-05-29 RX ADMIN — LACOSAMIDE 200 MG: 10 INJECTION, SOLUTION INTRAVENOUS at 08:37

## 2022-05-29 RX ADMIN — PROPOFOL 45 MCG/KG/MIN: 10 INJECTION, EMULSION INTRAVENOUS at 08:56

## 2022-05-29 RX ADMIN — INSULIN DETEMIR 20 UNITS: 100 INJECTION, SOLUTION SUBCUTANEOUS at 20:47

## 2022-05-29 RX ADMIN — SODIUM CHLORIDE 200 MG PE: 9 INJECTION, SOLUTION INTRAVENOUS at 13:57

## 2022-05-29 RX ADMIN — MUPIROCIN 1 APPLICATION: 20 OINTMENT TOPICAL at 20:47

## 2022-05-29 RX ADMIN — MEROPENEM 1 G: 1 INJECTION, POWDER, FOR SOLUTION INTRAVENOUS at 00:30

## 2022-05-29 RX ADMIN — SODIUM CHLORIDE SOLN NEBU 3% 4 ML: 3 NEBU SOLN at 18:21

## 2022-05-29 RX ADMIN — ALBUTEROL SULFATE 2.5 MG: 2.5 SOLUTION RESPIRATORY (INHALATION) at 14:16

## 2022-05-29 RX ADMIN — DORNASE ALFA 5 MG: 1 SOLUTION RESPIRATORY (INHALATION) at 10:56

## 2022-05-29 RX ADMIN — MORPHINE SULFATE 2 MG: 2 INJECTION, SOLUTION INTRAMUSCULAR; INTRAVENOUS at 07:25

## 2022-05-29 RX ADMIN — MEROPENEM 1 G: 1 INJECTION, POWDER, FOR SOLUTION INTRAVENOUS at 08:37

## 2022-05-29 RX ADMIN — ALTEPLASE 10 MG: 2.2 INJECTION, POWDER, LYOPHILIZED, FOR SOLUTION INTRAVENOUS at 21:09

## 2022-05-29 RX ADMIN — INSULIN HUMAN 2 UNITS: 100 INJECTION, SOLUTION PARENTERAL at 17:28

## 2022-05-29 RX ADMIN — LEVETIRACETAM 500 MG: 5 INJECTION INTRAVASCULAR at 08:37

## 2022-05-29 RX ADMIN — ALBUTEROL SULFATE 2.5 MG: 2.5 SOLUTION RESPIRATORY (INHALATION) at 06:27

## 2022-05-29 RX ADMIN — INSULIN HUMAN 8 UNITS: 100 INJECTION, SOLUTION PARENTERAL at 00:28

## 2022-05-29 RX ADMIN — ALTEPLASE 10 MG: 2.2 INJECTION, POWDER, LYOPHILIZED, FOR SOLUTION INTRAVENOUS at 10:56

## 2022-05-29 RX ADMIN — HEPARIN SODIUM 5000 UNITS: 5000 INJECTION INTRAVENOUS; SUBCUTANEOUS at 20:46

## 2022-05-29 RX ADMIN — LIOTHYRONINE SODIUM 25 MCG: 25 TABLET ORAL at 08:37

## 2022-05-29 RX ADMIN — LEVETIRACETAM 500 MG: 5 INJECTION INTRAVASCULAR at 20:46

## 2022-05-29 RX ADMIN — PROPOFOL 25 MCG/KG/MIN: 10 INJECTION, EMULSION INTRAVENOUS at 03:17

## 2022-05-29 RX ADMIN — PROPOFOL 30 MCG/KG/MIN: 10 INJECTION, EMULSION INTRAVENOUS at 23:18

## 2022-05-29 RX ADMIN — MEROPENEM 1 G: 1 INJECTION, POWDER, FOR SOLUTION INTRAVENOUS at 16:53

## 2022-05-29 RX ADMIN — SODIUM CHLORIDE SOLN NEBU 3% 4 ML: 3 NEBU SOLN at 06:35

## 2022-05-29 RX ADMIN — MUPIROCIN 1 APPLICATION: 20 OINTMENT TOPICAL at 08:38

## 2022-05-30 ENCOUNTER — APPOINTMENT (OUTPATIENT)
Dept: GENERAL RADIOLOGY | Facility: HOSPITAL | Age: 77
End: 2022-05-30

## 2022-05-30 LAB
ALBUMIN SERPL-MCNC: 1.8 G/DL (ref 3.5–5.2)
ALBUMIN/GLOB SERPL: 0.7 G/DL
ALP SERPL-CCNC: 142 U/L (ref 39–117)
ALT SERPL W P-5'-P-CCNC: 32 U/L (ref 1–41)
ANION GAP SERPL CALCULATED.3IONS-SCNC: 2 MMOL/L (ref 5–15)
ARTERIAL PATENCY WRIST A: POSITIVE
AST SERPL-CCNC: 38 U/L (ref 1–40)
ATMOSPHERIC PRESS: 748 MMHG
BACTERIA SPEC ANAEROBE CULT: NORMAL
BASE EXCESS BLDA CALC-SCNC: 9 MMOL/L (ref 0–2)
BASOPHILS # BLD MANUAL: 0.09 10*3/MM3 (ref 0–0.2)
BASOPHILS NFR BLD MANUAL: 1 % (ref 0–1.5)
BDY SITE: ABNORMAL
BILIRUB SERPL-MCNC: 0.2 MG/DL (ref 0–1.2)
BODY TEMPERATURE: 37 C
BUN SERPL-MCNC: 33 MG/DL (ref 8–23)
BUN/CREAT SERPL: 50.8 (ref 7–25)
CALCIUM SPEC-SCNC: 7.6 MG/DL (ref 8.6–10.5)
CHLORIDE SERPL-SCNC: 105 MMOL/L (ref 98–107)
CO2 SERPL-SCNC: 33 MMOL/L (ref 22–29)
CREAT SERPL-MCNC: 0.65 MG/DL (ref 0.76–1.27)
DEPRECATED RDW RBC AUTO: 50.7 FL (ref 37–54)
EGFRCR SERPLBLD CKD-EPI 2021: 97 ML/MIN/1.73
EOSINOPHIL # BLD MANUAL: 0.18 10*3/MM3 (ref 0–0.4)
EOSINOPHIL NFR BLD MANUAL: 2 % (ref 0.3–6.2)
ERYTHROCYTE [DISTWIDTH] IN BLOOD BY AUTOMATED COUNT: 14 % (ref 12.3–15.4)
GLOBULIN UR ELPH-MCNC: 2.6 GM/DL
GLUCOSE BLDC GLUCOMTR-MCNC: 110 MG/DL (ref 70–130)
GLUCOSE BLDC GLUCOMTR-MCNC: 128 MG/DL (ref 70–130)
GLUCOSE BLDC GLUCOMTR-MCNC: 142 MG/DL (ref 70–130)
GLUCOSE BLDC GLUCOMTR-MCNC: 148 MG/DL (ref 70–130)
GLUCOSE BLDC GLUCOMTR-MCNC: 153 MG/DL (ref 70–130)
GLUCOSE SERPL-MCNC: 127 MG/DL (ref 65–99)
HCO3 BLDA-SCNC: 32.7 MMOL/L (ref 20–26)
HCT VFR BLD AUTO: 28.3 % (ref 37.5–51)
HGB BLD-MCNC: 9.1 G/DL (ref 13–17.7)
INHALED O2 CONCENTRATION: 30 %
LYMPHOCYTES # BLD MANUAL: 1.02 10*3/MM3 (ref 0.7–3.1)
LYMPHOCYTES NFR BLD MANUAL: 2 % (ref 5–12)
Lab: ABNORMAL
MCH RBC QN AUTO: 31.9 PG (ref 26.6–33)
MCHC RBC AUTO-ENTMCNC: 32.2 G/DL (ref 31.5–35.7)
MCV RBC AUTO: 99.3 FL (ref 79–97)
METAMYELOCYTES NFR BLD MANUAL: 1 % (ref 0–0)
MODALITY: ABNORMAL
MONOCYTES # BLD: 0.18 10*3/MM3 (ref 0.1–0.9)
MYELOCYTES NFR BLD MANUAL: 1 % (ref 0–0)
NEUTROPHILS # BLD AUTO: 7.55 10*3/MM3 (ref 1.7–7)
NEUTROPHILS NFR BLD MANUAL: 80.8 % (ref 42.7–76)
NEUTS BAND NFR BLD MANUAL: 1 % (ref 0–5)
NEUTS VAC BLD QL SMEAR: ABNORMAL
PCO2 BLDA: 40.7 MM HG (ref 35–45)
PCO2 TEMP ADJ BLD: 40.7 MM HG (ref 35–45)
PEEP RESPIRATORY: 9 CM[H2O]
PH BLDA: 7.51 PH UNITS (ref 7.35–7.45)
PH, TEMP CORRECTED: 7.51 PH UNITS (ref 7.35–7.45)
PHENYTOIN SERPL-MCNC: 6.4 MCG/ML (ref 10–20)
PLAT MORPH BLD: NORMAL
PLATELET # BLD AUTO: 340 10*3/MM3 (ref 140–450)
PMV BLD AUTO: 10.1 FL (ref 6–12)
PO2 BLDA: 71 MM HG (ref 83–108)
PO2 TEMP ADJ BLD: 71 MM HG (ref 83–108)
POTASSIUM SERPL-SCNC: 4.3 MMOL/L (ref 3.5–5.2)
PROT SERPL-MCNC: 4.4 G/DL (ref 6–8.5)
RBC # BLD AUTO: 2.85 10*6/MM3 (ref 4.14–5.8)
RBC MORPH BLD: NORMAL
SAO2 % BLDCOA: 96.1 % (ref 94–99)
SET MECH RESP RATE: 25
SODIUM SERPL-SCNC: 140 MMOL/L (ref 136–145)
TOXIC GRANULATION: ABNORMAL
VARIANT LYMPHS NFR BLD MANUAL: 1 % (ref 0–5)
VARIANT LYMPHS NFR BLD MANUAL: 10.1 % (ref 19.6–45.3)
VENTILATOR MODE: AC
VT ON VENT VENT: 500 ML
WBC NRBC COR # BLD: 9.23 10*3/MM3 (ref 3.4–10.8)

## 2022-05-30 PROCEDURE — 63710000001 INSULIN REGULAR HUMAN PER 5 UNITS: Performed by: INTERNAL MEDICINE

## 2022-05-30 PROCEDURE — 99233 SBSQ HOSP IP/OBS HIGH 50: CPT | Performed by: PSYCHIATRY & NEUROLOGY

## 2022-05-30 PROCEDURE — 94664 DEMO&/EVAL PT USE INHALER: CPT

## 2022-05-30 PROCEDURE — 25010000002 ALTEPLASE 2 MG RECONSTITUTED SOLUTION 1 EACH VIAL: Performed by: SURGERY

## 2022-05-30 PROCEDURE — 82962 GLUCOSE BLOOD TEST: CPT

## 2022-05-30 PROCEDURE — 63710000001 INSULIN DETEMIR PER 5 UNITS: Performed by: INTERNAL MEDICINE

## 2022-05-30 PROCEDURE — 94799 UNLISTED PULMONARY SVC/PX: CPT

## 2022-05-30 PROCEDURE — 36600 WITHDRAWAL OF ARTERIAL BLOOD: CPT

## 2022-05-30 PROCEDURE — 85025 COMPLETE CBC W/AUTO DIFF WBC: CPT | Performed by: NEUROLOGICAL SURGERY

## 2022-05-30 PROCEDURE — 80053 COMPREHEN METABOLIC PANEL: CPT | Performed by: PSYCHIATRY & NEUROLOGY

## 2022-05-30 PROCEDURE — 25010000002 LEVETIRACETAM IN NACL 0.82% 500 MG/100ML SOLUTION: Performed by: NEUROLOGICAL SURGERY

## 2022-05-30 PROCEDURE — 80185 ASSAY OF PHENYTOIN TOTAL: CPT | Performed by: PSYCHIATRY & NEUROLOGY

## 2022-05-30 PROCEDURE — 25010000002 PROPOFOL 10 MG/ML EMULSION: Performed by: NEUROLOGICAL SURGERY

## 2022-05-30 PROCEDURE — 82803 BLOOD GASES ANY COMBINATION: CPT

## 2022-05-30 PROCEDURE — 99024 POSTOP FOLLOW-UP VISIT: CPT | Performed by: NEUROLOGICAL SURGERY

## 2022-05-30 PROCEDURE — 25010000002 MORPHINE SULFATE (PF) 2 MG/ML SOLUTION: Performed by: INTERNAL MEDICINE

## 2022-05-30 PROCEDURE — 25010000002 FOSPHENYTOIN 500 MG PE/10ML SOLUTION 10 ML VIAL: Performed by: PSYCHIATRY & NEUROLOGY

## 2022-05-30 PROCEDURE — 94003 VENT MGMT INPAT SUBQ DAY: CPT

## 2022-05-30 PROCEDURE — 85007 BL SMEAR W/DIFF WBC COUNT: CPT | Performed by: NEUROLOGICAL SURGERY

## 2022-05-30 PROCEDURE — 99233 SBSQ HOSP IP/OBS HIGH 50: CPT | Performed by: INTERNAL MEDICINE

## 2022-05-30 PROCEDURE — 99232 SBSQ HOSP IP/OBS MODERATE 35: CPT | Performed by: SURGERY

## 2022-05-30 PROCEDURE — 94761 N-INVAS EAR/PLS OXIMETRY MLT: CPT

## 2022-05-30 PROCEDURE — 71045 X-RAY EXAM CHEST 1 VIEW: CPT

## 2022-05-30 PROCEDURE — 25010000002 HEPARIN (PORCINE) PER 1000 UNITS: Performed by: NEUROLOGICAL SURGERY

## 2022-05-30 PROCEDURE — 25010000002 MEROPENEM PER 100 MG: Performed by: INTERNAL MEDICINE

## 2022-05-30 RX ORDER — MORPHINE SULFATE 2 MG/ML
2 INJECTION, SOLUTION INTRAMUSCULAR; INTRAVENOUS
Status: DISPENSED | OUTPATIENT
Start: 2022-05-30 | End: 2022-06-06

## 2022-05-30 RX ADMIN — INSULIN HUMAN 8 UNITS: 100 INJECTION, SOLUTION PARENTERAL at 05:11

## 2022-05-30 RX ADMIN — ALTEPLASE 10 MG: 2.2 INJECTION, POWDER, LYOPHILIZED, FOR SOLUTION INTRAVENOUS at 20:43

## 2022-05-30 RX ADMIN — LISINOPRIL 20 MG: 20 TABLET ORAL at 08:24

## 2022-05-30 RX ADMIN — LEVETIRACETAM 500 MG: 5 INJECTION INTRAVASCULAR at 20:03

## 2022-05-30 RX ADMIN — INSULIN HUMAN 8 UNITS: 100 INJECTION, SOLUTION PARENTERAL at 11:25

## 2022-05-30 RX ADMIN — MEROPENEM 1 G: 1 INJECTION, POWDER, FOR SOLUTION INTRAVENOUS at 00:00

## 2022-05-30 RX ADMIN — Medication 45 ML: at 11:26

## 2022-05-30 RX ADMIN — SODIUM CHLORIDE SOLN NEBU 3% 4 ML: 3 NEBU SOLN at 18:49

## 2022-05-30 RX ADMIN — POLYETHYLENE GLYCOL 3350 17 G: 17 POWDER, FOR SOLUTION ORAL at 08:25

## 2022-05-30 RX ADMIN — MUPIROCIN 1 APPLICATION: 20 OINTMENT TOPICAL at 20:03

## 2022-05-30 RX ADMIN — INSULIN DETEMIR 20 UNITS: 100 INJECTION, SOLUTION SUBCUTANEOUS at 20:26

## 2022-05-30 RX ADMIN — PANTOPRAZOLE SODIUM 40 MG: 40 INJECTION, POWDER, FOR SOLUTION INTRAVENOUS at 05:16

## 2022-05-30 RX ADMIN — MUPIROCIN 1 APPLICATION: 20 OINTMENT TOPICAL at 08:25

## 2022-05-30 RX ADMIN — SODIUM CHLORIDE 225 MG PE: 9 INJECTION, SOLUTION INTRAVENOUS at 14:13

## 2022-05-30 RX ADMIN — CARVEDILOL 6.25 MG: 6.25 TABLET, FILM COATED ORAL at 08:24

## 2022-05-30 RX ADMIN — SODIUM CHLORIDE 225 MG PE: 9 INJECTION, SOLUTION INTRAVENOUS at 21:00

## 2022-05-30 RX ADMIN — PROPOFOL 30 MCG/KG/MIN: 10 INJECTION, EMULSION INTRAVENOUS at 03:39

## 2022-05-30 RX ADMIN — INSULIN HUMAN 8 UNITS: 100 INJECTION, SOLUTION PARENTERAL at 00:07

## 2022-05-30 RX ADMIN — ALTEPLASE 10 MG: 2.2 INJECTION, POWDER, LYOPHILIZED, FOR SOLUTION INTRAVENOUS at 09:46

## 2022-05-30 RX ADMIN — SODIUM CHLORIDE 200 MG PE: 9 INJECTION, SOLUTION INTRAVENOUS at 05:56

## 2022-05-30 RX ADMIN — ALBUTEROL SULFATE 2.5 MG: 2.5 SOLUTION RESPIRATORY (INHALATION) at 06:27

## 2022-05-30 RX ADMIN — PROPOFOL 40 MCG/KG/MIN: 10 INJECTION, EMULSION INTRAVENOUS at 20:43

## 2022-05-30 RX ADMIN — LEVETIRACETAM 500 MG: 5 INJECTION INTRAVASCULAR at 08:24

## 2022-05-30 RX ADMIN — INSULIN HUMAN 8 UNITS: 100 INJECTION, SOLUTION PARENTERAL at 17:34

## 2022-05-30 RX ADMIN — LACOSAMIDE 200 MG: 10 INJECTION, SOLUTION INTRAVENOUS at 20:02

## 2022-05-30 RX ADMIN — Medication 45 ML: at 17:33

## 2022-05-30 RX ADMIN — DORNASE ALFA 5 MG: 1 SOLUTION RESPIRATORY (INHALATION) at 20:43

## 2022-05-30 RX ADMIN — CARVEDILOL 6.25 MG: 6.25 TABLET, FILM COATED ORAL at 17:33

## 2022-05-30 RX ADMIN — LIOTHYRONINE SODIUM 25 MCG: 25 TABLET ORAL at 08:24

## 2022-05-30 RX ADMIN — DORNASE ALFA 5 MG: 1 SOLUTION RESPIRATORY (INHALATION) at 09:46

## 2022-05-30 RX ADMIN — LACOSAMIDE 200 MG: 10 INJECTION, SOLUTION INTRAVENOUS at 08:24

## 2022-05-30 RX ADMIN — INSULIN HUMAN 2 UNITS: 100 INJECTION, SOLUTION PARENTERAL at 11:25

## 2022-05-30 RX ADMIN — Medication 45 ML: at 21:00

## 2022-05-30 RX ADMIN — HEPARIN SODIUM 5000 UNITS: 5000 INJECTION INTRAVENOUS; SUBCUTANEOUS at 08:25

## 2022-05-30 RX ADMIN — Medication 45 ML: at 14:13

## 2022-05-30 RX ADMIN — HEPARIN SODIUM 5000 UNITS: 5000 INJECTION INTRAVENOUS; SUBCUTANEOUS at 20:02

## 2022-05-30 RX ADMIN — SODIUM CHLORIDE SOLN NEBU 3% 4 ML: 3 NEBU SOLN at 06:28

## 2022-05-30 RX ADMIN — PROPOFOL 40 MCG/KG/MIN: 10 INJECTION, EMULSION INTRAVENOUS at 08:24

## 2022-05-30 RX ADMIN — LEVOTHYROXINE SODIUM 125 MCG: 125 TABLET ORAL at 05:12

## 2022-05-30 RX ADMIN — ALBUTEROL SULFATE 2.5 MG: 2.5 SOLUTION RESPIRATORY (INHALATION) at 14:45

## 2022-05-30 RX ADMIN — PROPOFOL 50 MCG/KG/MIN: 10 INJECTION, EMULSION INTRAVENOUS at 17:45

## 2022-05-30 RX ADMIN — Medication 45 ML: at 06:30

## 2022-05-30 RX ADMIN — MEROPENEM 1 G: 1 INJECTION, POWDER, FOR SOLUTION INTRAVENOUS at 08:24

## 2022-05-30 RX ADMIN — MORPHINE SULFATE 2 MG: 2 INJECTION, SOLUTION INTRAMUSCULAR; INTRAVENOUS at 17:26

## 2022-05-30 RX ADMIN — PROPOFOL 40 MCG/KG/MIN: 10 INJECTION, EMULSION INTRAVENOUS at 14:08

## 2022-05-30 RX ADMIN — CHLORHEXIDINE GLUCONATE 15 ML: 1.2 RINSE ORAL at 08:25

## 2022-05-30 RX ADMIN — CHLORHEXIDINE GLUCONATE 15 ML: 1.2 RINSE ORAL at 20:02

## 2022-05-30 RX ADMIN — ALBUTEROL SULFATE 2.5 MG: 2.5 SOLUTION RESPIRATORY (INHALATION) at 23:34

## 2022-05-31 ENCOUNTER — APPOINTMENT (OUTPATIENT)
Dept: GENERAL RADIOLOGY | Facility: HOSPITAL | Age: 77
End: 2022-05-31

## 2022-05-31 PROBLEM — R13.10 DYSPHAGIA: Status: ACTIVE | Noted: 2022-04-18

## 2022-05-31 LAB
ABO GROUP BLD: NORMAL
ALBUMIN SERPL-MCNC: 1.5 G/DL (ref 3.5–5.2)
ALBUMIN/GLOB SERPL: 0.4 G/DL
ALP SERPL-CCNC: 171 U/L (ref 39–117)
ALT SERPL W P-5'-P-CCNC: 34 U/L (ref 1–41)
ANION GAP SERPL CALCULATED.3IONS-SCNC: 4 MMOL/L (ref 5–15)
ANISOCYTOSIS BLD QL: ABNORMAL
ARTERIAL PATENCY WRIST A: POSITIVE
AST SERPL-CCNC: 41 U/L (ref 1–40)
ATMOSPHERIC PRESS: 749 MMHG
BASE EXCESS BLDA CALC-SCNC: 8.7 MMOL/L (ref 0–2)
BDY SITE: ABNORMAL
BILIRUB SERPL-MCNC: 0.2 MG/DL (ref 0–1.2)
BLD GP AB SCN SERPL QL: NEGATIVE
BODY TEMPERATURE: 37 C
BUN SERPL-MCNC: 33 MG/DL (ref 8–23)
BUN/CREAT SERPL: 51.6 (ref 7–25)
CALCIUM SPEC-SCNC: 7.8 MG/DL (ref 8.6–10.5)
CHLORIDE SERPL-SCNC: 104 MMOL/L (ref 98–107)
CO2 SERPL-SCNC: 32 MMOL/L (ref 22–29)
CREAT SERPL-MCNC: 0.64 MG/DL (ref 0.76–1.27)
DACRYOCYTES BLD QL SMEAR: ABNORMAL
DEPRECATED RDW RBC AUTO: 50.2 FL (ref 37–54)
EGFRCR SERPLBLD CKD-EPI 2021: 97.5 ML/MIN/1.73
ERYTHROCYTE [DISTWIDTH] IN BLOOD BY AUTOMATED COUNT: 13.8 % (ref 12.3–15.4)
GLOBULIN UR ELPH-MCNC: 3.8 GM/DL
GLUCOSE BLDC GLUCOMTR-MCNC: 101 MG/DL (ref 70–130)
GLUCOSE BLDC GLUCOMTR-MCNC: 126 MG/DL (ref 70–130)
GLUCOSE BLDC GLUCOMTR-MCNC: 146 MG/DL (ref 70–130)
GLUCOSE BLDC GLUCOMTR-MCNC: 88 MG/DL (ref 70–130)
GLUCOSE BLDC GLUCOMTR-MCNC: 98 MG/DL (ref 70–130)
GLUCOSE SERPL-MCNC: 143 MG/DL (ref 65–99)
HCO3 BLDA-SCNC: 33 MMOL/L (ref 20–26)
HCT VFR BLD AUTO: 29.2 % (ref 37.5–51)
HGB BLD-MCNC: 9.1 G/DL (ref 13–17.7)
INHALED O2 CONCENTRATION: 30 %
INR PPP: 1.1 (ref 0.91–1.09)
LYMPHOCYTES # BLD MANUAL: 0.85 10*3/MM3 (ref 0.7–3.1)
LYMPHOCYTES NFR BLD MANUAL: 9 % (ref 5–12)
Lab: ABNORMAL
MCH RBC QN AUTO: 31.1 PG (ref 26.6–33)
MCHC RBC AUTO-ENTMCNC: 31.2 G/DL (ref 31.5–35.7)
MCV RBC AUTO: 99.7 FL (ref 79–97)
METAMYELOCYTES NFR BLD MANUAL: 1 % (ref 0–0)
MODALITY: ABNORMAL
MONOCYTES # BLD: 1.09 10*3/MM3 (ref 0.1–0.9)
MYELOCYTES NFR BLD MANUAL: 1 % (ref 0–0)
NEUTROPHILS # BLD AUTO: 9.85 10*3/MM3 (ref 1.7–7)
NEUTROPHILS NFR BLD MANUAL: 80 % (ref 42.7–76)
NEUTS BAND NFR BLD MANUAL: 1 % (ref 0–5)
NRBC SPEC MANUAL: 1 /100 WBC (ref 0–0.2)
PCO2 BLDA: 43.9 MM HG (ref 35–45)
PCO2 TEMP ADJ BLD: 43.9 MM HG (ref 35–45)
PEEP RESPIRATORY: 9 CM[H2O]
PH BLDA: 7.49 PH UNITS (ref 7.35–7.45)
PH, TEMP CORRECTED: 7.49 PH UNITS (ref 7.35–7.45)
PHENYTOIN SERPL-MCNC: 6.4 MCG/ML (ref 10–20)
PLAT MORPH BLD: NORMAL
PLATELET # BLD AUTO: 350 10*3/MM3 (ref 140–450)
PMV BLD AUTO: 9.7 FL (ref 6–12)
PO2 BLDA: 90.5 MM HG (ref 83–108)
PO2 TEMP ADJ BLD: 90.5 MM HG (ref 83–108)
POIKILOCYTOSIS BLD QL SMEAR: ABNORMAL
POLYCHROMASIA BLD QL SMEAR: ABNORMAL
POTASSIUM SERPL-SCNC: 4.3 MMOL/L (ref 3.5–5.2)
PROMYELOCYTES NFR BLD MANUAL: 1 % (ref 0–0)
PROT SERPL-MCNC: 5.3 G/DL (ref 6–8.5)
PROTHROMBIN TIME: 13.7 SECONDS (ref 11.9–14.6)
RBC # BLD AUTO: 2.93 10*6/MM3 (ref 4.14–5.8)
RH BLD: POSITIVE
SAO2 % BLDCOA: 98.3 % (ref 94–99)
SARS-COV-2 RNA PNL SPEC NAA+PROBE: NOT DETECTED
SET MECH RESP RATE: 20
SODIUM SERPL-SCNC: 140 MMOL/L (ref 136–145)
T&S EXPIRATION DATE: NORMAL
VARIANT LYMPHS NFR BLD MANUAL: 7 % (ref 19.6–45.3)
VENTILATOR MODE: AC
VT ON VENT VENT: 500 ML
WBC MORPH BLD: NORMAL
WBC NRBC COR # BLD: 12.16 10*3/MM3 (ref 3.4–10.8)

## 2022-05-31 PROCEDURE — 94799 UNLISTED PULMONARY SVC/PX: CPT

## 2022-05-31 PROCEDURE — 25010000002 FOSPHENYTOIN 500 MG PE/10ML SOLUTION 10 ML VIAL: Performed by: PSYCHIATRY & NEUROLOGY

## 2022-05-31 PROCEDURE — 63710000001 INSULIN DETEMIR PER 5 UNITS: Performed by: INTERNAL MEDICINE

## 2022-05-31 PROCEDURE — 25010000002 ALTEPLASE 2 MG RECONSTITUTED SOLUTION 1 EACH VIAL: Performed by: SURGERY

## 2022-05-31 PROCEDURE — 99024 POSTOP FOLLOW-UP VISIT: CPT | Performed by: NURSE PRACTITIONER

## 2022-05-31 PROCEDURE — 25010000002 MEROPENEM PER 100 MG: Performed by: INTERNAL MEDICINE

## 2022-05-31 PROCEDURE — 25010000002 LEVETIRACETAM IN NACL 0.82% 500 MG/100ML SOLUTION: Performed by: NEUROLOGICAL SURGERY

## 2022-05-31 PROCEDURE — 36600 WITHDRAWAL OF ARTERIAL BLOOD: CPT

## 2022-05-31 PROCEDURE — 99222 1ST HOSP IP/OBS MODERATE 55: CPT | Performed by: CLINICAL NURSE SPECIALIST

## 2022-05-31 PROCEDURE — 80053 COMPREHEN METABOLIC PANEL: CPT | Performed by: PSYCHIATRY & NEUROLOGY

## 2022-05-31 PROCEDURE — 25010000002 PROPOFOL 10 MG/ML EMULSION: Performed by: NEUROLOGICAL SURGERY

## 2022-05-31 PROCEDURE — 99222 1ST HOSP IP/OBS MODERATE 55: CPT | Performed by: OTOLARYNGOLOGY

## 2022-05-31 PROCEDURE — 82803 BLOOD GASES ANY COMBINATION: CPT

## 2022-05-31 PROCEDURE — 86900 BLOOD TYPING SEROLOGIC ABO: CPT | Performed by: OTOLARYNGOLOGY

## 2022-05-31 PROCEDURE — 86901 BLOOD TYPING SEROLOGIC RH(D): CPT | Performed by: OTOLARYNGOLOGY

## 2022-05-31 PROCEDURE — 85610 PROTHROMBIN TIME: CPT | Performed by: INTERNAL MEDICINE

## 2022-05-31 PROCEDURE — 99233 SBSQ HOSP IP/OBS HIGH 50: CPT | Performed by: INTERNAL MEDICINE

## 2022-05-31 PROCEDURE — 25010000002 HYDRALAZINE PER 20 MG: Performed by: NEUROLOGICAL SURGERY

## 2022-05-31 PROCEDURE — 80185 ASSAY OF PHENYTOIN TOTAL: CPT | Performed by: PSYCHIATRY & NEUROLOGY

## 2022-05-31 PROCEDURE — 84630 ASSAY OF ZINC: CPT | Performed by: PSYCHIATRY & NEUROLOGY

## 2022-05-31 PROCEDURE — 87635 SARS-COV-2 COVID-19 AMP PRB: CPT | Performed by: OTOLARYNGOLOGY

## 2022-05-31 PROCEDURE — 82962 GLUCOSE BLOOD TEST: CPT

## 2022-05-31 PROCEDURE — 85025 COMPLETE CBC W/AUTO DIFF WBC: CPT | Performed by: NEUROLOGICAL SURGERY

## 2022-05-31 PROCEDURE — 63710000001 INSULIN REGULAR HUMAN PER 5 UNITS: Performed by: INTERNAL MEDICINE

## 2022-05-31 PROCEDURE — 85007 BL SMEAR W/DIFF WBC COUNT: CPT | Performed by: NEUROLOGICAL SURGERY

## 2022-05-31 PROCEDURE — 99231 SBSQ HOSP IP/OBS SF/LOW 25: CPT | Performed by: SURGERY

## 2022-05-31 PROCEDURE — 99233 SBSQ HOSP IP/OBS HIGH 50: CPT | Performed by: PSYCHIATRY & NEUROLOGY

## 2022-05-31 PROCEDURE — 71045 X-RAY EXAM CHEST 1 VIEW: CPT

## 2022-05-31 PROCEDURE — 86850 RBC ANTIBODY SCREEN: CPT | Performed by: OTOLARYNGOLOGY

## 2022-05-31 PROCEDURE — 84134 ASSAY OF PREALBUMIN: CPT | Performed by: PSYCHIATRY & NEUROLOGY

## 2022-05-31 PROCEDURE — 25010000002 MORPHINE SULFATE (PF) 2 MG/ML SOLUTION: Performed by: INTERNAL MEDICINE

## 2022-05-31 PROCEDURE — 25010000002 HEPARIN (PORCINE) PER 1000 UNITS: Performed by: NEUROLOGICAL SURGERY

## 2022-05-31 RX ORDER — AMINO AC/PROTEIN HYDR/WHEY PRO 10G-100/30
1 LIQUID (ML) ORAL 2 TIMES DAILY
Status: DISCONTINUED | OUTPATIENT
Start: 2022-05-31 | End: 2022-06-11

## 2022-05-31 RX ORDER — GUAR GUM
1 PACKET (EA) ORAL 4 TIMES DAILY
Status: DISCONTINUED | OUTPATIENT
Start: 2022-05-31 | End: 2022-06-20 | Stop reason: HOSPADM

## 2022-05-31 RX ORDER — OXYCODONE HCL 5 MG/5 ML
7.5 SOLUTION, ORAL ORAL EVERY 4 HOURS PRN
Status: DISPENSED | OUTPATIENT
Start: 2022-05-31 | End: 2022-06-07

## 2022-05-31 RX ADMIN — SODIUM CHLORIDE 250 MG PE: 9 INJECTION, SOLUTION INTRAVENOUS at 21:35

## 2022-05-31 RX ADMIN — HYDRALAZINE HYDROCHLORIDE 10 MG: 20 INJECTION INTRAMUSCULAR; INTRAVENOUS at 13:22

## 2022-05-31 RX ADMIN — ALBUTEROL SULFATE 2.5 MG: 2.5 SOLUTION RESPIRATORY (INHALATION) at 06:31

## 2022-05-31 RX ADMIN — LEVOTHYROXINE SODIUM 125 MCG: 125 TABLET ORAL at 05:53

## 2022-05-31 RX ADMIN — MORPHINE SULFATE 2 MG: 2 INJECTION, SOLUTION INTRAMUSCULAR; INTRAVENOUS at 13:23

## 2022-05-31 RX ADMIN — POLYETHYLENE GLYCOL 3350 17 G: 17 POWDER, FOR SOLUTION ORAL at 08:24

## 2022-05-31 RX ADMIN — OXYCODONE HYDROCHLORIDE 7.5 MG: 5 SOLUTION ORAL at 19:58

## 2022-05-31 RX ADMIN — ACETAMINOPHEN 650 MG: 325 TABLET ORAL at 20:02

## 2022-05-31 RX ADMIN — Medication 45 ML: at 10:39

## 2022-05-31 RX ADMIN — PANTOPRAZOLE SODIUM 40 MG: 40 INJECTION, POWDER, FOR SOLUTION INTRAVENOUS at 05:53

## 2022-05-31 RX ADMIN — PROPOFOL 20 MCG/KG/MIN: 10 INJECTION, EMULSION INTRAVENOUS at 15:15

## 2022-05-31 RX ADMIN — ALTEPLASE 10 MG: 2.2 INJECTION, POWDER, LYOPHILIZED, FOR SOLUTION INTRAVENOUS at 21:47

## 2022-05-31 RX ADMIN — ALBUTEROL SULFATE 2.5 MG: 2.5 SOLUTION RESPIRATORY (INHALATION) at 14:57

## 2022-05-31 RX ADMIN — Medication 1 PACKET: at 20:01

## 2022-05-31 RX ADMIN — BISACODYL 10 MG: 10 SUPPOSITORY RECTAL at 10:39

## 2022-05-31 RX ADMIN — Medication 45 ML: at 20:01

## 2022-05-31 RX ADMIN — MORPHINE SULFATE 2 MG: 2 INJECTION, SOLUTION INTRAMUSCULAR; INTRAVENOUS at 17:20

## 2022-05-31 RX ADMIN — MORPHINE SULFATE 2 MG: 2 INJECTION, SOLUTION INTRAMUSCULAR; INTRAVENOUS at 10:39

## 2022-05-31 RX ADMIN — PROPOFOL 40 MCG/KG/MIN: 10 INJECTION, EMULSION INTRAVENOUS at 07:27

## 2022-05-31 RX ADMIN — ALTEPLASE 10 MG: 2.2 INJECTION, POWDER, LYOPHILIZED, FOR SOLUTION INTRAVENOUS at 09:01

## 2022-05-31 RX ADMIN — HEPARIN SODIUM 5000 UNITS: 5000 INJECTION INTRAVENOUS; SUBCUTANEOUS at 08:24

## 2022-05-31 RX ADMIN — PROPOFOL 25 MCG/KG/MIN: 10 INJECTION, EMULSION INTRAVENOUS at 02:18

## 2022-05-31 RX ADMIN — CHLORHEXIDINE GLUCONATE 15 ML: 1.2 RINSE ORAL at 20:02

## 2022-05-31 RX ADMIN — LACOSAMIDE 200 MG: 10 INJECTION, SOLUTION INTRAVENOUS at 08:24

## 2022-05-31 RX ADMIN — MORPHINE SULFATE 2 MG: 2 INJECTION, SOLUTION INTRAMUSCULAR; INTRAVENOUS at 08:24

## 2022-05-31 RX ADMIN — LEVETIRACETAM 500 MG: 5 INJECTION INTRAVASCULAR at 08:24

## 2022-05-31 RX ADMIN — OXYCODONE HYDROCHLORIDE 7.5 MG: 5 SOLUTION ORAL at 15:15

## 2022-05-31 RX ADMIN — INSULIN HUMAN 8 UNITS: 100 INJECTION, SOLUTION PARENTERAL at 00:07

## 2022-05-31 RX ADMIN — ALBUTEROL SULFATE 2.5 MG: 2.5 SOLUTION RESPIRATORY (INHALATION) at 23:33

## 2022-05-31 RX ADMIN — MEROPENEM 1 G: 1 INJECTION, POWDER, FOR SOLUTION INTRAVENOUS at 16:08

## 2022-05-31 RX ADMIN — HEPARIN SODIUM 5000 UNITS: 5000 INJECTION INTRAVENOUS; SUBCUTANEOUS at 20:01

## 2022-05-31 RX ADMIN — LACOSAMIDE 200 MG: 10 INJECTION, SOLUTION INTRAVENOUS at 20:00

## 2022-05-31 RX ADMIN — MUPIROCIN 1 APPLICATION: 20 OINTMENT TOPICAL at 08:24

## 2022-05-31 RX ADMIN — SODIUM CHLORIDE SOLN NEBU 3% 4 ML: 3 NEBU SOLN at 19:57

## 2022-05-31 RX ADMIN — PROPOFOL 20 MCG/KG/MIN: 10 INJECTION, EMULSION INTRAVENOUS at 21:36

## 2022-05-31 RX ADMIN — SODIUM CHLORIDE SOLN NEBU 3% 4 ML: 3 NEBU SOLN at 06:31

## 2022-05-31 RX ADMIN — SODIUM CHLORIDE 250 MG PE: 9 INJECTION, SOLUTION INTRAVENOUS at 13:26

## 2022-05-31 RX ADMIN — SODIUM CHLORIDE 225 MG PE: 9 INJECTION, SOLUTION INTRAVENOUS at 05:53

## 2022-05-31 RX ADMIN — OXYCODONE HYDROCHLORIDE 7.5 MG: 5 SOLUTION ORAL at 10:39

## 2022-05-31 RX ADMIN — Medication 45 ML: at 06:01

## 2022-05-31 RX ADMIN — LEVETIRACETAM 500 MG: 5 INJECTION INTRAVASCULAR at 20:01

## 2022-05-31 RX ADMIN — INSULIN HUMAN 8 UNITS: 100 INJECTION, SOLUTION PARENTERAL at 06:03

## 2022-05-31 RX ADMIN — LIOTHYRONINE SODIUM 25 MCG: 25 TABLET ORAL at 08:24

## 2022-05-31 RX ADMIN — MORPHINE SULFATE 2 MG: 2 INJECTION, SOLUTION INTRAMUSCULAR; INTRAVENOUS at 19:58

## 2022-05-31 RX ADMIN — DORNASE ALFA 5 MG: 1 SOLUTION RESPIRATORY (INHALATION) at 21:46

## 2022-05-31 RX ADMIN — Medication 1 PACKET: at 17:21

## 2022-05-31 RX ADMIN — LISINOPRIL 20 MG: 20 TABLET ORAL at 08:24

## 2022-05-31 RX ADMIN — CARVEDILOL 6.25 MG: 6.25 TABLET, FILM COATED ORAL at 17:20

## 2022-05-31 RX ADMIN — MEROPENEM 1 G: 1 INJECTION, POWDER, FOR SOLUTION INTRAVENOUS at 08:24

## 2022-05-31 RX ADMIN — MUPIROCIN 1 APPLICATION: 20 OINTMENT TOPICAL at 20:02

## 2022-05-31 RX ADMIN — INSULIN HUMAN 8 UNITS: 100 INJECTION, SOLUTION PARENTERAL at 12:18

## 2022-05-31 RX ADMIN — CHLORHEXIDINE GLUCONATE 15 ML: 1.2 RINSE ORAL at 08:24

## 2022-05-31 RX ADMIN — INSULIN DETEMIR 20 UNITS: 100 INJECTION, SOLUTION SUBCUTANEOUS at 20:01

## 2022-05-31 RX ADMIN — DORNASE ALFA 5 MG: 1 SOLUTION RESPIRATORY (INHALATION) at 09:01

## 2022-05-31 RX ADMIN — PROPOFOL 35 MCG/KG/MIN: 10 INJECTION, EMULSION INTRAVENOUS at 11:00

## 2022-05-31 RX ADMIN — Medication 45 ML: at 13:25

## 2022-06-01 ENCOUNTER — APPOINTMENT (OUTPATIENT)
Dept: GENERAL RADIOLOGY | Facility: HOSPITAL | Age: 77
End: 2022-06-01

## 2022-06-01 ENCOUNTER — APPOINTMENT (OUTPATIENT)
Dept: CT IMAGING | Facility: HOSPITAL | Age: 77
End: 2022-06-01

## 2022-06-01 LAB
ALBUMIN SERPL-MCNC: 1.5 G/DL (ref 3.5–5.2)
ALBUMIN/GLOB SERPL: 0.5 G/DL
ALP SERPL-CCNC: 122 U/L (ref 39–117)
ALT SERPL W P-5'-P-CCNC: 25 U/L (ref 1–41)
ANION GAP SERPL CALCULATED.3IONS-SCNC: 5 MMOL/L (ref 5–15)
ANISOCYTOSIS BLD QL: ABNORMAL
ARTERIAL PATENCY WRIST A: POSITIVE
AST SERPL-CCNC: 25 U/L (ref 1–40)
ATMOSPHERIC PRESS: 750 MMHG
BASE EXCESS BLDA CALC-SCNC: 7.7 MMOL/L (ref 0–2)
BASO STIPL COARSE BLD QL SMEAR: ABNORMAL
BDY SITE: ABNORMAL
BILIRUB SERPL-MCNC: 0.2 MG/DL (ref 0–1.2)
BODY TEMPERATURE: 37 C
BUN SERPL-MCNC: 31 MG/DL (ref 8–23)
BUN/CREAT SERPL: 50 (ref 7–25)
CALCIUM SPEC-SCNC: 7.7 MG/DL (ref 8.6–10.5)
CHLORIDE SERPL-SCNC: 101 MMOL/L (ref 98–107)
CHYLO FLD QL: NORMAL
CO2 SERPL-SCNC: 32 MMOL/L (ref 22–29)
CREAT SERPL-MCNC: 0.62 MG/DL (ref 0.76–1.27)
DEPRECATED RDW RBC AUTO: 50.2 FL (ref 37–54)
EGFRCR SERPLBLD CKD-EPI 2021: 98.4 ML/MIN/1.73
EOSINOPHIL # BLD MANUAL: 0.23 10*3/MM3 (ref 0–0.4)
EOSINOPHIL NFR BLD MANUAL: 2 % (ref 0.3–6.2)
ERYTHROCYTE [DISTWIDTH] IN BLOOD BY AUTOMATED COUNT: 13.8 % (ref 12.3–15.4)
GLOBULIN UR ELPH-MCNC: 3 GM/DL
GLUCOSE BLDC GLUCOMTR-MCNC: 105 MG/DL (ref 70–130)
GLUCOSE BLDC GLUCOMTR-MCNC: 121 MG/DL (ref 70–130)
GLUCOSE BLDC GLUCOMTR-MCNC: 137 MG/DL (ref 70–130)
GLUCOSE BLDC GLUCOMTR-MCNC: 161 MG/DL (ref 70–130)
GLUCOSE BLDC GLUCOMTR-MCNC: 96 MG/DL (ref 70–130)
GLUCOSE SERPL-MCNC: 157 MG/DL (ref 65–99)
HCO3 BLDA-SCNC: 33.1 MMOL/L (ref 20–26)
HCT VFR BLD AUTO: 23.6 % (ref 37.5–51)
HGB BLD-MCNC: 7.3 G/DL (ref 13–17.7)
INHALED O2 CONCENTRATION: 30 %
LYMPHOCYTES # BLD MANUAL: 0.69 10*3/MM3 (ref 0.7–3.1)
LYMPHOCYTES NFR BLD MANUAL: 9.1 % (ref 5–12)
Lab: ABNORMAL
MCH RBC QN AUTO: 31.5 PG (ref 26.6–33)
MCHC RBC AUTO-ENTMCNC: 30.9 G/DL (ref 31.5–35.7)
MCV RBC AUTO: 101.7 FL (ref 79–97)
MODALITY: ABNORMAL
MONOCYTES # BLD: 1.04 10*3/MM3 (ref 0.1–0.9)
MYELOCYTES NFR BLD MANUAL: 5.1 % (ref 0–0)
NEUTROPHILS # BLD AUTO: 8.86 10*3/MM3 (ref 1.7–7)
NEUTROPHILS NFR BLD MANUAL: 76.8 % (ref 42.7–76)
NEUTS BAND NFR BLD MANUAL: 1 % (ref 0–5)
NRBC BLD AUTO-RTO: 0.3 /100 WBC (ref 0–0.2)
NRBC SPEC MANUAL: 2 /100 WBC (ref 0–0.2)
PCO2 BLDA: 50.3 MM HG (ref 35–45)
PCO2 TEMP ADJ BLD: 50.3 MM HG (ref 35–45)
PEEP RESPIRATORY: 8 CM[H2O]
PH BLDA: 7.43 PH UNITS (ref 7.35–7.45)
PH, TEMP CORRECTED: 7.43 PH UNITS (ref 7.35–7.45)
PHENYTOIN SERPL-MCNC: 6.3 MCG/ML (ref 10–20)
PLAT MORPH BLD: NORMAL
PLATELET # BLD AUTO: 306 10*3/MM3 (ref 140–450)
PMV BLD AUTO: 9.5 FL (ref 6–12)
PO2 BLDA: 98.2 MM HG (ref 83–108)
PO2 TEMP ADJ BLD: 98.2 MM HG (ref 83–108)
POLYCHROMASIA BLD QL SMEAR: ABNORMAL
POTASSIUM SERPL-SCNC: 4.1 MMOL/L (ref 3.5–5.2)
PREALB SERPL-MCNC: 12.7 MG/DL (ref 20–40)
PROT SERPL-MCNC: 4.5 G/DL (ref 6–8.5)
RBC # BLD AUTO: 2.32 10*6/MM3 (ref 4.14–5.8)
SAO2 % BLDCOA: 98.4 % (ref 94–99)
SET MECH RESP RATE: 16
SODIUM SERPL-SCNC: 138 MMOL/L (ref 136–145)
VARIANT LYMPHS NFR BLD MANUAL: 6.1 % (ref 19.6–45.3)
VENTILATOR MODE: AC
VT ON VENT VENT: 450 ML
WBC MORPH BLD: NORMAL
WBC NRBC COR # BLD: 11.39 10*3/MM3 (ref 3.4–10.8)

## 2022-06-01 PROCEDURE — 94799 UNLISTED PULMONARY SVC/PX: CPT

## 2022-06-01 PROCEDURE — 63710000001 INSULIN REGULAR HUMAN PER 5 UNITS: Performed by: INTERNAL MEDICINE

## 2022-06-01 PROCEDURE — 25010000002 LEVETIRACETAM IN NACL 0.82% 500 MG/100ML SOLUTION: Performed by: NEUROLOGICAL SURGERY

## 2022-06-01 PROCEDURE — 99233 SBSQ HOSP IP/OBS HIGH 50: CPT | Performed by: PSYCHIATRY & NEUROLOGY

## 2022-06-01 PROCEDURE — 99231 SBSQ HOSP IP/OBS SF/LOW 25: CPT | Performed by: SURGERY

## 2022-06-01 PROCEDURE — 71260 CT THORAX DX C+: CPT

## 2022-06-01 PROCEDURE — 80185 ASSAY OF PHENYTOIN TOTAL: CPT | Performed by: PSYCHIATRY & NEUROLOGY

## 2022-06-01 PROCEDURE — 94761 N-INVAS EAR/PLS OXIMETRY MLT: CPT

## 2022-06-01 PROCEDURE — 80053 COMPREHEN METABOLIC PANEL: CPT | Performed by: PSYCHIATRY & NEUROLOGY

## 2022-06-01 PROCEDURE — 71045 X-RAY EXAM CHEST 1 VIEW: CPT

## 2022-06-01 PROCEDURE — 85007 BL SMEAR W/DIFF WBC COUNT: CPT | Performed by: NEUROLOGICAL SURGERY

## 2022-06-01 PROCEDURE — 99233 SBSQ HOSP IP/OBS HIGH 50: CPT | Performed by: INTERNAL MEDICINE

## 2022-06-01 PROCEDURE — 25010000002 FOSPHENYTOIN 500 MG PE/10ML SOLUTION 10 ML VIAL: Performed by: PSYCHIATRY & NEUROLOGY

## 2022-06-01 PROCEDURE — 25010000002 MEROPENEM PER 100 MG: Performed by: INTERNAL MEDICINE

## 2022-06-01 PROCEDURE — 82803 BLOOD GASES ANY COMBINATION: CPT

## 2022-06-01 PROCEDURE — 63710000001 INSULIN DETEMIR PER 5 UNITS: Performed by: INTERNAL MEDICINE

## 2022-06-01 PROCEDURE — 25010000002 HEPARIN (PORCINE) PER 1000 UNITS: Performed by: NEUROLOGICAL SURGERY

## 2022-06-01 PROCEDURE — 94003 VENT MGMT INPAT SUBQ DAY: CPT

## 2022-06-01 PROCEDURE — 82962 GLUCOSE BLOOD TEST: CPT

## 2022-06-01 PROCEDURE — 25010000002 PROPOFOL 10 MG/ML EMULSION: Performed by: NEUROLOGICAL SURGERY

## 2022-06-01 PROCEDURE — 99232 SBSQ HOSP IP/OBS MODERATE 35: CPT | Performed by: OTOLARYNGOLOGY

## 2022-06-01 PROCEDURE — 94664 DEMO&/EVAL PT USE INHALER: CPT

## 2022-06-01 PROCEDURE — 99024 POSTOP FOLLOW-UP VISIT: CPT | Performed by: NURSE PRACTITIONER

## 2022-06-01 PROCEDURE — 85025 COMPLETE CBC W/AUTO DIFF WBC: CPT | Performed by: NEUROLOGICAL SURGERY

## 2022-06-01 PROCEDURE — 25010000002 IOPAMIDOL 61 % SOLUTION: Performed by: NEUROLOGICAL SURGERY

## 2022-06-01 PROCEDURE — 36600 WITHDRAWAL OF ARTERIAL BLOOD: CPT

## 2022-06-01 RX ORDER — NOREPINEPHRINE BIT/0.9 % NACL 8 MG/250ML
.02-.3 INFUSION BOTTLE (ML) INTRAVENOUS
Status: DISCONTINUED | OUTPATIENT
Start: 2022-06-01 | End: 2022-06-08

## 2022-06-01 RX ADMIN — ALBUTEROL SULFATE 2.5 MG: 2.5 SOLUTION RESPIRATORY (INHALATION) at 06:24

## 2022-06-01 RX ADMIN — LACOSAMIDE 200 MG: 10 INJECTION, SOLUTION INTRAVENOUS at 10:14

## 2022-06-01 RX ADMIN — SODIUM CHLORIDE 275 MG PE: 9 INJECTION, SOLUTION INTRAVENOUS at 14:59

## 2022-06-01 RX ADMIN — MEROPENEM 1 G: 1 INJECTION, POWDER, FOR SOLUTION INTRAVENOUS at 17:58

## 2022-06-01 RX ADMIN — SODIUM CHLORIDE SOLN NEBU 3% 4 ML: 3 NEBU SOLN at 06:24

## 2022-06-01 RX ADMIN — SODIUM CHLORIDE SOLN NEBU 3% 4 ML: 3 NEBU SOLN at 20:03

## 2022-06-01 RX ADMIN — MUPIROCIN 1 APPLICATION: 20 OINTMENT TOPICAL at 21:09

## 2022-06-01 RX ADMIN — CARVEDILOL 6.25 MG: 6.25 TABLET, FILM COATED ORAL at 10:12

## 2022-06-01 RX ADMIN — MEROPENEM 1 G: 1 INJECTION, POWDER, FOR SOLUTION INTRAVENOUS at 00:35

## 2022-06-01 RX ADMIN — PROPOFOL 15 MCG/KG/MIN: 10 INJECTION, EMULSION INTRAVENOUS at 18:09

## 2022-06-01 RX ADMIN — PROPOFOL 15 MCG/KG/MIN: 10 INJECTION, EMULSION INTRAVENOUS at 20:31

## 2022-06-01 RX ADMIN — ALBUTEROL SULFATE 2.5 MG: 2.5 SOLUTION RESPIRATORY (INHALATION) at 23:41

## 2022-06-01 RX ADMIN — PANTOPRAZOLE SODIUM 40 MG: 40 INJECTION, POWDER, FOR SOLUTION INTRAVENOUS at 06:15

## 2022-06-01 RX ADMIN — ALBUTEROL SULFATE 2.5 MG: 2.5 SOLUTION RESPIRATORY (INHALATION) at 14:23

## 2022-06-01 RX ADMIN — MUPIROCIN 1 APPLICATION: 20 OINTMENT TOPICAL at 10:28

## 2022-06-01 RX ADMIN — HEPARIN SODIUM 5000 UNITS: 5000 INJECTION INTRAVENOUS; SUBCUTANEOUS at 20:30

## 2022-06-01 RX ADMIN — IOPAMIDOL 100 ML: 612 INJECTION, SOLUTION INTRAVENOUS at 09:36

## 2022-06-01 RX ADMIN — POLYETHYLENE GLYCOL 3350 17 G: 17 POWDER, FOR SOLUTION ORAL at 10:13

## 2022-06-01 RX ADMIN — LEVETIRACETAM 500 MG: 5 INJECTION INTRAVASCULAR at 20:31

## 2022-06-01 RX ADMIN — SODIUM CHLORIDE 275 MG PE: 9 INJECTION, SOLUTION INTRAVENOUS at 21:16

## 2022-06-01 RX ADMIN — Medication 45 ML: at 10:14

## 2022-06-01 RX ADMIN — SODIUM CHLORIDE 250 MG PE: 9 INJECTION, SOLUTION INTRAVENOUS at 06:16

## 2022-06-01 RX ADMIN — CHLORHEXIDINE GLUCONATE 15 ML: 1.2 RINSE ORAL at 21:09

## 2022-06-01 RX ADMIN — LEVETIRACETAM 500 MG: 5 INJECTION INTRAVASCULAR at 10:13

## 2022-06-01 RX ADMIN — LACOSAMIDE 200 MG: 10 INJECTION, SOLUTION INTRAVENOUS at 20:31

## 2022-06-01 RX ADMIN — MEROPENEM 1 G: 1 INJECTION, POWDER, FOR SOLUTION INTRAVENOUS at 10:13

## 2022-06-01 RX ADMIN — INSULIN HUMAN 2 UNITS: 100 INJECTION, SOLUTION PARENTERAL at 06:23

## 2022-06-01 RX ADMIN — Medication 45 ML: at 20:31

## 2022-06-01 RX ADMIN — Medication 1 PACKET: at 21:09

## 2022-06-01 RX ADMIN — Medication 1 PACKET: at 10:13

## 2022-06-01 RX ADMIN — INSULIN HUMAN 8 UNITS: 100 INJECTION, SOLUTION PARENTERAL at 06:23

## 2022-06-01 RX ADMIN — Medication 1 PACKET: at 17:59

## 2022-06-01 RX ADMIN — LIOTHYRONINE SODIUM 25 MCG: 25 TABLET ORAL at 10:12

## 2022-06-01 RX ADMIN — Medication 1 PACKET: at 14:59

## 2022-06-01 RX ADMIN — CHLORHEXIDINE GLUCONATE 15 ML: 1.2 RINSE ORAL at 10:13

## 2022-06-01 RX ADMIN — LEVOTHYROXINE SODIUM 125 MCG: 125 TABLET ORAL at 06:15

## 2022-06-01 RX ADMIN — Medication 0.02 MCG/KG/MIN: at 01:16

## 2022-06-01 RX ADMIN — PROPOFOL 20 MCG/KG/MIN: 10 INJECTION, EMULSION INTRAVENOUS at 11:19

## 2022-06-01 RX ADMIN — LISINOPRIL 20 MG: 20 TABLET ORAL at 10:12

## 2022-06-01 RX ADMIN — SODIUM CHLORIDE, POTASSIUM CHLORIDE, SODIUM LACTATE AND CALCIUM CHLORIDE 500 ML: 600; 310; 30; 20 INJECTION, SOLUTION INTRAVENOUS at 00:42

## 2022-06-01 RX ADMIN — CARVEDILOL 6.25 MG: 6.25 TABLET, FILM COATED ORAL at 17:59

## 2022-06-01 RX ADMIN — INSULIN DETEMIR 20 UNITS: 100 INJECTION, SOLUTION SUBCUTANEOUS at 20:31

## 2022-06-01 NOTE — PROGRESS NOTES
[facility]  ENT PROGRESS NOTES  2022      Patient Identification:  Name: Hai Hernandez  Age: 77 y.o.  Sex: male  :  1945  MRN: [id number]                     Date of Admission: 5/10/2022      CC:    Respiratory insufficiency  Subjective   Tentative plans for tracheostomy have been deferred until tomorrow to have PEG tube placed in conjunction with GI medicine and secondary to tube feedings being discontinued somewhat late this morning.  Discussed with Dr. Lu.  HISTORY   HPI, ROS, PMFSHx reviewed:   No changes       Objective     PE:    Temp:  [96.6 °F (35.9 °C)-98.1 °F (36.7 °C)] 98.1 °F (36.7 °C)  Heart Rate:  [41-69] 58  Resp:  [17-24] 23  BP: ()/(40-86) 110/56  FiO2 (%):  [30 %] 30 %   Body mass index is 29.63 kg/m².     General appearance: chronically ill appearing.              Ability to Communicate: Patient is mechanically ventilated and intubated and therefore unable to communicate              Ears - right ear normal, left ear normal.              Nasal exam - normal and patent, no erythema, discharge or polyps.              Facial exam: no craniofacial deformities              Oropharyngeal exam - Endotracheal tube in place.              Neck exam - supple, no significant adenopathy.              CVS exam: normal rate and regular rhythm.              Chest: Normal chest excursion with ventilation, left thoracostomy tube in place              No lymphadenopathy in the anterior or posterior neck, supraclavicular areas.               Neurological exam reveals neck supple without rigidity.    DATA      MEDICATIONS   I have reviewed the current MAR.     LABS AND IMAGING      I have reviewed the labs and imaging data since the patient was last seen.       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)    Status epilepticus (HCC)    Essential hypertension    Type 2 diabetes mellitus with hyperglycemia,  without long-term current use of insulin (HCC)    Hypothyroidism (acquired)    Acute respiratory failure (secondary to status epilepticus)    Thrombocytopenia (HCC)    Cerebral parenchymal hemorrhage (HCC)    PAF (paroxysmal atrial fibrillation) (HCC)    Fever    Loculated pleural effusion    Acute deep vein thrombosis (DVT) of the ulnar and brachial veins of right upper extremity (HCC)    Dysphagia  Respiratory insufficiency        Plan     PLAN     Plans with tracheostomy in conjunction with GI medicine PEG tube placement tomorrow early afternoon          Geovany Davidson MD  6/1/2022  13:49 CDT

## 2022-06-01 NOTE — PROGRESS NOTES
"Patient name: Hai Hernandez  Patient : 1945  VISIT # [id number]  MR #[id number]    Procedure:Procedure(s):  CRANIOTOMY FOR TUMOR STERIOTACTIC WITH BRAIN LAB, right  Procedure Date:5/10/2022  POD:20 Days Post-Op    Subjective   Chief complaint: Shortness of breath    Patient remains intubated FiO2 30%, PEEP 9 with O2 sat 99%.  tPA course has been completed.  Chest x-ray shows stability.  No air leak in either chest tube.  Noted plans for possible trach and PEG today.          Objective     Visit Vitals  /68   Pulse 58   Temp 96.8 °F (36 °C) (Axillary)   Resp 23   Ht 182.9 cm (72\")   Wt 99.1 kg (218 lb 8 oz)   SpO2 96%   BMI 29.63 kg/m²       Intake/Output Summary (Last 24 hours) at 2022 0824  Last data filed at 2022 0606  Gross per 24 hour   Intake 2787.11 ml   Output 1520 ml   Net 1267.11 ml     Left chest tube 1: 70 ml/24 hours, serous, no air leak  Left chest tube 2: 50 ml/24 hours, serous, no air leak    Lab:     CBC:  Results from last 7 days   Lab Units 22  01322  04122  0142   WBC 10*3/mm3 11.39* 12.16* 9.23   HEMATOCRIT % 23.6* 29.2* 28.3*   PLATELETS 10*3/mm3 306 350 340          BMP:  Results from last 7 days   Lab Units 22  01322  0411 22  0554   SODIUM mmol/L 138 140 140   POTASSIUM mmol/L 4.1 4.3 4.3   CHLORIDE mmol/L 101 104 105   CO2 mmol/L 32.0* 32.0* 33.0*   GLUCOSE mg/dL 157* 143* 127*   BUN mg/dL 31* 33* 33*   CREATININE mg/dL 0.62* 0.64* 0.65*          COAG:  Results from last 7 days   Lab Units 22  1755   INR  1.10*       IMAGES:       Imaging Results (Last 24 Hours)     Procedure Component Value Units Date/Time    XR Chest 1 View [[id number]] Collected: 22     Updated: 2253    Narrative:      EXAMINATION: XR CHEST 1 VW-     2022 3:20 AM CDT     HISTORY: On vent; R13.10-Dysphagia, unspecified; Z01.818-Encounter for  other preprocedural examination; D32.9-Benign neoplasm of meninges,  unspecified; " Z74.09-Other reduced mobility; Z78.9-Other specified health  status; G40.901-Epilepsy, unspecified, not intractable, with status  epilepticus; J96.01-Acute respiratory failure with hypoxia     A single frontal portable view of the chest is compared to the previous  study dated 5/31/2022.     The lungs are poorly expanded.     There is a persistent left lower lobar consolidation.     There is a very ill-defined area of increased opacity in the left upper  and midlung projected over the left hilum which may represent an area of  consolidation or atelectasis. This was not obvious on the previous  study.     There is small left basal pleural effusion.     There are multiple small calcified granulomas in both lungs.     There is no pulmonary congestion or pneumothorax.     Endotracheal tube is is in place. The distal end of the Dobbhoff tube is  not visualized. It appears unchanged. A right-sided PICC line is in  place. No change. Left-sided chest tubes are in place. No change.     No acute bony abnormality.       Impression:      1. Persistent left lower lobar consolidation and a small left basal  pleural effusion.  2. Another ill-defined opacity in the left upper and mid lung which may  represent an area of discoid atelectasis or consolidation.  3. Multiple small old healed granulomatous in both lungs.  4. The tubes and lines in place.  This report was finalized on 06/01/2022 06:50 by Dr. Aida Wong MD.        CXR: Chest tubes in stable position.  Improved left lung expansion.  Left lower lobe atelectasis.  Small left pleural effusion.    Physical Exam:  General: Intubated  Cardiovascular: Regular rate and rhythm without murmur, rubs, or gallops.    Pulmonary: Coarse mechanical breath sounds bilaterally without wheezing, rubs, or rales.  Chest: Left chest tubes x 2 to 40 cm suction. No air leak. Fluid is serosanguineous.   Abdomen: Soft, nondistended, and nontender.  Extremities: Warm, moves all extremities.   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  Patient needs to remain on portable suction when transporting to and from CT.  This was discussed with nursing.  Daily chest x-ray  We will continue to follow      MARIO Romo  06/01/22  08:24 CDT

## 2022-06-01 NOTE — PROGRESS NOTES
Neurology Progress Note      Date of admission: 5/10/2022  4:49 AM  Date of visit: 6/1/2022    Chief Complaint:  F/u seizures    Subjective     Subjective:    No report of seizures  Still intubated and sedated  Was to go for trach and PEG today but feedings not turned off in time. Is on schedule for tomorrow..Had tube leak and that was replaced  Medications:  Current Facility-Administered Medications   Medication Dose Route Frequency Provider Last Rate Last Admin   • acetaminophen (TYLENOL) tablet 650 mg  650 mg Oral Q4H PRN Martell Downs MD   650 mg at 05/31/22 2002    Or   • acetaminophen (TYLENOL) suppository 650 mg  650 mg Rectal Q4H PRN Martell Downs MD   650 mg at 05/23/22 0016   • albuterol (PROVENTIL) nebulizer solution 0.083% 2.5 mg/3mL  2.5 mg Nebulization Q8H - RT Carlos A Dasilva MD   2.5 mg at 06/01/22 0624   • alteplase (CATHFLO/ACTIVASE) injection 2 mg  2 mg Intracatheter PRN Max Collins DO   New Syringe/Cartridge at 05/28/22 1330   • bisacodyl (DULCOLAX) suppository 10 mg  10 mg Rectal Daily PRN Sanford Morales MD   10 mg at 05/31/22 1039   • butalbital-acetaminophen-caffeine (FIORICET, ESGIC) -40 MG per tablet 1 tablet  1 tablet Oral Q4H PRN Martell Downs MD   1 tablet at 05/22/22 1729   • carvedilol (COREG) tablet 6.25 mg  6.25 mg Nasogastric BID With Meals Sanford Morales MD   6.25 mg at 05/31/22 1720   • chlorhexidine (PERIDEX) 0.12 % solution 15 mL  15 mL Mouth/Throat Q12H Raymon Gunderson MD   15 mL at 05/31/22 2002   • dextrose (D50W) (25 g/50 mL) IV injection 25 g  25 g Intravenous Q15 Min PRN Martell Downs MD   25 g at 05/21/22 1739   • dextrose (GLUTOSE) oral gel 15 g  15 g Oral Q15 Min PRN Martell Downs MD   15 g at 05/22/22 0527   • fosphenytoin (Cerebyx) 250 mg PE in sodium chloride 0.9 % 100 mL IVPB  250 mg PE Intravenous Q8H Barbara Castorena MD   250 mg PE at 06/01/22 0616   • glucagon (human  recombinant) (GLUCAGEN DIAGNOSTIC) injection 1 mg  1 mg Intramuscular Q15 Min PRN Martell Downs MD       • heparin (porcine) 5000 UNIT/ML injection 5,000 Units  5,000 Units Subcutaneous Q12H Martell Downs MD   5,000 Units at 05/31/22 2001   • Hold Medication (Anticoagulation)  1 each Does not apply Continuous PRN Yayo Galvin MD       • hydrALAZINE (APRESOLINE) injection 10 mg  10 mg Intravenous Q6H PRN Martell Downs MD   10 mg at 05/31/22 1322   • insulin detemir (LEVEMIR) injection 20 Units  20 Units Subcutaneous Nightly Max Collins DO   20 Units at 05/31/22 2001   • insulin regular (humuLIN R,novoLIN R) injection 2-7 Units  2-7 Units Subcutaneous Q6H Max Collins DO   2 Units at 06/01/22 0623   • insulin regular (humuLIN R,novoLIN R) injection 8 Units  8 Units Subcutaneous Q6H Sanford Morales MD   8 Units at 06/01/22 0623   • ipratropium-albuterol (DUO-NEB) nebulizer solution 3 mL  3 mL Nebulization Q4H PRN Raymon Gunderson MD   3 mL at 05/25/22 1007   • labetalol (NORMODYNE,TRANDATE) injection 10 mg  10 mg Intravenous Q6H PRN Stevie Parsons APRN   10 mg at 05/27/22 0931   • lacosamide (VIMPAT) injection 200 mg  200 mg Intravenous Q12H Barbara Castorena MD   200 mg at 05/31/22 2000   • levETIRAcetam in NaCl 0.82% (KEPPRA) IVPB 500 mg  500 mg Intravenous Q12H Martell Downs MD   500 mg at 05/31/22 2001   • levothyroxine (SYNTHROID, LEVOTHROID) tablet 125 mcg  125 mcg Nasogastric Q AM Sanford Morales MD   125 mcg at 06/01/22 0615   • lidocaine (XYLOCAINE) 1 % injection 10 mL  10 mL Intradermal Once Stevie Parsons APRN       • liothyronine (CYTOMEL) tablet 25 mcg  25 mcg Nasogastric Daily Max Collins DO   25 mcg at 05/31/22 0824   • lisinopril (PRINIVIL,ZESTRIL) tablet 20 mg  20 mg Nasogastric Q24H Sanford Morales MD   20 mg at 05/31/22 0824   • meropenem (MERREM) 1 g/100 mL 0.9% NS (mbp)  1 g Intravenous Q8H Max Collins,  DO   1 g at 06/01/22 0035   • Morphine sulfate (PF) injection 2 mg  2 mg Intravenous Q2H PRN Max Collins, DO   2 mg at 05/31/22 1958   • mupirocin (BACTROBAN) 2 % ointment 1 application  1 application Topical Q12H Aurelia Thomas APRN   1 application at 05/31/22 2002   • norepinephrine (LEVOPHED) 8 mg in 250 mL NS infusion (premix)  0.02-0.3 mcg/kg/min Intravenous Titrated Mark Escobar MD   Held at 06/01/22 0606   • Nutrisource fiber pack 1 packet  1 packet Nasogastric 4x Daily Martell Downs MD   1 packet at 05/31/22 2001   • ondansetron (ZOFRAN) tablet 4 mg  4 mg Oral Q6H PRN Martell Downs MD        Or   • ondansetron (ZOFRAN) injection 4 mg  4 mg Intravenous Q6H PRN Martell Downs MD       • oxyCODONE (ROXICODONE) 5 MG/5ML solution 7.5 mg  7.5 mg Nasogastric Q4H PRN Daly Perea APRN   7.5 mg at 05/31/22 1958   • pantoprazole (PROTONIX) injection 40 mg  40 mg Intravenous Q AM Carlos A Dasilva MD   40 mg at 06/01/22 0615   • polyethylene glycol (MIRALAX) packet 17 g  17 g Oral Daily Sanford Morales MD   17 g at 05/31/22 0824   • propofol (DIPRIVAN) infusion 10 mg/mL 100 mL  5-50 mcg/kg/min Intravenous Titrated Martell Downs MD 3.12 mL/hr at 06/01/22 0215 5 mcg/kg/min at 06/01/22 0215   • ProSource TF oral liquid 45 mL  1 packet Nasogastric BID Martell Downs MD   45 mL at 05/31/22 2001   • sodium chloride 3 % nebulizer solution 4 mL  4 mL Nebulization BID - RT Carlos A Dasilva MD   4 mL at 06/01/22 0624       Review of Systems:   -A 14 point review of systems is unobtainable as he does not respond and intubated and sedated     Objective     Objective      Vital Signs  Temp:  [96.4 °F (35.8 °C)-97.5 °F (36.4 °C)] 96.8 °F (36 °C)  Heart Rate:  [41-69] 58  Resp:  [17-24] 23  BP: ()/(40-98) 132/68  FiO2 (%):  [30 %] 30 %    Physical Exam:    HEENT:  Neck supple  CVS:  Regular rate and rhythm.  No murmurs  Carotid Examination:  No  bruits  Lungs:  Clear to auscultation  Abdomen:  Non-tender, Non-distended  Extremities:  No signs of peripheral edema    Neurologic Exam:    -Intubating and sedated  Will open eyes  -Does not follow  commands    Cranial nerves II through XII intact.  Pupils react  Opens eyes and will look at examiner   Does not follow any other commands  Breathes over the vent  Coughs when suctioned    Motor: (strength out of 5:  1= minimal movement, 2 = movement in plane of gravity, 3 = movement against gravity, 4 = movement against some resistance, 5 = full strength)  Withdraws to noxious stimuli but no spontaneous movements    DTR:  2+ throughout in all four extremities    Sensory:  Unable to assess except to noxious stimuli    Coordination/Gait:  -Not enough movement to assess for ataxia     Results Review:    I reviewed the patient's new clinical results.    Lab Results (last 24 hours)     Procedure Component Value Units Date/Time    POC Glucose Once [[id number]]  (Abnormal) Collected: 06/01/22 0620    Specimen: Blood Updated: 06/01/22 0631     Glucose 161 mg/dL      Comment: : [id number] Iron Szymanski ID: [id number]       Blood Gas, Arterial - [[id number]]  (Abnormal) Collected: 06/01/22 0439    Specimen: Arterial Blood Updated: 06/01/22 0440     Site Left Radial     Denzel's Test Positive     pH, Arterial 7.426 pH units      pCO2, Arterial 50.3 mm Hg      Comment: 83 Value above reference range        pO2, Arterial 98.2 mm Hg      HCO3, Arterial 33.1 mmol/L      Comment: 83 Value above reference range        Base Excess, Arterial 7.7 mmol/L      Comment: 83 Value above reference range        O2 Saturation, Arterial 98.4 %      Temperature 37.0 C      Barometric Pressure for Blood Gas 750 mmHg      Modality Ventilator     FIO2 30 %      Ventilator Mode AC     Set Tidal Volume 450     Set Mech Resp Rate 16.0     PEEP 8.0     Collected by LU THOMPSON [facility]     Comment: Meter: [id number]     :  [id number]         pCO2, Temperature Corrected 50.3 mm Hg      pH, Temp Corrected 7.426 pH Units      pO2, Temperature Corrected 98.2 mm Hg     Prealbumin [[id number]]  (Abnormal) Collected: 05/31/22 1350    Specimen: Blood Updated: 06/01/22 0230     Prealbumin 12.7 mg/dL     Manual Differential [[id number]]  (Abnormal) Collected: 06/01/22 0139    Specimen: Blood Updated: 06/01/22 0218     Neutrophil % 76.8 %      Lymphocyte % 6.1 %      Monocyte % 9.1 %      Eosinophil % 2.0 %      Bands %  1.0 %      Myelocyte % 5.1 %      Neutrophils Absolute 8.86 10*3/mm3      Lymphocytes Absolute 0.69 10*3/mm3      Monocytes Absolute 1.04 10*3/mm3      Eosinophils Absolute 0.23 10*3/mm3      nRBC 2.0 /100 WBC      Anisocytosis Slight/1+     Basophilic Stippling Slight/1+     Polychromasia Slight/1+     WBC Morphology Normal     Platelet Morphology Normal    Phenytoin Level, Total [[id number]]  (Abnormal) Collected: 06/01/22 0139    Specimen: Blood Updated: 06/01/22 0210     Phenytoin Level 6.3 mcg/mL     Comprehensive Metabolic Panel [[id number]]  (Abnormal) Collected: 06/01/22 0139    Specimen: Blood Updated: 06/01/22 0208     Glucose 157 mg/dL      BUN 31 mg/dL      Creatinine 0.62 mg/dL      Sodium 138 mmol/L      Potassium 4.1 mmol/L      Chloride 101 mmol/L      CO2 32.0 mmol/L      Calcium 7.7 mg/dL      Total Protein 4.5 g/dL      Albumin 1.50 g/dL      ALT (SGPT) 25 U/L      AST (SGOT) 25 U/L      Alkaline Phosphatase 122 U/L      Total Bilirubin 0.2 mg/dL      Globulin 3.0 gm/dL      A/G Ratio 0.5 g/dL      BUN/Creatinine Ratio 50.0     Anion Gap 5.0 mmol/L      eGFR 98.4 mL/min/1.73      Comment: National Kidney Foundation and American Society of Nephrology (ASN) Task Force recommended calculation based on the Chronic Kidney Disease Epidemiology Collaboration (CKD-EPI) equation refit without adjustment for race.       Narrative:      GFR Normal >60  Chronic Kidney Disease <60  Kidney Failure <15      CBC & Differential [[id number]]   (Abnormal) Collected: 06/01/22 0139    Specimen: Blood Updated: 06/01/22 0151    Narrative:      The following orders were created for panel order CBC & Differential.  Procedure                               Abnormality         Status                     ---------                               -----------         ------                     CBC Auto Differential[[id number]]        Abnormal            Final result                 Please view results for these tests on the individual orders.    CBC Auto Differential [[id number]]  (Abnormal) Collected: 06/01/22 0139    Specimen: Blood Updated: 06/01/22 0151     WBC 11.39 10*3/mm3      RBC 2.32 10*6/mm3      Hemoglobin 7.3 g/dL      Hematocrit 23.6 %      .7 fL      MCH 31.5 pg      MCHC 30.9 g/dL      RDW 13.8 %      RDW-SD 50.2 fl      MPV 9.5 fL      Platelets 306 10*3/mm3      nRBC 0.3 /100 WBC     POC Glucose Once [[id number]]  (Abnormal) Collected: 05/31/22 2356    Specimen: Blood Updated: 06/01/22 0007     Glucose 137 mg/dL      Comment: : [id number] Franco LaceyMeter ID: [id number]       POC Glucose Once [[id number]]  (Normal) Collected: 05/31/22 1919    Specimen: Blood Updated: 05/31/22 1931     Glucose 101 mg/dL      Comment: : [id number] Salvador LaceyMeter ID: [id number]       Protime-INR [[id number]]  (Abnormal) Collected: 05/31/22 1755    Specimen: Blood Updated: 05/31/22 1807     Protime 13.7 Seconds      INR 1.10    POC Glucose Once [[id number]]  (Normal) Collected: 05/31/22 1715    Specimen: Blood Updated: 05/31/22 1726     Glucose 88 mg/dL      Comment: : [id number] Yeimi Martinez ID: [id number]       COVID PRE-OP / PRE-PROCEDURE SCREENING ORDER (NO ISOLATION) - Swab, Nasal Cavity [[id number]]  (Normal) Collected: 05/31/22 1351    Specimen: Swab from Nasal Cavity Updated: 05/31/22 1452    Narrative:      The following orders were created for panel order COVID PRE-OP / PRE-PROCEDURE SCREENING ORDER (NO ISOLATION) - Swab, Nasal  Cavity.  Procedure                               Abnormality         Status                     ---------                               -----------         ------                     COVID-19,[facility] IN-SARAY...[[id number]]  Normal              Final result                 Please view results for these tests on the individual orders.    COVID-19,[facility] [employer] IN-HOUSE,Nasal Swab No Transport Media 3-4 HR TAT - Swab, Nasal Cavity [[id number]]  (Normal) Collected: 05/31/22 1351    Specimen: Swab from Nasal Cavity Updated: 05/31/22 1452     COVID19 Not Detected    Narrative:      Fact sheet for providers: https://www.fda.gov/media/724218/download     Fact sheet for patients: https://www.fda.gov/media/521414/download    Test performed by PCR.    Consider negative results in combination with clinical observations, patient history, and epidemiological information.    Zinc [[id number]] Collected: 05/31/22 1350    Specimen: Blood Updated: 05/31/22 1355    POC Glucose Once [[id number]]  (Normal) Collected: 05/31/22 1202    Specimen: Blood Updated: 05/31/22 1213     Glucose 98 mg/dL      Comment: : [id number] Jaxson López ID: [id number]           Imaging Results (Last 24 Hours)     Procedure Component Value Units Date/Time    CT Chest With Contrast Diagnostic [[id number]] Resulted: 06/01/22 0922     Updated: 06/01/22 0922    XR Chest 1 View [[id number]] Collected: 06/01/22 0646     Updated: 06/01/22 0653    Narrative:      EXAMINATION: XR CHEST 1 VW-     6/1/2022 3:20 AM CDT     HISTORY: On vent; R13.10-Dysphagia, unspecified; Z01.818-Encounter for  other preprocedural examination; D32.9-Benign neoplasm of meninges,  unspecified; Z74.09-Other reduced mobility; Z78.9-Other specified health  status; G40.901-Epilepsy, unspecified, not intractable, with status  epilepticus; J96.01-Acute respiratory failure with hypoxia     A single frontal portable view of the chest is compared to the previous  study dated 5/31/2022.     The  lungs are poorly expanded.     There is a persistent left lower lobar consolidation.     There is a very ill-defined area of increased opacity in the left upper  and midlung projected over the left hilum which may represent an area of  consolidation or atelectasis. This was not obvious on the previous  study.     There is small left basal pleural effusion.     There are multiple small calcified granulomas in both lungs.     There is no pulmonary congestion or pneumothorax.     Endotracheal tube is is in place. The distal end of the Dobbhoff tube is  not visualized. It appears unchanged. A right-sided PICC line is in  place. No change. Left-sided chest tubes are in place. No change.     No acute bony abnormality.       Impression:      1. Persistent left lower lobar consolidation and a small left basal  pleural effusion.  2. Another ill-defined opacity in the left upper and mid lung which may  represent an area of discoid atelectasis or consolidation.  3. Multiple small old healed granulomatous in both lungs.  4. The tubes and lines in place.  This report was finalized on 06/01/2022 06:50 by Dr. Aida Wong MD.          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 Seizures  2. Calculated Fosphenytoin level is 12.3  3. Hypoalbuminemia of only 1.5  4. Meningioma s/p craniotomy and excision    Plan:  Increase  Fosphenytoin to 275 mg every 8 hours  Will give IV until albumin level improves as he needs to tube feedings not to be held to get his protein up   Continue Vimpat  Continue Keppra  Obtain daily phenytoin level and daily albumin level  Zinc pending      Barbara Sutton MD  06/01/22  09:27 CDT

## 2022-06-01 NOTE — H&P (VIEW-ONLY)
[facility]  ENT PROGRESS NOTES  2022      Patient Identification:  Name: Hai Hernandez  Age: 77 y.o.  Sex: male  :  1945  MRN: [id number]                     Date of Admission: 5/10/2022      CC:    Respiratory insufficiency  Subjective   Tentative plans for tracheostomy have been deferred until tomorrow to have PEG tube placed in conjunction with GI medicine and secondary to tube feedings being discontinued somewhat late this morning.  Discussed with Dr. Lu.  HISTORY   HPI, ROS, PMFSHx reviewed:   No changes       Objective     PE:    Temp:  [96.6 °F (35.9 °C)-98.1 °F (36.7 °C)] 98.1 °F (36.7 °C)  Heart Rate:  [41-69] 58  Resp:  [17-24] 23  BP: ()/(40-86) 110/56  FiO2 (%):  [30 %] 30 %   Body mass index is 29.63 kg/m².     General appearance: chronically ill appearing.              Ability to Communicate: Patient is mechanically ventilated and intubated and therefore unable to communicate              Ears - right ear normal, left ear normal.              Nasal exam - normal and patent, no erythema, discharge or polyps.              Facial exam: no craniofacial deformities              Oropharyngeal exam - Endotracheal tube in place.              Neck exam - supple, no significant adenopathy.              CVS exam: normal rate and regular rhythm.              Chest: Normal chest excursion with ventilation, left thoracostomy tube in place              No lymphadenopathy in the anterior or posterior neck, supraclavicular areas.               Neurological exam reveals neck supple without rigidity.    DATA      MEDICATIONS   I have reviewed the current MAR.     LABS AND IMAGING      I have reviewed the labs and imaging data since the patient was last seen.       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)    Status epilepticus (HCC)    Essential hypertension    Type 2 diabetes mellitus with hyperglycemia,  without long-term current use of insulin (HCC)    Hypothyroidism (acquired)    Acute respiratory failure (secondary to status epilepticus)    Thrombocytopenia (HCC)    Cerebral parenchymal hemorrhage (HCC)    PAF (paroxysmal atrial fibrillation) (HCC)    Fever    Loculated pleural effusion    Acute deep vein thrombosis (DVT) of the ulnar and brachial veins of right upper extremity (HCC)    Dysphagia  Respiratory insufficiency        Plan     PLAN     Plans with tracheostomy in conjunction with GI medicine PEG tube placement tomorrow early afternoon          Geovany Davidson MD  6/1/2022  13:49 CDT

## 2022-06-01 NOTE — PROGRESS NOTES
NEUROSURGERY DAILY PROGRESS NOTE    ASSESSMENT:   Hai Hernandez is a 77 y.o. with a significant comorbidity of acephalic migraines, thyroid disease status post radiation as a child, basal cell and squamous cell carcinoma of the skin. He presents in FU for known meningioma found on workup for right visual field changes. Physical exam findings of neurologically intact with resolution of all symptoms and mild decreased vision in right eye.  His imaging shows 22 x 24 x 13.5 mm right planum sphenoidale mass most suggestive of meningioma.    Past Medical History:   Diagnosis Date   • Brain tumor (HCC)    • Depression    • Hearing loss    • History of cellulitis     right foot big toe   • Hypertension    • Pneumonia    • Right arm weakness     after cervial injury   • Sciatic pain     affects balance   • Thyroid disease    • Visual disturbance     due to brain tumor - right eye     Active Hospital Problems    Diagnosis    • **Meningioma (HCC)    • Acute deep vein thrombosis (DVT) of the ulnar and brachial veins of right upper extremity (HCC)    • Loculated pleural effusion    • Fever    • PAF (paroxysmal atrial fibrillation) (HCC)    • Cerebral parenchymal hemorrhage (HCC)    • Essential hypertension    • Type 2 diabetes mellitus with hyperglycemia, without long-term current use of insulin (HCC)    • Hypothyroidism (acquired)    • Acute respiratory failure (secondary to status epilepticus)    • Thrombocytopenia (HCC)    • 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  Remains intubated.  Opens eyes to voice and keeps them open.  Does not follow commands.    Intracranial meningioma   22 Days Post-Op (5/10/2022) Status post postcraniotomy for tumor   WHO 1 Meningioma   MRI with blood products in resection cavity but no evidence of cerebritis   CT head reviewed  "without expansion of SDH   Neurochecks per policy   Decadron off   Leakage from wound.  Stapled at bedside.  If leaks again will oversew    Hydrocephalus   Lumbar drain in place.  Opening pressure 17 cm H2O   Lumbar drain = 200, Open with 20cc every 4 hrs.      Much improved pseudomeningocele with increased drainage   CSF unremarkable from 5/17 and 5/23.    Anticipate Left VPS when less acute.      Postop seizures, in status   Neurology managing   Cont Keppra, Dialntin, Vimpat   Stat Ativan   Follow dilantin levels     CV: Cardene off   keep SBP <140.   Hypotension resolved and off pressors   Requiring hydralazine and labetalol PRNs   A-line removed secondary to limb ischemia  Pulm: Intubated.     Plan for trach placement today   Appreciate Pulmonology   Left chest tube placed x2 CT managing with TPA  : Keep jansen for now.   FEN: Hyponatremia, 127   3% NaCl DC   Poor glucose control.  Increase SSI   DHT in place.  ENDO: Accu-Chek and treat per policy  GI: PEG tube placement consult placed.    ID: Afebrile overnight,    DDX: infection vs drug fever   WBC improved after chest tube   CRP in 20s, repeat weekly   Broad spectrum abx, Meropenum and Vanc   Blood cult pending   Body fluid cx pending   CSF cultures pending, NGTD   UA+, 100K GNB   Heme:  DVT prophylaxis with SCD's.     Plt up 193 and HIT ab negative.     RUE clot.  Leave PICC for now.  Can not anticoagulate  Pain: NA  Dispo: DC pending seizure control   Pending extubation    CHIEF COMPLAINT:   Right visual field changes    Subjective  Symptom stable    Temp:  [96.6 °F (35.9 °C)-98.1 °F (36.7 °C)] 98.1 °F (36.7 °C)  Heart Rate:  [41-69] 58  Resp:  [17-24] 23  BP: ()/(40-98) 110/56  FiO2 (%):  [30 %] 30 %    Objective:  General Appearance:  Well-appearing and in no acute distress.    Vital signs: (most recent): Blood pressure 110/56, pulse 58, temperature 98.1 °F (36.7 °C), resp. rate 23, height 182.9 cm (72\"), weight 99.1 kg (218 lb 8 oz), SpO2 96 %.  "     Neurologic Exam     Mental Status   Level of consciousness: arousable by verbal stimuli ,  drowsy  Intubated.  Sedation paused   Open eyes to voice  Does not follow commands     Cranial Nerves     CN III, IV, VI   Pupils are equal, round, and reactive to light.  Extraocular motions are normal.     CN IX, X   CN IX normal.     Motor Exam   Weak withdrawal to pain x 4     Gait, Coordination, and Reflexes     Tremor   Resting tremor: absent  Intention tremor: absent  Action tremor: absent    Drains:   Urethral Catheter Non-latex;Silicone 16 Fr. (Active)       Output by Drain (mL) 05/31/22 0701 - 05/31/22 1900 05/31/22 1901 - 06/01/22 0700 06/01/22 0701 - 06/01/22 1241 Range Total   Requested LDAs do not have output data documented.       Imaging Results (Last 24 Hours)     Procedure Component Value Units Date/Time    XR Chest 1 View [[id number]] Resulted: 06/01/22 1155     Updated: 06/01/22 1155    CT Chest With Contrast Diagnostic [[id number]] Collected: 06/01/22 1040     Updated: 06/01/22 1047    Narrative:      EXAMINATION: CT CHEST W CONTRAST DIAGNOSTIC-      6/1/2022 9:21 AM CDT     HISTORY: post intrapleural tpa; R13.10-Dysphagia, unspecified;  Z01.818-Encounter for other preprocedural examination; D32.9-Benign  neoplasm of meninges, unspecified; Z74.09-Other reduced mobility;  Z78.9-Other specified health status; G40.901-Epilepsy, unspecified, not  intractable, with status epilepticus; J96.01-Acute respiratory failure  with hypoxia     In order to have a CT radiation dose as low as reasonably achievable  Automated Exposure Control was utilized for adjustment of the mA and/or  KV according to patient size.     DLP in mGycm= 426.     Postcontrast chest CT.  Axial, sagittal, and coronal sequences.     Comparison is made with May 25, 2022.     Interval placement of an upper left chest tube which has drained the  upper lateral pleural fluid collection which was seen previously.     The medial and inferior fluid  collection persists.  Unchanged position of the lower left chest tube.     There is still focal dense consolidation of the lower left lung.     The upper lung is better expanded and clear.  There is no significant pneumothorax.     The right lung is adequately expanded and essentially clear.  There is mild patchy atelectasis.     Endotracheal tube and gastric tube present.  The endotracheal tube tip lies 29 mm above the hernandez.     Summary:  1. Compared with one week ago there has been placement of an upper left  chest tube which has drained the upper lateral pleural fluid collection.  2. The lower left chest tube is unchanged in position.  3. There is still some loculated pleural fluid medially and inferiorly.  4. Persistent consolidation of the lower left lung.                                   This report was finalized on 06/01/2022 10:44 by Dr. Tomer Whelan MD.    XR Chest 1 View [[id number]] Collected: 06/01/22 0646     Updated: 06/01/22 0653    Narrative:      EXAMINATION: XR CHEST 1 VW-     6/1/2022 3:20 AM CDT     HISTORY: On vent; R13.10-Dysphagia, unspecified; Z01.818-Encounter for  other preprocedural examination; D32.9-Benign neoplasm of meninges,  unspecified; Z74.09-Other reduced mobility; Z78.9-Other specified health  status; G40.901-Epilepsy, unspecified, not intractable, with status  epilepticus; J96.01-Acute respiratory failure with hypoxia     A single frontal portable view of the chest is compared to the previous  study dated 5/31/2022.     The lungs are poorly expanded.     There is a persistent left lower lobar consolidation.     There is a very ill-defined area of increased opacity in the left upper  and midlung projected over the left hilum which may represent an area of  consolidation or atelectasis. This was not obvious on the previous  study.     There is small left basal pleural effusion.     There are multiple small calcified granulomas in both lungs.     There is no pulmonary congestion  or pneumothorax.     Endotracheal tube is is in place. The distal end of the Dobbhoff tube is  not visualized. It appears unchanged. A right-sided PICC line is in  place. No change. Left-sided chest tubes are in place. No change.     No acute bony abnormality.       Impression:      1. Persistent left lower lobar consolidation and a small left basal  pleural effusion.  2. Another ill-defined opacity in the left upper and mid lung which may  represent an area of discoid atelectasis or consolidation.  3. Multiple small old healed granulomatous in both lungs.  4. The tubes and lines in place.  This report was finalized on 06/01/2022 06:50 by Dr. Aida Wong MD.        Lab Results (last 24 hours)     Procedure Component Value Units Date/Time    POC Glucose Once [[id number]]  (Normal) Collected: 06/01/22 1215    Specimen: Blood Updated: 06/01/22 1226     Glucose 105 mg/dL      Comment: : [id number] Alana Sharif ID: [id number]       AFB Culture - Body Fluid, Pleural Cavity [[id number]] Collected: 05/25/22 1042    Specimen: Body Fluid from Pleural Cavity Updated: 06/01/22 1102     AFB Culture No AFB isolated at 1 week     AFB Stain No acid fast bacilli seen on direct smear      No acid fast bacilli seen on concentrated smear    Fungus Culture - Body Fluid, Pleural Cavity [[id number]] Collected: 05/25/22 1042    Specimen: Body Fluid from Pleural Cavity Updated: 06/01/22 1102     Fungus Culture No fungus isolated at 1 week    POC Glucose Once [[id number]]  (Abnormal) Collected: 06/01/22 0620    Specimen: Blood Updated: 06/01/22 0631     Glucose 161 mg/dL      Comment: : [id number] Iron Godinezeter ID: [id number]       Blood Gas, Arterial - [[id number]]  (Abnormal) Collected: 06/01/22 0439    Specimen: Arterial Blood Updated: 06/01/22 0440     Site Left Radial     Denzel's Test Positive     pH, Arterial 7.426 pH units      pCO2, Arterial 50.3 mm Hg      Comment: 83 Value above reference range        pO2,  Arterial 98.2 mm Hg      HCO3, Arterial 33.1 mmol/L      Comment: 83 Value above reference range        Base Excess, Arterial 7.7 mmol/L      Comment: 83 Value above reference range        O2 Saturation, Arterial 98.4 %      Temperature 37.0 C      Barometric Pressure for Blood Gas 750 mmHg      Modality Ventilator     FIO2 30 %      Ventilator Mode AC     Set Tidal Volume 450     Set Mech Resp Rate 16.0     PEEP 8.0     Collected by LU THOMPSON RRT     Comment: Meter: [id number]     :  [id number]        pCO2, Temperature Corrected 50.3 mm Hg      pH, Temp Corrected 7.426 pH Units      pO2, Temperature Corrected 98.2 mm Hg     Prealbumin [[id number]]  (Abnormal) Collected: 05/31/22 1350    Specimen: Blood Updated: 06/01/22 0230     Prealbumin 12.7 mg/dL     Manual Differential [[id number]]  (Abnormal) Collected: 06/01/22 0139    Specimen: Blood Updated: 06/01/22 0218     Neutrophil % 76.8 %      Lymphocyte % 6.1 %      Monocyte % 9.1 %      Eosinophil % 2.0 %      Bands %  1.0 %      Myelocyte % 5.1 %      Neutrophils Absolute 8.86 10*3/mm3      Lymphocytes Absolute 0.69 10*3/mm3      Monocytes Absolute 1.04 10*3/mm3      Eosinophils Absolute 0.23 10*3/mm3      nRBC 2.0 /100 WBC      Anisocytosis Slight/1+     Basophilic Stippling Slight/1+     Polychromasia Slight/1+     WBC Morphology Normal     Platelet Morphology Normal    Phenytoin Level, Total [[id number]]  (Abnormal) Collected: 06/01/22 0139    Specimen: Blood Updated: 06/01/22 0210     Phenytoin Level 6.3 mcg/mL     Comprehensive Metabolic Panel [[id number]]  (Abnormal) Collected: 06/01/22 0139    Specimen: Blood Updated: 06/01/22 0208     Glucose 157 mg/dL      BUN 31 mg/dL      Creatinine 0.62 mg/dL      Sodium 138 mmol/L      Potassium 4.1 mmol/L      Chloride 101 mmol/L      CO2 32.0 mmol/L      Calcium 7.7 mg/dL      Total Protein 4.5 g/dL      Albumin 1.50 g/dL      ALT (SGPT) 25 U/L      AST (SGOT) 25 U/L      Alkaline Phosphatase 122 U/L       Total Bilirubin 0.2 mg/dL      Globulin 3.0 gm/dL      A/G Ratio 0.5 g/dL      BUN/Creatinine Ratio 50.0     Anion Gap 5.0 mmol/L      eGFR 98.4 mL/min/1.73      Comment: National Kidney Foundation and American Society of Nephrology (ASN) Task Force recommended calculation based on the Chronic Kidney Disease Epidemiology Collaboration (CKD-EPI) equation refit without adjustment for race.       Narrative:      GFR Normal >60  Chronic Kidney Disease <60  Kidney Failure <15      CBC & Differential [[id number]]  (Abnormal) Collected: 06/01/22 0139    Specimen: Blood Updated: 06/01/22 0151    Narrative:      The following orders were created for panel order CBC & Differential.  Procedure                               Abnormality         Status                     ---------                               -----------         ------                     CBC Auto Differential[[id number]]        Abnormal            Final result                 Please view results for these tests on the individual orders.    CBC Auto Differential [[id number]]  (Abnormal) Collected: 06/01/22 0139    Specimen: Blood Updated: 06/01/22 0151     WBC 11.39 10*3/mm3      RBC 2.32 10*6/mm3      Hemoglobin 7.3 g/dL      Hematocrit 23.6 %      .7 fL      MCH 31.5 pg      MCHC 30.9 g/dL      RDW 13.8 %      RDW-SD 50.2 fl      MPV 9.5 fL      Platelets 306 10*3/mm3      nRBC 0.3 /100 WBC     POC Glucose Once [[id number]]  (Abnormal) Collected: 05/31/22 2356    Specimen: Blood Updated: 06/01/22 0007     Glucose 137 mg/dL      Comment: : [id number] Franco LaceyMeter ID: [id number]       POC Glucose Once [[id number]]  (Normal) Collected: 05/31/22 1919    Specimen: Blood Updated: 05/31/22 1931     Glucose 101 mg/dL      Comment: : [id number] Franco LaceyMeter ID: [id number]       Protime-INR [[id number]]  (Abnormal) Collected: 05/31/22 1755    Specimen: Blood Updated: 05/31/22 1807     Protime 13.7 Seconds      INR 1.10    POC Glucose Once  [[id number]]  (Normal) Collected: 05/31/22 1715    Specimen: Blood Updated: 05/31/22 1726     Glucose 88 mg/dL      Comment: : [id number] Martinez ID: [id number]       COVID PRE-OP / PRE-PROCEDURE SCREENING ORDER (NO ISOLATION) - Swab, Nasal Cavity [[id number]]  (Normal) Collected: 05/31/22 1351    Specimen: Swab from Nasal Cavity Updated: 05/31/22 1452    Narrative:      The following orders were created for panel order COVID PRE-OP / PRE-PROCEDURE SCREENING ORDER (NO ISOLATION) - Swab, Nasal Cavity.  Procedure                               Abnormality         Status                     ---------                               -----------         ------                     COVID-19,[facility] IN-S[facility]...[[id number]]  Normal              Final result                 Please view results for these tests on the individual orders.    COVID-19,[facility] IN-HOUSE,Nasal Swab No Transport Media 3-4 HR TAT - Swab, Nasal Cavity [[id number]]  (Normal) Collected: 05/31/22 1351    Specimen: Swab from Nasal Cavity Updated: 05/31/22 1452     COVID19 Not Detected    Narrative:      Fact sheet for providers: https://www.fda.gov/media/696653/download     Fact sheet for patients: https://www.fda.gov/media/365496/download    Test performed by PCR.    Consider negative results in combination with clinical observations, patient history, and epidemiological information.    Zinc [[id number]] Collected: 05/31/22 1350    Specimen: Blood Updated: 05/31/22 1355        [id number]  Stevie Parsons, APRN

## 2022-06-01 NOTE — SIGNIFICANT NOTE
06/01/22 0627   Readings   Plateau Pressure (cm H2O) 22 cm H2O   Driving Pressure (cm H2O) 14.5 cm H2O   Static Compliance (L/cm H2O) 42   Dynamic Compliance (L/cm H2O) 103 L/cm H2O

## 2022-06-01 NOTE — PROGRESS NOTES
[facility]  INPATIENT PROGRESS NOTE    Patient Name: Hai Hernandez  Date of Admission: 5/10/2022  Today's Date: 06/01/22  Length of Stay: 22  Primary Care Physician: Elisa Chacko MD    Subjective   Chief Complaint: Consult for medical management.  HPI   Tentative plans are for trach/PEG today. TF held as of 0700.     PICC line remains.     Hypotensive episode overnight briefly requiring Levophed. Hgb now 7.3 from 9.1. No bleeding noted.     Review of Systems   Unable to assess secondary to the patient's intubated state.     Objective    Temp:  [96.4 °F (35.8 °C)-97.5 °F (36.4 °C)] 96.8 °F (36 °C)  Heart Rate:  [41-69] 58  Resp:  [17-24] 23  BP: ()/(40-98) 132/68  FiO2 (%):  [30 %] 30 %  Physical Exam  Constitutional:       Appearance: He is well-developed.      Comments: Ventilated.  No family present. Seen and discussed with his nurse, Nicol.    HENT:      Head: Normocephalic.      Comments: Right frontal craniotomy site well-healing.  Eyes:      Conjunctiva/sclera: Conjunctivae normal.      Pupils: Pupils are equal, round, and reactive to light.   Neck:      Vascular: No JVD.   Cardiovascular:      Rate and Rhythm: Normal rate and regular rhythm.      Heart sounds: Normal heart sounds. No murmur heard.    No friction rub. No gallop.      Comments: Sinus in the 60s on telemetry.   Pulmonary:      Comments: Ventilated with clear lungs.  FiO2 at 30%, PEEP 8.  Left sided chest tubes.   Abdominal:      General: Bowel sounds are normal. There is no distension.      Palpations: Abdomen is soft.      Tenderness: There is no abdominal tenderness.   Musculoskeletal:         General: Swelling (trace) present. No tenderness or deformity. Normal range of motion.      Cervical back: Neck supple.      Comments: Right upper extremity has a PICC line remains. Arm less swollen.    Skin:     General: Skin is warm and dry.      Findings: No rash.   Neurological:       Motor: No abnormal muscle tone.      Deep Tendon Reflexes: Reflexes normal.      Comments: Minimal withdrawal to painful stimuli in all extremities; grimaces as well. No purposeful movements. Currently on 5 of propofol.          Intake/Output Summary (Last 24 hours) at 6/1/2022 0817  Last data filed at 6/1/2022 0606  Gross per 24 hour   Intake 3487.11 ml   Output 1800 ml   Net 1687.11 ml     Results Review:  I have reviewed the labs, radiology results, and diagnostic studies.    Laboratory Data:   Results from last 7 days   Lab Units 06/01/22  0139 05/31/22  0411 05/30/22  0142   WBC 10*3/mm3 11.39* 12.16* 9.23   HEMOGLOBIN g/dL 7.3* 9.1* 9.1*   HEMATOCRIT % 23.6* 29.2* 28.3*   PLATELETS 10*3/mm3 306 350 340     Results from last 7 days   Lab Units 06/01/22  0139 05/31/22  0411 05/30/22  0554   SODIUM mmol/L 138 140 140   POTASSIUM mmol/L 4.1 4.3 4.3   CHLORIDE mmol/L 101 104 105   CO2 mmol/L 32.0* 32.0* 33.0*   BUN mg/dL 31* 33* 33*   CREATININE mg/dL 0.62* 0.64* 0.65*   CALCIUM mg/dL 7.7* 7.8* 7.6*   BILIRUBIN mg/dL 0.2 0.2 0.2   ALK PHOS U/L 122* 171* 142*   ALT (SGPT) U/L 25 34 32   AST (SGOT) U/L 25 41* 38   GLUCOSE mg/dL 157* 143* 127*     Radiology Data:   Imaging Results (Last 24 Hours)     Procedure Component Value Units Date/Time    XR Chest 1 View [[id number]] Collected: 06/01/22 0646     Updated: 06/01/22 0653    Narrative:      EXAMINATION: XR CHEST 1 VW-     6/1/2022 3:20 AM CDT     HISTORY: On vent; R13.10-Dysphagia, unspecified; Z01.818-Encounter for  other preprocedural examination; D32.9-Benign neoplasm of meninges,  unspecified; Z74.09-Other reduced mobility; Z78.9-Other specified health  status; G40.901-Epilepsy, unspecified, not intractable, with status  epilepticus; J96.01-Acute respiratory failure with hypoxia     A single frontal portable view of the chest is compared to the previous  study dated 5/31/2022.     The lungs are poorly expanded.     There is a persistent left lower lobar  consolidation.     There is a very ill-defined area of increased opacity in the left upper  and midlung projected over the left hilum which may represent an area of  consolidation or atelectasis. This was not obvious on the previous  study.     There is small left basal pleural effusion.     There are multiple small calcified granulomas in both lungs.     There is no pulmonary congestion or pneumothorax.     Endotracheal tube is is in place. The distal end of the Dobbhoff tube is  not visualized. It appears unchanged. A right-sided PICC line is in  place. No change. Left-sided chest tubes are in place. No change.     No acute bony abnormality.       Impression:      1. Persistent left lower lobar consolidation and a small left basal  pleural effusion.  2. Another ill-defined opacity in the left upper and mid lung which may  represent an area of discoid atelectasis or consolidation.  3. Multiple small old healed granulomatous in both lungs.  4. The tubes and lines in place.  This report was finalized on 06/01/2022 06:50 by Dr. Aida Wong MD.        Results from last 7 days   Lab Units 06/01/22  0439   PH, ARTERIAL pH units 7.426   PO2 ART mm Hg 98.2   PCO2, ARTERIAL mm Hg 50.3*   HCO3 ART mmol/L 33.1*   FiO2 (%):  [30 %] 30 %  S RR:  [16] 16  PEEP/CPAP (cm H2O):  [8 cm H20] 8 cm H20  MAP (cm H2O):  [13-15] 14    I have reviewed the patient's current medications.     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)    • Status epilepticus (HCC)    • Acute respiratory failure (secondary to status epilepticus)    • Thrombocytopenia ([facility])    • Acute deep vein thrombosis (DVT) of the ulnar and brachial veins of right upper extremity (HCC)    • Loculated pleural effusion    • Fever    • PAF (paroxysmal atrial fibrillation) ([facility])    • Essential hypertension    • Type 2  diabetes mellitus with hyperglycemia, without long-term current use of insulin (HCC)    • Hypothyroidism (acquired)    • Dysphagia      Added automatically from request for surgery [id number]       Plan:  The patient was admitted on 5/10 by Dr. Downs with neurosurgery.  He has prior history of known meningioma that was causing compression and visual changes.  He presented for craniotomy.  Hospital medicine was originally consulted on 5/14.  The patient had developed status epilepticus postsurgically and required intubation/mechanical ventilation.  Neurology and pulmonology have also been following.    He required intubation on 5/14 for airway protection.  This was done by Dr. Gunderson.  Pulmonary has since been consulted. Continue to monitor for readiness for spontaneous breathing trials and extubation in the future. Again, he was intubated for airway protection given his status epilepticus.    He has recently had problems with status epilepticus.  He has been seen by Dr. Noe and Dr. Freire.  Propofol being used at times.  He is on IV Vimpat, IV Keppra, and IV Cerebyx.    He has had a lumbar drain placed by neurosurgery.  Most recent CT scan head without contrast was yesterday.  This showed postsurgical changes from right frontal craniotomy.  Small residual 5 mm subdural hematoma hygroma.  Residual hemorrhage and occipital Horn similar to prior exam.  Previously described hemorrhage in the right frontal lobe is not present on his exam.  Dilated ventricular system.  He continues to have a lumbar drain and apparently there has been some discussion about whether or not to place a more formal  shunt at some point.  He has also recently been on Decadron.      He previously had some thrombocytopenia.  His platelet count was 217,000 on 4/20/2022. This declined to a low of 72,000 on 5/17.  Peripheral smear on 5/17 showed no schistocytes with moderate peripheral thrombocytopenia.  PTT normal, PT/INR slightly  elevated, fibrin split products high, and fibrinogen high.  Neurosurgery gave him a sixpack of platelets on 5/18.  Platelets remain normal and are now 350,000.     He had run steady fever from the afternoon of 5/21 through 5/22.  Dr. Escobar was contacted and placed him on vancomycin and cefepime.  Recent blood cultures currently with no growth.  Recent respiratory culture currently with no growth. The urinalysis did show 13-20 white blood cells with 4+ bacteria and small leukocyte esterase.  There was also large blood.  Urine culture grew Klebsiella. He still has a lumbar drain and CSF was sampled on the morning of 5/22.  No organisms were seen on the gram stains. Obtained CT imaging of chest, abdomen, and pelvis on 5/23.  This showed a moderate size left pleural effusion with extensive compressive atelectasis of the left lung.  Not possible to exclude pneumonia.  He ultimately had chest tubes placed by Dr. Ortega.  Now receiving TPA through these.  He received vancomycin. Meropenem was started on 5/25 for 5 days and I extended it an additional 2 days on 5/31.     He has a history of hypertension.  He typically takes lisinopril. Resumed per tube at a lower dose than normal on 5/18. Titrated up on 5/19. Started carvedilol on 5/19. Titrated medications up to get off Cardene.     He has had some paroxysmal atrial fibrillation.  He cannot be anticoagulated at present.  Will ask neurosurgery if he can at least have an aspirin at some point.  He is currently in sinus rhythm in the 60s. Echocardiogram did not show any significant abnormalities.     He has a history of hypothyroidism. TSH checked and over suppressed so levothyroxine was initially held. Free T4 and free T3 were very low as well. Started levothyroxine and liothyronine on 5/19. TRH pending since 5/19!!  I rechecked TSH, free T4, and free T3 on 5/29 to see where we are at.  TSH is now 3.950.  Free T4 has improve from 0.31 to 0.53.  Free T3 has improved from less  than 0.40 to 1.60.    He is diabetic.  His A1c is 8.2.  Continues to receive basal insulin, every 6 hours, and sliding scale insulin.  Most recent blood sugars have been 137, 157, 161.       Reconciled and resumed home medications as appropriate.    Nutrition consulted for free water and tube feeding recommendations.    IV pantoprazole for GI prophylaxis.    He has a right upper extremity PICC line.  Duplex examination done with thrombus in the brachial and ulnar veins.  Feel the PICC line needs to be pulled.  Unlikely to be able to anticoagulate.  Will defer to Dr. Downs about further line placement.    SCDs for DVT prophylaxis.    Discharge Planning: Per NS.  To require tracheostomy and PEG tube placement.  LTAC?     Electronically signed by Max Collins DO, 06/01/22, 08:17 CDT.

## 2022-06-01 NOTE — PROGRESS NOTES
PEG tube tomorrow. Tube feedings were not held for trach and PEG. Both rescheduled for tomorrow.   Christel Davey, APRN  6/1/2022  09:47 CDT

## 2022-06-01 NOTE — PROGRESS NOTES
PULMONARY AND CRITICAL CARE PROGRESS NOTE - [facility]    Patient: Hai Hernandez    1945    MR# [id number]    Acct# [id number]  06/01/22   08:20 CDT  Referring Provider: Martell Downs, *    Chief Complaint: Mechanically ventilated    Interval history: He is afebrile.  Nutrition is off.  He is sedated on propofol.  Chest tube x2 attached to suction.  No leak.  120 mL of output.  Chest x-ray stable.  Hemoglobin down to 7.3 from 9.1.  Saturation 99 on PEEP of 8 and FiO2 0.3.  Peak airway pressure 28.  He is assisting the ventilator.  Awaiting tracheostomy, PEG,  shunt placement and possible VATS when condition allows.  No visitors present.    Meds:  albuterol, 2.5 mg, Nebulization, Q8H - RT  carvedilol, 6.25 mg, Nasogastric, BID With Meals  chlorhexidine, 15 mL, Mouth/Throat, Q12H  fosphenytoin, 250 mg PE, Intravenous, Q8H  heparin (porcine), 5,000 Units, Subcutaneous, Q12H  insulin detemir, 20 Units, Subcutaneous, Nightly  insulin regular, 2-7 Units, Subcutaneous, Q6H  insulin regular, 8 Units, Subcutaneous, Q6H  lacosamide, 200 mg, Intravenous, Q12H  levETIRAcetam, 500 mg, Intravenous, Q12H  levothyroxine, 125 mcg, Nasogastric, Q AM  lidocaine, 10 mL, Intradermal, Once  liothyronine, 25 mcg, Nasogastric, Daily  lisinopril, 20 mg, Nasogastric, Q24H  meropenem, 1 g, Intravenous, Q8H  mupirocin, 1 application, Topical, Q12H  Nutrisource fiber, 1 packet, Nasogastric, 4x Daily  pantoprazole, 40 mg, Intravenous, Q AM  polyethylene glycol, 17 g, Oral, Daily  ProSource TF, 1 packet, Nasogastric, BID  sodium chloride, 4 mL, Nebulization, BID - RT      hold, 1 each  norepinephrine, 0.02-0.3 mcg/kg/min, Last Rate: Stopped (06/01/22 0606)  propofol, 5-50 mcg/kg/min, Last Rate: 5 mcg/kg/min (06/01/22 0215)      Review of Systems:     Cannot obtain due to mechanical ventilated state     Ventilator Settings:        Resp Rate (Set): 16  Pressure Support (cm H2O): 8 cm H20  FiO2 (%): 30 %  PEEP/CPAP  (cm H2O): 8 cm H20  Minute Ventilation (L/min) (Obs): 10.5 L/min  Resp Rate (Observed) Vent: 21  I:E Ratio (Set): 1:0.32  I:E Ratio (Obs): 1:2.20  PIP Observed (cm H2O): 35 cm H2O  RSBI: 21.96  Physical Exam:  Temp:  [96.4 °F (35.8 °C)-97.5 °F (36.4 °C)] 96.8 °F (36 °C)  Heart Rate:  [41-69] 58  Resp:  [17-24] 23  BP: ()/(40-98) 132/68  FiO2 (%):  [30 %] 30 %  Intake/Output Summary (Last 24 hours) at 6/1/2022 0820  Last data filed at 6/1/2022 0606  Gross per 24 hour   Intake 3487.11 ml   Output 1800 ml   Net 1687.11 ml     SpO2 Percentage    06/01/22 0624 06/01/22 0630 06/01/22 0645   SpO2: 97% 96% 96%   Body mass index is 29.63 kg/m².   Physical Exam     Constitutional:       General: He is sleeping.      Appearance: He is ill-appearing. He is not toxic-appearing.      Interventions: He is sedated and intubated.   HENT:      Head: Normocephalic.      Comments: Craniotomy wound, drain draining clear fluid, ETT, NG/TF-off     Nose: Nose normal.   Eyes:      General: No scleral icterus.  Pulmonary:      Effort: No accessory muscle usage or respiratory distress. He is intubated.      Breath sounds: Decreased breath sounds in bases   Chest:      Chest wall: No edema.      Comments: Left sided chest tubes x2, no leak, 120 ml output  Abdominal:      General: There is no distension.      Palpations: Abdomen is soft.   Genitourinary:     Comments: Mccrary  Musculoskeletal:      Right lower leg: Edema present.      Left lower leg: Edema present.      Comments: SCDs   Skin:     Findings: No rash.   Neurological:      Motor: No tremor or seizure activity.   Psychiatric:         Behavior: Behavior is not agitated.     Electronically signed by MARIO Weeks, 6/1/2022, 08:20 CDT      Physician Substantive Portion: Medical Decision Making    Laboratory Data:  Results from last 7 days   Lab Units 06/01/22  0139 05/31/22  0411 05/30/22  0142   WBC 10*3/mm3 11.39* 12.16* 9.23   HEMOGLOBIN g/dL 7.3* 9.1* 9.1*   PLATELETS  10*3/mm3 306 350 340     Results from last 7 days   Lab Units 06/01/22  0139 05/31/22  1755 05/31/22  0411 05/30/22  0554   SODIUM mmol/L 138  --  140 140   POTASSIUM mmol/L 4.1  --  4.3 4.3   BUN mg/dL 31*  --  33* 33*   CREATININE mg/dL 0.62*  --  0.64* 0.65*   INR   --  1.10*  --   --      Results from last 7 days   Lab Units 06/01/22  0439 05/31/22  0413 05/30/22  0405   PH, ARTERIAL pH units 7.426 7.485* 7.513*   PCO2, ARTERIAL mm Hg 50.3* 43.9 40.7   PO2 ART mm Hg 98.2 90.5 71.0*   FIO2 % 30 30 30     No results found for: BLOODCX, URINECX, WOUNDCX, MRSACX, RESPCX, STOOLCX  Recent films:  XR Chest 1 View    Result Date: 6/1/2022  EXAMINATION: XR CHEST 1 VW-  6/1/2022 3:20 AM CDT  HISTORY: On vent; R13.10-Dysphagia, unspecified; Z01.818-Encounter for other preprocedural examination; D32.9-Benign neoplasm of meninges, unspecified; Z74.09-Other reduced mobility; Z78.9-Other specified health status; G40.901-Epilepsy, unspecified, not intractable, with status epilepticus; J96.01-Acute respiratory failure with hypoxia  A single frontal portable view of the chest is compared to the previous study dated 5/31/2022.  The lungs are poorly expanded.  There is a persistent left lower lobar consolidation.  There is a very ill-defined area of increased opacity in the left upper and midlung projected over the left hilum which may represent an area of consolidation or atelectasis. This was not obvious on the previous study.  There is small left basal pleural effusion.  There are multiple small calcified granulomas in both lungs.  There is no pulmonary congestion or pneumothorax.  Endotracheal tube is is in place. The distal end of the Dobbhoff tube is not visualized. It appears unchanged. A right-sided PICC line is in place. No change. Left-sided chest tubes are in place. No change.  No acute bony abnormality.      Impression: 1. Persistent left lower lobar consolidation and a small left basal pleural effusion. 2. Another  ill-defined opacity in the left upper and mid lung which may represent an area of discoid atelectasis or consolidation. 3. Multiple small old healed granulomatous in both lungs. 4. The tubes and lines in place. This report was finalized on 06/01/2022 06:50 by Dr. Aida Wong MD.    XR Chest 1 View    Result Date: 5/31/2022  EXAMINATION: XR CHEST 1 VW-  5/31/2022 3:10 AM CDT  HISTORY: On vent; R13.10-Dysphagia, unspecified; Z01.818-Encounter for other preprocedural examination; D32.9-Benign neoplasm of meninges, unspecified; Z74.09-Other reduced mobility; Z78.9-Other specified health status; G40.901-Epilepsy, unspecified, not intractable, with status epilepticus  1 view chest x-ray.  Comparison is made with yesterday at 3:16 AM.  The endotracheal tube and Dobbhoff feeding tube remain in good position. Unchanged left chest tube. No significant pneumothorax.  The right PICC line remains in good position.  Mild interstitial lung disease with scattered small granulomas.  Persistent left lower lobe infiltrate/atelectasis.  Summary: 1. Stable appearance of the chest compared with yesterday. This report was finalized on 05/31/2022 07:27 by Dr. Tomer Whelan MD.       My radiograph interpretation/independent review of imaging: atelectasis, chest tubes    Pulmonary Assessment:    1. Acute resp failure requiring vent following seizure,metabolic encephalopathy, and with hypoxia and hypercapnia following development of pleural effusion  2. Pleural effusion  3. Encephalopathy persistent  4. Anemia worsened    Recommend/plan:   · Continue chest tube drainage, mgmt per ct surgery  · Continue mechanical ventilation without changes today  · Anticipate tracheostomy  · Following our visit we were informed of development of ett cuff air leak and plans for replacement.  · Continue following hgb, near threshold for transfusion.      This visit was performed by both a physician and an [facility] RN.  I personally evaluated and  examined the patient.  I performed all aspects of the medical decision making as documented.  Electronically signed by Carlos A Dasilva MD, 6/1/2022, 18:23 CDT

## 2022-06-02 ENCOUNTER — ANESTHESIA EVENT (OUTPATIENT)
Dept: PERIOP | Facility: HOSPITAL | Age: 77
End: 2022-06-02

## 2022-06-02 ENCOUNTER — APPOINTMENT (OUTPATIENT)
Dept: CT IMAGING | Facility: HOSPITAL | Age: 77
End: 2022-06-02

## 2022-06-02 ENCOUNTER — APPOINTMENT (OUTPATIENT)
Dept: GENERAL RADIOLOGY | Facility: HOSPITAL | Age: 77
End: 2022-06-02

## 2022-06-02 ENCOUNTER — ANESTHESIA (OUTPATIENT)
Dept: PERIOP | Facility: HOSPITAL | Age: 77
End: 2022-06-02

## 2022-06-02 LAB
ALBUMIN SERPL-MCNC: 1.6 G/DL (ref 3.5–5.2)
ALBUMIN/GLOB SERPL: 0.4 G/DL
ALP SERPL-CCNC: 225 U/L (ref 39–117)
ALT SERPL W P-5'-P-CCNC: 31 U/L (ref 1–41)
ANION GAP SERPL CALCULATED.3IONS-SCNC: 8 MMOL/L (ref 5–15)
ARTERIAL PATENCY WRIST A: POSITIVE
AST SERPL-CCNC: 34 U/L (ref 1–40)
ATMOSPHERIC PRESS: 747 MMHG
BASE EXCESS BLDA CALC-SCNC: 9.1 MMOL/L (ref 0–2)
BDY SITE: ABNORMAL
BILIRUB SERPL-MCNC: 0.3 MG/DL (ref 0–1.2)
BODY TEMPERATURE: 37 C
BUN SERPL-MCNC: 28 MG/DL (ref 8–23)
BUN/CREAT SERPL: 56 (ref 7–25)
CALCIUM SPEC-SCNC: 8.1 MG/DL (ref 8.6–10.5)
CHLORIDE SERPL-SCNC: 101 MMOL/L (ref 98–107)
CO2 SERPL-SCNC: 28 MMOL/L (ref 22–29)
CREAT SERPL-MCNC: 0.5 MG/DL (ref 0.76–1.27)
DEPRECATED RDW RBC AUTO: 48.3 FL (ref 37–54)
EGFRCR SERPLBLD CKD-EPI 2021: 105.1 ML/MIN/1.73
EOSINOPHIL # BLD MANUAL: 0.12 10*3/MM3 (ref 0–0.4)
EOSINOPHIL NFR BLD MANUAL: 1 % (ref 0.3–6.2)
ERYTHROCYTE [DISTWIDTH] IN BLOOD BY AUTOMATED COUNT: 13.9 % (ref 12.3–15.4)
GLOBULIN UR ELPH-MCNC: 4.1 GM/DL
GLUCOSE BLDC GLUCOMTR-MCNC: 122 MG/DL (ref 70–130)
GLUCOSE BLDC GLUCOMTR-MCNC: 135 MG/DL (ref 70–130)
GLUCOSE BLDC GLUCOMTR-MCNC: 64 MG/DL (ref 70–130)
GLUCOSE BLDC GLUCOMTR-MCNC: 71 MG/DL (ref 70–130)
GLUCOSE BLDC GLUCOMTR-MCNC: 95 MG/DL (ref 70–130)
GLUCOSE SERPL-MCNC: 68 MG/DL (ref 65–99)
HCO3 BLDA-SCNC: 34.2 MMOL/L (ref 20–26)
HCT VFR BLD AUTO: 27.9 % (ref 37.5–51)
HGB BLD-MCNC: 9 G/DL (ref 13–17.7)
INHALED O2 CONCENTRATION: 30 %
LYMPHOCYTES # BLD MANUAL: 0.59 10*3/MM3 (ref 0.7–3.1)
LYMPHOCYTES NFR BLD MANUAL: 9 % (ref 5–12)
Lab: ABNORMAL
MCH RBC QN AUTO: 31.1 PG (ref 26.6–33)
MCHC RBC AUTO-ENTMCNC: 32.3 G/DL (ref 31.5–35.7)
MCV RBC AUTO: 96.5 FL (ref 79–97)
METAMYELOCYTES NFR BLD MANUAL: 4 % (ref 0–0)
MODALITY: ABNORMAL
MONOCYTES # BLD: 1.07 10*3/MM3 (ref 0.1–0.9)
MYELOCYTES NFR BLD MANUAL: 5 % (ref 0–0)
NEUTROPHILS # BLD AUTO: 8.78 10*3/MM3 (ref 1.7–7)
NEUTROPHILS NFR BLD MANUAL: 67 % (ref 42.7–76)
NEUTS BAND NFR BLD MANUAL: 7 % (ref 0–5)
PCO2 BLDA: 49.5 MM HG (ref 35–45)
PCO2 TEMP ADJ BLD: 49.5 MM HG (ref 35–45)
PEEP RESPIRATORY: 8 CM[H2O]
PH BLDA: 7.45 PH UNITS (ref 7.35–7.45)
PH, TEMP CORRECTED: 7.45 PH UNITS (ref 7.35–7.45)
PHENYTOIN SERPL-MCNC: 7 MCG/ML (ref 10–20)
PLAT MORPH BLD: NORMAL
PLATELET # BLD AUTO: 366 10*3/MM3 (ref 140–450)
PMV BLD AUTO: 9.6 FL (ref 6–12)
PO2 BLDA: 84.1 MM HG (ref 83–108)
PO2 TEMP ADJ BLD: 84.1 MM HG (ref 83–108)
POLYCHROMASIA BLD QL SMEAR: ABNORMAL
POTASSIUM SERPL-SCNC: 4.5 MMOL/L (ref 3.5–5.2)
PROMYELOCYTES NFR BLD MANUAL: 2 % (ref 0–0)
PROT SERPL-MCNC: 5.7 G/DL (ref 6–8.5)
RBC # BLD AUTO: 2.89 10*6/MM3 (ref 4.14–5.8)
SAO2 % BLDCOA: 97.2 % (ref 94–99)
SARS-COV-2 RNA PNL SPEC NAA+PROBE: NOT DETECTED
SET MECH RESP RATE: 16
SODIUM SERPL-SCNC: 137 MMOL/L (ref 136–145)
VARIANT LYMPHS NFR BLD MANUAL: 1 % (ref 0–5)
VARIANT LYMPHS NFR BLD MANUAL: 4 % (ref 19.6–45.3)
VENTILATOR MODE: AC
VT ON VENT VENT: 450 ML
WBC MORPH BLD: NORMAL
WBC NRBC COR # BLD: 11.86 10*3/MM3 (ref 3.4–10.8)
ZINC SERPL-MCNC: 54 UG/DL (ref 44–115)

## 2022-06-02 PROCEDURE — 0W9B30Z DRAINAGE OF LEFT PLEURAL CAVITY WITH DRAINAGE DEVICE, PERCUTANEOUS APPROACH: ICD-10-PCS | Performed by: RADIOLOGY

## 2022-06-02 PROCEDURE — 25010000002 MORPHINE SULFATE (PF) 2 MG/ML SOLUTION: Performed by: INTERNAL MEDICINE

## 2022-06-02 PROCEDURE — 94003 VENT MGMT INPAT SUBQ DAY: CPT

## 2022-06-02 PROCEDURE — 25010000002 LEVETIRACETAM IN NACL 0.82% 500 MG/100ML SOLUTION: Performed by: NEUROLOGICAL SURGERY

## 2022-06-02 PROCEDURE — 99232 SBSQ HOSP IP/OBS MODERATE 35: CPT | Performed by: PSYCHIATRY & NEUROLOGY

## 2022-06-02 PROCEDURE — 87635 SARS-COV-2 COVID-19 AMP PRB: CPT | Performed by: INTERNAL MEDICINE

## 2022-06-02 PROCEDURE — 94799 UNLISTED PULMONARY SVC/PX: CPT

## 2022-06-02 PROCEDURE — C1769 GUIDE WIRE: HCPCS | Performed by: OTOLARYNGOLOGY

## 2022-06-02 PROCEDURE — 25010000002 LEVETIRACETAM IN NACL 0.82% 500 MG/100ML SOLUTION: Performed by: OTOLARYNGOLOGY

## 2022-06-02 PROCEDURE — 80053 COMPREHEN METABOLIC PANEL: CPT | Performed by: PSYCHIATRY & NEUROLOGY

## 2022-06-02 PROCEDURE — 25010000002 PROPOFOL 10 MG/ML EMULSION: Performed by: OTOLARYNGOLOGY

## 2022-06-02 PROCEDURE — 82803 BLOOD GASES ANY COMBINATION: CPT

## 2022-06-02 PROCEDURE — 25010000002 FOSPHENYTOIN 500 MG PE/10ML SOLUTION 10 ML VIAL: Performed by: OTOLARYNGOLOGY

## 2022-06-02 PROCEDURE — 25010000002 HYDRALAZINE PER 20 MG: Performed by: OTOLARYNGOLOGY

## 2022-06-02 PROCEDURE — 99231 SBSQ HOSP IP/OBS SF/LOW 25: CPT | Performed by: SURGERY

## 2022-06-02 PROCEDURE — 80185 ASSAY OF PHENYTOIN TOTAL: CPT | Performed by: PSYCHIATRY & NEUROLOGY

## 2022-06-02 PROCEDURE — 85025 COMPLETE CBC W/AUTO DIFF WBC: CPT | Performed by: NEUROLOGICAL SURGERY

## 2022-06-02 PROCEDURE — 31600 PLANNED TRACHEOSTOMY: CPT | Performed by: OTOLARYNGOLOGY

## 2022-06-02 PROCEDURE — 71045 X-RAY EXAM CHEST 1 VIEW: CPT

## 2022-06-02 PROCEDURE — 25010000002 MEROPENEM PER 100 MG: Performed by: INTERNAL MEDICINE

## 2022-06-02 PROCEDURE — 94761 N-INVAS EAR/PLS OXIMETRY MLT: CPT

## 2022-06-02 PROCEDURE — 0DH63UZ INSERTION OF FEEDING DEVICE INTO STOMACH, PERCUTANEOUS APPROACH: ICD-10-PCS | Performed by: INTERNAL MEDICINE

## 2022-06-02 PROCEDURE — 25010000002 HEPARIN (PORCINE) PER 1000 UNITS: Performed by: OTOLARYNGOLOGY

## 2022-06-02 PROCEDURE — 85007 BL SMEAR W/DIFF WBC COUNT: CPT | Performed by: NEUROLOGICAL SURGERY

## 2022-06-02 PROCEDURE — 36600 WITHDRAWAL OF ARTERIAL BLOOD: CPT

## 2022-06-02 PROCEDURE — 99233 SBSQ HOSP IP/OBS HIGH 50: CPT | Performed by: INTERNAL MEDICINE

## 2022-06-02 PROCEDURE — 25010000002 PROPOFOL 10 MG/ML EMULSION: Performed by: NEUROLOGICAL SURGERY

## 2022-06-02 PROCEDURE — 75989 ABSCESS DRAINAGE UNDER X-RAY: CPT

## 2022-06-02 PROCEDURE — 25010000002 FOSPHENYTOIN 500 MG PE/10ML SOLUTION 10 ML VIAL: Performed by: PSYCHIATRY & NEUROLOGY

## 2022-06-02 PROCEDURE — 99024 POSTOP FOLLOW-UP VISIT: CPT | Performed by: NURSE PRACTITIONER

## 2022-06-02 PROCEDURE — 25010000002 MORPHINE SULFATE (PF) 2 MG/ML SOLUTION: Performed by: OTOLARYNGOLOGY

## 2022-06-02 PROCEDURE — 82962 GLUCOSE BLOOD TEST: CPT

## 2022-06-02 PROCEDURE — 0B110F4 BYPASS TRACHEA TO CUTANEOUS WITH TRACHEOSTOMY DEVICE, OPEN APPROACH: ICD-10-PCS | Performed by: OTOLARYNGOLOGY

## 2022-06-02 RX ORDER — LIDOCAINE HYDROCHLORIDE AND EPINEPHRINE 10; 10 MG/ML; UG/ML
INJECTION, SOLUTION INFILTRATION; PERINEURAL AS NEEDED
Status: DISCONTINUED | OUTPATIENT
Start: 2022-06-02 | End: 2022-06-02 | Stop reason: HOSPADM

## 2022-06-02 RX ORDER — EPHEDRINE SULFATE 50 MG/ML
INJECTION, SOLUTION INTRAVENOUS AS NEEDED
Status: DISCONTINUED | OUTPATIENT
Start: 2022-06-02 | End: 2022-06-02 | Stop reason: SURG

## 2022-06-02 RX ORDER — ROCURONIUM BROMIDE 10 MG/ML
INJECTION, SOLUTION INTRAVENOUS AS NEEDED
Status: DISCONTINUED | OUTPATIENT
Start: 2022-06-02 | End: 2022-06-02 | Stop reason: SURG

## 2022-06-02 RX ORDER — MAGNESIUM HYDROXIDE 1200 MG/15ML
LIQUID ORAL AS NEEDED
Status: DISCONTINUED | OUTPATIENT
Start: 2022-06-02 | End: 2022-06-02 | Stop reason: HOSPADM

## 2022-06-02 RX ORDER — SODIUM CHLORIDE, SODIUM LACTATE, POTASSIUM CHLORIDE, CALCIUM CHLORIDE 600; 310; 30; 20 MG/100ML; MG/100ML; MG/100ML; MG/100ML
INJECTION, SOLUTION INTRAVENOUS CONTINUOUS PRN
Status: DISCONTINUED | OUTPATIENT
Start: 2022-06-02 | End: 2022-06-02 | Stop reason: SURG

## 2022-06-02 RX ADMIN — PROPOFOL 15 MCG/KG/MIN: 10 INJECTION, EMULSION INTRAVENOUS at 16:28

## 2022-06-02 RX ADMIN — MORPHINE SULFATE 2 MG: 2 INJECTION, SOLUTION INTRAMUSCULAR; INTRAVENOUS at 07:26

## 2022-06-02 RX ADMIN — SODIUM CHLORIDE 275 MG PE: 9 INJECTION, SOLUTION INTRAVENOUS at 21:49

## 2022-06-02 RX ADMIN — LACOSAMIDE 200 MG: 10 INJECTION, SOLUTION INTRAVENOUS at 20:04

## 2022-06-02 RX ADMIN — MORPHINE SULFATE 2 MG: 2 INJECTION, SOLUTION INTRAMUSCULAR; INTRAVENOUS at 20:17

## 2022-06-02 RX ADMIN — LEVETIRACETAM 500 MG: 5 INJECTION INTRAVASCULAR at 20:05

## 2022-06-02 RX ADMIN — SODIUM CHLORIDE 275 MG PE: 9 INJECTION, SOLUTION INTRAVENOUS at 06:12

## 2022-06-02 RX ADMIN — PROPOFOL 20 MCG/KG/MIN: 10 INJECTION, EMULSION INTRAVENOUS at 08:38

## 2022-06-02 RX ADMIN — MUPIROCIN 1 APPLICATION: 20 OINTMENT TOPICAL at 10:50

## 2022-06-02 RX ADMIN — PROPOFOL 20 MCG/KG/MIN: 10 INJECTION, EMULSION INTRAVENOUS at 21:53

## 2022-06-02 RX ADMIN — EPHEDRINE SULFATE 15 MG: 50 INJECTION INTRAVENOUS at 12:16

## 2022-06-02 RX ADMIN — PANTOPRAZOLE SODIUM 40 MG: 40 INJECTION, POWDER, FOR SOLUTION INTRAVENOUS at 06:12

## 2022-06-02 RX ADMIN — SODIUM CHLORIDE SOLN NEBU 3% 4 ML: 3 NEBU SOLN at 07:10

## 2022-06-02 RX ADMIN — ROCURONIUM BROMIDE 50 MG: 10 SOLUTION INTRAVENOUS at 11:58

## 2022-06-02 RX ADMIN — MORPHINE SULFATE 2 MG: 2 INJECTION, SOLUTION INTRAMUSCULAR; INTRAVENOUS at 00:21

## 2022-06-02 RX ADMIN — HYDRALAZINE HYDROCHLORIDE 10 MG: 20 INJECTION INTRAMUSCULAR; INTRAVENOUS at 19:33

## 2022-06-02 RX ADMIN — SODIUM CHLORIDE, POTASSIUM CHLORIDE, SODIUM LACTATE AND CALCIUM CHLORIDE: 600; 310; 30; 20 INJECTION, SOLUTION INTRAVENOUS at 11:43

## 2022-06-02 RX ADMIN — HEPARIN SODIUM 5000 UNITS: 5000 INJECTION INTRAVENOUS; SUBCUTANEOUS at 20:04

## 2022-06-02 RX ADMIN — CHLORHEXIDINE GLUCONATE 15 ML: 1.2 RINSE ORAL at 10:54

## 2022-06-02 RX ADMIN — MEROPENEM 1 G: 1 INJECTION, POWDER, FOR SOLUTION INTRAVENOUS at 00:21

## 2022-06-02 RX ADMIN — DEXTROSE MONOHYDRATE 25 G: 25 INJECTION, SOLUTION INTRAVENOUS at 17:59

## 2022-06-02 RX ADMIN — MORPHINE SULFATE 2 MG: 2 INJECTION, SOLUTION INTRAMUSCULAR; INTRAVENOUS at 17:56

## 2022-06-02 RX ADMIN — ALBUTEROL SULFATE 2.5 MG: 2.5 SOLUTION RESPIRATORY (INHALATION) at 22:57

## 2022-06-02 RX ADMIN — ALBUTEROL SULFATE 2.5 MG: 2.5 SOLUTION RESPIRATORY (INHALATION) at 07:10

## 2022-06-02 RX ADMIN — DEXTROSE MONOHYDRATE 25 G: 25 INJECTION, SOLUTION INTRAVENOUS at 10:54

## 2022-06-02 RX ADMIN — LEVETIRACETAM 500 MG: 5 INJECTION INTRAVASCULAR at 08:38

## 2022-06-02 RX ADMIN — SODIUM CHLORIDE 275 MG PE: 9 INJECTION, SOLUTION INTRAVENOUS at 15:03

## 2022-06-02 RX ADMIN — DEXTROSE MONOHYDRATE 25 G: 25 INJECTION, SOLUTION INTRAVENOUS at 06:12

## 2022-06-02 RX ADMIN — ALBUTEROL SULFATE 2.5 MG: 2.5 SOLUTION RESPIRATORY (INHALATION) at 14:27

## 2022-06-02 RX ADMIN — CHLORHEXIDINE GLUCONATE 15 ML: 1.2 RINSE ORAL at 20:17

## 2022-06-02 RX ADMIN — SODIUM CHLORIDE SOLN NEBU 3% 4 ML: 3 NEBU SOLN at 19:17

## 2022-06-02 RX ADMIN — MUPIROCIN 1 APPLICATION: 20 OINTMENT TOPICAL at 20:05

## 2022-06-02 RX ADMIN — LACOSAMIDE 200 MG: 10 INJECTION, SOLUTION INTRAVENOUS at 08:38

## 2022-06-02 NOTE — SIGNIFICANT NOTE
06/02/22 0707   Readings   ETCO2 (mmHg) 36 mmHg   Resp Rate (Observed) Vent 24   I:E Ratio (Obs) 1:1.90   Vt, Exp (observed, mL) 471 mL   Minute Ventilation (L/min) (Obs) 11.1 L/min   PIP Observed (cm H2O) 30 cm H2O   MAP (cm H2O) 14   PEEP Observed cmH2O 7.9 cmH2O   Plateau Pressure (cm H2O) 22 cm H2O   Driving Pressure (cm H2O) 14.1 cm H2O   Dynamic Compliance (L/cm H2O) 40 L/cm H2O

## 2022-06-02 NOTE — PROGRESS NOTES
[facility] Medicine Services  INPATIENT PROGRESS NOTE    Patient Name: Hai Hernandez  Date of Admission: 5/10/2022  Today's Date: 06/02/22  Length of Stay: 23  Primary Care Physician: Elisa Chacko MD    Subjective   Chief Complaint: Consult for medical management.  HPI   Tracheostomy and PEG tube were deferred yesterday secondary to the patient's tube feedings not being shut off until 0700.  Tentative plans to proceed today.    No further hypotensive episodes.    No fevers.    Hemoglobin 9.0 from 7.3 without intervention.  It had been 9.1 in the previous day.    Review of Systems   Unable to assess secondary to the patient's intubated state.     Objective    Temp:  [96.9 °F (36.1 °C)-98.2 °F (36.8 °C)] 96.9 °F (36.1 °C)  Heart Rate:  [50-69] 62  Resp:  [23-30] 26  BP: ()/(46-86) 152/71  FiO2 (%):  [30 %] 30 %  Physical Exam  Constitutional:       Appearance: He is well-developed.      Comments: Ventilated.  No family present. Discussed with his nurse, Simona.    HENT:      Head: Normocephalic.      Comments: Right frontal craniotomy site well-healing.  Eyes:      Conjunctiva/sclera: Conjunctivae normal.      Pupils: Pupils are equal, round, and reactive to light.   Neck:      Vascular: No JVD.   Cardiovascular:      Rate and Rhythm: Normal rate and regular rhythm.      Heart sounds: Normal heart sounds. No murmur heard.    No friction rub. No gallop.      Comments: Sinus in the 50s, 60s on telemetry.   Pulmonary:      Comments: Ventilated with clear lungs.  FiO2 at 30%, PEEP 8.  Left sided chest tubes.   Abdominal:      General: Bowel sounds are normal. There is no distension.      Palpations: Abdomen is soft.      Tenderness: There is no abdominal tenderness.   Musculoskeletal:         General: Swelling (trace) present. No tenderness or deformity. Normal range of motion.      Cervical back: Neck supple.      Comments: Right upper extremity has a PICC line remains.  Arm less swollen.    Skin:     General: Skin is warm and dry.      Findings: No rash.   Neurological:      Motor: No abnormal muscle tone.      Deep Tendon Reflexes: Reflexes normal.      Comments: Minimal withdrawal to painful stimuli in all extremities; grimaces as well. No purposeful movements. Currently on 5 of propofol.          Intake/Output Summary (Last 24 hours) at 6/2/2022 0803  Last data filed at 6/2/2022 0612  Gross per 24 hour   Intake 2795 ml   Output 1820 ml   Net 975 ml     Results Review:  I have reviewed the labs, radiology results, and diagnostic studies.    Laboratory Data:   Results from last 7 days   Lab Units 06/02/22 0528 06/01/22 0139 05/31/22  0411   WBC 10*3/mm3 11.86* 11.39* 12.16*   HEMOGLOBIN g/dL 9.0* 7.3* 9.1*   HEMATOCRIT % 27.9* 23.6* 29.2*   PLATELETS 10*3/mm3 366 306 350     Results from last 7 days   Lab Units 06/02/22 0528 06/01/22 0139 05/31/22  0411   SODIUM mmol/L 137 138 140   POTASSIUM mmol/L 4.5 4.1 4.3   CHLORIDE mmol/L 101 101 104   CO2 mmol/L 28.0 32.0* 32.0*   BUN mg/dL 28* 31* 33*   CREATININE mg/dL 0.50* 0.62* 0.64*   CALCIUM mg/dL 8.1* 7.7* 7.8*   BILIRUBIN mg/dL 0.3 0.2 0.2   ALK PHOS U/L 225* 122* 171*   ALT (SGPT) U/L 31 25 34   AST (SGOT) U/L 34 25 41*   GLUCOSE mg/dL 68 157* 143*     Radiology Data:   Imaging Results (Last 24 Hours)     Procedure Component Value Units Date/Time    XR Chest 1 View [[id number]] Collected: 06/02/22 0719     Updated: 06/02/22 0724    Narrative:      EXAM: XR CHEST 1 VW- 6/2/2022 3:37 AM CDT     HISTORY: On vent; R13.10-Dysphagia, unspecified; Z01.818-Encounter for  other preprocedural examination; D32.9-Benign neoplasm of meninges,  unspecified; Z74.09-Other reduced mobility; Z78.9-Other specified health  status; G40.901-Epilepsy, unspecified, not intractable, with status  epilepticus; J96.01-Acute respiratory failure with hypoxia       COMPARISON: CT scan June 1, 2022.     TECHNIQUE: Frontal radiograph of the chest      FINDINGS:   The right lung is clear. Two left chest tubes are present. There is  opacification of the left lower lobe. This may be compression  atelectasis from prior posterior left pleural effusion.. The  cardiomediastinal silhouette and pulmonary vascularity are within normal  limits. Endotracheal tube is present. Dobbhoff feeding tube is present.  Right PIC catheter is satisfactorily positioned     The osseous structures and surrounding soft tissues demonstrate no acute  abnormality.          Impression:      1. 2 left chest tubes are present. There is persistent opacification the  left chest is likely atelectasis left lower lobe  2. Support tubes are satisfactorily positioned..        This report was finalized on 06/02/2022 07:21 by Dr. Emanuel Frederick MD.    XR Chest 1 View [[id number]] Collected: 06/01/22 1323     Updated: 06/01/22 1329    Narrative:      EXAMINATION: XR CHEST 1 VW-     6/1/2022 11:55 AM CDT     HISTORY: Et tube change; R13.10-Dysphagia, unspecified;  Z01.818-Encounter for other preprocedural examination; D32.9-Benign  neoplasm of meninges, unspecified; Z74.09-Other reduced mobility;  Z78.9-Other specified health status; G40.901-Epilepsy, unspecified, not  intractable, with status epilepticus; J96.01-Acute respiratory failure  with hypoxia     1 view chest x-ray.     Comparison is made with earlier today at 3:13 AM.     Endotracheal tube replaced/repositioned and being slightly lower with  the tip 20 mm above the hernandez.     Right PICC line and feeding tube remain in place.     Unchanged appearance of the lungs with 2 left chest tubes and loculated  left pleural fluid.     Persistent left lung base consolidation.     Summary:  1. Slightly lower lying endotracheal tube with the tip 20 mm above the  hernandez.  2. Otherwise unchanged appearance of the chest.  This report was finalized on 06/01/2022 13:26 by Dr. Tomer Whelan MD.    CT Chest With Contrast Diagnostic [[id number]] Collected: 06/01/22  1040     Updated: 06/01/22 1047    Narrative:      EXAMINATION: CT CHEST W CONTRAST DIAGNOSTIC-      6/1/2022 9:21 AM CDT     HISTORY: post intrapleural tpa; R13.10-Dysphagia, unspecified;  Z01.818-Encounter for other preprocedural examination; D32.9-Benign  neoplasm of meninges, unspecified; Z74.09-Other reduced mobility;  Z78.9-Other specified health status; G40.901-Epilepsy, unspecified, not  intractable, with status epilepticus; J96.01-Acute respiratory failure  with hypoxia     In order to have a CT radiation dose as low as reasonably achievable  Automated Exposure Control was utilized for adjustment of the mA and/or  KV according to patient size.     DLP in mGycm= 426.     Postcontrast chest CT.  Axial, sagittal, and coronal sequences.     Comparison is made with May 25, 2022.     Interval placement of an upper left chest tube which has drained the  upper lateral pleural fluid collection which was seen previously.     The medial and inferior fluid collection persists.  Unchanged position of the lower left chest tube.     There is still focal dense consolidation of the lower left lung.     The upper lung is better expanded and clear.  There is no significant pneumothorax.     The right lung is adequately expanded and essentially clear.  There is mild patchy atelectasis.     Endotracheal tube and gastric tube present.  The endotracheal tube tip lies 29 mm above the hernandez.     Summary:  1. Compared with one week ago there has been placement of an upper left  chest tube which has drained the upper lateral pleural fluid collection.  2. The lower left chest tube is unchanged in position.  3. There is still some loculated pleural fluid medially and inferiorly.  4. Persistent consolidation of the lower left lung.                                   This report was finalized on 06/01/2022 10:44 by Dr. Tomer Whelan MD.        Results from last 7 days   Lab Units 06/02/22  0433   PH, ARTERIAL pH units 7.448   PO2 ART mm  Hg 84.1   PCO2, ARTERIAL mm Hg 49.5*   HCO3 ART mmol/L 34.2*   FiO2 (%):  [30 %] 30 %  S RR:  [16] 16  PEEP/CPAP (cm H2O):  [8 cm H20] 8 cm H20  MAP (cm H2O):  [13-27] 14    I have reviewed the patient's current medications.     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 Downs with neurosurgery.  He has prior history of known meningioma that was causing compression and visual changes.  He presented for craniotomy.  Hospital medicine was originally consulted on 5/14.  The patient had developed status epilepticus postsurgically and required intubation/mechanical ventilation.  Neurology and pulmonology have also been following.    He required intubation on 5/14 for airway protection.  This was done by Dr. Gunderson.  Pulmonary has since been consulted. Continue to monitor for readiness for spontaneous breathing trials and extubation in the future. Again, he was intubated for airway protection given his status epilepticus.    He has recently had problems with status epilepticus.  He has been seen by Dr. Noe and Dr. Freire.  Propofol being used at times.  He is on IV Vimpat, IV Keppra, and IV Cerebyx.    He has had a lumbar drain placed by neurosurgery.  Most  recent CT scan head without contrast was yesterday.  This showed postsurgical changes from right frontal craniotomy.  Small residual 5 mm subdural hematoma hygroma.  Residual hemorrhage and occipital Horn similar to prior exam.  Previously described hemorrhage in the right frontal lobe is not present on his exam.  Dilated ventricular system.  He continues to have a lumbar drain and apparently there has been some discussion about whether or not to place a more formal  shunt at some point.  He has also recently been on Decadron.      He previously had some thrombocytopenia.  His platelet count was 217,000 on 4/20/2022. This declined to a low of 72,000 on 5/17.  Peripheral smear on 5/17 showed no schistocytes with moderate peripheral thrombocytopenia.  PTT normal, PT/INR slightly elevated, fibrin split products high, and fibrinogen high.  Neurosurgery gave him a sixpack of platelets on 5/18.  Platelets remain normal and are now 366,000.     He had run steady fever from the afternoon of 5/21 through 5/22.  Dr. Escobar was contacted and placed him on vancomycin and cefepime.  Recent blood cultures currently with no growth.  Recent respiratory culture currently with no growth. The urinalysis did show 13-20 white blood cells with 4+ bacteria and small leukocyte esterase.  There was also large blood.  Urine culture grew Klebsiella. He still has a lumbar drain and CSF was sampled on the morning of 5/22.  No organisms were seen on the gram stains. Obtained CT imaging of chest, abdomen, and pelvis on 5/23.  This showed a moderate size left pleural effusion with extensive compressive atelectasis of the left lung.  Not possible to exclude pneumonia.  He ultimately had chest tubes placed by Dr. Ortega.  Now receiving TPA through these.  He received vancomycin. Meropenem was started on 5/25 for 5 days and I extended it an additional 2 days on 5/31.  Repeat CT of the chest reviewed.  Awaiting further recommendations from  cardiothoracic surgery.      He has a history of hypertension.  He typically takes lisinopril. Resumed per tube at a lower dose than normal on 5/18. Titrated up on 5/19. Started carvedilol on 5/19. Titrated medications up to get off Cardene.     He has had some paroxysmal atrial fibrillation.  He cannot be anticoagulated at present.  Will ask neurosurgery if he can at least have an aspirin at some point.  He is currently in sinus rhythm in the 60s. Echocardiogram did not show any significant abnormalities.     He has a history of hypothyroidism. TSH checked and over suppressed so levothyroxine was initially held. Free T4 and free T3 were very low as well. Started levothyroxine and liothyronine on 5/19. TRH pending since 5/19!!  I rechecked TSH, free T4, and free T3 on 5/29 to see where we are at.  TSH is now 3.950.  Free T4 has improve from 0.31 to 0.53.  Free T3 has improved from less than 0.40 to 1.60.    He is diabetic.  His A1c is 8.2.  Continues to receive basal insulin, every 6 hours, and sliding scale insulin.  Most recent blood sugars have been 68, 64, 71.       Reconciled and resumed home medications as appropriate.    Nutrition consulted for free water and tube feeding recommendations.    IV pantoprazole for GI prophylaxis.    He has a right upper extremity PICC line.  Duplex examination done with thrombus in the brachial and ulnar veins.  Feel the PICC line needs to be pulled.  Unlikely to be able to anticoagulate.  Will defer to Dr. Downs about further line placement.    SCDs for DVT prophylaxis.    Discharge Planning: Per NS.  To require tracheostomy and PEG tube placement.  LTAC?     Electronically signed by Max Collins DO, 06/02/22, 08:03 CDT.

## 2022-06-02 NOTE — PLAN OF CARE
Patient remains intubated and sedated, will open eyes on own, does not follow commands during sedation vacation.  No s/s of seizure activity noted. HR remains SB, occasional ectopic beats noted O2 WNL.  Vent settings monitored and adjusted per RT .  Meds administered as seen on MAR. Adequate UOP via jansen catheter, copious sediment noted.  Large BM overnight.  Fall and safety precautions remain intact.   Oral care and turns performed Q2. Tube feed stopped at 0000.  20 mls CSF drained Q2 per MD order.  CT maintained to dry suction.  Wound care completed.      Goal Outcome Evaluation:  Plan of Care Reviewed With: patient        Progress: no change

## 2022-06-02 NOTE — ANESTHESIA PROCEDURE NOTES
Airway  Urgency: elective    Airway not difficult    General Information and Staff    Patient location during procedure: OR  CRNA/CAA: Herbert Patel CRNA    Indications and Patient Condition  Indications for airway management: airway protection    Preoxygenated: yes  MILS maintained throughout  Mask difficulty assessment: 1 - vent by mask    Final Airway Details  Final airway type: endotracheal airway      Successful airway: ETT  Cuffed: yes   Successful intubation technique: direct laryngoscopy  Endotracheal tube insertion site: tracheostomy  Blade: Ramos  Blade size: 2  Placement verified by: chest auscultation and capnometry   Cuff volume (mL): 5  Measured from: gums  Number of attempts at approach: 1  Assessment: lips, teeth, and gum same as pre-op and atraumatic intubation

## 2022-06-02 NOTE — PROGRESS NOTES
Neurology Progress Note      Chief Complaint:    Postoperative seizure    Subjective     Subjective:  No report of any seizure-like activity.  Patient is preparing to go downstairs for CT-guided chest tube placement as he is continuing to loculated his effusion and current chest tubes have not been well-function.    Plans are to proceed with PEG tube and tracheostomy tomorrow.    Patient remains hemodynamically stable.  Lumbar drain remains in place.      Past Medical History:   Diagnosis Date   • Brain tumor (HCC)    • Depression    • Hearing loss    • History of cellulitis     right foot big toe   • Hypertension    • Pneumonia    • Right arm weakness     after cervial injury   • Sciatic pain     affects balance   • Thyroid disease    • Visual disturbance     due to brain tumor - right eye     Past Surgical History:   Procedure Laterality Date   • CATARACT EXTRACTION, BILATERAL     • COLONOSCOPY     • CRANIOTOMY FOR TUMOR Right 5/10/2022    Procedure: CRANIOTOMY FOR TUMOR STERIOTACTIC WITH BRAIN LAB, right;  Surgeon: Martell Downs MD;  Location: [facility];  Service: Neurosurgery;  Laterality: Right;   • NECK SURGERY     • SKIN CANCER EXCISION     • TONSILLECTOMY       Family History   Problem Relation Age of Onset   • Cancer Sister    • Cancer Brother      Social History     Tobacco Use   • Smoking status: Never Smoker   • Smokeless tobacco: Never Used   Vaping Use   • Vaping Use: Never used   Substance Use Topics   • Alcohol use: Never   • Drug use: Never       Medications:  Current Facility-Administered Medications   Medication Dose Route Frequency Provider Last Rate Last Admin   • acetaminophen (TYLENOL) tablet 650 mg  650 mg Oral Q4H PRN Geovany Davidson MD   650 mg at 05/31/22 2002    Or   • acetaminophen (TYLENOL) suppository 650 mg  650 mg Rectal Q4H PRN Geovany Davidson MD   650 mg at 05/23/22 0016   • albuterol (PROVENTIL) nebulizer solution 0.083% 2.5 mg/3mL  2.5 mg Nebulization Q8H -  RT Geovany Davidson MD   2.5 mg at 06/02/22 1427   • alteplase (CATHFLO/ACTIVASE) injection 2 mg  2 mg Intracatheter PRN Geovany Davidson MD   New Syringe/Cartridge at 05/28/22 1330   • bisacodyl (DULCOLAX) suppository 10 mg  10 mg Rectal Daily PRN Geovany Davidson MD   10 mg at 05/31/22 1039   • butalbital-acetaminophen-caffeine (FIORICET, ESGIC) -40 MG per tablet 1 tablet  1 tablet Oral Q4H PRN Geovany Davidson MD   1 tablet at 05/22/22 1729   • carvedilol (COREG) tablet 6.25 mg  6.25 mg Nasogastric BID With Meals Geovany Davidson MD   6.25 mg at 06/01/22 1759   • chlorhexidine (PERIDEX) 0.12 % solution 15 mL  15 mL Mouth/Throat Q12H Geovany Davidson MD   15 mL at 06/02/22 1054   • dextrose (D50W) (25 g/50 mL) IV injection 25 g  25 g Intravenous Q15 Min PRN Geovany Davidson MD   25 g at 06/02/22 1054   • dextrose (GLUTOSE) oral gel 15 g  15 g Oral Q15 Min PRN Geovany Davidson MD   15 g at 05/22/22 0527   • fosphenytoin (Cerebyx) 275 mg PE in sodium chloride 0.9 % 100 mL IVPB  275 mg PE Intravenous Q8H Geovany Davidson MD   275 mg PE at 06/02/22 1503   • glucagon (human recombinant) (GLUCAGEN DIAGNOSTIC) injection 1 mg  1 mg Intramuscular Q15 Min PRN Geovany Davidson MD       • heparin (porcine) 5000 UNIT/ML injection 5,000 Units  5,000 Units Subcutaneous Q12H Geovany Davidson MD   5,000 Units at 06/01/22 2030   • Hold Medication (Anticoagulation)  1 each Does not apply Continuous PRN Geovany Davidson MD       • hydrALAZINE (APRESOLINE) injection 10 mg  10 mg Intravenous Q6H PRN Geovany Davidson MD   10 mg at 05/31/22 1322   • insulin detemir (LEVEMIR) injection 20 Units  20 Units Subcutaneous Nightly Geovany Davidson MD   20 Units at 06/01/22 2031   • insulin regular (humuLIN R,novoLIN R) injection 2-7 Units  2-7 Units Subcutaneous Q6H Geovany Davidson MD   2 Units at 06/01/22 0623   • insulin regular (humuLIN R,novoLIN R) injection 8 Units  8 Units  Subcutaneous Q6H Geovany Davidson MD   8 Units at 06/01/22 0623   • ipratropium-albuterol (DUO-NEB) nebulizer solution 3 mL  3 mL Nebulization Q4H PRN Geovany Davidson MD   3 mL at 05/25/22 1007   • labetalol (NORMODYNE,TRANDATE) injection 10 mg  10 mg Intravenous Q6H PRN Geovany Davidson MD   10 mg at 05/27/22 0931   • lacosamide (VIMPAT) injection 200 mg  200 mg Intravenous Q12H Geovany Davidson MD   200 mg at 06/02/22 0838   • levETIRAcetam in NaCl 0.82% (KEPPRA) IVPB 500 mg  500 mg Intravenous Q12H Geovany Davidson MD   500 mg at 06/02/22 0838   • levothyroxine (SYNTHROID, LEVOTHROID) tablet 125 mcg  125 mcg Nasogastric Q AM Geovany Davidson MD   125 mcg at 06/01/22 0615   • lidocaine (XYLOCAINE) 1 % injection 10 mL  10 mL Intradermal Once Geovany Davidson MD       • liothyronine (CYTOMEL) tablet 25 mcg  25 mcg Nasogastric Daily Geovany Davidson MD   25 mcg at 06/01/22 1012   • lisinopril (PRINIVIL,ZESTRIL) tablet 20 mg  20 mg Nasogastric Q24H Geovany Davidson MD   20 mg at 06/01/22 1012   • Morphine sulfate (PF) injection 2 mg  2 mg Intravenous Q2H PRN Geovany Davidson MD   2 mg at 06/02/22 0726   • mupirocin (BACTROBAN) 2 % ointment 1 application  1 application Topical Q12H Geovany Davidson MD   1 application at 06/02/22 1050   • norepinephrine (LEVOPHED) 8 mg in 250 mL NS infusion (premix)  0.02-0.3 mcg/kg/min Intravenous Titrated Geovany Davidson MD   Held at 06/01/22 0606   • Nutrisource fiber pack 1 packet  1 packet Nasogastric 4x Daily Geovany Davidson MD   1 packet at 06/01/22 2109   • ondansetron (ZOFRAN) tablet 4 mg  4 mg Oral Q6H PRN Geovany Davidson MD        Or   • ondansetron (ZOFRAN) injection 4 mg  4 mg Intravenous Q6H PRN Geovany Davidson MD       • oxyCODONE (ROXICODONE) 5 MG/5ML solution 7.5 mg  7.5 mg Nasogastric Q4H PRN Geovany Davidson MD   7.5 mg at 05/31/22 1958   • pantoprazole (PROTONIX) injection 40 mg  40 mg Intravenous Q  AM Geovany Davidson MD   40 mg at 06/02/22 0612   • polyethylene glycol (MIRALAX) packet 17 g  17 g Oral Daily Geovany Davidson MD   17 g at 06/01/22 1013   • propofol (DIPRIVAN) infusion 10 mg/mL 100 mL  5-50 mcg/kg/min Intravenous Titrated Geovany Davidson MD   Stopped at 06/02/22 1144   • ProSource TF oral liquid 45 mL  1 packet Nasogastric BID Geovany Davidson MD   45 mL at 06/01/22 2031   • sodium chloride 3 % nebulizer solution 4 mL  4 mL Nebulization BID - RT Geovayn Davidson MD   4 mL at 06/02/22 0710       Allergies:    Patient has no known allergies.    Review of Systems:   -A 14 point review of systems is completed and is negative.      Objective      Vital Signs  Temp:  [95 °F (35 °C)-98.2 °F (36.8 °C)] 95 °F (35 °C)  Heart Rate:  [47-62] 47  Resp:  [21-30] 25  BP: (108-177)/(51-81) 108/51  FiO2 (%):  [30 %] 30 %    Physical Exam:     HEENT:  Neck supple  CVS:  Regular rate and rhythm.  No murmurs  Carotid Examination:  No bruits  Lungs:  Clear to auscultation  Abdomen:  Non-tender, Non-distended  Extremities:  No signs of peripheral edema     Neurologic Exam:     -Intubated and sedated    -Will open eyes to some stimulation according to nursing staff, but would not arouse for examiner today.  -Does not follow commands     Cranial nerves II through XII intact.  Pupils react  Does not follow any other commands  Breathes over the vent     Motor: (strength out of 5:  1= minimal movement, 2 = movement in plane of gravity, 3 = movement against gravity, 4 = movement against some resistance, 5 = full strength)  Minimal withdrawal to noxious stimuli but no spontaneous movements     DTR:  2+ throughout in all four extremities     Sensory:  Unable to assess except to noxious stimuli     Coordination/Gait:  -Could not assess.       Results Review:    I reviewed the patient's new clinical results and findings.    Lab Results (last 24 hours)     Procedure Component Value Units Date/Time    POC  Glucose Once [[id number]]  (Abnormal) Collected: 06/02/22 1120    Specimen: Blood Updated: 06/02/22 1131     Glucose 135 mg/dL      Comment: : [id number] Yeimi Martinez ID: [id number]       POC Glucose Once [[id number]]  (Abnormal) Collected: 06/02/22 1052    Specimen: Blood Updated: 06/02/22 1105     Glucose 64 mg/dL      Comment: : [id number] Yeimi Martinez ID: [id number]       Manual Differential [[id number]]  (Abnormal) Collected: 06/02/22 0528    Specimen: Blood Updated: 06/02/22 0720     Neutrophil % 67.0 %      Lymphocyte % 4.0 %      Monocyte % 9.0 %      Eosinophil % 1.0 %      Bands %  7.0 %      Metamyelocyte % 4.0 %      Myelocyte % 5.0 %      Promyelocyte % 2.0 %      Atypical Lymphocyte % 1.0 %      Neutrophils Absolute 8.78 10*3/mm3      Lymphocytes Absolute 0.59 10*3/mm3      Monocytes Absolute 1.07 10*3/mm3      Eosinophils Absolute 0.12 10*3/mm3      Polychromasia Slight/1+     WBC Morphology Normal     Platelet Morphology Normal    CBC & Differential [[id number]]  (Abnormal) Collected: 06/02/22 0528    Specimen: Blood Updated: 06/02/22 0720    Narrative:      The following orders were created for panel order CBC & Differential.  Procedure                               Abnormality         Status                     ---------                               -----------         ------                     CBC Auto Differential[[id number]]        Abnormal            Final result                 Please view results for these tests on the individual orders.    CBC Auto Differential [[id number]]  (Abnormal) Collected: 06/02/22 0528    Specimen: Blood Updated: 06/02/22 0720     WBC 11.86 10*3/mm3      RBC 2.89 10*6/mm3      Hemoglobin 9.0 g/dL      Hematocrit 27.9 %      MCV 96.5 fL      MCH 31.1 pg      MCHC 32.3 g/dL      RDW 13.9 %      RDW-SD 48.3 fl      MPV 9.6 fL      Platelets 366 10*3/mm3     Narrative:      The previously reported component NRBC is no longer being reported. Previous  result was 0.3 /100 WBC (Reference Range: 0.0-0.2 /100 WBC) on 6/2/2022 at 0610 CDT.    Zinc [[id number]] Collected: 05/31/22 1350    Specimen: Blood Updated: 06/02/22 0716     Zinc 54 ug/dL      Comment:                                 Detection Limit = 5       Narrative:      Test(s) 001800-Zinc, Plasma or Serum  was developed and its performance characteristics determined  by [facility]. It has not been cleared or approved by the Food  and Drug Administration.  Performed at:  [address] - [facility]  [id number]  : Lucio Cueto MD, Phone:  [phone]    Comprehensive Metabolic Panel [[id number]]  (Abnormal) Collected: 06/02/22 0528    Specimen: Blood Updated: 06/02/22 0641     Glucose 68 mg/dL      BUN 28 mg/dL      Creatinine 0.50 mg/dL      Sodium 137 mmol/L      Potassium 4.5 mmol/L      Chloride 101 mmol/L      CO2 28.0 mmol/L      Calcium 8.1 mg/dL      Total Protein 5.7 g/dL      Albumin 1.60 g/dL      ALT (SGPT) 31 U/L      AST (SGOT) 34 U/L      Alkaline Phosphatase 225 U/L      Total Bilirubin 0.3 mg/dL      Globulin 4.1 gm/dL      A/G Ratio 0.4 g/dL      BUN/Creatinine Ratio 56.0     Anion Gap 8.0 mmol/L      eGFR 105.1 mL/min/1.73      Comment: National Kidney Foundation and American Society of Nephrology (ASN) Task Force recommended calculation based on the Chronic Kidney Disease Epidemiology Collaboration (CKD-EPI) equation refit without adjustment for race.       Narrative:      GFR Normal >60  Chronic Kidney Disease <60  Kidney Failure <15      Phenytoin Level, Total [[id number]]  (Abnormal) Collected: 06/02/22 0528    Specimen: Blood Updated: 06/02/22 0634     Phenytoin Level 7.0 mcg/mL     POC Glucose Once [[id number]]  (Normal) Collected: 06/02/22 0601    Specimen: Blood Updated: 06/02/22 0612     Glucose 71 mg/dL      Comment: : ABBEY Ayala ID: [id number]       POC Glucose Once [[id number]]  (Abnormal) Collected: 06/02/22 0559     Specimen: Blood Updated: 06/02/22 0612     Glucose 64 mg/dL      Comment: : ABBEY Wangjignesh ID: [id number]       COVID PRE-OP / PRE-PROCEDURE SCREENING ORDER (NO ISOLATION) - Swab, Nasal Cavity [[id number]]  (Normal) Collected: 06/02/22 0422    Specimen: Swab from Nasal Cavity Updated: 06/02/22 0530    Narrative:      The following orders were created for panel order COVID PRE-OP / PRE-PROCEDURE SCREENING ORDER (NO ISOLATION) - Swab, Nasal Cavity.  Procedure                               Abnormality         Status                     ---------                               -----------         ------                     COVID-19,[facility] IN-SARAY...[[id number]]  Normal              Final result                 Please view results for these tests on the individual orders.    COVID-19,[facility] IN-HOUSE,Nasal Swab No Transport Media 3-4 HR TAT - Swab, Nasal Cavity [[id number]]  (Normal) Collected: 06/02/22 0422    Specimen: Swab from Nasal Cavity Updated: 06/02/22 0530     COVID19 Not Detected    Narrative:      Fact sheet for providers: https://www.fda.gov/media/698939/download     Fact sheet for patients: https://www.fda.gov/media/492419/download    Test performed by PCR.    Consider negative results in combination with clinical observations, patient history, and epidemiological information.    Blood Gas, Arterial - [[id number]]  (Abnormal) Collected: 06/02/22 0433    Specimen: Arterial Blood Updated: 06/02/22 0428     Site Left Radial     Denzel's Test Positive     pH, Arterial 7.448 pH units      pCO2, Arterial 49.5 mm Hg      Comment: 83 Value above reference range        pO2, Arterial 84.1 mm Hg      HCO3, Arterial 34.2 mmol/L      Comment: 83 Value above reference range        Base Excess, Arterial 9.1 mmol/L      Comment: 83 Value above reference range        O2 Saturation, Arterial 97.2 %      Temperature 37.0 C      Barometric Pressure for Blood Gas 747 mmHg      Modality Ventilator     FIO2 30 %       Ventilator Mode AC     Set Tidal Volume 450     Set [facility] Resp Rate 16.0     PEEP 8.0     Collected by [id number]     Comment: Meter: [id number]     :  [id number]        pCO2, Temperature Corrected 49.5 mm Hg      pH, Temp Corrected 7.448 pH Units      pO2, Temperature Corrected 84.1 mm Hg     POC Glucose Once [[id number]]  (Normal) Collected: 06/01/22 2349    Specimen: Blood Updated: 06/02/22 0005     Glucose 122 mg/dL      Comment: : ABBEY Guaman Lucy ID: [id number]       POC Glucose Once [[id number]]  (Normal) Collected: 06/01/22 2030    Specimen: Blood Updated: 06/01/22 2041     Glucose 121 mg/dL      Comment: : ABBEY Guaman LuhaMeter ID: [id number]       POC Glucose Once [[id number]]  (Normal) Collected: 06/01/22 1755    Specimen: Blood Updated: 06/01/22 1806     Glucose 96 mg/dL      Comment: : JDAV GomezniferMeter ID: [id number]             Imaging Results (Last 24 Hours)     Procedure Component Value Units Date/Time    CT Guided Abscess Drain Lung [[id number]] Collected: 06/02/22 1506     Updated: 06/02/22 1512    Narrative:      EXAMINATION: CT GUIDED ABSCESS DRAIN LUNG-      6/2/2022 9:20 AM CDT     HISTORY: left loculated pleural effusion.  Dr Ortega has discussed with  Dr. Whelan; R13.10-Dysphagia, unspecified; Z01.818-Encounter for other  preprocedural examination; D32.9-Benign neoplasm of meninges,  unspecified; Z74.09-Other reduced mobility; Z78.9-Other specified health  status; G40.901-Epilepsy, unspecified, not intractable, with status  epilepticus; J96.01-Acute respiratory failure with hypoxia     In order to have a CT radiation dose as low as reasonably achievable  Automated Exposure Control was utilized for adjustment of the mA and/or  KV according to patient size.     DLP in mGycm= 3093.     CT-guided placement of 2 separate 8 Estonian pigtail catheter drains  within loculated left pleural fluid collections.     An appropriate time out was  performed with all present staff members to  ensure correct patient and correct site and procedure.  The procedure was discussed with the patient including risks, benefits,  and alternatives.    Consent was given.     Patient is intubated and on mechanical ventilation.  ICU nurse and respiratory staff present throughout the procedure.     Right lateral positioning.  Noncontrast CT imaging used to localize the target pleural fluid  collections.     Routine prep and local anesthesia.     With CT guidance an 8 Vincentian pigtail drain catheter was placed into each  of the 2 loculated pleural fluid collections.     Free flow of thin clear to yellow fluid was noted.     40 to 50 cc of fluid was removed from each collection.     Each catheter was placed to gravity bag drainage.     Blood loss = less than 1 cc.     No complications.     Summary:  1. CT-guided placement of 2 left-sided pleural drainage catheters.  2. No complications.                                         This report was finalized on 06/02/2022 15:09 by Dr. Tomer Whelan MD.    XR Chest 1 View [[id number]] Collected: 06/02/22 0719     Updated: 06/02/22 0724    Narrative:      EXAM: XR CHEST 1 VW- 6/2/2022 3:37 AM CDT     HISTORY: On vent; R13.10-Dysphagia, unspecified; Z01.818-Encounter for  other preprocedural examination; D32.9-Benign neoplasm of meninges,  unspecified; Z74.09-Other reduced mobility; Z78.9-Other specified health  status; G40.901-Epilepsy, unspecified, not intractable, with status  epilepticus; J96.01-Acute respiratory failure with hypoxia       COMPARISON: CT scan June 1, 2022.     TECHNIQUE: Frontal radiograph of the chest     FINDINGS:   The right lung is clear. Two left chest tubes are present. There is  opacification of the left lower lobe. This may be compression  atelectasis from prior posterior left pleural effusion.. The  cardiomediastinal silhouette and pulmonary vascularity are within normal  limits. Endotracheal tube is present.  Dobbhoff feeding tube is present.  Right PIC catheter is satisfactorily positioned     The osseous structures and surrounding soft tissues demonstrate no acute  abnormality.          Impression:      1. 2 left chest tubes are present. There is persistent opacification the  left chest is likely atelectasis left lower lobe  2. Support tubes are satisfactorily positioned..        This report was finalized on 06/02/2022 07:21 by Dr. Emanuel Frederick MD.          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  Postoperative seizure  2. Calculated Fosphenytoin level is 13.0  3. Hypoalbuminemia of only 1.5  4. Meningioma s/p craniotomy and excision    Plan:  · Continue fosphenytoin to 275 mg every 8 hours  · Still dealing with hypoalbuminemia as this will increase free fosphenytoin  · Continue Vimpat and Keppra at present dosing  · Continue to follow daily corrected Dilantin levels and albumin          Zac Mccoy PA-C  06/02/22  15:58 CDT

## 2022-06-02 NOTE — OP NOTE
[facility]  OPERATIVE NOTE    Hai Hernandez  6/2/2022    Pre-op Diagnosis:   Respiratory Insufficiency  Acute respiratory failure with hypoxia (HCC) [J96.01]    Post-op Diagnosis:     Same  Acute respiratory failure with hypoxia (HCC) [J96.01]    Procedure/CPT® Codes:  Tracheostomy      Surgeon(s):  Geovany Davidosn MD Morrison, Jimmy J, MD    Anesthesia:   General Endotracheal Anesthesia    Estimated Blood Loss:   Minimal    Specimens:                None      Drains:   None    Findings:   as below    Complications:  None    PROCEDURE:  The patient was placed in the supine position and under General Endotracheal Anesthesia the patient was prepped and draped in the usual fashion.      Approximately 8 mL of 1% Xylocaine with epinephrine (1:1000,000) was injected into the planned incision site 2 fingerbreadths above the manubrium area.  An incision was made with a #15 blade.  The incision was oriented horizontally and its length was approximately 6 cm. The incision was carried down through the superficial layer of deep cervical fascia. Minimal bleeding was encountered which was controlled throughout the procedure utilizing a combination of 2-0 and 3-0 silk ties and electrocautery.     The strap musculature was identified in the midline and retracted laterally.  The isthmus of the thyroid gland was encountered and divided in the midline utilizing electrocautery.  The anterior tracheal wall was identified. A trach hook was placed beneath the cricoid cartilage and the trachea elevated in the wound.  The anterior tracheal wall was cleared with blunt dissection consisting of digital dissection with a 4 x 4. An inferiorly based trapezoidall-shaped U flap was subsequently fashioned, (Martin flap) utilizing #11 blade and curved Green scissors. The superior margin of the tracheal flap was sutured to the inferior skin margin utilizing 0 Ethibond.      The endotracheal tube was gently withdrawn and a # 8 cuffed  Shiley  tracheostomy tube was placed without difficulty. The patient was subsequently ventilated through the tracheostomy tube, placement of which was confirmed by appropriate chest rise and the presence of a CO2 wave on the monitor. The tracheostomy tube was secured with trach tape and further secured with 2-0 silk sutured to the skin of the anterior neck. Bactroban ointment and sterile dressing were applied and the procedure was terminated.    The patient tolerated the procedure well and was transported back to the ICU in stable condition following a percutaneous endoscopic gastrostomy tube placement which was performed and dictated under separate operative note per Dr. Lu.           Geovany Davidson MD     Date: 6/2/2022  Time: 11:59 CDT

## 2022-06-02 NOTE — ANESTHESIA PREPROCEDURE EVALUATION
Anesthesia Evaluation     Patient summary reviewed   no history of anesthetic complications:  NPO Solid Status: > 8 hours             Airway   Mallampati: I  TM distance: >3 FB  Neck ROM: full  No difficulty expected  Dental          Pulmonary    (-) COPD, asthma, sleep apnea, not a smoker  Cardiovascular   Exercise tolerance: good (4-7 METS)    ECG reviewed    (+) hypertension, DVT,   (-) past MI, CAD, cardiac stents, hyperlipidemia      Neuro/Psych  (+) seizures, headaches,      ROS Comment: Meningioma  GI/Hepatic/Renal/Endo    (+) obesity,   diabetes mellitus type 2, thyroid problem hypothyroidism  (-) liver disease, no renal disease    Musculoskeletal     Abdominal    Substance History      OB/GYN          Other   blood dyscrasia,                       Anesthesia Plan    ASA 4     general   (Critically ill )  intravenous induction           CODE STATUS:

## 2022-06-02 NOTE — PROGRESS NOTES
"Patient name: Hai Hernandez  Patient : 1945  VISIT # [id number]  MR #[id number]    Procedure:Procedure(s):  CRANIOTOMY FOR TUMOR STERIOTACTIC WITH BRAIN LAB, right  Procedure Date:5/10/2022  POD:21 Days Post-Op    Subjective   Chief complaint: Shortness of breath    Patient remains intubated FiO2 30%, PEEP 8 with O2 sat 96%.  tPA course has been completed.  CT chest with contrast was performed yesterday revealing 2 fluid collections that remain although overall improved.  The left lung is expanded.  Dr. Ortega discussed with Dr. Whelan, will plan on CT-guided drain placement into the remaining fluid collections today.        Objective     Visit Vitals  /69   Pulse 57   Temp 96.9 °F (36.1 °C) (Axillary)   Resp 26   Ht 182.9 cm (72\")   Wt 105 kg (231 lb 6.4 oz)   SpO2 93%   BMI 31.38 kg/m²       Intake/Output Summary (Last 24 hours) at 2022 0751  Last data filed at 2022 0612  Gross per 24 hour   Intake 2795 ml   Output 1820 ml   Net 975 ml     Left chest tube 1: 90 ml/24 hours, serous, no air leak  Left chest tube 2: 40 ml/24 hours, serous, no air leak    Lab:     CBC:  Results from last 7 days   Lab Units 22  0139 22  0411   WBC 10*3/mm3 11.86* 11.39* 12.16*   HEMATOCRIT % 27.9* 23.6* 29.2*   PLATELETS 10*3/mm3 366 306 350          BMP:  Results from last 7 days   Lab Units 22  0139 22  0411   SODIUM mmol/L 137 138 140   POTASSIUM mmol/L 4.5 4.1 4.3   CHLORIDE mmol/L 101 101 104   CO2 mmol/L 28.0 32.0* 32.0*   GLUCOSE mg/dL 68 157* 143*   BUN mg/dL 28* 31* 33*   CREATININE mg/dL 0.50* 0.62* 0.64*          COAG:  Results from last 7 days   Lab Units 22  1755   INR  1.10*       IMAGES:       Imaging Results (Last 24 Hours)     Procedure Component Value Units Date/Time    XR Chest 1 View [[id number]] Collected: 22     Updated: 22    Narrative:      EXAM: XR CHEST 1 VW- 2022 3:37 AM CDT     HISTORY: On vent; " R13.10-Dysphagia, unspecified; Z01.818-Encounter for  other preprocedural examination; D32.9-Benign neoplasm of meninges,  unspecified; Z74.09-Other reduced mobility; Z78.9-Other specified health  status; G40.901-Epilepsy, unspecified, not intractable, with status  epilepticus; J96.01-Acute respiratory failure with hypoxia       COMPARISON: CT scan June 1, 2022.     TECHNIQUE: Frontal radiograph of the chest     FINDINGS:   The right lung is clear. Two left chest tubes are present. There is  opacification of the left lower lobe. This may be compression  atelectasis from prior posterior left pleural effusion.. The  cardiomediastinal silhouette and pulmonary vascularity are within normal  limits. Endotracheal tube is present. Dobbhoff feeding tube is present.  Right PIC catheter is satisfactorily positioned     The osseous structures and surrounding soft tissues demonstrate no acute  abnormality.          Impression:      1. 2 left chest tubes are present. There is persistent opacification the  left chest is likely atelectasis left lower lobe  2. Support tubes are satisfactorily positioned..        This report was finalized on 06/02/2022 07:21 by Dr. Emanuel Frederick MD.    XR Chest 1 View [[id number]] Collected: 06/01/22 1323     Updated: 06/01/22 1329    Narrative:      EXAMINATION: XR CHEST 1 VW-     6/1/2022 11:55 AM CDT     HISTORY: Et tube change; R13.10-Dysphagia, unspecified;  Z01.818-Encounter for other preprocedural examination; D32.9-Benign  neoplasm of meninges, unspecified; Z74.09-Other reduced mobility;  Z78.9-Other specified health status; G40.901-Epilepsy, unspecified, not  intractable, with status epilepticus; J96.01-Acute respiratory failure  with hypoxia     1 view chest x-ray.     Comparison is made with earlier today at 3:13 AM.     Endotracheal tube replaced/repositioned and being slightly lower with  the tip 20 mm above the hernandez.     Right PICC line and feeding tube remain in place.     Unchanged  appearance of the lungs with 2 left chest tubes and loculated  left pleural fluid.     Persistent left lung base consolidation.     Summary:  1. Slightly lower lying endotracheal tube with the tip 20 mm above the  hernandez.  2. Otherwise unchanged appearance of the chest.  This report was finalized on 06/01/2022 13:26 by Dr. Tomer Whelan MD.    CT Chest With Contrast Diagnostic [[id number]] Collected: 06/01/22 1040     Updated: 06/01/22 1047    Narrative:      EXAMINATION: CT CHEST W CONTRAST DIAGNOSTIC-      6/1/2022 9:21 AM CDT     HISTORY: post intrapleural tpa; R13.10-Dysphagia, unspecified;  Z01.818-Encounter for other preprocedural examination; D32.9-Benign  neoplasm of meninges, unspecified; Z74.09-Other reduced mobility;  Z78.9-Other specified health status; G40.901-Epilepsy, unspecified, not  intractable, with status epilepticus; J96.01-Acute respiratory failure  with hypoxia     In order to have a CT radiation dose as low as reasonably achievable  Automated Exposure Control was utilized for adjustment of the mA and/or  KV according to patient size.     DLP in mGycm= 426.     Postcontrast chest CT.  Axial, sagittal, and coronal sequences.     Comparison is made with May 25, 2022.     Interval placement of an upper left chest tube which has drained the  upper lateral pleural fluid collection which was seen previously.     The medial and inferior fluid collection persists.  Unchanged position of the lower left chest tube.     There is still focal dense consolidation of the lower left lung.     The upper lung is better expanded and clear.  There is no significant pneumothorax.     The right lung is adequately expanded and essentially clear.  There is mild patchy atelectasis.     Endotracheal tube and gastric tube present.  The endotracheal tube tip lies 29 mm above the hernandez.     Summary:  1. Compared with one week ago there has been placement of an upper left  chest tube which has drained the upper lateral  pleural fluid collection.  2. The lower left chest tube is unchanged in position.  3. There is still some loculated pleural fluid medially and inferiorly.  4. Persistent consolidation of the lower left lung.                                   This report was finalized on 06/01/2022 10:44 by Dr. Tomer Whelan MD.        CXR: Chest tubes in stable position.  Improved left lung expansion.  Left lower lobe atelectasis.  Small left pleural effusion.    Physical Exam:  General: Intubated  Cardiovascular: Regular rate and rhythm without murmur, rubs, or gallops.    Pulmonary: Coarse mechanical breath sounds bilaterally without wheezing, rubs, or rales.  Chest: Left chest tubes x 2 to 40 cm suction. No air leak. Fluid is serosanguineous.   Abdomen: Soft, nondistended, and nontender.  Extremities: Warm, moves all extremities.  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 Whelan today.   Decrease chest tube suction to 20  Daily chest x-ray  We will continue to follow      MARIO Romo  06/02/22  07:51 CDT

## 2022-06-02 NOTE — PROGRESS NOTES
PULMONARY AND CRITICAL CARE PROGRESS NOTE - [facility]    Patient: Hai Hernandez    1945    MR# [id number]    Acct# [id number]  06/02/22   08:06 CDT  Referring Provider: Martell Downs, *    Chief Complaint: Mechanically ventilated    Interval history: He remains intubated, day #20.  He was having issues yesterday with low tidal volumes and we ordered RT to change his tube out.  Tidal volumes are running in the mid 450's this morning. Propofol is paused and he is not following any commands.  Nutrition is on hold for trach and PEG placement planned for early this afternoon.  Nursing also reports CT surgery plans on a CT-guided drain placement to the left lung likely sometime today.  Chest tube x2 remain attached to suction and tPA course has been completed. Chest x-ray and ABGs remained stable.  PEEP has been decreased to 6.  No visitors present.    Meds:  albuterol, 2.5 mg, Nebulization, Q8H - RT  carvedilol, 6.25 mg, Nasogastric, BID With Meals  chlorhexidine, 15 mL, Mouth/Throat, Q12H  fosphenytoin, 275 mg PE, Intravenous, Q8H  heparin (porcine), 5,000 Units, Subcutaneous, Q12H  insulin detemir, 20 Units, Subcutaneous, Nightly  insulin regular, 2-7 Units, Subcutaneous, Q6H  insulin regular, 8 Units, Subcutaneous, Q6H  lacosamide, 200 mg, Intravenous, Q12H  levETIRAcetam, 500 mg, Intravenous, Q12H  levothyroxine, 125 mcg, Nasogastric, Q AM  lidocaine, 10 mL, Intradermal, Once  liothyronine, 25 mcg, Nasogastric, Daily  lisinopril, 20 mg, Nasogastric, Q24H  mupirocin, 1 application, Topical, Q12H  Nutrisource fiber, 1 packet, Nasogastric, 4x Daily  pantoprazole, 40 mg, Intravenous, Q AM  polyethylene glycol, 17 g, Oral, Daily  ProSource TF, 1 packet, Nasogastric, BID  sodium chloride, 4 mL, Nebulization, BID - RT      hold, 1 each  norepinephrine, 0.02-0.3 mcg/kg/min, Last Rate: Stopped (06/01/22 0606)  propofol, 5-50 mcg/kg/min, Last Rate: 20 mcg/kg/min (06/02/22 1030)      Review of  Systems:     Cannot obtain due to mechanical ventilated state     Ventilator Settings:        Resp Rate (Set): 16  Pressure Support (cm H2O): 8 cm H20  FiO2 (%): 30 %  PEEP/CPAP (cm H2O): 8 cm H20  Minute Ventilation (L/min) (Obs): 11.1 L/min  Resp Rate (Observed) Vent: 29  I:E Ratio (Set): 1:1.7  I:E Ratio (Obs): 1:1.90  PIP Observed (cm H2O): 30 cm H2O  RSBI: 21.96  Physical Exam:  Temp:  [96.9 °F (36.1 °C)-98.2 °F (36.8 °C)] 96.9 °F (36.1 °C)  Heart Rate:  [50-69] 62  Resp:  [23-30] 26  BP: ()/(46-86) 152/71  FiO2 (%):  [30 %] 30 %    Intake/Output Summary (Last 24 hours) at 6/2/2022 0806  Last data filed at 6/2/2022 0612  Gross per 24 hour   Intake 2795 ml   Output 1820 ml   Net 975 ml     SpO2 Percentage    06/02/22 0700 06/02/22 0710 06/02/22 0731   SpO2: 93% 93% 94%   Body mass index is 31.38 kg/m².   Physical Exam     Constitutional:       General: He is sleeping.      Appearance: He is ill-appearing. He is not toxic-appearing.      Interventions: He is sedated and intubated.   HENT:      Head: Normocephalic.      Comments: Craniotomy wound, drain draining clear fluid, ETT, NG/TF-off     Nose: Nose normal.   Eyes:      General: No scleral icterus.  Pulmonary:      Effort: No accessory muscle usage or respiratory distress. He is intubated.      Breath sounds: Decreased breath sounds in bases   Chest:      Chest wall: No edema.      Comments: Left sided chest tubes x2, no leak  Abdominal:      General: There is no distension.      Palpations: Abdomen is soft.   Genitourinary:     Comments: Mccrary  Musculoskeletal:      Right lower leg: Edema present.      Left lower leg: Edema present.      Comments: SCDs   Skin:     Findings: No rash.   Neurological:      Motor: No tremor or seizure activity.  Not following any commands with sedation paused.  Psychiatric:         Behavior: Behavior is not agitated.     Electronically signed by Gregorio Wiggins MARIO, 6/2/2022, 08:06 CDT      Physician Substantive  Portion: Medical Decision Making    Laboratory Data:  Results from last 7 days   Lab Units 06/02/22  0528 06/01/22  0139 05/31/22  0411   WBC 10*3/mm3 11.86* 11.39* 12.16*   HEMOGLOBIN g/dL 9.0* 7.3* 9.1*   PLATELETS 10*3/mm3 366 306 350     Results from last 7 days   Lab Units 06/02/22  0528 06/01/22  0139 05/31/22  1755 05/31/22 0411   SODIUM mmol/L 137 138  --  140   POTASSIUM mmol/L 4.5 4.1  --  4.3   BUN mg/dL 28* 31*  --  33*   CREATININE mg/dL 0.50* 0.62*  --  0.64*   INR   --   --  1.10*  --      Results from last 7 days   Lab Units 06/02/22 0433 06/01/22 0439 05/31/22 0413   PH, ARTERIAL pH units 7.448 7.426 7.485*   PCO2, ARTERIAL mm Hg 49.5* 50.3* 43.9   PO2 ART mm Hg 84.1 98.2 90.5   FIO2 % 30 30 30     No results found for: BLOODCX, URINECX, WOUNDCX, MRSACX, RESPCX, STOOLCX  Recent films:  CT Chest With Contrast Diagnostic    Result Date: 6/1/2022  EXAMINATION: CT CHEST W CONTRAST DIAGNOSTIC-   6/1/2022 9:21 AM CDT  HISTORY: post intrapleural tpa; R13.10-Dysphagia, unspecified; Z01.818-Encounter for other preprocedural examination; D32.9-Benign neoplasm of meninges, unspecified; Z74.09-Other reduced mobility; Z78.9-Other specified health status; G40.901-Epilepsy, unspecified, not intractable, with status epilepticus; J96.01-Acute respiratory failure with hypoxia  In order to have a CT radiation dose as low as reasonably achievable Automated Exposure Control was utilized for adjustment of the mA and/or KV according to patient size.  DLP in mGycm= 426.  Postcontrast chest CT. Axial, sagittal, and coronal sequences.  Comparison is made with May 25, 2022.  Interval placement of an upper left chest tube which has drained the upper lateral pleural fluid collection which was seen previously.  The medial and inferior fluid collection persists. Unchanged position of the lower left chest tube.  There is still focal dense consolidation of the lower left lung.  The upper lung is better expanded and clear. There  is no significant pneumothorax.  The right lung is adequately expanded and essentially clear. There is mild patchy atelectasis.  Endotracheal tube and gastric tube present. The endotracheal tube tip lies 29 mm above the hernandez.  Summary: 1. Compared with one week ago there has been placement of an upper left chest tube which has drained the upper lateral pleural fluid collection. 2. The lower left chest tube is unchanged in position. 3. There is still some loculated pleural fluid medially and inferiorly. 4. Persistent consolidation of the lower left lung.            This report was finalized on 06/01/2022 10:44 by Dr. Tomer Whelan MD.    XR Chest 1 View    Result Date: 6/2/2022  EXAM: XR CHEST 1 VW- 6/2/2022 3:37 AM CDT  HISTORY: On vent; R13.10-Dysphagia, unspecified; Z01.818-Encounter for other preprocedural examination; D32.9-Benign neoplasm of meninges, unspecified; Z74.09-Other reduced mobility; Z78.9-Other specified health status; G40.901-Epilepsy, unspecified, not intractable, with status epilepticus; J96.01-Acute respiratory failure with hypoxia   COMPARISON: CT scan June 1, 2022.  TECHNIQUE: Frontal radiograph of the chest  FINDINGS: The right lung is clear. Two left chest tubes are present. There is opacification of the left lower lobe. This may be compression atelectasis from prior posterior left pleural effusion.. The cardiomediastinal silhouette and pulmonary vascularity are within normal limits. Endotracheal tube is present. Dobbhoff feeding tube is present. Right PIC catheter is satisfactorily positioned  The osseous structures and surrounding soft tissues demonstrate no acute abnormality.       Impression: 1. 2 left chest tubes are present. There is persistent opacification the left chest is likely atelectasis left lower lobe 2. Support tubes are satisfactorily positioned..   This report was finalized on 06/02/2022 07:21 by Dr. Emanuel Frederick MD.    XR Chest 1 View    Result Date:  6/1/2022  EXAMINATION: XR CHEST 1 VW-  6/1/2022 11:55 AM CDT  HISTORY: Et tube change; R13.10-Dysphagia, unspecified; Z01.818-Encounter for other preprocedural examination; D32.9-Benign neoplasm of meninges, unspecified; Z74.09-Other reduced mobility; Z78.9-Other specified health status; G40.901-Epilepsy, unspecified, not intractable, with status epilepticus; J96.01-Acute respiratory failure with hypoxia  1 view chest x-ray.  Comparison is made with earlier today at 3:13 AM.  Endotracheal tube replaced/repositioned and being slightly lower with the tip 20 mm above the hernandez.  Right PICC line and feeding tube remain in place.  Unchanged appearance of the lungs with 2 left chest tubes and loculated left pleural fluid.  Persistent left lung base consolidation.  Summary: 1. Slightly lower lying endotracheal tube with the tip 20 mm above the hernandez. 2. Otherwise unchanged appearance of the chest. This report was finalized on 06/01/2022 13:26 by Dr. Tomer Whelan MD.    XR Chest 1 View    Result Date: 6/1/2022  EXAMINATION: XR CHEST 1 VW-  6/1/2022 3:20 AM CDT  HISTORY: On vent; R13.10-Dysphagia, unspecified; Z01.818-Encounter for other preprocedural examination; D32.9-Benign neoplasm of meninges, unspecified; Z74.09-Other reduced mobility; Z78.9-Other specified health status; G40.901-Epilepsy, unspecified, not intractable, with status epilepticus; J96.01-Acute respiratory failure with hypoxia  A single frontal portable view of the chest is compared to the previous study dated 5/31/2022.  The lungs are poorly expanded.  There is a persistent left lower lobar consolidation.  There is a very ill-defined area of increased opacity in the left upper and midlung projected over the left hilum which may represent an area of consolidation or atelectasis. This was not obvious on the previous study.  There is small left basal pleural effusion.  There are multiple small calcified granulomas in both lungs.  There is no pulmonary  congestion or pneumothorax.  Endotracheal tube is is in place. The distal end of the Dobbhoff tube is not visualized. It appears unchanged. A right-sided PICC line is in place. No change. Left-sided chest tubes are in place. No change.  No acute bony abnormality.      Impression: 1. Persistent left lower lobar consolidation and a small left basal pleural effusion. 2. Another ill-defined opacity in the left upper and mid lung which may represent an area of discoid atelectasis or consolidation. 3. Multiple small old healed granulomatous in both lungs. 4. The tubes and lines in place. This report was finalized on 06/01/2022 06:50 by Dr. Aida Wong MD.         My radiograph interpretation/independent review of imaging: chest tubes, ETT.  Atelectasis LLL, effusion    Pulmonary Assessment:    1. Acute resp failure multifactorial requiring vent  2. Encephalopathy  3. Pleural effusion, complicated  4. Metabolic alkalosis    Recommend/plan:   · Trache  · Continue vent and decrease Tp to 0.1 sec, decrease peep to 6.  Pt mildly alkalotic with active breathing currently  · Discussed with NS.  shunt eventually  · Resume nutrition postop  · Continue pleural fluid drainage; anticipate imaging directed pigtail catheter placement.      This visit was performed by both a physician and an [facility] RN.  I personally evaluated and examined the patient.  I performed all aspects of the medical decision making as documented.  Electronically signed by Carlos A Dasilva MD, 6/2/2022, 08:13 CDT

## 2022-06-02 NOTE — ANESTHESIA POSTPROCEDURE EVALUATION
"Patient: Hai Hernandez    Procedure Summary     Date: 06/02/22 Room / Location:  [facility] OR 02 /  [facility]    Anesthesia Start: 1143 Anesthesia Stop: 1318    Procedures:       TRACHEOSTOMY (N/A Neck)      ESOPHAGOGASTRODUODENOSCOPY WITH PERCUTANEOUS ENDOSCOPIC GASTROSTOMY TUBE INSERTION WITH ANESTHESIA (N/A ) Diagnosis:       Acute respiratory failure with hypoxia (HCC)      (Acute respiratory failure with hypoxia (HCC) [J96.01])    Surgeons: Geovany Davidson MD; Melecio Lu MD Provider: Herbert Patel CRNA    Anesthesia Type: general ASA Status: 4          Anesthesia Type: general    Vitals  No vitals data found for the desired time range.          Post Anesthesia Care and Evaluation    Patient location during evaluation: ICU  Pain management: adequate  Airway patency: patent  Anesthetic complications: No anesthetic complications    Cardiovascular status: acceptable  Respiratory status: acceptable, ventilator and trach  Hydration status: acceptable    Comments: Blood pressure 129/53, pulse 53, temperature 96.4 °F (35.8 °C), temperature source Axillary, resp. rate 21, height 182.9 cm (72\"), weight 105 kg (231 lb 6.4 oz), SpO2 96 %.    Pt discharged from PACU based on rl score >8      "

## 2022-06-02 NOTE — ANESTHESIA POSTPROCEDURE EVALUATION
Patient: Hai Hernandez    Procedure Summary     Date: 06/02/22 Room / Location:  [facility] OR 02 /  [facility]    Anesthesia Start: 1143 Anesthesia Stop: 1318    Procedures:       TRACHEOSTOMY (N/A Neck)      ESOPHAGOGASTRODUODENOSCOPY WITH PERCUTANEOUS ENDOSCOPIC GASTROSTOMY TUBE INSERTION WITH ANESTHESIA (N/A ) Diagnosis:       Acute respiratory failure with hypoxia (HCC)      (Acute respiratory failure with hypoxia (HCC) [J96.01])    Surgeons: Geovany Davidson MD; Melecio Lu MD Provider: Herbert Patel CRNA    Anesthesia Type: general ASA Status: 4          Anesthesia Type: general    Vitals  No vitals data found for the desired time range.          Post Anesthesia Care and Evaluation    Patient location during evaluation: ICU  Patient participation: complete - patient participated  Level of consciousness: awake and alert  Pain score: 0  Pain management: adequate  Airway patency: patent  Anesthetic complications: No anesthetic complications  PONV Status: none  Cardiovascular status: acceptable and stable  Respiratory status: acceptable  Hydration status: acceptable

## 2022-06-02 NOTE — PROGRESS NOTES
NEUROSURGERY DAILY PROGRESS NOTE    ASSESSMENT:   Hai Hernandez is a 77 y.o. with a significant comorbidity of acephalic migraines, thyroid disease status post radiation as a child, basal cell and squamous cell carcinoma of the skin. He presents in FU for known meningioma found on workup for right visual field changes. Physical exam findings of neurologically intact with resolution of all symptoms and mild decreased vision in right eye.  His imaging shows 22 x 24 x 13.5 mm right planum sphenoidale mass most suggestive of meningioma.    Past Medical History:   Diagnosis Date   • Brain tumor (HCC)    • Depression    • Hearing loss    • History of cellulitis     right foot big toe   • Hypertension    • Pneumonia    • Right arm weakness     after cervial injury   • Sciatic pain     affects balance   • Thyroid disease    • Visual disturbance     due to brain tumor - right eye     Active Hospital Problems    Diagnosis    • **Meningioma (HCC)    • Acute deep vein thrombosis (DVT) of the ulnar and brachial veins of right upper extremity (HCC)    • Loculated pleural effusion    • Fever    • PAF (paroxysmal atrial fibrillation) (HCC)    • Cerebral parenchymal hemorrhage (HCC)    • Essential hypertension    • Type 2 diabetes mellitus with hyperglycemia, without long-term current use of insulin (HCC)    • Hypothyroidism (acquired)    • Acute respiratory failure (secondary to status epilepticus)    • Thrombocytopenia (HCC)    • 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  Remains intubated.  Opens eyes to voice and keeps them open.  Does not follow commands.    Intracranial meningioma   Day of Surgery (5/10/2022) Status post postcraniotomy for tumor   WHO 1 Meningioma   MRI with blood products in resection cavity but no evidence of cerebritis   CT head reviewed without  "expansion of SDH   Neurochecks per policy   Decadron off   Leakage from wound.  Stapled at bedside.  If leaks again will oversew    Hydrocephalus   Lumbar drain in place.  Opening pressure 17 cm H2O   Lumbar drain, Open with 20cc every 4 hrs.      Much improved pseudomeningocele with increased drainage   CSF unremarkable from 5/17 and 5/23.    Anticipate Left VPS when less acute.      Postop seizures, in status   Neurology managing   Cont Keppra, Dialntin, Vimpat   Stat Ativan   Follow dilantin levels     CV: Cardene off   keep SBP <140.   Hypotension resolved and off pressors   Requiring hydralazine and labetalol PRNs   A-line removed secondary to limb ischemia  Pulm: Trach today.  Appreciate ENT   Left chest tube placed x2 CT managing with TPA  : Keep jansen for now.   FEN: Hyponatremia, 127   3% NaCl DC   Poor glucose control.  Increase SSI     ENDO: Accu-Chek and treat per policy  GI: PEG tube placement today.  Appreciate GI  ID: Afebrile overnight,    DDX: infection vs drug fever   WBC improved after chest tube   CRP in 20s, repeat weekly   Broad spectrum abx, Meropenum and Vanc   Blood cult pending   Body fluid cx pending   CSF cultures pending, NGTD   UA+, 100K GNB   Heme:  DVT prophylaxis with SCD's.     Plt up 193 and HIT ab negative.     RUE clot.  Leave PICC for now.  Can not anticoagulate  Pain: NA  Dispo: DC pending seizure control   Pending extubation    CHIEF COMPLAINT:   Right visual field changes    Subjective  Symptom stable    Temp:  [95 °F (35 °C)-98.2 °F (36.8 °C)] 95 °F (35 °C)  Heart Rate:  [47-62] 47  Resp:  [21-30] 25  BP: (108-177)/(51-79) 108/51  FiO2 (%):  [30 %] 30 %    Objective:  General Appearance:  Well-appearing and in no acute distress.    Vital signs: (most recent): Blood pressure 108/51, pulse (!) 47, temperature 95 °F (35 °C), temperature source Axillary, resp. rate 25, height 182.9 cm (72\"), weight 105 kg (231 lb 6.4 oz), SpO2 96 %.      Neurologic Exam     Mental Status "   Level of consciousness: arousable by verbal stimuli ,  drowsy  Intubated.  Sedation paused   Open eyes to voice  Does not follow commands     Cranial Nerves     CN III, IV, VI   Pupils are equal, round, and reactive to light.  Extraocular motions are normal.     CN IX, X   CN IX normal.     Motor Exam   Weak withdrawal to pain x 4     Gait, Coordination, and Reflexes     Tremor   Resting tremor: absent  Intention tremor: absent  Action tremor: absent    Drains:   Urethral Catheter Non-latex;Silicone 16 Fr. (Active)       Output by Drain (mL) 06/01/22 0701 - 06/01/22 1900 06/01/22 1901 - 06/02/22 0700 06/02/22 0701 - 06/02/22 1612 Range Total   Requested LDAs do not have output data documented.       Imaging Results (Last 24 Hours)     Procedure Component Value Units Date/Time    CT Guided Abscess Drain Lung [[id number]] Collected: 06/02/22 1506     Updated: 06/02/22 1512    Narrative:      EXAMINATION: CT GUIDED ABSCESS DRAIN LUNG-      6/2/2022 9:20 AM CDT     HISTORY: left loculated pleural effusion.  Dr Ortega has discussed with  Dr. Whelan; R13.10-Dysphagia, unspecified; Z01.818-Encounter for other  preprocedural examination; D32.9-Benign neoplasm of meninges,  unspecified; Z74.09-Other reduced mobility; Z78.9-Other specified health  status; G40.901-Epilepsy, unspecified, not intractable, with status  epilepticus; J96.01-Acute respiratory failure with hypoxia     In order to have a CT radiation dose as low as reasonably achievable  Automated Exposure Control was utilized for adjustment of the mA and/or  KV according to patient size.     DLP in mGycm= 3093.     CT-guided placement of 2 separate 8 Czech pigtail catheter drains  within loculated left pleural fluid collections.     An appropriate time out was performed with all present staff members to  ensure correct patient and correct site and procedure.  The procedure was discussed with the patient including risks, benefits,  and alternatives.    Consent was  given.     Patient is intubated and on mechanical ventilation.  ICU nurse and respiratory staff present throughout the procedure.     Right lateral positioning.  Noncontrast CT imaging used to localize the target pleural fluid  collections.     Routine prep and local anesthesia.     With CT guidance an 8 Yoruba pigtail drain catheter was placed into each  of the 2 loculated pleural fluid collections.     Free flow of thin clear to yellow fluid was noted.     40 to 50 cc of fluid was removed from each collection.     Each catheter was placed to gravity bag drainage.     Blood loss = less than 1 cc.     No complications.     Summary:  1. CT-guided placement of 2 left-sided pleural drainage catheters.  2. No complications.                                         This report was finalized on 06/02/2022 15:09 by Dr. Tomer Whelan MD.    XR Chest 1 View [[id number]] Collected: 06/02/22 0719     Updated: 06/02/22 0724    Narrative:      EXAM: XR CHEST 1 VW- 6/2/2022 3:37 AM CDT     HISTORY: On vent; R13.10-Dysphagia, unspecified; Z01.818-Encounter for  other preprocedural examination; D32.9-Benign neoplasm of meninges,  unspecified; Z74.09-Other reduced mobility; Z78.9-Other specified health  status; G40.901-Epilepsy, unspecified, not intractable, with status  epilepticus; J96.01-Acute respiratory failure with hypoxia       COMPARISON: CT scan June 1, 2022.     TECHNIQUE: Frontal radiograph of the chest     FINDINGS:   The right lung is clear. Two left chest tubes are present. There is  opacification of the left lower lobe. This may be compression  atelectasis from prior posterior left pleural effusion.. The  cardiomediastinal silhouette and pulmonary vascularity are within normal  limits. Endotracheal tube is present. Dobbhoff feeding tube is present.  Right PIC catheter is satisfactorily positioned     The osseous structures and surrounding soft tissues demonstrate no acute  abnormality.          Impression:      1. 2  left chest tubes are present. There is persistent opacification the  left chest is likely atelectasis left lower lobe  2. Support tubes are satisfactorily positioned..        This report was finalized on 06/02/2022 07:21 by Dr. Emanuel Frederick MD.        Lab Results (last 24 hours)     Procedure Component Value Units Date/Time    POC Glucose Once [[id number]]  (Abnormal) Collected: 06/02/22 1120    Specimen: Blood Updated: 06/02/22 1131     Glucose 135 mg/dL      Comment: : [id number] [employer]imi [employer] ID: [id number]       POC Glucose Once [[id number]]  (Abnormal) Collected: 06/02/22 1052    Specimen: Blood Updated: 06/02/22 1105     Glucose 64 mg/dL      Comment: : [id number] [employer] ID: [id number]       Manual Differential [[id number]]  (Abnormal) Collected: 06/02/22 0528    Specimen: Blood Updated: 06/02/22 0720     Neutrophil % 67.0 %      Lymphocyte % 4.0 %      Monocyte % 9.0 %      Eosinophil % 1.0 %      Bands %  7.0 %      Metamyelocyte % 4.0 %      Myelocyte % 5.0 %      Promyelocyte % 2.0 %      Atypical Lymphocyte % 1.0 %      Neutrophils Absolute 8.78 10*3/mm3      Lymphocytes Absolute 0.59 10*3/mm3      Monocytes Absolute 1.07 10*3/mm3      Eosinophils Absolute 0.12 10*3/mm3      Polychromasia Slight/1+     WBC Morphology Normal     Platelet Morphology Normal    CBC & Differential [[id number]]  (Abnormal) Collected: 06/02/22 0528    Specimen: Blood Updated: 06/02/22 0720    Narrative:      The following orders were created for panel order CBC & Differential.  Procedure                               Abnormality         Status                     ---------                               -----------         ------                     CBC Auto Differential[[id number]]        Abnormal            Final result                 Please view results for these tests on the individual orders.    CBC Auto Differential [[id number]]  (Abnormal) Collected: 06/02/22 0528    Specimen: Blood Updated:  06/02/22 0720     WBC 11.86 10*3/mm3      RBC 2.89 10*6/mm3      Hemoglobin 9.0 g/dL      Hematocrit 27.9 %      MCV 96.5 fL      MCH 31.1 pg      MCHC 32.3 g/dL      RDW 13.9 %      RDW-SD 48.3 fl      MPV 9.6 fL      Platelets 366 10*3/mm3     Narrative:      The previously reported component NRBC is no longer being reported. Previous result was 0.3 /100 WBC (Reference Range: 0.0-0.2 /100 WBC) on 6/2/2022 at 0610 CDT.    Zinc [[id number]] Collected: 05/31/22 1350    Specimen: Blood Updated: 06/02/22 0716     Zinc 54 ug/dL      Comment:                                 Detection Limit = 5       Narrative:      Test(s) 001800-Zinc, Plasma or Serum  was developed and its performance characteristics determined  by [facility]. It has not been cleared or approved by the Food  and Drug Administration.  Performed at:  [address]  [id number]  : Lucio Cueto MD, Phone:  [phone]    Comprehensive Metabolic Panel [[id number]]  (Abnormal) Collected: 06/02/22 0528    Specimen: Blood Updated: 06/02/22 0641     Glucose 68 mg/dL      BUN 28 mg/dL      Creatinine 0.50 mg/dL      Sodium 137 mmol/L      Potassium 4.5 mmol/L      Chloride 101 mmol/L      CO2 28.0 mmol/L      Calcium 8.1 mg/dL      Total Protein 5.7 g/dL      Albumin 1.60 g/dL      ALT (SGPT) 31 U/L      AST (SGOT) 34 U/L      Alkaline Phosphatase 225 U/L      Total Bilirubin 0.3 mg/dL      Globulin 4.1 gm/dL      A/G Ratio 0.4 g/dL      BUN/Creatinine Ratio 56.0     Anion Gap 8.0 mmol/L      eGFR 105.1 mL/min/1.73      Comment: National Kidney Foundation and American Society of Nephrology (ASN) Task Force recommended calculation based on the Chronic Kidney Disease Epidemiology Collaboration (CKD-EPI) equation refit without adjustment for race.       Narrative:      GFR Normal >60  Chronic Kidney Disease <60  Kidney Failure <15      Phenytoin Level, Total [[id number]]  (Abnormal) Collected: 06/02/22 0528     Specimen: Blood Updated: 06/02/22 0634     Phenytoin Level 7.0 mcg/mL     POC Glucose Once [[id number]]  (Normal) Collected: 06/02/22 0601    Specimen: Blood Updated: 06/02/22 0612     Glucose 71 mg/dL      Comment: : ABBEY Wangter ID: [id number]       POC Glucose Once [[id number]]  (Abnormal) Collected: 06/02/22 0559    Specimen: Blood Updated: 06/02/22 0612     Glucose 64 mg/dL      Comment: : ABBEY [employer]eter ID: [id number]       COVID PRE-OP / PRE-PROCEDURE SCREENING ORDER (NO ISOLATION) - Swab, Nasal Cavity [[id number]]  (Normal) Collected: 06/02/22 0422    Specimen: Swab from Nasal Cavity Updated: 06/02/22 0530    Narrative:      The following orders were created for panel order COVID PRE-OP / PRE-PROCEDURE SCREENING ORDER (NO ISOLATION) - Swab, Nasal Cavity.  Procedure                               Abnormality         Status                     ---------                               -----------         ------                     COVID-19,[facility] IN-SAR[facility]...[[id number]]  Normal              Final result                 Please view results for these tests on the individual orders.    COVID-19,[facility] IN-HOUSE,Nasal Swab No Transport Media 3-4 HR TAT - Swab, Nasal Cavity [[id number]]  (Normal) Collected: 06/02/22 0422    Specimen: Swab from Nasal Cavity Updated: 06/02/22 0530     COVID19 Not Detected    Narrative:      Fact sheet for providers: https://www.fda.gov/media/569525/download     Fact sheet for patients: https://www.fda.gov/media/920290/download    Test performed by PCR.    Consider negative results in combination with clinical observations, patient history, and epidemiological information.    Blood Gas, Arterial - [[id number]]  (Abnormal) Collected: 06/02/22 0433    Specimen: Arterial Blood Updated: 06/02/22 0428     Site Left Radial     Denzel's Test Positive     pH, Arterial 7.448 pH units      pCO2, Arterial 49.5 mm Hg      Comment: 83 Value above reference  range        pO2, Arterial 84.1 mm Hg      HCO3, Arterial 34.2 mmol/L      Comment: 83 Value above reference range        Base Excess, Arterial 9.1 mmol/L      Comment: 83 Value above reference range        O2 Saturation, Arterial 97.2 %      Temperature 37.0 C      Barometric Pressure for Blood Gas 747 mmHg      Modality Ventilator     FIO2 30 %      Ventilator Mode AC     Set Tidal Volume 450     Set Mech Resp Rate 16.0     PEEP 8.0     Collected by [id number]     Comment: Meter: [id number]     :  [id number]        pCO2, Temperature Corrected 49.5 mm Hg      pH, Temp Corrected 7.448 pH Units      pO2, Temperature Corrected 84.1 mm Hg     POC Glucose Once [[id number]]  (Normal) Collected: 06/01/22 2349    Specimen: Blood Updated: 06/02/22 0005     Glucose 122 mg/dL      Comment: : ABBEY Ayala ID: [id number]       POC Glucose Once [[id number]]  (Normal) Collected: 06/01/22 2030    Specimen: Blood Updated: 06/01/22 2041     Glucose 121 mg/dL      Comment: : ABBEY Ayala ID: [id number]       POC Glucose Once [[id number]]  (Normal) Collected: 06/01/22 1755    Specimen: Blood Updated: 06/01/22 1806     Glucose 96 mg/dL      Comment: : RIO Bernal ID: [id number]           [id number]  MARIO Rangel

## 2022-06-02 NOTE — PROGRESS NOTES
Neurology Progress Note      Date of admission: 5/10/2022  4:49 AM  Date of visit: 6/2/2022    Chief Complaint:  Seizures    Subjective     Subjective:    He opens eyes off sedation but does not follow commands.  May be too weak to move but when asked to blink his eyes  He did not do that either except naturally   Medications:  Current Facility-Administered Medications   Medication Dose Route Frequency Provider Last Rate Last Admin   • acetaminophen (TYLENOL) tablet 650 mg  650 mg Oral Q4H PRN Martell Downs MD   650 mg at 05/31/22 2002    Or   • acetaminophen (TYLENOL) suppository 650 mg  650 mg Rectal Q4H PRN Martell Downs MD   650 mg at 05/23/22 0016   • albuterol (PROVENTIL) nebulizer solution 0.083% 2.5 mg/3mL  2.5 mg Nebulization Q8H - RT Carlos A Dasilva MD   2.5 mg at 06/02/22 0710   • alteplase (CATHFLO/ACTIVASE) injection 2 mg  2 mg Intracatheter PRN Max Collins,    New Syringe/Cartridge at 05/28/22 1330   • bisacodyl (DULCOLAX) suppository 10 mg  10 mg Rectal Daily PRN Sanford Morales MD   10 mg at 05/31/22 1039   • butalbital-acetaminophen-caffeine (FIORICET, ESGIC) -40 MG per tablet 1 tablet  1 tablet Oral Q4H PRN Martell Downs MD   1 tablet at 05/22/22 1729   • carvedilol (COREG) tablet 6.25 mg  6.25 mg Nasogastric BID With Meals Sanford Morales MD   6.25 mg at 06/01/22 1759   • chlorhexidine (PERIDEX) 0.12 % solution 15 mL  15 mL Mouth/Throat Q12H Raymon Gunderson MD   15 mL at 06/01/22 2109   • dextrose (D50W) (25 g/50 mL) IV injection 25 g  25 g Intravenous Q15 Min PRN Martell Downs MD   25 g at 06/02/22 0612   • dextrose (GLUTOSE) oral gel 15 g  15 g Oral Q15 Min PRN Martell Downs MD   15 g at 05/22/22 0527   • fosphenytoin (Cerebyx) 275 mg PE in sodium chloride 0.9 % 100 mL IVPB  275 mg PE Intravenous Q8H Barbara Castorena MD   275 mg PE at 06/02/22 0612   • glucagon (human recombinant) (GLUCAGEN  DIAGNOSTIC) injection 1 mg  1 mg Intramuscular Q15 Min PRN Martell Downs MD       • heparin (porcine) 5000 UNIT/ML injection 5,000 Units  5,000 Units Subcutaneous Q12H Martell Downs MD   5,000 Units at 06/01/22 2030   • Hold Medication (Anticoagulation)  1 each Does not apply Continuous PRN Yayo Galvin MD       • hydrALAZINE (APRESOLINE) injection 10 mg  10 mg Intravenous Q6H PRN Martell Downs MD   10 mg at 05/31/22 1322   • insulin detemir (LEVEMIR) injection 20 Units  20 Units Subcutaneous Nightly Max Collins DO   20 Units at 06/01/22 2031   • insulin regular (humuLIN R,novoLIN R) injection 2-7 Units  2-7 Units Subcutaneous Q6H Max Collins,    2 Units at 06/01/22 0623   • insulin regular (humuLIN R,novoLIN R) injection 8 Units  8 Units Subcutaneous Q6H Sanford Morales MD   8 Units at 06/01/22 0623   • ipratropium-albuterol (DUO-NEB) nebulizer solution 3 mL  3 mL Nebulization Q4H PRN Raymon Gunderson MD   3 mL at 05/25/22 1007   • labetalol (NORMODYNE,TRANDATE) injection 10 mg  10 mg Intravenous Q6H PRN Stevie Parsons APRN   10 mg at 05/27/22 0931   • lacosamide (VIMPAT) injection 200 mg  200 mg Intravenous Q12H Barbara Castorena MD   200 mg at 06/02/22 0838   • levETIRAcetam in NaCl 0.82% (KEPPRA) IVPB 500 mg  500 mg Intravenous Q12H Martell Downs MD   500 mg at 06/02/22 0838   • levothyroxine (SYNTHROID, LEVOTHROID) tablet 125 mcg  125 mcg Nasogastric Q AM Sanford Morales MD   125 mcg at 06/01/22 0615   • lidocaine (XYLOCAINE) 1 % injection 10 mL  10 mL Intradermal Once Stevie Parsons APRN       • liothyronine (CYTOMEL) tablet 25 mcg  25 mcg Nasogastric Daily Max Collins DO   25 mcg at 06/01/22 1012   • lisinopril (PRINIVIL,ZESTRIL) tablet 20 mg  20 mg Nasogastric Q24H Sanford Morales MD   20 mg at 06/01/22 1012   • Morphine sulfate (PF) injection 2 mg  2 mg Intravenous Q2H PRN Max Collins DO   2 mg at 06/02/22  0726   • mupirocin (BACTROBAN) 2 % ointment 1 application  1 application Topical Q12H Aurelia Thomas APRN   1 application at 06/01/22 2109   • norepinephrine (LEVOPHED) 8 mg in 250 mL NS infusion (premix)  0.02-0.3 mcg/kg/min Intravenous Titrated Mark Escobar MD   Held at 06/01/22 0606   • Nutrisource fiber pack 1 packet  1 packet Nasogastric 4x Daily Martell Downs MD   1 packet at 06/01/22 2109   • ondansetron (ZOFRAN) tablet 4 mg  4 mg Oral Q6H PRN Martell Downs MD        Or   • ondansetron (ZOFRAN) injection 4 mg  4 mg Intravenous Q6H PRN Martell Downs MD       • oxyCODONE (ROXICODONE) 5 MG/5ML solution 7.5 mg  7.5 mg Nasogastric Q4H PRN Daly Perea APRN   7.5 mg at 05/31/22 1958   • pantoprazole (PROTONIX) injection 40 mg  40 mg Intravenous Q AM Carlos A Dasilva MD   40 mg at 06/02/22 0612   • polyethylene glycol (MIRALAX) packet 17 g  17 g Oral Daily Sanford Morales MD   17 g at 06/01/22 1013   • propofol (DIPRIVAN) infusion 10 mg/mL 100 mL  5-50 mcg/kg/min Intravenous Titrated Martell Downs MD 12.48 mL/hr at 06/02/22 0838 20 mcg/kg/min at 06/02/22 0838   • ProSource TF oral liquid 45 mL  1 packet Nasogastric BID Martell Downs MD   45 mL at 06/01/22 2031   • sodium chloride 3 % nebulizer solution 4 mL  4 mL Nebulization BID - RT Carlos A Dasilva MD   4 mL at 06/02/22 0710       Review of Systems:   -A 14 point review of systems is unobtainable    Objective     Objective      Vital Signs  Temp:  [96.9 °F (36.1 °C)-98.2 °F (36.8 °C)] 96.9 °F (36.1 °C)  Heart Rate:  [50-69] 62  Resp:  [23-30] 26  BP: ()/(46-86) 152/71  FiO2 (%):  [30 %] 30 %    Physical Exam:    HEENT:  Neck supple  CVS:  Regular rate and rhythm.  No murmurs  Carotid Examination:  No bruits  Lungs:  Clear to auscultation  Abdomen:  Non-tender, Non-distended  Extremities:  No signs of peripheral edema    Neurologic Exam:  Propofol on at 20  Intubated and sedated       Cranial nerves II through XII intact.  Pupils react  No spont eye opening  No obvious facial asymmetry  Breathes over vent  Coughs when suctioned    Motor: (strength out of 5:  1= minimal movement, 2 = movement in plane of gravity, 3 = movement against gravity, 4 = movement against some resistance, 5 = full strength)    No withdrawal to noxious stimuli    DTR:  2+ throughout in all four extremities  upgoing toe on left    Sensory:  No withdrawal throughout    Coordination/Gait:  -No moving enough to assess     Results Review:    I reviewed the patient's new clinical results.    Lab Results (last 24 hours)     Procedure Component Value Units Date/Time    Manual Differential [[id number]]  (Abnormal) Collected: 06/02/22 0528    Specimen: Blood Updated: 06/02/22 0720     Neutrophil % 67.0 %      Lymphocyte % 4.0 %      Monocyte % 9.0 %      Eosinophil % 1.0 %      Bands %  7.0 %      Metamyelocyte % 4.0 %      Myelocyte % 5.0 %      Promyelocyte % 2.0 %      Atypical Lymphocyte % 1.0 %      Neutrophils Absolute 8.78 10*3/mm3      Lymphocytes Absolute 0.59 10*3/mm3      Monocytes Absolute 1.07 10*3/mm3      Eosinophils Absolute 0.12 10*3/mm3      Polychromasia Slight/1+     WBC Morphology Normal     Platelet Morphology Normal    CBC & Differential [[id number]]  (Abnormal) Collected: 06/02/22 0528    Specimen: Blood Updated: 06/02/22 0720    Narrative:      The following orders were created for panel order CBC & Differential.  Procedure                               Abnormality         Status                     ---------                               -----------         ------                     CBC Auto Differential[[id number]]        Abnormal            Final result                 Please view results for these tests on the individual orders.    CBC Auto Differential [[id number]]  (Abnormal) Collected: 06/02/22 0528    Specimen: Blood Updated: 06/02/22 0720     WBC 11.86 10*3/mm3      RBC 2.89 10*6/mm3      Hemoglobin  9.0 g/dL      Hematocrit 27.9 %      MCV 96.5 fL      MCH 31.1 pg      MCHC 32.3 g/dL      RDW 13.9 %      RDW-SD 48.3 fl      MPV 9.6 fL      Platelets 366 10*3/mm3     Narrative:      The previously reported component NRBC is no longer being reported. Previous result was 0.3 /100 WBC (Reference Range: 0.0-0.2 /100 WBC) on 6/2/2022 at 0610 CDT.    Zinc [[id number]] Collected: 05/31/22 1350    Specimen: Blood Updated: 06/02/22 0716     Zinc 54 ug/dL      Comment:                                 Detection Limit = 5       Narrative:      Test(s) 001800-Zinc, Plasma or Serum  was developed and its performance characteristics determined  by [facility]. It has not been cleared or approved by the Food  and Drug Administration.  Performed at:  [address] [facility]  [id number]  : Lucio Cueto MD, Phone:  [phone]    Comprehensive Metabolic Panel [[id number]]  (Abnormal) Collected: 06/02/22 0528    Specimen: Blood Updated: 06/02/22 0641     Glucose 68 mg/dL      BUN 28 mg/dL      Creatinine 0.50 mg/dL      Sodium 137 mmol/L      Potassium 4.5 mmol/L      Chloride 101 mmol/L      CO2 28.0 mmol/L      Calcium 8.1 mg/dL      Total Protein 5.7 g/dL      Albumin 1.60 g/dL      ALT (SGPT) 31 U/L      AST (SGOT) 34 U/L      Alkaline Phosphatase 225 U/L      Total Bilirubin 0.3 mg/dL      Globulin 4.1 gm/dL      A/G Ratio 0.4 g/dL      BUN/Creatinine Ratio 56.0     Anion Gap 8.0 mmol/L      eGFR 105.1 mL/min/1.73      Comment: National Kidney Foundation and American Society of Nephrology (ASN) Task Force recommended calculation based on the Chronic Kidney Disease Epidemiology Collaboration (CKD-EPI) equation refit without adjustment for race.       Narrative:      GFR Normal >60  Chronic Kidney Disease <60  Kidney Failure <15      Phenytoin Level, Total [[id number]]  (Abnormal) Collected: 06/02/22 0528    Specimen: Blood Updated: 06/02/22 0634     Phenytoin Level 7.0 mcg/mL     POC  Glucose Once [[id number]]  (Normal) Collected: 06/02/22 0601    Specimen: Blood Updated: 06/02/22 0612     Glucose 71 mg/dL      Comment: : ABBEY Wangter ID: [id number]       POC Glucose Once [[id number]]  (Abnormal) Collected: 06/02/22 0559    Specimen: Blood Updated: 06/02/22 0612     Glucose 64 mg/dL      Comment: : ABBEY Wangter ID: [id number]       COVID PRE-OP / PRE-PROCEDURE SCREENING ORDER (NO ISOLATION) - Swab, Nasal Cavity [[id number]]  (Normal) Collected: 06/02/22 0422    Specimen: Swab from Nasal Cavity Updated: 06/02/22 0530    Narrative:      The following orders were created for panel order COVID PRE-OP / PRE-PROCEDURE SCREENING ORDER (NO ISOLATION) - Swab, Nasal Cavity.  Procedure                               Abnormality         Status                     ---------                               -----------         ------                     COVID-19,[facility] IN-S[facility]...[[id number]]  Normal              Final result                 Please view results for these tests on the individual orders.    COVID-19,[facility] IN-HOUSE,Nasal Swab No Transport Media 3-4 HR TAT - Swab, Nasal Cavity [[id number]]  (Normal) Collected: 06/02/22 0422    Specimen: Swab from Nasal Cavity Updated: 06/02/22 0530     COVID19 Not Detected    Narrative:      Fact sheet for providers: https://www.fda.gov/media/464597/download     Fact sheet for patients: https://www.fda.gov/media/565412/download    Test performed by PCR.    Consider negative results in combination with clinical observations, patient history, and epidemiological information.    Blood Gas, Arterial - [[id number]]  (Abnormal) Collected: 06/02/22 0433    Specimen: Arterial Blood Updated: 06/02/22 0428     Site Left Radial     Denzel's Test Positive     pH, Arterial 7.448 pH units      pCO2, Arterial 49.5 mm Hg      Comment: 83 Value above reference range        pO2, Arterial 84.1 mm Hg      HCO3, Arterial 34.2 mmol/L       Comment: 83 Value above reference range        Base Excess, Arterial 9.1 mmol/L      Comment: 83 Value above reference range        O2 Saturation, Arterial 97.2 %      Temperature 37.0 C      Barometric Pressure for Blood Gas 747 mmHg      Modality Ventilator     FIO2 30 %      Ventilator Mode AC     Set Tidal Volume 450     Set Mech Resp Rate 16.0     PEEP 8.0     Collected by [id number]     Comment: Meter: [id number]     :  [id number]        pCO2, Temperature Corrected 49.5 mm Hg      pH, Temp Corrected 7.448 pH Units      pO2, Temperature Corrected 84.1 mm Hg     POC Glucose Once [[id number]]  (Normal) Collected: 06/01/22 2349    Specimen: Blood Updated: 06/02/22 0005     Glucose 122 mg/dL      Comment: : ABBEY Ayala ID: [id number]       POC Glucose Once [[id number]]  (Normal) Collected: 06/01/22 2030    Specimen: Blood Updated: 06/01/22 2041     Glucose 121 mg/dL      Comment: : ABBEY Ayala ID: [id number]       POC Glucose Once [[id number]]  (Normal) Collected: 06/01/22 1755    Specimen: Blood Updated: 06/01/22 1806     Glucose 96 mg/dL      Comment: : JDAV Bernal ID: [id number]       POC Glucose Once [[id number]]  (Normal) Collected: 06/01/22 1215    Specimen: Blood Updated: 06/01/22 1226     Glucose 105 mg/dL      Comment: : [id number] Alana Sharif ID: [id number]       AFB Culture - Body Fluid, Pleural Cavity [[id number]] Collected: 05/25/22 1042    Specimen: Body Fluid from Pleural Cavity Updated: 06/01/22 1102     AFB Culture No AFB isolated at 1 week     AFB Stain No acid fast bacilli seen on direct smear      No acid fast bacilli seen on concentrated smear    Fungus Culture - Body Fluid, Pleural Cavity [[id number]] Collected: 05/25/22 1042    Specimen: Body Fluid from Pleural Cavity Updated: 06/01/22 1102     Fungus Culture No fungus isolated at 1 week        Imaging Results (Last 24 Hours)     Procedure Component Value  Units Date/Time    XR Chest 1 View [[id number]] Collected: 06/02/22 0719     Updated: 06/02/22 0724    Narrative:      EXAM: XR CHEST 1 - 6/2/2022 3:37 AM CDT     HISTORY: On vent; R13.10-Dysphagia, unspecified; Z01.818-Encounter for  other preprocedural examination; D32.9-Benign neoplasm of meninges,  unspecified; Z74.09-Other reduced mobility; Z78.9-Other specified health  status; G40.901-Epilepsy, unspecified, not intractable, with status  epilepticus; J96.01-Acute respiratory failure with hypoxia       COMPARISON: CT scan June 1, 2022.     TECHNIQUE: Frontal radiograph of the chest     FINDINGS:   The right lung is clear. Two left chest tubes are present. There is  opacification of the left lower lobe. This may be compression  atelectasis from prior posterior left pleural effusion.. The  cardiomediastinal silhouette and pulmonary vascularity are within normal  limits. Endotracheal tube is present. Dobbhoff feeding tube is present.  Right PIC catheter is satisfactorily positioned     The osseous structures and surrounding soft tissues demonstrate no acute  abnormality.          Impression:      1. 2 left chest tubes are present. There is persistent opacification the  left chest is likely atelectasis left lower lobe  2. Support tubes are satisfactorily positioned..        This report was finalized on 06/02/2022 07:21 by Dr. Emanuel Frederick MD.    XR Chest 1 View [[id number]] Collected: 06/01/22 1323     Updated: 06/01/22 1329    Narrative:      EXAMINATION: XR CHEST 1 -     6/1/2022 11:55 AM CDT     HISTORY: Et tube change; R13.10-Dysphagia, unspecified;  Z01.818-Encounter for other preprocedural examination; D32.9-Benign  neoplasm of meninges, unspecified; Z74.09-Other reduced mobility;  Z78.9-Other specified health status; G40.901-Epilepsy, unspecified, not  intractable, with status epilepticus; J96.01-Acute respiratory failure  with hypoxia     1 view chest x-ray.     Comparison is made with earlier today at  3:13 AM.     Endotracheal tube replaced/repositioned and being slightly lower with  the tip 20 mm above the hernandez.     Right PICC line and feeding tube remain in place.     Unchanged appearance of the lungs with 2 left chest tubes and loculated  left pleural fluid.     Persistent left lung base consolidation.     Summary:  1. Slightly lower lying endotracheal tube with the tip 20 mm above the  hernandez.  2. Otherwise unchanged appearance of the chest.  This report was finalized on 06/01/2022 13:26 by Dr. Tomer Whelan MD.    CT Chest With Contrast Diagnostic [[id number]] Collected: 06/01/22 1040     Updated: 06/01/22 1047    Narrative:      EXAMINATION: CT CHEST W CONTRAST DIAGNOSTIC-      6/1/2022 9:21 AM CDT     HISTORY: post intrapleural tpa; R13.10-Dysphagia, unspecified;  Z01.818-Encounter for other preprocedural examination; D32.9-Benign  neoplasm of meninges, unspecified; Z74.09-Other reduced mobility;  Z78.9-Other specified health status; G40.901-Epilepsy, unspecified, not  intractable, with status epilepticus; J96.01-Acute respiratory failure  with hypoxia     In order to have a CT radiation dose as low as reasonably achievable  Automated Exposure Control was utilized for adjustment of the mA and/or  KV according to patient size.     DLP in mGycm= 426.     Postcontrast chest CT.  Axial, sagittal, and coronal sequences.     Comparison is made with May 25, 2022.     Interval placement of an upper left chest tube which has drained the  upper lateral pleural fluid collection which was seen previously.     The medial and inferior fluid collection persists.  Unchanged position of the lower left chest tube.     There is still focal dense consolidation of the lower left lung.     The upper lung is better expanded and clear.  There is no significant pneumothorax.     The right lung is adequately expanded and essentially clear.  There is mild patchy atelectasis.     Endotracheal tube and gastric tube present.  The  endotracheal tube tip lies 29 mm above the hernandez.     Summary:  1. Compared with one week ago there has been placement of an upper left  chest tube which has drained the upper lateral pleural fluid collection.  2. The lower left chest tube is unchanged in position.  3. There is still some loculated pleural fluid medially and inferiorly.  4. Persistent consolidation of the lower left lung.                                   This report was finalized on 06/01/2022 10:44 by Dr. Tomer Whelan MD.          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 Seizures  2. Calculated phenytoin level 12.8  3. Hypoalbuminemia worsened overnight  Tube feed held for 2 hours a day due to meds from Dr Ortega  4. Anemia  5. Atrial fibrillation    Plan:  Increased Fosphenytoin to 250 mg every 8 hours  Daily phenytoin levels  Needs correction of hypoalbuminemia and nutritionist is involved  Obtain prealbumin and zinc      Donnell Freire MD  06/02/22  08:53 CDT

## 2022-06-03 ENCOUNTER — ANESTHESIA (OUTPATIENT)
Dept: PERIOP | Facility: HOSPITAL | Age: 77
End: 2022-06-03

## 2022-06-03 ENCOUNTER — APPOINTMENT (OUTPATIENT)
Dept: CT IMAGING | Facility: HOSPITAL | Age: 77
End: 2022-06-03

## 2022-06-03 ENCOUNTER — ANESTHESIA EVENT (OUTPATIENT)
Dept: PERIOP | Facility: HOSPITAL | Age: 77
End: 2022-06-03

## 2022-06-03 ENCOUNTER — APPOINTMENT (OUTPATIENT)
Dept: GENERAL RADIOLOGY | Facility: HOSPITAL | Age: 77
End: 2022-06-03

## 2022-06-03 PROBLEM — G91.0 COMMUNICATING HYDROCEPHALUS: Status: ACTIVE | Noted: 2022-04-18

## 2022-06-03 LAB
ALBUMIN SERPL-MCNC: 1.6 G/DL (ref 3.5–5.2)
ALBUMIN/GLOB SERPL: 0.4 G/DL
ALP SERPL-CCNC: 174 U/L (ref 39–117)
ALT SERPL W P-5'-P-CCNC: 28 U/L (ref 1–41)
ANION GAP SERPL CALCULATED.3IONS-SCNC: 7 MMOL/L (ref 5–15)
ANISOCYTOSIS BLD QL: ABNORMAL
ARTERIAL PATENCY WRIST A: POSITIVE
AST SERPL-CCNC: 30 U/L (ref 1–40)
ATMOSPHERIC PRESS: 749 MMHG
BASE EXCESS BLDA CALC-SCNC: 8.3 MMOL/L (ref 0–2)
BASO STIPL COARSE BLD QL SMEAR: ABNORMAL
BDY SITE: ABNORMAL
BILIRUB SERPL-MCNC: 0.4 MG/DL (ref 0–1.2)
BODY TEMPERATURE: 37 C
BUN SERPL-MCNC: 21 MG/DL (ref 8–23)
BUN/CREAT SERPL: 42.9 (ref 7–25)
CALCIUM SPEC-SCNC: 8.1 MG/DL (ref 8.6–10.5)
CHLORIDE SERPL-SCNC: 100 MMOL/L (ref 98–107)
CO2 SERPL-SCNC: 29 MMOL/L (ref 22–29)
CREAT SERPL-MCNC: 0.49 MG/DL (ref 0.76–1.27)
CRP SERPL-MCNC: 13.97 MG/DL (ref 0–0.5)
DEPRECATED RDW RBC AUTO: 50.4 FL (ref 37–54)
EGFRCR SERPLBLD CKD-EPI 2021: 105.7 ML/MIN/1.73
EOSINOPHIL # BLD MANUAL: 0.17 10*3/MM3 (ref 0–0.4)
EOSINOPHIL NFR BLD MANUAL: 2 % (ref 0.3–6.2)
ERYTHROCYTE [DISTWIDTH] IN BLOOD BY AUTOMATED COUNT: 14.1 % (ref 12.3–15.4)
GLOBULIN UR ELPH-MCNC: 3.9 GM/DL
GLUCOSE BLDC GLUCOMTR-MCNC: 111 MG/DL (ref 70–130)
GLUCOSE BLDC GLUCOMTR-MCNC: 122 MG/DL (ref 70–130)
GLUCOSE BLDC GLUCOMTR-MCNC: 163 MG/DL (ref 70–130)
GLUCOSE BLDC GLUCOMTR-MCNC: 93 MG/DL (ref 70–130)
GLUCOSE SERPL-MCNC: 109 MG/DL (ref 65–99)
HCO3 BLDA-SCNC: 33 MMOL/L (ref 20–26)
HCT VFR BLD AUTO: 27.7 % (ref 37.5–51)
HGB BLD-MCNC: 8.6 G/DL (ref 13–17.7)
INHALED O2 CONCENTRATION: 30 %
LYMPHOCYTES # BLD MANUAL: 0.92 10*3/MM3 (ref 0.7–3.1)
LYMPHOCYTES NFR BLD MANUAL: 6.1 % (ref 5–12)
Lab: ABNORMAL
MACROCYTES BLD QL SMEAR: ABNORMAL
MAGNESIUM SERPL-MCNC: 1.8 MG/DL (ref 1.6–2.4)
MCH RBC QN AUTO: 31.2 PG (ref 26.6–33)
MCHC RBC AUTO-ENTMCNC: 31 G/DL (ref 31.5–35.7)
MCV RBC AUTO: 100.4 FL (ref 79–97)
METAMYELOCYTES NFR BLD MANUAL: 1 % (ref 0–0)
MODALITY: ABNORMAL
MONOCYTES # BLD: 0.51 10*3/MM3 (ref 0.1–0.9)
MYELOCYTES NFR BLD MANUAL: 3 % (ref 0–0)
NEUTROPHILS # BLD AUTO: 6.36 10*3/MM3 (ref 1.7–7)
NEUTROPHILS NFR BLD MANUAL: 73.7 % (ref 42.7–76)
NEUTS BAND NFR BLD MANUAL: 3 % (ref 0–5)
NRBC BLD AUTO-RTO: 0.4 /100 WBC (ref 0–0.2)
PCO2 BLDA: 46.1 MM HG (ref 35–45)
PCO2 TEMP ADJ BLD: 46.1 MM HG (ref 35–45)
PEEP RESPIRATORY: 6 CM[H2O]
PH BLDA: 7.46 PH UNITS (ref 7.35–7.45)
PH, TEMP CORRECTED: 7.46 PH UNITS (ref 7.35–7.45)
PHENYTOIN SERPL-MCNC: 8.3 MCG/ML (ref 10–20)
PLAT MORPH BLD: NORMAL
PLATELET # BLD AUTO: 338 10*3/MM3 (ref 140–450)
PMV BLD AUTO: 9.2 FL (ref 6–12)
PO2 BLDA: 78.6 MM HG (ref 83–108)
PO2 TEMP ADJ BLD: 78.6 MM HG (ref 83–108)
POIKILOCYTOSIS BLD QL SMEAR: ABNORMAL
POLYCHROMASIA BLD QL SMEAR: ABNORMAL
POTASSIUM SERPL-SCNC: 4.6 MMOL/L (ref 3.5–5.2)
PROT SERPL-MCNC: 5.5 G/DL (ref 6–8.5)
RBC # BLD AUTO: 2.76 10*6/MM3 (ref 4.14–5.8)
SAO2 % BLDCOA: 96.8 % (ref 94–99)
SET MECH RESP RATE: 16
SODIUM SERPL-SCNC: 136 MMOL/L (ref 136–145)
VARIANT LYMPHS NFR BLD MANUAL: 11.1 % (ref 19.6–45.3)
VENTILATOR MODE: AC
VT ON VENT VENT: 450 ML
WBC MORPH BLD: NORMAL
WBC NRBC COR # BLD: 8.29 10*3/MM3 (ref 3.4–10.8)

## 2022-06-03 PROCEDURE — 70450 CT HEAD/BRAIN W/O DYE: CPT

## 2022-06-03 PROCEDURE — 25010000002 CEFAZOLIN PER 500 MG: Performed by: NURSE ANESTHETIST, CERTIFIED REGISTERED

## 2022-06-03 PROCEDURE — 25010000002 MORPHINE SULFATE (PF) 2 MG/ML SOLUTION: Performed by: OTOLARYNGOLOGY

## 2022-06-03 PROCEDURE — 94003 VENT MGMT INPAT SUBQ DAY: CPT

## 2022-06-03 PROCEDURE — 86140 C-REACTIVE PROTEIN: CPT | Performed by: NURSE PRACTITIONER

## 2022-06-03 PROCEDURE — 25010000002 HYDRALAZINE PER 20 MG: Performed by: OTOLARYNGOLOGY

## 2022-06-03 PROCEDURE — 85007 BL SMEAR W/DIFF WBC COUNT: CPT | Performed by: OTOLARYNGOLOGY

## 2022-06-03 PROCEDURE — 25010000002 LEVETIRACETAM IN NACL 0.82% 500 MG/100ML SOLUTION: Performed by: NURSE PRACTITIONER

## 2022-06-03 PROCEDURE — 25010000002 FOSPHENYTOIN 500 MG PE/10ML SOLUTION 10 ML VIAL: Performed by: NURSE PRACTITIONER

## 2022-06-03 PROCEDURE — 94799 UNLISTED PULMONARY SVC/PX: CPT

## 2022-06-03 PROCEDURE — 99233 SBSQ HOSP IP/OBS HIGH 50: CPT | Performed by: INTERNAL MEDICINE

## 2022-06-03 PROCEDURE — 25010000002 LORAZEPAM PER 2 MG: Performed by: PHYSICIAN ASSISTANT

## 2022-06-03 PROCEDURE — 99231 SBSQ HOSP IP/OBS SF/LOW 25: CPT | Performed by: SURGERY

## 2022-06-03 PROCEDURE — 25010000002 PROPOFOL 10 MG/ML EMULSION: Performed by: NURSE PRACTITIONER

## 2022-06-03 PROCEDURE — 62223 ESTABLISH BRAIN CAVITY SHUNT: CPT | Performed by: NEUROLOGICAL SURGERY

## 2022-06-03 PROCEDURE — 25010000002 PROPOFOL 10 MG/ML EMULSION: Performed by: OTOLARYNGOLOGY

## 2022-06-03 PROCEDURE — 25010000002 LEVETIRACETAM IN NACL 0.82% 500 MG/100ML SOLUTION: Performed by: OTOLARYNGOLOGY

## 2022-06-03 PROCEDURE — 82962 GLUCOSE BLOOD TEST: CPT

## 2022-06-03 PROCEDURE — 80185 ASSAY OF PHENYTOIN TOTAL: CPT | Performed by: OTOLARYNGOLOGY

## 2022-06-03 PROCEDURE — 99233 SBSQ HOSP IP/OBS HIGH 50: CPT | Performed by: PSYCHIATRY & NEUROLOGY

## 2022-06-03 PROCEDURE — 25010000002 CEFAZOLIN PER 500 MG: Performed by: NURSE PRACTITIONER

## 2022-06-03 PROCEDURE — 61781 SCAN PROC CRANIAL INTRA: CPT | Performed by: NEUROLOGICAL SURGERY

## 2022-06-03 PROCEDURE — 63710000001 INSULIN DETEMIR PER 5 UNITS: Performed by: NURSE PRACTITIONER

## 2022-06-03 PROCEDURE — 36600 WITHDRAWAL OF ARTERIAL BLOOD: CPT

## 2022-06-03 PROCEDURE — 83735 ASSAY OF MAGNESIUM: CPT | Performed by: PHYSICIAN ASSISTANT

## 2022-06-03 PROCEDURE — 00160J6 BYPASS CEREBRAL VENTRICLE TO PERITONEAL CAVITY WITH SYNTHETIC SUBSTITUTE, OPEN APPROACH: ICD-10-PCS | Performed by: NEUROLOGICAL SURGERY

## 2022-06-03 PROCEDURE — 85025 COMPLETE CBC W/AUTO DIFF WBC: CPT | Performed by: OTOLARYNGOLOGY

## 2022-06-03 PROCEDURE — 99024 POSTOP FOLLOW-UP VISIT: CPT | Performed by: NURSE PRACTITIONER

## 2022-06-03 PROCEDURE — 71045 X-RAY EXAM CHEST 1 VIEW: CPT

## 2022-06-03 PROCEDURE — 82803 BLOOD GASES ANY COMBINATION: CPT

## 2022-06-03 PROCEDURE — C1729 CATH, DRAINAGE: HCPCS | Performed by: NEUROLOGICAL SURGERY

## 2022-06-03 PROCEDURE — 25010000002 FOSPHENYTOIN 500 MG PE/10ML SOLUTION 10 ML VIAL: Performed by: OTOLARYNGOLOGY

## 2022-06-03 PROCEDURE — 94761 N-INVAS EAR/PLS OXIMETRY MLT: CPT

## 2022-06-03 PROCEDURE — C1889 IMPLANT/INSERT DEVICE, NOC: HCPCS | Performed by: NEUROLOGICAL SURGERY

## 2022-06-03 PROCEDURE — 25010000002 HEPARIN (PORCINE) PER 1000 UNITS: Performed by: NURSE PRACTITIONER

## 2022-06-03 PROCEDURE — 99233 SBSQ HOSP IP/OBS HIGH 50: CPT | Performed by: NURSE PRACTITIONER

## 2022-06-03 PROCEDURE — 94664 DEMO&/EVAL PT USE INHALER: CPT

## 2022-06-03 PROCEDURE — 80053 COMPREHEN METABOLIC PANEL: CPT | Performed by: OTOLARYNGOLOGY

## 2022-06-03 DEVICE — VALVE 42866 FP-STRATA 2 REGULAR
Type: IMPLANTABLE DEVICE | Site: BRAIN | Status: FUNCTIONAL
Brand: STRATA®

## 2022-06-03 DEVICE — HEMOST ABS SURGIFOAM SZ100 8X12 10MM: Type: IMPLANTABLE DEVICE | Site: BRAIN | Status: FUNCTIONAL

## 2022-06-03 DEVICE — CATHETER 95001 KIT ANTIMICROBIAL
Type: IMPLANTABLE DEVICE | Site: BRAIN | Status: FUNCTIONAL
Brand: ARES™

## 2022-06-03 RX ORDER — CEFAZOLIN SODIUM 1 G/3ML
INJECTION, POWDER, FOR SOLUTION INTRAMUSCULAR; INTRAVENOUS AS NEEDED
Status: DISCONTINUED | OUTPATIENT
Start: 2022-06-03 | End: 2022-06-03 | Stop reason: SURG

## 2022-06-03 RX ORDER — LORAZEPAM 2 MG/ML
2 INJECTION INTRAMUSCULAR ONCE
Status: COMPLETED | OUTPATIENT
Start: 2022-06-03 | End: 2022-06-03

## 2022-06-03 RX ORDER — VECURONIUM BROMIDE 1 MG/ML
INJECTION, POWDER, LYOPHILIZED, FOR SOLUTION INTRAVENOUS AS NEEDED
Status: DISCONTINUED | OUTPATIENT
Start: 2022-06-03 | End: 2022-06-03 | Stop reason: SURG

## 2022-06-03 RX ORDER — BUPIVACAINE HCL/0.9 % NACL/PF 0.1 %
2 PLASTIC BAG, INJECTION (ML) EPIDURAL EVERY 8 HOURS
Status: COMPLETED | OUTPATIENT
Start: 2022-06-03 | End: 2022-06-04

## 2022-06-03 RX ORDER — MAGNESIUM HYDROXIDE 1200 MG/15ML
LIQUID ORAL AS NEEDED
Status: DISCONTINUED | OUTPATIENT
Start: 2022-06-03 | End: 2022-06-03 | Stop reason: HOSPADM

## 2022-06-03 RX ORDER — BUPIVACAINE HYDROCHLORIDE AND EPINEPHRINE 2.5; 5 MG/ML; UG/ML
INJECTION, SOLUTION INFILTRATION; PERINEURAL AS NEEDED
Status: DISCONTINUED | OUTPATIENT
Start: 2022-06-03 | End: 2022-06-03 | Stop reason: HOSPADM

## 2022-06-03 RX ORDER — ROCURONIUM BROMIDE 10 MG/ML
INJECTION, SOLUTION INTRAVENOUS AS NEEDED
Status: DISCONTINUED | OUTPATIENT
Start: 2022-06-03 | End: 2022-06-03 | Stop reason: SURG

## 2022-06-03 RX ADMIN — SODIUM CHLORIDE SOLN NEBU 3% 4 ML: 3 NEBU SOLN at 19:47

## 2022-06-03 RX ADMIN — SODIUM CHLORIDE 275 MG PE: 9 INJECTION, SOLUTION INTRAVENOUS at 22:25

## 2022-06-03 RX ADMIN — MUPIROCIN 1 APPLICATION: 20 OINTMENT TOPICAL at 20:27

## 2022-06-03 RX ADMIN — MORPHINE SULFATE 2 MG: 2 INJECTION, SOLUTION INTRAMUSCULAR; INTRAVENOUS at 07:49

## 2022-06-03 RX ADMIN — LABETALOL HYDROCHLORIDE 10 MG: 5 INJECTION INTRAVENOUS at 13:10

## 2022-06-03 RX ADMIN — PROPOFOL 20 MCG/KG/MIN: 10 INJECTION, EMULSION INTRAVENOUS at 12:02

## 2022-06-03 RX ADMIN — SODIUM CHLORIDE 275 MG PE: 9 INJECTION, SOLUTION INTRAVENOUS at 14:29

## 2022-06-03 RX ADMIN — VECURONIUM BROMIDE 10 MG: 1 INJECTION, POWDER, LYOPHILIZED, FOR SOLUTION INTRAVENOUS at 16:53

## 2022-06-03 RX ADMIN — LEVETIRACETAM 500 MG: 5 INJECTION INTRAVASCULAR at 08:01

## 2022-06-03 RX ADMIN — LACOSAMIDE 200 MG: 10 INJECTION, SOLUTION INTRAVENOUS at 20:25

## 2022-06-03 RX ADMIN — ROCURONIUM BROMIDE 50 MG: 10 SOLUTION INTRAVENOUS at 15:05

## 2022-06-03 RX ADMIN — HEPARIN SODIUM 5000 UNITS: 5000 INJECTION INTRAVENOUS; SUBCUTANEOUS at 20:25

## 2022-06-03 RX ADMIN — CEFAZOLIN SODIUM 2 G: 10 INJECTION, POWDER, FOR SOLUTION INTRAVENOUS at 23:01

## 2022-06-03 RX ADMIN — VECURONIUM BROMIDE 10 MG: 1 INJECTION, POWDER, LYOPHILIZED, FOR SOLUTION INTRAVENOUS at 15:30

## 2022-06-03 RX ADMIN — ALBUTEROL SULFATE 2.5 MG: 2.5 SOLUTION RESPIRATORY (INHALATION) at 23:27

## 2022-06-03 RX ADMIN — SODIUM CHLORIDE SOLN NEBU 3% 4 ML: 3 NEBU SOLN at 07:00

## 2022-06-03 RX ADMIN — HYDRALAZINE HYDROCHLORIDE 10 MG: 20 INJECTION INTRAMUSCULAR; INTRAVENOUS at 10:18

## 2022-06-03 RX ADMIN — ALBUTEROL SULFATE 2.5 MG: 2.5 SOLUTION RESPIRATORY (INHALATION) at 06:59

## 2022-06-03 RX ADMIN — PANTOPRAZOLE SODIUM 40 MG: 40 INJECTION, POWDER, FOR SOLUTION INTRAVENOUS at 06:09

## 2022-06-03 RX ADMIN — MORPHINE SULFATE 2 MG: 2 INJECTION, SOLUTION INTRAMUSCULAR; INTRAVENOUS at 13:09

## 2022-06-03 RX ADMIN — LEVETIRACETAM 500 MG: 5 INJECTION INTRAVASCULAR at 20:25

## 2022-06-03 RX ADMIN — CHLORHEXIDINE GLUCONATE 15 ML: 1.2 RINSE ORAL at 20:25

## 2022-06-03 RX ADMIN — SODIUM CHLORIDE 275 MG PE: 9 INJECTION, SOLUTION INTRAVENOUS at 05:40

## 2022-06-03 RX ADMIN — PROPOFOL 50 MCG/KG/MIN: 10 INJECTION, EMULSION INTRAVENOUS at 22:25

## 2022-06-03 RX ADMIN — CEFAZOLIN 2 G: 330 INJECTION, POWDER, FOR SOLUTION INTRAMUSCULAR; INTRAVENOUS at 15:30

## 2022-06-03 RX ADMIN — PROPOFOL 20 MCG/KG/MIN: 10 INJECTION, EMULSION INTRAVENOUS at 04:00

## 2022-06-03 RX ADMIN — MUPIROCIN 1 APPLICATION: 20 OINTMENT TOPICAL at 08:01

## 2022-06-03 RX ADMIN — CHLORHEXIDINE GLUCONATE 15 ML: 1.2 RINSE ORAL at 08:01

## 2022-06-03 RX ADMIN — LACOSAMIDE 200 MG: 10 INJECTION, SOLUTION INTRAVENOUS at 08:01

## 2022-06-03 RX ADMIN — INSULIN DETEMIR 20 UNITS: 100 INJECTION, SOLUTION SUBCUTANEOUS at 20:25

## 2022-06-03 RX ADMIN — PROPOFOL 50 MCG/KG/MIN: 10 INJECTION, EMULSION INTRAVENOUS at 20:00

## 2022-06-03 RX ADMIN — LORAZEPAM 2 MG: 2 INJECTION INTRAMUSCULAR; INTRAVENOUS at 18:44

## 2022-06-03 RX ADMIN — BISACODYL 10 MG: 10 SUPPOSITORY RECTAL at 07:49

## 2022-06-03 RX ADMIN — MORPHINE SULFATE 2 MG: 2 INJECTION, SOLUTION INTRAMUSCULAR; INTRAVENOUS at 11:03

## 2022-06-03 NOTE — PROGRESS NOTES
"Patient name: Hai Hernandez  Patient : 1945  VISIT # [id number]  MR #[id number]    Procedure:Procedure(s):  CRANIOTOMY FOR TUMOR STERIOTACTIC WITH BRAIN LAB, right  Procedure Date:5/10/2022  POD:22 Days Post-Op    Subjective   Chief complaint: Shortness of breath    Patient remains mechanically ventilated to trach, FiO2 30%, PEEP 8 with O2 sat 94%.  CT guided placement of 2 left sided pleural catheters was placed by radiology yesterday.   No overnight events.  Trach and PEG yesterday.        Objective     Visit Vitals  /77   Pulse 59   Temp 97.8 °F (36.6 °C) (Axillary)   Resp 23   Ht 182.9 cm (72\")   Wt 105 kg (231 lb 8 oz)   SpO2 97%   BMI 31.40 kg/m²       Intake/Output Summary (Last 24 hours) at 6/3/2022 0918  Last data filed at 6/3/2022 0800  Gross per 24 hour   Intake 652 ml   Output 1040 ml   Net -388 ml     Left chest tube 1: 5 ml/24 hours, serous, no air leak  Left chest tube 2: 5 ml/24 hours, serous, no air leak  Left pleural drain 1: 50 ml/24 hours, serous  Left pleural drain 2: 240 ml24 hours, serous    Lab:     CBC:  Results from last 7 days   Lab Units 22  0430 2228 22  0139   WBC 10*3/mm3 8.29 11.86* 11.39*   HEMATOCRIT % 27.7* 27.9* 23.6*   PLATELETS 10*3/mm3 338 366 306          BMP:  Results from last 7 days   Lab Units 22  0430 22  0528 22  0139   SODIUM mmol/L 136 137 138   POTASSIUM mmol/L 4.6 4.5 4.1   CHLORIDE mmol/L 100 101 101   CO2 mmol/L 29.0 28.0 32.0*   GLUCOSE mg/dL 109* 68 157*   BUN mg/dL 21 28* 31*   CREATININE mg/dL 0.49* 0.50* 0.62*          COAG:  Results from last 7 days   Lab Units 22  1755   INR  1.10*       IMAGES:       Imaging Results (Last 24 Hours)     Procedure Component Value Units Date/Time    XR Chest 1 View [[id number]] Collected: 2238     Updated: 22    Narrative:      EXAM/TECHNIQUE: XR CHEST 1 VW-     INDICATION: On vent; R13.10-Dysphagia, unspecified; Z01.818-Encounter  for other " preprocedural examination; D32.9-Benign neoplasm of meninges,  unspecified; Z74.09-Other reduced mobility; Z78.9-Other specified health  status; G40.901-Epilepsy, unspecified, not intractable, with status  epilepticus; J96.01-Acute respiratory failure with hypoxia     COMPARISON: Prior day     FINDINGS:     2 pigtail catheter drainage catheters have been placed in the medial  posterior chest. Lateral large caliber chest tubes remain stable in  position. No significant change in bibasilar parenchymal and pleural  opacities. No visible pneumothorax. Tracheostomy tube is stable in  position. Cardiac silhouette is stable.       Impression:         Interval placement of 2 pigtail drainage catheters in the medial LEFT  posterior chest. Otherwise, no change in appearance the chest.  This report was finalized on 06/03/2022 07:40 by Dr. Aristides Amor MD.    CT Guided Abscess Drain Lung [[id number]] Collected: 06/02/22 1506     Updated: 06/02/22 1512    Narrative:      EXAMINATION: CT GUIDED ABSCESS DRAIN LUNG-      6/2/2022 9:20 AM CDT     HISTORY: left loculated pleural effusion.  Dr Ortega has discussed with  Dr. Whelan; R13.10-Dysphagia, unspecified; Z01.818-Encounter for other  preprocedural examination; D32.9-Benign neoplasm of meninges,  unspecified; Z74.09-Other reduced mobility; Z78.9-Other specified health  status; G40.901-Epilepsy, unspecified, not intractable, with status  epilepticus; J96.01-Acute respiratory failure with hypoxia     In order to have a CT radiation dose as low as reasonably achievable  Automated Exposure Control was utilized for adjustment of the mA and/or  KV according to patient size.     DLP in mGycm= 3093.     CT-guided placement of 2 separate 8 [place] pigtail catheter drains  within loculated left pleural fluid collections.     An appropriate time out was performed with all present staff members to  ensure correct patient and correct site and procedure.  The procedure was discussed with the  patient including risks, benefits,  and alternatives.    Consent was given.     Patient is intubated and on mechanical ventilation.  ICU nurse and respiratory staff present throughout the procedure.     Right lateral positioning.  Noncontrast CT imaging used to localize the target pleural fluid  collections.     Routine prep and local anesthesia.     With CT guidance an 8 [place] pigtail drain catheter was placed into each  of the 2 loculated pleural fluid collections.     Free flow of thin clear to yellow fluid was noted.     40 to 50 cc of fluid was removed from each collection.     Each catheter was placed to gravity bag drainage.     Blood loss = less than 1 cc.     No complications.     Summary:  1. CT-guided placement of 2 left-sided pleural drainage catheters.  2. No complications.                                         This report was finalized on 06/02/2022 15:09 by Dr. Tomer Whelan MD.        CXR: Chest tubes in stable position.  Left lung remains expanded.  Left lower lobe atelectasis.  Small left pleural effusion.    Physical Exam:  General: Mechanical ventilation to trach.    Cardiovascular: Regular rate and rhythm without murmur, rubs, or gallops.    Pulmonary: Coarse mechanical breath sounds bilaterally without wheezing, rubs, or rales.  Chest: Left chest tubes x 4 to 20 cm suction. No air leak. Fluid is serosanguineous.   Abdomen: Soft, nondistended, and nontender.  Extremities: Warm, moves all extremities.  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)    Status epilepticus (HCC)    Essential hypertension    Type 2 diabetes mellitus with hyperglycemia, without long-term current use of insulin (HCC)    Hypothyroidism (acquired)    Acute respiratory failure (secondary to status epilepticus)    Thrombocytopenia (HCC)    Cerebral parenchymal hemorrhage (HCC)    PAF (paroxysmal atrial  fibrillation) (HCC)    Fever    Loculated pleural effusion        Plan:  Posterior catheters x2 have been placed to dry suction at 20  Continue large bore chest tube suction to 20  Daily chest x-ray  We will continue to follow      MARIO Romo  06/03/22  09:18 CDT

## 2022-06-03 NOTE — PROGRESS NOTES
Pt transported to CT scan per transport vent. AMbu bag on vent. In sharee ETCO2 reading of 36mmHG.Pt trach in good postion. Pt transported back to ICU10 per transport vent. In line ETCO2 reading was 39 mmHG. Trach noted in same position. Pt placed back on vent, same settings.

## 2022-06-03 NOTE — BRIEF OP NOTE
VENTRICULAR PERITONEAL SHUNT INSERTION WITH BRAIN LAB  Progress Note    Hai Hernandez  6/3/2022    Pre-op Diagnosis:   Communicating hydrocephalus (HCC) [G91.0]       Post-Op Diagnosis Codes:     * Communicating hydrocephalus (HCC) [G91.0]    Procedure/CPT® Codes:        Procedure(s):  VENTRICULAR PERITONEAL SHUNT INSERTION WITH BRAIN LAB    Surgeon(s):  Martell Downs MD    Anesthesia: General    Staff:   Circulator: Leatha Montero RN; Carey Diaz RN  Scrub Person: Ayah Lopez; Yayo Saleh         Estimated Blood Loss: 30 cc    Urine Voided: * No values recorded between 6/3/2022  2:51 PM and 6/3/2022  4:39 PM *    Specimens:                None          Drains:   Chest Tube 1 Left Midaxillary (Active)   Function -20 cm H2O 06/03/22 1200   Air Leak/Fluctuation dependent drainage cleared 06/03/22 1200   Patency Intervention Tip/tilt 06/03/22 1200   Drainage Description Serous;Yellow 06/03/22 1200   Dressing Type Gauze 06/03/22 1200   Dressing Status Intact;Old drainage 06/03/22 1200   Dressing Intervention Dressing changed 06/01/22 0800   Site Assessment Not assessed 06/03/22 1200   Surrounding Skin Unable to view 06/03/22 1200   Securement tubing anchored to body distal to insertion site with tape 06/03/22 0800   Left Subcutaneous Emphysema none present 06/03/22 1200   Right Subcutaneous Emphysema none present 06/03/22 1200   Safety all connections secured;suction checked;2 rubber-tipped hemostats with patient 06/03/22 0800   Output (mL) 0 mL 06/03/22 1202       Chest Tube Left Midaxillary (Active)   Function -20 cm H2O 06/03/22 1200   Air Leak/Fluctuation dependent drainage cleared 06/03/22 1200   Patency Intervention Tip/tilt 06/03/22 1200   Drainage Description Serosanguineous 06/03/22 1200   Dressing Type Gauze 06/03/22 1200   Dressing Status Intact;Old drainage 06/03/22 1200   Dressing Intervention Dressing changed 06/01/22 0400   Site Assessment Not assessed 06/03/22 1200    Surrounding Skin Unable to view 06/03/22 1200   Securement tubing anchored to body distal to insertion site with tape 06/03/22 0800   Left Subcutaneous Emphysema none present 06/03/22 1200   Right Subcutaneous Emphysema none present 06/03/22 1200   Safety all connections secured;suction checked;2 rubber-tipped hemostats with patient 06/03/22 0800   Output (mL) 0 mL 06/03/22 1202       Chest Tube Posterior Midaxillary (Active)   Function -40 cm H2O 06/03/22 1200   Air Leak/Fluctuation dependent drainage cleared 06/03/22 1200   Patency Intervention Tip/tilt 06/03/22 1200   Drainage Description Yellow;Serous 06/03/22 1200   Dressing Type Transparent;Antimicrobial dressing/disc 06/03/22 1200   Dressing Status Clean;Intact;Dry 06/03/22 1200   Site Assessment Not assessed 06/03/22 1200   Surrounding Skin Unable to view 06/03/22 1200   Securement tubing anchored to body distal to insertion site with tape 06/03/22 0800   Left Subcutaneous Emphysema none present 06/03/22 1200   Right Subcutaneous Emphysema none present 06/03/22 1200   Safety all connections secured;2 rubber-tipped hemostats with patient 06/03/22 0800   Output (mL) 0 mL 06/03/22 1202       Chest Tube Posterior Midaxillary (Active)   Function -40 cm H2O 06/03/22 1200   Air Leak/Fluctuation dependent drainage cleared 06/03/22 1200   Patency Intervention Tip/tilt 06/03/22 1200   Drainage Description Serous;Yellow;Clots;Other (Comment) 06/03/22 1200   Dressing Type Transparent;Antimicrobial dressing/disc 06/03/22 1200   Dressing Status Clean;Dry;Intact 06/03/22 1200   Site Assessment Not assessed 06/03/22 1200   Surrounding Skin Unable to view 06/03/22 1200   Securement tubing anchored to body distal to insertion site with tape 06/03/22 0800   Left Subcutaneous Emphysema none present 06/03/22 1200   Right Subcutaneous Emphysema none present 06/03/22 1200   Safety all connections secured;2 rubber-tipped hemostats with patient 06/03/22 0800   Output (mL) 15 mL  06/03/22 1202       Gastrostomy/Enterostomy Percutaneous endoscopic gastrostomy (PEG) 1 20 Fr. (Active)   Surrounding Skin Unable to view 06/03/22 1200   Securement taped to abdomen;anchored to abdomen with adhesive device 06/03/22 1200   Drain Status Clamped 06/03/22 1200   Drainage Appearance None 06/03/22 1200   Site Description Unable to view 06/03/22 1200   Dressing Status Clean;Dry;Intact 06/03/22 1200   Dressing Type Split gauze;Dry dressing 06/03/22 1200       Lumbar Drain (Active)   Drain Status Clamped 06/03/22 1200   CSF Color Yellow 06/03/22 1200   Site Description Unable to view 06/03/22 1200   Dressing Status Dry;Intact;Old drainage 06/03/22 1200   CSF Output (mL) 20 mL 06/03/22 1200       Continuous Bladder Irrigation Triple-lumen 20 Fr (Active)   Daily Indications Required activity restriction from trauma, surgery, (e.g. unstable spine, fracture, hemodynamics) 06/03/22 1200   Site Assessment Clean;Skin intact 06/03/22 1200   Collection Container Standard drainage bag 06/03/22 1200   Securement Method Securing device 06/03/22 1200   Catheter care complete Yes 06/03/22 0800   CBI Irrigation Intake (mL) 0 mL 06/03/22 1202   CBI [place] Output (mL) 400 mL 06/03/22 1202   CBI Net Output (mL) 400 mL 06/03/22 1202       [REMOVED] NG/OG Tube Orogastric Center mouth (Removed)   Site Assessment Clean;Dry;Intact 05/16/22 0000   Securement secured to ETT 05/16/22 0000   Secured at (cm) 65 05/16/22 0000   NG/OG Site Interventions Site assessed 05/16/22 0000   Status Clamped 05/16/22 0000   Surrounding Skin Dry;Intact 05/16/22 0000       [REMOVED] NG/OG Tube Nasogastric Right nostril (Removed)   Placement Verification X-ray 06/02/22 0800   Site Assessment Clean;Dry;Intact 06/02/22 0800   Securement taped to nostril center 06/02/22 0800   Secured at (cm) 65 06/02/22 0800   NG/OG Site Interventions Site assessed 06/02/22 0400   Dressing Intervention New dressing 06/02/22 0400   Status Clamped 06/02/22 0800    Surrounding Skin Dry;Intact 06/02/22 0800   Tube Feeding Frequency Continuous 06/01/22 2031   Tube Feeding Product Peptamen P 05/31/22 2000   Tube Feeding Strength Full strength 06/01/22 2031   Tube Feeding Method Continuous per pump 06/01/22 2031   Tube Feeding Rate (mL) 0 mL 06/02/22 0000   Tube Feeding Bag Changed No 06/01/22 2031   Tube Feeding Residual (mL) 0 mL 06/02/22 0000   Tube Feeding Residual Returned (mL) 0 mL 06/02/22 0000   Feeding Tube Flushed With Sterile water 06/01/22 2031   Flush/ Irrigation Intake (mL) 125 06/02/22 0021   Tube Feeding Intake (mL) 181 06/02/22 0021   Intake (mL) 150 mL 06/01/22 1517   Output (mL) 0 mL 05/25/22 1600       [REMOVED] Urethral Catheter Non-latex;Silicone 16 Fr. (Removed)   Daily Indications Required activity restriction from trauma, surgery, (e.g. unstable spine, fracture, hemodynamics) 05/11/22 0400   Site Assessment Clean;Skin intact 05/11/22 0400   Collection Container Standard drainage bag 05/11/22 0400   Securement Method Securing device 05/11/22 0400   Catheter care complete Yes 05/10/22 2000   Output (mL) 675 mL 05/11/22 0423       [REMOVED] Urethral Catheter Non-latex 16 Fr. (Removed)   Daily Indications Hourly Output in Critical Unstable Patient requiring Frequent Intervention (hemodynamics, titration or life supportive therapy) 05/23/22 1600   Site Assessment Clean;Skin intact 05/23/22 1200   Collection Container Standard drainage bag 05/23/22 1600   Securement Method Securing device 05/23/22 1600   Catheter care complete Yes 05/23/22 1200   Output (mL) 125 mL 05/23/22 1308       [REMOVED] Urethral Catheter Silicone 16 Fr. (Removed)   Daily Indications Required activity restriction from trauma, surgery, (e.g. unstable spine, fracture, hemodynamics) 06/02/22 0400   Site Assessment Clean;Skin intact 06/02/22 0400   Collection Container Standard drainage bag 06/02/22 0400   Securement Method Securing device 06/02/22 0400   Catheter care complete Yes  06/02/22 0400   Irrigation/Instillation Indication patency maintenance 05/31/22 1600   Output (mL) 150 mL 06/02/22 0400       [REMOVED] External Urinary Catheter (Removed)   Site Assessment Clean;Skin intact 05/15/22 0030   Application/Removal skin care provided 05/15/22 0030   Collection Container Standard drainage bag 05/15/22 0030   Securement Method Securing device 05/15/22 0030   Output (mL) 300 mL 05/14/22 1800       Findings: Good flow of CSF        Complications: None          Martell Downs MD     Date: 6/3/2022  Time: 17:07 CDT

## 2022-06-03 NOTE — PLAN OF CARE
Goal Outcome Evaluation:  Plan of Care Reviewed With: patient      Progress: no change  Outcome Evaluation: Ntn follow up. Pt remain sedated on vent. Pt s/p peg and  shunt placement today. Pt received 80% of goal enteral ntn over two days. TF order discontinued today. Recommend when ready to use peg tube order: Peptamen Intesne VHP at 45 ml/hr x 8 hrs, increasing by 10 ml/hr every 4 hrs as tolerated until goal rate of 65 ml/hr. Routine flush of 40 ml/hr per pump. Cont. Pro source liquid protein modular BID. Cont nutrisource fiber QID. Cont to follow for plan of care.

## 2022-06-03 NOTE — SIGNIFICANT NOTE
06/03/22 0841   Readings   Plateau Pressure (cm H2O) 15 cm H2O   Driving Pressure (cm H2O) 10.6 cm H2O   Dynamic Compliance (L/cm H2O) 44 L/cm H2O

## 2022-06-03 NOTE — PROGRESS NOTES
Neurology Progress Note      Date of admission: 5/10/2022  4:49 AM  Date of visit: 6/3/2022    Chief Complaint:  Seizures    Subjective     Subjective:    ENT placed tracheostomy yesterday and is fully functioning today.  His new chest tube is in place and is draining.  He continues to have a CSF drain.  No reports of seizure activity.  Vital signs remained stable.  PEG tube in place as well currently with no feeds.    Medications:  Current Facility-Administered Medications   Medication Dose Route Frequency Provider Last Rate Last Admin   • acetaminophen (TYLENOL) tablet 650 mg  650 mg Oral Q4H PRN Geovany Davidson MD   650 mg at 05/31/22 2002    Or   • acetaminophen (TYLENOL) suppository 650 mg  650 mg Rectal Q4H PRN Geovany Davidson MD   650 mg at 05/23/22 0016   • albuterol (PROVENTIL) nebulizer solution 0.083% 2.5 mg/3mL  2.5 mg Nebulization Q8H - RT Geovany Davidson MD   2.5 mg at 06/03/22 0659   • alteplase (CATHFLO/ACTIVASE) injection 2 mg  2 mg Intracatheter PRN Geovany Davidson MD   New Syringe/Cartridge at 05/28/22 1330   • bisacodyl (DULCOLAX) suppository 10 mg  10 mg Rectal Daily PRN Geovany Davidson MD   10 mg at 06/03/22 0749   • butalbital-acetaminophen-caffeine (FIORICET, ESGIC) -40 MG per tablet 1 tablet  1 tablet Oral Q4H PRN Geovany Davidson MD   1 tablet at 05/22/22 1729   • carvedilol (COREG) tablet 6.25 mg  6.25 mg Nasogastric BID With Meals Geovany Davidson MD   6.25 mg at 06/01/22 1759   • chlorhexidine (PERIDEX) 0.12 % solution 15 mL  15 mL Mouth/Throat Q12H Geovany Davidson MD   15 mL at 06/03/22 0801   • dextrose (D50W) (25 g/50 mL) IV injection 25 g  25 g Intravenous Q15 Min PRN Geovany Davidson MD   25 g at 06/02/22 1759   • dextrose (GLUTOSE) oral gel 15 g  15 g Oral Q15 Min PRN Geovany Davidson MD   15 g at 05/22/22 0508   • fosphenytoin (Cerebyx) 275 mg PE in sodium chloride 0.9 % 100 mL IVPB  275 mg PE Intravenous Q8H Geovany Davidson  MD Iron   275 mg PE at 06/03/22 0540   • glucagon (human recombinant) (GLUCAGEN DIAGNOSTIC) injection 1 mg  1 mg Intramuscular Q15 Min PRN Geovany Davidson MD       • heparin (porcine) 5000 UNIT/ML injection 5,000 Units  5,000 Units Subcutaneous Q12H Geovany Davidson MD   5,000 Units at 06/02/22 2004   • Hold Medication (Anticoagulation)  1 each Does not apply Continuous PRN Geovany Davidson MD       • hydrALAZINE (APRESOLINE) injection 10 mg  10 mg Intravenous Q6H PRN Geovany Davidson MD   10 mg at 06/02/22 1933   • insulin detemir (LEVEMIR) injection 20 Units  20 Units Subcutaneous Nightly Geovany Davidson MD   20 Units at 06/01/22 2031   • insulin regular (humuLIN R,novoLIN R) injection 2-7 Units  2-7 Units Subcutaneous Q6H Geovany Davidson MD   2 Units at 06/01/22 0623   • insulin regular (humuLIN R,novoLIN R) injection 8 Units  8 Units Subcutaneous Q6H Geovany Davidson MD   8 Units at 06/01/22 0623   • ipratropium-albuterol (DUO-NEB) nebulizer solution 3 mL  3 mL Nebulization Q4H PRN Geovany Davidson MD   3 mL at 05/25/22 1007   • labetalol (NORMODYNE,TRANDATE) injection 10 mg  10 mg Intravenous Q6H PRN Geovany Davidson MD   10 mg at 05/27/22 0931   • lacosamide (VIMPAT) injection 200 mg  200 mg Intravenous Q12H Geovany Davidson MD   200 mg at 06/03/22 0801   • levETIRAcetam in NaCl 0.82% (KEPPRA) IVPB 500 mg  500 mg Intravenous Q12H Geovany Davidson MD   500 mg at 06/03/22 0801   • levothyroxine (SYNTHROID, LEVOTHROID) tablet 125 mcg  125 mcg Nasogastric Q AM Geovany Davidson MD   125 mcg at 06/01/22 0615   • lidocaine (XYLOCAINE) 1 % injection 10 mL  10 mL Intradermal Once Geovany Davidson MD       • liothyronine (CYTOMEL) tablet 25 mcg  25 mcg Nasogastric Daily Geovany Davidson MD   25 mcg at 06/01/22 1012   • lisinopril (PRINIVIL,ZESTRIL) tablet 20 mg  20 mg Nasogastric Q24H Geovany Davidson MD   20 mg at 06/01/22 1012   • Morphine sulfate (PF)  injection 2 mg  2 mg Intravenous Q2H PRN Geovany Davidson MD   2 mg at 06/03/22 0749   • mupirocin (BACTROBAN) 2 % ointment 1 application  1 application Topical Q12H Geovany Davidson MD   1 application at 06/03/22 0801   • norepinephrine (LEVOPHED) 8 mg in 250 mL NS infusion (premix)  0.02-0.3 mcg/kg/min Intravenous Titrated Geovany Davidson MD   Held at 06/01/22 0606   • Nutrisource fiber pack 1 packet  1 packet Nasogastric 4x Daily Geovany Davidson MD   1 packet at 06/01/22 2109   • ondansetron (ZOFRAN) tablet 4 mg  4 mg Oral Q6H PRN Geovany Davidson MD        Or   • ondansetron (ZOFRAN) injection 4 mg  4 mg Intravenous Q6H PRN Geovany Davidson MD       • oxyCODONE (ROXICODONE) 5 MG/5ML solution 7.5 mg  7.5 mg Nasogastric Q4H PRN Geovany Davidson MD   7.5 mg at 05/31/22 1958   • pantoprazole (PROTONIX) injection 40 mg  40 mg Intravenous Q AM Geovany Davidson MD   40 mg at 06/03/22 0609   • polyethylene glycol (MIRALAX) packet 17 g  17 g Oral Daily Geovany Davidson MD   17 g at 06/01/22 1013   • propofol (DIPRIVAN) infusion 10 mg/mL 100 mL  5-50 mcg/kg/min Intravenous Titrated Geovany Davidson MD   Held at 06/03/22 0720   • ProSource TF oral liquid 45 mL  1 packet Nasogastric BID Geovany Davidson MD   45 mL at 06/01/22 2031   • sodium chloride 3 % nebulizer solution 4 mL  4 mL Nebulization BID - RT Geovany Davidson MD   4 mL at 06/03/22 0700       Review of Systems:   -A 14 point review of systems is unobtainable    Objective     Objective      Vital Signs  Temp:  [95 °F (35 °C)-97.8 °F (36.6 °C)] 97.8 °F (36.6 °C)  Heart Rate:  [46-59] 56  Resp:  [21-32] 23  BP: (104-171)/(40-88) 159/76  FiO2 (%):  [30 %] 30 %    Physical Exam:    HEENT:  Neck supple.  Tracheostomy in place  CVS:  Regular rate and rhythm.  No murmurs  Carotid Examination:  No bruits  Lungs:  Clear to auscultation  Abdomen:  Non-tender, Non-distended.  PEG tube in place  Extremities:  No signs of  peripheral edema    Neurologic Exam:  Propofol on at 10  Intubated and sedated  Awakens but does not follow commands.    Cranial nerves II through XII intact.  Pupils react  Eyes open with verbal stimuli  No obvious facial asymmetry  Breathes over vent  Coughs when suctioned    Motor: (strength out of 5:  1= minimal movement, 2 = movement in plane of gravity, 3 = movement against gravity, 4 = movement against some resistance, 5 = full strength)    No withdrawal to noxious stimuli  No spontaneous movement    DTR:  2+ throughout in all four extremities  upgoing toe on left    Sensory:  No withdrawal throughout    Coordination/Gait:  -No active tremors     Results Review:    I reviewed the patient's new clinical results.    Lab Results (last 24 hours)     Procedure Component Value Units Date/Time    POC Glucose Once [[id number]]  (Normal) Collected: 06/03/22 0540    Specimen: Blood Updated: 06/03/22 0551     Glucose 111 mg/dL      Comment: : ABBEY Ayala ID: [id number]       Manual Differential [[id number]]  (Abnormal) Collected: 06/03/22 0430    Specimen: Blood Updated: 06/03/22 0512     Neutrophil % 73.7 %      Lymphocyte % 11.1 %      Monocyte % 6.1 %      Eosinophil % 2.0 %      Bands %  3.0 %      Metamyelocyte % 1.0 %      Myelocyte % 3.0 %      Neutrophils Absolute 6.36 10*3/mm3      Lymphocytes Absolute 0.92 10*3/mm3      Monocytes Absolute 0.51 10*3/mm3      Eosinophils Absolute 0.17 10*3/mm3      Anisocytosis Slight/1+     Basophilic Stippling Slight/1+     Macrocytes Slight/1+     Poikilocytes Slight/1+     Polychromasia Slight/1+     WBC Morphology Normal     Platelet Morphology Normal    Comprehensive Metabolic Panel [[id number]]  (Abnormal) Collected: 06/03/22 0430    Specimen: Blood Updated: 06/03/22 0504     Glucose 109 mg/dL      BUN 21 mg/dL      Creatinine 0.49 mg/dL      Sodium 136 mmol/L      Potassium 4.6 mmol/L      Chloride 100 mmol/L      CO2 29.0 mmol/L      Calcium 8.1  mg/dL      Total Protein 5.5 g/dL      Albumin 1.60 g/dL      ALT (SGPT) 28 U/L      AST (SGOT) 30 U/L      Alkaline Phosphatase 174 U/L      Total Bilirubin 0.4 mg/dL      Globulin 3.9 gm/dL      A/G Ratio 0.4 g/dL      BUN/Creatinine Ratio 42.9     Anion Gap 7.0 mmol/L      eGFR 105.7 mL/min/1.73      Comment: National Kidney Foundation and American Society of Nephrology (ASN) Task Force recommended calculation based on the Chronic Kidney Disease Epidemiology Collaboration (CKD-EPI) equation refit without adjustment for race.       Narrative:      GFR Normal >60  Chronic Kidney Disease <60  Kidney Failure <15      Phenytoin Level, Total [[id number]]  (Abnormal) Collected: 06/03/22 0430    Specimen: Blood Updated: 06/03/22 0458     Phenytoin Level 8.3 mcg/mL     CBC & Differential [[id number]]  (Abnormal) Collected: 06/03/22 0430    Specimen: Blood Updated: 06/03/22 0447    Narrative:      The following orders were created for panel order CBC & Differential.  Procedure                               Abnormality         Status                     ---------                               -----------         ------                     CBC Auto Differential[[id number]]        Abnormal            Final result                 Please view results for these tests on the individual orders.    CBC Auto Differential [[id number]]  (Abnormal) Collected: 06/03/22 0430    Specimen: Blood Updated: 06/03/22 0447     WBC 8.29 10*3/mm3      RBC 2.76 10*6/mm3      Hemoglobin 8.6 g/dL      Hematocrit 27.7 %      .4 fL      MCH 31.2 pg      MCHC 31.0 g/dL      RDW 14.1 %      RDW-SD 50.4 fl      MPV 9.2 fL      Platelets 338 10*3/mm3      nRBC 0.4 /100 WBC     Blood Gas, Arterial - [[id number]]  (Abnormal) Collected: 06/03/22 0412    Specimen: Arterial Blood Updated: 06/03/22 0406     Site Left Radial     Denzel's Test Positive     pH, Arterial 7.463 pH units      Comment: 83 Value above reference range        pCO2, Arterial 46.1 mm Hg       Comment: 83 Value above reference range        pO2, Arterial 78.6 mm Hg      Comment: 84 Value below reference range        HCO3, Arterial 33.0 mmol/L      Comment: 83 Value above reference range        Base Excess, Arterial 8.3 mmol/L      Comment: 83 Value above reference range        O2 Saturation, Arterial 96.8 %      Temperature 37.0 C      Barometric Pressure for Blood Gas 749 mmHg      Modality Ventilator     FIO2 30 %      Ventilator Mode AC     Set Tidal Volume 450     Set Mech Resp Rate 16.0     PEEP 6.0     Collected by [id number]     Comment: Meter: [id number]     :  [id number]        pCO2, Temperature Corrected 46.1 mm Hg      pH, Temp Corrected 7.463 pH Units      pO2, Temperature Corrected 78.6 mm Hg     POC Glucose Once [[id number]]  (Normal) Collected: 06/03/22 0020    Specimen: Blood Updated: 06/03/22 0032     Glucose 93 mg/dL      Comment: : ABBEY Ayala ID: [id number]       POC Glucose Once [[id number]]  (Normal) Collected: 06/02/22 1957    Specimen: Blood Updated: 06/02/22 2008     Glucose 95 mg/dL      Comment: : ABBEY Ayala ID: [id number]       POC Glucose Once [[id number]]  (Abnormal) Collected: 06/02/22 1756    Specimen: Blood Updated: 06/02/22 1807     Glucose 64 mg/dL      Comment: : [id number] Yeimi Martinez ID: [id number]       POC Glucose Once [[id number]]  (Abnormal) Collected: 06/02/22 1120    Specimen: Blood Updated: 06/02/22 1131     Glucose 135 mg/dL      Comment: : [id number] Yeimi PeopleseMejignesh ID: [id number]       POC Glucose Once [[id number]]  (Abnormal) Collected: 06/02/22 1052    Specimen: Blood Updated: 06/02/22 1105     Glucose 64 mg/dL      Comment: : [id number] Yeimi Martinez ID: [id number]           Imaging Results (Last 24 Hours)     Procedure Component Value Units Date/Time    XR Chest 1 View [[id number]] Collected: 06/03/22 0738     Updated: 06/03/22 0743    Narrative:       EXAM/TECHNIQUE: XR CHEST 1 VW-     INDICATION: On vent; R13.10-Dysphagia, unspecified; Z01.818-Encounter  for other preprocedural examination; D32.9-Benign neoplasm of meninges,  unspecified; Z74.09-Other reduced mobility; Z78.9-Other specified health  status; G40.901-Epilepsy, unspecified, not intractable, with status  epilepticus; J96.01-Acute respiratory failure with hypoxia     COMPARISON: Prior day     FINDINGS:     2 pigtail catheter drainage catheters have been placed in the medial  posterior chest. Lateral large caliber chest tubes remain stable in  position. No significant change in bibasilar parenchymal and pleural  opacities. No visible pneumothorax. Tracheostomy tube is stable in  position. Cardiac silhouette is stable.       Impression:         Interval placement of 2 pigtail drainage catheters in the medial LEFT  posterior chest. Otherwise, no change in appearance the chest.  This report was finalized on 06/03/2022 07:40 by Dr. Aristides Amor MD.    CT Guided Abscess Drain Lung [[id number]] Collected: 06/02/22 1506     Updated: 06/02/22 1512    Narrative:      EXAMINATION: CT GUIDED ABSCESS DRAIN LUNG-      6/2/2022 9:20 AM CDT     HISTORY: left loculated pleural effusion.  Dr Ortega has discussed with  Dr. Whelan; R13.10-Dysphagia, unspecified; Z01.818-Encounter for other  preprocedural examination; D32.9-Benign neoplasm of meninges,  unspecified; Z74.09-Other reduced mobility; Z78.9-Other specified health  status; G40.901-Epilepsy, unspecified, not intractable, with status  epilepticus; J96.01-Acute respiratory failure with hypoxia     In order to have a CT radiation dose as low as reasonably achievable  Automated Exposure Control was utilized for adjustment of the mA and/or  KV according to patient size.     DLP in mGycm= 3093.     CT-guided placement of 2 separate 8 [place] pigtail catheter drains  within loculated left pleural fluid collections.     An appropriate time out was performed with all  present staff members to  ensure correct patient and correct site and procedure.  The procedure was discussed with the patient including risks, benefits,  and alternatives.    Consent was given.     Patient is intubated and on mechanical ventilation.  ICU nurse and respiratory staff present throughout the procedure.     Right lateral positioning.  Noncontrast CT imaging used to localize the target pleural fluid  collections.     Routine prep and local anesthesia.     With CT guidance an 8 Urdu pigtail drain catheter was placed into each  of the 2 loculated pleural fluid collections.     Free flow of thin clear to yellow fluid was noted.     40 to 50 cc of fluid was removed from each collection.     Each catheter was placed to gravity bag drainage.     Blood loss = less than 1 cc.     No complications.     Summary:  1. CT-guided placement of 2 left-sided pleural drainage catheters.  2. No complications.                                         This report was finalized on 06/02/2022 15:09 by Dr. Tomer Whelan MD.        Reviewed labs.  Phenytoin level corrects to a therapeutic range  Zinc is normal at 54  Prealbumin is 12.7 which is below normal range.  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 Seizures  2.  Phenytoin level currently 8.3.  3. Hypoalbuminemia .  PEG tube in place.  Awaiting clearance for the  initiation of tube feeds  4. Anemia  5. Atrial fibrillation    Plan:  · We will hopefully be cleared to start tube feeds today as patient does show signs of malnutrition  · Fosphenytoin 275 mg every 8 hours  · Continue daily Dilantin levels  · On Vimpat 200 mg IV every 12 hours  · Keppra 500 mg every 12 hours      Donnell Freire MD  06/03/22  08:41 CDT

## 2022-06-03 NOTE — PLAN OF CARE
Patient trached on vent and sedated, will open eyes on own, does not follow commands, BUE will flex to painful stimuli, and patient will show increased RR to painful stimuli, BLE will pull away from gentle touch.  No s/s of seizure activity noted. HR remains SB, occasional ectopic beats noted O2 WNL.  Vent settings monitored and adjusted per RT.  Meds administered as seen on MAR. Adequate UOP via jansen catheter, copious sediment noted. No BM overnight, patient remains NPO w/ new PEG at this time.  Fall and safety precautions remain intact.   Oral care and turns performed Q2. 20 mls CSF drained Q2H per MD order, clear CSF noted, also to note increased amt of time to drain 20cc CSF.  Mid-ax CT maintained to dry suction, posterior CT attached to drainage bags.  Bernice gauze, hemostats at bedside.  Wound care completed during CHG bath.        Goal Outcome Evaluation:  Plan of Care Reviewed With: patient        Progress: no change

## 2022-06-03 NOTE — PROGRESS NOTES
PULMONARY AND CRITICAL CARE PROGRESS NOTE - [facility]    Patient: Hai Hernandez    1945    MR# [id number]    Acct# [id number]  06/03/22   08:33 CDT  Referring Provider: Martell Downs, *    Chief Complaint: Mechanically ventilated    Interval history: He underwent tracheostomy and PEG placement yesterday.  Propofol has been paused this morning and he will open his eyes but not follow any commands.  ANABELLE Jarvis is at bedside providing care.  He had 2 pigtail catheters placed yesterday in addition to his 2 regular chest tubes.  Both pigtail catheters are draining.  Chest x-ray is stable with the addition of the 2 additional chest tubes.  ABGs are adequate.  Trach site appears stable.  Dr. Dasilva has switched him into pressure control this morning and nursing will monitor him closely for tolerance.  No visitors present.    Meds:  albuterol, 2.5 mg, Nebulization, Q8H - RT  carvedilol, 6.25 mg, Nasogastric, BID With Meals  chlorhexidine, 15 mL, Mouth/Throat, Q12H  fosphenytoin, 275 mg PE, Intravenous, Q8H  heparin (porcine), 5,000 Units, Subcutaneous, Q12H  insulin detemir, 20 Units, Subcutaneous, Nightly  insulin regular, 2-7 Units, Subcutaneous, Q6H  insulin regular, 8 Units, Subcutaneous, Q6H  lacosamide, 200 mg, Intravenous, Q12H  levETIRAcetam, 500 mg, Intravenous, Q12H  levothyroxine, 125 mcg, Nasogastric, Q AM  lidocaine, 10 mL, Intradermal, Once  liothyronine, 25 mcg, Nasogastric, Daily  lisinopril, 20 mg, Nasogastric, Q24H  mupirocin, 1 application, Topical, Q12H  Nutrisource fiber, 1 packet, Nasogastric, 4x Daily  pantoprazole, 40 mg, Intravenous, Q AM  polyethylene glycol, 17 g, Oral, Daily  ProSource TF, 1 packet, Nasogastric, BID  sodium chloride, 4 mL, Nebulization, BID - RT      hold, 1 each  norepinephrine, 0.02-0.3 mcg/kg/min, Last Rate: Stopped (06/01/22 0606)  propofol, 5-50 mcg/kg/min, Last Rate: Stopped (06/03/22 0720)      Review of Systems:   Cannot obtain due to  mechanical ventilated state     Ventilator Settings:        Resp Rate (Set): 16  Pressure Support (cm H2O): 8 cm H20  FiO2 (%): 30 %  PEEP/CPAP (cm H2O): 6 cm H20  Minute Ventilation (L/min) (Obs): 11.5 L/min  Resp Rate (Observed) Vent: 23  I:E Ratio (Set): 1:1.7  I:E Ratio (Obs): 1:2.90  PIP Observed (cm H2O): 31 cm H2O  RSBI: 21.96  Physical Exam:  Temp:  [95 °F (35 °C)-97.8 °F (36.6 °C)] 97.8 °F (36.6 °C)  Heart Rate:  [46-59] 56  Resp:  [21-32] 23  BP: (104-171)/(40-88) 159/76  FiO2 (%):  [30 %] 30 %    Intake/Output Summary (Last 24 hours) at 6/3/2022 0833  Last data filed at 6/3/2022 0400  Gross per 24 hour   Intake 652 ml   Output 980 ml   Net -328 ml     SpO2 Percentage    06/03/22 0300 06/03/22 0400 06/03/22 0659   SpO2: 96% 96% 97%   Body mass index is 31.4 kg/m².   Physical Exam     Constitutional:       General: He is sleeping.      Appearance: He is ill-appearing. He is not toxic-appearing.      Interventions: He is sedated and intubated.   HENT:      Head: Normocephalic.      Comments: Craniotomy wound, drain draining clear fluid, ETT, NG/TF-off     Nose: Nose normal.   Eyes:      General: No scleral icterus.  Cardiovascular: Sinus bradycardia, 50s  Pulmonary:      Effort: No accessory muscle usage or respiratory distress. He is intubated.      Breath sounds: Decreased breath sounds in bases   Chest:      Chest wall: No edema.      Comments: Left sided chest tubes x2, no leak.  2 pigtail catheters in place with drainage bags.  Abdominal:      General: There is no distension.      Palpations: Abdomen is soft.   Genitourinary:     Comments: Mccrary  Musculoskeletal:      Right lower leg: Edema present.      Left lower leg: Edema present.      Comments: SCDs   Skin:     Findings: No rash.   Neurological:      Motor: No tremor or seizure activity.  Will open eyes but not following any commands with sedation paused.  Psychiatric:         Behavior: Behavior is not agitated.   Electronically signed by Gregorio  MARIO Wiggins, 6/3/2022, 08:33 CDT      Physician Substantive Portion: Medical Decision Making    Laboratory Data:  Results from last 7 days   Lab Units 06/03/22 0430 06/02/22 0528 06/01/22 0139   WBC 10*3/mm3 8.29 11.86* 11.39*   HEMOGLOBIN g/dL 8.6* 9.0* 7.3*   PLATELETS 10*3/mm3 338 366 306     Results from last 7 days   Lab Units 06/03/22  0430 06/02/22 0528 06/01/22 0139 05/31/22  1755   SODIUM mmol/L 136 137 138  --    POTASSIUM mmol/L 4.6 4.5 4.1  --    BUN mg/dL 21 28* 31*  --    CREATININE mg/dL 0.49* 0.50* 0.62*  --    INR   --   --   --  1.10*     Results from last 7 days   Lab Units 06/03/22 0412 06/02/22 0433 06/01/22 0439   PH, ARTERIAL pH units 7.463* 7.448 7.426   PCO2, ARTERIAL mm Hg 46.1* 49.5* 50.3*   PO2 ART mm Hg 78.6* 84.1 98.2   FIO2 % 30 30 30     No results found for: BLOODCX, URINECX, WOUNDCX, MRSACX, RESPCX, STOOLCX  Recent films:  CT Chest With Contrast Diagnostic    Result Date: 6/1/2022  EXAMINATION: CT CHEST W CONTRAST DIAGNOSTIC-   6/1/2022 9:21 AM CDT  HISTORY: post intrapleural tpa; R13.10-Dysphagia, unspecified; Z01.818-Encounter for other preprocedural examination; D32.9-Benign neoplasm of meninges, unspecified; Z74.09-Other reduced mobility; Z78.9-Other specified health status; G40.901-Epilepsy, unspecified, not intractable, with status epilepticus; J96.01-Acute respiratory failure with hypoxia  In order to have a CT radiation dose as low as reasonably achievable Automated Exposure Control was utilized for adjustment of the mA and/or KV according to patient size.  DLP in mGycm= 426.  Postcontrast chest CT. Axial, sagittal, and coronal sequences.  Comparison is made with May 25, 2022.  Interval placement of an upper left chest tube which has drained the upper lateral pleural fluid collection which was seen previously.  The medial and inferior fluid collection persists. Unchanged position of the lower left chest tube.  There is still focal dense consolidation of the  lower left lung.  The upper lung is better expanded and clear. There is no significant pneumothorax.  The right lung is adequately expanded and essentially clear. There is mild patchy atelectasis.  Endotracheal tube and gastric tube present. The endotracheal tube tip lies 29 mm above the hernandez.  Summary: 1. Compared with one week ago there has been placement of an upper left chest tube which has drained the upper lateral pleural fluid collection. 2. The lower left chest tube is unchanged in position. 3. There is still some loculated pleural fluid medially and inferiorly. 4. Persistent consolidation of the lower left lung.            This report was finalized on 06/01/2022 10:44 by Dr. Tomer Whelan MD.    XR Chest 1 View    Result Date: 6/3/2022  EXAM/TECHNIQUE: XR CHEST 1 VW-  INDICATION: On vent; R13.10-Dysphagia, unspecified; Z01.818-Encounter for other preprocedural examination; D32.9-Benign neoplasm of meninges, unspecified; Z74.09-Other reduced mobility; Z78.9-Other specified health status; G40.901-Epilepsy, unspecified, not intractable, with status epilepticus; J96.01-Acute respiratory failure with hypoxia  COMPARISON: Prior day  FINDINGS:  2 pigtail catheter drainage catheters have been placed in the medial posterior chest. Lateral large caliber chest tubes remain stable in position. No significant change in bibasilar parenchymal and pleural opacities. No visible pneumothorax. Tracheostomy tube is stable in position. Cardiac silhouette is stable.      Impression:  Interval placement of 2 pigtail drainage catheters in the medial LEFT posterior chest. Otherwise, no change in appearance the chest. This report was finalized on 06/03/2022 07:40 by Dr. Aristides Amor MD.    XR Chest 1 View    Result Date: 6/2/2022  EXAM: XR CHEST 1 VW- 6/2/2022 3:37 AM CDT  HISTORY: On vent; R13.10-Dysphagia, unspecified; Z01.818-Encounter for other preprocedural examination; D32.9-Benign neoplasm of meninges, unspecified;  Z74.09-Other reduced mobility; Z78.9-Other specified health status; G40.901-Epilepsy, unspecified, not intractable, with status epilepticus; J96.01-Acute respiratory failure with hypoxia   COMPARISON: CT scan June 1, 2022.  TECHNIQUE: Frontal radiograph of the chest  FINDINGS: The right lung is clear. Two left chest tubes are present. There is opacification of the left lower lobe. This may be compression atelectasis from prior posterior left pleural effusion.. The cardiomediastinal silhouette and pulmonary vascularity are within normal limits. Endotracheal tube is present. Dobbhoff feeding tube is present. Right PIC catheter is satisfactorily positioned  The osseous structures and surrounding soft tissues demonstrate no acute abnormality.       Impression: 1. 2 left chest tubes are present. There is persistent opacification the left chest is likely atelectasis left lower lobe 2. Support tubes are satisfactorily positioned..   This report was finalized on 06/02/2022 07:21 by Dr. Emanuel Frederick MD.    XR Chest 1 View    Result Date: 6/1/2022  EXAMINATION: XR CHEST 1 VW-  6/1/2022 11:55 AM CDT  HISTORY: Et tube change; R13.10-Dysphagia, unspecified; Z01.818-Encounter for other preprocedural examination; D32.9-Benign neoplasm of meninges, unspecified; Z74.09-Other reduced mobility; Z78.9-Other specified health status; G40.901-Epilepsy, unspecified, not intractable, with status epilepticus; J96.01-Acute respiratory failure with hypoxia  1 view chest x-ray.  Comparison is made with earlier today at 3:13 AM.  Endotracheal tube replaced/repositioned and being slightly lower with the tip 20 mm above the hernandez.  Right PICC line and feeding tube remain in place.  Unchanged appearance of the lungs with 2 left chest tubes and loculated left pleural fluid.  Persistent left lung base consolidation.  Summary: 1. Slightly lower lying endotracheal tube with the tip 20 mm above the hernandez. 2. Otherwise unchanged appearance of the  chest. This report was finalized on 06/01/2022 13:26 by Dr. Tomer Whelan MD.    CT Guided Abscess Drain Lung    Result Date: 6/2/2022  EXAMINATION: CT GUIDED ABSCESS DRAIN LUNG-   6/2/2022 9:20 AM CDT  HISTORY: left loculated pleural effusion.  Dr Ortega has discussed with Dr. Whelan; R13.10-Dysphagia, unspecified; Z01.818-Encounter for other preprocedural examination; D32.9-Benign neoplasm of meninges, unspecified; Z74.09-Other reduced mobility; Z78.9-Other specified health status; G40.901-Epilepsy, unspecified, not intractable, with status epilepticus; J96.01-Acute respiratory failure with hypoxia  In order to have a CT radiation dose as low as reasonably achievable Automated Exposure Control was utilized for adjustment of the mA and/or KV according to patient size.  DLP in mGycm= 3093.  CT-guided placement of 2 separate 8 German pigtail catheter drains within loculated left pleural fluid collections.  An appropriate time out was performed with all present staff members to ensure correct patient and correct site and procedure. The procedure was discussed with the patient including risks, benefits, and alternatives.  Consent was given.  Patient is intubated and on mechanical ventilation. ICU nurse and respiratory staff present throughout the procedure.  Right lateral positioning. Noncontrast CT imaging used to localize the target pleural fluid collections.  Routine prep and local anesthesia.  With CT guidance an 8 German pigtail drain catheter was placed into each of the 2 loculated pleural fluid collections.  Free flow of thin clear to yellow fluid was noted.  40 to 50 cc of fluid was removed from each collection.  Each catheter was placed to gravity bag drainage.  Blood loss = less than 1 cc.  No complications.  Summary: 1. CT-guided placement of 2 left-sided pleural drainage catheters. 2. No complications.              This report was finalized on 06/02/2022 15:09 by Dr. Tomer Whelan MD.     My radiograph  interpretation/independent review of imaging: new pigtail catheters, chest tubes, tracheostomy tube ok    Pulmonary Assessment:    1. Acute resp failure requiring mechanical ventilation due to encephalopathy, improved hypoxia and hypercapnia  2. Meningioma  3. Tracheostomy status  4. Complicated pleural space, s/p multiple drainage procedures  5. Anemia stable  6. Metabolic alkalosis    Recommend/plan:   · Try to minimize sedation  · Change to pressure control ventilation given good patient drive; may promote better synchrony with pt more awake      This visit was performed by both a physician and an [facility] RN.  I personally evaluated and examined the patient.  I performed all aspects of the medical decision making as documented.  Electronically signed by Carlos A Dasilva MD, 6/3/2022, 09:35 CDT

## 2022-06-03 NOTE — ANESTHESIA POSTPROCEDURE EVALUATION
"Patient: Hai Hernandez    Procedure Summary     Date: 06/03/22 Room / Location:  [facility] OR  /  [facility] OR    Anesthesia Start: 1455 Anesthesia Stop: 1715    Procedure: VENTRICULAR PERITONEAL SHUNT INSERTION WITH BRAIN LAB (Right ) Diagnosis:       Communicating hydrocephalus (HCC)      (Communicating hydrocephalus (HCC) [G91.0])    Surgeons: Martell Downs MD Provider: Noe Fonseca CRNA    Anesthesia Type: general ASA Status: 4          Anesthesia Type: general    Vitals  Vitals Value Taken Time   BP     Temp 97.2 °F (36.2 °C) 06/03/22 1715   Pulse 59 06/03/22 1722   Resp     SpO2 87 % 06/03/22 1722   Vitals shown include unvalidated device data.        Post Anesthesia Care and Evaluation    Patient location during evaluation: ICU  Pain management: adequate  Airway patency: patent  Anesthetic complications: No anesthetic complications    Cardiovascular status: acceptable  Respiratory status: acceptable, ETT, intubated and ventilator  Hydration status: acceptable    Comments: Blood pressure 145/80, pulse 54, temperature 97.2 °F (36.2 °C), temperature source Axillary, resp. rate 23, height 182.9 cm (72\"), weight 105 kg (231 lb 8 oz), SpO2 98 %.    Pt discharged from PACU based on rl score >8      "

## 2022-06-03 NOTE — PROGRESS NOTES
"NEUROSURGERY DAILY PROGRESS NOTE    ASSESSMENT:   Hai Hernandez is a 77 y.o. with a significant comorbidity of acephalic migraines, thyroid disease status post radiation as a child, basal cell and squamous cell carcinoma of the skin. He presents in FU for known meningioma found on workup for right visual field changes. Physical exam findings of neurologically intact with resolution of all symptoms and mild decreased vision in right eye.  His imaging shows 22 x 24 x 13.5 mm right planum sphenoidale mass most suggestive of meningioma.    Past Medical History:   Diagnosis Date   • Brain tumor (HCC)    • Depression    • Hearing loss    • History of cellulitis     right foot big toe   • Hypertension    • Pneumonia    • Right arm weakness     after cervial injury   • Sciatic pain     affects balance   • Thyroid disease    • Visual disturbance     due to brain tumor - right eye     Active Hospital Problems    Diagnosis    • **Meningioma (HCC)    • Acute deep vein thrombosis (DVT) of the ulnar and brachial veins of right upper extremity (HCC)    • Loculated pleural effusion    • Fever    • PAF (paroxysmal atrial fibrillation) (HCC)    • Cerebral parenchymal hemorrhage (HCC)    • Essential hypertension    • Type 2 diabetes mellitus with hyperglycemia, without long-term current use of insulin (HCC)    • Hypothyroidism (acquired)    • Acute respiratory failure (secondary to status epilepticus)    • Thrombocytopenia (HCC)    • 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  Trach placed.  Low-dose propofol.  Opens eyes to voice and keeps them open.  Mouths \"Ouch\" to painful stimuli.  Does not follow commands.  Does not withdraw to painful stimuli.    Intracranial meningioma   1 Day Post-Op (5/10/2022) Status post postcraniotomy for tumor   Pathology: WHO 1 Meningioma   MRI " with blood products in resection cavity but no evidence of cerebritis   CT head reviewed without expansion of SDH   Neurochecks per policy   Decadron off   Leakage from wound.  Stapled at bedside.  If leaks again will oversew    Hydrocephalus   Lumbar drain in place.  Opening pressure 17 cm H2O   Lumbar drain, Open with 20cc every 4 hrs.      Much improved pseudomeningocele with increased drainage   CSF unremarkable from 5/17 and 5/23.    Anticipate Left VPS when less acute.     Stereotactic CT head today.   Potential plan for VPS placement today    Postop seizures, in status   Neurology managing   Cont Keppra, Dialntin, Vimpat   Stat Ativan   Follow dilantin levels     CV: Cardene off   keep SBP <140.   Hypotension resolved and off pressors   Requiring hydralazine and labetalol PRNs   A-line removed secondary to limb ischemia  Pulm: Tracheostomy in place.  Appreciate ENT   Left chest tube placed x2 CT managing with TPA  : Keep jansen for now.   FEN: Hyponatremia, 127   3% NaCl DC   Poor glucose control.  Increase SSI     ENDO: Accu-Chek and treat per policy  GI: PEG tube placement today.  Appreciate GI  ID: Afebrile overnight,    DDX: infection vs drug fever   WBC 8.29   CRP in 20s, repeat pending   Broad spectrum abx, Meropenum and Vanc   Blood cult NGTD   Body fluid cx pending   CSF cultures pending, NGTD   UA+, 100K GNB   Heme:  DVT prophylaxis with SCD's.     Plt up 193 and HIT ab negative.     RUE clot.  Leave PICC for now.  Can not anticoagulate  Pain: NA  Dispo: DC pending seizure control   Pending extubation    CHIEF COMPLAINT:   Right visual field changes    Subjective  Symptom stable    Temp:  [95 °F (35 °C)-97.8 °F (36.6 °C)] 97.8 °F (36.6 °C)  Heart Rate:  [46-59] 59  Resp:  [21-32] 23  BP: (104-172)/(40-88) 172/77  FiO2 (%):  [30 %] 30 %    Objective:  General Appearance:  Well-appearing and in no acute distress.    Vital signs: (most recent): Blood pressure 172/77, pulse 59, temperature 97.8 °F (36.6  "°C), temperature source Axillary, resp. rate 23, height 182.9 cm (72\"), weight 105 kg (231 lb 8 oz), SpO2 97 %.      Neurologic Exam     Mental Status   Level of consciousness: arousable by verbal stimuli ,  drowsy  Opens eyes to voice and keeps them open.  Mouths \"Ouch\" to painful stimuli.  Does not follow commands.       Cranial Nerves     CN III, IV, VI   Pupils are equal, round, and reactive to light.  Extraocular motions are normal.     CN IX, X   CN IX normal.     Gait, Coordination, and Reflexes     Tremor   Resting tremor: absent  Intention tremor: absent  Action tremor: absent    Drains:   Urethral Catheter Non-latex;Silicone 16 Fr. (Active)       Output by Drain (mL) 06/02/22 0701 - 06/02/22 1900 06/02/22 1901 - 06/03/22 0700 06/03/22 0701 - 06/03/22 0935 Range Total   Requested LDAs do not have output data documented.       Imaging Results (Last 24 Hours)     Procedure Component Value Units Date/Time    XR Chest 1 View [[id number]] Collected: 06/03/22 0738     Updated: 06/03/22 0743    Narrative:      EXAM/TECHNIQUE: XR CHEST 1 VW-     INDICATION: On vent; R13.10-Dysphagia, unspecified; Z01.818-Encounter  for other preprocedural examination; D32.9-Benign neoplasm of meninges,  unspecified; Z74.09-Other reduced mobility; Z78.9-Other specified health  status; G40.901-Epilepsy, unspecified, not intractable, with status  epilepticus; J96.01-Acute respiratory failure with hypoxia     COMPARISON: Prior day     FINDINGS:     2 pigtail catheter drainage catheters have been placed in the medial  posterior chest. Lateral large caliber chest tubes remain stable in  position. No significant change in bibasilar parenchymal and pleural  opacities. No visible pneumothorax. Tracheostomy tube is stable in  position. Cardiac silhouette is stable.       Impression:         Interval placement of 2 pigtail drainage catheters in the medial LEFT  posterior chest. Otherwise, no change in appearance the chest.  This report was " finalized on 06/03/2022 07:40 by Dr. Aristides Amor MD.    CT Guided Abscess Drain Lung [[id number]] Collected: 06/02/22 1506     Updated: 06/02/22 1512    Narrative:      EXAMINATION: CT GUIDED ABSCESS DRAIN LUNG-      6/2/2022 9:20 AM CDT     HISTORY: left loculated pleural effusion.  Dr Ortega has discussed with  Dr. Whelan; R13.10-Dysphagia, unspecified; Z01.818-Encounter for other  preprocedural examination; D32.9-Benign neoplasm of meninges,  unspecified; Z74.09-Other reduced mobility; Z78.9-Other specified health  status; G40.901-Epilepsy, unspecified, not intractable, with status  epilepticus; J96.01-Acute respiratory failure with hypoxia     In order to have a CT radiation dose as low as reasonably achievable  Automated Exposure Control was utilized for adjustment of the mA and/or  KV according to patient size.     DLP in mGycm= 3093.     CT-guided placement of 2 separate 8 [place] pigtail catheter drains  within loculated left pleural fluid collections.     An appropriate time out was performed with all present staff members to  ensure correct patient and correct site and procedure.  The procedure was discussed with the patient including risks, benefits,  and alternatives.    Consent was given.     Patient is intubated and on mechanical ventilation.  ICU nurse and respiratory staff present throughout the procedure.     Right lateral positioning.  Noncontrast CT imaging used to localize the target pleural fluid  collections.     Routine prep and local anesthesia.     With CT guidance an 8 [place] pigtail drain catheter was placed into each  of the 2 loculated pleural fluid collections.     Free flow of thin clear to yellow fluid was noted.     40 to 50 cc of fluid was removed from each collection.     Each catheter was placed to gravity bag drainage.     Blood loss = less than 1 cc.     No complications.     Summary:  1. CT-guided placement of 2 left-sided pleural drainage catheters.  2. No complications.                                          This report was finalized on 06/02/2022 15:09 by Dr. Tomer Whelan MD.        Lab Results (last 24 hours)     Procedure Component Value Units Date/Time    POC Glucose Once [[id number]]  (Normal) Collected: 06/03/22 0540    Specimen: Blood Updated: 06/03/22 0551     Glucose 111 mg/dL      Comment: : ABBEY Wangter ID: [id number]       Manual Differential [[id number]]  (Abnormal) Collected: 06/03/22 0430    Specimen: Blood Updated: 06/03/22 0512     Neutrophil % 73.7 %      Lymphocyte % 11.1 %      Monocyte % 6.1 %      Eosinophil % 2.0 %      Bands %  3.0 %      Metamyelocyte % 1.0 %      Myelocyte % 3.0 %      Neutrophils Absolute 6.36 10*3/mm3      Lymphocytes Absolute 0.92 10*3/mm3      Monocytes Absolute 0.51 10*3/mm3      Eosinophils Absolute 0.17 10*3/mm3      Anisocytosis Slight/1+     Basophilic Stippling Slight/1+     Macrocytes Slight/1+     Poikilocytes Slight/1+     Polychromasia Slight/1+     WBC Morphology Normal     Platelet Morphology Normal    Comprehensive Metabolic Panel [[id number]]  (Abnormal) Collected: 06/03/22 0430    Specimen: Blood Updated: 06/03/22 0504     Glucose 109 mg/dL      BUN 21 mg/dL      Creatinine 0.49 mg/dL      Sodium 136 mmol/L      Potassium 4.6 mmol/L      Chloride 100 mmol/L      CO2 29.0 mmol/L      Calcium 8.1 mg/dL      Total Protein 5.5 g/dL      Albumin 1.60 g/dL      ALT (SGPT) 28 U/L      AST (SGOT) 30 U/L      Alkaline Phosphatase 174 U/L      Total Bilirubin 0.4 mg/dL      Globulin 3.9 gm/dL      A/G Ratio 0.4 g/dL      BUN/Creatinine Ratio 42.9     Anion Gap 7.0 mmol/L      eGFR 105.7 mL/min/1.73      Comment: National Kidney Foundation and American Society of Nephrology (ASN) Task Force recommended calculation based on the Chronic Kidney Disease Epidemiology Collaboration (CKD-EPI) equation refit without adjustment for race.       Narrative:      GFR Normal >60  Chronic Kidney Disease <60  Kidney Failure <15       Phenytoin Level, Total [[id number]]  (Abnormal) Collected: 06/03/22 0430    Specimen: Blood Updated: 06/03/22 0458     Phenytoin Level 8.3 mcg/mL     CBC & Differential [[id number]]  (Abnormal) Collected: 06/03/22 0430    Specimen: Blood Updated: 06/03/22 0447    Narrative:      The following orders were created for panel order CBC & Differential.  Procedure                               Abnormality         Status                     ---------                               -----------         ------                     CBC Auto Differential[[id number]]        Abnormal            Final result                 Please view results for these tests on the individual orders.    CBC Auto Differential [[id number]]  (Abnormal) Collected: 06/03/22 0430    Specimen: Blood Updated: 06/03/22 0447     WBC 8.29 10*3/mm3      RBC 2.76 10*6/mm3      Hemoglobin 8.6 g/dL      Hematocrit 27.7 %      .4 fL      MCH 31.2 pg      MCHC 31.0 g/dL      RDW 14.1 %      RDW-SD 50.4 fl      MPV 9.2 fL      Platelets 338 10*3/mm3      nRBC 0.4 /100 WBC     Blood Gas, Arterial - [[id number]]  (Abnormal) Collected: 06/03/22 0412    Specimen: Arterial Blood Updated: 06/03/22 0406     Site Left Radial     Denzel's Test Positive     pH, Arterial 7.463 pH units      Comment: 83 Value above reference range        pCO2, Arterial 46.1 mm Hg      Comment: 83 Value above reference range        pO2, Arterial 78.6 mm Hg      Comment: 84 Value below reference range        HCO3, Arterial 33.0 mmol/L      Comment: 83 Value above reference range        Base Excess, Arterial 8.3 mmol/L      Comment: 83 Value above reference range        O2 Saturation, Arterial 96.8 %      Temperature 37.0 C      Barometric Pressure for Blood Gas 749 mmHg      Modality Ventilator     FIO2 30 %      Ventilator Mode AC     Set Tidal Volume 450     Set Mech Resp Rate 16.0     PEEP 6.0     Collected by [id number]     Comment: Meter: [id number]     :  [id number]        pCO2,  Temperature Corrected 46.1 mm Hg      pH, Temp Corrected 7.463 pH Units      pO2, Temperature Corrected 78.6 mm Hg     POC Glucose Once [[id number]]  (Normal) Collected: 06/03/22 0020    Specimen: Blood Updated: 06/03/22 0032     Glucose 93 mg/dL      Comment: : YANIRASATYA Kenricksatya Lucy ID: [id number]       POC Glucose Once [[id number]]  (Normal) Collected: 06/02/22 1957    Specimen: Blood Updated: 06/02/22 2008     Glucose 95 mg/dL      Comment: : ABBEY Ayala ID: [id number]       POC Glucose Once [[id number]]  (Abnormal) Collected: 06/02/22 1756    Specimen: Blood Updated: 06/02/22 1807     Glucose 64 mg/dL      Comment: : [id number] Yeimi Martinez ID: [id number]       POC Glucose Once [[id number]]  (Abnormal) Collected: 06/02/22 1120    Specimen: Blood Updated: 06/02/22 1131     Glucose 135 mg/dL      Comment: : [id number] Yeimi magnetic.ioFreida ID: [id number]       POC Glucose Once [[id number]]  (Abnormal) Collected: 06/02/22 1052    Specimen: Blood Updated: 06/02/22 1105     Glucose 64 mg/dL      Comment: : [id number] Yeimi Martinez ID: [id number]           [id number]  Stevie Parsons, APRN

## 2022-06-03 NOTE — PROGRESS NOTES
[facility]  INPATIENT WOUND CARE    PROGRESS NOTE    Today's Date: 06/03/22    Patient Name: Hai Hernandez  MRN: [id number]  CSN: [id number]  PCP: Elisa Chacko MD  Referring Provider: MARTELL DOWNS  Attending Provider: Martell Downs, *  Length of Stay: 24    SUBJECTIVE   Chief Complaint: Wounds of sacrum and scalp    HPI: Hai Hernandez continues care in ICU.  Wounds of forehead have continued to heal with 1 small area present which is scabbed and no erythema of the periwound area.  Sacral area stable.  Patient does not appear to be responding to stimuli.  Tracheostomy placed yesterday.  Discussed care with ANABELLE Jarvis.  RN denies any additional needs from wound care at this time.        Visit Dx:    ICD-10-CM ICD-9-CM   1. Dysphagia, unspecified type  R13.10 787.20   2. Preop testing  Z01.818 V72.84   3. Meningioma (HCC)  D32.9 225.2   4. Impaired mobility  Z74.09 799.89   5. Decreased activities of daily living (ADL)  Z78.9 V49.89   6. Status epilepticus (HCC)  G40.901 345.3   7. Acute respiratory failure with hypoxia (HCC)  J96.01 518.81   8. 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)   • Status epilepticus (HCC)   • Essential hypertension   • Type 2 diabetes mellitus with hyperglycemia, without long-term current use of insulin (HCC)   • Hypothyroidism (acquired)   • Acute respiratory failure (secondary to status epilepticus)   • Thrombocytopenia (HCC)   • Cerebral parenchymal hemorrhage (HCC)   • PAF (paroxysmal atrial fibrillation) (HCC)   • Fever   • Loculated pleural effusion   • Acute deep vein thrombosis (DVT) of the ulnar and brachial veins of right upper extremity (HCC)   • Dysphagia   • Communicating hydrocephalus (HCC)       History:   Past Medical History:    Diagnosis Date   • Brain tumor (HCC)    • Depression    • Hearing loss    • History of cellulitis     right foot big toe   • Hypertension    • Pneumonia    • Right arm weakness     after cervial injury   • Sciatic pain     affects balance   • Thyroid disease    • Visual disturbance     due to brain tumor - right eye     Past Surgical History:   Procedure Laterality Date   • CATARACT EXTRACTION, BILATERAL     • COLONOSCOPY     • CRANIOTOMY FOR TUMOR Right 5/10/2022    Procedure: CRANIOTOMY FOR TUMOR STERIOTACTIC WITH BRAIN LAB, right;  Surgeon: Martell Downs MD;  Location: [facility] OR;  Service: Neurosurgery;  Laterality: Right;   • ENDOSCOPY W/ PEG TUBE PLACEMENT N/A 6/2/2022    Procedure: ESOPHAGOGASTRODUODENOSCOPY WITH PERCUTANEOUS ENDOSCOPIC GASTROSTOMY TUBE INSERTION WITH ANESTHESIA;  Surgeon: Melecio Lu MD;  Location: [facility] OR;  Service: Gastroenterology;  Laterality: N/A;   • NECK SURGERY     • SKIN CANCER EXCISION     • TONSILLECTOMY     • TRACHEOSTOMY N/A 6/2/2022    Procedure: TRACHEOSTOMY;  Surgeon: Geovany Davidson MD;  Location: [facility] OR;  Service: ENT;  Laterality: N/A;     Social History     Socioeconomic History   • Marital status:    Tobacco Use   • Smoking status: Never Smoker   • Smokeless tobacco: Never Used   Vaping Use   • Vaping Use: Never used   Substance and Sexual Activity   • Alcohol use: Never   • Drug use: Never   • Sexual activity: Defer       Allergies:  No Known Allergies    Medications:    Current Facility-Administered Medications:   •  [MAR Hold] acetaminophen (TYLENOL) tablet 650 mg, 650 mg, Oral, Q4H PRN, 650 mg at 05/31/22 2002 **OR** [MAR Hold] acetaminophen (TYLENOL) suppository 650 mg, 650 mg, Rectal, Q4H PRN, Geovany Davidson MD, 650 mg at 05/23/22 0016  •  [MAR Hold] albuterol (PROVENTIL) nebulizer solution 0.083% 2.5 mg/3mL, 2.5 mg, Nebulization, Q8H - RT, Geovany Davidson MD, 2.5 mg at 06/03/22 0659  •  [MAR Hold] alteplase  (CATHFLO/ACTIVASE) injection 2 mg, 2 mg, Intracatheter, PRN, Geovany Davidson MD, New Syringe/Cartridge at 05/28/22 1330  •  [MAR Hold] bisacodyl (DULCOLAX) suppository 10 mg, 10 mg, Rectal, Daily PRN, Geovany Davidson MD, 10 mg at 06/03/22 0749  •  bupivacaine-EPINEPHrine (MARCAINE w/EPI) injection, , , PRN, Martell Downs MD, 50 mL at 06/03/22 1432  •  [MAR Hold] butalbital-acetaminophen-caffeine (FIORICET, ESGIC) -40 MG per tablet 1 tablet, 1 tablet, Oral, Q4H PRN, Geovany Davidson MD, 1 tablet at 05/22/22 1729  •  carvedilol (COREG) tablet 6.25 mg, 6.25 mg, Nasogastric, BID With Meals, Geovany Davidson MD, 6.25 mg at 06/01/22 1759  •  [MAR Hold] chlorhexidine (PERIDEX) 0.12 % solution 15 mL, 15 mL, Mouth/Throat, Q12H, Geovany Davidson MD, 15 mL at 06/03/22 0801  •  [MAR Hold] dextrose (D50W) (25 g/50 mL) IV injection 25 g, 25 g, Intravenous, Q15 Min PRN, Geovany Davidson MD, 25 g at 06/02/22 1759  •  [MAR Hold] dextrose (GLUTOSE) oral gel 15 g, 15 g, Oral, Q15 Min PRN, Geovany Davidson MD, 15 g at 05/22/22 0527  •  fosphenytoin (Cerebyx) 275 mg PE in sodium chloride 0.9 % 100 mL IVPB, 275 mg PE, Intravenous, Q8H, Geovany Dvaidson MD, 275 mg PE at 06/03/22 1429  •  gelatin absorbable (GELFOAM) powder, , , PRN, Martell Downs MD, 1 each at 06/03/22 1515  •  [MAR Hold] glucagon (human recombinant) (GLUCAGEN DIAGNOSTIC) injection 1 mg, 1 mg, Intramuscular, Q15 Min PRN, Geovany Davidson MD  •  [MAR Hold] heparin (porcine) 5000 UNIT/ML injection 5,000 Units, 5,000 Units, Subcutaneous, Q12H, Geovany Davidson MD, 5,000 Units at 06/02/22 2004  •  Hold Medication (Anticoagulation), 1 each, Does not apply, Continuous PRN, Geovany Davidson MD  •  hydrALAZINE (APRESOLINE) injection 10 mg, 10 mg, Intravenous, Q6H PRN, Geovany Davidson MD, 10 mg at 06/03/22 1018  •  [MAR Hold] insulin detemir (LEVEMIR) injection 20 Units, 20 Units, Subcutaneous, Nightly,  Geovany Davidson MD, 20 Units at 06/01/22 2031  •  [MAR Hold] insulin regular (humuLIN R,novoLIN R) injection 2-7 Units, 2-7 Units, Subcutaneous, Q6H, Geovany Davidson MD, 2 Units at 06/01/22 0623  •  [MAR Hold] insulin regular (humuLIN R,novoLIN R) injection 8 Units, 8 Units, Subcutaneous, Q6H, Geovany Davidson MD, 8 Units at 06/01/22 0623  •  [MAR Hold] ipratropium-albuterol (DUO-NEB) nebulizer solution 3 mL, 3 mL, Nebulization, Q4H PRN, Geovany Davidson MD, 3 mL at 05/25/22 1007  •  labetalol (NORMODYNE,TRANDATE) injection 10 mg, 10 mg, Intravenous, Q6H PRN, Geovany Davidson MD, 10 mg at 06/03/22 1310  •  lacosamide (VIMPAT) injection 200 mg, 200 mg, Intravenous, Q12H, Geovany Davidson MD, 200 mg at 06/03/22 0801  •  levETIRAcetam in NaCl 0.82% (KEPPRA) IVPB 500 mg, 500 mg, Intravenous, Q12H, Geovany Davidson MD, 500 mg at 06/03/22 0801  •  [MAR Hold] levothyroxine (SYNTHROID, LEVOTHROID) tablet 125 mcg, 125 mcg, Nasogastric, Q AM, Geovany Davidson MD, 125 mcg at 06/01/22 0615  •  [MAR Hold] lidocaine (XYLOCAINE) 1 % injection 10 mL, 10 mL, Intradermal, Once, Geovany Davidson MD  •  [MAR Hold] liothyronine (CYTOMEL) tablet 25 mcg, 25 mcg, Nasogastric, Daily, Geovany Davidson MD, 25 mcg at 06/01/22 1012  •  lisinopril (PRINIVIL,ZESTRIL) tablet 20 mg, 20 mg, Nasogastric, Q24H, Geovany Davidson MD, 20 mg at 06/01/22 1012  •  [MAR Hold] Morphine sulfate (PF) injection 2 mg, 2 mg, Intravenous, Q2H PRN, Geovany Davidson MD, 2 mg at 06/03/22 1309  •  [MAR Hold] mupirocin (BACTROBAN) 2 % ointment 1 application, 1 application, Topical, Q12H, Geovany Davidson MD, 1 application at 06/03/22 0801  •  norepinephrine (LEVOPHED) 8 mg in 250 mL NS infusion (premix), 0.02-0.3 mcg/kg/min, Intravenous, Titrated, Geovany Davidson MD, Held at 06/01/22 0606  •  [MAR Hold] Nutrisource fiber pack 1 packet, 1 packet, Nasogastric, 4x Daily, Geovany Davidson MD, 1 packet at  06/01/22 2109  •  [MAR Hold] ondansetron (ZOFRAN) tablet 4 mg, 4 mg, Oral, Q6H PRN **OR** [MAR Hold] ondansetron (ZOFRAN) injection 4 mg, 4 mg, Intravenous, Q6H PRN, Geovany Davidson MD  •  [MAR Hold] oxyCODONE (ROXICODONE) 5 MG/5ML solution 7.5 mg, 7.5 mg, Nasogastric, Q4H PRN, Geovany Davidson MD, 7.5 mg at 05/31/22 1958  •  [MAR Hold] pantoprazole (PROTONIX) injection 40 mg, 40 mg, Intravenous, Q AM, Geovany Davidson MD, 40 mg at 06/03/22 0609  •  [MAR Hold] polyethylene glycol (MIRALAX) packet 17 g, 17 g, Oral, Daily, Geovany Davidson MD, 17 g at 06/01/22 1013  •  propofol (DIPRIVAN) infusion 10 mg/mL 100 mL, 5-50 mcg/kg/min, Intravenous, Titrated, Geovany Davidson MD, Last Rate: 12.48 mL/hr at 06/03/22 1202, 20 mcg/kg/min at 06/03/22 1202  •  [MAR Hold] ProSource TF oral liquid 45 mL, 1 packet, Nasogastric, BID, Geovany Davidson MD, 45 mL at 06/01/22 2031  •  sodium chloride (NS) irrigation solution, , , PRN, Martell Downs MD, 1,000 mL at 06/03/22 1516  •  [MAR Hold] sodium chloride 3 % nebulizer solution 4 mL, 4 mL, Nebulization, BID - RT, Geovany Davidson MD, 4 mL at 06/03/22 0700  •  thrombin topical, , , PRN, Martell Downs MD, 10,000 Units at 06/03/22 1516    Review of Systems:  Review of Systems   Unable to perform ROS: Patient unresponsive         OBJECTIVE     Vitals:    06/03/22 1430   BP: 145/80   Pulse: 54   Resp:    Temp:    SpO2: 98%       PHYSICAL EXAM  Physical Exam    Physical Exam  Vitals and nursing note reviewed.   Constitutional:       General: He is awake.      Appearance: He is obese.      Comments: Body mass index is 34.98 kg/m².    HENT:      Head: Normocephalic.      Comments: Healing pressure injuries from EEG probes.  These are unstageable and has been bases are covered with scab.  No drainage present.  No erythema of the periwound area.  Continuously improving.  Eyes:      General: Lids are normal.      Comments: Patient's eyes are open  but he is not tracking.   Neck:      Trachea: Tracheostomy present.      Comments: Tracheostomy to ventilator  Cardiovascular:      Rate and Rhythm: Normal rate and regular rhythm.   Pulmonary:      Effort: Pulmonary effort is normal. No respiratory distress.   Abdominal:      General: There is no distension.      Palpations: Abdomen is soft.      Comments: PEG tube present   Genitourinary:     Comments: Triple-lumen continuous bladder irrigation present  Musculoskeletal:      Cervical back: No edema.      Right lower leg: Edema present     Left lower leg: Edema present  Skin:     General: Skin is warm and dry.      Findings: Wound present.      Comments: Stage 3 pressure injury of sacrum- healing stage 3  remains stable.  No increase in erythema or drainage at this time.     Forehead wounds continue to heal with 2 small scabbed areas and erythema of the scalp.  No drainage at this time.  No signs of infection.     Neurological:      Mental Status: He is unresponsive.      Comments: Eyes open with no response to stimuli      Results Review:  Lab Results (last 48 hours)     Procedure Component Value Units Date/Time    POC Glucose Once [[id number]]  (Normal) Collected: 06/03/22 1205    Specimen: Blood Updated: 06/03/22 1216     Glucose 122 mg/dL      Comment: : [id number] Yeimi Martinez ID: [id number]       C-reactive Protein [[id number]]  (Abnormal) Collected: 06/03/22 1043    Specimen: Blood Updated: 06/03/22 1110     C-Reactive Protein 13.97 mg/dL     POC Glucose Once [[id number]]  (Normal) Collected: 06/03/22 0540    Specimen: Blood Updated: 06/03/22 0551     Glucose 111 mg/dL      Comment: : ABBEY Ayala ID: [id number]       Manual Differential [[id number]]  (Abnormal) Collected: 06/03/22 0430    Specimen: Blood Updated: 06/03/22 0512     Neutrophil % 73.7 %      Lymphocyte % 11.1 %      Monocyte % 6.1 %      Eosinophil % 2.0 %      Bands %  3.0 %      Metamyelocyte % 1.0 %       Myelocyte % 3.0 %      Neutrophils Absolute 6.36 10*3/mm3      Lymphocytes Absolute 0.92 10*3/mm3      Monocytes Absolute 0.51 10*3/mm3      Eosinophils Absolute 0.17 10*3/mm3      Anisocytosis Slight/1+     Basophilic Stippling Slight/1+     Macrocytes Slight/1+     Poikilocytes Slight/1+     Polychromasia Slight/1+     WBC Morphology Normal     Platelet Morphology Normal    Comprehensive Metabolic Panel [[id number]]  (Abnormal) Collected: 06/03/22 0430    Specimen: Blood Updated: 06/03/22 0504     Glucose 109 mg/dL      BUN 21 mg/dL      Creatinine 0.49 mg/dL      Sodium 136 mmol/L      Potassium 4.6 mmol/L      Chloride 100 mmol/L      CO2 29.0 mmol/L      Calcium 8.1 mg/dL      Total Protein 5.5 g/dL      Albumin 1.60 g/dL      ALT (SGPT) 28 U/L      AST (SGOT) 30 U/L      Alkaline Phosphatase 174 U/L      Total Bilirubin 0.4 mg/dL      Globulin 3.9 gm/dL      A/G Ratio 0.4 g/dL      BUN/Creatinine Ratio 42.9     Anion Gap 7.0 mmol/L      eGFR 105.7 mL/min/1.73      Comment: National Kidney Foundation and American Society of Nephrology (ASN) Task Force recommended calculation based on the Chronic Kidney Disease Epidemiology Collaboration (CKD-EPI) equation refit without adjustment for race.       Narrative:      GFR Normal >60  Chronic Kidney Disease <60  Kidney Failure <15      Phenytoin Level, Total [[id number]]  (Abnormal) Collected: 06/03/22 0430    Specimen: Blood Updated: 06/03/22 0458     Phenytoin Level 8.3 mcg/mL     CBC & Differential [[id number]]  (Abnormal) Collected: 06/03/22 0430    Specimen: Blood Updated: 06/03/22 0447    Narrative:      The following orders were created for panel order CBC & Differential.  Procedure                               Abnormality         Status                     ---------                               -----------         ------                     CBC Auto Differential[[id number]]        Abnormal            Final result                 Please view results for these  tests on the individual orders.    CBC Auto Differential [[id number]]  (Abnormal) Collected: 06/03/22 0430    Specimen: Blood Updated: 06/03/22 0447     WBC 8.29 10*3/mm3      RBC 2.76 10*6/mm3      Hemoglobin 8.6 g/dL      Hematocrit 27.7 %      .4 fL      MCH 31.2 pg      MCHC 31.0 g/dL      RDW 14.1 %      RDW-SD 50.4 fl      MPV 9.2 fL      Platelets 338 10*3/mm3      nRBC 0.4 /100 WBC     Blood Gas, Arterial - [[id number]]  (Abnormal) Collected: 06/03/22 0412    Specimen: Arterial Blood Updated: 06/03/22 0406     Site Left Radial     Denzel's Test Positive     pH, Arterial 7.463 pH units      Comment: 83 Value above reference range        pCO2, Arterial 46.1 mm Hg      Comment: 83 Value above reference range        pO2, Arterial 78.6 mm Hg      Comment: 84 Value below reference range        HCO3, Arterial 33.0 mmol/L      Comment: 83 Value above reference range        Base Excess, Arterial 8.3 mmol/L      Comment: 83 Value above reference range        O2 Saturation, Arterial 96.8 %      Temperature 37.0 C      Barometric Pressure for Blood Gas 749 mmHg      Modality Ventilator     FIO2 30 %      Ventilator Mode AC     Set Tidal Volume 450     Set Mech Resp Rate 16.0     PEEP 6.0     Collected by [id number]     Comment: Meter: [id number]     :  [id number]        pCO2, Temperature Corrected 46.1 mm Hg      pH, Temp Corrected 7.463 pH Units      pO2, Temperature Corrected 78.6 mm Hg     POC Glucose Once [[id number]]  (Normal) Collected: 06/03/22 0020    Specimen: Blood Updated: 06/03/22 0032     Glucose 93 mg/dL      Comment: : ABBEY Ayala ID: [id number]       POC Glucose Once [[id number]]  (Normal) Collected: 06/02/22 1957    Specimen: Blood Updated: 06/02/22 2008     Glucose 95 mg/dL      Comment: : ABBEY Ayala ID: [id number]       POC Glucose Once [[id number]]  (Abnormal) Collected: 06/02/22 1756    Specimen: Blood Updated: 06/02/22 1807     Glucose 64  mg/dL      Comment: : [id number] Yeimi PeopleseMeter ID: [id number]       POC Glucose Once [[id number]]  (Abnormal) Collected: 06/02/22 1120    Specimen: Blood Updated: 06/02/22 1131     Glucose 135 mg/dL      Comment: : [id number] Yeimi PeopleseMeter ID: [id number]       POC Glucose Once [[id number]]  (Abnormal) Collected: 06/02/22 1052    Specimen: Blood Updated: 06/02/22 1105     Glucose 64 mg/dL      Comment: : [id number] Yeimi PeopleseMeter ID: [id number]       Manual Differential [[id number]]  (Abnormal) Collected: 06/02/22 0528    Specimen: Blood Updated: 06/02/22 0720     Neutrophil % 67.0 %      Lymphocyte % 4.0 %      Monocyte % 9.0 %      Eosinophil % 1.0 %      Bands %  7.0 %      Metamyelocyte % 4.0 %      Myelocyte % 5.0 %      Promyelocyte % 2.0 %      Atypical Lymphocyte % 1.0 %      Neutrophils Absolute 8.78 10*3/mm3      Lymphocytes Absolute 0.59 10*3/mm3      Monocytes Absolute 1.07 10*3/mm3      Eosinophils Absolute 0.12 10*3/mm3      Polychromasia Slight/1+     WBC Morphology Normal     Platelet Morphology Normal    CBC & Differential [[id number]]  (Abnormal) Collected: 06/02/22 0528    Specimen: Blood Updated: 06/02/22 0720    Narrative:      The following orders were created for panel order CBC & Differential.  Procedure                               Abnormality         Status                     ---------                               -----------         ------                     CBC Auto Differential[[id number]]        Abnormal            Final result                 Please view results for these tests on the individual orders.    CBC Auto Differential [[id number]]  (Abnormal) Collected: 06/02/22 0528    Specimen: Blood Updated: 06/02/22 0720     WBC 11.86 10*3/mm3      RBC 2.89 10*6/mm3      Hemoglobin 9.0 g/dL      Hematocrit 27.9 %      MCV 96.5 fL      MCH 31.1 pg      MCHC 32.3 g/dL      RDW 13.9 %      RDW-SD 48.3 fl      MPV 9.6 fL      Platelets 366 10*3/mm3      Narrative:      The previously reported component NRBC is no longer being reported. Previous result was 0.3 /100 WBC (Reference Range: 0.0-0.2 /100 WBC) on 6/2/2022 at 0610 CDT.    Zinc [[id number]] Collected: 05/31/22 1350    Specimen: Blood Updated: 06/02/22 0716     Zinc 54 ug/dL      Comment:                                 Detection Limit = 5       Narrative:      Test(s) 001800-Zinc, Plasma or Serum  was developed and its performance characteristics determined  by [facility]. It has not been cleared or approved by the Food  and Drug Administration.  Performed at:  [address] [facility]  [id number]  : Lucio Cueto MD, Phone:  [phone]    Comprehensive Metabolic Panel [[id number]]  (Abnormal) Collected: 06/02/22 0528    Specimen: Blood Updated: 06/02/22 0641     Glucose 68 mg/dL      BUN 28 mg/dL      Creatinine 0.50 mg/dL      Sodium 137 mmol/L      Potassium 4.5 mmol/L      Chloride 101 mmol/L      CO2 28.0 mmol/L      Calcium 8.1 mg/dL      Total Protein 5.7 g/dL      Albumin 1.60 g/dL      ALT (SGPT) 31 U/L      AST (SGOT) 34 U/L      Alkaline Phosphatase 225 U/L      Total Bilirubin 0.3 mg/dL      Globulin 4.1 gm/dL      A/G Ratio 0.4 g/dL      BUN/Creatinine Ratio 56.0     Anion Gap 8.0 mmol/L      eGFR 105.1 mL/min/1.73      Comment: National Kidney Foundation and American Society of Nephrology (ASN) Task Force recommended calculation based on the Chronic Kidney Disease Epidemiology Collaboration (CKD-EPI) equation refit without adjustment for race.       Narrative:      GFR Normal >60  Chronic Kidney Disease <60  Kidney Failure <15      Phenytoin Level, Total [[id number]]  (Abnormal) Collected: 06/02/22 0528    Specimen: Blood Updated: 06/02/22 0634     Phenytoin Level 7.0 mcg/mL     POC Glucose Once [[id number]]  (Normal) Collected: 06/02/22 0601    Specimen: Blood Updated: 06/02/22 0612     Glucose 71 mg/dL      Comment: : ABBEY Ayala  ID: [id number]       POC Glucose Once [[id number]]  (Abnormal) Collected: 06/02/22 0559    Specimen: Blood Updated: 06/02/22 0612     Glucose 64 mg/dL      Comment: : ABBEY Ayala ID: [id number]       COVID PRE-OP / PRE-PROCEDURE SCREENING ORDER (NO ISOLATION) - Swab, Nasal Cavity [[id number]]  (Normal) Collected: 06/02/22 0422    Specimen: Swab from Nasal Cavity Updated: 06/02/22 0530    Narrative:      The following orders were created for panel order COVID PRE-OP / PRE-PROCEDURE SCREENING ORDER (NO ISOLATION) - Swab, Nasal Cavity.  Procedure                               Abnormality         Status                     ---------                               -----------         ------                     COVID-19,[facility] IN-SARAY...[[id number]]  Normal              Final result                 Please view results for these tests on the individual orders.    COVID-19,[facility] IN-HOUSE,Nasal Swab No Transport Media 3-4 HR TAT - Swab, Nasal Cavity [[id number]]  (Normal) Collected: 06/02/22 0422    Specimen: Swab from Nasal Cavity Updated: 06/02/22 0530     COVID19 Not Detected    Narrative:      Fact sheet for providers: https://www.fda.gov/media/920845/download     Fact sheet for patients: https://www.fda.gov/media/477120/download    Test performed by PCR.    Consider negative results in combination with clinical observations, patient history, and epidemiological information.    Blood Gas, Arterial - [[id number]]  (Abnormal) Collected: 06/02/22 0433    Specimen: Arterial Blood Updated: 06/02/22 0428     Site Left Radial     Denzel's Test Positive     pH, Arterial 7.448 pH units      pCO2, Arterial 49.5 mm Hg      Comment: 83 Value above reference range        pO2, Arterial 84.1 mm Hg      HCO3, Arterial 34.2 mmol/L      Comment: 83 Value above reference range        Base Excess, Arterial 9.1 mmol/L      Comment: 83 Value above reference range        O2 Saturation, Arterial 97.2 %      Temperature  37.0 C      Barometric Pressure for Blood Gas 747 mmHg      Modality Ventilator     FIO2 30 %      Ventilator Mode AC     Set Tidal Volume 450     Set [facility] Resp Rate 16.0     PEEP 8.0     Collected by [id number]     Comment: Meter: [id number]     :  [id number]        pCO2, Temperature Corrected 49.5 mm Hg      pH, Temp Corrected 7.448 pH Units      pO2, Temperature Corrected 84.1 mm Hg     POC Glucose Once [[id number]]  (Normal) Collected: 06/01/22 2349    Specimen: Blood Updated: 06/02/22 0005     Glucose 122 mg/dL      Comment: : ABBEY ArvizuaMeter ID: [id number]       POC Glucose Once [[id number]]  (Normal) Collected: 06/01/22 2030    Specimen: Blood Updated: 06/01/22 2041     Glucose 121 mg/dL      Comment: : ABBEY ArvizuaMeter ID: [id number]       POC Glucose Once [[id number]]  (Normal) Collected: 06/01/22 1755    Specimen: Blood Updated: 06/01/22 1806     Glucose 96 mg/dL      Comment: : [email or web address] PaulsonMinor JenniferMeter ID: [id number]           Imaging Results (Last 72 Hours)     Procedure Component Value Units Date/Time    CT Head Without Contrast [[id number]] Collected: 06/03/22 1223     Updated: 06/03/22 1228    Narrative:      EXAMINATION: CT HEAD WO CONTRAST-      6/3/2022 11:34 AM CDT     HISTORY: continued evaluation.  Surgical planning for VPS placement;  R13.10-Dysphagia, unspecified; Z01.818-Encounter for other preprocedural  examination; D32.9-Benign neoplasm of meninges, unspecified;  Z74.09-Other reduced mobility; Z78.9-Other specified health status;  G40.901-Epilepsy, unspecified, not intractable, with status epilepticus;  J96.01-Acute respiratory failure with hypoxia; G91.0-Communicati     In order to have a CT radiation dose as low as reasonably achievable  Automated Exposure Control was utilized for adjustment of the mA and/or  KV according to patient size.     DLP in mGycm= 874.     Noncontrast head CT.  Axial, sagittal, and coronal imaging.      Comparison is made with May 27, 2022.     Right frontal craniotomy.  Interval partial resolution of scalp swelling and edema.     Persistent though decreased dependent intraventricular hemorrhage.  Ventricle size is stable.     There is no parenchymal hemorrhage or mass effect.     Summary:  1. Stable postoperative head CT.                                   This report was finalized on 06/03/2022 12:25 by Dr. Tomer Whelan MD.    XR Chest 1 View [[id number]] Collected: 06/03/22 0738     Updated: 06/03/22 0743    Narrative:      EXAM/TECHNIQUE: XR CHEST 1 VW-     INDICATION: On vent; R13.10-Dysphagia, unspecified; Z01.818-Encounter  for other preprocedural examination; D32.9-Benign neoplasm of meninges,  unspecified; Z74.09-Other reduced mobility; Z78.9-Other specified health  status; G40.901-Epilepsy, unspecified, not intractable, with status  epilepticus; J96.01-Acute respiratory failure with hypoxia     COMPARISON: Prior day     FINDINGS:     2 pigtail catheter drainage catheters have been placed in the medial  posterior chest. Lateral large caliber chest tubes remain stable in  position. No significant change in bibasilar parenchymal and pleural  opacities. No visible pneumothorax. Tracheostomy tube is stable in  position. Cardiac silhouette is stable.       Impression:         Interval placement of 2 pigtail drainage catheters in the medial LEFT  posterior chest. Otherwise, no change in appearance the chest.  This report was finalized on 06/03/2022 07:40 by Dr. Aristides Amor MD.    CT Guided Abscess Drain Lung [[id number]] Collected: 06/02/22 1506     Updated: 06/02/22 1512    Narrative:      EXAMINATION: CT GUIDED ABSCESS DRAIN LUNG-      6/2/2022 9:20 AM CDT     HISTORY: left loculated pleural effusion.  Dr Ortega has discussed with  Dr. 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     DLP in mGycm= 3093.     CT-guided placement of 2 separate 8 German pigtail catheter drains  within loculated left pleural fluid collections.     An appropriate time out was performed with all present staff members to  ensure correct patient and correct site and procedure.  The procedure was discussed with the patient including risks, benefits,  and alternatives.    Consent was given.     Patient is intubated and on mechanical ventilation.  ICU nurse and respiratory staff present throughout the procedure.     Right lateral positioning.  Noncontrast CT imaging used to localize the target pleural fluid  collections.     Routine prep and local anesthesia.     With CT guidance an 8 German pigtail drain catheter was placed into each  of the 2 loculated pleural fluid collections.     Free flow of thin clear to yellow fluid was noted.     40 to 50 cc of fluid was removed from each collection.     Each catheter was placed to gravity bag drainage.     Blood loss = less than 1 cc.     No complications.     Summary:  1. CT-guided placement of 2 left-sided pleural drainage catheters.  2. No complications.                                         This report was finalized on 06/02/2022 15:09 by Dr. Tomer Whelan MD.    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     TECHNIQUE: Frontal radiograph of the chest     FINDINGS:   The right lung is clear. Two left chest tubes are present. There is  opacification of the left lower lobe. This may be compression  atelectasis from prior posterior left pleural effusion.. The  cardiomediastinal silhouette and pulmonary vascularity are within normal  limits. Endotracheal tube is present. Dobbhoff feeding tube is present.  Right PIC catheter is satisfactorily positioned     The osseous structures and surrounding soft tissues demonstrate no acute  abnormality.          Impression:      1. 2 left chest tubes are present. There is persistent opacification the  left chest is likely atelectasis left lower lobe  2. Support tubes are satisfactorily positioned..        This report was finalized on 06/02/2022 07:21 by Dr. Emanuel Frederick MD.    XR Chest 1 View [[id number]] Collected: 06/01/22 1323     Updated: 06/01/22 1329    Narrative:      EXAMINATION: XR CHEST 1 VW-     6/1/2022 11:55 AM CDT     HISTORY: Et tube change; R13.10-Dysphagia, unspecified;  Z01.818-Encounter for other preprocedural examination; D32.9-Benign  neoplasm of meninges, unspecified; Z74.09-Other reduced mobility;  Z78.9-Other specified health status; G40.901-Epilepsy, unspecified, not  intractable, with status epilepticus; J96.01-Acute respiratory failure  with hypoxia     1 view chest x-ray.     Comparison is made with earlier today at 3:13 AM.     Endotracheal tube replaced/repositioned and being slightly lower with  the tip 20 mm above the hernandez.     Right PICC line and feeding tube remain in place.     Unchanged appearance of the lungs with 2 left chest tubes and loculated  left pleural fluid.     Persistent left lung base consolidation.     Summary:  1. Slightly lower lying endotracheal tube with the tip 20 mm above  the  hernandez.  2. Otherwise unchanged appearance of the chest.  This report was finalized on 06/01/2022 13:26 by Dr. Tomer Whelan MD.    CT Chest With Contrast Diagnostic [[id number]] Collected: 06/01/22 1040     Updated: 06/01/22 1047    Narrative:      EXAMINATION: CT CHEST W CONTRAST DIAGNOSTIC-      6/1/2022 9:21 AM CDT     HISTORY: post intrapleural tpa; R13.10-Dysphagia, unspecified;  Z01.818-Encounter for other preprocedural examination; D32.9-Benign  neoplasm of meninges, unspecified; Z74.09-Other reduced mobility;  Z78.9-Other specified health status; G40.901-Epilepsy, unspecified, not  intractable, with status epilepticus; J96.01-Acute respiratory failure  with hypoxia     In order to have a CT radiation dose as low as reasonably achievable  Automated Exposure Control was utilized for adjustment of the mA and/or  KV according to patient size.     DLP in mGycm= 426.     Postcontrast chest CT.  Axial, sagittal, and coronal sequences.     Comparison is made with May 25, 2022.     Interval placement of an upper left chest tube which has drained the  upper lateral pleural fluid collection which was seen previously.     The medial and inferior fluid collection persists.  Unchanged position of the lower left chest tube.     There is still focal dense consolidation of the lower left lung.     The upper lung is better expanded and clear.  There is no significant pneumothorax.     The right lung is adequately expanded and essentially clear.  There is mild patchy atelectasis.     Endotracheal tube and gastric tube present.  The endotracheal tube tip lies 29 mm above the hernandez.     Summary:  1. Compared with one week ago there has been placement of an upper left  chest tube which has drained the upper lateral pleural fluid collection.  2. The lower left chest tube is unchanged in position.  3. There is still some loculated pleural fluid medially and inferiorly.  4. Persistent consolidation of the lower left lung.                                    This report was finalized on 06/01/2022 10:44 by Dr. Tomer Whelan MD.    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     The lungs are poorly expanded.     There is a persistent left lower lobar consolidation.     There is a very ill-defined area of increased opacity in the left upper  and midlung projected over the left hilum which may represent an area of  consolidation or atelectasis. This was not obvious on the previous  study.     There is small left basal pleural effusion.     There are multiple small calcified granulomas in both lungs.     There is no pulmonary congestion or pneumothorax.     Endotracheal tube is is in place. The distal end of the Dobbhoff tube is  not visualized. It appears unchanged. A right-sided PICC line is in  place. No change. Left-sided chest tubes are in place. No change.     No acute bony abnormality.       Impression:      1. Persistent left lower lobar consolidation and a small left basal  pleural effusion.  2. Another ill-defined opacity in the left upper and mid lung which may  represent an area of discoid atelectasis or consolidation.  3. Multiple small old healed granulomatous in both lungs.  4. The tubes and lines in place.  This report was finalized on 06/01/2022 06:50 by Dr. Aida Wong MD.             ASSESSMENT/PLAN       Examination and evaluation of wound(s) was performed.    DIAGNOSIS:    Pressure injury of sacral area, stage 3  Pressure injury of forehead, unstageable  Bowel incontinence  Impaired mobility  Tracheostomy  Sedated due to medication  Obesity - Body mass index is 34.98 kg/m².     PLAN:   Continue current wound care orders that are in place.         This document has been electronically signed by MARIO Montemayor on 6/3/2022 15:16 CDT       Time spent in face-to-face evaluation, chart review, planning and education 35 minutes with greater than 50% of time spent with patient and/or family and in coordination of care.  Counseling of patient and/or family includes counseling on risk factor reduction, Importance of compliance with chosen management options and patient/family education.

## 2022-06-03 NOTE — ANESTHESIA PREPROCEDURE EVALUATION
Anesthesia Evaluation     Patient summary reviewed   no history of anesthetic complications:  NPO Solid Status: > 8 hours             Airway   Mallampati: I  TM distance: >3 FB  Neck ROM: full  No difficulty expected  Dental          Pulmonary    (+) pleural effusion (s/p ct guided drainage),   (-) COPD, asthma, sleep apnea, not a smoker    ROS comment: Trach and peg in place  On 30% fio2  Cardiovascular   Exercise tolerance: good (4-7 METS)    ECG reviewed    (+) hypertension, dysrhythmias Atrial Fib,   (-) past MI, CAD, cardiac stents, hyperlipidemia      Neuro/Psych  (+) seizures, headaches, psychiatric history Depression,      ROS Comment: memingioma causing visual disturbance  S/p crani  Developed status epilepticus post op  Now presents for  shunt insertion  GI/Hepatic/Renal/Endo    (+) obesity,   diabetes mellitus type 2, thyroid problem hypothyroidism  (-) liver disease, no renal disease    Musculoskeletal     Abdominal    Substance History      OB/GYN          Other   blood dyscrasia,                       Anesthesia Plan    ASA 4     general     intravenous induction     Anesthetic plan, all risks, benefits, and alternatives have been provided, discussed and informed consent has been obtained with: patient.        CODE STATUS:

## 2022-06-03 NOTE — ANESTHESIA PROCEDURE NOTES
Airway  Urgency: elective    Airway not difficult    General Information and Staff    Patient location during procedure: OR  CRNA/CAA: Drake Mathew CRNA    Indications and Patient Condition  Indications for airway management: airway protection    Preoxygenated: yes  Mask difficulty assessment: 1 - vent by mask    Final Airway Details  Final airway type: surgical airway (from ICU on vent with trach. )        Number of attempts at approach: 1  Assessment: lips, teeth, and gum same as pre-op    Additional Comments  From ICU with trach in place, breath sounds confirmed, ETCO2 (+), placed on anesthesia vent without complication

## 2022-06-03 NOTE — CASE MANAGEMENT/SOCIAL WORK
Continued Stay Note   [facility]     Patient Name: Hai Hernandez  MRN: [id number]  Today's Date: 6/3/2022    Admit Date: 5/10/2022     Discharge Plan     Row Name 06/03/22 1051       Plan    Plan LTAC following    Plan Comments LTAC is following for possible admission when patient is medically appropriate for transfer to that level of care.               Discharge Codes    No documentation.               Expected Discharge Date and Time     Expected Discharge Date Expected Discharge Time    Satinder 10, 2022             JOSE Cruz

## 2022-06-03 NOTE — PROGRESS NOTES
[facility]  INPATIENT PROGRESS NOTE    Length of Stay: 24  Date of Admission: 5/10/2022  Primary Care Physician: Elisa Chacko MD    Subjective   Chief Complaint: Respiratory failure/status post tracheotomy/status post chest tube.    HPI   White blood cells normal.  Blood pressure stable, afebrile.  FiO2 30%.  Slight decrease in hemoglobin, no sign of acute bleed.    Review of Systems   Unable to assess secondary to the patient's intubated and sedated state.     All pertinent negatives and positives are as above. All other systems have been reviewed and are negative unless otherwise stated.     Objective    Temp:  [95 °F (35 °C)-97.8 °F (36.6 °C)] 97.8 °F (36.6 °C)  Heart Rate:  [46-59] 56  Resp:  [21-32] 23  BP: (104-171)/(40-88) 159/76  FiO2 (%):  [30 %] 30 %    Intake/Output Summary (Last 24 hours) at 6/3/2022 0856  Last data filed at 6/3/2022 0400  Gross per 24 hour   Intake 652 ml   Output 980 ml   Net -328 ml     Physical Exam  Vitals and nursing note reviewed.   HENT:      Head: Normocephalic.   Eyes:      Conjunctiva/sclera: Conjunctivae normal.      Pupils: Pupils are equal, round, and reactive to light.   Neck:      Vascular: No JVD.   Cardiovascular:      Rate and Rhythm: Normal rate and regular rhythm.      Heart sounds: Normal heart sounds.   Pulmonary:      Effort: No respiratory distress.      Breath sounds: No wheezing or rales.      Comments: Tracheotomy in place.  On the vent.  Diminished breath sound bilateral, slight rhonchi.  Chest:      Chest wall: No tenderness.   Abdominal:      General: Bowel sounds are normal. There is no distension.      Palpations: Abdomen is soft.      Tenderness: There is no abdominal tenderness.      Comments: Obesity .   Musculoskeletal:         General: No tenderness or deformity.      Cervical back: Neck supple.      Right lower leg: Edema present.      Left lower leg: Edema present.      Comments: +1-2 .    Skin:     General: Skin is warm and dry.      Capillary Refill: Capillary refill takes 2 to 3 seconds.      Findings: No rash.   Neurological:      Mental Status: He is oriented to person, place, and time.      Cranial Nerves: No cranial nerve deficit.      Motor: Weakness present. No abnormal muscle tone.      Coordination: Coordination abnormal.      Gait: Gait abnormal.      Deep Tendon Reflexes: Reflexes normal.         Results Review:  Lab Results (last 24 hours)     Procedure Component Value Units Date/Time    POC Glucose Once [[id number]]  (Normal) Collected: 06/03/22 0540    Specimen: Blood Updated: 06/03/22 0551     Glucose 111 mg/dL      Comment: : ABBEY VazquezMelissa ID: [id number]       Manual Differential [[id number]]  (Abnormal) Collected: 06/03/22 0430    Specimen: Blood Updated: 06/03/22 0512     Neutrophil % 73.7 %      Lymphocyte % 11.1 %      Monocyte % 6.1 %      Eosinophil % 2.0 %      Bands %  3.0 %      Metamyelocyte % 1.0 %      Myelocyte % 3.0 %      Neutrophils Absolute 6.36 10*3/mm3      Lymphocytes Absolute 0.92 10*3/mm3      Monocytes Absolute 0.51 10*3/mm3      Eosinophils Absolute 0.17 10*3/mm3      Anisocytosis Slight/1+     Basophilic Stippling Slight/1+     Macrocytes Slight/1+     Poikilocytes Slight/1+     Polychromasia Slight/1+     WBC Morphology Normal     Platelet Morphology Normal    Comprehensive Metabolic Panel [[id number]]  (Abnormal) Collected: 06/03/22 0430    Specimen: Blood Updated: 06/03/22 0504     Glucose 109 mg/dL      BUN 21 mg/dL      Creatinine 0.49 mg/dL      Sodium 136 mmol/L      Potassium 4.6 mmol/L      Chloride 100 mmol/L      CO2 29.0 mmol/L      Calcium 8.1 mg/dL      Total Protein 5.5 g/dL      Albumin 1.60 g/dL      ALT (SGPT) 28 U/L      AST (SGOT) 30 U/L      Alkaline Phosphatase 174 U/L      Total Bilirubin 0.4 mg/dL      Globulin 3.9 gm/dL      A/G Ratio 0.4 g/dL      BUN/Creatinine Ratio 42.9     Anion Gap 7.0 mmol/L      eGFR  105.7 mL/min/1.73      Comment: National Kidney Foundation and American Society of Nephrology (ASN) Task Force recommended calculation based on the Chronic Kidney Disease Epidemiology Collaboration (CKD-EPI) equation refit without adjustment for race.       Narrative:      GFR Normal >60  Chronic Kidney Disease <60  Kidney Failure <15      Phenytoin Level, Total [[id number]]  (Abnormal) Collected: 06/03/22 0430    Specimen: Blood Updated: 06/03/22 0458     Phenytoin Level 8.3 mcg/mL     CBC & Differential [[id number]]  (Abnormal) Collected: 06/03/22 0430    Specimen: Blood Updated: 06/03/22 0447    Narrative:      The following orders were created for panel order CBC & Differential.  Procedure                               Abnormality         Status                     ---------                               -----------         ------                     CBC Auto Differential[[id number]]        Abnormal            Final result                 Please view results for these tests on the individual orders.    CBC Auto Differential [[id number]]  (Abnormal) Collected: 06/03/22 0430    Specimen: Blood Updated: 06/03/22 0447     WBC 8.29 10*3/mm3      RBC 2.76 10*6/mm3      Hemoglobin 8.6 g/dL      Hematocrit 27.7 %      .4 fL      MCH 31.2 pg      MCHC 31.0 g/dL      RDW 14.1 %      RDW-SD 50.4 fl      MPV 9.2 fL      Platelets 338 10*3/mm3      nRBC 0.4 /100 WBC     Blood Gas, Arterial - [[id number]]  (Abnormal) Collected: 06/03/22 0412    Specimen: Arterial Blood Updated: 06/03/22 0406     Site Left Radial     Denzel's Test Positive     pH, Arterial 7.463 pH units      Comment: 83 Value above reference range        pCO2, Arterial 46.1 mm Hg      Comment: 83 Value above reference range        pO2, Arterial 78.6 mm Hg      Comment: 84 Value below reference range        HCO3, Arterial 33.0 mmol/L      Comment: 83 Value above reference range        Base Excess, Arterial 8.3 mmol/L      Comment: 83 Value above reference  range        O2 Saturation, Arterial 96.8 %      Temperature 37.0 C      Barometric Pressure for Blood Gas 749 mmHg      Modality Ventilator     FIO2 30 %      Ventilator Mode AC     Set Tidal Volume 450     Set Mech Resp Rate 16.0     PEEP 6.0     Collected by [id number]     Comment: Meter: [id number]     :  [id number]        pCO2, Temperature Corrected 46.1 mm Hg      pH, Temp Corrected 7.463 pH Units      pO2, Temperature Corrected 78.6 mm Hg     POC Glucose Once [[id number]]  (Normal) Collected: 06/03/22 0020    Specimen: Blood Updated: 06/03/22 0032     Glucose 93 mg/dL      Comment: : ABBEY Ayala ID: [id number]       POC Glucose Once [[id number]]  (Normal) Collected: 06/02/22 1957    Specimen: Blood Updated: 06/02/22 2008     Glucose 95 mg/dL      Comment: : ABBEY Ayala ID: [id number]       POC Glucose Once [[id number]]  (Abnormal) Collected: 06/02/22 1756    Specimen: Blood Updated: 06/02/22 1807     Glucose 64 mg/dL      Comment: : [id number] Yeimi Valkyrie Computer SystemseMeter ID: [id number]       POC Glucose Once [[id number]]  (Abnormal) Collected: 06/02/22 1120    Specimen: Blood Updated: 06/02/22 1131     Glucose 135 mg/dL      Comment: : [id number] Yeimi Valkyrie Computer SystemseMeter ID: [id number]       POC Glucose Once [[id number]]  (Abnormal) Collected: 06/02/22 1052    Specimen: Blood Updated: 06/02/22 1105     Glucose 64 mg/dL      Comment: : [id number] Yeimi CarrieMeter ID: [id number]              Cultures:  No results found for: BLOODCX, URINECX, WOUNDCX, MRSACX, RESPCX, STOOLCX    Radiology Data:    Imaging Results (Last 24 Hours)     Procedure Component Value Units Date/Time    XR Chest 1 View [[id number]] Collected: 06/03/22 0738     Updated: 06/03/22 0743    Narrative:      EXAM/TECHNIQUE: XR CHEST 1 VW-     INDICATION: On vent; R13.10-Dysphagia, unspecified; Z01.818-Encounter  for other preprocedural examination; D32.9-Benign neoplasm of  meninges,  unspecified; Z74.09-Other reduced mobility; Z78.9-Other specified health  status; G40.901-Epilepsy, unspecified, not intractable, with status  epilepticus; J96.01-Acute respiratory failure with hypoxia     COMPARISON: Prior day     FINDINGS:     2 pigtail catheter drainage catheters have been placed in the medial  posterior chest. Lateral large caliber chest tubes remain stable in  position. No significant change in bibasilar parenchymal and pleural  opacities. No visible pneumothorax. Tracheostomy tube is stable in  position. Cardiac silhouette is stable.       Impression:         Interval placement of 2 pigtail drainage catheters in the medial LEFT  posterior chest. Otherwise, no change in appearance the chest.  This report was finalized on 06/03/2022 07:40 by Dr. Aristides Amor MD.    CT Guided Abscess Drain Lung [[id number]] Collected: 06/02/22 1506     Updated: 06/02/22 1512    Narrative:      EXAMINATION: CT GUIDED ABSCESS DRAIN LUNG-      6/2/2022 9:20 AM CDT     HISTORY: left loculated pleural effusion.  Dr Ortega has discussed with  Dr. Whelan; R13.10-Dysphagia, unspecified; Z01.818-Encounter for other  preprocedural examination; D32.9-Benign neoplasm of meninges,  unspecified; Z74.09-Other reduced mobility; Z78.9-Other specified health  status; G40.901-Epilepsy, unspecified, not intractable, with status  epilepticus; J96.01-Acute respiratory failure with hypoxia     In order to have a CT radiation dose as low as reasonably achievable  Automated Exposure Control was utilized for adjustment of the mA and/or  KV according to patient size.     DLP in mGycm= 3093.     CT-guided placement of 2 separate 8 American pigtail catheter drains  within loculated left pleural fluid collections.     An appropriate time out was performed with all present staff members to  ensure correct patient and correct site and procedure.  The procedure was discussed with the patient including risks, benefits,  and alternatives.     Consent was given.     Patient is intubated and on mechanical ventilation.  ICU nurse and respiratory staff present throughout the procedure.     Right lateral positioning.  Noncontrast CT imaging used to localize the target pleural fluid  collections.     Routine prep and local anesthesia.     With CT guidance an 8 Malaysian pigtail drain catheter was placed into each  of the 2 loculated pleural fluid collections.     Free flow of thin clear to yellow fluid was noted.     40 to 50 cc of fluid was removed from each collection.     Each catheter was placed to gravity bag drainage.     Blood loss = less than 1 cc.     No complications.     Summary:  1. CT-guided placement of 2 left-sided pleural drainage catheters.  2. No complications.                                         This report was finalized on 06/02/2022 15:09 by Dr. Tomer Whelan MD.          No Known Allergies    Scheduled meds:   albuterol, 2.5 mg, Nebulization, Q8H - RT  carvedilol, 6.25 mg, Nasogastric, BID With Meals  chlorhexidine, 15 mL, Mouth/Throat, Q12H  fosphenytoin, 275 mg PE, Intravenous, Q8H  heparin (porcine), 5,000 Units, Subcutaneous, Q12H  insulin detemir, 20 Units, Subcutaneous, Nightly  insulin regular, 2-7 Units, Subcutaneous, Q6H  insulin regular, 8 Units, Subcutaneous, Q6H  lacosamide, 200 mg, Intravenous, Q12H  levETIRAcetam, 500 mg, Intravenous, Q12H  levothyroxine, 125 mcg, Nasogastric, Q AM  lidocaine, 10 mL, Intradermal, Once  liothyronine, 25 mcg, Nasogastric, Daily  lisinopril, 20 mg, Nasogastric, Q24H  mupirocin, 1 application, Topical, Q12H  Nutrisource fiber, 1 packet, Nasogastric, 4x Daily  pantoprazole, 40 mg, Intravenous, Q AM  polyethylene glycol, 17 g, Oral, Daily  ProSource TF, 1 packet, Nasogastric, BID  sodium chloride, 4 mL, Nebulization, BID - RT        PRN meds:  •  acetaminophen **OR** acetaminophen  •  alteplase  •  bisacodyl  •  butalbital-acetaminophen-caffeine  •  dextrose  •  dextrose  •  glucagon (human  recombinant)  •  hold  •  hydrALAZINE  •  ipratropium-albuterol  •  labetalol  •  Morphine  •  ondansetron **OR** ondansetron  •  oxyCODONE    Assessment/Plan       Meningioma (HCC)    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 The patient was admitted on 5/10 by Dr. Downs with neurosurgery.  He has prior history of known meningioma that was causing compression and visual changes.  He presented for craniotomy.  Hospital medicine was originally consulted on 5/14.  The patient had developed status epilepticus postsurgically and required intubation/mechanical ventilation.     Respiratory failure/left pleural effusion. He required intubation on 5/14 for airway protection.  This was done by Dr. Gunderson.  Pulmonary has since been consulted. Continue to monitor for readiness for spontaneous breathing trials and extubation in the future. Again, he was intubated for airway protection given his status epilepticus.  Propofol drip . albuterol nebs.  DuoNebs as needed.  Morphine as needed.  Oxycodone as needed.  CT imaging of chest, abdomen, and pelvis on 5/23.  This showed a moderate size left pleural effusion with extensive compressive atelectasis of the left lung.  Not possible to exclude pneumonia.    Status post thoracotomy tube placement 6/26/2022.   Status post tracheotomy left chest 6/2/2022.  Patient receiving tPA through chest tube.  Ventilator- assist-control, FiO2 30,  tidal volume 450, rate 16, PEEP 6.    Hypotension.  Resolved.  Levophed drip off.    Seizure . consult neurology . IV Vimpat.  IV Keppra.  IV cerebyx.     Meningioma.  Decadron.  CT scan head without contrast showed postsurgical changes from right frontal craniotomy, Small residual 5 mm subdural hematoma hygroma, Residual hemorrhage and occipital Horn similar to prior exam, Previously described hemorrhage in the right frontal lobe is not present on his exam, Dilated ventricular system.  Status post craniotomy 5/10/2022. He has had a lumbar drain placed by neurosurgery.    Plans for shunt placement by neurosurgery.    Thrombocytopenia. His platelet count was 217,000 on 4/20/2022. This declined to a low of 72,000 on 5/17.  Peripheral smear on 5/17 showed no schistocytes with moderate peripheral thrombocytopenia.  PTT normal, PT/INR slightly elevated, fibrin split products high, and fibrinogen high.  Neurosurgery gave him a sixpack of platelets on 5/18.  Platelets remain normal and are now 338,000.     Fever. He had run steady fever from the afternoon of 5/21 through 5/22.  Dr. Escobar was contacted and placed him on vancomycin and cefepime.  He still has a lumbar drain and CSF was sampled on the morning of 5/22.  ..  He received vancomycin. Meropenem was started on 5/25 for 5 days.  Patient has finished vancomycin and meropenem antibiotics.    Hypertension/atrial fibrillation. He typically takes lisinopril. Resumed per tube at a lower dose than normal on 5/18. Titrated up on 5/19. Started carvedilol on 5/19. Titrated medications up to get off Cardene.  Unable to take anticoagulation.  Lisinopril.  Hydralazine as needed.  Echocardiogram- ejection fraction 70%-hyperdynamic, mild concentric hypertrophy, tricuspid regurgitation, right ventricle cavity moderately dilated, right atrial cavity dilated, no significant valvular pathology, atrial fibrillation rhythm.    Hypothyroidism. Free T4 and free T3 were very low as well.  Started levothyroxine and liothyronine on 5/19. TRH pending since 5/19!!  I rechecked TSH, free T4, and free T3 on 5/29 to see where we are at.  TSH is now 3.950.  Free T4 has improve from 0.31 to 0.53.  Free T3 has improved from less than 0.40 to 1.60.  Synthroid . Cytomel .    Diabetes.  Hemoglobin A1c 8.2.  Sliding scale.   Levemir . regular insulin.    Reflux.  Protonix.  Zofran as needed.    Constipation.  MiraLAX.  Dulcolax suppository as needed.    Headaches.  Fioricet as needed.    Obesity.  BMI 31.    SCD for DVT prophylaxis.  Heparin prophylaxis.     Nutrition.  PEG tube placement 6/2/2022.    Blood culture- no growth 5 days.  Respiratory culture- Light growth (2+) Normal respiratory annia. No S. aureus or Pseudomonas aeruginosa detected. Final report.   Urine culture>100,000 CFU/mL Klebsiella aerogenes Abnormal   AFB culture-pending.  Anaerobic culture-negative.  Body fluid culture-negative.  Fungal culture pending.  MRSA DNA probe-negative.  COVID-19-negative.    Discharge Planning: LTAC?    Electronically signed by Aden Kc MD, 06/03/22, 8:56 AM CDT.

## 2022-06-04 ENCOUNTER — APPOINTMENT (OUTPATIENT)
Dept: GENERAL RADIOLOGY | Facility: HOSPITAL | Age: 77
End: 2022-06-04

## 2022-06-04 LAB
ALBUMIN SERPL-MCNC: 1.5 G/DL (ref 3.5–5.2)
ALBUMIN/GLOB SERPL: 0.4 G/DL
ALP SERPL-CCNC: 147 U/L (ref 39–117)
ALT SERPL W P-5'-P-CCNC: 25 U/L (ref 1–41)
ANION GAP SERPL CALCULATED.3IONS-SCNC: 4 MMOL/L (ref 5–15)
ANISOCYTOSIS BLD QL: ABNORMAL
ARTERIAL PATENCY WRIST A: ABNORMAL
ARTERIAL PATENCY WRIST A: POSITIVE
AST SERPL-CCNC: 28 U/L (ref 1–40)
ATMOSPHERIC PRESS: 746 MMHG
ATMOSPHERIC PRESS: 749 MMHG
BASE EXCESS BLDA CALC-SCNC: 6.7 MMOL/L (ref 0–2)
BASE EXCESS BLDA CALC-SCNC: 6.9 MMOL/L (ref 0–2)
BASO STIPL COARSE BLD QL SMEAR: ABNORMAL
BASOPHILS # BLD MANUAL: 0.08 10*3/MM3 (ref 0–0.2)
BASOPHILS NFR BLD MANUAL: 1 % (ref 0–1.5)
BDY SITE: ABNORMAL
BDY SITE: ABNORMAL
BILIRUB SERPL-MCNC: 0.4 MG/DL (ref 0–1.2)
BODY TEMPERATURE: 37 C
BODY TEMPERATURE: 37 C
BUN SERPL-MCNC: 21 MG/DL (ref 8–23)
BUN/CREAT SERPL: 42 (ref 7–25)
CALCIUM SPEC-SCNC: 8.1 MG/DL (ref 8.6–10.5)
CHLORIDE SERPL-SCNC: 102 MMOL/L (ref 98–107)
CO2 SERPL-SCNC: 31 MMOL/L (ref 22–29)
CREAT SERPL-MCNC: 0.5 MG/DL (ref 0.76–1.27)
DEPRECATED RDW RBC AUTO: 51.1 FL (ref 37–54)
EGFRCR SERPLBLD CKD-EPI 2021: 105.1 ML/MIN/1.73
ERYTHROCYTE [DISTWIDTH] IN BLOOD BY AUTOMATED COUNT: 13.8 % (ref 12.3–15.4)
GLOBULIN UR ELPH-MCNC: 3.6 GM/DL
GLUCOSE BLDC GLUCOMTR-MCNC: 102 MG/DL (ref 70–130)
GLUCOSE BLDC GLUCOMTR-MCNC: 102 MG/DL (ref 70–130)
GLUCOSE BLDC GLUCOMTR-MCNC: 110 MG/DL (ref 70–130)
GLUCOSE BLDC GLUCOMTR-MCNC: 134 MG/DL (ref 70–130)
GLUCOSE BLDC GLUCOMTR-MCNC: 141 MG/DL (ref 70–130)
GLUCOSE SERPL-MCNC: 134 MG/DL (ref 65–99)
HCO3 BLDA-SCNC: 31.8 MMOL/L (ref 20–26)
HCO3 BLDA-SCNC: 33.1 MMOL/L (ref 20–26)
HCT VFR BLD AUTO: 25.8 % (ref 37.5–51)
HGB BLD-MCNC: 8 G/DL (ref 13–17.7)
INHALED O2 CONCENTRATION: 30 %
INHALED O2 CONCENTRATION: 30 %
LYMPHOCYTES # BLD MANUAL: 0.48 10*3/MM3 (ref 0.7–3.1)
LYMPHOCYTES NFR BLD MANUAL: 5.1 % (ref 5–12)
Lab: ABNORMAL
Lab: ABNORMAL
MCH RBC QN AUTO: 31.7 PG (ref 26.6–33)
MCHC RBC AUTO-ENTMCNC: 31 G/DL (ref 31.5–35.7)
MCV RBC AUTO: 102.4 FL (ref 79–97)
METAMYELOCYTES NFR BLD MANUAL: 1 % (ref 0–0)
MODALITY: ABNORMAL
MODALITY: ABNORMAL
MONOCYTES # BLD: 0.4 10*3/MM3 (ref 0.1–0.9)
MYELOCYTES NFR BLD MANUAL: 6.1 % (ref 0–0)
NEUTROPHILS # BLD AUTO: 6.39 10*3/MM3 (ref 1.7–7)
NEUTROPHILS NFR BLD MANUAL: 79.6 % (ref 42.7–76)
NEUTS BAND NFR BLD MANUAL: 1 % (ref 0–5)
PAW @ PEAK INSP FLOW SETTING VENT: 17 CMH2O
PCO2 BLDA: 47.7 MM HG (ref 35–45)
PCO2 BLDA: 57.5 MM HG (ref 35–45)
PCO2 TEMP ADJ BLD: 47.7 MM HG (ref 35–45)
PCO2 TEMP ADJ BLD: 57.5 MM HG (ref 35–45)
PEEP RESPIRATORY: 5 CM[H2O]
PEEP RESPIRATORY: 5 CM[H2O]
PH BLDA: 7.37 PH UNITS (ref 7.35–7.45)
PH BLDA: 7.43 PH UNITS (ref 7.35–7.45)
PH, TEMP CORRECTED: 7.37 PH UNITS (ref 7.35–7.45)
PH, TEMP CORRECTED: 7.43 PH UNITS (ref 7.35–7.45)
PHENYTOIN SERPL-MCNC: 9.4 MCG/ML (ref 10–20)
PLAT MORPH BLD: NORMAL
PLATELET # BLD AUTO: 300 10*3/MM3 (ref 140–450)
PMV BLD AUTO: 9.2 FL (ref 6–12)
PO2 BLDA: 103 MM HG (ref 83–108)
PO2 BLDA: 84.8 MM HG (ref 83–108)
PO2 TEMP ADJ BLD: 103 MM HG (ref 83–108)
PO2 TEMP ADJ BLD: 84.8 MM HG (ref 83–108)
POIKILOCYTOSIS BLD QL SMEAR: ABNORMAL
POLYCHROMASIA BLD QL SMEAR: ABNORMAL
POTASSIUM SERPL-SCNC: 4.7 MMOL/L (ref 3.5–5.2)
PROT SERPL-MCNC: 5.1 G/DL (ref 6–8.5)
RBC # BLD AUTO: 2.52 10*6/MM3 (ref 4.14–5.8)
SAO2 % BLDCOA: 96.7 % (ref 94–99)
SAO2 % BLDCOA: 98.6 % (ref 94–99)
SET MECH RESP RATE: 16
SET MECH RESP RATE: 16
SODIUM SERPL-SCNC: 137 MMOL/L (ref 136–145)
TOXIC GRANULATION: ABNORMAL
VARIANT LYMPHS NFR BLD MANUAL: 6.1 % (ref 19.6–45.3)
VENTILATOR MODE: ABNORMAL
VENTILATOR MODE: AC
VT ON VENT VENT: 450 ML
WBC NRBC COR # BLD: 7.93 10*3/MM3 (ref 3.4–10.8)

## 2022-06-04 PROCEDURE — 71045 X-RAY EXAM CHEST 1 VIEW: CPT

## 2022-06-04 PROCEDURE — 80053 COMPREHEN METABOLIC PANEL: CPT | Performed by: NURSE PRACTITIONER

## 2022-06-04 PROCEDURE — 94799 UNLISTED PULMONARY SVC/PX: CPT

## 2022-06-04 PROCEDURE — 63710000001 INSULIN DETEMIR PER 5 UNITS: Performed by: NURSE PRACTITIONER

## 2022-06-04 PROCEDURE — 99231 SBSQ HOSP IP/OBS SF/LOW 25: CPT | Performed by: SURGERY

## 2022-06-04 PROCEDURE — 80185 ASSAY OF PHENYTOIN TOTAL: CPT | Performed by: NURSE PRACTITIONER

## 2022-06-04 PROCEDURE — 82962 GLUCOSE BLOOD TEST: CPT

## 2022-06-04 PROCEDURE — 25010000002 LORAZEPAM PER 2 MG: Performed by: PSYCHIATRY & NEUROLOGY

## 2022-06-04 PROCEDURE — 85025 COMPLETE CBC W/AUTO DIFF WBC: CPT | Performed by: NURSE PRACTITIONER

## 2022-06-04 PROCEDURE — 99024 POSTOP FOLLOW-UP VISIT: CPT | Performed by: NURSE PRACTITIONER

## 2022-06-04 PROCEDURE — 94664 DEMO&/EVAL PT USE INHALER: CPT

## 2022-06-04 PROCEDURE — 25010000002 MORPHINE SULFATE (PF) 2 MG/ML SOLUTION: Performed by: NURSE PRACTITIONER

## 2022-06-04 PROCEDURE — 25010000002 CEFAZOLIN PER 500 MG: Performed by: NURSE PRACTITIONER

## 2022-06-04 PROCEDURE — 25010000002 PROPOFOL 10 MG/ML EMULSION: Performed by: NURSE PRACTITIONER

## 2022-06-04 PROCEDURE — 94003 VENT MGMT INPAT SUBQ DAY: CPT

## 2022-06-04 PROCEDURE — 25010000002 HYDRALAZINE PER 20 MG: Performed by: NURSE PRACTITIONER

## 2022-06-04 PROCEDURE — 36600 WITHDRAWAL OF ARTERIAL BLOOD: CPT

## 2022-06-04 PROCEDURE — 99291 CRITICAL CARE FIRST HOUR: CPT | Performed by: PSYCHIATRY & NEUROLOGY

## 2022-06-04 PROCEDURE — 25010000002 LEVETIRACETAM IN NACL 0.82% 500 MG/100ML SOLUTION: Performed by: NURSE PRACTITIONER

## 2022-06-04 PROCEDURE — 82803 BLOOD GASES ANY COMBINATION: CPT

## 2022-06-04 PROCEDURE — 25010000002 FOSPHENYTOIN 500 MG PE/10ML SOLUTION 10 ML VIAL: Performed by: NURSE PRACTITIONER

## 2022-06-04 PROCEDURE — 25010000002 HEPARIN (PORCINE) PER 1000 UNITS: Performed by: NURSE PRACTITIONER

## 2022-06-04 PROCEDURE — 85007 BL SMEAR W/DIFF WBC COUNT: CPT | Performed by: NURSE PRACTITIONER

## 2022-06-04 PROCEDURE — 99233 SBSQ HOSP IP/OBS HIGH 50: CPT | Performed by: INTERNAL MEDICINE

## 2022-06-04 RX ORDER — AMINO AC/PROTEIN HYDR/WHEY PRO 10G-100/30
45 LIQUID (ML) ORAL 2 TIMES DAILY
Status: DISCONTINUED | OUTPATIENT
Start: 2022-06-04 | End: 2022-06-20 | Stop reason: HOSPADM

## 2022-06-04 RX ORDER — LORAZEPAM 2 MG/ML
2 INJECTION INTRAMUSCULAR ONCE
Status: COMPLETED | OUTPATIENT
Start: 2022-06-04 | End: 2022-06-04

## 2022-06-04 RX ORDER — LORAZEPAM 2 MG/ML
2 INJECTION INTRAMUSCULAR
Status: DISCONTINUED | OUTPATIENT
Start: 2022-06-04 | End: 2022-06-11

## 2022-06-04 RX ADMIN — CHLORHEXIDINE GLUCONATE 15 ML: 1.2 RINSE ORAL at 08:28

## 2022-06-04 RX ADMIN — PROPOFOL 40 MCG/KG/MIN: 10 INJECTION, EMULSION INTRAVENOUS at 02:26

## 2022-06-04 RX ADMIN — ACETAMINOPHEN 650 MG: 325 TABLET ORAL at 20:29

## 2022-06-04 RX ADMIN — Medication 0.02 MCG/KG/MIN: at 21:01

## 2022-06-04 RX ADMIN — SODIUM CHLORIDE 275 MG PE: 9 INJECTION, SOLUTION INTRAVENOUS at 22:15

## 2022-06-04 RX ADMIN — ALBUTEROL SULFATE 2.5 MG: 2.5 SOLUTION RESPIRATORY (INHALATION) at 07:01

## 2022-06-04 RX ADMIN — INSULIN DETEMIR 20 UNITS: 100 INJECTION, SOLUTION SUBCUTANEOUS at 20:36

## 2022-06-04 RX ADMIN — SODIUM CHLORIDE SOLN NEBU 3% 4 ML: 3 NEBU SOLN at 07:10

## 2022-06-04 RX ADMIN — CHLORHEXIDINE GLUCONATE 15 ML: 1.2 RINSE ORAL at 20:29

## 2022-06-04 RX ADMIN — OXYCODONE HYDROCHLORIDE 7.5 MG: 5 SOLUTION ORAL at 20:27

## 2022-06-04 RX ADMIN — Medication 45 ML: at 09:39

## 2022-06-04 RX ADMIN — Medication 1 PACKET: at 17:41

## 2022-06-04 RX ADMIN — MORPHINE SULFATE 2 MG: 2 INJECTION, SOLUTION INTRAMUSCULAR; INTRAVENOUS at 13:37

## 2022-06-04 RX ADMIN — LEVETIRACETAM 500 MG: 5 INJECTION INTRAVASCULAR at 08:27

## 2022-06-04 RX ADMIN — ALBUTEROL SULFATE 2.5 MG: 2.5 SOLUTION RESPIRATORY (INHALATION) at 22:56

## 2022-06-04 RX ADMIN — Medication 45 ML: at 11:21

## 2022-06-04 RX ADMIN — LEVETIRACETAM 500 MG: 5 INJECTION INTRAVASCULAR at 20:27

## 2022-06-04 RX ADMIN — HEPARIN SODIUM 5000 UNITS: 5000 INJECTION INTRAVENOUS; SUBCUTANEOUS at 08:27

## 2022-06-04 RX ADMIN — LORAZEPAM 2 MG: 2 INJECTION INTRAMUSCULAR; INTRAVENOUS at 20:38

## 2022-06-04 RX ADMIN — LACOSAMIDE 200 MG: 10 INJECTION, SOLUTION INTRAVENOUS at 20:27

## 2022-06-04 RX ADMIN — CEFAZOLIN SODIUM 2 G: 10 INJECTION, POWDER, FOR SOLUTION INTRAVENOUS at 08:28

## 2022-06-04 RX ADMIN — PROPOFOL 30 MCG/KG/MIN: 10 INJECTION, EMULSION INTRAVENOUS at 22:06

## 2022-06-04 RX ADMIN — POLYETHYLENE GLYCOL 3350 17 G: 17 POWDER, FOR SOLUTION ORAL at 08:28

## 2022-06-04 RX ADMIN — MUPIROCIN 1 APPLICATION: 20 OINTMENT TOPICAL at 20:25

## 2022-06-04 RX ADMIN — Medication 1 PACKET: at 11:01

## 2022-06-04 RX ADMIN — CEFAZOLIN SODIUM 2 G: 10 INJECTION, POWDER, FOR SOLUTION INTRAVENOUS at 15:48

## 2022-06-04 RX ADMIN — PROPOFOL 30 MCG/KG/MIN: 10 INJECTION, EMULSION INTRAVENOUS at 15:19

## 2022-06-04 RX ADMIN — MORPHINE SULFATE 2 MG: 2 INJECTION, SOLUTION INTRAMUSCULAR; INTRAVENOUS at 20:44

## 2022-06-04 RX ADMIN — HEPARIN SODIUM 5000 UNITS: 5000 INJECTION INTRAVENOUS; SUBCUTANEOUS at 20:26

## 2022-06-04 RX ADMIN — Medication 1 PACKET: at 20:25

## 2022-06-04 RX ADMIN — SODIUM CHLORIDE 275 MG PE: 9 INJECTION, SOLUTION INTRAVENOUS at 13:37

## 2022-06-04 RX ADMIN — Medication 45 ML: at 20:26

## 2022-06-04 RX ADMIN — Medication 1 PACKET: at 09:39

## 2022-06-04 RX ADMIN — LISINOPRIL 20 MG: 20 TABLET ORAL at 08:28

## 2022-06-04 RX ADMIN — PANTOPRAZOLE SODIUM 40 MG: 40 INJECTION, POWDER, FOR SOLUTION INTRAVENOUS at 05:25

## 2022-06-04 RX ADMIN — LORAZEPAM 2 MG: 2 INJECTION INTRAMUSCULAR; INTRAVENOUS at 16:41

## 2022-06-04 RX ADMIN — LIOTHYRONINE SODIUM 25 MCG: 25 TABLET ORAL at 08:28

## 2022-06-04 RX ADMIN — LACOSAMIDE 200 MG: 10 INJECTION, SOLUTION INTRAVENOUS at 08:27

## 2022-06-04 RX ADMIN — HYDRALAZINE HYDROCHLORIDE 10 MG: 20 INJECTION INTRAMUSCULAR; INTRAVENOUS at 11:07

## 2022-06-04 RX ADMIN — MORPHINE SULFATE 2 MG: 2 INJECTION, SOLUTION INTRAMUSCULAR; INTRAVENOUS at 16:13

## 2022-06-04 RX ADMIN — OXYCODONE HYDROCHLORIDE 7.5 MG: 5 SOLUTION ORAL at 11:07

## 2022-06-04 RX ADMIN — ALBUTEROL SULFATE 2.5 MG: 2.5 SOLUTION RESPIRATORY (INHALATION) at 14:16

## 2022-06-04 RX ADMIN — SODIUM CHLORIDE SOLN NEBU 3% 4 ML: 3 NEBU SOLN at 19:13

## 2022-06-04 RX ADMIN — MUPIROCIN 1 APPLICATION: 20 OINTMENT TOPICAL at 08:28

## 2022-06-04 RX ADMIN — Medication 45 ML: at 20:29

## 2022-06-04 RX ADMIN — SODIUM CHLORIDE 275 MG PE: 9 INJECTION, SOLUTION INTRAVENOUS at 05:24

## 2022-06-04 RX ADMIN — PROPOFOL 40 MCG/KG/MIN: 10 INJECTION, EMULSION INTRAVENOUS at 18:56

## 2022-06-04 NOTE — PROGRESS NOTES
PULMONARY AND CRITICAL CARE PROGRESS NOTE - [facility]    Patient: Hai Hernandez    1945    MR# [id number]    Acct# [id number]  06/04/22   08:48 CDT  Referring Provider: Martell Downs, *    Chief Complaint: Mechanically ventilated    Interval history: The patient remains intubated and sedated.  Propofol gtt infusing.  O2 sat 99% on 5 PEEP, 0.30 FiO2.  Nursing reports seizure activity overnight.  HR 37.  Temp 92 this AM.  He is now on [place] warming system.  ABG and CXR stable.  x4 CT intact.  No other aggravating or alleviating factors.           Meds:  albuterol, 2.5 mg, Nebulization, Q8H - RT  carvedilol, 6.25 mg, Nasogastric, BID With Meals  ceFAZolin, 2 g, Intravenous, Q8H  chlorhexidine, 15 mL, Mouth/Throat, Q12H  fosphenytoin, 275 mg PE, Intravenous, Q8H  heparin (porcine), 5,000 Units, Subcutaneous, Q12H  insulin detemir, 20 Units, Subcutaneous, Nightly  insulin regular, 2-7 Units, Subcutaneous, Q6H  insulin regular, 8 Units, Subcutaneous, Q6H  lacosamide, 200 mg, Intravenous, Q12H  levETIRAcetam, 500 mg, Intravenous, Q12H  levothyroxine, 125 mcg, Nasogastric, Q AM  lidocaine, 10 mL, Intradermal, Once  liothyronine, 25 mcg, Nasogastric, Daily  lisinopril, 20 mg, Nasogastric, Q24H  mupirocin, 1 application, Topical, Q12H  Nutrisource fiber, 1 packet, Nasogastric, 4x Daily  pantoprazole, 40 mg, Intravenous, Q AM  polyethylene glycol, 17 g, Oral, Daily  ProSource TF, 1 packet, Nasogastric, BID  sodium chloride, 4 mL, Nebulization, BID - RT      hold, 1 each  norepinephrine, 0.02-0.3 mcg/kg/min, Last Rate: Stopped (06/01/22 0606)  propofol, 5-50 mcg/kg/min, Last Rate: 15 mcg/kg/min (06/04/22 0524)      Review of Systems:   Cannot obtain due to mechanical ventilated state     Ventilator Settings:        Resp Rate (Set): 16  Pressure Support (cm H2O): 8 cm H20  FiO2 (%): 40 %  PEEP/CPAP (cm H2O): 5 cm H20  Minute Ventilation (L/min) (Obs): 7.87 L/min  Resp Rate (Observed) Vent:  29  I:E Ratio (Set): 1:0.32  I:E Ratio (Obs): 1:1.50  PIP Observed (cm H2O): 18 cm H2O  RSBI: 21.96  Physical Exam:  Temp:  [92 °F (33.3 °C)-98.1 °F (36.7 °C)] 92 °F (33.3 °C)  Heart Rate:  [35-68] 37  Resp:  [16-23] 23  BP: ()/(50-87) 132/74  FiO2 (%):  [30 %-60 %] 40 %    Intake/Output Summary (Last 24 hours) at 6/4/2022 0848  Last data filed at 6/4/2022 0756  Gross per 24 hour   Intake 785 ml   Output 490 ml   Net 295 ml     SpO2 Percentage    06/04/22 0719 06/04/22 0730 06/04/22 0745   SpO2: 100% 100% 100%   Body mass index is 33.91 kg/m².   Physical Exam     Constitutional:       General: He is sleeping.      Appearance: He is ill-appearing. He is not toxic-appearing.      Interventions: He is sedated and intubated.   HENT:      Head: Normocephalic.      Comments: Craniotomy wound, trach     Nose: Nose normal.   Eyes:      General: No scleral icterus.  Cardiovascular: Sinus bradycardia, 30s  Pulmonary:      Effort: No accessory muscle usage or respiratory distress. He is intubated.      Breath sounds: Decreased breath sounds in bases   Chest:      Chest wall: No edema.      Comments: Left sided chest tubes x2, 2 pigtails intact.  Abdominal:      General: There is no distension.      Palpations: Abdomen is soft.   Genitourinary:     Comments: Mccrary  Musculoskeletal:      Right lower leg: Edema present.      Left lower leg: Edema present.      Comments: SCDs   Skin:     Findings: No rash.   Neurological:      Motor: No tremor or seizure activity.  Will open eyes, but not following any commands with sedation paused.  Psychiatric:         Behavior: Behavior is not agitated.   Electronically signed by MARIO Aldrich, 6/4/2022, 08:48 CDT      Physician Substantive Portion: Medical Decision Making    Laboratory Data:  Results from last 7 days   Lab Units 06/04/22  0436 06/03/22  0430 06/02/22  0528   WBC 10*3/mm3 7.93 8.29 11.86*   HEMOGLOBIN g/dL 8.0* 8.6* 9.0*   PLATELETS 10*3/mm3 300 210 884      Results from last 7 days   Lab Units 06/04/22  0436 06/03/22  0430 06/02/22  0528 06/01/22  0139 05/31/22  1755   SODIUM mmol/L 137 136 137   < >  --    POTASSIUM mmol/L 4.7 4.6 4.5   < >  --    BUN mg/dL 21 21 28*   < >  --    CREATININE mg/dL 0.50* 0.49* 0.50*   < >  --    INR   --   --   --   --  1.10*    < > = values in this interval not displayed.     Results from last 7 days   Lab Units 06/04/22  0442 06/03/22  0412 06/02/22  0433   PH, ARTERIAL pH units 7.369 7.463* 7.448   PCO2, ARTERIAL mm Hg 57.5* 46.1* 49.5*   PO2 ART mm Hg 103.0 78.6* 84.1   FIO2 % 30 30 30     No results found for: BLOODCX, URINECX, WOUNDCX, MRSACX, RESPCX, STOOLCX  Recent films:  CT Head Without Contrast    Result Date: 6/3/2022  EXAMINATION: CT head without contrast 6/3/2022  DOSE: 936 mGycm. All CT scans are performed using dose optimization techniques as appropriate to the performed exam and including at least one of the following: Automated exposure control, adjustment of the mA and/or kV according to size, and the use of the iterative reconstruction technique..  HISTORY: Evaluate status post ventricular peritoneal shunt placement  FINDINGS: Today's exam is compared to prior study of earlier the same day. A heterogeneous masslike density is noted within the right frontal lobe with some peripheral hyperdensity likely representing postoperative blood products. The patient's undergone right frontal craniotomy. A ventriculostomy tube has been placed via right posterior parietal approach with the tip projecting through the posterior horn and body of the right lateral ventricle. The tip is noted near the midline. Ventricular size is stable from the previous exam. There is hemorrhage layering dependently within the posterior horn of both lateral ventricles stable from the previous exam. There is persistent mass effect in the right frontal lobe with mild shift to the midline stable from the previous exam. The basilar cisterns remain  patent.      Impression: 1.. Ventriculoperitoneal shunt placed via right posterior parietal approach with the catheter traversing the posterior horn and body of the right lateral ventricle with the tip near the midline. Ventricle size is stable from the previous exam. There is no evidence of complications. 2. Intraventricular hemorrhage is stable from the previous exam. There is again a heterogeneous masslike density in the right frontal lobe with recent craniotomy and mild mass effect with shift of the midline from right to left within the frontal lobe. This is stable from the previous exam. This report was finalized on 06/03/2022 17:55 by Dr. Guillaume Bey MD.    CT Head Without Contrast    Result Date: 6/3/2022  EXAMINATION: CT HEAD WO CONTRAST-   6/3/2022 11:34 AM CDT  HISTORY: continued evaluation.  Surgical planning for VPS placement; R13.10-Dysphagia, unspecified; Z01.818-Encounter for other preprocedural examination; D32.9-Benign neoplasm of meninges, unspecified; Z74.09-Other reduced mobility; Z78.9-Other specified health status; G40.901-Epilepsy, unspecified, not intractable, with status epilepticus; J96.01-Acute respiratory failure with hypoxia; G91.0-Communicati  In order to have a CT radiation dose as low as reasonably achievable Automated Exposure Control was utilized for adjustment of the mA and/or KV according to patient size.  DLP in mGycm= 874.  Noncontrast head CT. Axial, sagittal, and coronal imaging.  Comparison is made with May 27, 2022.  Right frontal craniotomy. Interval partial resolution of scalp swelling and edema.  Persistent though decreased dependent intraventricular hemorrhage. Ventricle size is stable.  There is no parenchymal hemorrhage or mass effect.  Summary: 1. Stable postoperative head CT.            This report was finalized on 06/03/2022 12:25 by Dr. Tomer Whelan MD.    XR Chest 1 View    Result Date: 6/4/2022  HISTORY: Intubated  CXR: Frontal view the chest obtained   COMPARISON: 6/3/2022  FINDINGS: Tracheostomy tube tip appropriate in position. There are 2 small pigtail catheters of the left hemithorax with 2 larger lateral left-sided chest tubes. Pleural catheters similar in position. No pneumothorax. Small left pleural fluid collection. Hypoventilated lungs with stable left greater than right basilar densities favoring atelectasis. Scattered diffuse bilateral granuloma.  Right upper extremity PICC line tip projects over the SVC.      Impression: 1. Appropriate positioning of tracheostomy tube. Similar positioning of the left-sided pleural catheters with small left pleural fluid collection. Probable left greater than right basilar atelectasis. No appreciable pneumothorax. This report was finalized on 06/04/2022 08:22 by Dr. Christel Feliciano MD.    XR Chest 1 View    Result Date: 6/3/2022  EXAM/TECHNIQUE: XR CHEST 1 VW-  INDICATION: On vent; R13.10-Dysphagia, unspecified; Z01.818-Encounter for other preprocedural examination; D32.9-Benign neoplasm of meninges, unspecified; Z74.09-Other reduced mobility; Z78.9-Other specified health status; G40.901-Epilepsy, unspecified, not intractable, with status epilepticus; J96.01-Acute respiratory failure with hypoxia  COMPARISON: Prior day  FINDINGS:  2 pigtail catheter drainage catheters have been placed in the medial posterior chest. Lateral large caliber chest tubes remain stable in position. No significant change in bibasilar parenchymal and pleural opacities. No visible pneumothorax. Tracheostomy tube is stable in position. Cardiac silhouette is stable.      Impression:  Interval placement of 2 pigtail drainage catheters in the medial LEFT posterior chest. Otherwise, no change in appearance the chest. This report was finalized on 06/03/2022 07:40 by Dr. Aristides Amor MD.    CT Guided Abscess Drain Lung    Result Date: 6/2/2022  EXAMINATION: CT GUIDED ABSCESS DRAIN LUNG-   6/2/2022 9:20 AM CDT  HISTORY: left loculated pleural effusion.   Dr Ortega has discussed with Dr. Whelan; R13.10-Dysphagia, unspecified; Z01.818-Encounter for other preprocedural examination; D32.9-Benign neoplasm of meninges, unspecified; Z74.09-Other reduced mobility; Z78.9-Other specified health status; G40.901-Epilepsy, unspecified, not intractable, with status epilepticus; J96.01-Acute respiratory failure with hypoxia  In order to have a CT radiation dose as low as reasonably achievable Automated Exposure Control was utilized for adjustment of the mA and/or KV according to patient size.  DLP in mGycm= 3093.  CT-guided placement of 2 separate 8 [place] pigtail catheter drains within loculated left pleural fluid collections.  An appropriate time out was performed with all present staff members to ensure correct patient and correct site and procedure. The procedure was discussed with the patient including risks, benefits, and alternatives.  Consent was given.  Patient is intubated and on mechanical ventilation. ICU nurse and respiratory staff present throughout the procedure.  Right lateral positioning. Noncontrast CT imaging used to localize the target pleural fluid collections.  Routine prep and local anesthesia.  With CT guidance an 8 [place] pigtail drain catheter was placed into each of the 2 loculated pleural fluid collections.  Free flow of thin clear to yellow fluid was noted.  40 to 50 cc of fluid was removed from each collection.  Each catheter was placed to gravity bag drainage.  Blood loss = less than 1 cc.  No complications.  Summary: 1. CT-guided placement of 2 left-sided pleural drainage catheters. 2. No complications.              This report was finalized on 06/02/2022 15:09 by Dr. Tomer Whelan MD.     My radiograph interpretation/independent review of imaging: Reviewed and agree with current interpretation    Pulmonary Assessment:    1. Acute hypoxic respiratory failure  2. On mechanically assisted ventilation  3. Tracheostomy and PEG tube placement done for  long-term care  4. Status postcraniotomy for meningioma  5. Encephalopathy status epilepticus  6. Loculated left-sided pleural effusion with multiple chest tubes CT surgery managing  7. Hypertension  8. Type 2 diabetes mellitus  9. S/p ventriculoperitoneal shunt placement  10. Obesity    Recommend/plan:   · Patient was seen in the follow-up visit in pulm rounds in intensive clinic today.  · He appears to be stable on the ventilator but not ready for spontaneous breathing trial.  · Current ventilator settings pressure control ventilation, PI 12, PEEP 5, rate 16, 30% FiO2 the oxygen saturation 96%.  · Continue current ventilator settings bronchodilator treatment and routine respiratory care.  ·  shunt done.  Patient tolerated the procedure well.  Neurosurgery managing.  · Multiple chest tubes in place on left chest.  CT surgery following good reexpansion of the lung noted in the imaging studies  · Continue nutritional support.  Scalp wound postsurgery looks good without any oozing or drainage.  · Neurology following for status epilepticus continue antiseizure medications  · Blood pressure and glycemic control. Pain and anxiety control and DVT and stress ulcer prophylaxis.  · Repeat labs and imaging studies from time to time.  Nutritional support with tube feeding  · CODE STATUS: Full.  Overall prognosis: Guarded.  · We will follow.      This visit was performed by both a physician and an [facility] RN.  I personally evaluated and examined the patient.  I performed all aspects of the medical decision making as documented.    Electronically signed by     Murphy Kothari MD,  Pulmonologist/Intensivist   6/4/2022, 14:02 CDT

## 2022-06-04 NOTE — PROGRESS NOTES
[facility]  INPATIENT PROGRESS NOTE    Length of Stay: 25  Date of Admission: 5/10/2022  Primary Care Physician: Elisa Chacko MD    Subjective   Chief Complaint: Respiratory failure/status post tracheotomy/status post chest tube    HPI   For chest tube to the left lung.  FiO2 is 540%.  Slight decrease in hemoglobin, no sign of acute bleed.  Blood pressure stable, afebrile.  Episode of seizure last night, went up without proper thought last night.  Adjustment of medication was done by neurology, propofol has been turned off this morning.  Tobi hugger is in place.    Review of Systems   Unable to assess secondary to the patient's intubated and sedated state.     All pertinent negatives and positives are as above. All other systems have been reviewed and are negative unless otherwise stated.     Objective    Temp:  [92 °F (33.3 °C)-98.1 °F (36.7 °C)] 92 °F (33.3 °C)  Heart Rate:  [35-68] 37  Resp:  [16-23] 23  BP: ()/(50-87) 132/74  FiO2 (%):  [30 %-60 %] 40 %    Intake/Output Summary (Last 24 hours) at 6/4/2022 0922  Last data filed at 6/4/2022 0756  Gross per 24 hour   Intake 685 ml   Output 490 ml   Net 195 ml     Physical Exam  Vitals and nursing note reviewed.   HENT:      Head: Normocephalic.   Eyes:      Conjunctiva/sclera: Conjunctivae normal.      Pupils: Pupils are equal, round, and reactive to light.   Neck:      Vascular: No JVD.   Cardiovascular:      Rate and Rhythm: Normal rate and regular rhythm.      Heart sounds: Normal heart sounds.   Pulmonary:      Effort: No respiratory distress.      Breath sounds: No wheezing or rales.      Comments: Tracheotomy in place.  On the vent.  Diminished breath sound bilateral, slight rhonchi.  Chest:      Chest wall: No tenderness.   Abdominal:      General: Bowel sounds are normal. There is no distension.      Palpations: Abdomen is soft.      Tenderness: There is no abdominal tenderness.       Comments: Obesity .   Musculoskeletal:         General: No tenderness or deformity.      Cervical back: Neck supple.      Right lower leg: Edema present.      Left lower leg: Edema present.      Comments: +1-2 .   Skin:     General: Skin is warm and dry.      Capillary Refill: Capillary refill takes 2 to 3 seconds.      Findings: No rash.   Neurological:      Mental Status: He is oriented to person, place, and time.      Cranial Nerves: No cranial nerve deficit.      Motor: Weakness present. No abnormal muscle tone.      Coordination: Coordination abnormal.      Gait: Gait abnormal.      Deep Tendon Reflexes: Reflexes normal.   Results Review:  Lab Results (last 24 hours)     Procedure Component Value Units Date/Time    Manual Differential [[id number]]  (Abnormal) Collected: 06/04/22 0436    Specimen: Blood Updated: 06/04/22 0546     Neutrophil % 79.6 %      Lymphocyte % 6.1 %      Monocyte % 5.1 %      Basophil % 1.0 %      Bands %  1.0 %      Metamyelocyte % 1.0 %      Myelocyte % 6.1 %      Neutrophils Absolute 6.39 10*3/mm3      Lymphocytes Absolute 0.48 10*3/mm3      Monocytes Absolute 0.40 10*3/mm3      Basophils Absolute 0.08 10*3/mm3      Anisocytosis Slight/1+     Basophilic Stippling Slight/1+     Poikilocytes Slight/1+     Polychromasia Slight/1+     Toxic Granulation Slight/1+     Platelet Morphology Normal    CBC & Differential [[id number]]  (Abnormal) Collected: 06/04/22 0436    Specimen: Blood Updated: 06/04/22 0546    Narrative:      The following orders were created for panel order CBC & Differential.  Procedure                               Abnormality         Status                     ---------                               -----------         ------                     CBC Auto Differential[[id number]]        Abnormal            Final result                 Please view results for these tests on the individual orders.    CBC Auto Differential [[id number]]  (Abnormal) Collected: 06/04/22 0436     Specimen: Blood Updated: 06/04/22 0546     WBC 7.93 10*3/mm3      RBC 2.52 10*6/mm3      Hemoglobin 8.0 g/dL      Hematocrit 25.8 %      .4 fL      MCH 31.7 pg      MCHC 31.0 g/dL      RDW 13.8 %      RDW-SD 51.1 fl      MPV 9.2 fL      Platelets 300 10*3/mm3     Narrative:      The previously reported component NRBC is no longer being reported. Previous result was 0.3 /100 WBC (Reference Range: 0.0-0.2 /100 WBC) on 6/4/2022 at 0504 CDT.    POC Glucose Once [[id number]]  (Abnormal) Collected: 06/04/22 0521    Specimen: Blood Updated: 06/04/22 0531     Glucose 134 mg/dL      Comment: : JESUS ClemonsilyMeter ID: [id number]       Comprehensive Metabolic Panel [[id number]]  (Abnormal) Collected: 06/04/22 0436    Specimen: Blood Updated: 06/04/22 0508     Glucose 134 mg/dL      BUN 21 mg/dL      Creatinine 0.50 mg/dL      Sodium 137 mmol/L      Potassium 4.7 mmol/L      Comment: Slight hemolysis detected by analyzer. Results may be affected.        Chloride 102 mmol/L      CO2 31.0 mmol/L      Calcium 8.1 mg/dL      Total Protein 5.1 g/dL      Albumin 1.50 g/dL      ALT (SGPT) 25 U/L      AST (SGOT) 28 U/L      Alkaline Phosphatase 147 U/L      Total Bilirubin 0.4 mg/dL      Globulin 3.6 gm/dL      A/G Ratio 0.4 g/dL      BUN/Creatinine Ratio 42.0     Anion Gap 4.0 mmol/L      eGFR 105.1 mL/min/1.73      Comment: National Kidney Foundation and American Society of Nephrology (ASN) Task Force recommended calculation based on the Chronic Kidney Disease Epidemiology Collaboration (CKD-EPI) equation refit without adjustment for race.       Narrative:      GFR Normal >60  Chronic Kidney Disease <60  Kidney Failure <15      Phenytoin Level, Total [[id number]]  (Abnormal) Collected: 06/04/22 0436    Specimen: Blood Updated: 06/04/22 0508     Phenytoin Level 9.4 mcg/mL     Blood Gas, Arterial - [[id number]]  (Abnormal) Collected: 06/04/22 0442    Specimen: Arterial Blood Updated: 06/04/22 0438     Site Right  Brachial     Denzel's Test N/A     pH, Arterial 7.369 pH units      pCO2, Arterial 57.5 mm Hg      Comment: 83 Value above reference range        pO2, Arterial 103.0 mm Hg      HCO3, Arterial 33.1 mmol/L      Comment: 83 Value above reference range        Base Excess, Arterial 6.9 mmol/L      Comment: 83 Value above reference range        O2 Saturation, Arterial 98.6 %      Temperature 37.0 C      Barometric Pressure for Blood Gas 749 mmHg      Modality Ventilator     FIO2 30 %      Ventilator Mode PC     Set Mech Resp Rate 16.0     PEEP 5.0     PIP 17 cmH2O      Comment: Meter: [id number]     :  [id number]        Collected by [id number]     pCO2, Temperature Corrected 57.5 mm Hg      pH, Temp Corrected 7.369 pH Units      pO2, Temperature Corrected 103 mm Hg     POC Glucose Once [[id number]]  (Abnormal) Collected: 06/04/22 0033    Specimen: Blood Updated: 06/04/22 0044     Glucose 141 mg/dL      Comment: : ERESTACEY Hsieh EmilyMeter ID: [id number]       POC Glucose Once [[id number]]  (Abnormal) Collected: 06/03/22 2022    Specimen: Blood Updated: 06/03/22 2033     Glucose 163 mg/dL      Comment: : EREYNBRYANTBlaise Hsieh EmilyMeter ID: [id number]       Magnesium [[id number]]  (Normal) Collected: 06/03/22 1841    Specimen: Blood Updated: 06/03/22 1858     Magnesium 1.8 mg/dL     POC Glucose Once [[id number]]  (Normal) Collected: 06/03/22 1205    Specimen: Blood Updated: 06/03/22 1216     Glucose 122 mg/dL      Comment: : [id number] Yeimi Martinez ID: [id number]       C-reactive Protein [[id number]]  (Abnormal) Collected: 06/03/22 1043    Specimen: Blood Updated: 06/03/22 1110     C-Reactive Protein 13.97 mg/dL            Cultures:  No results found for: BLOODCX, URINECX, WOUNDCX, MRSACX, RESPCX, STOOLCX    Radiology Data:    Imaging Results (Last 24 Hours)     Procedure Component Value Units Date/Time    XR Chest 1 View [[id number]] Collected: 06/04/22 0817     Updated: 06/04/22 0825     Narrative:      HISTORY: Intubated     CXR: Frontal view the chest obtained     COMPARISON: 6/3/2022     FINDINGS: Tracheostomy tube tip appropriate in position. There are 2  small pigtail catheters of the left hemithorax with 2 larger lateral  left-sided chest tubes. Pleural catheters similar in position. No  pneumothorax. Small left pleural fluid collection. Hypoventilated lungs  with stable left greater than right basilar densities favoring  atelectasis. Scattered diffuse bilateral granuloma.     Right upper extremity PICC line tip projects over the SVC.       Impression:      1. Appropriate positioning of tracheostomy tube. Similar positioning of  the left-sided pleural catheters with small left pleural fluid  collection. Probable left greater than right basilar atelectasis. No  appreciable pneumothorax.  This report was finalized on 06/04/2022 08:22 by Dr. Christel Feliciano MD.    CT Head Without Contrast [[id number]] Collected: 06/03/22 1749     Updated: 06/03/22 1758    Narrative:      EXAMINATION: CT head without contrast 6/3/2022     DOSE: 936 mGycm. All CT scans are performed using dose optimization  techniques as appropriate to the performed exam and including at least  one of the following: Automated exposure control, adjustment of the mA  and/or kV according to size, and the use of the iterative reconstruction  technique..     HISTORY: Evaluate status post ventricular peritoneal shunt placement     FINDINGS: Today's exam is compared to prior study of earlier the same  day. A heterogeneous masslike density is noted within the right frontal  lobe with some peripheral hyperdensity likely representing postoperative  blood products. The patient's undergone right frontal craniotomy. A  ventriculostomy tube has been placed via right posterior parietal  approach with the tip projecting through the posterior horn and body of  the right lateral ventricle. The tip is noted near the midline.  Ventricular size is  stable from the previous exam. There is hemorrhage  layering dependently within the posterior horn of both lateral  ventricles stable from the previous exam. There is persistent mass  effect in the right frontal lobe with mild shift to the midline stable  from the previous exam. The basilar cisterns remain patent.       Impression:      1.. Ventriculoperitoneal shunt placed via right posterior parietal  approach with the catheter traversing the posterior horn and body of the  right lateral ventricle with the tip near the midline. Ventricle size is  stable from the previous exam. There is no evidence of complications.  2. Intraventricular hemorrhage is stable from the previous exam. There  is again a heterogeneous masslike density in the right frontal lobe with  recent craniotomy and mild mass effect with shift of the midline from  right to left within the frontal lobe. This is stable from the previous  exam.  This report was finalized on 06/03/2022 17:55 by Dr. Guillaume Bey MD.    CT Head Without Contrast [[id number]] Collected: 06/03/22 1223     Updated: 06/03/22 1228    Narrative:      EXAMINATION: CT HEAD WO CONTRAST-      6/3/2022 11:34 AM CDT     HISTORY: continued evaluation.  Surgical planning for VPS placement;  R13.10-Dysphagia, unspecified; Z01.818-Encounter for other preprocedural  examination; D32.9-Benign neoplasm of meninges, unspecified;  Z74.09-Other reduced mobility; Z78.9-Other specified health status;  G40.901-Epilepsy, unspecified, not intractable, with status epilepticus;  J96.01-Acute respiratory failure with hypoxia; G91.0-Communicati     In order to have a CT radiation dose as low as reasonably achievable  Automated Exposure Control was utilized for adjustment of the mA and/or  KV according to patient size.     DLP in mGycm= 874.     Noncontrast head CT.  Axial, sagittal, and coronal imaging.     Comparison is made with May 27, 2022.     Right frontal craniotomy.  Interval partial  resolution of scalp swelling and edema.     Persistent though decreased dependent intraventricular hemorrhage.  Ventricle size is stable.     There is no parenchymal hemorrhage or mass effect.     Summary:  1. Stable postoperative head CT.                                   This report was finalized on 06/03/2022 12:25 by Dr. Tomer Whelan MD.          No Known Allergies    Scheduled meds:   albuterol, 2.5 mg, Nebulization, Q8H - RT  carvedilol, 6.25 mg, Nasogastric, BID With Meals  ceFAZolin, 2 g, Intravenous, Q8H  chlorhexidine, 15 mL, Mouth/Throat, Q12H  fosphenytoin, 275 mg PE, Intravenous, Q8H  heparin (porcine), 5,000 Units, Subcutaneous, Q12H  insulin detemir, 20 Units, Subcutaneous, Nightly  insulin regular, 2-7 Units, Subcutaneous, Q6H  insulin regular, 8 Units, Subcutaneous, Q6H  lacosamide, 200 mg, Intravenous, Q12H  levETIRAcetam, 500 mg, Intravenous, Q12H  levothyroxine, 125 mcg, Nasogastric, Q AM  lidocaine, 10 mL, Intradermal, Once  liothyronine, 25 mcg, Nasogastric, Daily  lisinopril, 20 mg, Nasogastric, Q24H  mupirocin, 1 application, Topical, Q12H  Nutrisource fiber, 1 packet, Nasogastric, 4x Daily  pantoprazole, 40 mg, Intravenous, Q AM  polyethylene glycol, 17 g, Oral, Daily  ProSource TF, 1 packet, Nasogastric, BID  sodium chloride, 4 mL, Nebulization, BID - RT        PRN meds:  •  acetaminophen **OR** acetaminophen  •  alteplase  •  bisacodyl  •  butalbital-acetaminophen-caffeine  •  dextrose  •  dextrose  •  glucagon (human recombinant)  •  hold  •  hydrALAZINE  •  ipratropium-albuterol  •  labetalol  •  Morphine  •  ondansetron **OR** ondansetron  •  oxyCODONE    Assessment/Plan       Meningioma (HCC)    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 The patient was admitted on 5/10 by Dr. Downs with neurosurgery.  He has prior history of known meningioma that was causing compression and visual changes.  He presented for craniotomy.  Hospital medicine was originally consulted on 5/14.  The patient had developed status epilepticus postsurgically and required intubation/mechanical ventilation.      Respiratory failure/left pleural effusion. He required intubation on 5/14 for airway protection.  This was done by Dr. Gunderson.  Pulmonary has since been consulted. Continue to monitor for readiness for spontaneous breathing trials and extubation in the future. Again, he was intubated for airway protection given his status epilepticus.  Propofol drip . albuterol nebs.  DuoNebs as needed.  Morphine as needed.  Oxycodone as needed.  CT imaging of chest, abdomen, and pelvis on 5/23.  This showed a moderate size left pleural effusion with extensive compressive atelectasis of the left lung.  Not possible to exclude pneumonia.    Status post thoracotomy tube placement 6/26/2022.   Status post tracheotomy left chest 6/2/2022.   Status post tPA through chest tube.  Four chest tube to the left lung.  Ventilator- assist-control, FiO2 30, tidal volume 450, rate 16, PEEP 6.     Hypotension.  Resolved.  Levophed drip off.     Seizure . consult neurology . IV Vimpat.  IV Keppra.  IV cerebyx.      Meningioma.  Decadron.  CT scan head without contrast showed postsurgical changes from right frontal craniotomy, Small residual 5 mm subdural hematoma hygroma, Residual hemorrhage and occipital Horn similar to prior exam, Previously described  hemorrhage in the right frontal lobe is not present on his exam, Dilated ventricular system.  Status post craniotomy 5/10/2022. He has had a lumbar drain placed by neurosurgery.    Plans for shunt placement by neurosurgery.     Thrombocytopenia. His platelet count was 217,000 on 4/20/2022. This declined to a low of 72,000 on 5/17.  Peripheral smear on 5/17 showed no schistocytes with moderate peripheral thrombocytopenia.  PTT normal, PT/INR slightly elevated, fibrin split products high, and fibrinogen high.  Neurosurgery gave him a sixpack of platelets on 5/18.  Thrombocytopenia resolved.     Fever-resolved. He had run steady fever from the afternoon of 5/21 through 5/22.  Dr. Escobar was contacted and placed him on vancomycin and cefepime.  He still has a lumbar drain and CSF was sampled on the morning of 5/22.  ..  He received vancomycin. Meropenem was started on 5/25 for 5 days.  Patient has finished vancomycin and meropenem antibiotics.  Fever resolved.     Hypertension/atrial fibrillation. He typically takes lisinopril. Resumed per tube at a lower dose than normal on 5/18. Titrated up on 5/19. Started carvedilol on 5/19. Titrated medications up to get off Cardene.  Unable to take anticoagulation.  Lisinopril.  Hydralazine as needed.  Echocardiogram- ejection fraction 70%-hyperdynamic, mild concentric hypertrophy, tricuspid regurgitation, right ventricle cavity moderately dilated, right atrial cavity dilated, no significant valvular pathology, atrial fibrillation rhythm.     Hypothyroidism. Free T4 and free T3 were very low as well. Started levothyroxine and liothyronine on 5/19. TRH pending since 5/19!!  I rechecked TSH, free T4, and free T3 on 5/29 to see where we are at.  TSH is now 3.950.  Free T4 has improve from 0.31 to 0.53.  Free T3 has improved from less than 0.40 to 1.60.  Synthroid . Cytomel .     Diabetes.  Hemoglobin A1c 8.2.  Sliding scale.   Levemir . regular insulin.    Anemia.  Hemoglobin stable.   No sign of acute bleed.     Reflux.  Protonix.  Zofran as needed.     Constipation.  MiraLAX.  Dulcolax suppository as needed.     Headaches.  Fioricet as needed.     Obesity.  BMI 31.     SCD for DVT prophylaxis.  Heparin prophylaxis.     Nutrition.  PEG tube placement 6/2/2022.     Blood culture- no growth 5 days.  Respiratory culture- Light growth (2+) Normal respiratory annia. No S. aureus or Pseudomonas aeruginosa detected. Final report.   Urine culture>100,000 CFU/mL Klebsiella aerogenes Abnormal   AFB culture-pending.  Anaerobic culture-negative.  Body fluid culture-negative.  Fungal culture pending.  MRSA DNA probe-negative.  COVID-19-negative.     Discharge Planning: LTAC?    Electronically signed by Aden cK MD, 06/04/22, 9:22 AM CDT.

## 2022-06-04 NOTE — PROGRESS NOTES
"    [facility] Cardiothoracic Surgery  PROGRESS NOTE   CC: Loculated left pleural effusion    Subjective:  Yesterday had  shunt placed, otherwise doing well on stable vent settings    ROS: Unable to obtain due to mechanical ventilation    Objective:      /79   Pulse (!) 46   Temp 92.5 °F (33.6 °C) (Rectal) Comment: bearhugger and warm blankkets in place. Dr. Freire,and De notified  Resp 23   Ht 182.9 cm (72\")   Wt 113 kg (250 lb)   SpO2 97%   BMI 33.91 kg/m²       Intake/Output Summary (Last 24 hours) at 6/4/2022 1229  Last data filed at 6/4/2022 0756  Gross per 24 hour   Intake 633 ml   Output 55 ml   Net 578 ml       CT Output: Posterior tube 1: 25, posterior tube 2: 85, large bore tubes without drainage    PE:  Vitals:    06/04/22 1015   BP: 144/79   Pulse: (!) 46   Resp:    Temp: 92.5 °F (33.6 °C)   SpO2: 97%     GENERAL: NAD, resting comfortably, unable to follow commands but opens eyes spontaneously   CARDIOVASCULAR: regular, regular rate, sinus  PULMONARY: Normal bilateral breath sounds, no labored breathing, on vent via tracheostomy, left-sided pleural tubes with serous output  ABDOMEN: soft, nontender/nondistended  EXTREMITIES: mild peripheral edema, normal pulses, normal ROM        Lab Results (last 72 hours)     Procedure Component Value Units Date/Time    POC Glucose Once [[id number]]  (Normal) Collected: 06/04/22 1058    Specimen: Blood Updated: 06/04/22 1110     Glucose 110 mg/dL      Comment: : RFANETA Bernardo RyanMeter ID: [id number]       Manual Differential [[id number]]  (Abnormal) Collected: 06/04/22 0436    Specimen: Blood Updated: 06/04/22 0546     Neutrophil % 79.6 %      Lymphocyte % 6.1 %      Monocyte % 5.1 %      Basophil % 1.0 %      Bands %  1.0 %      Metamyelocyte % 1.0 %      Myelocyte % 6.1 %      Neutrophils Absolute 6.39 10*3/mm3      Lymphocytes Absolute 0.48 10*3/mm3      Monocytes Absolute 0.40 10*3/mm3      Basophils Absolute 0.08 " 10*3/mm3      Anisocytosis Slight/1+     Basophilic Stippling Slight/1+     Poikilocytes Slight/1+     Polychromasia Slight/1+     Toxic Granulation Slight/1+     Platelet Morphology Normal    CBC & Differential [[id number]]  (Abnormal) Collected: 06/04/22 0436    Specimen: Blood Updated: 06/04/22 0546    Narrative:      The following orders were created for panel order CBC & Differential.  Procedure                               Abnormality         Status                     ---------                               -----------         ------                     CBC Auto Differential[[id number]]        Abnormal            Final result                 Please view results for these tests on the individual orders.    CBC Auto Differential [[id number]]  (Abnormal) Collected: 06/04/22 0436    Specimen: Blood Updated: 06/04/22 0546     WBC 7.93 10*3/mm3      RBC 2.52 10*6/mm3      Hemoglobin 8.0 g/dL      Hematocrit 25.8 %      .4 fL      MCH 31.7 pg      MCHC 31.0 g/dL      RDW 13.8 %      RDW-SD 51.1 fl      MPV 9.2 fL      Platelets 300 10*3/mm3     Narrative:      The previously reported component NRBC is no longer being reported. Previous result was 0.3 /100 WBC (Reference Range: 0.0-0.2 /100 WBC) on 6/4/2022 at 0504 CDT.    POC Glucose Once [[id number]]  (Abnormal) Collected: 06/04/22 0521    Specimen: Blood Updated: 06/04/22 0531     Glucose 134 mg/dL      Comment: : JESUS ClemonsilyMeter ID: [id number]       Comprehensive Metabolic Panel [[id number]]  (Abnormal) Collected: 06/04/22 0436    Specimen: Blood Updated: 06/04/22 0508     Glucose 134 mg/dL      BUN 21 mg/dL      Creatinine 0.50 mg/dL      Sodium 137 mmol/L      Potassium 4.7 mmol/L      Comment: Slight hemolysis detected by analyzer. Results may be affected.        Chloride 102 mmol/L      CO2 31.0 mmol/L      Calcium 8.1 mg/dL      Total Protein 5.1 g/dL      Albumin 1.50 g/dL      ALT (SGPT) 25 U/L      AST (SGOT) 28 U/L      Alkaline  Phosphatase 147 U/L      Total Bilirubin 0.4 mg/dL      Globulin 3.6 gm/dL      A/G Ratio 0.4 g/dL      BUN/Creatinine Ratio 42.0     Anion Gap 4.0 mmol/L      eGFR 105.1 mL/min/1.73      Comment: National Kidney Foundation and American Society of Nephrology (ASN) Task Force recommended calculation based on the Chronic Kidney Disease Epidemiology Collaboration (CKD-EPI) equation refit without adjustment for race.       Narrative:      GFR Normal >60  Chronic Kidney Disease <60  Kidney Failure <15      Phenytoin Level, Total [[id number]]  (Abnormal) Collected: 06/04/22 0436    Specimen: Blood Updated: 06/04/22 0508     Phenytoin Level 9.4 mcg/mL     Blood Gas, Arterial - [[id number]]  (Abnormal) Collected: 06/04/22 0442    Specimen: Arterial Blood Updated: 06/04/22 0438     Site Right Brachial     Denzel's Test N/A     pH, Arterial 7.369 pH units      pCO2, Arterial 57.5 mm Hg      Comment: 83 Value above reference range        pO2, Arterial 103.0 mm Hg      HCO3, Arterial 33.1 mmol/L      Comment: 83 Value above reference range        Base Excess, Arterial 6.9 mmol/L      Comment: 83 Value above reference range        O2 Saturation, Arterial 98.6 %      Temperature 37.0 C      Barometric Pressure for Blood Gas 749 mmHg      Modality Ventilator     FIO2 30 %      Ventilator Mode PC     Set [facility] Resp Rate 16.0     PEEP 5.0     PIP 17 cmH2O      Comment: Meter: [id number]     :  [id number]        Collected by [id number]     pCO2, Temperature Corrected 57.5 mm Hg      pH, Temp Corrected 7.369 pH Units      pO2, Temperature Corrected 103 mm Hg     POC Glucose Once [[id number]]  (Abnormal) Collected: 06/04/22 0033    Specimen: Blood Updated: 06/04/22 0044     Glucose 141 mg/dL      Comment: : JESUS ClemonsilyMeter ID: [id number]       POC Glucose Once [[id number]]  (Abnormal) Collected: 06/03/22 2022    Specimen: Blood Updated: 06/03/22 2033     Glucose 163 mg/dL      Comment: : JESUS  Jori MathewMeter ID: [id number]       Magnesium [[id number]]  (Normal) Collected: 06/03/22 1841    Specimen: Blood Updated: 06/03/22 1858     Magnesium 1.8 mg/dL     POC Glucose Once [[id number]]  (Normal) Collected: 06/03/22 1205    Specimen: Blood Updated: 06/03/22 1216     Glucose 122 mg/dL      Comment: : [id number] Yeimi Greeneter ID: [id number]       C-reactive Protein [[id number]]  (Abnormal) Collected: 06/03/22 1043    Specimen: Blood Updated: 06/03/22 1110     C-Reactive Protein 13.97 mg/dL     POC Glucose Once [[id number]]  (Normal) Collected: 06/03/22 0540    Specimen: Blood Updated: 06/03/22 0551     Glucose 111 mg/dL      Comment: : ABBEY Ayala ID: [id number]       Manual Differential [[id number]]  (Abnormal) Collected: 06/03/22 0430    Specimen: Blood Updated: 06/03/22 0512     Neutrophil % 73.7 %      Lymphocyte % 11.1 %      Monocyte % 6.1 %      Eosinophil % 2.0 %      Bands %  3.0 %      Metamyelocyte % 1.0 %      Myelocyte % 3.0 %      Neutrophils Absolute 6.36 10*3/mm3      Lymphocytes Absolute 0.92 10*3/mm3      Monocytes Absolute 0.51 10*3/mm3      Eosinophils Absolute 0.17 10*3/mm3      Anisocytosis Slight/1+     Basophilic Stippling Slight/1+     Macrocytes Slight/1+     Poikilocytes Slight/1+     Polychromasia Slight/1+     WBC Morphology Normal     Platelet Morphology Normal    Comprehensive Metabolic Panel [[id number]]  (Abnormal) Collected: 06/03/22 0430    Specimen: Blood Updated: 06/03/22 0504     Glucose 109 mg/dL      BUN 21 mg/dL      Creatinine 0.49 mg/dL      Sodium 136 mmol/L      Potassium 4.6 mmol/L      Chloride 100 mmol/L      CO2 29.0 mmol/L      Calcium 8.1 mg/dL      Total Protein 5.5 g/dL      Albumin 1.60 g/dL      ALT (SGPT) 28 U/L      AST (SGOT) 30 U/L      Alkaline Phosphatase 174 U/L      Total Bilirubin 0.4 mg/dL      Globulin 3.9 gm/dL      A/G Ratio 0.4 g/dL      BUN/Creatinine Ratio 42.9     Anion Gap 7.0 mmol/L      eGFR 105.7  mL/min/1.73      Comment: National Kidney Foundation and American Society of Nephrology (ASN) Task Force recommended calculation based on the Chronic Kidney Disease Epidemiology Collaboration (CKD-EPI) equation refit without adjustment for race.       Narrative:      GFR Normal >60  Chronic Kidney Disease <60  Kidney Failure <15      Phenytoin Level, Total [[id number]]  (Abnormal) Collected: 06/03/22 0430    Specimen: Blood Updated: 06/03/22 0458     Phenytoin Level 8.3 mcg/mL     CBC & Differential [[id number]]  (Abnormal) Collected: 06/03/22 0430    Specimen: Blood Updated: 06/03/22 0447    Narrative:      The following orders were created for panel order CBC & Differential.  Procedure                               Abnormality         Status                     ---------                               -----------         ------                     CBC Auto Differential[[id number]]        Abnormal            Final result                 Please view results for these tests on the individual orders.    CBC Auto Differential [[id number]]  (Abnormal) Collected: 06/03/22 0430    Specimen: Blood Updated: 06/03/22 0447     WBC 8.29 10*3/mm3      RBC 2.76 10*6/mm3      Hemoglobin 8.6 g/dL      Hematocrit 27.7 %      .4 fL      MCH 31.2 pg      MCHC 31.0 g/dL      RDW 14.1 %      RDW-SD 50.4 fl      MPV 9.2 fL      Platelets 338 10*3/mm3      nRBC 0.4 /100 WBC     Blood Gas, Arterial - [[id number]]  (Abnormal) Collected: 06/03/22 0412    Specimen: Arterial Blood Updated: 06/03/22 0406     Site Left Radial     Denzel's Test Positive     pH, Arterial 7.463 pH units      Comment: 83 Value above reference range        pCO2, Arterial 46.1 mm Hg      Comment: 83 Value above reference range        pO2, Arterial 78.6 mm Hg      Comment: 84 Value below reference range        HCO3, Arterial 33.0 mmol/L      Comment: 83 Value above reference range        Base Excess, Arterial 8.3 mmol/L      Comment: 83 Value above reference range         O2 Saturation, Arterial 96.8 %      Temperature 37.0 C      Barometric Pressure for Blood Gas 749 mmHg      Modality Ventilator     FIO2 30 %      Ventilator Mode AC     Set Tidal Volume 450     Set Mech Resp Rate 16.0     PEEP 6.0     Collected by [id number]     Comment: Meter: [id number]     :  [id number]        pCO2, Temperature Corrected 46.1 mm Hg      pH, Temp Corrected 7.463 pH Units      pO2, Temperature Corrected 78.6 mm Hg     POC Glucose Once [[id number]]  (Normal) Collected: 06/03/22 0020    Specimen: Blood Updated: 06/03/22 0032     Glucose 93 mg/dL      Comment: : ABBEY Ayala ID: [id number]       POC Glucose Once [[id number]]  (Normal) Collected: 06/02/22 1957    Specimen: Blood Updated: 06/02/22 2008     Glucose 95 mg/dL      Comment: : ABBEY Ayala ID: [id number]       POC Glucose Once [[id number]]  (Abnormal) Collected: 06/02/22 1756    Specimen: Blood Updated: 06/02/22 1807     Glucose 64 mg/dL      Comment: : [id number] Yeimi Martinez ID: [id number]       POC Glucose Once [[id number]]  (Abnormal) Collected: 06/02/22 1120    Specimen: Blood Updated: 06/02/22 1131     Glucose 135 mg/dL      Comment: : [id number] Yeimi Martinez ID: [id number]       POC Glucose Once [[id number]]  (Abnormal) Collected: 06/02/22 1052    Specimen: Blood Updated: 06/02/22 1105     Glucose 64 mg/dL      Comment: : [id number] Yeimi Martinez ID: [id number]       Manual Differential [[id number]]  (Abnormal) Collected: 06/02/22 0528    Specimen: Blood Updated: 06/02/22 0720     Neutrophil % 67.0 %      Lymphocyte % 4.0 %      Monocyte % 9.0 %      Eosinophil % 1.0 %      Bands %  7.0 %      Metamyelocyte % 4.0 %      Myelocyte % 5.0 %      Promyelocyte % 2.0 %      Atypical Lymphocyte % 1.0 %      Neutrophils Absolute 8.78 10*3/mm3      Lymphocytes Absolute 0.59 10*3/mm3      Monocytes Absolute 1.07 10*3/mm3      Eosinophils Absolute 0.12  10*3/mm3      Polychromasia Slight/1+     WBC Morphology Normal     Platelet Morphology Normal    CBC & Differential [[id number]]  (Abnormal) Collected: 06/02/22 0528    Specimen: Blood Updated: 06/02/22 0720    Narrative:      The following orders were created for panel order CBC & Differential.  Procedure                               Abnormality         Status                     ---------                               -----------         ------                     CBC Auto Differential[[id number]]        Abnormal            Final result                 Please view results for these tests on the individual orders.    CBC Auto Differential [[id number]]  (Abnormal) Collected: 06/02/22 0528    Specimen: Blood Updated: 06/02/22 0720     WBC 11.86 10*3/mm3      RBC 2.89 10*6/mm3      Hemoglobin 9.0 g/dL      Hematocrit 27.9 %      MCV 96.5 fL      MCH 31.1 pg      MCHC 32.3 g/dL      RDW 13.9 %      RDW-SD 48.3 fl      MPV 9.6 fL      Platelets 366 10*3/mm3     Narrative:      The previously reported component NRBC is no longer being reported. Previous result was 0.3 /100 WBC (Reference Range: 0.0-0.2 /100 WBC) on 6/2/2022 at 0610 CDT.    Zinc [[id number]] Collected: 05/31/22 1350    Specimen: Blood Updated: 06/02/22 0716     Zinc 54 ug/dL      Comment:                                 Detection Limit = 5       Narrative:      Test(s) 001800-Zinc, Plasma or Serum  was developed and its performance characteristics determined  by [facility]. It has not been cleared or approved by the Food  and Drug Administration.  Performed at:  [address]  [id number]  : Lucio Cueto MD, Phone:  [phone]    Comprehensive Metabolic Panel [[id number]]  (Abnormal) Collected: 06/02/22 0528    Specimen: Blood Updated: 06/02/22 0641     Glucose 68 mg/dL      BUN 28 mg/dL      Creatinine 0.50 mg/dL      Sodium 137 mmol/L      Potassium 4.5 mmol/L      Chloride 101 mmol/L      CO2 28.0  mmol/L      Calcium 8.1 mg/dL      Total Protein 5.7 g/dL      Albumin 1.60 g/dL      ALT (SGPT) 31 U/L      AST (SGOT) 34 U/L      Alkaline Phosphatase 225 U/L      Total Bilirubin 0.3 mg/dL      Globulin 4.1 gm/dL      A/G Ratio 0.4 g/dL      BUN/Creatinine Ratio 56.0     Anion Gap 8.0 mmol/L      eGFR 105.1 mL/min/1.73      Comment: National Kidney Foundation and American Society of Nephrology (ASN) Task Force recommended calculation based on the Chronic Kidney Disease Epidemiology Collaboration (CKD-EPI) equation refit without adjustment for race.       Narrative:      GFR Normal >60  Chronic Kidney Disease <60  Kidney Failure <15      Phenytoin Level, Total [[id number]]  (Abnormal) Collected: 06/02/22 0528    Specimen: Blood Updated: 06/02/22 0634     Phenytoin Level 7.0 mcg/mL     POC Glucose Once [[id number]]  (Normal) Collected: 06/02/22 0601    Specimen: Blood Updated: 06/02/22 0612     Glucose 71 mg/dL      Comment: : ABBEY Ayala ID: [id number]       POC Glucose Once [[id number]]  (Abnormal) Collected: 06/02/22 0559    Specimen: Blood Updated: 06/02/22 0612     Glucose 64 mg/dL      Comment: : ABBEY Wangter ID: [id number]       COVID PRE-OP / PRE-PROCEDURE SCREENING ORDER (NO ISOLATION) - Swab, Nasal Cavity [[id number]]  (Normal) Collected: 06/02/22 0422    Specimen: Swab from Nasal Cavity Updated: 06/02/22 0530    Narrative:      The following orders were created for panel order COVID PRE-OP / PRE-PROCEDURE SCREENING ORDER (NO ISOLATION) - Swab, Nasal Cavity.  Procedure                               Abnormality         Status                     ---------                               -----------         ------                     COVID-19,[facility] IN-SAR[facility]...[[id number]]  Normal              Final result                 Please view results for these tests on the individual orders.    COVID-19,[facility] IN-HOUSE,Nasal Swab No Transport Media 3-4 HR TAT - Swab, Nasal  Cavity [[id number]]  (Normal) Collected: 06/02/22 0422    Specimen: Swab from Nasal Cavity Updated: 06/02/22 0530     COVID19 Not Detected    Narrative:      Fact sheet for providers: https://www.fda.gov/media/604472/download     Fact sheet for patients: https://www.fda.gov/media/559401/download    Test performed by PCR.    Consider negative results in combination with clinical observations, patient history, and epidemiological information.    Blood Gas, Arterial - [[id number]]  (Abnormal) Collected: 06/02/22 0433    Specimen: Arterial Blood Updated: 06/02/22 0428     Site Left Radial     Denzel's Test Positive     pH, Arterial 7.448 pH units      pCO2, Arterial 49.5 mm Hg      Comment: 83 Value above reference range        pO2, Arterial 84.1 mm Hg      HCO3, Arterial 34.2 mmol/L      Comment: 83 Value above reference range        Base Excess, Arterial 9.1 mmol/L      Comment: 83 Value above reference range        O2 Saturation, Arterial 97.2 %      Temperature 37.0 C      Barometric Pressure for Blood Gas 747 mmHg      Modality Ventilator     FIO2 30 %      Ventilator Mode AC     Set Tidal Volume 450     Set Mech Resp Rate 16.0     PEEP 8.0     Collected by [id number]     Comment: Meter: [id number]     :  [id number]        pCO2, Temperature Corrected 49.5 mm Hg      pH, Temp Corrected 7.448 pH Units      pO2, Temperature Corrected 84.1 mm Hg     POC Glucose Once [[id number]]  (Normal) Collected: 06/01/22 2349    Specimen: Blood Updated: 06/02/22 0005     Glucose 122 mg/dL      Comment: : ABBEY Ayala ID: [id number]       POC Glucose Once [[id number]]  (Normal) Collected: 06/01/22 2030    Specimen: Blood Updated: 06/01/22 2041     Glucose 121 mg/dL      Comment: : ABBEY Ayala ID: [id number]       POC Glucose Once [[id number]]  (Normal) Collected: 06/01/22 1755    Specimen: Blood Updated: 06/01/22 1806     Glucose 96 mg/dL      Comment: : [id number] Mily  Dolores ID: [id number]                 CXR: Overall unchanged, good expansion of the left lung with stable positioning of chest tubes    Assessment & Plan     Mr. Hernandez is a 77-year-old male who is status post meningioma resection complicated by seizure and loculated left pleural effusion.  He is now status post tracheostomy PEG tube and  shunt.  We have been trying nonsurgical treatment for his loculated left pleural effusion there is been much improvement, he is postop day 2 from having CT-guided drains placed in posterior fluid collections.  We will plan on tomorrow repeat CT chest to assess improvement of his collections and likely start removing drains at that time.    Pilo Ortega M.D.  Cardiothoracic Surgeon

## 2022-06-04 NOTE — PLAN OF CARE
Patients temp was 92.5 rectally at the beginning of the shift and was bradycardic in the 30s after increasing sedation for seizure activity on the night of 6/3. Bearhugger and warm blankets applied and temp resolved. Propofol was turned off and bradycardia resolved as well. The patient did become very tachypnic breathing 45 times a minute. Propofol was restarted and maxed out as well as PRN doses of morphine given with no affect. One time dose of ativan given with great affect RR decreased to the 20s. Tube feeding initiated with minimal residuals. No seizure activity noted this shift. VSS, safety measures in place, turn q2.

## 2022-06-04 NOTE — SIGNIFICANT NOTE
06/04/22 0706   Readings   PEEP Intrinsic (cm H2O) 4.7 cm H2O   Plateau Pressure (cm H2O) 17 cm H2O   Static Compliance (L/cm H2O) 33

## 2022-06-04 NOTE — PROGRESS NOTES
NEUROSURGERY DAILY PROGRESS NOTE    ASSESSMENT:   Hai Hernandez is a 77 y.o. with a significant comorbidity of acephalic migraines, thyroid disease status post radiation as a child, basal cell and squamous cell carcinoma of the skin. He presents in FU for known meningioma found on workup for right visual field changes. Physical exam findings of neurologically intact with resolution of all symptoms and mild decreased vision in right eye.  His imaging shows 22 x 24 x 13.5 mm right planum sphenoidale mass most suggestive of meningioma.    Past Medical History:   Diagnosis Date   • Brain tumor (HCC)    • Depression    • Hearing loss    • History of cellulitis     right foot big toe   • Hypertension    • Pneumonia    • Right arm weakness     after cervial injury   • Sciatic pain     affects balance   • Thyroid disease    • Visual disturbance     due to brain tumor - right eye     Active Hospital Problems    Diagnosis    • **Meningioma (HCC)    • Acute deep vein thrombosis (DVT) of the ulnar and brachial veins of right upper extremity (HCC)    • Loculated pleural effusion    • Fever    • PAF (paroxysmal atrial fibrillation) (HCC)    • Cerebral parenchymal hemorrhage (HCC)    • Essential hypertension    • Type 2 diabetes mellitus with hyperglycemia, without long-term current use of insulin (HCC)    • Hypothyroidism (acquired)    • Acute respiratory failure (secondary to status epilepticus)    • Thrombocytopenia (HCC)    • 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  Episodic bradycardia and hypothermia.  Remains on low-dose propofol.  Neuro exam relatively unchanged at present, Opens eyes to voice and  "keeps them open.  Mouths \"Ouch\" to painful stimuli.  Does not follow commands.  Does not withdraw or localize to painful stimuli.    Intracranial meningioma   POD #25 (5/10/2022) Status post postcraniotomy for tumor   Pathology: WHO 1 Meningioma   MRI with blood products in resection cavity but no evidence of cerebritis   CT head reviewed without expansion of SDH   Neurochecks per policy   Decadron off   Leakage from wound.  Stapled at bedside.  Keep for now.  If leaks again will oversew    Hydrocephalus   Lumbar drain removed   CSF unremarkable from 5/17 and 5/23.    1 Day Post-Op right posterior parietal  shunt placement   Postop CT stable    Postop seizures, in status   Neurology managing   Cont Keppra, Dialntin, Vimpat   Stat Ativan   Follow dilantin levels     CV: Cardene off   keep SBP <140.   Hypotension resolved and off pressors   Requiring hydralazine and labetalol PRNs   A-line removed secondary to limb ischemia  Pulm: Tracheostomy in place.  Appreciate ENT   Left chest tube placed x2 CT managing with TPA  : Keep jansen for now.   FEN: Hyponatremia, 127   3% NaCl DC   Poor glucose control.  Increase SSI     ENDO: Accu-Chek and treat per policy  GI: PEG tube placement today.  Appreciate GI  ID: Afebrile overnight,    DDX: infection vs drug fever   WBC 8.29   CRP in 20s, repeat pending   Broad spectrum abx, Meropenum and Vanc   Blood cult NGTD   Body fluid cx pending   CSF cultures pending, NGTD   UA+, 100K GNB   Heme:  DVT prophylaxis with SCD's.     Plt up 193 and HIT ab negative.     RUE clot.  Leave PICC for now.  Can not anticoagulate  Pain: NA  Dispo: DC pending seizure control   Pending extubation    CHIEF COMPLAINT:   Right visual field changes    Subjective  Symptom stable    Temp:  [92 °F (33.3 °C)-98.1 °F (36.7 °C)] 92.5 °F (33.6 °C)  Heart Rate:  [35-68] 46  Resp:  [16-23] 23  BP: ()/(50-87) 144/79  FiO2 (%):  [30 %-60 %] 40 %    Objective:  General Appearance:  Well-appearing and in no " "acute distress.    Vital signs: (most recent): Blood pressure 144/79, pulse (!) 46, temperature 92.5 °F (33.6 °C), temperature source Rectal, resp. rate 23, height 182.9 cm (72\"), weight 113 kg (250 lb), SpO2 97 %.      Neurologic Exam     Mental Status   Level of consciousness: arousable by verbal stimuli ,  drowsy  Opens eyes to voice and keeps them open.  Mouths \"Ouch\" to painful stimuli.  Does not follow commands.       Cranial Nerves     CN III, IV, VI   Pupils are equal, round, and reactive to light.  Extraocular motions are normal.     CN IX, X   CN IX normal.     Gait, Coordination, and Reflexes     Tremor   Resting tremor: absent  Intention tremor: absent  Action tremor: absent    Drains:   Urethral Catheter Non-latex;Silicone 16 Fr. (Active)       Output by Drain (mL) 06/03/22 0701 - 06/03/22 1900 06/03/22 1901 - 06/04/22 0700 06/04/22 0701 - 06/04/22 1147 Range Total   Requested LDAs do not have output data documented.       Imaging Results (Last 24 Hours)     Procedure Component Value Units Date/Time    XR Chest 1 View [[id number]] Collected: 06/04/22 0817     Updated: 06/04/22 0825    Narrative:      HISTORY: Intubated     CXR: Frontal view the chest obtained     COMPARISON: 6/3/2022     FINDINGS: Tracheostomy tube tip appropriate in position. There are 2  small pigtail catheters of the left hemithorax with 2 larger lateral  left-sided chest tubes. Pleural catheters similar in position. No  pneumothorax. Small left pleural fluid collection. Hypoventilated lungs  with stable left greater than right basilar densities favoring  atelectasis. Scattered diffuse bilateral granuloma.     Right upper extremity PICC line tip projects over the SVC.       Impression:      1. Appropriate positioning of tracheostomy tube. Similar positioning of  the left-sided pleural catheters with small left pleural fluid  collection. Probable left greater than right basilar atelectasis. No  appreciable pneumothorax.  This report " was finalized on 06/04/2022 08:22 by Dr. Christel Feliciano MD.    CT Head Without Contrast [[id number]] Collected: 06/03/22 1749     Updated: 06/03/22 1758    Narrative:      EXAMINATION: CT head without contrast 6/3/2022     DOSE: 936 mGycm. All CT scans are performed using dose optimization  techniques as appropriate to the performed exam and including at least  one of the following: Automated exposure control, adjustment of the mA  and/or kV according to size, and the use of the iterative reconstruction  technique..     HISTORY: Evaluate status post ventricular peritoneal shunt placement     FINDINGS: Today's exam is compared to prior study of earlier the same  day. A heterogeneous masslike density is noted within the right frontal  lobe with some peripheral hyperdensity likely representing postoperative  blood products. The patient's undergone right frontal craniotomy. A  ventriculostomy tube has been placed via right posterior parietal  approach with the tip projecting through the posterior horn and body of  the right lateral ventricle. The tip is noted near the midline.  Ventricular size is stable from the previous exam. There is hemorrhage  layering dependently within the posterior horn of both lateral  ventricles stable from the previous exam. There is persistent mass  effect in the right frontal lobe with mild shift to the midline stable  from the previous exam. The basilar cisterns remain patent.       Impression:      1.. Ventriculoperitoneal shunt placed via right posterior parietal  approach with the catheter traversing the posterior horn and body of the  right lateral ventricle with the tip near the midline. Ventricle size is  stable from the previous exam. There is no evidence of complications.  2. Intraventricular hemorrhage is stable from the previous exam. There  is again a heterogeneous masslike density in the right frontal lobe with  recent craniotomy and mild mass effect with shift of the midline  from  right to left within the frontal lobe. This is stable from the previous  exam.  This report was finalized on 06/03/2022 17:55 by Dr. Guillaume Bey MD.    CT Head Without Contrast [[id number]] Collected: 06/03/22 1223     Updated: 06/03/22 1228    Narrative:      EXAMINATION: CT HEAD WO CONTRAST-      6/3/2022 11:34 AM CDT     HISTORY: continued evaluation.  Surgical planning for VPS placement;  R13.10-Dysphagia, unspecified; Z01.818-Encounter for other preprocedural  examination; D32.9-Benign neoplasm of meninges, unspecified;  Z74.09-Other reduced mobility; Z78.9-Other specified health status;  G40.901-Epilepsy, unspecified, not intractable, with status epilepticus;  J96.01-Acute respiratory failure with hypoxia; G91.0-Communicati     In order to have a CT radiation dose as low as reasonably achievable  Automated Exposure Control was utilized for adjustment of the mA and/or  KV according to patient size.     DLP in mGycm= 874.     Noncontrast head CT.  Axial, sagittal, and coronal imaging.     Comparison is made with May 27, 2022.     Right frontal craniotomy.  Interval partial resolution of scalp swelling and edema.     Persistent though decreased dependent intraventricular hemorrhage.  Ventricle size is stable.     There is no parenchymal hemorrhage or mass effect.     Summary:  1. Stable postoperative head CT.                                   This report was finalized on 06/03/2022 12:25 by Dr. Tomer Whelan MD.        Lab Results (last 24 hours)     Procedure Component Value Units Date/Time    POC Glucose Once [[id number]]  (Normal) Collected: 06/04/22 1058    Specimen: Blood Updated: 06/04/22 1110     Glucose 110 mg/dL      Comment: : [email or web address] Tova RyanMeter ID: [id number]       Manual Differential [[id number]]  (Abnormal) Collected: 06/04/22 0436    Specimen: Blood Updated: 06/04/22 0546     Neutrophil % 79.6 %      Lymphocyte % 6.1 %      Monocyte % 5.1 %      Basophil % 1.0 %      Bands  %  1.0 %      Metamyelocyte % 1.0 %      Myelocyte % 6.1 %      Neutrophils Absolute 6.39 10*3/mm3      Lymphocytes Absolute 0.48 10*3/mm3      Monocytes Absolute 0.40 10*3/mm3      Basophils Absolute 0.08 10*3/mm3      Anisocytosis Slight/1+     Basophilic Stippling Slight/1+     Poikilocytes Slight/1+     Polychromasia Slight/1+     Toxic Granulation Slight/1+     Platelet Morphology Normal    CBC & Differential [[id number]]  (Abnormal) Collected: 06/04/22 0436    Specimen: Blood Updated: 06/04/22 0546    Narrative:      The following orders were created for panel order CBC & Differential.  Procedure                               Abnormality         Status                     ---------                               -----------         ------                     CBC Auto Differential[[id number]]        Abnormal            Final result                 Please view results for these tests on the individual orders.    CBC Auto Differential [[id number]]  (Abnormal) Collected: 06/04/22 0436    Specimen: Blood Updated: 06/04/22 0546     WBC 7.93 10*3/mm3      RBC 2.52 10*6/mm3      Hemoglobin 8.0 g/dL      Hematocrit 25.8 %      .4 fL      MCH 31.7 pg      MCHC 31.0 g/dL      RDW 13.8 %      RDW-SD 51.1 fl      MPV 9.2 fL      Platelets 300 10*3/mm3     Narrative:      The previously reported component NRBC is no longer being reported. Previous result was 0.3 /100 WBC (Reference Range: 0.0-0.2 /100 WBC) on 6/4/2022 at 0504 T.    POC Glucose Once [[id number]]  (Abnormal) Collected: 06/04/22 0521    Specimen: Blood Updated: 06/04/22 0531     Glucose 134 mg/dL      Comment: : JESUS Hsieh EmilyMeter ID: [id number]       Comprehensive Metabolic Panel [[id number]]  (Abnormal) Collected: 06/04/22 0436    Specimen: Blood Updated: 06/04/22 0508     Glucose 134 mg/dL      BUN 21 mg/dL      Creatinine 0.50 mg/dL      Sodium 137 mmol/L      Potassium 4.7 mmol/L      Comment: Slight hemolysis detected by analyzer.  Results may be affected.        Chloride 102 mmol/L      CO2 31.0 mmol/L      Calcium 8.1 mg/dL      Total Protein 5.1 g/dL      Albumin 1.50 g/dL      ALT (SGPT) 25 U/L      AST (SGOT) 28 U/L      Alkaline Phosphatase 147 U/L      Total Bilirubin 0.4 mg/dL      Globulin 3.6 gm/dL      A/G Ratio 0.4 g/dL      BUN/Creatinine Ratio 42.0     Anion Gap 4.0 mmol/L      eGFR 105.1 mL/min/1.73      Comment: National Kidney Foundation and American Society of Nephrology (ASN) Task Force recommended calculation based on the Chronic Kidney Disease Epidemiology Collaboration (CKD-EPI) equation refit without adjustment for race.       Narrative:      GFR Normal >60  Chronic Kidney Disease <60  Kidney Failure <15      Phenytoin Level, Total [[id number]]  (Abnormal) Collected: 06/04/22 0436    Specimen: Blood Updated: 06/04/22 0508     Phenytoin Level 9.4 mcg/mL     Blood Gas, Arterial - [[id number]]  (Abnormal) Collected: 06/04/22 0442    Specimen: Arterial Blood Updated: 06/04/22 0438     Site Right Brachial     Denzel's Test N/A     pH, Arterial 7.369 pH units      pCO2, Arterial 57.5 mm Hg      Comment: 83 Value above reference range        pO2, Arterial 103.0 mm Hg      HCO3, Arterial 33.1 mmol/L      Comment: 83 Value above reference range        Base Excess, Arterial 6.9 mmol/L      Comment: 83 Value above reference range        O2 Saturation, Arterial 98.6 %      Temperature 37.0 C      Barometric Pressure for Blood Gas 749 mmHg      Modality Ventilator     FIO2 30 %      Ventilator Mode PC     Set [facility] Resp Rate 16.0     PEEP 5.0     PIP 17 cmH2O      Comment: Meter: [id number]     :  [id number]        Collected by [id number]     pCO2, Temperature Corrected 57.5 mm Hg      pH, Temp Corrected 7.369 pH Units      pO2, Temperature Corrected 103 mm Hg     POC Glucose Once [[id number]]  (Abnormal) Collected: 06/04/22 0033    Specimen: Blood Updated: 06/04/22 0044     Glucose 141 mg/dL      Comment: : [email or web address]  Jori EmilyMeter ID: [id number]       POC Glucose Once [[id number]]  (Abnormal) Collected: 06/03/22 2022    Specimen: Blood Updated: 06/03/22 2033     Glucose 163 mg/dL      Comment: : [email or web address] Jori EmilyMeter ID: [id number]       Magnesium [[id number]]  (Normal) Collected: 06/03/22 1841    Specimen: Blood Updated: 06/03/22 1858     Magnesium 1.8 mg/dL     POC Glucose Once [[id number]]  (Normal) Collected: 06/03/22 1205    Specimen: Blood Updated: 06/03/22 1216     Glucose 122 mg/dL      Comment: : [id number] Yeimi Martinez ID: [id number]           [id number]  Stevie Parsons, APRN

## 2022-06-04 NOTE — NURSING NOTE
Pt resting in bed. Pt does not respond to painful or verbal stimuli. Will cough with stimulation. Pupils reactive but sluggish. Sinus devorah as low as 35. Decreased diprivan. Irrigated jansen x1.  Chest tubes in place, no air leak. Will continue to monitor and report off to oncoming nurse.

## 2022-06-04 NOTE — PLAN OF CARE
Goal Outcome Evaluation:  Plan of Care Reviewed With: other (see comments) (RN)        Progress: no change  Outcome Evaluation: Ntn follow up. Consult received to initiate tube feeding s/p peg tube. Intubated, off propofol at this time.Initiate Impact Peptide 1.5 at 30 ml/hr x 12 hrs, increase by 10 ml/hr every 6 hrs as tolerated until goal rate of 65 ml/hr achieved. Routine water flush of 40 ml/hr per pump.  Pro source modular BID.  Nutrisource Fiber 1 pkt QID. Cont to follow for plan of care.

## 2022-06-04 NOTE — PROGRESS NOTES
Neurology Progress Note      Date of admission: 5/10/2022  4:49 AM  Date of visit: 6/4/2022    Chief Complaint:  Seizures    Subjective     Subjective:    Last night the patient had some atypical tongue activity status postplacement of a ventriculoperitoneal shunt.  It was unclear whether this represented seizure activity.  The propofol was increased at that time.  Early this morning the patient had runs of bradycardia with stable blood pressures.  The propofol was weaned off and currently his vital signs are normal.  He currently has no seizure-like activity.    Medications:  Current Facility-Administered Medications   Medication Dose Route Frequency Provider Last Rate Last Admin   • acetaminophen (TYLENOL) tablet 650 mg  650 mg Oral Q4H PRN Stevie Parsons APRN   650 mg at 05/31/22 2002    Or   • acetaminophen (TYLENOL) suppository 650 mg  650 mg Rectal Q4H PRN Stevie Parsons APRN   650 mg at 05/23/22 0016   • albuterol (PROVENTIL) nebulizer solution 0.083% 2.5 mg/3mL  2.5 mg Nebulization Q8H - RT Stevie Parsons APRN   2.5 mg at 06/04/22 0701   • alteplase (CATHFLO/ACTIVASE) injection 2 mg  2 mg Intracatheter PRN Stevie Parsons APRN   New Syringe/Cartridge at 05/28/22 1330   • bisacodyl (DULCOLAX) suppository 10 mg  10 mg Rectal Daily PRN Stevie Parsons APRN   10 mg at 06/03/22 0749   • butalbital-acetaminophen-caffeine (FIORICET, ESGIC) -40 MG per tablet 1 tablet  1 tablet Oral Q4H PRN Setvie Parsons APRN   1 tablet at 05/22/22 1729   • carvedilol (COREG) tablet 6.25 mg  6.25 mg Nasogastric BID With Meals Stevie Parsons APRN   6.25 mg at 06/01/22 1759   • ceFAZolin in 0.9% normal saline (ANCEF) IVPB solution 2 g  2 g Intravenous Q8H Stevie Parsons APRN 200 mL/hr at 06/04/22 0828 2 g at 06/04/22 0828   • chlorhexidine (PERIDEX) 0.12 % solution 15 mL  15 mL Mouth/Throat Q12H Rust, MARIO Navarrete   15 mL at 06/04/22 0828   • dextrose (D50W) (25 g/50 mL) IV injection 25 g  25 g Intravenous Q15 Min PRN Rust,  MARIO Navarrete   25 g at 06/02/22 1759   • dextrose (GLUTOSE) oral gel 15 g  15 g Oral Q15 Min PRN Stevie Parsons APRN   15 g at 05/22/22 0527   • fosphenytoin (Cerebyx) 275 mg PE in sodium chloride 0.9 % 100 mL IVPB  275 mg PE Intravenous Q8H RustStevie APRN   275 mg PE at 06/04/22 0524   • glucagon (human recombinant) (GLUCAGEN DIAGNOSTIC) injection 1 mg  1 mg Intramuscular Q15 Min PRN RustStevie APRN       • heparin (porcine) 5000 UNIT/ML injection 5,000 Units  5,000 Units Subcutaneous Q12H RustStevie APRN   5,000 Units at 06/04/22 0827   • Hold Medication (Anticoagulation)  1 each Does not apply Continuous PRN FeliztStevie APRN       • hydrALAZINE (APRESOLINE) injection 10 mg  10 mg Intravenous Q6H PRN RustStevie APRN   10 mg at 06/03/22 1018   • insulin detemir (LEVEMIR) injection 20 Units  20 Units Subcutaneous Nightly RustStevie APRN   20 Units at 06/03/22 2025   • insulin regular (humuLIN R,novoLIN R) injection 2-7 Units  2-7 Units Subcutaneous Q6H FeliztStevie APRN   2 Units at 06/01/22 0623   • insulin regular (humuLIN R,novoLIN R) injection 8 Units  8 Units Subcutaneous Q6H RustStevie APRN   8 Units at 06/01/22 0623   • ipratropium-albuterol (DUO-NEB) nebulizer solution 3 mL  3 mL Nebulization Q4H PRN FeliztStevie APRN   3 mL at 05/25/22 1007   • labetalol (NORMODYNE,TRANDATE) injection 10 mg  10 mg Intravenous Q6H PRN FeliztStevie APRN   10 mg at 06/03/22 1310   • lacosamide (VIMPAT) injection 200 mg  200 mg Intravenous Q12H RustStevie APRN   200 mg at 06/04/22 0827   • levETIRAcetam in NaCl 0.82% (KEPPRA) IVPB 500 mg  500 mg Intravenous Q12H RusStevie alexander APRN   500 mg at 06/04/22 0827   • levothyroxine (SYNTHROID, LEVOTHROID) tablet 125 mcg  125 mcg Nasogastric Q AM Setvie Parsons APRN   125 mcg at 06/01/22 0615   • lidocaine (XYLOCAINE) 1 % injection 10 mL  10 mL Intradermal Once FeliztStevie APRN       • liothyronine (CYTOMEL) tablet 25 mcg  25 mcg Nasogastric  Daily RustStevie APRN   25 mcg at 06/04/22 0828   • lisinopril (PRINIVIL,ZESTRIL) tablet 20 mg  20 mg Nasogastric Q24H FeliztStevie APRN   20 mg at 06/04/22 0828   • Morphine sulfate (PF) injection 2 mg  2 mg Intravenous Q2H PRN Stevie Parsons APRN   2 mg at 06/03/22 1309   • mupirocin (BACTROBAN) 2 % ointment 1 application  1 application Topical Q12H Stevie Parsons APRN   1 application at 06/04/22 0828   • norepinephrine (LEVOPHED) 8 mg in 250 mL NS infusion (premix)  0.02-0.3 mcg/kg/min Intravenous Titrated Stevie Parsons APRN   Held at 06/01/22 0606   • Nutrisource fiber pack 1 packet  1 packet Nasogastric 4x Daily Stevie Parsons APRN   1 packet at 06/04/22 0939   • ondansetron (ZOFRAN) tablet 4 mg  4 mg Oral Q6H PRN FeliztStevie APRN        Or   • ondansetron (ZOFRAN) injection 4 mg  4 mg Intravenous Q6H PRN FeliztStevie APRN       • oxyCODONE (ROXICODONE) 5 MG/5ML solution 7.5 mg  7.5 mg Nasogastric Q4H PRN Stevie Parsons APRN   7.5 mg at 05/31/22 1958   • pantoprazole (PROTONIX) injection 40 mg  40 mg Intravenous Q AM Stevie Parsons APRN   40 mg at 06/04/22 0525   • polyethylene glycol (MIRALAX) packet 17 g  17 g Oral Daily FeliztStevie APRN   17 g at 06/04/22 0828   • propofol (DIPRIVAN) infusion 10 mg/mL 100 mL  5-50 mcg/kg/min Intravenous Titrated Stevie Parsons APRN   Stopped at 06/04/22 0900   • ProSource TF oral liquid 45 mL  1 packet Nasogastric BID Stevie Parsons APRN   45 mL at 06/04/22 0939   • sodium chloride 3 % nebulizer solution 4 mL  4 mL Nebulization BID - RT Stevie Parsons APRN   4 mL at 06/04/22 0710       Review of Systems:   -A 14 point review of systems is unobtainable    Objective     Objective      Vital Signs  Temp:  [92 °F (33.3 °C)-98.1 °F (36.7 °C)] 92 °F (33.3 °C)  Heart Rate:  [35-68] 46  Resp:  [16-23] 23  BP: ()/(50-87) 144/79  FiO2 (%):  [30 %-60 %] 40 %    Physical Exam:    HEENT:  Neck supple.  Tracheostomy in place  CVS:  Regular rate and rhythm.  No  murmurs  Carotid Examination:  No bruits  Lungs:  Clear to auscultation  Abdomen:  Non-tender, Non-distended.  PEG tube in place  Extremities:  No signs of peripheral edema    Neurologic Exam:  Propofol currently paused.  Eyes open.  Occasionally tracks examiner around the room with eye movements.  Pupils react equally  No nystagmus  Facial grimacing symmetric  Eyes open.  No command following.      Motor: (strength out of 5:  1= minimal movement, 2 = movement in plane of gravity, 3 = movement against gravity, 4 = movement against some resistance, 5 = full strength)    No withdrawal to noxious stimuli  No spontaneous movement    DTR:  2+ throughout in all four extremities  upgoing toe on left    Sensory:  No withdrawal throughout    Coordination/Gait:  -No active tremors     Results Review:    I reviewed the patient's new clinical results.    Lab Results (last 24 hours)     Procedure Component Value Units Date/Time    Manual Differential [[id number]]  (Abnormal) Collected: 06/04/22 0436    Specimen: Blood Updated: 06/04/22 0546     Neutrophil % 79.6 %      Lymphocyte % 6.1 %      Monocyte % 5.1 %      Basophil % 1.0 %      Bands %  1.0 %      Metamyelocyte % 1.0 %      Myelocyte % 6.1 %      Neutrophils Absolute 6.39 10*3/mm3      Lymphocytes Absolute 0.48 10*3/mm3      Monocytes Absolute 0.40 10*3/mm3      Basophils Absolute 0.08 10*3/mm3      Anisocytosis Slight/1+     Basophilic Stippling Slight/1+     Poikilocytes Slight/1+     Polychromasia Slight/1+     Toxic Granulation Slight/1+     Platelet Morphology Normal    CBC & Differential [[id number]]  (Abnormal) Collected: 06/04/22 0436    Specimen: Blood Updated: 06/04/22 0546    Narrative:      The following orders were created for panel order CBC & Differential.  Procedure                               Abnormality         Status                     ---------                               -----------         ------                     CBC Auto  Differential[[id number]]        Abnormal            Final result                 Please view results for these tests on the individual orders.    CBC Auto Differential [[id number]]  (Abnormal) Collected: 06/04/22 0436    Specimen: Blood Updated: 06/04/22 0546     WBC 7.93 10*3/mm3      RBC 2.52 10*6/mm3      Hemoglobin 8.0 g/dL      Hematocrit 25.8 %      .4 fL      MCH 31.7 pg      MCHC 31.0 g/dL      RDW 13.8 %      RDW-SD 51.1 fl      MPV 9.2 fL      Platelets 300 10*3/mm3     Narrative:      The previously reported component NRBC is no longer being reported. Previous result was 0.3 /100 WBC (Reference Range: 0.0-0.2 /100 WBC) on 6/4/2022 at 0504 CDT.    POC Glucose Once [[id number]]  (Abnormal) Collected: 06/04/22 0521    Specimen: Blood Updated: 06/04/22 0531     Glucose 134 mg/dL      Comment: : JESUS Hsieh EmilyMeter ID: [id number]       Comprehensive Metabolic Panel [[id number]]  (Abnormal) Collected: 06/04/22 0436    Specimen: Blood Updated: 06/04/22 0508     Glucose 134 mg/dL      BUN 21 mg/dL      Creatinine 0.50 mg/dL      Sodium 137 mmol/L      Potassium 4.7 mmol/L      Comment: Slight hemolysis detected by analyzer. Results may be affected.        Chloride 102 mmol/L      CO2 31.0 mmol/L      Calcium 8.1 mg/dL      Total Protein 5.1 g/dL      Albumin 1.50 g/dL      ALT (SGPT) 25 U/L      AST (SGOT) 28 U/L      Alkaline Phosphatase 147 U/L      Total Bilirubin 0.4 mg/dL      Globulin 3.6 gm/dL      A/G Ratio 0.4 g/dL      BUN/Creatinine Ratio 42.0     Anion Gap 4.0 mmol/L      eGFR 105.1 mL/min/1.73      Comment: National Kidney Foundation and American Society of Nephrology (ASN) Task Force recommended calculation based on the Chronic Kidney Disease Epidemiology Collaboration (CKD-EPI) equation refit without adjustment for race.       Narrative:      GFR Normal >60  Chronic Kidney Disease <60  Kidney Failure <15      Phenytoin Level, Total [[id number]]  (Abnormal) Collected: 06/04/22  0436    Specimen: Blood Updated: 06/04/22 0508     Phenytoin Level 9.4 mcg/mL     Blood Gas, Arterial - [[id number]]  (Abnormal) Collected: 06/04/22 0442    Specimen: Arterial Blood Updated: 06/04/22 0438     Site Right Brachial     Denzel's Test N/A     pH, Arterial 7.369 pH units      pCO2, Arterial 57.5 mm Hg      Comment: 83 Value above reference range        pO2, Arterial 103.0 mm Hg      HCO3, Arterial 33.1 mmol/L      Comment: 83 Value above reference range        Base Excess, Arterial 6.9 mmol/L      Comment: 83 Value above reference range        O2 Saturation, Arterial 98.6 %      Temperature 37.0 C      Barometric Pressure for Blood Gas 749 mmHg      Modality Ventilator     FIO2 30 %      Ventilator Mode PC     Set Mech Resp Rate 16.0     PEEP 5.0     PIP 17 cmH2O      Comment: Meter: [id number]     :  [id number]        Collected by [id number]     pCO2, Temperature Corrected 57.5 mm Hg      pH, Temp Corrected 7.369 pH Units      pO2, Temperature Corrected 103 mm Hg     POC Glucose Once [[id number]]  (Abnormal) Collected: 06/04/22 0033    Specimen: Blood Updated: 06/04/22 0044     Glucose 141 mg/dL      Comment: : EREYNDC Hsieh EmilyMeter ID: [id number]       POC Glucose Once [[id number]]  (Abnormal) Collected: 06/03/22 2022    Specimen: Blood Updated: 06/03/22 2033     Glucose 163 mg/dL      Comment: : EREYNDC Hsieh EmilyMeter ID: [id number]       Magnesium [[id number]]  (Normal) Collected: 06/03/22 1841    Specimen: Blood Updated: 06/03/22 1858     Magnesium 1.8 mg/dL     POC Glucose Once [[id number]]  (Normal) Collected: 06/03/22 1205    Specimen: Blood Updated: 06/03/22 1216     Glucose 122 mg/dL      Comment: : [id number] Yeimi Martinez ID: [id number]       C-reactive Protein [[id number]]  (Abnormal) Collected: 06/03/22 1043    Specimen: Blood Updated: 06/03/22 1110     C-Reactive Protein 13.97 mg/dL         Imaging Results (Last 24 Hours)     Procedure Component  Value Units Date/Time    XR Chest 1 View [[id number]] Collected: 06/04/22 0817     Updated: 06/04/22 0825    Narrative:      HISTORY: Intubated     CXR: Frontal view the chest obtained     COMPARISON: 6/3/2022     FINDINGS: Tracheostomy tube tip appropriate in position. There are 2  small pigtail catheters of the left hemithorax with 2 larger lateral  left-sided chest tubes. Pleural catheters similar in position. No  pneumothorax. Small left pleural fluid collection. Hypoventilated lungs  with stable left greater than right basilar densities favoring  atelectasis. Scattered diffuse bilateral granuloma.     Right upper extremity PICC line tip projects over the SVC.       Impression:      1. Appropriate positioning of tracheostomy tube. Similar positioning of  the left-sided pleural catheters with small left pleural fluid  collection. Probable left greater than right basilar atelectasis. No  appreciable pneumothorax.  This report was finalized on 06/04/2022 08:22 by Dr. Christel Feliciano MD.    CT Head Without Contrast [[id number]] Collected: 06/03/22 1749     Updated: 06/03/22 1758    Narrative:      EXAMINATION: CT head without contrast 6/3/2022     DOSE: 936 mGycm. All CT scans are performed using dose optimization  techniques as appropriate to the performed exam and including at least  one of the following: Automated exposure control, adjustment of the mA  and/or kV according to size, and the use of the iterative reconstruction  technique..     HISTORY: Evaluate status post ventricular peritoneal shunt placement     FINDINGS: Today's exam is compared to prior study of earlier the same  day. A heterogeneous masslike density is noted within the right frontal  lobe with some peripheral hyperdensity likely representing postoperative  blood products. The patient's undergone right frontal craniotomy. A  ventriculostomy tube has been placed via right posterior parietal  approach with the tip projecting through the posterior  horn and body of  the right lateral ventricle. The tip is noted near the midline.  Ventricular size is stable from the previous exam. There is hemorrhage  layering dependently within the posterior horn of both lateral  ventricles stable from the previous exam. There is persistent mass  effect in the right frontal lobe with mild shift to the midline stable  from the previous exam. The basilar cisterns remain patent.       Impression:      1.. Ventriculoperitoneal shunt placed via right posterior parietal  approach with the catheter traversing the posterior horn and body of the  right lateral ventricle with the tip near the midline. Ventricle size is  stable from the previous exam. There is no evidence of complications.  2. Intraventricular hemorrhage is stable from the previous exam. There  is again a heterogeneous masslike density in the right frontal lobe with  recent craniotomy and mild mass effect with shift of the midline from  right to left within the frontal lobe. This is stable from the previous  exam.  This report was finalized on 06/03/2022 17:55 by Dr. Guillaume Bey MD.    CT Head Without Contrast [[id number]] Collected: 06/03/22 1223     Updated: 06/03/22 1228    Narrative:      EXAMINATION: CT HEAD WO CONTRAST-      6/3/2022 11:34 AM CDT     HISTORY: continued evaluation.  Surgical planning for VPS placement;  R13.10-Dysphagia, unspecified; Z01.818-Encounter for other preprocedural  examination; D32.9-Benign neoplasm of meninges, unspecified;  Z74.09-Other reduced mobility; Z78.9-Other specified health status;  G40.901-Epilepsy, unspecified, not intractable, with status epilepticus;  J96.01-Acute respiratory failure with hypoxia; G91.0-Communicati     In order to have a CT radiation dose as low as reasonably achievable  Automated Exposure Control was utilized for adjustment of the mA and/or  KV according to patient size.     DLP in mGycm= 874.     Noncontrast head CT.  Axial, sagittal, and coronal  imaging.     Comparison is made with May 27, 2022.     Right frontal craniotomy.  Interval partial resolution of scalp swelling and edema.     Persistent though decreased dependent intraventricular hemorrhage.  Ventricle size is stable.     There is no parenchymal hemorrhage or mass effect.     Summary:  1. Stable postoperative head CT.                                   This report was finalized on 06/03/2022 12:25 by Dr. Tomer Whelan MD.        Reviewed labs.  Phenytoin level corrects to a therapeutic range.  Uncorrected level is currently 9.4  Zinc is normal at 54  Prealbumin is 12.7 which is below normal range.  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 Seizures  --No seizure activity currently.  Unclear whether last night's events represented seizure activity.  As he currently has no concerning rhythmic movements we will hold on doing an EEG.  If at any point today he began showing activity we may do a stat EEG.  2.  Phenytoin level currently 9.4  3. Hypoalbuminemia .  PEG tube in place.   4. Anemia  5. Atrial fibrillation  6.  Status post ventriculoperitoneal shunt on 6/3  7.  Bradycardia likely caused by combination of fosphenytoin and high doses of propofol.    Plan:  · Fosphenytoin 275 mg every 8 hours  · Continue daily Dilantin levels  · On Vimpat 200 mg IV  every 12 hours  · Keppra 500 mg every 12 hours  · Monitor for seizure activity today and consider stat EEG if any is seen    35 minutes of critical care time with adjustment of sedation, monitoring vital signs, neurologic exam.    Donnell Freire MD  06/04/22  10:34 CDT

## 2022-06-05 ENCOUNTER — APPOINTMENT (OUTPATIENT)
Dept: NEUROLOGY | Facility: HOSPITAL | Age: 77
End: 2022-06-05

## 2022-06-05 ENCOUNTER — APPOINTMENT (OUTPATIENT)
Dept: GENERAL RADIOLOGY | Facility: HOSPITAL | Age: 77
End: 2022-06-05

## 2022-06-05 ENCOUNTER — APPOINTMENT (OUTPATIENT)
Dept: CT IMAGING | Facility: HOSPITAL | Age: 77
End: 2022-06-05

## 2022-06-05 LAB
ALBUMIN SERPL-MCNC: 1.8 G/DL (ref 3.5–5.2)
ALBUMIN/GLOB SERPL: 0.5 G/DL
ALP SERPL-CCNC: 158 U/L (ref 39–117)
ALT SERPL W P-5'-P-CCNC: 22 U/L (ref 1–41)
ANION GAP SERPL CALCULATED.3IONS-SCNC: 5 MMOL/L (ref 5–15)
ARTERIAL PATENCY WRIST A: POSITIVE
AST SERPL-CCNC: 30 U/L (ref 1–40)
ATMOSPHERIC PRESS: 746 MMHG
BASE EXCESS BLDA CALC-SCNC: 5.9 MMOL/L (ref 0–2)
BASO STIPL COARSE BLD QL SMEAR: ABNORMAL
BDY SITE: ABNORMAL
BILIRUB SERPL-MCNC: 0.5 MG/DL (ref 0–1.2)
BODY TEMPERATURE: 37 C
BUN SERPL-MCNC: 27 MG/DL (ref 8–23)
BUN/CREAT SERPL: 34.6 (ref 7–25)
CALCIUM SPEC-SCNC: 7.9 MG/DL (ref 8.6–10.5)
CHLORIDE SERPL-SCNC: 102 MMOL/L (ref 98–107)
CO2 SERPL-SCNC: 29 MMOL/L (ref 22–29)
CREAT SERPL-MCNC: 0.78 MG/DL (ref 0.76–1.27)
CRP SERPL-MCNC: 11.99 MG/DL (ref 0–0.5)
DACRYOCYTES BLD QL SMEAR: ABNORMAL
DEPRECATED RDW RBC AUTO: 51.3 FL (ref 37–54)
EGFRCR SERPLBLD CKD-EPI 2021: 91.9 ML/MIN/1.73
EOSINOPHIL # BLD MANUAL: 0.21 10*3/MM3 (ref 0–0.4)
EOSINOPHIL NFR BLD MANUAL: 2.1 % (ref 0.3–6.2)
ERYTHROCYTE [DISTWIDTH] IN BLOOD BY AUTOMATED COUNT: 14.4 % (ref 12.3–15.4)
GLOBULIN UR ELPH-MCNC: 3.6 GM/DL
GLUCOSE BLDC GLUCOMTR-MCNC: 120 MG/DL (ref 70–130)
GLUCOSE BLDC GLUCOMTR-MCNC: 124 MG/DL (ref 70–130)
GLUCOSE BLDC GLUCOMTR-MCNC: 133 MG/DL (ref 70–130)
GLUCOSE BLDC GLUCOMTR-MCNC: 140 MG/DL (ref 70–130)
GLUCOSE BLDC GLUCOMTR-MCNC: 162 MG/DL (ref 70–130)
GLUCOSE SERPL-MCNC: 152 MG/DL (ref 65–99)
HCO3 BLDA-SCNC: 31.2 MMOL/L (ref 20–26)
HCT VFR BLD AUTO: 26.2 % (ref 37.5–51)
HGB BLD-MCNC: 8.3 G/DL (ref 13–17.7)
INHALED O2 CONCENTRATION: 30 %
LYMPHOCYTES # BLD MANUAL: 0.85 10*3/MM3 (ref 0.7–3.1)
LYMPHOCYTES NFR BLD MANUAL: 5.2 % (ref 5–12)
Lab: ABNORMAL
MCH RBC QN AUTO: 31.6 PG (ref 26.6–33)
MCHC RBC AUTO-ENTMCNC: 31.7 G/DL (ref 31.5–35.7)
MCV RBC AUTO: 99.6 FL (ref 79–97)
MODALITY: ABNORMAL
MONOCYTES # BLD: 0.53 10*3/MM3 (ref 0.1–0.9)
MYELOCYTES NFR BLD MANUAL: 3.1 % (ref 0–0)
NEUTROPHILS # BLD AUTO: 8.19 10*3/MM3 (ref 1.7–7)
NEUTROPHILS NFR BLD MANUAL: 80.4 % (ref 42.7–76)
NEUTS VAC BLD QL SMEAR: ABNORMAL
NRBC BLD AUTO-RTO: 0.7 /100 WBC (ref 0–0.2)
PCO2 BLDA: 48.7 MM HG (ref 35–45)
PCO2 TEMP ADJ BLD: 48.7 MM HG (ref 35–45)
PEEP RESPIRATORY: 5 CM[H2O]
PH BLDA: 7.42 PH UNITS (ref 7.35–7.45)
PH, TEMP CORRECTED: 7.42 PH UNITS (ref 7.35–7.45)
PHENYTOIN SERPL-MCNC: 10.6 MCG/ML (ref 10–20)
PLAT MORPH BLD: NORMAL
PLATELET # BLD AUTO: 326 10*3/MM3 (ref 140–450)
PMV BLD AUTO: 9.2 FL (ref 6–12)
PO2 BLDA: 87.6 MM HG (ref 83–108)
PO2 TEMP ADJ BLD: 87.6 MM HG (ref 83–108)
POIKILOCYTOSIS BLD QL SMEAR: ABNORMAL
POLYCHROMASIA BLD QL SMEAR: ABNORMAL
POTASSIUM SERPL-SCNC: 4.3 MMOL/L (ref 3.5–5.2)
PROMYELOCYTES NFR BLD MANUAL: 1 % (ref 0–0)
PROT SERPL-MCNC: 5.4 G/DL (ref 6–8.5)
RBC # BLD AUTO: 2.63 10*6/MM3 (ref 4.14–5.8)
SAO2 % BLDCOA: 97.7 % (ref 94–99)
SET MECH RESP RATE: 16
SODIUM SERPL-SCNC: 136 MMOL/L (ref 136–145)
VARIANT LYMPHS NFR BLD MANUAL: 2.1 % (ref 0–5)
VARIANT LYMPHS NFR BLD MANUAL: 6.2 % (ref 19.6–45.3)
VENTILATOR MODE: AC
VT ON VENT VENT: 450 ML
WBC NRBC COR # BLD: 10.19 10*3/MM3 (ref 3.4–10.8)

## 2022-06-05 PROCEDURE — 25010000002 FOSPHENYTOIN 500 MG PE/10ML SOLUTION 10 ML VIAL: Performed by: NURSE PRACTITIONER

## 2022-06-05 PROCEDURE — 71250 CT THORAX DX C-: CPT

## 2022-06-05 PROCEDURE — 94799 UNLISTED PULMONARY SVC/PX: CPT

## 2022-06-05 PROCEDURE — 85025 COMPLETE CBC W/AUTO DIFF WBC: CPT | Performed by: NURSE PRACTITIONER

## 2022-06-05 PROCEDURE — 82962 GLUCOSE BLOOD TEST: CPT

## 2022-06-05 PROCEDURE — 36600 WITHDRAWAL OF ARTERIAL BLOOD: CPT

## 2022-06-05 PROCEDURE — 99233 SBSQ HOSP IP/OBS HIGH 50: CPT | Performed by: INTERNAL MEDICINE

## 2022-06-05 PROCEDURE — 63710000001 INSULIN REGULAR HUMAN PER 5 UNITS: Performed by: NURSE PRACTITIONER

## 2022-06-05 PROCEDURE — 25010000002 HEPARIN (PORCINE) PER 1000 UNITS: Performed by: NURSE PRACTITIONER

## 2022-06-05 PROCEDURE — 71045 X-RAY EXAM CHEST 1 VIEW: CPT

## 2022-06-05 PROCEDURE — 99291 CRITICAL CARE FIRST HOUR: CPT | Performed by: PSYCHIATRY & NEUROLOGY

## 2022-06-05 PROCEDURE — 85007 BL SMEAR W/DIFF WBC COUNT: CPT | Performed by: NURSE PRACTITIONER

## 2022-06-05 PROCEDURE — 25010000002 MORPHINE SULFATE (PF) 2 MG/ML SOLUTION: Performed by: NURSE PRACTITIONER

## 2022-06-05 PROCEDURE — 99024 POSTOP FOLLOW-UP VISIT: CPT | Performed by: NURSE PRACTITIONER

## 2022-06-05 PROCEDURE — 99231 SBSQ HOSP IP/OBS SF/LOW 25: CPT | Performed by: SURGERY

## 2022-06-05 PROCEDURE — 25010000002 PROPOFOL 10 MG/ML EMULSION: Performed by: NURSE PRACTITIONER

## 2022-06-05 PROCEDURE — 86140 C-REACTIVE PROTEIN: CPT | Performed by: NURSE PRACTITIONER

## 2022-06-05 PROCEDURE — 0 LEVETIRACETAM IN NACL 0.75% 1000 MG/100ML SOLUTION: Performed by: CLINICAL NURSE SPECIALIST

## 2022-06-05 PROCEDURE — 63710000001 INSULIN DETEMIR PER 5 UNITS: Performed by: NURSE PRACTITIONER

## 2022-06-05 PROCEDURE — 80185 ASSAY OF PHENYTOIN TOTAL: CPT | Performed by: NURSE PRACTITIONER

## 2022-06-05 PROCEDURE — 95819 EEG AWAKE AND ASLEEP: CPT

## 2022-06-05 PROCEDURE — 94003 VENT MGMT INPAT SUBQ DAY: CPT

## 2022-06-05 PROCEDURE — 95819 EEG AWAKE AND ASLEEP: CPT | Performed by: PSYCHIATRY & NEUROLOGY

## 2022-06-05 PROCEDURE — 94664 DEMO&/EVAL PT USE INHALER: CPT

## 2022-06-05 PROCEDURE — 80053 COMPREHEN METABOLIC PANEL: CPT | Performed by: NURSE PRACTITIONER

## 2022-06-05 PROCEDURE — 82803 BLOOD GASES ANY COMBINATION: CPT

## 2022-06-05 RX ORDER — LEVETIRACETAM 10 MG/ML
1000 INJECTION INTRAVASCULAR EVERY 12 HOURS SCHEDULED
Status: DISCONTINUED | OUTPATIENT
Start: 2022-06-05 | End: 2022-06-20 | Stop reason: HOSPADM

## 2022-06-05 RX ADMIN — Medication 1 PACKET: at 09:56

## 2022-06-05 RX ADMIN — Medication 45 ML: at 20:02

## 2022-06-05 RX ADMIN — Medication 1 PACKET: at 17:03

## 2022-06-05 RX ADMIN — LEVETIRACETAM 1000 MG: 10 INJECTION INTRAVENOUS at 20:01

## 2022-06-05 RX ADMIN — LEVETIRACETAM 1000 MG: 10 INJECTION INTRAVENOUS at 09:58

## 2022-06-05 RX ADMIN — INSULIN HUMAN 8 UNITS: 100 INJECTION, SOLUTION PARENTERAL at 17:02

## 2022-06-05 RX ADMIN — LEVOTHYROXINE SODIUM 125 MCG: 125 TABLET ORAL at 06:12

## 2022-06-05 RX ADMIN — SODIUM CHLORIDE 275 MG PE: 9 INJECTION, SOLUTION INTRAVENOUS at 21:02

## 2022-06-05 RX ADMIN — SODIUM CHLORIDE 275 MG PE: 9 INJECTION, SOLUTION INTRAVENOUS at 14:11

## 2022-06-05 RX ADMIN — Medication 1 PACKET: at 11:54

## 2022-06-05 RX ADMIN — LACOSAMIDE 200 MG: 10 INJECTION, SOLUTION INTRAVENOUS at 20:02

## 2022-06-05 RX ADMIN — SODIUM CHLORIDE SOLN NEBU 3% 4 ML: 3 NEBU SOLN at 06:29

## 2022-06-05 RX ADMIN — Medication 45 ML: at 09:59

## 2022-06-05 RX ADMIN — MUPIROCIN 1 APPLICATION: 20 OINTMENT TOPICAL at 20:03

## 2022-06-05 RX ADMIN — MORPHINE SULFATE 2 MG: 2 INJECTION, SOLUTION INTRAMUSCULAR; INTRAVENOUS at 14:22

## 2022-06-05 RX ADMIN — ALBUTEROL SULFATE 2.5 MG: 2.5 SOLUTION RESPIRATORY (INHALATION) at 23:00

## 2022-06-05 RX ADMIN — PANTOPRAZOLE SODIUM 40 MG: 40 INJECTION, POWDER, FOR SOLUTION INTRAVENOUS at 06:12

## 2022-06-05 RX ADMIN — SODIUM CHLORIDE 275 MG PE: 9 INJECTION, SOLUTION INTRAVENOUS at 06:12

## 2022-06-05 RX ADMIN — MUPIROCIN 1 APPLICATION: 20 OINTMENT TOPICAL at 09:56

## 2022-06-05 RX ADMIN — OXYCODONE HYDROCHLORIDE 7.5 MG: 5 SOLUTION ORAL at 12:08

## 2022-06-05 RX ADMIN — POLYETHYLENE GLYCOL 3350 17 G: 17 POWDER, FOR SOLUTION ORAL at 09:55

## 2022-06-05 RX ADMIN — LIOTHYRONINE SODIUM 25 MCG: 25 TABLET ORAL at 09:55

## 2022-06-05 RX ADMIN — CHLORHEXIDINE GLUCONATE 15 ML: 1.2 RINSE ORAL at 09:57

## 2022-06-05 RX ADMIN — SODIUM CHLORIDE SOLN NEBU 3% 4 ML: 3 NEBU SOLN at 18:30

## 2022-06-05 RX ADMIN — CARVEDILOL 6.25 MG: 6.25 TABLET, FILM COATED ORAL at 17:02

## 2022-06-05 RX ADMIN — HEPARIN SODIUM 5000 UNITS: 5000 INJECTION INTRAVENOUS; SUBCUTANEOUS at 09:55

## 2022-06-05 RX ADMIN — ALBUTEROL SULFATE 2.5 MG: 2.5 SOLUTION RESPIRATORY (INHALATION) at 06:24

## 2022-06-05 RX ADMIN — MORPHINE SULFATE 2 MG: 2 INJECTION, SOLUTION INTRAMUSCULAR; INTRAVENOUS at 17:02

## 2022-06-05 RX ADMIN — INSULIN HUMAN 2 UNITS: 100 INJECTION, SOLUTION PARENTERAL at 06:15

## 2022-06-05 RX ADMIN — PROPOFOL 20 MCG/KG/MIN: 10 INJECTION, EMULSION INTRAVENOUS at 03:59

## 2022-06-05 RX ADMIN — INSULIN DETEMIR 20 UNITS: 100 INJECTION, SOLUTION SUBCUTANEOUS at 20:03

## 2022-06-05 RX ADMIN — INSULIN HUMAN 8 UNITS: 100 INJECTION, SOLUTION PARENTERAL at 06:14

## 2022-06-05 RX ADMIN — INSULIN HUMAN 8 UNITS: 100 INJECTION, SOLUTION PARENTERAL at 11:51

## 2022-06-05 RX ADMIN — LACOSAMIDE 200 MG: 10 INJECTION, SOLUTION INTRAVENOUS at 09:55

## 2022-06-05 RX ADMIN — HEPARIN SODIUM 5000 UNITS: 5000 INJECTION INTRAVENOUS; SUBCUTANEOUS at 20:02

## 2022-06-05 RX ADMIN — Medication 1 PACKET: at 20:02

## 2022-06-05 RX ADMIN — CHLORHEXIDINE GLUCONATE 15 ML: 1.2 RINSE ORAL at 20:01

## 2022-06-05 RX ADMIN — ALBUTEROL SULFATE 2.5 MG: 2.5 SOLUTION RESPIRATORY (INHALATION) at 13:42

## 2022-06-05 NOTE — PROGRESS NOTES
[facility] ER Medicine Services  INPATIENT PROGRESS NOTE    Length of Stay: 26  Date of Admission: 5/10/2022  Primary Care Physician: Elisa Chacko MD    Subjective   Chief Complaint: Respiratory failure/status post tracheotomy/status post chest tube    HPI   Questionable seizure focal last night, plan for EEG today by neurology.  Blood pressure stable.  T-max 100 . T-current 97.5.  Slight brachycardia.    Review of Systems   Unable to assess secondary to the patient's intubated and sedated state.    All pertinent negatives and positives are as above. All other systems have been reviewed and are negative unless otherwise stated.     Objective    Temp:  [92.5 °F (33.6 °C)-100 °F (37.8 °C)] 97.5 °F (36.4 °C)  Heart Rate:  [43-81] 51  Resp:  [18-45] 22  BP: ()/(45-93) 146/76  FiO2 (%):  [30 %] 30 %    Intake/Output Summary (Last 24 hours) at 6/5/2022 0934  Last data filed at 6/5/2022 0800  Gross per 24 hour   Intake 1802.27 ml   Output 97 ml   Net 1705.27 ml     Physical Exam  Vitals and nursing note reviewed.   HENT:      Head: Normocephalic.   Eyes:      Conjunctiva/sclera: Conjunctivae normal.      Pupils: Pupils are equal, round, and reactive to light.   Neck:      Vascular: No JVD.   Cardiovascular:      Rate and Rhythm: Normal rate and regular rhythm.      Heart sounds: Normal heart sounds.   Pulmonary:      Effort: No respiratory distress.      Breath sounds: No wheezing or rales.      Comments: Tracheotomy in place.  On the vent.  Diminished breath sound bilateral, clear.  Chest:      Chest wall: No tenderness.   Abdominal:      General: Bowel sounds are normal. There is no distension.      Palpations: Abdomen is soft.      Tenderness: There is no abdominal tenderness.      Comments: Obesity .  PEG tube in place.  Musculoskeletal:         General: No tenderness or deformity.      Cervical back: Neck supple.      Right lower leg: Edema present.      Left lower leg:  Edema present.      Comments: +1-2 .   Skin:     General: Skin is warm and dry.      Capillary Refill: Capillary refill takes 2 to 3 seconds.      Findings: No rash.   Neurological:      Mental Status: He is oriented to person, place, and time.      Cranial Nerves: No cranial nerve deficit.      Motor: Weakness present. No abnormal muscle tone.      Coordination: Coordination abnormal.      Gait: Gait abnormal.      Deep Tendon Reflexes: Reflexes normal.   Results Review:  Lab Results (last 24 hours)     Procedure Component Value Units Date/Time    POC Glucose Once [[id number]]  (Abnormal) Collected: 06/05/22 0614    Specimen: Blood Updated: 06/05/22 0625     Glucose 162 mg/dL      Comment: : [id number] Salvador LaceyMeter ID: [id number]       Manual Differential [[id number]]  (Abnormal) Collected: 06/05/22 0406    Specimen: Blood Updated: 06/05/22 0510     Neutrophil % 80.4 %      Lymphocyte % 6.2 %      Monocyte % 5.2 %      Eosinophil % 2.1 %      Myelocyte % 3.1 %      Promyelocyte % 1.0 %      Atypical Lymphocyte % 2.1 %      Neutrophils Absolute 8.19 10*3/mm3      Lymphocytes Absolute 0.85 10*3/mm3      Monocytes Absolute 0.53 10*3/mm3      Eosinophils Absolute 0.21 10*3/mm3      Basophilic Stippling Slight/1+     Dacrocytes Slight/1+     Poikilocytes Mod/2+     Polychromasia Slight/1+     Vacuolated Neutrophils Slight/1+     Platelet Morphology Normal    Comprehensive Metabolic Panel [[id number]]  (Abnormal) Collected: 06/05/22 0406    Specimen: Blood Updated: 06/05/22 0445     Glucose 152 mg/dL      BUN 27 mg/dL      Creatinine 0.78 mg/dL      Sodium 136 mmol/L      Potassium 4.3 mmol/L      Chloride 102 mmol/L      CO2 29.0 mmol/L      Calcium 7.9 mg/dL      Total Protein 5.4 g/dL      Albumin 1.80 g/dL      ALT (SGPT) 22 U/L      AST (SGOT) 30 U/L      Alkaline Phosphatase 158 U/L      Total Bilirubin 0.5 mg/dL      Globulin 3.6 gm/dL      A/G Ratio 0.5 g/dL      BUN/Creatinine Ratio 34.6     Anion Gap  5.0 mmol/L      eGFR 91.9 mL/min/1.73      Comment: National Kidney Foundation and American Society of Nephrology (ASN) Task Force recommended calculation based on the Chronic Kidney Disease Epidemiology Collaboration (CKD-EPI) equation refit without adjustment for race.       Narrative:      GFR Normal >60  Chronic Kidney Disease <60  Kidney Failure <15      Phenytoin Level, Total [[id number]]  (Normal) Collected: 06/05/22 0406    Specimen: Blood Updated: 06/05/22 0445     Phenytoin Level 10.6 mcg/mL     C-reactive Protein [[id number]]  (Abnormal) Collected: 06/05/22 0406    Specimen: Blood Updated: 06/05/22 0445     C-Reactive Protein 11.99 mg/dL     Blood Gas, Arterial - [[id number]]  (Abnormal) Collected: 06/05/22 0441    Specimen: Arterial Blood Updated: 06/05/22 0437     Site Left Radial     Denzel's Test Positive     pH, Arterial 7.415 pH units      pCO2, Arterial 48.7 mm Hg      Comment: 83 Value above reference range        pO2, Arterial 87.6 mm Hg      HCO3, Arterial 31.2 mmol/L      Comment: 83 Value above reference range        Base Excess, Arterial 5.9 mmol/L      Comment: 83 Value above reference range        O2 Saturation, Arterial 97.7 %      Temperature 37.0 C      Barometric Pressure for Blood Gas 746 mmHg      Modality Ventilator     FIO2 30 %      Ventilator Mode AC     Set Tidal Volume 450     Set Mech Resp Rate 16.0     PEEP 5.0     Collected by [id number]     Comment: Meter: [id number]     :  [id number]        pCO2, Temperature Corrected 48.7 mm Hg      pH, Temp Corrected 7.415 pH Units      pO2, Temperature Corrected 87.6 mm Hg     CBC & Differential [[id number]]  (Abnormal) Collected: 06/05/22 0406    Specimen: Blood Updated: 06/05/22 0428    Narrative:      The following orders were created for panel order CBC & Differential.  Procedure                               Abnormality         Status                     ---------                               -----------         ------                      CBC Auto Differential[[id number]]        Abnormal            Final result                 Please view results for these tests on the individual orders.    CBC Auto Differential [[id number]]  (Abnormal) Collected: 06/05/22 0406    Specimen: Blood Updated: 06/05/22 0428     WBC 10.19 10*3/mm3      RBC 2.63 10*6/mm3      Hemoglobin 8.3 g/dL      Hematocrit 26.2 %      MCV 99.6 fL      MCH 31.6 pg      MCHC 31.7 g/dL      RDW 14.4 %      RDW-SD 51.3 fl      MPV 9.2 fL      Platelets 326 10*3/mm3      nRBC 0.7 /100 WBC     POC Glucose Once [[id number]]  (Abnormal) Collected: 06/05/22 0038    Specimen: Blood Updated: 06/05/22 0049     Glucose 140 mg/dL      Comment: : [id number] Salvador CailineyMeter ID: [id number]       Blood Gas, Arterial - [[id number]]  (Abnormal) Collected: 06/04/22 2141    Specimen: Arterial Blood Updated: 06/04/22 2136     Site Left Radial     Denzel's Test Positive     pH, Arterial 7.432 pH units      pCO2, Arterial 47.7 mm Hg      Comment: 83 Value above reference range        pO2, Arterial 84.8 mm Hg      HCO3, Arterial 31.8 mmol/L      Comment: 83 Value above reference range        Base Excess, Arterial 6.7 mmol/L      Comment: 83 Value above reference range        O2 Saturation, Arterial 96.7 %      Temperature 37.0 C      Barometric Pressure for Blood Gas 746 mmHg      Modality Ventilator     FIO2 30 %      Ventilator Mode AC     Set Tidal Volume 450     Set Mech Resp Rate 16.0     PEEP 5.0     Collected by [id number]     Comment: Meter: [id number]     :  [id number]        pCO2, Temperature Corrected 47.7 mm Hg      pH, Temp Corrected 7.432 pH Units      pO2, Temperature Corrected 84.8 mm Hg     POC Glucose Once [[id number]]  (Normal) Collected: 06/04/22 2036    Specimen: Blood Updated: 06/04/22 2047     Glucose 102 mg/dL      Comment: : [id number] Jone RoldanonMeter ID: [id number]       POC Glucose Once [[id number]]  (Normal) Collected: 06/04/22 1739    Specimen: Blood Updated:  06/04/22 1751     Glucose 102 mg/dL      Comment: : ZACH Bernardo RyanMeter ID: [id number]       POC Glucose Once [[id number]]  (Normal) Collected: 06/04/22 1058    Specimen: Blood Updated: 06/04/22 1110     Glucose 110 mg/dL      Comment: : ZACH Bernardo RyanMeter ID: [id number]              Cultures:  No results found for: BLOODCX, URINECX, WOUNDCX, MRSACX, RESPCX, STOOLCX    Radiology Data:    Imaging Results (Last 24 Hours)     Procedure Component Value Units Date/Time    XR Chest 1 View [[id number]] Collected: 06/05/22 0750     Updated: 06/05/22 0756    Narrative:      HISTORY: Intubated     CXR: Frontal view the chest obtained     COMPARISON: 6/4/2022 at 8:47 PM     FINDINGS: Tracheostomy tube appropriate in position. Right-sided   shunt. Right upper extremity PICC line tip projects over the SVC.  Multiple left-sided pleural catheters are stable in position.  Hypoventilated lungs with prominent pulmonary vascular structures. There  are small pleural effusions. No appreciable pneumothorax. Scattered  bilateral calcified granuloma. Cardiomegaly.       Impression:      1. Hypoventilated lungs. Vascular crowding with likely pulmonary venous  congestion/mild edema. Multiple left-sided chest tubes similar in  position with no appreciable pneumothorax. Very small pleural effusions  suspected. Scattered calcified granuloma. Appropriate positioning of  tracheostomy tube.  This report was finalized on 06/05/2022 07:53 by Dr. Christel Feliciano MD.    CT Chest Without Contrast Diagnostic [[id number]] Collected: 06/05/22 0704     Updated: 06/05/22 0715    Narrative:      CT CHEST WO CONTRAST DIAGNOSTIC- 6/5/2022 5:50 AM CDT     HISTORY: pleural effusion; R13.10-Dysphagia, unspecified;  Z01.818-Encounter for other preprocedural examination; D32.9-Benign  neoplasm of meninges, unspecified; Z74.09-Other reduced mobility;  Z78.9-Other specified health status; G40.901-Epilepsy, unspecified,  not  intractable, with status epilepticus; J96.01-Acute respiratory failure  with hypoxia; G91.0-Communicating hydrocephalus      COMPARISON: None     DOSE LENGTH PRODUCT: 405 mGy cm. Automated exposure control was also  utilized to decrease patient radiation dose.     TECHNIQUE: Axial images of the chest are obtained without IV contrast     FINDINGS:  The tracheostomy tube is in place with tip position above the  hernandez. The 2 small posterior apical pleural catheter is remain in place  with appropriate drainage of the posterior medial pleural fluid  collections compared to the prior study. The 2 larger left-sided  thoracotomy tubes remain in place. Only small pleural effusions are seen  on the current exam. There is stable cardiomegaly. Thoracic aorta normal  in caliber. No pericardial effusion. Calcified mediastinal and bilateral  hilar lymph nodes with no pathologic lymphadenopathy.     A right upper extremity PICC line is in place with its tip in the  proximal SVC. A right sided peritoneal shunt identified. Minimal  subcutaneous emphysema within the right upper abdominal wall. Images of  the upper abdomen are degraded by the artifact from overlying lines and  EKG leads. Questionable biliary sludge with no biliary dilatation.  Adrenal glands are not imaged in their entirety.     Scattered calcified granulomas seen bilaterally. Vascular crowding.  Bilateral lower lobe consolidation may represent cysts atelectasis or  pneumonia. Only trace air seen within the left posterior pleural space.     Moderate degenerative change thoracic spine. No focal destructive  osseous lesions.       Impression:      1. Left chest tubes remain in place with appropriate evacuation of the  left pleural fluid. Only small pleural effusions seen on today's exam  with minimal air present in the posterior left pleural space.  2. Bibasilar consolidation may represent atelectasis and/or pneumonia.  Scattered bilateral calcified granuloma.  Vascular crowding and/or mild  venous congestion. Stable cardiomegaly.  This report was finalized on 06/05/2022 07:12 by Dr. Christel Feliciano MD.    XR Chest 1 View [[id number]] Collected: 06/04/22 2102     Updated: 06/04/22 2107    Narrative:      EXAMINATION: Chest 1 view 6/4/2022     HISTORY: Respiratory decline.     FINDINGS: Upright frontal projection of chest is compared to prior exam  of earlier the same day. 2 small caliber pigtail catheters as well as 2  thoracostomy tubes are in place on the left. There is no pneumothorax.  Lungs are hypoventilated with bibasilar atelectasis. There is improved  aeration when compared to the previous exam. A PICC line and  tracheostomy tube remain in place.       Impression:      .  1. No change in the left-sided thoracostomy tubes. There is no  appreciable pneumothorax.  2. Improved aeration of the lungs but with persistent bilateral lower  lobe atelectasis.  This report was finalized on 06/04/2022 21:04 by Dr. Guillaume Bey MD.          No Known Allergies    Scheduled meds:   albuterol, 2.5 mg, Nebulization, Q8H - RT  carvedilol, 6.25 mg, Nasogastric, BID With Meals  chlorhexidine, 15 mL, Mouth/Throat, Q12H  fosphenytoin, 275 mg PE, Intravenous, Q8H  heparin (porcine), 5,000 Units, Subcutaneous, Q12H  insulin detemir, 20 Units, Subcutaneous, Nightly  insulin regular, 2-7 Units, Subcutaneous, Q6H  insulin regular, 8 Units, Subcutaneous, Q6H  lacosamide, 200 mg, Intravenous, Q12H  levETIRAcetam, 1,000 mg, Intravenous, Q12H  levothyroxine, 125 mcg, Nasogastric, Q AM  lidocaine, 10 mL, Intradermal, Once  liothyronine, 25 mcg, Nasogastric, Daily  lisinopril, 20 mg, Nasogastric, Q24H  mupirocin, 1 application, Topical, Q12H  Nutrisource fiber, 1 packet, Nasogastric, 4x Daily  pantoprazole, 40 mg, Intravenous, Q AM  polyethylene glycol, 17 g, Oral, Daily  ProSource TF, 1 packet, Nasogastric, BID  ProSource TF, 45 mL, Per G Tube, BID  sodium chloride, 4 mL, Nebulization, BID -  RT        PRN meds:  •  acetaminophen **OR** acetaminophen  •  alteplase  •  bisacodyl  •  butalbital-acetaminophen-caffeine  •  dextrose  •  dextrose  •  glucagon (human recombinant)  •  hold  •  hydrALAZINE  •  ipratropium-albuterol  •  labetalol  •  LORazepam  •  Morphine  •  ondansetron **OR** ondansetron  •  oxyCODONE    Assessment/Plan       Meningioma (HCC)    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 The patient was admitted on 5/10 by Dr. Downs with neurosurgery.  He has prior history of known meningioma that was causing compression and visual changes.  He presented for craniotomy.  Hospital medicine was originally consulted on 5/14.  The patient had developed status epilepticus postsurgically and required intubation/mechanical ventilation.      Respiratory failure/left pleural effusion. He required intubation on 5/14 for airway protection.  This was done by Dr. Gunderson.  Pulmonary has since been consulted. Continue to monitor for readiness for spontaneous breathing trials and extubation in the future. Again, he was intubated for airway protection given his status epilepticus.  Propofol drip . albuterol nebs.  DuoNebs as needed.  Morphine as needed.  Oxycodone as needed.  CT imaging of chest-  Left chest tubes remain in place with appropriate evacuation of the left pleural fluid, Only small pleural effusions seen on  today's exam  with minimal air present in the posterior left pleural space, Bibasilar consolidation may represent atelectasis and/or pneumonia, scattered bilateral calcified granuloma, Vascular crowding and/or mild venous congestion, Stable cardiomegaly.  Status post thoracotomy tube placement 6/26/2022.   Status post tracheotomy left chest 6/2/2022.   Status post tPA through chest tube.  Four chest tube to the left lung.  Ventilator- assist-control, FiO2 30, tidal volume 450, rate 16, PEEP 5.     Hypotension.  Resolved.  Levophed drip off.     Seizure . consult neurology . IV Vimpat.  IV Keppra.  IV cerebyx.      Meningioma.  Decadron.  CT scan head without contrast showed postsurgical changes from right frontal craniotomy, Small residual 5 mm subdural hematoma hygroma, Residual hemorrhage and occipital Horn similar to prior exam, Previously described hemorrhage in the right frontal lobe is not present on his exam, Dilated ventricular system.  Status post craniotomy 5/10/2022. He has had a lumbar drain placed by neurosurgery.    Plans for shunt placement by neurosurgery.     Thrombocytopenia. His platelet count was 217,000 on 4/20/2022. This declined to a low of 72,000 on 5/17.  Peripheral smear on 5/17 showed no schistocytes with moderate peripheral thrombocytopenia.  PTT normal, PT/INR slightly elevated, fibrin split products high, and fibrinogen high.  Neurosurgery gave him a sixpack of platelets on 5/18.  Thrombocytopenia resolved.     Fever-resolved. He had run steady fever from the afternoon of 5/21 through 5/22.  Dr. Escobar was contacted and placed him on vancomycin and cefepime.  He still has a lumbar drain and CSF was sampled on the morning of 5/22.  ..  He received vancomycin. Meropenem was started on 5/25 for 5 days.  Patient has finished vancomycin and meropenem antibiotics.  Fever resolved.     Hypertension/atrial fibrillation. He typically takes lisinopril. Resumed per tube at a lower dose than normal  on 5/18. Titrated up on 5/19. Started carvedilol on 5/19. Titrated medications up to get off Cardene.  Unable to take anticoagulation.  Lisinopril.  Hydralazine as needed.  Echocardiogram- ejection fraction 70%-hyperdynamic, mild concentric hypertrophy, tricuspid regurgitation, right ventricle cavity moderately dilated, right atrial cavity dilated, no significant valvular pathology, atrial fibrillation rhythm.     Hypothyroidism. Free T4 and free T3 were very low as well. Started levothyroxine and liothyronine on 5/19. TRH pending since 5/19!!  I rechecked TSH, free T4, and free T3 on 5/29 to see where we are at.  TSH is now 3.950.  Free T4 has improve from 0.31 to 0.53.  Free T3 has improved from less than 0.40 to 1.60.  Synthroid . Cytomel .     Diabetes.  Hemoglobin A1c 8.2.  Sliding scale.   Levemir . regular insulin.     Anemia.  Hemoglobin stable.  No sign of acute bleed.     Reflux.  Protonix.  Zofran as needed.     Constipation.  MiraLAX.  Dulcolax suppository as needed.     Headaches.  Fioricet as needed.     Obesity.  BMI 31.     SCD for DVT prophylaxis.  Heparin prophylaxis.     Nutrition.  PEG tube placement 6/2/2022.     Blood culture- no growth 5 days.  Respiratory culture- Light growth (2+) Normal respiratory annia. No S. aureus or Pseudomonas aeruginosa detected. Final report.   Urine culture>100,000 CFU/mL Klebsiella aerogenes Abnormal   AFB culture-pending.  Anaerobic culture-negative.  Body fluid culture-negative.  Fungal culture pending.  MRSA DNA probe-negative.  COVID-19-negative.     Discharge Planning: [facility]?    Electronically signed by Aden Kc MD, 06/05/22, 9:34 AM CDT.

## 2022-06-05 NOTE — PROGRESS NOTES
PULMONARY AND CRITICAL CARE PROGRESS NOTE - [facility]    Patient: Hai Hernandez    1945    MR# [id number]    Acct# [id number]  06/05/22   09:03 CDT  Referring Provider: Martell Downs, *    Chief Complaint: Mechanically ventilated    Interval history: The patient remains intubated and sedated.  Propofol gtt infusing.  He is now requiring Levophed.  O2 sat 96% on 5 PEEP, 0.30 FiO2.  1 overnight phone call regarding the patient's ventilator dyssynchrony.  The patient was transitioned from AC/PC to AC/VC with better vent compliance noted per nursing.  ABGs stable.  Will continue vent as is.  x4 CT remain intact intact per chest x-ray.  Chest x-ray shows hypoventilated lungs and likely pulm pulmonary congestion/mild edema.  No other aggravating or alleviating factors.           Meds:  albuterol, 2.5 mg, Nebulization, Q8H - RT  carvedilol, 6.25 mg, Nasogastric, BID With Meals  chlorhexidine, 15 mL, Mouth/Throat, Q12H  fosphenytoin, 275 mg PE, Intravenous, Q8H  heparin (porcine), 5,000 Units, Subcutaneous, Q12H  insulin detemir, 20 Units, Subcutaneous, Nightly  insulin regular, 2-7 Units, Subcutaneous, Q6H  insulin regular, 8 Units, Subcutaneous, Q6H  lacosamide, 200 mg, Intravenous, Q12H  levETIRAcetam, 1,000 mg, Intravenous, Q12H  levothyroxine, 125 mcg, Nasogastric, Q AM  lidocaine, 10 mL, Intradermal, Once  liothyronine, 25 mcg, Nasogastric, Daily  lisinopril, 20 mg, Nasogastric, Q24H  mupirocin, 1 application, Topical, Q12H  Nutrisource fiber, 1 packet, Nasogastric, 4x Daily  pantoprazole, 40 mg, Intravenous, Q AM  polyethylene glycol, 17 g, Oral, Daily  ProSource TF, 1 packet, Nasogastric, BID  ProSource TF, 45 mL, Per G Tube, BID  sodium chloride, 4 mL, Nebulization, BID - RT      hold, 1 each  norepinephrine, 0.02-0.3 mcg/kg/min, Last Rate: 0.06 mcg/kg/min (06/05/22 0645)  propofol, 5-50 mcg/kg/min, Last Rate: 20 mcg/kg/min (06/05/22 1029)      Review of Systems:   Cannot obtain  due to mechanical ventilated state     Ventilator Settings:        Resp Rate (Set): 16  Pressure Support (cm H2O): 8 cm H20  FiO2 (%): 30 %  PEEP/CPAP (cm H2O): 5 cm H20  Minute Ventilation (L/min) (Obs): 9.39 L/min  Resp Rate (Observed) Vent: 29  I:E Ratio (Set): 1:0.32  I:E Ratio (Obs): 1:1.60  PIP Observed (cm H2O): 20 cm H2O  RSBI: 21.96  Physical Exam:  Temp:  [92.5 °F (33.6 °C)-100 °F (37.8 °C)] 97.5 °F (36.4 °C)  Heart Rate:  [41-81] 51  Resp:  [18-45] 22  BP: ()/(45-93) 146/76  FiO2 (%):  [30 %] 30 %    Intake/Output Summary (Last 24 hours) at 6/5/2022 0903  Last data filed at 6/5/2022 0800  Gross per 24 hour   Intake 1802.27 ml   Output 97 ml   Net 1705.27 ml     SpO2 Percentage    06/05/22 0800 06/05/22 0815 06/05/22 0830   SpO2: 97% 97% 97%   Body mass index is 33.91 kg/m².   Physical Exam   Constitutional:       General: He is intubated and sedated     Appearance: He is ill-appearing. He is not toxic-appearing.      Interventions: He is sedated and intubated.   HENT:      Head: Normocephalic.      Comments: Craniotomy wound, trach     Nose: Nose normal.   Eyes:      General: No scleral icterus.  Cardiovascular: Sinus bradycardia, 40s-50s  Pulmonary:      Effort: No accessory muscle usage or respiratory distress. He is intubated.      Breath sounds: Decreased breath sounds in bases   Chest:      Chest wall: No edema.      Comments: Left sided chest tubes x2 + 2 pigtails intact.  Abdominal:      General: There is no distension.      Palpations: Abdomen is soft.   Genitourinary:     Comments: Mccrary  Musculoskeletal:      Right lower leg: Edema present.      Left lower leg: Edema present.      Comments: SCDs   Skin:     Findings: No rash.   Neurological:      Motor: No tremor or seizure activity.  Will open eyes, but not following any commands with sedation paused.  Psychiatric:         Behavior: Behavior is not agitated.   Electronically signed by MARIO Aldrich, 6/5/2022, 09:03 CDT       Physician Substantive Portion: Medical Decision Making    Laboratory Data:  Results from last 7 days   Lab Units 06/05/22  0406 06/04/22  0436 06/03/22  0430   WBC 10*3/mm3 10.19 7.93 8.29   HEMOGLOBIN g/dL 8.3* 8.0* 8.6*   PLATELETS 10*3/mm3 326 300 338     Results from last 7 days   Lab Units 06/05/22  0406 06/04/22  0436 06/03/22  0430 06/01/22  0139 05/31/22  1755   SODIUM mmol/L 136 137 136   < >  --    POTASSIUM mmol/L 4.3 4.7 4.6   < >  --    BUN mg/dL 27* 21 21   < >  --    CREATININE mg/dL 0.78 0.50* 0.49*   < >  --    INR   --   --   --   --  1.10*    < > = values in this interval not displayed.     Results from last 7 days   Lab Units 06/05/22  0441 06/04/22  2141 06/04/22  0442   PH, ARTERIAL pH units 7.415 7.432 7.369   PCO2, ARTERIAL mm Hg 48.7* 47.7* 57.5*   PO2 ART mm Hg 87.6 84.8 103.0   FIO2 % 30 30 30     No results found for: BLOODCX, URINECX, WOUNDCX, MRSACX, RESPCX, STOOLCX  Recent films:  CT Head Without Contrast    Result Date: 6/3/2022  EXAMINATION: CT head without contrast 6/3/2022  DOSE: 936 mGycm. All CT scans are performed using dose optimization techniques as appropriate to the performed exam and including at least one of the following: Automated exposure control, adjustment of the mA and/or kV according to size, and the use of the iterative reconstruction technique..  HISTORY: Evaluate status post ventricular peritoneal shunt placement  FINDINGS: Today's exam is compared to prior study of earlier the same day. A heterogeneous masslike density is noted within the right frontal lobe with some peripheral hyperdensity likely representing postoperative blood products. The patient's undergone right frontal craniotomy. A ventriculostomy tube has been placed via right posterior parietal approach with the tip projecting through the posterior horn and body of the right lateral ventricle. The tip is noted near the midline. Ventricular size is stable from the previous exam. There is hemorrhage  layering dependently within the posterior horn of both lateral ventricles stable from the previous exam. There is persistent mass effect in the right frontal lobe with mild shift to the midline stable from the previous exam. The basilar cisterns remain patent.      Impression: 1.. Ventriculoperitoneal shunt placed via right posterior parietal approach with the catheter traversing the posterior horn and body of the right lateral ventricle with the tip near the midline. Ventricle size is stable from the previous exam. There is no evidence of complications. 2. Intraventricular hemorrhage is stable from the previous exam. There is again a heterogeneous masslike density in the right frontal lobe with recent craniotomy and mild mass effect with shift of the midline from right to left within the frontal lobe. This is stable from the previous exam. This report was finalized on 06/03/2022 17:55 by Dr. Guillaume Bey MD.    CT Head Without Contrast    Result Date: 6/3/2022  EXAMINATION: CT HEAD WO CONTRAST-   6/3/2022 11:34 AM CDT  HISTORY: continued evaluation.  Surgical planning for VPS placement; R13.10-Dysphagia, unspecified; Z01.818-Encounter for other preprocedural examination; D32.9-Benign neoplasm of meninges, unspecified; Z74.09-Other reduced mobility; Z78.9-Other specified health status; G40.901-Epilepsy, unspecified, not intractable, with status epilepticus; J96.01-Acute respiratory failure with hypoxia; G91.0-Communicati  In order to have a CT radiation dose as low as reasonably achievable Automated Exposure Control was utilized for adjustment of the mA and/or KV according to patient size.  DLP in mGycm= 874.  Noncontrast head CT. Axial, sagittal, and coronal imaging.  Comparison is made with May 27, 2022.  Right frontal craniotomy. Interval partial resolution of scalp swelling and edema.  Persistent though decreased dependent intraventricular hemorrhage. Ventricle size is stable.  There is no parenchymal  hemorrhage or mass effect.  Summary: 1. Stable postoperative head CT.            This report was finalized on 06/03/2022 12:25 by Dr. Tomer Whelan MD.    CT Chest Without Contrast Diagnostic    Result Date: 6/5/2022  CT CHEST WO CONTRAST DIAGNOSTIC- 6/5/2022 5:50 AM CDT  HISTORY: pleural effusion; R13.10-Dysphagia, unspecified; Z01.818-Encounter for other preprocedural examination; D32.9-Benign neoplasm of meninges, unspecified; Z74.09-Other reduced mobility; Z78.9-Other specified health status; G40.901-Epilepsy, unspecified, not intractable, with status epilepticus; J96.01-Acute respiratory failure with hypoxia; 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 Automated exposure control was also utilized to decrease patient radiation dose.  TECHNIQUE: Axial images of the chest are obtained without IV contrast  FINDINGS:  The tracheostomy tube is in place with tip position above the hernandez. The 2 small posterior apical pleural catheter is remain in place with appropriate drainage of the posterior medial pleural fluid collections compared to the prior study. The 2 larger left-sided thoracotomy tubes remain in place. Only small pleural effusions are seen on the current exam. There is stable cardiomegaly. Thoracic aorta normal in caliber. No pericardial effusion. Calcified mediastinal and bilateral hilar lymph nodes with no pathologic lymphadenopathy.  A right upper extremity PICC line is in place with its tip in the proximal SVC. A right sided peritoneal shunt identified. Minimal subcutaneous emphysema within the right upper abdominal wall. Images of the upper abdomen are degraded by the artifact from overlying lines and EKG leads. Questionable biliary sludge with no biliary dilatation. Adrenal glands are not imaged in their entirety.  Scattered calcified granulomas seen bilaterally. Vascular crowding. Bilateral lower lobe consolidation may represent cysts atelectasis or  pneumonia. Only trace air seen within the left posterior pleural space.  Moderate degenerative change thoracic spine. No focal destructive osseous lesions.      Impression: 1. Left chest tubes remain in place with appropriate evacuation of the left pleural fluid. Only small pleural effusions seen on today's exam with minimal air present in the posterior left pleural space. 2. Bibasilar consolidation may represent atelectasis and/or pneumonia. Scattered bilateral calcified granuloma. Vascular crowding and/or mild venous congestion. Stable cardiomegaly. This report was finalized on 06/05/2022 07:12 by Dr. Christel Feliciano MD.    XR Chest 1 View    Result Date: 6/5/2022  HISTORY: Intubated  CXR: Frontal view the chest obtained  COMPARISON: 6/4/2022 at 8:47 PM  FINDINGS: Tracheostomy tube appropriate in position. Right-sided  shunt. Right upper extremity PICC line tip projects over the SVC. Multiple left-sided pleural catheters are stable in position. Hypoventilated lungs with prominent pulmonary vascular structures. There are small pleural effusions. No appreciable pneumothorax. Scattered bilateral calcified granuloma. Cardiomegaly.      Impression: 1. Hypoventilated lungs. Vascular crowding with likely pulmonary venous congestion/mild edema. Multiple left-sided chest tubes similar in position with no appreciable pneumothorax. Very small pleural effusions suspected. Scattered calcified granuloma. Appropriate positioning of tracheostomy tube. This report was finalized on 06/05/2022 07:53 by Dr. Christel Feliciano MD.    XR Chest 1 View    Result Date: 6/4/2022  EXAMINATION: Chest 1 view 6/4/2022  HISTORY: Respiratory decline.  FINDINGS: Upright frontal projection of chest is compared to prior exam of earlier the same day. 2 small caliber pigtail catheters as well as 2 thoracostomy tubes are in place on the left. There is no pneumothorax. Lungs are hypoventilated with bibasilar atelectasis. There is improved aeration when  compared to the previous exam. A PICC line and tracheostomy tube remain in place.      Impression: . 1. No change in the left-sided thoracostomy tubes. There is no appreciable pneumothorax. 2. Improved aeration of the lungs but with persistent bilateral lower lobe atelectasis. This report was finalized on 06/04/2022 21:04 by Dr. Guillaume Bey MD.    XR Chest 1 View    Result Date: 6/4/2022  HISTORY: Intubated  CXR: Frontal view the chest obtained  COMPARISON: 6/3/2022  FINDINGS: Tracheostomy tube tip appropriate in position. There are 2 small pigtail catheters of the left hemithorax with 2 larger lateral left-sided chest tubes. Pleural catheters similar in position. No pneumothorax. Small left pleural fluid collection. Hypoventilated lungs with stable left greater than right basilar densities favoring atelectasis. Scattered diffuse bilateral granuloma.  Right upper extremity PICC line tip projects over the SVC.      Impression: 1. Appropriate positioning of tracheostomy tube. Similar positioning of the left-sided pleural catheters with small left pleural fluid collection. Probable left greater than right basilar atelectasis. No appreciable pneumothorax. This report was finalized on 06/04/2022 08:22 by Dr. Christel Feliciano MD.     My radiograph interpretation/independent review of imaging: Reviewed and agree with current interpretation.    Pulmonary Assessment:    1. Acute hypoxic respiratory failure  2. On mechanically assisted ventilation  3. Tracheostomy and PEG tube placement done for long-term care  4. Status postcraniotomy for meningioma  5. Encephalopathy status epilepticus  6. Loculated left-sided pleural effusion with multiple chest tubes CT surgery managing  7. Hypertension  8. Type 2 diabetes mellitus  9. S/p ventriculoperitoneal shunt placement  10. Obesity    Recommend/plan:   · Patient is currently on assist control volume volume control ventilation and was on pressure control ventilation last night  which has been changed.  He had oxygen desaturation last night with pressure control ventilation and had to go back on assist control ventilation.  · Current ventilator settings are tidal volume 450 rate of 16 PEEP of 5 FiO2 30% with oxygen saturation 96%.  · He had a CT scan of the chest done which is ordered by Dr. Ortega.  He still has 4 chest tubes in place which are all adequately draining without any air leak.  · Continue current ventilator settings patient is not ready for spontaneous breathing trial.  · Titrate PEEP and FiO2 to keep oxygen sats more than 92%.  · Continue  bronchodilator treatment and routine respiratory care.  ·  shunt done.  Neurosurgery following.   No change in neurologic status.  · Multiple chest tubes in place on left chest.  CT surgery following good reexpansion of the lung noted in the imaging studies  · Continue nutritional support with tube feeding.  Craniotomy wound healing well.  Wound care per protocol.  · Neurology following for status epilepticus continue antiseizure medications  · Blood pressure and glycemic control. Pain and anxiety control and DVT and stress ulcer prophylaxis.  · Repeat labs and imaging studies from time to time.  Nutritional support with tube feeding  · CODE STATUS: Full.  Overall prognosis: Guarded.  · We will follow.     This visit was performed by both a physician and an [facility] RN.  I personally evaluated and examined the patient.  I performed all aspects of the medical decision making as documented.    Electronically signed by     Murphy Kothari MD,  Pulmonologist/Intensivist   6/5/2022, 11:39 CDT

## 2022-06-05 NOTE — PROGRESS NOTES
NEUROSURGERY DAILY PROGRESS NOTE    ASSESSMENT:   Hai Hernandez is a 77 y.o. with a significant comorbidity of acephalic migraines, thyroid disease status post radiation as a child, basal cell and squamous cell carcinoma of the skin. He presents in FU for known meningioma found on workup for right visual field changes. Physical exam findings of neurologically intact with resolution of all symptoms and mild decreased vision in right eye.  His imaging shows 22 x 24 x 13.5 mm right planum sphenoidale mass most suggestive of meningioma.    Past Medical History:   Diagnosis Date   • Brain tumor (HCC)    • Depression    • Hearing loss    • History of cellulitis     right foot big toe   • Hypertension    • Pneumonia    • Right arm weakness     after cervial injury   • Sciatic pain     affects balance   • Thyroid disease    • Visual disturbance     due to brain tumor - right eye     Active Hospital Problems    Diagnosis    • **Meningioma (HCC)    • Acute deep vein thrombosis (DVT) of the ulnar and brachial veins of right upper extremity (HCC)    • Loculated pleural effusion    • Fever    • PAF (paroxysmal atrial fibrillation) (HCC)    • Cerebral parenchymal hemorrhage (HCC)    • Essential hypertension    • Type 2 diabetes mellitus with hyperglycemia, without long-term current use of insulin (HCC)    • Hypothyroidism (acquired)    • Acute respiratory failure (secondary to status epilepticus)    • Thrombocytopenia (HCC)    • 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  Off propofol.  Not eyes to voice and keeps them open. Does not follow commands.  Does not withdraw or localize to painful stimuli.    Intracranial meningioma   POD #26  (5/10/2022) Status post postcraniotomy for tumor   Pathology: WHO 1 Meningioma   MRI with blood products in resection cavity but no evidence of cerebritis   CT head reviewed without expansion of SDH   Neurochecks per policy   Decadron off   Leakage from wound.  Stapled at bedside.  Keep for now.  If leaks again will oversew    Hydrocephalus   Lumbar drain removed   CSF unremarkable from 5/17 and 5/23.    2 Days Post-Op right posterior parietal  shunt placement   Shunt pumps and refills well   Postop CT stable    Postop seizures, in status   Neurology managing   Cont Keppra, Dialntin, Vimpat   Stat Ativan   Follow dilantin levels   EEG pending     CV: Cardene off   keep SBP <140.   Hypotension resolved and off pressors   Requiring hydralazine and labetalol PRNs   A-line removed secondary to limb ischemia  Pulm: Tracheostomy in place.  Appreciate ENT   Left chest tube placed x2 CT managing with TPA  : Keep jansen for now.   FEN: Hyponatremia, 127   3% NaCl DC   Poor glucose control.  Increase SSI     ENDO: Accu-Chek and treat per policy  GI: PEG tube placement today.  Appreciate GI  ID: Afebrile overnight,    DDX: infection vs drug fever   WBC 8.29   CRP in 20s, repeat pending   Broad spectrum abx, Meropenum and Vanc   Blood cult NGTD   Body fluid cx pending   CSF cultures pending, NGTD   UA+, 100K GNB   Heme:  DVT prophylaxis with SCD's.     Plt up 193 and HIT ab negative.     RUE clot.  Leave PICC for now.  Can not anticoagulate  Pain: NA  Dispo: DC pending seizure control   Pending extubation    CHIEF COMPLAINT:   Right visual field changes    Subjective  Symptom stable    Temp:  [97.5 °F (36.4 °C)-100 °F (37.8 °C)] 97.5 °F (36.4 °C)  Heart Rate:  [47-81] 48  Resp:  [18-45] 22  BP: ()/(45-76) 115/68  FiO2 (%):  [30 %] 30 %    Objective:  General Appearance:  Well-appearing and in no acute distress.    Vital signs: (most recent): Blood pressure 115/68, pulse (!) 48, temperature 97.5 °F (36.4 °C),  "temperature source Axillary, resp. rate 22, height 182.9 cm (72\"), weight 113 kg (250 lb), SpO2 96 %.      Neurologic Exam     Mental Status   Level of consciousness: arousable by verbal stimuli ,  drowsy  Questionable seizure-like activity overnight.  Off propofol.  Not eyes to voice and keeps them open. Does not follow commands.  Does not withdraw or localize to painful stimuli.         Cranial Nerves     CN III, IV, VI   Pupils are equal, round, and reactive to light.  Extraocular motions are normal.     CN IX, X   CN IX normal.     Gait, Coordination, and Reflexes     Tremor   Resting tremor: absent  Intention tremor: absent  Action tremor: absent    Drains:   Urethral Catheter Non-latex;Silicone 16 Fr. (Active)       Output by Drain (mL) 06/04/22 0701 - 06/04/22 1900 06/04/22 1901 - 06/05/22 0700 06/05/22 0701 - 06/05/22 1206 Range Total   Requested LDAs do not have output data documented.       Imaging Results (Last 24 Hours)     Procedure Component Value Units Date/Time    XR Chest 1 View [[id number]] Collected: 06/05/22 0750     Updated: 06/05/22 0756    Narrative:      HISTORY: Intubated     CXR: Frontal view the chest obtained     COMPARISON: 6/4/2022 at 8:47 PM     FINDINGS: Tracheostomy tube appropriate in position. Right-sided   shunt. Right upper extremity PICC line tip projects over the SVC.  Multiple left-sided pleural catheters are stable in position.  Hypoventilated lungs with prominent pulmonary vascular structures. There  are small pleural effusions. No appreciable pneumothorax. Scattered  bilateral calcified granuloma. Cardiomegaly.       Impression:      1. Hypoventilated lungs. Vascular crowding with likely pulmonary venous  congestion/mild edema. Multiple left-sided chest tubes similar in  position with no appreciable pneumothorax. Very small pleural effusions  suspected. Scattered calcified granuloma. Appropriate positioning of  tracheostomy tube.  This report was finalized on 06/05/2022 " 07:53 by Dr. Christel Feliciano MD.    CT Chest Without Contrast Diagnostic [[id number]] Collected: 06/05/22 0704     Updated: 06/05/22 0715    Narrative:      CT CHEST WO CONTRAST DIAGNOSTIC- 6/5/2022 5:50 AM CDT     HISTORY: pleural effusion; R13.10-Dysphagia, unspecified;  Z01.818-Encounter for other preprocedural examination; D32.9-Benign  neoplasm of meninges, unspecified; Z74.09-Other reduced mobility;  Z78.9-Other specified health status; G40.901-Epilepsy, unspecified, not  intractable, with status epilepticus; J96.01-Acute respiratory failure  with hypoxia; G91.0-Communicating hydrocephalus      COMPARISON: None     DOSE LENGTH PRODUCT: 405 mGy cm. Automated exposure control was also  utilized to decrease patient radiation dose.     TECHNIQUE: Axial images of the chest are obtained without IV contrast     FINDINGS:  The tracheostomy tube is in place with tip position above the  hernandez. The 2 small posterior apical pleural catheter is remain in place  with appropriate drainage of the posterior medial pleural fluid  collections compared to the prior study. The 2 larger left-sided  thoracotomy tubes remain in place. Only small pleural effusions are seen  on the current exam. There is stable cardiomegaly. Thoracic aorta normal  in caliber. No pericardial effusion. Calcified mediastinal and bilateral  hilar lymph nodes with no pathologic lymphadenopathy.     A right upper extremity PICC line is in place with its tip in the  proximal SVC. A right sided peritoneal shunt identified. Minimal  subcutaneous emphysema within the right upper abdominal wall. Images of  the upper abdomen are degraded by the artifact from overlying lines and  EKG leads. Questionable biliary sludge with no biliary dilatation.  Adrenal glands are not imaged in their entirety.     Scattered calcified granulomas seen bilaterally. Vascular crowding.  Bilateral lower lobe consolidation may represent cysts atelectasis or  pneumonia. Only trace air  seen within the left posterior pleural space.     Moderate degenerative change thoracic spine. No focal destructive  osseous lesions.       Impression:      1. Left chest tubes remain in place with appropriate evacuation of the  left pleural fluid. Only small pleural effusions seen on today's exam  with minimal air present in the posterior left pleural space.  2. Bibasilar consolidation may represent atelectasis and/or pneumonia.  Scattered bilateral calcified granuloma. Vascular crowding and/or mild  venous congestion. Stable cardiomegaly.  This report was finalized on 06/05/2022 07:12 by Dr. Christel Feliciano MD.    XR Chest 1 View [[id number]] Collected: 06/04/22 2102     Updated: 06/04/22 2107    Narrative:      EXAMINATION: Chest 1 view 6/4/2022     HISTORY: Respiratory decline.     FINDINGS: Upright frontal projection of chest is compared to prior exam  of earlier the same day. 2 small caliber pigtail catheters as well as 2  thoracostomy tubes are in place on the left. There is no pneumothorax.  Lungs are hypoventilated with bibasilar atelectasis. There is improved  aeration when compared to the previous exam. A PICC line and  tracheostomy tube remain in place.       Impression:      .  1. No change in the left-sided thoracostomy tubes. There is no  appreciable pneumothorax.  2. Improved aeration of the lungs but with persistent bilateral lower  lobe atelectasis.  This report was finalized on 06/04/2022 21:04 by Dr. Guillaume Bey MD.        Lab Results (last 24 hours)     Procedure Component Value Units Date/Time    POC Glucose Once [[id number]]  (Abnormal) Collected: 06/05/22 1137    Specimen: Blood Updated: 06/05/22 1148     Glucose 133 mg/dL      Comment: : [id number] Jay MoralesMeter ID: [id number]       POC Glucose Once [[id number]]  (Abnormal) Collected: 06/05/22 0614    Specimen: Blood Updated: 06/05/22 0625     Glucose 162 mg/dL      Comment: : [id number] Franco CailinbrittanyMeter ID: [id number]        Manual Differential [[id number]]  (Abnormal) Collected: 06/05/22 0406    Specimen: Blood Updated: 06/05/22 0510     Neutrophil % 80.4 %      Lymphocyte % 6.2 %      Monocyte % 5.2 %      Eosinophil % 2.1 %      Myelocyte % 3.1 %      Promyelocyte % 1.0 %      Atypical Lymphocyte % 2.1 %      Neutrophils Absolute 8.19 10*3/mm3      Lymphocytes Absolute 0.85 10*3/mm3      Monocytes Absolute 0.53 10*3/mm3      Eosinophils Absolute 0.21 10*3/mm3      Basophilic Stippling Slight/1+     Dacrocytes Slight/1+     Poikilocytes Mod/2+     Polychromasia Slight/1+     Vacuolated Neutrophils Slight/1+     Platelet Morphology Normal    Comprehensive Metabolic Panel [[id number]]  (Abnormal) Collected: 06/05/22 0406    Specimen: Blood Updated: 06/05/22 0445     Glucose 152 mg/dL      BUN 27 mg/dL      Creatinine 0.78 mg/dL      Sodium 136 mmol/L      Potassium 4.3 mmol/L      Chloride 102 mmol/L      CO2 29.0 mmol/L      Calcium 7.9 mg/dL      Total Protein 5.4 g/dL      Albumin 1.80 g/dL      ALT (SGPT) 22 U/L      AST (SGOT) 30 U/L      Alkaline Phosphatase 158 U/L      Total Bilirubin 0.5 mg/dL      Globulin 3.6 gm/dL      A/G Ratio 0.5 g/dL      BUN/Creatinine Ratio 34.6     Anion Gap 5.0 mmol/L      eGFR 91.9 mL/min/1.73      Comment: National Kidney Foundation and American Society of Nephrology (ASN) Task Force recommended calculation based on the Chronic Kidney Disease Epidemiology Collaboration (CKD-EPI) equation refit without adjustment for race.       Narrative:      GFR Normal >60  Chronic Kidney Disease <60  Kidney Failure <15      Phenytoin Level, Total [[id number]]  (Normal) Collected: 06/05/22 0406    Specimen: Blood Updated: 06/05/22 0445     Phenytoin Level 10.6 mcg/mL     C-reactive Protein [[id number]]  (Abnormal) Collected: 06/05/22 0406    Specimen: Blood Updated: 06/05/22 0445     C-Reactive Protein 11.99 mg/dL     Blood Gas, Arterial - [[id number]]  (Abnormal) Collected: 06/05/22 0441    Specimen: Arterial Blood  Updated: 06/05/22 0437     Site Left Radial     Denzel's Test Positive     pH, Arterial 7.415 pH units      pCO2, Arterial 48.7 mm Hg      Comment: 83 Value above reference range        pO2, Arterial 87.6 mm Hg      HCO3, Arterial 31.2 mmol/L      Comment: 83 Value above reference range        Base Excess, Arterial 5.9 mmol/L      Comment: 83 Value above reference range        O2 Saturation, Arterial 97.7 %      Temperature 37.0 C      Barometric Pressure for Blood Gas 746 mmHg      Modality Ventilator     FIO2 30 %      Ventilator Mode AC     Set Tidal Volume 450     Set Mech Resp Rate 16.0     PEEP 5.0     Collected by [id number]     Comment: Meter: [id number]     :  [id number]        pCO2, Temperature Corrected 48.7 mm Hg      pH, Temp Corrected 7.415 pH Units      pO2, Temperature Corrected 87.6 mm Hg     CBC & Differential [[id number]]  (Abnormal) Collected: 06/05/22 0406    Specimen: Blood Updated: 06/05/22 0428    Narrative:      The following orders were created for panel order CBC & Differential.  Procedure                               Abnormality         Status                     ---------                               -----------         ------                     CBC Auto Differential[[id number]]        Abnormal            Final result                 Please view results for these tests on the individual orders.    CBC Auto Differential [[id number]]  (Abnormal) Collected: 06/05/22 0406    Specimen: Blood Updated: 06/05/22 0428     WBC 10.19 10*3/mm3      RBC 2.63 10*6/mm3      Hemoglobin 8.3 g/dL      Hematocrit 26.2 %      MCV 99.6 fL      MCH 31.6 pg      MCHC 31.7 g/dL      RDW 14.4 %      RDW-SD 51.3 fl      MPV 9.2 fL      Platelets 326 10*3/mm3      nRBC 0.7 /100 WBC     POC Glucose Once [[id number]]  (Abnormal) Collected: 06/05/22 0038    Specimen: Blood Updated: 06/05/22 0049     Glucose 140 mg/dL      Comment: : [id number] Franco LacbrittanyMeter ID: [id number]       Blood Gas, Arterial -  [[id number]]  (Abnormal) Collected: 06/04/22 2141    Specimen: Arterial Blood Updated: 06/04/22 2136     Site Left Radial     Denzel's Test Positive     pH, Arterial 7.432 pH units      pCO2, Arterial 47.7 mm Hg      Comment: 83 Value above reference range        pO2, Arterial 84.8 mm Hg      HCO3, Arterial 31.8 mmol/L      Comment: 83 Value above reference range        Base Excess, Arterial 6.7 mmol/L      Comment: 83 Value above reference range        O2 Saturation, Arterial 96.7 %      Temperature 37.0 C      Barometric Pressure for Blood Gas 746 mmHg      Modality Ventilator     FIO2 30 %      Ventilator Mode AC     Set Tidal Volume 450     Set Mech Resp Rate 16.0     PEEP 5.0     Collected by [id number]     Comment: Meter: [id number]     :  [id number]        pCO2, Temperature Corrected 47.7 mm Hg      pH, Temp Corrected 7.432 pH Units      pO2, Temperature Corrected 84.8 mm Hg     POC Glucose Once [[id number]]  (Normal) Collected: 06/04/22 2036    Specimen: Blood Updated: 06/04/22 2047     Glucose 102 mg/dL      Comment: : [id number] Jone RoldanonMeter ID: [id number]       POC Glucose Once [[id number]]  (Normal) Collected: 06/04/22 1739    Specimen: Blood Updated: 06/04/22 1751     Glucose 102 mg/dL      Comment: : [id number] Tova RyanMeter ID: [id number]           [id number]  MARIO Rangel

## 2022-06-05 NOTE — PROGRESS NOTES
"    [facility] Cardiothoracic Surgery  PROGRESS NOTE   CC: Loculated left pleural effusion    Subjective:  Stable, continues to have some drainage from posterior chest tubes no drainage from large bore tubes.  Chest x-ray stable.    ROS: Unable to obtain due to intubation    Objective:      /60   Pulse 68   Temp 98.2 °F (36.8 °C) (Axillary)   Resp 18   Ht 182.9 cm (72\")   Wt 113 kg (250 lb)   SpO2 93%   BMI 33.91 kg/m²       Intake/Output Summary (Last 24 hours) at 6/5/2022 1548  Last data filed at 6/5/2022 1200  Gross per 24 hour   Intake 2192.27 ml   Output 112 ml   Net 2080.27 ml       CT Output: Post tube 1 : 29, Post tube 2 : 40; large bore tubes with minimal    PE:  Vitals:    06/05/22 1445   BP: 115/60   Pulse: 68   Resp:    Temp:    SpO2: 93%     GENERAL: NAD, resting comfortably, unable to follow commands but opens eyes spontaneously   CARDIOVASCULAR: regular, regular rate, sinus  PULMONARY: Normal bilateral breath sounds, no labored breathing, on vent via tracheostomy, left-sided pleural tubes with serous output  ABDOMEN: soft, nontender/nondistended  EXTREMITIES: mild peripheral edema, normal pulses, normal ROM        Lab Results (last 72 hours)     Procedure Component Value Units Date/Time    POC Glucose Once [[id number]]  (Abnormal) Collected: 06/05/22 1137    Specimen: Blood Updated: 06/05/22 1148     Glucose 133 mg/dL      Comment: : [id number] Jay PonceenMeter ID: [id number]       POC Glucose Once [[id number]]  (Abnormal) Collected: 06/05/22 0614    Specimen: Blood Updated: 06/05/22 0625     Glucose 162 mg/dL      Comment: : [id number] Salvador SimeoneyMeter ID: [id number]       Manual Differential [[id number]]  (Abnormal) Collected: 06/05/22 0406    Specimen: Blood Updated: 06/05/22 0510     Neutrophil % 80.4 %      Lymphocyte % 6.2 %      Monocyte % 5.2 %      Eosinophil % 2.1 %      Myelocyte % 3.1 %      Promyelocyte % 1.0 %      Atypical Lymphocyte % 2.1 %      " Neutrophils Absolute 8.19 10*3/mm3      Lymphocytes Absolute 0.85 10*3/mm3      Monocytes Absolute 0.53 10*3/mm3      Eosinophils Absolute 0.21 10*3/mm3      Basophilic Stippling Slight/1+     Dacrocytes Slight/1+     Poikilocytes Mod/2+     Polychromasia Slight/1+     Vacuolated Neutrophils Slight/1+     Platelet Morphology Normal    Comprehensive Metabolic Panel [[id number]]  (Abnormal) Collected: 06/05/22 0406    Specimen: Blood Updated: 06/05/22 0445     Glucose 152 mg/dL      BUN 27 mg/dL      Creatinine 0.78 mg/dL      Sodium 136 mmol/L      Potassium 4.3 mmol/L      Chloride 102 mmol/L      CO2 29.0 mmol/L      Calcium 7.9 mg/dL      Total Protein 5.4 g/dL      Albumin 1.80 g/dL      ALT (SGPT) 22 U/L      AST (SGOT) 30 U/L      Alkaline Phosphatase 158 U/L      Total Bilirubin 0.5 mg/dL      Globulin 3.6 gm/dL      A/G Ratio 0.5 g/dL      BUN/Creatinine Ratio 34.6     Anion Gap 5.0 mmol/L      eGFR 91.9 mL/min/1.73      Comment: National Kidney Foundation and American Society of Nephrology (ASN) Task Force recommended calculation based on the Chronic Kidney Disease Epidemiology Collaboration (CKD-EPI) equation refit without adjustment for race.       Narrative:      GFR Normal >60  Chronic Kidney Disease <60  Kidney Failure <15      Phenytoin Level, Total [[id number]]  (Normal) Collected: 06/05/22 0406    Specimen: Blood Updated: 06/05/22 0445     Phenytoin Level 10.6 mcg/mL     C-reactive Protein [[id number]]  (Abnormal) Collected: 06/05/22 0406    Specimen: Blood Updated: 06/05/22 0445     C-Reactive Protein 11.99 mg/dL     Blood Gas, Arterial - [[id number]]  (Abnormal) Collected: 06/05/22 0441    Specimen: Arterial Blood Updated: 06/05/22 0437     Site Left Radial     Denzel's Test Positive     pH, Arterial 7.415 pH units      pCO2, Arterial 48.7 mm Hg      Comment: 83 Value above reference range        pO2, Arterial 87.6 mm Hg      HCO3, Arterial 31.2 mmol/L      Comment: 83 Value above reference range         Base Excess, Arterial 5.9 mmol/L      Comment: 83 Value above reference range        O2 Saturation, Arterial 97.7 %      Temperature 37.0 C      Barometric Pressure for Blood Gas 746 mmHg      Modality Ventilator     FIO2 30 %      Ventilator Mode AC     Set Tidal Volume 450     Set [facility] Resp Rate 16.0     PEEP 5.0     Collected by [id number]     Comment: Meter: [id number]     :  [id number]        pCO2, Temperature Corrected 48.7 mm Hg      pH, Temp Corrected 7.415 pH Units      pO2, Temperature Corrected 87.6 mm Hg     CBC & Differential [[id number]]  (Abnormal) Collected: 06/05/22 0406    Specimen: Blood Updated: 06/05/22 0428    Narrative:      The following orders were created for panel order CBC & Differential.  Procedure                               Abnormality         Status                     ---------                               -----------         ------                     CBC Auto Differential[[id number]]        Abnormal            Final result                 Please view results for these tests on the individual orders.    CBC Auto Differential [[id number]]  (Abnormal) Collected: 06/05/22 0406    Specimen: Blood Updated: 06/05/22 0428     WBC 10.19 10*3/mm3      RBC 2.63 10*6/mm3      Hemoglobin 8.3 g/dL      Hematocrit 26.2 %      MCV 99.6 fL      MCH 31.6 pg      MCHC 31.7 g/dL      RDW 14.4 %      RDW-SD 51.3 fl      MPV 9.2 fL      Platelets 326 10*3/mm3      nRBC 0.7 /100 WBC     POC Glucose Once [[id number]]  (Abnormal) Collected: 06/05/22 0038    Specimen: Blood Updated: 06/05/22 0049     Glucose 140 mg/dL      Comment: : [id number] Salvador LaceyMeter ID: [id number]       Blood Gas, Arterial - [[id number]]  (Abnormal) Collected: 06/04/22 2141    Specimen: Arterial Blood Updated: 06/04/22 2136     Site Left Radial     Denzel's Test Positive     pH, Arterial 7.432 pH units      pCO2, Arterial 47.7 mm Hg      Comment: 83 Value above reference range        pO2, Arterial 84.8 mm Hg       HCO3, Arterial 31.8 mmol/L      Comment: 83 Value above reference range        Base Excess, Arterial 6.7 mmol/L      Comment: 83 Value above reference range        O2 Saturation, Arterial 96.7 %      Temperature 37.0 C      Barometric Pressure for Blood Gas 746 mmHg      Modality Ventilator     FIO2 30 %      Ventilator Mode AC     Set Tidal Volume 450     Set Mech Resp Rate 16.0     PEEP 5.0     Collected by [id number]     Comment: Meter: [id number]     :  [id number]        pCO2, Temperature Corrected 47.7 mm Hg      pH, Temp Corrected 7.432 pH Units      pO2, Temperature Corrected 84.8 mm Hg     POC Glucose Once [[id number]]  (Normal) Collected: 06/04/22 2036    Specimen: Blood Updated: 06/04/22 2047     Glucose 102 mg/dL      Comment: : [id number] Jone RoldanonMeter ID: [id number]       POC Glucose Once [[id number]]  (Normal) Collected: 06/04/22 1739    Specimen: Blood Updated: 06/04/22 1751     Glucose 102 mg/dL      Comment: : RFORTJAZLYN Bernardo RyanMeter ID: [id number]       POC Glucose Once [[id number]]  (Normal) Collected: 06/04/22 1058    Specimen: Blood Updated: 06/04/22 1110     Glucose 110 mg/dL      Comment: : RFANETA Bernardo RyanMeter ID: [id number]       Manual Differential [[id number]]  (Abnormal) Collected: 06/04/22 0436    Specimen: Blood Updated: 06/04/22 0546     Neutrophil % 79.6 %      Lymphocyte % 6.1 %      Monocyte % 5.1 %      Basophil % 1.0 %      Bands %  1.0 %      Metamyelocyte % 1.0 %      Myelocyte % 6.1 %      Neutrophils Absolute 6.39 10*3/mm3      Lymphocytes Absolute 0.48 10*3/mm3      Monocytes Absolute 0.40 10*3/mm3      Basophils Absolute 0.08 10*3/mm3      Anisocytosis Slight/1+     Basophilic Stippling Slight/1+     Poikilocytes Slight/1+     Polychromasia Slight/1+     Toxic Granulation Slight/1+     Platelet Morphology Normal    CBC & Differential [[id number]]  (Abnormal) Collected: 06/04/22 0436    Specimen: Blood Updated: 06/04/22 0546    Narrative:       The following orders were created for panel order CBC & Differential.  Procedure                               Abnormality         Status                     ---------                               -----------         ------                     CBC Auto Differential[[id number]]        Abnormal            Final result                 Please view results for these tests on the individual orders.    CBC Auto Differential [[id number]]  (Abnormal) Collected: 06/04/22 0436    Specimen: Blood Updated: 06/04/22 0546     WBC 7.93 10*3/mm3      RBC 2.52 10*6/mm3      Hemoglobin 8.0 g/dL      Hematocrit 25.8 %      .4 fL      MCH 31.7 pg      MCHC 31.0 g/dL      RDW 13.8 %      RDW-SD 51.1 fl      MPV 9.2 fL      Platelets 300 10*3/mm3     Narrative:      The previously reported component NRBC is no longer being reported. Previous result was 0.3 /100 WBC (Reference Range: 0.0-0.2 /100 WBC) on 6/4/2022 at 0504 CDT.    POC Glucose Once [[id number]]  (Abnormal) Collected: 06/04/22 0521    Specimen: Blood Updated: 06/04/22 0531     Glucose 134 mg/dL      Comment: : JESUS Jori EmilyMeter ID: [id number]       Comprehensive Metabolic Panel [[id number]]  (Abnormal) Collected: 06/04/22 0436    Specimen: Blood Updated: 06/04/22 0508     Glucose 134 mg/dL      BUN 21 mg/dL      Creatinine 0.50 mg/dL      Sodium 137 mmol/L      Potassium 4.7 mmol/L      Comment: Slight hemolysis detected by analyzer. Results may be affected.        Chloride 102 mmol/L      CO2 31.0 mmol/L      Calcium 8.1 mg/dL      Total Protein 5.1 g/dL      Albumin 1.50 g/dL      ALT (SGPT) 25 U/L      AST (SGOT) 28 U/L      Alkaline Phosphatase 147 U/L      Total Bilirubin 0.4 mg/dL      Globulin 3.6 gm/dL      A/G Ratio 0.4 g/dL      BUN/Creatinine Ratio 42.0     Anion Gap 4.0 mmol/L      eGFR 105.1 mL/min/1.73      Comment: National Kidney Foundation and American Society of Nephrology (ASN) Task Force recommended calculation based on the  Chronic Kidney Disease Epidemiology Collaboration (CKD-EPI) equation refit without adjustment for race.       Narrative:      GFR Normal >60  Chronic Kidney Disease <60  Kidney Failure <15      Phenytoin Level, Total [[id number]]  (Abnormal) Collected: 06/04/22 0436    Specimen: Blood Updated: 06/04/22 0508     Phenytoin Level 9.4 mcg/mL     Blood Gas, Arterial - [[id number]]  (Abnormal) Collected: 06/04/22 0442    Specimen: Arterial Blood Updated: 06/04/22 0438     Site Right Brachial     Denzel's Test N/A     pH, Arterial 7.369 pH units      pCO2, Arterial 57.5 mm Hg      Comment: 83 Value above reference range        pO2, Arterial 103.0 mm Hg      HCO3, Arterial 33.1 mmol/L      Comment: 83 Value above reference range        Base Excess, Arterial 6.9 mmol/L      Comment: 83 Value above reference range        O2 Saturation, Arterial 98.6 %      Temperature 37.0 C      Barometric Pressure for Blood Gas 749 mmHg      Modality Ventilator     FIO2 30 %      Ventilator Mode PC     Set Mec Resp Rate 16.0     PEEP 5.0     PIP 17 cmH2O      Comment: Meter: [id number]     :  [id number]        Collected by [id number]     pCO2, Temperature Corrected 57.5 mm Hg      pH, Temp Corrected 7.369 pH Units      pO2, Temperature Corrected 103 mm Hg     POC Glucose Once [[id number]]  (Abnormal) Collected: 06/04/22 0033    Specimen: Blood Updated: 06/04/22 0044     Glucose 141 mg/dL      Comment: : JESUS ClemonsilyMeter ID: [id number]       POC Glucose Once [[id number]]  (Abnormal) Collected: 06/03/22 2022    Specimen: Blood Updated: 06/03/22 2033     Glucose 163 mg/dL      Comment: : JESUS Hsieh VenturepaxilyMeter ID: [id number]       Magnesium [[id number]]  (Normal) Collected: 06/03/22 1841    Specimen: Blood Updated: 06/03/22 1858     Magnesium 1.8 mg/dL     POC Glucose Once [[id number]]  (Normal) Collected: 06/03/22 1205    Specimen: Blood Updated: 06/03/22 1216     Glucose 122 mg/dL      Comment: :  [id number] Yeimi Martinez ID: [id number]       C-reactive Protein [[id number]]  (Abnormal) Collected: 06/03/22 1043    Specimen: Blood Updated: 06/03/22 1110     C-Reactive Protein 13.97 mg/dL     POC Glucose Once [[id number]]  (Normal) Collected: 06/03/22 0540    Specimen: Blood Updated: 06/03/22 0551     Glucose 111 mg/dL      Comment: : YANIRALEESA VazquezMelissa ID: [id number]       Manual Differential [[id number]]  (Abnormal) Collected: 06/03/22 0430    Specimen: Blood Updated: 06/03/22 0512     Neutrophil % 73.7 %      Lymphocyte % 11.1 %      Monocyte % 6.1 %      Eosinophil % 2.0 %      Bands %  3.0 %      Metamyelocyte % 1.0 %      Myelocyte % 3.0 %      Neutrophils Absolute 6.36 10*3/mm3      Lymphocytes Absolute 0.92 10*3/mm3      Monocytes Absolute 0.51 10*3/mm3      Eosinophils Absolute 0.17 10*3/mm3      Anisocytosis Slight/1+     Basophilic Stippling Slight/1+     Macrocytes Slight/1+     Poikilocytes Slight/1+     Polychromasia Slight/1+     WBC Morphology Normal     Platelet Morphology Normal    Comprehensive Metabolic Panel [[id number]]  (Abnormal) Collected: 06/03/22 0430    Specimen: Blood Updated: 06/03/22 0504     Glucose 109 mg/dL      BUN 21 mg/dL      Creatinine 0.49 mg/dL      Sodium 136 mmol/L      Potassium 4.6 mmol/L      Chloride 100 mmol/L      CO2 29.0 mmol/L      Calcium 8.1 mg/dL      Total Protein 5.5 g/dL      Albumin 1.60 g/dL      ALT (SGPT) 28 U/L      AST (SGOT) 30 U/L      Alkaline Phosphatase 174 U/L      Total Bilirubin 0.4 mg/dL      Globulin 3.9 gm/dL      A/G Ratio 0.4 g/dL      BUN/Creatinine Ratio 42.9     Anion Gap 7.0 mmol/L      eGFR 105.7 mL/min/1.73      Comment: National Kidney Foundation and American Society of Nephrology (ASN) Task Force recommended calculation based on the Chronic Kidney Disease Epidemiology Collaboration (CKD-EPI) equation refit without adjustment for race.       Narrative:      GFR Normal >60  Chronic Kidney Disease <60  Kidney  Failure <15      Phenytoin Level, Total [[id number]]  (Abnormal) Collected: 06/03/22 0430    Specimen: Blood Updated: 06/03/22 0458     Phenytoin Level 8.3 mcg/mL     CBC & Differential [[id number]]  (Abnormal) Collected: 06/03/22 0430    Specimen: Blood Updated: 06/03/22 0447    Narrative:      The following orders were created for panel order CBC & Differential.  Procedure                               Abnormality         Status                     ---------                               -----------         ------                     CBC Auto Differential[[id number]]        Abnormal            Final result                 Please view results for these tests on the individual orders.    CBC Auto Differential [[id number]]  (Abnormal) Collected: 06/03/22 0430    Specimen: Blood Updated: 06/03/22 0447     WBC 8.29 10*3/mm3      RBC 2.76 10*6/mm3      Hemoglobin 8.6 g/dL      Hematocrit 27.7 %      .4 fL      MCH 31.2 pg      MCHC 31.0 g/dL      RDW 14.1 %      RDW-SD 50.4 fl      MPV 9.2 fL      Platelets 338 10*3/mm3      nRBC 0.4 /100 WBC     Blood Gas, Arterial - [[id number]]  (Abnormal) Collected: 06/03/22 0412    Specimen: Arterial Blood Updated: 06/03/22 0406     Site Left Radial     Denzel's Test Positive     pH, Arterial 7.463 pH units      Comment: 83 Value above reference range        pCO2, Arterial 46.1 mm Hg      Comment: 83 Value above reference range        pO2, Arterial 78.6 mm Hg      Comment: 84 Value below reference range        HCO3, Arterial 33.0 mmol/L      Comment: 83 Value above reference range        Base Excess, Arterial 8.3 mmol/L      Comment: 83 Value above reference range        O2 Saturation, Arterial 96.8 %      Temperature 37.0 C      Barometric Pressure for Blood Gas 749 mmHg      Modality Ventilator     FIO2 30 %      Ventilator Mode AC     Set Tidal Volume 450     Set Mech Resp Rate 16.0     PEEP 6.0     Collected by [id number]     Comment: Meter: [id number]     :   [id number]        pCO2, Temperature Corrected 46.1 mm Hg      pH, Temp Corrected 7.463 pH Units      pO2, Temperature Corrected 78.6 mm Hg     POC Glucose Once [[id number]]  (Normal) Collected: 06/03/22 0020    Specimen: Blood Updated: 06/03/22 0032     Glucose 93 mg/dL      Comment: : ABBEY ArvizuaMeter ID: [id number]       POC Glucose Once [[id number]]  (Normal) Collected: 06/02/22 1957    Specimen: Blood Updated: 06/02/22 2008     Glucose 95 mg/dL      Comment: : ABBEY ArvizuaMeter ID: [id number]       POC Glucose Once [[id number]]  (Abnormal) Collected: 06/02/22 1756    Specimen: Blood Updated: 06/02/22 1807     Glucose 64 mg/dL      Comment: : [id number] Yeimi Martinez ID: [id number]             CT chest: Continue atelectasis of left lower lobe improvement in posterior collections stable positioning of drains    CXR: Stable appearance and stable appearance of chest tubes    Assessment & Plan     Mr. Hernandez is a 77-year-old male who is status post meningioma resection complicated by seizure and loculated left pleural effusion.  He is now status post tracheostomy PEG tube and  shunt.  We have been trying nonsurgical treatment for his loculated left pleural effusion there is been much improvement, he is postop day 3 from having CT-guided drains placed in posterior fluid collections.      Reviewed CT scan, continued atelectasis LLL, improved fluid collections, no remaining collections at large bore chest tube sites    Large bore tubes removed today, continue pigtail for drainage likely remove the next 2 to 3 days    Pilo Ortega M.D.  Cardiothoracic Surgeon

## 2022-06-05 NOTE — PROGRESS NOTES
Neurology Progress Note      Date of admission: 5/10/2022  4:49 AM  Date of visit: 6/5/2022    Chief Complaint:  Seizures    Subjective     Subjective:    Overnight he had multiple vital sign abnormalities that included tachycardia and increased respiratory rate that seem to be improved with Ativan administration.  It was also noted at the beginning of the shift he was hypothermic with a temperature of 92.5.  He also showed some signs of bradycardia today.        Medications:  Current Facility-Administered Medications   Medication Dose Route Frequency Provider Last Rate Last Admin   • acetaminophen (TYLENOL) tablet 650 mg  650 mg Oral Q4H PRN Stevie Parsons APRN   650 mg at 06/04/22 2029    Or   • acetaminophen (TYLENOL) suppository 650 mg  650 mg Rectal Q4H PRN Stevie Parsons APRN   650 mg at 05/23/22 0016   • albuterol (PROVENTIL) nebulizer solution 0.083% 2.5 mg/3mL  2.5 mg Nebulization Q8H - RT Stevie Parsons APRN   2.5 mg at 06/05/22 0624   • alteplase (CATHFLO/ACTIVASE) injection 2 mg  2 mg Intracatheter PRN Stevie Parsons APRN   New Syringe/Cartridge at 05/28/22 1330   • bisacodyl (DULCOLAX) suppository 10 mg  10 mg Rectal Daily PRN Stevie Parsons APRN   10 mg at 06/03/22 0749   • butalbital-acetaminophen-caffeine (FIORICET, ESGIC) -40 MG per tablet 1 tablet  1 tablet Oral Q4H PRN Stevie Parsons APRN   1 tablet at 05/22/22 1729   • carvedilol (COREG) tablet 6.25 mg  6.25 mg Nasogastric BID With Meals Stevie Parsons APRN   6.25 mg at 06/01/22 1759   • chlorhexidine (PERIDEX) 0.12 % solution 15 mL  15 mL Mouth/Throat Q12H Stevie Parsons APRN   15 mL at 06/04/22 2029   • dextrose (D50W) (25 g/50 mL) IV injection 25 g  25 g Intravenous Q15 Min PRN Stevie Parsons APRN   25 g at 06/02/22 1759   • dextrose (GLUTOSE) oral gel 15 g  15 g Oral Q15 Min PRN Stevie Parsons, MARIO   15 g at 05/22/22 0487   • fosphenytoin (Cerebyx) 275 mg PE in sodium chloride 0.9 % 100 mL IVPB  275 mg PE Intravenous Q8H Rust, Stevie  MARIO CAMPOVERDE   275 mg PE at 06/05/22 0612   • glucagon (human recombinant) (GLUCAGEN DIAGNOSTIC) injection 1 mg  1 mg Intramuscular Q15 Min PRN Stevie Parsons APRN       • heparin (porcine) 5000 UNIT/ML injection 5,000 Units  5,000 Units Subcutaneous Q12H Stevie Parsons APRN   5,000 Units at 06/04/22 2026   • Hold Medication (Anticoagulation)  1 each Does not apply Continuous PRN Stevie Parsons APRN       • hydrALAZINE (APRESOLINE) injection 10 mg  10 mg Intravenous Q6H PRN FeliztStevie APRN   10 mg at 06/04/22 1107   • insulin detemir (LEVEMIR) injection 20 Units  20 Units Subcutaneous Nightly Stevie Parsons APRN   20 Units at 06/04/22 2036   • insulin regular (humuLIN R,novoLIN R) injection 2-7 Units  2-7 Units Subcutaneous Q6H Stevie Parsons APRN   2 Units at 06/05/22 0615   • insulin regular (humuLIN R,novoLIN R) injection 8 Units  8 Units Subcutaneous Q6H FeliztStevie APRN   8 Units at 06/05/22 0614   • ipratropium-albuterol (DUO-NEB) nebulizer solution 3 mL  3 mL Nebulization Q4H PRN Stevie Parsons APRN   3 mL at 05/25/22 1007   • labetalol (NORMODYNE,TRANDATE) injection 10 mg  10 mg Intravenous Q6H PRN Stevie Parsons APRN   10 mg at 06/03/22 1310   • lacosamide (VIMPAT) injection 200 mg  200 mg Intravenous Q12H Stevie Parsons APRN   200 mg at 06/04/22 2027   • levETIRAcetam in NaCl 0.82% (KEPPRA) IVPB 500 mg  500 mg Intravenous Q12H Stevie Parsons APRN   500 mg at 06/04/22 2027   • levothyroxine (SYNTHROID, LEVOTHROID) tablet 125 mcg  125 mcg Nasogastric Q AM Stevie Parsons APRN   125 mcg at 06/05/22 0612   • lidocaine (XYLOCAINE) 1 % injection 10 mL  10 mL Intradermal Once Rust, MARIO Navarrete       • liothyronine (CYTOMEL) tablet 25 mcg  25 mcg Nasogastric Daily RustStevie APRN   25 mcg at 06/04/22 0828   • lisinopril (PRINIVIL,ZESTRIL) tablet 20 mg  20 mg Nasogastric Q24H Stevie Parsons APRN   20 mg at 06/04/22 0828   • LORazepam (ATIVAN) injection 2 mg  2 mg Intravenous Q2H PRN Donnell Freire MD   2  mg at 06/04/22 2038   • Morphine sulfate (PF) injection 2 mg  2 mg Intravenous Q2H PRN Stevie Parsons APRN   2 mg at 06/04/22 2044   • mupirocin (BACTROBAN) 2 % ointment 1 application  1 application Topical Q12H Stevie Parsons APRN   1 application at 06/04/22 2025   • norepinephrine (LEVOPHED) 8 mg in 250 mL NS infusion (premix)  0.02-0.3 mcg/kg/min Intravenous Titrated Stevie Parsons APRN 11.15 mL/hr at 06/05/22 0645 0.06 mcg/kg/min at 06/05/22 0645   • Nutrisource fiber pack 1 packet  1 packet Nasogastric 4x Daily Stevie Parsons APRN   1 packet at 06/04/22 2025   • ondansetron (ZOFRAN) tablet 4 mg  4 mg Oral Q6H PRN Stevie Parsons APRN        Or   • ondansetron (ZOFRAN) injection 4 mg  4 mg Intravenous Q6H PRN FeliztStevie APRN       • oxyCODONE (ROXICODONE) 5 MG/5ML solution 7.5 mg  7.5 mg Nasogastric Q4H PRN Stevie Parsons APRN   7.5 mg at 06/04/22 2027   • pantoprazole (PROTONIX) injection 40 mg  40 mg Intravenous Q AM Stevie Parsons APRN   40 mg at 06/05/22 0612   • polyethylene glycol (MIRALAX) packet 17 g  17 g Oral Daily Stevie Parsons APRN   17 g at 06/04/22 0828   • propofol (DIPRIVAN) infusion 10 mg/mL 100 mL  5-50 mcg/kg/min Intravenous Titrated Stevie Parsons APRN 12.48 mL/hr at 06/05/22 0359 20 mcg/kg/min at 06/05/22 0359   • ProSource TF oral liquid 45 mL  1 packet Nasogastric BID Stevie Parsons APRN   45 mL at 06/04/22 2029   • ProSource TF oral liquid 45 mL  45 mL Per G Tube BID Aden Kc MD   45 mL at 06/04/22 2026   • sodium chloride 3 % nebulizer solution 4 mL  4 mL Nebulization BID - RT Jaqueline, Stevie CAMPOVERDE, MARIO   4 mL at 06/05/22 0629       Review of Systems:   -A 14 point review of systems is unobtainable    Objective     Objective      Vital Signs  Temp:  [92.5 °F (33.6 °C)-100 °F (37.8 °C)] 97.5 °F (36.4 °C)  Heart Rate:  [40-81] 51  Resp:  [18-45] 22  BP: ()/(45-93) 146/76  FiO2 (%):  [30 %] 30 %    Physical Exam:    HEENT:  Neck supple.  Tracheostomy in place  CVS:  Regular rate  and rhythm.  No murmurs  Carotid Examination:  No bruits  Lungs:  Clear to auscultation  Abdomen:  Non-tender, Non-distended.  PEG tube in place  Extremities:  No signs of peripheral edema    Neurologic Exam: There is somewhat of an atypical movement of his lower jaw that is semirhythmic in nature.  I do not see any atypical tongue movements today.  Propofol currently paused.  Eyes open.  Occasionally tracks examiner around the room with eye movements.  Pupils react equally  No nystagmus  Facial grimacing symmetric  Eyes open.  No command following.      Motor: (strength out of 5:  1= minimal movement, 2 = movement in plane of gravity, 3 = movement against gravity, 4 = movement against some resistance, 5 = full strength)    No withdrawal to noxious stimuli  No spontaneous movement    DTR:  2+ throughout in all four extremities  upgoing toe on left    Sensory:  No withdrawal throughout    Coordination/Gait:  -No active tremors     Results Review:    I reviewed the patient's new clinical results.    Lab Results (last 24 hours)     Procedure Component Value Units Date/Time    POC Glucose Once [[id number]]  (Abnormal) Collected: 06/05/22 0614    Specimen: Blood Updated: 06/05/22 0625     Glucose 162 mg/dL      Comment: : [id number] Salvador LaceyMeter ID: [id number]       Manual Differential [[id number]]  (Abnormal) Collected: 06/05/22 0406    Specimen: Blood Updated: 06/05/22 0510     Neutrophil % 80.4 %      Lymphocyte % 6.2 %      Monocyte % 5.2 %      Eosinophil % 2.1 %      Myelocyte % 3.1 %      Promyelocyte % 1.0 %      Atypical Lymphocyte % 2.1 %      Neutrophils Absolute 8.19 10*3/mm3      Lymphocytes Absolute 0.85 10*3/mm3      Monocytes Absolute 0.53 10*3/mm3      Eosinophils Absolute 0.21 10*3/mm3      Basophilic Stippling Slight/1+     Dacrocytes Slight/1+     Poikilocytes Mod/2+     Polychromasia Slight/1+     Vacuolated Neutrophils Slight/1+     Platelet Morphology Normal    Comprehensive Metabolic  Panel [[id number]]  (Abnormal) Collected: 06/05/22 0406    Specimen: Blood Updated: 06/05/22 0445     Glucose 152 mg/dL      BUN 27 mg/dL      Creatinine 0.78 mg/dL      Sodium 136 mmol/L      Potassium 4.3 mmol/L      Chloride 102 mmol/L      CO2 29.0 mmol/L      Calcium 7.9 mg/dL      Total Protein 5.4 g/dL      Albumin 1.80 g/dL      ALT (SGPT) 22 U/L      AST (SGOT) 30 U/L      Alkaline Phosphatase 158 U/L      Total Bilirubin 0.5 mg/dL      Globulin 3.6 gm/dL      A/G Ratio 0.5 g/dL      BUN/Creatinine Ratio 34.6     Anion Gap 5.0 mmol/L      eGFR 91.9 mL/min/1.73      Comment: National Kidney Foundation and American Society of Nephrology (ASN) Task Force recommended calculation based on the Chronic Kidney Disease Epidemiology Collaboration (CKD-EPI) equation refit without adjustment for race.       Narrative:      GFR Normal >60  Chronic Kidney Disease <60  Kidney Failure <15      Phenytoin Level, Total [[id number]]  (Normal) Collected: 06/05/22 0406    Specimen: Blood Updated: 06/05/22 0445     Phenytoin Level 10.6 mcg/mL     C-reactive Protein [[id number]]  (Abnormal) Collected: 06/05/22 0406    Specimen: Blood Updated: 06/05/22 0445     C-Reactive Protein 11.99 mg/dL     Blood Gas, Arterial - [[id number]]  (Abnormal) Collected: 06/05/22 0441    Specimen: Arterial Blood Updated: 06/05/22 0437     Site Left Radial     Denzel's Test Positive     pH, Arterial 7.415 pH units      pCO2, Arterial 48.7 mm Hg      Comment: 83 Value above reference range        pO2, Arterial 87.6 mm Hg      HCO3, Arterial 31.2 mmol/L      Comment: 83 Value above reference range        Base Excess, Arterial 5.9 mmol/L      Comment: 83 Value above reference range        O2 Saturation, Arterial 97.7 %      Temperature 37.0 C      Barometric Pressure for Blood Gas 746 mmHg      Modality Ventilator     FIO2 30 %      Ventilator Mode AC     Set Tidal Volume 450     Set Mech Resp Rate 16.0     PEEP 5.0     Collected by [id number]     Comment: Meter:  [id number]     :  [id number]        pCO2, Temperature Corrected 48.7 mm Hg      pH, Temp Corrected 7.415 pH Units      pO2, Temperature Corrected 87.6 mm Hg     CBC & Differential [[id number]]  (Abnormal) Collected: 06/05/22 0406    Specimen: Blood Updated: 06/05/22 0428    Narrative:      The following orders were created for panel order CBC & Differential.  Procedure                               Abnormality         Status                     ---------                               -----------         ------                     CBC Auto Differential[[id number]]        Abnormal            Final result                 Please view results for these tests on the individual orders.    CBC Auto Differential [[id number]]  (Abnormal) Collected: 06/05/22 0406    Specimen: Blood Updated: 06/05/22 0428     WBC 10.19 10*3/mm3      RBC 2.63 10*6/mm3      Hemoglobin 8.3 g/dL      Hematocrit 26.2 %      MCV 99.6 fL      MCH 31.6 pg      MCHC 31.7 g/dL      RDW 14.4 %      RDW-SD 51.3 fl      MPV 9.2 fL      Platelets 326 10*3/mm3      nRBC 0.7 /100 WBC     POC Glucose Once [[id number]]  (Abnormal) Collected: 06/05/22 0038    Specimen: Blood Updated: 06/05/22 0049     Glucose 140 mg/dL      Comment: : [id number] Salvador LaceyMeter ID: [id number]       Blood Gas, Arterial - [[id number]]  (Abnormal) Collected: 06/04/22 2141    Specimen: Arterial Blood Updated: 06/04/22 2136     Site Left Radial     Denzel's Test Positive     pH, Arterial 7.432 pH units      pCO2, Arterial 47.7 mm Hg      Comment: 83 Value above reference range        pO2, Arterial 84.8 mm Hg      HCO3, Arterial 31.8 mmol/L      Comment: 83 Value above reference range        Base Excess, Arterial 6.7 mmol/L      Comment: 83 Value above reference range        O2 Saturation, Arterial 96.7 %      Temperature 37.0 C      Barometric Pressure for Blood Gas 746 mmHg      Modality Ventilator     FIO2 30 %      Ventilator Mode AC     Set Tidal Volume 450     Set  [facility] Resp Rate 16.0     PEEP 5.0     Collected by [id number]     Comment: Meter: [id number]     :  [id number]        pCO2, Temperature Corrected 47.7 mm Hg      pH, Temp Corrected 7.432 pH Units      pO2, Temperature Corrected 84.8 mm Hg     POC Glucose Once [[id number]]  (Normal) Collected: 06/04/22 2036    Specimen: Blood Updated: 06/04/22 2047     Glucose 102 mg/dL      Comment: : [id number] Jone RoldanonMeter ID: [id number]       POC Glucose Once [[id number]]  (Normal) Collected: 06/04/22 1739    Specimen: Blood Updated: 06/04/22 1751     Glucose 102 mg/dL      Comment: : ZACH Bernardo RyanMeter ID: [id number]       POC Glucose Once [[id number]]  (Normal) Collected: 06/04/22 1058    Specimen: Blood Updated: 06/04/22 1110     Glucose 110 mg/dL      Comment: : HEATHERNERBlaise Bernardo RyanMeter ID: [id number]           Imaging Results (Last 24 Hours)     Procedure Component Value Units Date/Time    XR Chest 1 View [[id number]] Collected: 06/05/22 0750     Updated: 06/05/22 0756    Narrative:      HISTORY: Intubated     CXR: Frontal view the chest obtained     COMPARISON: 6/4/2022 at 8:47 PM     FINDINGS: Tracheostomy tube appropriate in position. Right-sided   shunt. Right upper extremity PICC line tip projects over the SVC.  Multiple left-sided pleural catheters are stable in position.  Hypoventilated lungs with prominent pulmonary vascular structures. There  are small pleural effusions. No appreciable pneumothorax. Scattered  bilateral calcified granuloma. Cardiomegaly.       Impression:      1. Hypoventilated lungs. Vascular crowding with likely pulmonary venous  congestion/mild edema. Multiple left-sided chest tubes similar in  position with no appreciable pneumothorax. Very small pleural effusions  suspected. Scattered calcified granuloma. Appropriate positioning of  tracheostomy tube.  This report was finalized on 06/05/2022 07:53 by Dr. Christel Feliciano MD.    CT Chest Without Contrast  Diagnostic [[id number]] Collected: 06/05/22 0704     Updated: 06/05/22 0715    Narrative:      CT CHEST WO CONTRAST DIAGNOSTIC- 6/5/2022 5:50 AM CDT     HISTORY: pleural effusion; R13.10-Dysphagia, unspecified;  Z01.818-Encounter for other preprocedural examination; D32.9-Benign  neoplasm of meninges, unspecified; Z74.09-Other reduced mobility;  Z78.9-Other specified health status; G40.901-Epilepsy, unspecified, not  intractable, with status epilepticus; J96.01-Acute respiratory failure  with hypoxia; G91.0-Communicating hydrocephalus      COMPARISON: None     DOSE LENGTH PRODUCT: 405 mGy cm. Automated exposure control was also  utilized to decrease patient radiation dose.     TECHNIQUE: Axial images of the chest are obtained without IV contrast     FINDINGS:  The tracheostomy tube is in place with tip position above the  hernandez. The 2 small posterior apical pleural catheter is remain in place  with appropriate drainage of the posterior medial pleural fluid  collections compared to the prior study. The 2 larger left-sided  thoracotomy tubes remain in place. Only small pleural effusions are seen  on the current exam. There is stable cardiomegaly. Thoracic aorta normal  in caliber. No pericardial effusion. Calcified mediastinal and bilateral  hilar lymph nodes with no pathologic lymphadenopathy.     A right upper extremity PICC line is in place with its tip in the  proximal SVC. A right sided peritoneal shunt identified. Minimal  subcutaneous emphysema within the right upper abdominal wall. Images of  the upper abdomen are degraded by the artifact from overlying lines and  EKG leads. Questionable biliary sludge with no biliary dilatation.  Adrenal glands are not imaged in their entirety.     Scattered calcified granulomas seen bilaterally. Vascular crowding.  Bilateral lower lobe consolidation may represent cysts atelectasis or  pneumonia. Only trace air seen within the left posterior pleural space.     Moderate  degenerative change thoracic spine. No focal destructive  osseous lesions.       Impression:      1. Left chest tubes remain in place with appropriate evacuation of the  left pleural fluid. Only small pleural effusions seen on today's exam  with minimal air present in the posterior left pleural space.  2. Bibasilar consolidation may represent atelectasis and/or pneumonia.  Scattered bilateral calcified granuloma. Vascular crowding and/or mild  venous congestion. Stable cardiomegaly.  This report was finalized on 06/05/2022 07:12 by Dr. Christel Feliciano MD.    XR Chest 1 View [[id number]] Collected: 06/04/22 2102     Updated: 06/04/22 2107    Narrative:      EXAMINATION: Chest 1 view 6/4/2022     HISTORY: Respiratory decline.     FINDINGS: Upright frontal projection of chest is compared to prior exam  of earlier the same day. 2 small caliber pigtail catheters as well as 2  thoracostomy tubes are in place on the left. There is no pneumothorax.  Lungs are hypoventilated with bibasilar atelectasis. There is improved  aeration when compared to the previous exam. A PICC line and  tracheostomy tube remain in place.       Impression:      .  1. No change in the left-sided thoracostomy tubes. There is no  appreciable pneumothorax.  2. Improved aeration of the lungs but with persistent bilateral lower  lobe atelectasis.  This report was finalized on 06/04/2022 21:04 by Dr. Guillaume Bey MD.           Corrected phenytoin level today is 16.8.    Zinc is normal at 54  Prealbumin is 12.7 which is below normal range.  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 Seizures  --No seizure activity currently.  Unclear whether last night's events represented seizure activity.  As he currently has no concerning rhythmic movements we will hold on doing an EEG.  If at any point today he began showing activity we may do a stat EEG.  2.  Phenytoin level currently 9.4  3. Hypoalbuminemia .  PEG tube in place.   4. Anemia  5. Atrial fibrillation  6.  Status post ventriculoperitoneal shunt on 6/3  7.  Combination of atypical vital signs overnight with some atypical movement of the patient's jaw does raise some possible concern of underlying seizure activity.  It is somewhat difficult to know what to do next in regards to treating this is the patient is on triple therapy.  Some of the bradycardia may be explained by fosphenytoin.  I think moving forward we may consider repeating EEG and also increasing the Keppra level to 1000 mg every 12 hours.    Plan:  · Fosphenytoin 275 mg every 8 hours  · Continue daily Dilantin levels  · On Vimpat 200 mg IV every 12 hours  · Keppra 500 mg every 12 hours -Will increase Keppra to 1000 mg every 12 hours 6/5      45 minutes of critical care time with adjustment of sedation, monitoring vital signs, neurologic exam.    Donnell Freire MD  06/05/22  08:50 CDT

## 2022-06-05 NOTE — SIGNIFICANT NOTE
06/05/22 0627   Readings   PEEP Intrinsic (cm H2O) 4.9 cm H2O   Plateau Pressure (cm H2O) 17 cm H2O   Static Compliance (L/cm H2O) 41

## 2022-06-06 ENCOUNTER — APPOINTMENT (OUTPATIENT)
Dept: CT IMAGING | Facility: HOSPITAL | Age: 77
End: 2022-06-06

## 2022-06-06 ENCOUNTER — APPOINTMENT (OUTPATIENT)
Dept: GENERAL RADIOLOGY | Facility: HOSPITAL | Age: 77
End: 2022-06-06

## 2022-06-06 LAB
ALBUMIN SERPL-MCNC: 1.9 G/DL (ref 3.5–5.2)
ALBUMIN/GLOB SERPL: 0.5 G/DL
ALP SERPL-CCNC: 182 U/L (ref 39–117)
ALT SERPL W P-5'-P-CCNC: 23 U/L (ref 1–41)
ANION GAP SERPL CALCULATED.3IONS-SCNC: 2 MMOL/L (ref 5–15)
ARTERIAL PATENCY WRIST A: POSITIVE
AST SERPL-CCNC: 40 U/L (ref 1–40)
ATMOSPHERIC PRESS: 744 MMHG
BASE EXCESS BLDA CALC-SCNC: 7.7 MMOL/L (ref 0–2)
BDY SITE: ABNORMAL
BILIRUB SERPL-MCNC: 0.3 MG/DL (ref 0–1.2)
BODY TEMPERATURE: 37 C
BUN SERPL-MCNC: 32 MG/DL (ref 8–23)
BUN/CREAT SERPL: 45.7 (ref 7–25)
CALCIUM SPEC-SCNC: 7.9 MG/DL (ref 8.6–10.5)
CHLORIDE SERPL-SCNC: 104 MMOL/L (ref 98–107)
CO2 SERPL-SCNC: 31 MMOL/L (ref 22–29)
CREAT SERPL-MCNC: 0.7 MG/DL (ref 0.76–1.27)
DEPRECATED RDW RBC AUTO: 52.6 FL (ref 37–54)
EGFRCR SERPLBLD CKD-EPI 2021: 94.9 ML/MIN/1.73
EOSINOPHIL # BLD MANUAL: 0.21 10*3/MM3 (ref 0–0.4)
EOSINOPHIL NFR BLD MANUAL: 2.1 % (ref 0.3–6.2)
ERYTHROCYTE [DISTWIDTH] IN BLOOD BY AUTOMATED COUNT: 14.6 % (ref 12.3–15.4)
GLOBULIN UR ELPH-MCNC: 3.7 GM/DL
GLUCOSE BLDC GLUCOMTR-MCNC: 120 MG/DL (ref 70–130)
GLUCOSE BLDC GLUCOMTR-MCNC: 126 MG/DL (ref 70–130)
GLUCOSE BLDC GLUCOMTR-MCNC: 144 MG/DL (ref 70–130)
GLUCOSE BLDC GLUCOMTR-MCNC: 153 MG/DL (ref 70–130)
GLUCOSE SERPL-MCNC: 149 MG/DL (ref 65–99)
HCO3 BLDA-SCNC: 32.5 MMOL/L (ref 20–26)
HCT VFR BLD AUTO: 27 % (ref 37.5–51)
HGB BLD-MCNC: 8.3 G/DL (ref 13–17.7)
INHALED O2 CONCENTRATION: 30 %
LYMPHOCYTES # BLD MANUAL: 0.72 10*3/MM3 (ref 0.7–3.1)
LYMPHOCYTES NFR BLD MANUAL: 8.2 % (ref 5–12)
Lab: ABNORMAL
MCH RBC QN AUTO: 31.2 PG (ref 26.6–33)
MCHC RBC AUTO-ENTMCNC: 30.7 G/DL (ref 31.5–35.7)
MCV RBC AUTO: 101.5 FL (ref 79–97)
METAMYELOCYTES NFR BLD MANUAL: 2.1 % (ref 0–0)
MODALITY: ABNORMAL
MONOCYTES # BLD: 0.82 10*3/MM3 (ref 0.1–0.9)
MYELOCYTES NFR BLD MANUAL: 3.1 % (ref 0–0)
NEUTROPHILS # BLD AUTO: 7.69 10*3/MM3 (ref 1.7–7)
NEUTROPHILS NFR BLD MANUAL: 77.3 % (ref 42.7–76)
PCO2 BLDA: 46.3 MM HG (ref 35–45)
PCO2 TEMP ADJ BLD: 46.3 MM HG (ref 35–45)
PEEP RESPIRATORY: 5 CM[H2O]
PH BLDA: 7.46 PH UNITS (ref 7.35–7.45)
PH, TEMP CORRECTED: 7.46 PH UNITS (ref 7.35–7.45)
PHENYTOIN SERPL-MCNC: 12.8 MCG/ML (ref 10–20)
PLAT MORPH BLD: NORMAL
PLATELET # BLD AUTO: 274 10*3/MM3 (ref 140–450)
PMV BLD AUTO: 9.2 FL (ref 6–12)
PO2 BLDA: 74.6 MM HG (ref 83–108)
PO2 TEMP ADJ BLD: 74.6 MM HG (ref 83–108)
POIKILOCYTOSIS BLD QL SMEAR: ABNORMAL
POLYCHROMASIA BLD QL SMEAR: ABNORMAL
POTASSIUM SERPL-SCNC: 4.4 MMOL/L (ref 3.5–5.2)
PROT SERPL-MCNC: 5.6 G/DL (ref 6–8.5)
RBC # BLD AUTO: 2.66 10*6/MM3 (ref 4.14–5.8)
SAO2 % BLDCOA: 95.9 % (ref 94–99)
SET MECH RESP RATE: 16
SODIUM SERPL-SCNC: 137 MMOL/L (ref 136–145)
SPHEROCYTES BLD QL SMEAR: ABNORMAL
THYROTROPIN RELEASING HORM PLAS-MCNC: 5 PG/ML
VARIANT LYMPHS NFR BLD MANUAL: 7.2 % (ref 19.6–45.3)
VENTILATOR MODE: AC
VT ON VENT VENT: 450 ML
WBC MORPH BLD: NORMAL
WBC NRBC COR # BLD: 9.95 10*3/MM3 (ref 3.4–10.8)

## 2022-06-06 PROCEDURE — 85025 COMPLETE CBC W/AUTO DIFF WBC: CPT | Performed by: NURSE PRACTITIONER

## 2022-06-06 PROCEDURE — 0 LEVETIRACETAM IN NACL 0.75% 1000 MG/100ML SOLUTION: Performed by: CLINICAL NURSE SPECIALIST

## 2022-06-06 PROCEDURE — 82803 BLOOD GASES ANY COMBINATION: CPT

## 2022-06-06 PROCEDURE — 99233 SBSQ HOSP IP/OBS HIGH 50: CPT | Performed by: INTERNAL MEDICINE

## 2022-06-06 PROCEDURE — 94664 DEMO&/EVAL PT USE INHALER: CPT

## 2022-06-06 PROCEDURE — 70450 CT HEAD/BRAIN W/O DYE: CPT

## 2022-06-06 PROCEDURE — 94799 UNLISTED PULMONARY SVC/PX: CPT

## 2022-06-06 PROCEDURE — 99233 SBSQ HOSP IP/OBS HIGH 50: CPT | Performed by: NURSE PRACTITIONER

## 2022-06-06 PROCEDURE — 63710000001 INSULIN DETEMIR PER 5 UNITS: Performed by: NURSE PRACTITIONER

## 2022-06-06 PROCEDURE — 80185 ASSAY OF PHENYTOIN TOTAL: CPT | Performed by: NURSE PRACTITIONER

## 2022-06-06 PROCEDURE — 82962 GLUCOSE BLOOD TEST: CPT

## 2022-06-06 PROCEDURE — 94761 N-INVAS EAR/PLS OXIMETRY MLT: CPT

## 2022-06-06 PROCEDURE — 94003 VENT MGMT INPAT SUBQ DAY: CPT

## 2022-06-06 PROCEDURE — 36600 WITHDRAWAL OF ARTERIAL BLOOD: CPT

## 2022-06-06 PROCEDURE — 25010000002 MORPHINE SULFATE (PF) 2 MG/ML SOLUTION: Performed by: NURSE PRACTITIONER

## 2022-06-06 PROCEDURE — 99291 CRITICAL CARE FIRST HOUR: CPT | Performed by: PSYCHIATRY & NEUROLOGY

## 2022-06-06 PROCEDURE — 25010000002 HEPARIN (PORCINE) PER 1000 UNITS: Performed by: NURSE PRACTITIONER

## 2022-06-06 PROCEDURE — 80053 COMPREHEN METABOLIC PANEL: CPT | Performed by: NURSE PRACTITIONER

## 2022-06-06 PROCEDURE — 71045 X-RAY EXAM CHEST 1 VIEW: CPT

## 2022-06-06 PROCEDURE — 25010000002 FOSPHENYTOIN 500 MG PE/10ML SOLUTION 10 ML VIAL: Performed by: NURSE PRACTITIONER

## 2022-06-06 PROCEDURE — 99024 POSTOP FOLLOW-UP VISIT: CPT | Performed by: NURSE PRACTITIONER

## 2022-06-06 PROCEDURE — 99231 SBSQ HOSP IP/OBS SF/LOW 25: CPT | Performed by: SURGERY

## 2022-06-06 PROCEDURE — 99232 SBSQ HOSP IP/OBS MODERATE 35: CPT | Performed by: OTOLARYNGOLOGY

## 2022-06-06 PROCEDURE — 85007 BL SMEAR W/DIFF WBC COUNT: CPT | Performed by: NURSE PRACTITIONER

## 2022-06-06 PROCEDURE — 63710000001 INSULIN REGULAR HUMAN PER 5 UNITS: Performed by: NURSE PRACTITIONER

## 2022-06-06 RX ADMIN — ALBUTEROL SULFATE 2.5 MG: 2.5 SOLUTION RESPIRATORY (INHALATION) at 06:30

## 2022-06-06 RX ADMIN — LIOTHYRONINE SODIUM 25 MCG: 25 TABLET ORAL at 08:27

## 2022-06-06 RX ADMIN — CHLORHEXIDINE GLUCONATE 15 ML: 1.2 RINSE ORAL at 20:50

## 2022-06-06 RX ADMIN — INSULIN DETEMIR 20 UNITS: 100 INJECTION, SOLUTION SUBCUTANEOUS at 21:10

## 2022-06-06 RX ADMIN — INSULIN HUMAN 8 UNITS: 100 INJECTION, SOLUTION PARENTERAL at 00:15

## 2022-06-06 RX ADMIN — SODIUM CHLORIDE 275 MG PE: 9 INJECTION, SOLUTION INTRAVENOUS at 14:11

## 2022-06-06 RX ADMIN — SODIUM CHLORIDE SOLN NEBU 3% 4 ML: 3 NEBU SOLN at 06:30

## 2022-06-06 RX ADMIN — Medication 1 PACKET: at 12:00

## 2022-06-06 RX ADMIN — LEVETIRACETAM 1000 MG: 10 INJECTION INTRAVENOUS at 08:26

## 2022-06-06 RX ADMIN — PANTOPRAZOLE SODIUM 40 MG: 40 INJECTION, POWDER, FOR SOLUTION INTRAVENOUS at 05:01

## 2022-06-06 RX ADMIN — ALBUTEROL SULFATE 2.5 MG: 2.5 SOLUTION RESPIRATORY (INHALATION) at 20:34

## 2022-06-06 RX ADMIN — CARVEDILOL 6.25 MG: 6.25 TABLET, FILM COATED ORAL at 17:04

## 2022-06-06 RX ADMIN — INSULIN HUMAN 8 UNITS: 100 INJECTION, SOLUTION PARENTERAL at 05:18

## 2022-06-06 RX ADMIN — Medication 45 ML: at 08:28

## 2022-06-06 RX ADMIN — SODIUM CHLORIDE 275 MG PE: 9 INJECTION, SOLUTION INTRAVENOUS at 05:01

## 2022-06-06 RX ADMIN — Medication 1 PACKET: at 17:04

## 2022-06-06 RX ADMIN — MUPIROCIN 1 APPLICATION: 20 OINTMENT TOPICAL at 20:50

## 2022-06-06 RX ADMIN — Medication 1 PACKET: at 08:00

## 2022-06-06 RX ADMIN — LACOSAMIDE 200 MG: 10 INJECTION, SOLUTION INTRAVENOUS at 20:50

## 2022-06-06 RX ADMIN — CHLORHEXIDINE GLUCONATE 15 ML: 1.2 RINSE ORAL at 08:27

## 2022-06-06 RX ADMIN — HEPARIN SODIUM 5000 UNITS: 5000 INJECTION INTRAVENOUS; SUBCUTANEOUS at 20:50

## 2022-06-06 RX ADMIN — LACOSAMIDE 200 MG: 10 INJECTION, SOLUTION INTRAVENOUS at 08:26

## 2022-06-06 RX ADMIN — CARVEDILOL 6.25 MG: 6.25 TABLET, FILM COATED ORAL at 08:27

## 2022-06-06 RX ADMIN — SODIUM CHLORIDE 275 MG PE: 9 INJECTION, SOLUTION INTRAVENOUS at 21:39

## 2022-06-06 RX ADMIN — ALBUTEROL SULFATE 2.5 MG: 2.5 SOLUTION RESPIRATORY (INHALATION) at 14:25

## 2022-06-06 RX ADMIN — LEVOTHYROXINE SODIUM 125 MCG: 125 TABLET ORAL at 05:01

## 2022-06-06 RX ADMIN — SODIUM CHLORIDE SOLN NEBU 3% 4 ML: 3 NEBU SOLN at 20:34

## 2022-06-06 RX ADMIN — MORPHINE SULFATE 2 MG: 2 INJECTION, SOLUTION INTRAMUSCULAR; INTRAVENOUS at 05:00

## 2022-06-06 RX ADMIN — POLYETHYLENE GLYCOL 3350 17 G: 17 POWDER, FOR SOLUTION ORAL at 08:27

## 2022-06-06 RX ADMIN — HEPARIN SODIUM 5000 UNITS: 5000 INJECTION INTRAVENOUS; SUBCUTANEOUS at 08:27

## 2022-06-06 RX ADMIN — MUPIROCIN 1 APPLICATION: 20 OINTMENT TOPICAL at 08:28

## 2022-06-06 RX ADMIN — LEVETIRACETAM 1000 MG: 10 INJECTION INTRAVENOUS at 20:50

## 2022-06-06 RX ADMIN — Medication 1 PACKET: at 20:50

## 2022-06-06 RX ADMIN — LISINOPRIL 20 MG: 20 TABLET ORAL at 08:27

## 2022-06-06 NOTE — PROGRESS NOTES
NEUROSURGERY DAILY PROGRESS NOTE    ASSESSMENT:   Hai Hernandez is a 77 y.o. with a significant comorbidity of acephalic migraines, thyroid disease status post radiation as a child, basal cell and squamous cell carcinoma of the skin. He presents in FU for known meningioma found on workup for right visual field changes. Physical exam findings of neurologically intact with resolution of all symptoms and mild decreased vision in right eye.  His imaging shows 22 x 24 x 13.5 mm right planum sphenoidale mass most suggestive of meningioma.    Past Medical History:   Diagnosis Date   • Brain tumor (HCC)    • Depression    • Hearing loss    • History of cellulitis     right foot big toe   • Hypertension    • Pneumonia    • Right arm weakness     after cervial injury   • Sciatic pain     affects balance   • Thyroid disease    • Visual disturbance     due to brain tumor - right eye     Active Hospital Problems    Diagnosis    • **Meningioma (HCC)    • Acute deep vein thrombosis (DVT) of the ulnar and brachial veins of right upper extremity (HCC)    • Loculated pleural effusion    • Fever    • PAF (paroxysmal atrial fibrillation) (HCC)    • Cerebral parenchymal hemorrhage (HCC)    • Essential hypertension    • Type 2 diabetes mellitus with hyperglycemia, without long-term current use of insulin (HCC)    • Hypothyroidism (acquired)    • Acute respiratory failure (secondary to status epilepticus)    • Thrombocytopenia (HCC)    • 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  Opens eyes to voice but does not keep them open.  Does not follow commands.  Does not withdraw or localize to painful stimuli.    Intracranial meningioma   POD #27 (5/10/2022) Status post  postcraniotomy for tumor   Pathology: WHO 1 Meningioma   MRI with blood products in resection cavity but no evidence of cerebritis   CT head reviewed without expansion of SDH   Neurochecks per policy   Decadron off   Leakage from wound.  Stapled at bedside.  Keep for now.  If leaks again will oversew    Hydrocephalus   Lumbar drain removed   CSF unremarkable from 5/17 and 5/23.    3 Days Post-Op right posterior parietal  shunt placement   Shunt pumps and refills well   Postop CT stable   Repeat CT head today    Postop seizures, in status   Neurology managing   Cont Keppra, Dialntin, Vimpat   Stat Ativan   Follow dilantin levels   Repeat EEG unremarkable     CV: Cardene off   keep SBP <140.   Hypotension resolved and off pressors   Requiring hydralazine and labetalol PRNs   A-line removed secondary to limb ischemia  Pulm: Tracheostomy in place.  Appreciate ENT   Left chest tube placed x2 CT managing with TPA  : Keep jansen for now.   FEN: Hyponatremia, 127   3% NaCl DC   Poor glucose control.  Increase SSI     ENDO: Accu-Chek and treat per policy  GI: PEG tube placement today.  Appreciate GI  ID: Afebrile overnight,    DDX: infection vs drug fever   WBC 8.29   CRP in 20s, repeat pending   Broad spectrum abx, Meropenum and Vanc   Blood cult NGTD   Body fluid cx pending   CSF cultures pending, NGTD   UA+, 100K GNB   Heme:  DVT prophylaxis with SCD's.     Plt up 193 and HIT ab negative.     RUE clot.  Leave PICC for now.  Can not anticoagulate  Pain: NA  Dispo: DC pending seizure control   Pending extubation    CHIEF COMPLAINT:   Right visual field changes    Subjective  Symptom stable    Temp:  [98.2 °F (36.8 °C)-99.1 °F (37.3 °C)] 99.1 °F (37.3 °C)  Heart Rate:  [48-81] 68  Resp:  [18-32] 28  BP: (103-174)/(49-88) 146/69  FiO2 (%):  [30 %] 30 %    Objective:  General Appearance:  Well-appearing and in no acute distress.    Vital signs: (most recent): Blood pressure 146/69, pulse 68, temperature 99.1 °F (37.3 °C),  "temperature source Oral, resp. rate 28, height 182.9 cm (72\"), weight 117 kg (257 lb 14.4 oz), SpO2 94 %.      Neurologic Exam     Mental Status   Level of consciousness: arousable by verbal stimuli ,  drowsy  Off propofol.  Opens eyes to voice but does not keep them open.  Does not follow commands.  Does not withdraw or localize to painful stimuli.         Cranial Nerves     CN III, IV, VI   Pupils are equal, round, and reactive to light.  Extraocular motions are normal.     CN IX, X   CN IX normal.     Gait, Coordination, and Reflexes     Tremor   Resting tremor: absent  Intention tremor: absent  Action tremor: absent    Drains:   Urethral Catheter Non-latex;Silicone 16 Fr. (Active)       Output by Drain (mL) 06/05/22 0701 - 06/05/22 1900 06/05/22 1901 - 06/06/22 0700 06/06/22 0701 - 06/06/22 0920 Range Total   Requested LDAs do not have output data documented.       Imaging Results (Last 24 Hours)     Procedure Component Value Units Date/Time    XR Chest 1 View [[id number]] Collected: 06/06/22 0721     Updated: 06/06/22 0726    Narrative:      HISTORY: Intubated     CXR: Frontal view the chest obtained     COMPARISON: 6/5/2022     FINDINGS: Tracheostomy tube remains in place. Right-sided  shunt.  Right upper extremity PICC line tip projects over the proximal SVC. The  two medial left pleural pigtail catheters remain in place. No  appreciable pneumothorax. Lungs are hypoventilated. Vascular crowding  versus mild venous congestion. Bibasilar opacities may be atelectasis  versus pneumonia. Scattered bilateral calcified granuloma. No acute  regional bony pathology.       Impression:      1. Similar positioning of the medial left pleural pigtail catheters. No  pneumothorax. Hypoventilated lungs with vascular crowding and basilar  atelectasis versus pneumonia. Trace pleural effusions.  This report was finalized on 06/06/2022 07:23 by Dr. Christel Feliciano MD.    XR Chest 1 View [[id number]] Collected: 06/05/22 1445     " Updated: 06/05/22 1449    Narrative:      HISTORY: Chest tube removal     CXR: Frontal view the chest obtained     COMPARISON: 6/5/2022 at 3:14 AM     FINDINGS: The 2 large left thoracotomy tubes have been removed. The 2  medially positioned left posterior pleural pigtail catheters remain in  place. No measurable pneumothorax. Small left pleural fluid collection.  Hypoventilated lungs with bibasilar opacities-patchy atelectasis versus  consolidation. Scattered granuloma.     Tracheostomy tube remains appropriate in position. Right upper extremity  PICC line tip projects over the SVC.       Impression:      1. Removal of the two large left thoracotomy chest tubes with retention  of the medial left pleural pigtail catheters. No measurable  pneumothorax. Small left pleural fluid collection. Bibasilar opacities.  This report was finalized on 06/05/2022 14:46 by Dr. Christel Feliciano MD.        Lab Results (last 24 hours)     Procedure Component Value Units Date/Time    POC Glucose Once [[id number]]  (Normal) Collected: 06/06/22 0504    Specimen: Blood Updated: 06/06/22 0515     Glucose 126 mg/dL      Comment: : [id number] Salvador LaceyMeter ID: [id number]       Phenytoin Level, Total [[id number]]  (Normal) Collected: 06/06/22 0347    Specimen: Blood Updated: 06/06/22 0449     Phenytoin Level 12.8 mcg/mL     Manual Differential [[id number]]  (Abnormal) Collected: 06/06/22 0347    Specimen: Blood Updated: 06/06/22 0424     Neutrophil % 77.3 %      Lymphocyte % 7.2 %      Monocyte % 8.2 %      Eosinophil % 2.1 %      Metamyelocyte % 2.1 %      Myelocyte % 3.1 %      Neutrophils Absolute 7.69 10*3/mm3      Lymphocytes Absolute 0.72 10*3/mm3      Monocytes Absolute 0.82 10*3/mm3      Eosinophils Absolute 0.21 10*3/mm3      Poikilocytes Mod/2+     Polychromasia Slight/1+     Spherocytes Slight/1+     WBC Morphology Normal     Platelet Morphology Normal    CBC & Differential [[id number]]  (Abnormal) Collected: 06/06/22 0347     Specimen: Blood Updated: 06/06/22 0424    Narrative:      The following orders were created for panel order CBC & Differential.  Procedure                               Abnormality         Status                     ---------                               -----------         ------                     CBC Auto Differential[[id number]]        Abnormal            Final result                 Please view results for these tests on the individual orders.    CBC Auto Differential [[id number]]  (Abnormal) Collected: 06/06/22 0347    Specimen: Blood Updated: 06/06/22 0424     WBC 9.95 10*3/mm3      RBC 2.66 10*6/mm3      Hemoglobin 8.3 g/dL      Hematocrit 27.0 %      .5 fL      MCH 31.2 pg      MCHC 30.7 g/dL      RDW 14.6 %      RDW-SD 52.6 fl      MPV 9.2 fL      Platelets 274 10*3/mm3     Comprehensive Metabolic Panel [[id number]]  (Abnormal) Collected: 06/06/22 0347    Specimen: Blood Updated: 06/06/22 0422     Glucose 149 mg/dL      BUN 32 mg/dL      Creatinine 0.70 mg/dL      Sodium 137 mmol/L      Potassium 4.4 mmol/L      Comment: Slight hemolysis detected by analyzer. Results may be affected.        Chloride 104 mmol/L      CO2 31.0 mmol/L      Calcium 7.9 mg/dL      Total Protein 5.6 g/dL      Albumin 1.90 g/dL      ALT (SGPT) 23 U/L      AST (SGOT) 40 U/L      Alkaline Phosphatase 182 U/L      Total Bilirubin 0.3 mg/dL      Globulin 3.7 gm/dL      A/G Ratio 0.5 g/dL      BUN/Creatinine Ratio 45.7     Anion Gap 2.0 mmol/L      eGFR 94.9 mL/min/1.73      Comment: National Kidney Foundation and American Society of Nephrology (ASN) Task Force recommended calculation based on the Chronic Kidney Disease Epidemiology Collaboration (CKD-EPI) equation refit without adjustment for race.       Narrative:      GFR Normal >60  Chronic Kidney Disease <60  Kidney Failure <15      Blood Gas, Arterial - [[id number]]  (Abnormal) Collected: 06/06/22 0420    Specimen: Arterial Blood Updated: 06/06/22 0415     Site Left Radial      Denzel's Test Positive     pH, Arterial 7.455 pH units      Comment: 83 Value above reference range        pCO2, Arterial 46.3 mm Hg      Comment: 83 Value above reference range        pO2, Arterial 74.6 mm Hg      Comment: 84 Value below reference range        HCO3, Arterial 32.5 mmol/L      Comment: 83 Value above reference range        Base Excess, Arterial 7.7 mmol/L      Comment: 83 Value above reference range        O2 Saturation, Arterial 95.9 %      Temperature 37.0 C      Barometric Pressure for Blood Gas 744 mmHg      Modality Ventilator     FIO2 30 %      Ventilator Mode AC     Set Tidal Volume 450     Set Mech Resp Rate 16.0     PEEP 5.0     Collected by [id number]     Comment: Meter: [id number]     :  [id number]        pCO2, Temperature Corrected 46.3 mm Hg      pH, Temp Corrected 7.455 pH Units      pO2, Temperature Corrected 74.6 mm Hg     POC Glucose Once [[id number]]  (Abnormal) Collected: 06/06/22 0006    Specimen: Blood Updated: 06/06/22 0017     Glucose 144 mg/dL      Comment: : [id number] Franco LaceyMeter ID: [id number]       POC Glucose Once [[id number]]  (Normal) Collected: 06/05/22 2000    Specimen: Blood Updated: 06/05/22 2012     Glucose 124 mg/dL      Comment: : [id number] Franco LaceyMeter ID: [id number]       POC Glucose Once [[id number]]  (Normal) Collected: 06/05/22 1659    Specimen: Blood Updated: 06/05/22 1710     Glucose 120 mg/dL      Comment: : [id number] [place] VanillaenMeter ID: [id number]       POC Glucose Once [[id number]]  (Abnormal) Collected: 06/05/22 1137    Specimen: Blood Updated: 06/05/22 1148     Glucose 133 mg/dL      Comment: : [id number] LX EnterprisesenMeter ID: [id number]           [id number]  Stevie Parsons, APRN

## 2022-06-06 NOTE — PROGRESS NOTES
"Patient name: Hai Hernandez  Patient : 1945  VISIT # [id number]  MR #[id number]    Procedure:Procedure(s):  CRANIOTOMY FOR TUMOR STERIOTACTIC WITH BRAIN LAB, right  Procedure Date:5/10/2022  POD:25 Days Post-Op    Subjective   Chief complaint: Shortness of breath    Patient remains mechanically ventilated to trach, FiO2 30%, PEEP 5 with O2 sat 96%.  2 large-bore chest tubes removed yesterday without remark with no residual pneumothorax.   No overnight events.        Objective     Visit Vitals  /69   Pulse 68   Temp 99.1 °F (37.3 °C) (Oral)   Resp 28   Ht 182.9 cm (72\")   Wt 117 kg (257 lb 14.4 oz)   SpO2 94%   BMI 34.98 kg/m²       Intake/Output Summary (Last 24 hours) at 2022 0747  Last data filed at 2022 0400  Gross per 24 hour   Intake 2592.36 ml   Output 152 ml   Net 2440.36 ml     Left pleural drain 1: 73 ml/24 hours, serous  Left pleural drain 2: 64 ml24 hours, serous    Lab:     CBC:  Results from last 7 days   Lab Units 22  0347 22  0406 22  0436   WBC 10*3/mm3 9.95 10.19 7.93   HEMATOCRIT % 27.0* 26.2* 25.8*   PLATELETS 10*3/mm3 274 326 300          BMP:  Results from last 7 days   Lab Units 22  0347 22  0406 22  0436   SODIUM mmol/L 137 136 137   POTASSIUM mmol/L 4.4 4.3 4.7   CHLORIDE mmol/L 104 102 102   CO2 mmol/L 31.0* 29.0 31.0*   GLUCOSE mg/dL 149* 152* 134*   BUN mg/dL 32* 27* 21   CREATININE mg/dL 0.70* 0.78 0.50*          COAG:  Results from last 7 days   Lab Units 22  1755   INR  1.10*       IMAGES:       Imaging Results (Last 24 Hours)     Procedure Component Value Units Date/Time    XR Chest 1 View [[id number]] Collected: 22     Updated: 22    Narrative:      HISTORY: Intubated     CXR: Frontal view the chest obtained     COMPARISON: 2022     FINDINGS: Tracheostomy tube remains in place. Right-sided  shunt.  Right upper extremity PICC line tip projects over the proximal SVC. The  two medial left pleural " pigtail catheters remain in place. No  appreciable pneumothorax. Lungs are hypoventilated. Vascular crowding  versus mild venous congestion. Bibasilar opacities may be atelectasis  versus pneumonia. Scattered bilateral calcified granuloma. No acute  regional bony pathology.       Impression:      1. Similar positioning of the medial left pleural pigtail catheters. No  pneumothorax. Hypoventilated lungs with vascular crowding and basilar  atelectasis versus pneumonia. Trace pleural effusions.  This report was finalized on 06/06/2022 07:23 by Dr. Christel Feliciano MD.    XR Chest 1 View [[id number]] Collected: 06/05/22 1445     Updated: 06/05/22 1449    Narrative:      HISTORY: Chest tube removal     CXR: Frontal view the chest obtained     COMPARISON: 6/5/2022 at 3:14 AM     FINDINGS: The 2 large left thoracotomy tubes have been removed. The 2  medially positioned left posterior pleural pigtail catheters remain in  place. No measurable pneumothorax. Small left pleural fluid collection.  Hypoventilated lungs with bibasilar opacities-patchy atelectasis versus  consolidation. Scattered granuloma.     Tracheostomy tube remains appropriate in position. Right upper extremity  PICC line tip projects over the SVC.       Impression:      1. Removal of the two large left thoracotomy chest tubes with retention  of the medial left pleural pigtail catheters. No measurable  pneumothorax. Small left pleural fluid collection. Bibasilar opacities.  This report was finalized on 06/05/2022 14:46 by Dr. Christel Feliciano MD.    XR Chest 1 View [[id number]] Collected: 06/05/22 0750     Updated: 06/05/22 0756    Narrative:      HISTORY: Intubated     CXR: Frontal view the chest obtained     COMPARISON: 6/4/2022 at 8:47 PM     FINDINGS: Tracheostomy tube appropriate in position. Right-sided   shunt. Right upper extremity PICC line tip projects over the SVC.  Multiple left-sided pleural catheters are stable in position.  Hypoventilated lungs  with prominent pulmonary vascular structures. There  are small pleural effusions. No appreciable pneumothorax. Scattered  bilateral calcified granuloma. Cardiomegaly.       Impression:      1. Hypoventilated lungs. Vascular crowding with likely pulmonary venous  congestion/mild edema. Multiple left-sided chest tubes similar in  position with no appreciable pneumothorax. Very small pleural effusions  suspected. Scattered calcified granuloma. Appropriate positioning of  tracheostomy tube.  This report was finalized on 06/05/2022 07:53 by Dr. Christel Feliciano MD.        CXR: Left pleural drains x2 in stable position.  Left lung remains expanded.  Left lower lobe atelectasis.  Small left pleural effusion.    Physical Exam:  General: Mechanical ventilation to trach.    Cardiovascular: Regular rate and rhythm without murmur, rubs, or gallops.    Pulmonary: Coarse mechanical breath sounds bilaterally without wheezing, rubs, or rales.  Chest: Left chest tubes x 2 to 40 cm suction. No air leak. Fluid is serosanguineous.   Abdomen: Soft, nondistended, and nontender.  Extremities: Warm, moves all extremities.  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  Continue chest tubes to suction.  Daily chest x-ray  We will continue to follow      MARIO Romo  06/06/22  07:47 CDT

## 2022-06-06 NOTE — PROGRESS NOTES
Neurology Progress Note      Date of admission: 5/10/2022  4:49 AM  Date of visit: 6/6/2022    Chief Complaint:  Seizures    Subjective     Subjective:    Waking up with this morning.  Propofol was turned off around midnight.  Chest tubes remain in place.  Tracheostomy in place.  Vital signs were normal overnight.  An EEG that was done late yesterday which showed generalized slowing with no signs of epileptiform activity.  Mouth movements were captured during the recording and there was no epileptic correlate.        Medications:  Current Facility-Administered Medications   Medication Dose Route Frequency Provider Last Rate Last Admin   • acetaminophen (TYLENOL) tablet 650 mg  650 mg Oral Q4H PRN Stevie Parsons APRN   650 mg at 06/04/22 2029    Or   • acetaminophen (TYLENOL) suppository 650 mg  650 mg Rectal Q4H PRN Stevie Parsons APRN   650 mg at 05/23/22 0016   • albuterol (PROVENTIL) nebulizer solution 0.083% 2.5 mg/3mL  2.5 mg Nebulization Q8H - RT Stevie Parsons APRN   2.5 mg at 06/06/22 0630   • alteplase (CATHFLO/ACTIVASE) injection 2 mg  2 mg Intracatheter PRN Stevie Parsons APRN   New Syringe/Cartridge at 05/28/22 1330   • bisacodyl (DULCOLAX) suppository 10 mg  10 mg Rectal Daily PRN Stevie Parsons APRN   10 mg at 06/03/22 0749   • butalbital-acetaminophen-caffeine (FIORICET, ESGIC) -40 MG per tablet 1 tablet  1 tablet Oral Q4H PRN Stevie Parsons APRN   1 tablet at 05/22/22 1729   • carvedilol (COREG) tablet 6.25 mg  6.25 mg Nasogastric BID With Meals Stevie Parsons APRN   6.25 mg at 06/06/22 0827   • chlorhexidine (PERIDEX) 0.12 % solution 15 mL  15 mL Mouth/Throat Q12H Stevie Parsons APRN   15 mL at 06/06/22 0827   • dextrose (D50W) (25 g/50 mL) IV injection 25 g  25 g Intravenous Q15 Min PRN Stevie Prasons APRN   25 g at 06/02/22 1759   • dextrose (GLUTOSE) oral gel 15 g  15 g Oral Q15 Min PRN Stevie Parsons APRN   15 g at 05/22/22 0537   • fosphenytoin (Cerebyx) 275 mg PE in sodium chloride 0.9  % 100 mL IVPB  275 mg PE Intravenous Q8H Stevie Parsons APRN   275 mg PE at 06/06/22 0501   • glucagon (human recombinant) (GLUCAGEN DIAGNOSTIC) injection 1 mg  1 mg Intramuscular Q15 Min PRN Stevie Parsons APRN       • heparin (porcine) 5000 UNIT/ML injection 5,000 Units  5,000 Units Subcutaneous Q12H Stevie Parsons APRN   5,000 Units at 06/06/22 0827   • Hold Medication (Anticoagulation)  1 each Does not apply Continuous PRN Stevie Parsons APRN       • hydrALAZINE (APRESOLINE) injection 10 mg  10 mg Intravenous Q6H PRN Stevie Parsons APRN   10 mg at 06/04/22 1107   • insulin detemir (LEVEMIR) injection 20 Units  20 Units Subcutaneous Nightly Stevie Parsons APRN   20 Units at 06/05/22 2003   • insulin regular (humuLIN R,novoLIN R) injection 2-7 Units  2-7 Units Subcutaneous Q6H Stevie Parsons APRN   2 Units at 06/05/22 0615   • insulin regular (humuLIN R,novoLIN R) injection 8 Units  8 Units Subcutaneous Q6H Stevie Parsons APRN   8 Units at 06/06/22 0518   • ipratropium-albuterol (DUO-NEB) nebulizer solution 3 mL  3 mL Nebulization Q4H PRN Stevie Parsons APRN   3 mL at 05/25/22 1007   • labetalol (NORMODYNE,TRANDATE) injection 10 mg  10 mg Intravenous Q6H PRN Stevie Parsons APRN   10 mg at 06/03/22 1310   • lacosamide (VIMPAT) injection 200 mg  200 mg Intravenous Q12H Stevie Parsons APRN   200 mg at 06/06/22 0826   • levETIRAcetam in NaCl 0.75% (KEPPRA) IVPB 1,000 mg  1,000 mg Intravenous Q12H Di Ward APRN   1,000 mg at 06/06/22 0826   • levothyroxine (SYNTHROID, LEVOTHROID) tablet 125 mcg  125 mcg Nasogastric Q AM Stevie Parsons APRN   125 mcg at 06/06/22 0501   • lidocaine (XYLOCAINE) 1 % injection 10 mL  10 mL Intradermal Once RustStevie APRN       • liothyronine (CYTOMEL) tablet 25 mcg  25 mcg Nasogastric Daily RustStevie APRN   25 mcg at 06/06/22 0827   • lisinopril (PRINIVIL,ZESTRIL) tablet 20 mg  20 mg Nasogastric Q24H RustStevie APRN   20 mg at 06/06/22 0827   • LORazepam (ATIVAN)  injection 2 mg  2 mg Intravenous Q2H PRN Donnell Freire MD   2 mg at 06/04/22 2038   • Morphine sulfate (PF) injection 2 mg  2 mg Intravenous Q2H PRN Stevie Parsons APRN   2 mg at 06/06/22 0500   • mupirocin (BACTROBAN) 2 % ointment 1 application  1 application Topical Q12H Stevie Parsons APRN   1 application at 06/06/22 0828   • norepinephrine (LEVOPHED) 8 mg in 250 mL NS infusion (premix)  0.02-0.3 mcg/kg/min Intravenous Titrated Stevie Parsons APRN   Stopped at 06/05/22 0835   • Nutrisource fiber pack 1 packet  1 packet Nasogastric 4x Daily Stevie Parsons APRN   1 packet at 06/05/22 2002   • ondansetron (ZOFRAN) tablet 4 mg  4 mg Oral Q6H PRN Stevie Parsons APRN        Or   • ondansetron (ZOFRAN) injection 4 mg  4 mg Intravenous Q6H PRN FeliztStevie APRN       • oxyCODONE (ROXICODONE) 5 MG/5ML solution 7.5 mg  7.5 mg Nasogastric Q4H PRN Stevie Parsons APRN   7.5 mg at 06/05/22 1208   • pantoprazole (PROTONIX) injection 40 mg  40 mg Intravenous Q AM Stevie Parsons APRN   40 mg at 06/06/22 0501   • polyethylene glycol (MIRALAX) packet 17 g  17 g Oral Daily Stevie Parsons APRN   17 g at 06/06/22 0827   • propofol (DIPRIVAN) infusion 10 mg/mL 100 mL  5-50 mcg/kg/min Intravenous Titrated Stevie Parsons APRN   Stopped at 06/05/22 0815   • ProSource TF oral liquid 45 mL  1 packet Nasogastric BID Stevie Parsons APRN   45 mL at 06/04/22 2029   • ProSource TF oral liquid 45 mL  45 mL Per G Tube BID Aden Kc MD   45 mL at 06/06/22 0828   • sodium chloride 3 % nebulizer solution 4 mL  4 mL Nebulization BID - RT Stevie Parsons APRN   4 mL at 06/06/22 0630       Review of Systems:   -A 14 point review of systems is unobtainable    Objective     Objective      Vital Signs  Temp:  [98.2 °F (36.8 °C)-99.1 °F (37.3 °C)] 99.1 °F (37.3 °C)  Heart Rate:  [48-81] 68  Resp:  [18-32] 28  BP: (103-174)/(49-88) 146/69  FiO2 (%):  [30 %] 30 %    Physical Exam:    HEENT:  Neck supple.  Tracheostomy in place  CVS:  Regular rate  and rhythm.  No murmurs  Carotid Examination:  No bruits  Lungs:  Clear to auscultation  Abdomen:  Non-tender, Non-distended.  PEG tube in place  Extremities:  No signs of peripheral edema    Neurologic Exam:   Propofol currently off.  The patient awakens with noxious stimulation.  He has facial grimacing to any noxious stimulation.  Eyes open.  Occasionally tracks examiner around the room with eye movements.  Pupils react equally  No nystagmus  Facial grimacing symmetric  Eyes open.  No command following.      Motor: (strength out of 5:  1= minimal movement, 2 = movement in plane of gravity, 3 = movement against gravity, 4 = movement against some resistance, 5 = full strength)    Facial grimacing to noxious stimulation but no signs of spontaneous movement of the extremities currently.    DTR:  2+ throughout in all four extremities  upgoing toe on left    Sensory:  Facial grimace to noxious stimulation    Coordination/Gait:  -No active tremors     Results Review:    I reviewed the patient's new clinical results.    Lab Results (last 24 hours)     Procedure Component Value Units Date/Time    POC Glucose Once [[id number]]  (Normal) Collected: 06/06/22 0504    Specimen: Blood Updated: 06/06/22 0515     Glucose 126 mg/dL      Comment: : [id number] Salvador LaceyMeter ID: [id number]       Phenytoin Level, Total [[id number]]  (Normal) Collected: 06/06/22 0347    Specimen: Blood Updated: 06/06/22 0449     Phenytoin Level 12.8 mcg/mL     Manual Differential [[id number]]  (Abnormal) Collected: 06/06/22 0347    Specimen: Blood Updated: 06/06/22 0424     Neutrophil % 77.3 %      Lymphocyte % 7.2 %      Monocyte % 8.2 %      Eosinophil % 2.1 %      Metamyelocyte % 2.1 %      Myelocyte % 3.1 %      Neutrophils Absolute 7.69 10*3/mm3      Lymphocytes Absolute 0.72 10*3/mm3      Monocytes Absolute 0.82 10*3/mm3      Eosinophils Absolute 0.21 10*3/mm3      Poikilocytes Mod/2+     Polychromasia Slight/1+     Spherocytes Slight/1+      WBC Morphology Normal     Platelet Morphology Normal    CBC & Differential [[id number]]  (Abnormal) Collected: 06/06/22 0347    Specimen: Blood Updated: 06/06/22 0424    Narrative:      The following orders were created for panel order CBC & Differential.  Procedure                               Abnormality         Status                     ---------                               -----------         ------                     CBC Auto Differential[[id number]]        Abnormal            Final result                 Please view results for these tests on the individual orders.    CBC Auto Differential [[id number]]  (Abnormal) Collected: 06/06/22 0347    Specimen: Blood Updated: 06/06/22 0424     WBC 9.95 10*3/mm3      RBC 2.66 10*6/mm3      Hemoglobin 8.3 g/dL      Hematocrit 27.0 %      .5 fL      MCH 31.2 pg      MCHC 30.7 g/dL      RDW 14.6 %      RDW-SD 52.6 fl      MPV 9.2 fL      Platelets 274 10*3/mm3     Comprehensive Metabolic Panel [[id number]]  (Abnormal) Collected: 06/06/22 0347    Specimen: Blood Updated: 06/06/22 0422     Glucose 149 mg/dL      BUN 32 mg/dL      Creatinine 0.70 mg/dL      Sodium 137 mmol/L      Potassium 4.4 mmol/L      Comment: Slight hemolysis detected by analyzer. Results may be affected.        Chloride 104 mmol/L      CO2 31.0 mmol/L      Calcium 7.9 mg/dL      Total Protein 5.6 g/dL      Albumin 1.90 g/dL      ALT (SGPT) 23 U/L      AST (SGOT) 40 U/L      Alkaline Phosphatase 182 U/L      Total Bilirubin 0.3 mg/dL      Globulin 3.7 gm/dL      A/G Ratio 0.5 g/dL      BUN/Creatinine Ratio 45.7     Anion Gap 2.0 mmol/L      eGFR 94.9 mL/min/1.73      Comment: National Kidney Foundation and American Society of Nephrology (ASN) Task Force recommended calculation based on the Chronic Kidney Disease Epidemiology Collaboration (CKD-EPI) equation refit without adjustment for race.       Narrative:      GFR Normal >60  Chronic Kidney Disease <60  Kidney Failure <15      Blood Gas,  Arterial - [[id number]]  (Abnormal) Collected: 06/06/22 0420    Specimen: Arterial Blood Updated: 06/06/22 0415     Site Left Radial     Denzel's Test Positive     pH, Arterial 7.455 pH units      Comment: 83 Value above reference range        pCO2, Arterial 46.3 mm Hg      Comment: 83 Value above reference range        pO2, Arterial 74.6 mm Hg      Comment: 84 Value below reference range        HCO3, Arterial 32.5 mmol/L      Comment: 83 Value above reference range        Base Excess, Arterial 7.7 mmol/L      Comment: 83 Value above reference range        O2 Saturation, Arterial 95.9 %      Temperature 37.0 C      Barometric Pressure for Blood Gas 744 mmHg      Modality Ventilator     FIO2 30 %      Ventilator Mode AC     Set Tidal Volume 450     Set Mech Resp Rate 16.0     PEEP 5.0     Collected by [id number]     Comment: Meter: [id number]     :  [id number]        pCO2, Temperature Corrected 46.3 mm Hg      pH, Temp Corrected 7.455 pH Units      pO2, Temperature Corrected 74.6 mm Hg     POC Glucose Once [[id number]]  (Abnormal) Collected: 06/06/22 0006    Specimen: Blood Updated: 06/06/22 0017     Glucose 144 mg/dL      Comment: : [id number] Franco LaceyMeter ID: [id number]       POC Glucose Once [[id number]]  (Normal) Collected: 06/05/22 2000    Specimen: Blood Updated: 06/05/22 2012     Glucose 124 mg/dL      Comment: : [id number] Franoc LaceyMeter ID: [id number]       POC Glucose Once [[id number]]  (Normal) Collected: 06/05/22 1659    Specimen: Blood Updated: 06/05/22 1710     Glucose 120 mg/dL      Comment: : [id number] Jay PoderopediaenMeter ID: [id number]       POC Glucose Once [[id number]]  (Abnormal) Collected: 06/05/22 1137    Specimen: Blood Updated: 06/05/22 1148     Glucose 133 mg/dL      Comment: : [id number] GrapheneaenMeter ID: [id number]           Imaging Results (Last 24 Hours)     Procedure Component Value Units Date/Time    XR Chest 1 View [[id number]] Collected: 06/06/22 0721      Updated: 06/06/22 0726    Narrative:      HISTORY: Intubated     CXR: Frontal view the chest obtained     COMPARISON: 6/5/2022     FINDINGS: Tracheostomy tube remains in place. Right-sided  shunt.  Right upper extremity PICC line tip projects over the proximal SVC. The  two medial left pleural pigtail catheters remain in place. No  appreciable pneumothorax. Lungs are hypoventilated. Vascular crowding  versus mild venous congestion. Bibasilar opacities may be atelectasis  versus pneumonia. Scattered bilateral calcified granuloma. No acute  regional bony pathology.       Impression:      1. Similar positioning of the medial left pleural pigtail catheters. No  pneumothorax. Hypoventilated lungs with vascular crowding and basilar  atelectasis versus pneumonia. Trace pleural effusions.  This report was finalized on 06/06/2022 07:23 by Dr. Christel Feliciano MD.    XR Chest 1 View [[id number]] Collected: 06/05/22 1445     Updated: 06/05/22 1449    Narrative:      HISTORY: Chest tube removal     CXR: Frontal view the chest obtained     COMPARISON: 6/5/2022 at 3:14 AM     FINDINGS: The 2 large left thoracotomy tubes have been removed. The 2  medially positioned left posterior pleural pigtail catheters remain in  place. No measurable pneumothorax. Small left pleural fluid collection.  Hypoventilated lungs with bibasilar opacities-patchy atelectasis versus  consolidation. Scattered granuloma.     Tracheostomy tube remains appropriate in position. Right upper extremity  PICC line tip projects over the SVC.       Impression:      1. Removal of the two large left thoracotomy chest tubes with retention  of the medial left pleural pigtail catheters. No measurable  pneumothorax. Small left pleural fluid collection. Bibasilar opacities.  This report was finalized on 06/05/2022 14:46 by Dr. Christel Feliciano MD.          Labs reviewed.  Low albumin    Zinc is normal at 54  Prealbumin is 12.7 which is below normal range.    EEG on 6/5  shows slowing.  Mouth movements were captured and had no epileptic correlate.  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 Seizures  --No seizure activity currently.  Unclear whether last night's events represented seizure activity.  As he currently has no concerning rhythmic movements we will hold on doing an EEG.  If at any point today he began showing activity we may do a stat EEG.  2.  Phenytoin level currently 9.4  3. Hypoalbuminemia .  PEG tube in place.   4. Anemia  5. Atrial fibrillation  6.  Status post ventriculoperitoneal shunt on 6/3  7.  Metabolic encephalopathy likely secondary to prolonged propofol use which is now improving.  8.  Rhythmic mouth movements not epileptic.    Plan:  · Fosphenytoin 275 mg every 8 hours  · Continue daily Dilantin levels  · On Vimpat 200 mg IV every 12 hours  · Keppra 500 mg every 12 hours -Will increase Keppra to 1000 mg every 12 hours 6/5  · Repeat head CT today per neurosurgery      36 minutes of critical care time with adjustment of sedation, monitoring vital signs, neurologic exam.    Donnell Freire MD  06/06/22  08:56 CDT

## 2022-06-06 NOTE — PROGRESS NOTES
[facility] Medicine Services  INPATIENT PROGRESS NOTE    Length of Stay: 27  Date of Admission: 5/10/2022  Primary Care Physician: Elisa Chacko MD    Subjective   Chief Complaint: Respiratory failure/status post tracheotomy/status post chest tube    HPI   Patient went from for chest tube due to chest tube yesterday.  T-max 99.1.  T-current 99.1.  Blood pressure slightly high, slightly brachycardia.  FiO2 30%.    Review of Systems   Unable to assess secondary to the patient's intubated and sedated state.    All pertinent negatives and positives are as above. All other systems have been reviewed and are negative unless otherwise stated.     Objective    Temp:  [98.3 °F (36.8 °C)-99.1 °F (37.3 °C)] 99.1 °F (37.3 °C)  Heart Rate:  [52-81] 52  Resp:  [18-32] 31  BP: (103-174)/(49-88) 156/67  FiO2 (%):  [30 %] 30 %    Intake/Output Summary (Last 24 hours) at 6/6/2022 1212  Last data filed at 6/6/2022 0400  Gross per 24 hour   Intake 1727.36 ml   Output 83 ml   Net 1644.36 ml     Physical Exam  Vitals and nursing note reviewed.   HENT:      Head: Normocephalic.   Eyes:      Conjunctiva/sclera: Conjunctivae normal.      Pupils: Pupils are equal, round, and reactive to light.   Neck:      Vascular: No JVD.   Cardiovascular:      Rate and Rhythm: Normal rate and regular rhythm.      Heart sounds: Normal heart sounds.   Pulmonary:      Effort: No respiratory distress.      Breath sounds: No wheezing or rales.      Comments: Tracheotomy in place.  On the vent.  Diminished breath sound bilateral, clear.  Chest:      Chest wall: No tenderness.   Abdominal:      General: Bowel sounds are normal. There is no distension.      Palpations: Abdomen is soft.      Tenderness: There is no abdominal tenderness.      Comments: Obesity .  PEG tube in place.  Musculoskeletal:         General: No tenderness or deformity.      Cervical back: Neck supple.      Right lower leg: Edema present.      Left  lower leg: Edema present.      Comments: +1-2 .   Skin:     General: Skin is warm and dry.      Capillary Refill: Capillary refill takes 2 to 3 seconds.      Findings: No rash.   Neurological:      Mental Status: He is oriented to person, place, and time.      Cranial Nerves: No cranial nerve deficit.      Motor: Weakness present. No abnormal muscle tone.      Coordination: Coordination abnormal.      Gait: Gait abnormal.      Deep Tendon Reflexes: Reflexes normal.   Results Review:  Lab Results (last 24 hours)     Procedure Component Value Units Date/Time    POC Glucose Once [[id number]]  (Normal) Collected: 06/06/22 0504    Specimen: Blood Updated: 06/06/22 0515     Glucose 126 mg/dL      Comment: : [id number] Salvador LaceyMeter ID: [id number]       Phenytoin Level, Total [[id number]]  (Normal) Collected: 06/06/22 0347    Specimen: Blood Updated: 06/06/22 0449     Phenytoin Level 12.8 mcg/mL     Manual Differential [[id number]]  (Abnormal) Collected: 06/06/22 0347    Specimen: Blood Updated: 06/06/22 0424     Neutrophil % 77.3 %      Lymphocyte % 7.2 %      Monocyte % 8.2 %      Eosinophil % 2.1 %      Metamyelocyte % 2.1 %      Myelocyte % 3.1 %      Neutrophils Absolute 7.69 10*3/mm3      Lymphocytes Absolute 0.72 10*3/mm3      Monocytes Absolute 0.82 10*3/mm3      Eosinophils Absolute 0.21 10*3/mm3      Poikilocytes Mod/2+     Polychromasia Slight/1+     Spherocytes Slight/1+     WBC Morphology Normal     Platelet Morphology Normal    CBC & Differential [[id number]]  (Abnormal) Collected: 06/06/22 0347    Specimen: Blood Updated: 06/06/22 0424    Narrative:      The following orders were created for panel order CBC & Differential.  Procedure                               Abnormality         Status                     ---------                               -----------         ------                     CBC Auto Differential[[id number]]        Abnormal            Final result                 Please view results  for these tests on the individual orders.    CBC Auto Differential [[id number]]  (Abnormal) Collected: 06/06/22 0347    Specimen: Blood Updated: 06/06/22 0424     WBC 9.95 10*3/mm3      RBC 2.66 10*6/mm3      Hemoglobin 8.3 g/dL      Hematocrit 27.0 %      .5 fL      MCH 31.2 pg      MCHC 30.7 g/dL      RDW 14.6 %      RDW-SD 52.6 fl      MPV 9.2 fL      Platelets 274 10*3/mm3     Comprehensive Metabolic Panel [[id number]]  (Abnormal) Collected: 06/06/22 0347    Specimen: Blood Updated: 06/06/22 0422     Glucose 149 mg/dL      BUN 32 mg/dL      Creatinine 0.70 mg/dL      Sodium 137 mmol/L      Potassium 4.4 mmol/L      Comment: Slight hemolysis detected by analyzer. Results may be affected.        Chloride 104 mmol/L      CO2 31.0 mmol/L      Calcium 7.9 mg/dL      Total Protein 5.6 g/dL      Albumin 1.90 g/dL      ALT (SGPT) 23 U/L      AST (SGOT) 40 U/L      Alkaline Phosphatase 182 U/L      Total Bilirubin 0.3 mg/dL      Globulin 3.7 gm/dL      A/G Ratio 0.5 g/dL      BUN/Creatinine Ratio 45.7     Anion Gap 2.0 mmol/L      eGFR 94.9 mL/min/1.73      Comment: National Kidney Foundation and American Society of Nephrology (ASN) Task Force recommended calculation based on the Chronic Kidney Disease Epidemiology Collaboration (CKD-EPI) equation refit without adjustment for race.       Narrative:      GFR Normal >60  Chronic Kidney Disease <60  Kidney Failure <15      Blood Gas, Arterial - [[id number]]  (Abnormal) Collected: 06/06/22 0420    Specimen: Arterial Blood Updated: 06/06/22 0415     Site Left Radial     Denzel's Test Positive     pH, Arterial 7.455 pH units      Comment: 83 Value above reference range        pCO2, Arterial 46.3 mm Hg      Comment: 83 Value above reference range        pO2, Arterial 74.6 mm Hg      Comment: 84 Value below reference range        HCO3, Arterial 32.5 mmol/L      Comment: 83 Value above reference range        Base Excess, Arterial 7.7 mmol/L      Comment: 83 Value above  reference range        O2 Saturation, Arterial 95.9 %      Temperature 37.0 C      Barometric Pressure for Blood Gas 744 mmHg      Modality Ventilator     FIO2 30 %      Ventilator Mode AC     Set Tidal Volume 450     Set Mech Resp Rate 16.0     PEEP 5.0     Collected by [id number]     Comment: Meter: [id number]     :  [id number]        pCO2, Temperature Corrected 46.3 mm Hg      pH, Temp Corrected 7.455 pH Units      pO2, Temperature Corrected 74.6 mm Hg     POC Glucose Once [[id number]]  (Abnormal) Collected: 06/06/22 0006    Specimen: Blood Updated: 06/06/22 0017     Glucose 144 mg/dL      Comment: : [id number] Franco LaceyMeter ID: [id number]       POC Glucose Once [[id number]]  (Normal) Collected: 06/05/22 2000    Specimen: Blood Updated: 06/05/22 2012     Glucose 124 mg/dL      Comment: : [id number] Franco LaceyMeter ID: [id number]       POC Glucose Once [[id number]]  (Normal) Collected: 06/05/22 1659    Specimen: Blood Updated: 06/05/22 1710     Glucose 120 mg/dL      Comment: : [id number] Jay PonceenMeter ID: [id number]              Cultures:  No results found for: BLOODCX, URINECX, WOUNDCX, MRSACX, RESPCX, STOOLCX    Radiology Data:    Imaging Results (Last 24 Hours)     Procedure Component Value Units Date/Time    XR Chest 1 View [[id number]] Collected: 06/06/22 0721     Updated: 06/06/22 0726    Narrative:      HISTORY: Intubated     CXR: Frontal view the chest obtained     COMPARISON: 6/5/2022     FINDINGS: Tracheostomy tube remains in place. Right-sided  shunt.  Right upper extremity PICC line tip projects over the proximal SVC. The  two medial left pleural pigtail catheters remain in place. No  appreciable pneumothorax. Lungs are hypoventilated. Vascular crowding  versus mild venous congestion. Bibasilar opacities may be atelectasis  versus pneumonia. Scattered bilateral calcified granuloma. No acute  regional bony pathology.       Impression:      1. Similar positioning of the  medial left pleural pigtail catheters. No  pneumothorax. Hypoventilated lungs with vascular crowding and basilar  atelectasis versus pneumonia. Trace pleural effusions.  This report was finalized on 06/06/2022 07:23 by Dr. Christel Feliciano MD.    XR Chest 1 View [[id number]] Collected: 06/05/22 1445     Updated: 06/05/22 1449    Narrative:      HISTORY: Chest tube removal     CXR: Frontal view the chest obtained     COMPARISON: 6/5/2022 at 3:14 AM     FINDINGS: The 2 large left thoracotomy tubes have been removed. The 2  medially positioned left posterior pleural pigtail catheters remain in  place. No measurable pneumothorax. Small left pleural fluid collection.  Hypoventilated lungs with bibasilar opacities-patchy atelectasis versus  consolidation. Scattered granuloma.     Tracheostomy tube remains appropriate in position. Right upper extremity  PICC line tip projects over the SVC.       Impression:      1. Removal of the two large left thoracotomy chest tubes with retention  of the medial left pleural pigtail catheters. No measurable  pneumothorax. Small left pleural fluid collection. Bibasilar opacities.  This report was finalized on 06/05/2022 14:46 by Dr. Christel Feliciano MD.          No Known Allergies    Scheduled meds:   albuterol, 2.5 mg, Nebulization, Q8H - RT  carvedilol, 6.25 mg, Nasogastric, BID With Meals  chlorhexidine, 15 mL, Mouth/Throat, Q12H  fosphenytoin, 275 mg PE, Intravenous, Q8H  heparin (porcine), 5,000 Units, Subcutaneous, Q12H  insulin detemir, 20 Units, Subcutaneous, Nightly  insulin regular, 2-7 Units, Subcutaneous, Q6H  insulin regular, 8 Units, Subcutaneous, Q6H  lacosamide, 200 mg, Intravenous, Q12H  levETIRAcetam, 1,000 mg, Intravenous, Q12H  levothyroxine, 125 mcg, Nasogastric, Q AM  lidocaine, 10 mL, Intradermal, Once  liothyronine, 25 mcg, Nasogastric, Daily  lisinopril, 20 mg, Nasogastric, Q24H  mupirocin, 1 application, Topical, Q12H  Nutrisource fiber, 1 packet, Nasogastric, 4x  Daily  pantoprazole, 40 mg, Intravenous, Q AM  polyethylene glycol, 17 g, Oral, Daily  ProSource TF, 1 packet, Nasogastric, BID  ProSource TF, 45 mL, Per G Tube, BID  sodium chloride, 4 mL, Nebulization, BID - RT        PRN meds:  •  acetaminophen **OR** acetaminophen  •  alteplase  •  bisacodyl  •  butalbital-acetaminophen-caffeine  •  dextrose  •  dextrose  •  glucagon (human recombinant)  •  hold  •  hydrALAZINE  •  ipratropium-albuterol  •  labetalol  •  LORazepam  •  Morphine  •  ondansetron **OR** ondansetron  •  oxyCODONE    Assessment/Plan       Meningioma (HCC)    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 The patient was admitted on 5/10 by Dr. Downs with neurosurgery.  He has prior history of known meningioma that was causing compression and visual changes.  He presented for craniotomy.  Hospital medicine was originally consulted on 5/14.  The patient had developed status epilepticus postsurgically and required intubation/mechanical ventilation.      Respiratory failure/left pleural effusion. He required intubation on 5/14 for airway protection.  This was done by Dr. Gunderson.  Pulmonary has since been consulted. Continue to monitor for readiness for spontaneous breathing trials and extubation in the future. Again, he was intubated for airway protection given his status epilepticus.  Propofol drip . albuterol nebs.   DuoNebs as needed.  Morphine as needed.  Oxycodone as needed.  CT imaging of chest-  Left chest tubes remain in place with appropriate evacuation of the left pleural fluid, Only small pleural effusions seen on today's exam  with minimal air present in the posterior left pleural space, Bibasilar consolidation may represent atelectasis and/or pneumonia, scattered bilateral calcified granuloma, Vascular crowding and/or mild venous congestion, Stable cardiomegaly.  Status post thoracotomy tube placement 6/26/2022.   Status post tracheotomy left chest 6/2/2022.   Status post tPA through chest tube.  Patient went from 4 chest tube to 2 chest tubes to the left lung 6/5/2022.  Ventilator- assist-control, FiO2 30, tidal volume 450, rate 16, PEEP 5.     Hypotension.  Resolved.  Levophed drip off.     Seizure . consult neurology . IV Vimpat.  IV Keppra.  IV cerebyx.      Meningioma.  Decadron.  CT scan head without contrast showed postsurgical changes from right frontal craniotomy, Small residual 5 mm subdural hematoma hygroma, Residual hemorrhage and occipital Horn similar to prior exam, Previously described hemorrhage in the right frontal lobe is not present on his exam, Dilated ventricular system.  Status post craniotomy 5/10/2022. He has had a lumbar drain placed by neurosurgery.    Plans for shunt placement by neurosurgery.     Thrombocytopenia. His platelet count was 217,000 on 4/20/2022. This declined to a low of 72,000 on 5/17.  Peripheral smear on 5/17 showed no schistocytes with moderate peripheral thrombocytopenia.  PTT normal, PT/INR slightly elevated, fibrin split products high, and fibrinogen high.  Neurosurgery gave him a sixpack of platelets on 5/18.  Thrombocytopenia resolved.     Fever. He had run steady fever from the afternoon of 5/21 through 5/22.  Dr. Escobar was contacted and placed him on vancomycin and cefepime.  He still has a lumbar drain and CSF was sampled on the morning of 5/22.  ..  He received  vancomycin. Meropenem was started on 5/25 for 5 days.  Patient has finished vancomycin and meropenem antibiotics.  Fever resolved.     Hypertension/atrial fibrillation. He typically takes lisinopril. Resumed per tube at a lower dose than normal on 5/18. Titrated up on 5/19. Started carvedilol on 5/19. Titrated medications up to get off Cardene.  Unable to take anticoagulation.  Lisinopril.  Hydralazine as needed.  Echocardiogram- ejection fraction 70%-hyperdynamic, mild concentric hypertrophy, tricuspid regurgitation, right ventricle cavity moderately dilated, right atrial cavity dilated, no significant valvular pathology, atrial fibrillation rhythm.     Hypothyroidism. Free T4 and free T3 were very low as well. Started levothyroxine and liothyronine on 5/19. TRH pending since 5/19!!  I rechecked TSH, free T4, and free T3 on 5/29 to see where we are at.  TSH is now 3.950.  Free T4 has improve from 0.31 to 0.53.  Free T3 has improved from less than 0.40 to 1.60.  Synthroid . Cytomel .     Diabetes.  Hemoglobin A1c 8.2.  Sliding scale.   Levemir . regular insulin.     Anemia.  Hemoglobin stable.  No sign of acute bleed.     Reflux.  Protonix.  Zofran as needed.     Constipation.  MiraLAX.  Dulcolax suppository as needed.     Headaches.  Fioricet as needed.     Obesity.  BMI 31.     SCD for DVT prophylaxis.  Heparin prophylaxis.     Nutrition.  PEG tube placement 6/2/2022.     Blood culture- no growth 5 days.  Respiratory culture- Light growth (2+) Normal respiratory annia. No S. aureus or Pseudomonas aeruginosa detected. Final report.   Urine culture>100,000 CFU/mL Klebsiella aerogenes Abnormal   AFB culture-pending.  Anaerobic culture-negative.  Body fluid culture-negative.  Fungal culture pending.  MRSA DNA probe-negative.  COVID-19-negative.     Discharge Planning: [facility]?    Electronically signed by Aden Kc MD, 06/06/22, 12:12 PM CDT.

## 2022-06-06 NOTE — CASE MANAGEMENT/SOCIAL WORK
Continued Stay Note   [place]     Patient Name: Hai Hernandez  MRN: [id number]  Today's Date: 6/6/2022    Admit Date: 5/10/2022     Discharge Plan     Row Name 06/06/22 1056       Plan    Plan [facility] following    Plan Comments [facility] is following.  [facility] may possibly accept patient when clinically appropriate.               Discharge Codes    No documentation.               Expected Discharge Date and Time     Expected Discharge Date Expected Discharge Time    Satinder 10, 2022             JOSE Cruz

## 2022-06-06 NOTE — PROGRESS NOTES
[facility] Otolaryngology Head and Neck Surgery  INPATIENT PROGRESS NOTE    Patient Name: Hai Hernandez  : 1945   MRN: [id number]  Date of Admission: 5/10/2022  Today's Date: 22  Length of Stay: 27  I010/1    Martell Downs, *   Primary Care Physician: Elisa Chacko MD  Surgical Procedures Since Admission:  Procedure(s):  CRANIOTOMY FOR TUMOR STERIOTACTIC WITH BRAIN LAB, right  Surgeon:  Martell Downs MD  Status:  27 Days Post-Op  -------------------    Procedure(s):  TRACHEOSTOMY  ESOPHAGOGASTRODUODENOSCOPY WITH PERCUTANEOUS ENDOSCOPIC GASTROSTOMY TUBE INSERTION WITH ANESTHESIA  Surgeon:  Geovany Davidson MD  Status:  4 Days Post-Op  -------------------    Procedure(s):  VENTRICULAR PERITONEAL SHUNT INSERTION WITH BRAIN LAB  Surgeon:  Martell Downs MD  Status:  3 Days Post-Op  -------------------    Procedure(s):  ESOPHAGOGASTRODUODENOSCOPY WITH PERCUTANEOUS ENDOSCOPIC GASTROSTOMY TUBE INSERTION AT BEDSIDE W DELANOS  Surgeon:  Melecio Lu MD  Status:  * No surgery date entered *  -------------------    Subjective   Subjective   Chief Complaint: Trach management  HPI   Accompanied by: Dr. Downs  Since last examined, Hai Hernandez has remained on mechanical ventilation, trach in position.  He does not respond to light stimulation this morning.  Nursing staff reports trach is stable.  Patient seen, chart reviewed    Review of Systems   No change from prior inquiry  All pertinent negatives and positives are as above. All other systems have been reviewed and are negative unless otherwise stated.   Objective   Objective   Vitals:   Temp:  [98.2 °F (36.8 °C)-99.1 °F (37.3 °C)] 99.1 °F (37.3 °C)  Heart Rate:  [48-81] 68  Resp:  [18-32] 28  BP: (103-174)/(49-88) 146/69  FiO2 (%):  [30 %] 30 %  Output by Drain (mL) 22 0701 - 22 1900 22 1901 - 22 0700 22 0701 - 22 0804 Range Total   Chest Tube Posterior  Midaxillary 35 38  73   Chest Tube Posterior Midaxillary 34 30  64   Continuous Bladder Irrigation Triple-lumen 20 Fr           Physical Exam  Constitutional:       General: He is not in acute distress.     Appearance: He is well-developed. He is obese. He is ill-appearing.      Interventions: He is sedated and intubated.      Comments: Lying in bed, mechanical ventilation, trach in position  Does not respond to my voice   HENT:      Head: Normocephalic.        Comments: Right-sided shunt     Right Ear: External ear normal.      Left Ear: External ear normal.      Nose: Nose normal.      Mouth/Throat:      Lips: Pink.      Mouth: Mucous membranes are dry.      Dentition: Normal dentition. No gum lesions.      Tongue: No lesions.      Comments: Macroglossia-moderate  Prolapse-moderate  Eyes:      General: Lids are normal.      Conjunctiva/sclera: Conjunctivae normal.   Neck:      Comments: Neck-increased girth    TRACHEOSTOMY SITE:    Tracheostomy tube type: Shiley #8 cuffed DIC Legacy trach    Stoma: Healing appropriately    Secretions: Mild crusting    Voice: Not evaluated  Date placed: 6/2/2022  Date last changed: Never    Pulmonary:      Effort: No tachypnea, prolonged expiration or respiratory distress. He is intubated.   Neurological:      Mental Status: He is lethargic.      Comments: Not responding to my questions   Psychiatric:      Comments: Not evaluated           Result Review    Result Review:  I have personally reviewed the results from the time of this admission to 6/6/2022 08:04 CDT and agree with these findings:  [x]  Laboratory  []  Microbiology  []  Radiology  []  EKG/Telemetry   []  Cardiology/Vascular   []  Pathology  [x]  Old records  []  Other:  Most notable findings include: Alkalosis, hypercapnic, hypoxic, elevated glucose, anemia  Progress Notes by Donnell Freire MD (06/05/2022 08:50)   Progress Notes by Aden Kc MD (06/05/2022 09:34)   Progress Notes by Murphy Kothari MD  (06/05/2022 09:03)   Progress Notes by Pilo Ortega MD (06/05/2022 15:48)   Progress Notes by Vidya Hendrickson APRN (06/06/2022 07:47)   CBC & Differential (06/06/2022 03:47)   Comprehensive Metabolic Panel (06/06/2022 03:47)   Blood Gas, Arterial - (06/06/2022 04:20)       Assessment & Plan   Assessment / Plan   Brief Patient Summary:  Hai Hernandez is a 77 y.o. male who has remained on mechanical ventilation, trach in position.  He has a stable trach in position.  I will continue current trach management.    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  I will continue current trach care.  • Respiratory failure-patient remains on mechanical ventilation.  He is followed by the pulmonary service.  Discussed Plan with Dr. Downs  Following patient as in-patient. Further recommendations will be made based on serial examinations.    Medications/Orders for this encounter: No new medications ordered.  No New orders written.    Discharge Planning: Per primary team        DVT prophylaxis:  Medical and mechanical DVT prophylaxis orders are  present.     Electronically signed by Leodan Hathaway Jr, MD, 06/06/22, 8:04 AM CDT.

## 2022-06-06 NOTE — PROGRESS NOTES
PULMONARY AND CRITICAL CARE PROGRESS NOTE - [facility]    Patient: Hai Hernandez    1945    MR# [id number]    Acct# [id number]  06/06/22   08:59 CDT  Referring Provider: Martell Downs, *    Chief Complaint: Mechanically ventilated    Interval history: The patient remains intubated with trach to vent.  No sedation is infusing.  He appears vent dyssynchronous in AC/VC mode.  He was changed to AC/PC mode with better vent synchrony noted.  Tidal volume adequate 400-500s.  2 large bore CT remain.  Plans for CT head today.  No other aggravating or alleviating factors.           Meds:  albuterol, 2.5 mg, Nebulization, Q8H - RT  carvedilol, 6.25 mg, Nasogastric, BID With Meals  chlorhexidine, 15 mL, Mouth/Throat, Q12H  fosphenytoin, 275 mg PE, Intravenous, Q8H  heparin (porcine), 5,000 Units, Subcutaneous, Q12H  insulin detemir, 20 Units, Subcutaneous, Nightly  insulin regular, 2-7 Units, Subcutaneous, Q6H  insulin regular, 8 Units, Subcutaneous, Q6H  lacosamide, 200 mg, Intravenous, Q12H  levETIRAcetam, 1,000 mg, Intravenous, Q12H  levothyroxine, 125 mcg, Nasogastric, Q AM  lidocaine, 10 mL, Intradermal, Once  liothyronine, 25 mcg, Nasogastric, Daily  lisinopril, 20 mg, Nasogastric, Q24H  mupirocin, 1 application, Topical, Q12H  Nutrisource fiber, 1 packet, Nasogastric, 4x Daily  pantoprazole, 40 mg, Intravenous, Q AM  polyethylene glycol, 17 g, Oral, Daily  ProSource TF, 1 packet, Nasogastric, BID  ProSource TF, 45 mL, Per G Tube, BID  sodium chloride, 4 mL, Nebulization, BID - RT      hold, 1 each  norepinephrine, 0.02-0.3 mcg/kg/min, Last Rate: Stopped (06/05/22 0835)  propofol, 5-50 mcg/kg/min, Last Rate: Stopped (06/05/22 0815)      Review of Systems:   Cannot obtain due to mechanical ventilated state     Ventilator Settings:        Resp Rate (Set): 16  Pressure Support (cm H2O): 8 cm H20  FiO2 (%): 30 %  PEEP/CPAP (cm H2O): 5 cm H20  Minute Ventilation (L/min) (Obs): 14.6 L/min  Resp  Rate (Observed) Vent: 31  I:E Ratio (Set): 1:0.32  I:E Ratio (Obs): 1:1.50  PIP Observed (cm H2O): 16 cm H2O  RSBI: 21.96  Physical Exam:  Temp:  [98.2 °F (36.8 °C)-99.1 °F (37.3 °C)] 99.1 °F (37.3 °C)  Heart Rate:  [48-81] 68  Resp:  [18-32] 28  BP: (103-174)/(49-88) 146/69  FiO2 (%):  [30 %] 30 %    Intake/Output Summary (Last 24 hours) at 6/6/2022 0859  Last data filed at 6/6/2022 0400  Gross per 24 hour   Intake 2197.36 ml   Output 128 ml   Net 2069.36 ml     SpO2 Percentage    06/06/22 0630 06/06/22 0635 06/06/22 0711   SpO2: 95% 95% 94%   Body mass index is 34.98 kg/m².   Physical Exam   Constitutional:       General: He is intubated and sedated     Appearance: He is ill-appearing. He is not toxic-appearing.      Interventions: He is sedated and intubated.   HENT:      Head: Normocephalic.      Comments: Craniotomy wound, trach     Nose: Nose normal.   Eyes:      General: No scleral icterus.  Cardiovascular: normal rate  Pulmonary:      Effort: No accessory muscle usage or respiratory distress. He is intubated.      Breath sounds: Decreased breath sounds in bases   Chest:      Chest wall: No edema.      Comments: Left sided chest tubes x2   Abdominal:      General: There is no distension.      Palpations: Abdomen is soft.   Genitourinary:     Comments: Mccrary  Musculoskeletal:      Right lower leg: Edema present.      Left lower leg: Edema present.      Comments: SCDs   Skin:     Findings: No rash.   Neurological:      Motor: Will open eyes, but not following any commands with sedation paused.  Psychiatric:         Behavior: Behavior is not agitated.   Electronically signed by MARIO Aldrich, 6/6/2022, 08:59 CDT      Physician Substantive Portion: Medical Decision Making    Laboratory Data:  Results from last 7 days   Lab Units 06/06/22  0347 06/05/22  0406 06/04/22  0436   WBC 10*3/mm3 9.95 10.19 7.93   HEMOGLOBIN g/dL 8.3* 8.3* 8.0*   PLATELETS 10*3/mm3 274 326 300     Results from last 7 days    Lab Units 06/06/22  0347 06/05/22  0406 06/04/22  0436 06/01/22  0139 05/31/22  1755   SODIUM mmol/L 137 136 137   < >  --    POTASSIUM mmol/L 4.4 4.3 4.7   < >  --    BUN mg/dL 32* 27* 21   < >  --    CREATININE mg/dL 0.70* 0.78 0.50*   < >  --    INR   --   --   --   --  1.10*    < > = values in this interval not displayed.     Results from last 7 days   Lab Units 06/06/22  0420 06/05/22  0441 06/04/22  2141   PH, ARTERIAL pH units 7.455* 7.415 7.432   PCO2, ARTERIAL mm Hg 46.3* 48.7* 47.7*   PO2 ART mm Hg 74.6* 87.6 84.8   FIO2 % 30 30 30     No results found for: BLOODCX, URINECX, WOUNDCX, MRSACX, RESPCX, STOOLCX  Recent films:  CT Chest Without Contrast Diagnostic    Result Date: 6/5/2022  CT CHEST WO CONTRAST DIAGNOSTIC- 6/5/2022 5:50 AM CDT  HISTORY: pleural effusion; R13.10-Dysphagia, unspecified; Z01.818-Encounter for other preprocedural examination; D32.9-Benign neoplasm of meninges, unspecified; Z74.09-Other reduced mobility; Z78.9-Other specified health status; G40.901-Epilepsy, unspecified, not intractable, with status epilepticus; J96.01-Acute respiratory failure with hypoxia; 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 Automated exposure control was also utilized to decrease patient radiation dose.  TECHNIQUE: Axial images of the chest are obtained without IV contrast  FINDINGS:  The tracheostomy tube is in place with tip position above the hernandez. The 2 small posterior apical pleural catheter is remain in place with appropriate drainage of the posterior medial pleural fluid collections compared to the prior study. The 2 larger left-sided thoracotomy tubes remain in place. Only small pleural effusions are seen on the current exam. There is stable cardiomegaly. Thoracic aorta normal in caliber. No pericardial effusion. Calcified mediastinal and bilateral hilar lymph nodes with no pathologic lymphadenopathy.  A right upper extremity PICC line is in place with  its tip in the proximal SVC. A right sided peritoneal shunt identified. Minimal subcutaneous emphysema within the right upper abdominal wall. Images of the upper abdomen are degraded by the artifact from overlying lines and EKG leads. Questionable biliary sludge with no biliary dilatation. Adrenal glands are not imaged in their entirety.  Scattered calcified granulomas seen bilaterally. Vascular crowding. Bilateral lower lobe consolidation may represent cysts atelectasis or pneumonia. Only trace air seen within the left posterior pleural space.  Moderate degenerative change thoracic spine. No focal destructive osseous lesions.      Impression: 1. Left chest tubes remain in place with appropriate evacuation of the left pleural fluid. Only small pleural effusions seen on today's exam with minimal air present in the posterior left pleural space. 2. Bibasilar consolidation may represent atelectasis and/or pneumonia. Scattered bilateral calcified granuloma. Vascular crowding and/or mild venous congestion. Stable cardiomegaly. This report was finalized on 06/05/2022 07:12 by Dr. Christel Feliciano MD.    XR Chest 1 View    Result Date: 6/6/2022  HISTORY: Intubated  CXR: Frontal view the chest obtained  COMPARISON: 6/5/2022  FINDINGS: Tracheostomy tube remains in place. Right-sided  shunt. Right upper extremity PICC line tip projects over the proximal SVC. The two medial left pleural pigtail catheters remain in place. No appreciable pneumothorax. Lungs are hypoventilated. Vascular crowding versus mild venous congestion. Bibasilar opacities may be atelectasis versus pneumonia. Scattered bilateral calcified granuloma. No acute regional bony pathology.      Impression: 1. Similar positioning of the medial left pleural pigtail catheters. No pneumothorax. Hypoventilated lungs with vascular crowding and basilar atelectasis versus pneumonia. Trace pleural effusions. This report was finalized on 06/06/2022 07:23 by Dr. Kearney  MD Braden.    XR Chest 1 View    Result Date: 6/5/2022  HISTORY: Chest tube removal  CXR: Frontal view the chest obtained  COMPARISON: 6/5/2022 at 3:14 AM  FINDINGS: The 2 large left thoracotomy tubes have been removed. The 2 medially positioned left posterior pleural pigtail catheters remain in place. No measurable pneumothorax. Small left pleural fluid collection. Hypoventilated lungs with bibasilar opacities-patchy atelectasis versus consolidation. Scattered granuloma.  Tracheostomy tube remains appropriate in position. Right upper extremity PICC line tip projects over the SVC.      Impression: 1. Removal of the two large left thoracotomy chest tubes with retention of the medial left pleural pigtail catheters. No measurable pneumothorax. Small left pleural fluid collection. Bibasilar opacities. This report was finalized on 06/05/2022 14:46 by Dr. Christel Feliciano MD.    XR Chest 1 View    Result Date: 6/5/2022  HISTORY: Intubated  CXR: Frontal view the chest obtained  COMPARISON: 6/4/2022 at 8:47 PM  FINDINGS: Tracheostomy tube appropriate in position. Right-sided  shunt. Right upper extremity PICC line tip projects over the SVC. Multiple left-sided pleural catheters are stable in position. Hypoventilated lungs with prominent pulmonary vascular structures. There are small pleural effusions. No appreciable pneumothorax. Scattered bilateral calcified granuloma. Cardiomegaly.      Impression: 1. Hypoventilated lungs. Vascular crowding with likely pulmonary venous congestion/mild edema. Multiple left-sided chest tubes similar in position with no appreciable pneumothorax. Very small pleural effusions suspected. Scattered calcified granuloma. Appropriate positioning of tracheostomy tube. This report was finalized on 06/05/2022 07:53 by Dr. Christel Feliciano MD.    XR Chest 1 View    Result Date: 6/4/2022  EXAMINATION: Chest 1 view 6/4/2022  HISTORY: Respiratory decline.  FINDINGS: Upright frontal projection of chest is  compared to prior exam of earlier the same day. 2 small caliber pigtail catheters as well as 2 thoracostomy tubes are in place on the left. There is no pneumothorax. Lungs are hypoventilated with bibasilar atelectasis. There is improved aeration when compared to the previous exam. A PICC line and tracheostomy tube remain in place.      Impression: . 1. No change in the left-sided thoracostomy tubes. There is no appreciable pneumothorax. 2. Improved aeration of the lungs but with persistent bilateral lower lobe atelectasis. This report was finalized on 06/04/2022 21:04 by Dr. Guillaume Bey MD.    My radiograph interpretation/independent review of imaging: Reviewed and agree with current interpretation    Pulmonary Assessment:    1. Acute hypoxic respiratory failure  2. On mechanically assisted ventilation  3. Tracheostomy and PEG tube placement done for long-term care  4. Status postcraniotomy for meningioma  5. Encephalopathy status epilepticus  6. Loculated left-sided pleural effusion with multiple chest tubes CT surgery managing  7. Hypertension  8. Type 2 diabetes mellitus  9. S/p ventriculoperitoneal shunt placement  10. Obesity    Recommend/plan:   · Patient  stable on ventilator but not much improved from neurologic standpoint.  He is off sedation and is not waking up.    · He is currently back on pressure control ventilation.. Currently he is on 5 of PEEP and 30% FiO2.  Current ventilator settings.  · Not ready for weaning.  He had a CT scan of the chest done 2 chest tubes pulled out to still remains in place.    · Patient will be referred to the LTAC and will need a longer hospital stay.  · Continue  bronchodilator treatment and routine respiratory care.  ·  shunt done.  Neurosurgery following.   No change in neurologic status.  · Placement of chest tubes per CT surgery  · Continue nutritional support with tube feeding.  Craniotomy wound healing well.  Wound care per protocol.  · Neurology following for  status epilepticus continue antiseizure medications  · Blood pressure and glycemic control. Pain and anxiety control and DVT and stress ulcer prophylaxis.  · Repeat labs and imaging studies from time to time.  Nutritional support with tube feeding  · CODE STATUS: Full.  Overall prognosis: Guarded.  · We will follow.  ·   This visit was performed by both a physician and an [facility] RN.  I personally evaluated and examined the patient.  I performed all aspects of the medical decision making as documented.    Electronically signed by     Murphy Kothari MD,  Pulmonologist/Intensivist   6/6/2022, 12:35 CDT

## 2022-06-06 NOTE — PROGRESS NOTES
[facility]  INPATIENT WOUND CARE    PROGRESS NOTE    Today's Date: 06/06/22    Patient Name: Hai Hernandez  MRN: [id number]  CSN: [id number]  PCP: Elisa Chacko MD  Referring Provider: MARTELL DOWNS  Attending Provider: Martell Downs, *  Length of Stay: 27    SUBJECTIVE   Chief Complaint: Pressure injury of sacral area and forehead    HPI: Hai Hernandez continues care in ICU room 10.  Patient remains on ventilator to tracheostomy.  Patient is resting on [employer] with comfort glide repositioning sheet and wedges in place.  Patient was turned over right side.  ANABELLE Spears reports concern for pressure injury of back related to pigtail drains.  She has placed gauze to pad due to skin contact areas.  According to chart review, Dr. Ortega has noted removal of pigtail drains tomorrow if output is less than 100.  Large bore chest tubes have been removed.      Visit Dx:    ICD-10-CM ICD-9-CM   1. Dysphagia, unspecified type  R13.10 787.20   2. Preop testing  Z01.818 V72.84   3. Meningioma (HCC)  D32.9 225.2   4. Impaired mobility  Z74.09 799.89   5. Decreased activities of daily living (ADL)  Z78.9 V49.89   6. Status epilepticus (HCC)  G40.901 345.3   7. Acute respiratory failure with hypoxia (HCC)  J96.01 518.81   8. 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)   • Status epilepticus (HCC)   • Essential hypertension   • Type 2 diabetes mellitus with hyperglycemia, without long-term current use of insulin (HCC)   • Hypothyroidism (acquired)   • Acute respiratory failure (secondary to status epilepticus)   • Thrombocytopenia (HCC)   • Cerebral parenchymal hemorrhage (HCC)   • PAF (paroxysmal atrial fibrillation) (HCC)   • Fever   • Loculated pleural effusion   • Acute deep vein  thrombosis (DVT) of the ulnar and brachial veins of right upper extremity (HCC)   • Dysphagia   • Communicating hydrocephalus (HCC)       History:   Past Medical History:   Diagnosis Date   • Brain tumor (HCC)    • Depression    • Hearing loss    • History of cellulitis     right foot big toe   • Hypertension    • Pneumonia    • Right arm weakness     after cervial injury   • Sciatic pain     affects balance   • Thyroid disease    • Visual disturbance     due to brain tumor - right eye     Past Surgical History:   Procedure Laterality Date   • CATARACT EXTRACTION, BILATERAL     • COLONOSCOPY     • CRANIOTOMY FOR TUMOR Right 5/10/2022    Procedure: CRANIOTOMY FOR TUMOR STERIOTACTIC WITH BRAIN LAB, right;  Surgeon: Martell Downs MD;  Location:  [facility] OR;  Service: Neurosurgery;  Laterality: Right;   • ENDOSCOPY W/ PEG TUBE PLACEMENT N/A 6/2/2022    Procedure: ESOPHAGOGASTRODUODENOSCOPY WITH PERCUTANEOUS ENDOSCOPIC GASTROSTOMY TUBE INSERTION WITH ANESTHESIA;  Surgeon: Melecio Lu MD;  Location:  [facility] OR;  Service: Gastroenterology;  Laterality: N/A;   • NECK SURGERY     • SKIN CANCER EXCISION     • TONSILLECTOMY     • TRACHEOSTOMY N/A 6/2/2022    Procedure: TRACHEOSTOMY;  Surgeon: Geovany Davidson MD;  Location:  [facility] OR;  Service: ENT;  Laterality: N/A;   •  SHUNT INSERTION Right 6/3/2022    Procedure: VENTRICULAR PERITONEAL SHUNT INSERTION WITH BRAIN LAB;  Surgeon: Martell Downs MD;  Location:  [facility];  Service: Neurosurgery;  Laterality: Right;     Social History     Socioeconomic History   • Marital status:    Tobacco Use   • Smoking status: Never Smoker   • Smokeless tobacco: Never Used   Vaping Use   • Vaping Use: Never used   Substance and Sexual Activity   • Alcohol use: Never   • Drug use: Never   • Sexual activity: Defer       Allergies:  No Known Allergies    Medications:    Current Facility-Administered Medications:   •  acetaminophen (TYLENOL) tablet 650 mg, 650  mg, Oral, Q4H PRN, 650 mg at 06/04/22 2029 **OR** acetaminophen (TYLENOL) suppository 650 mg, 650 mg, Rectal, Q4H PRN, RustStevie, APRN, 650 mg at 05/23/22 0016  •  albuterol (PROVENTIL) nebulizer solution 0.083% 2.5 mg/3mL, 2.5 mg, Nebulization, Q8H - RT, Stevie Parsons APRN, 2.5 mg at 06/06/22 0630  •  alteplase (CATHFLO/ACTIVASE) injection 2 mg, 2 mg, Intracatheter, PRN, RustStevie APRN, New Syringe/Cartridge at 05/28/22 1330  •  bisacodyl (DULCOLAX) suppository 10 mg, 10 mg, Rectal, Daily PRN, Rust, Stevie CAMPOVERDE, APRN, 10 mg at 06/03/22 0749  •  butalbital-acetaminophen-caffeine (FIORICET, ESGIC) -40 MG per tablet 1 tablet, 1 tablet, Oral, Q4H PRN, RusStevie alexander APRN, 1 tablet at 05/22/22 1729  •  carvedilol (COREG) tablet 6.25 mg, 6.25 mg, Nasogastric, BID With Meals, RustStevie APRN, 6.25 mg at 06/06/22 0827  •  chlorhexidine (PERIDEX) 0.12 % solution 15 mL, 15 mL, Mouth/Throat, Q12H, RusStevie alexander APRN, 15 mL at 06/06/22 0827  •  dextrose (D50W) (25 g/50 mL) IV injection 25 g, 25 g, Intravenous, Q15 Min PRN, Stevie Parsons APRN, 25 g at 06/02/22 1759  •  dextrose (GLUTOSE) oral gel 15 g, 15 g, Oral, Q15 Min PRN, RustStevie APRN, 15 g at 05/22/22 0527  •  fosphenytoin (Cerebyx) 275 mg PE in sodium chloride 0.9 % 100 mL IVPB, 275 mg PE, Intravenous, Q8H, RustStevie APRN, 275 mg PE at 06/06/22 0501  •  glucagon (human recombinant) (GLUCAGEN DIAGNOSTIC) injection 1 mg, 1 mg, Intramuscular, Q15 Min PRN, Stevie Parsons APRN  •  heparin (porcine) 5000 UNIT/ML injection 5,000 Units, 5,000 Units, Subcutaneous, Q12H, Stevie Parsons APRN, 5,000 Units at 06/06/22 0827  •  Hold Medication (Anticoagulation), 1 each, Does not apply, Continuous PRN, Stevie Parsons APRN  •  hydrALAZINE (APRESOLINE) injection 10 mg, 10 mg, Intravenous, Q6H PRN, Stevie Parsons APRN, 10 mg at 06/04/22 1107  •  insulin detemir (LEVEMIR) injection 20 Units, 20 Units, Subcutaneous, Nightly, Stevie Parsons APRN, 20 Units at  06/05/22 2003  •  insulin regular (humuLIN R,novoLIN R) injection 2-7 Units, 2-7 Units, Subcutaneous, Q6H, Stevie Parsons APRN, 2 Units at 06/05/22 0615  •  insulin regular (humuLIN R,novoLIN R) injection 8 Units, 8 Units, Subcutaneous, Q6H, RusStevie alexander APRN, 8 Units at 06/06/22 0518  •  ipratropium-albuterol (DUO-NEB) nebulizer solution 3 mL, 3 mL, Nebulization, Q4H PRN, Stevie Parsons APRN, 3 mL at 05/25/22 1007  •  labetalol (NORMODYNE,TRANDATE) injection 10 mg, 10 mg, Intravenous, Q6H PRN, Stevie Parsons APRN, 10 mg at 06/03/22 1310  •  lacosamide (VIMPAT) injection 200 mg, 200 mg, Intravenous, Q12H, Stevie Parsons APRN, 200 mg at 06/06/22 0826  •  levETIRAcetam in NaCl 0.75% (KEPPRA) IVPB 1,000 mg, 1,000 mg, Intravenous, Q12H, Di Ward APRN, 1,000 mg at 06/06/22 0826  •  levothyroxine (SYNTHROID, LEVOTHROID) tablet 125 mcg, 125 mcg, Nasogastric, Q AM, Stevie Parsons APRN, 125 mcg at 06/06/22 0501  •  lidocaine (XYLOCAINE) 1 % injection 10 mL, 10 mL, Intradermal, Once, Stevie Parsons APRN  •  liothyronine (CYTOMEL) tablet 25 mcg, 25 mcg, Nasogastric, Daily, Stevie Parsons APRN, 25 mcg at 06/06/22 0827  •  lisinopril (PRINIVIL,ZESTRIL) tablet 20 mg, 20 mg, Nasogastric, Q24H, Stevie Parsons APRN, 20 mg at 06/06/22 0827  •  LORazepam (ATIVAN) injection 2 mg, 2 mg, Intravenous, Q2H PRN, Donnell Freire MD, 2 mg at 06/04/22 2038  •  Morphine sulfate (PF) injection 2 mg, 2 mg, Intravenous, Q2H PRN, Stevie Parsons APRN, 2 mg at 06/06/22 0500  •  mupirocin (BACTROBAN) 2 % ointment 1 application, 1 application, Topical, Q12H, Stevie Parsons APRN, 1 application at 06/06/22 0828  •  norepinephrine (LEVOPHED) 8 mg in 250 mL NS infusion (premix), 0.02-0.3 mcg/kg/min, Intravenous, Titrated, Stevie Parsons APRN, Stopped at 06/05/22 0835  •  Nutrisource fiber pack 1 packet, 1 packet, Nasogastric, 4x Daily, Stevie Parsons APRN, 1 packet at 06/05/22 2002  •  ondansetron (ZOFRAN) tablet 4 mg, 4 mg, Oral, Q6H PRN  **OR** ondansetron (ZOFRAN) injection 4 mg, 4 mg, Intravenous, Q6H PRN, RustStevie APRN  •  oxyCODONE (ROXICODONE) 5 MG/5ML solution 7.5 mg, 7.5 mg, Nasogastric, Q4H PRN, Rust, Stevie CAMPOVERDE, APRN, 7.5 mg at 06/05/22 1208  •  pantoprazole (PROTONIX) injection 40 mg, 40 mg, Intravenous, Q AM, RustStevie APRN, 40 mg at 06/06/22 0501  •  polyethylene glycol (MIRALAX) packet 17 g, 17 g, Oral, Daily, RustStevie APRN, 17 g at 06/06/22 0827  •  propofol (DIPRIVAN) infusion 10 mg/mL 100 mL, 5-50 mcg/kg/min, Intravenous, Titrated, Rust, Stevie CAMPOVERDE, APRN, Stopped at 06/05/22 0815  •  ProSource TF oral liquid 45 mL, 1 packet, Nasogastric, BID, RusStevie alexander APRN, 45 mL at 06/04/22 2029  •  ProSource TF oral liquid 45 mL, 45 mL, Per G Tube, BID, Aden Kc MD, 45 mL at 06/06/22 0828  •  sodium chloride 3 % nebulizer solution 4 mL, 4 mL, Nebulization, BID - RT, Stevie Parsons APRN, 4 mL at 06/06/22 0630    Review of Systems:  Review of Systems   Unable to perform ROS: Patient unresponsive         OBJECTIVE     Vitals:    06/06/22 0711   BP:    Pulse: 68   Resp:    Temp:    SpO2: 94%       PHYSICAL EXAM  Physical Exam  Vitals and nursing note reviewed.   Constitutional:       General: He is awake.      Appearance: He is obese.      Comments: Body mass index is 34.98 kg/m².    HENT:      Head: Normocephalic.      Comments: Healing pressure injuries from EEG probes.  These are unstageable and has been bases are covered with scab.  No drainage present.  No erythema of the periwound area.  Continuously improving.  Eyes:      General: Lids are normal.      Comments: Patient's eyes are open but he is not tracking.   Neck:      Trachea: Tracheostomy present.      Comments: Tracheostomy to ventilator  Cardiovascular:      Rate and Rhythm: Normal rate and regular rhythm.   Pulmonary:      Effort: Pulmonary effort is normal. No respiratory distress.   Abdominal:      General: There is no distension.      Palpations: Abdomen is  soft.      Comments: PEG tube present   Genitourinary:     Comments: Triple-lumen continuous bladder irrigation present  Musculoskeletal:      Cervical back: No edema.   Skin:     General: Skin is warm and dry.      Findings: Wound present.      Comments: Stage 3 pressure injury of sacrum-healing stage 3 shows epithelialization to a large portion of wound leaving only an area that measures 0.4cm x 0.3cm. Non-blanchable erythema has expanded reaching 3.2cm x 3.2cm with a small open area proximal to wound that is open measuring 0.5cm x 0.4cm.  This is superficial.    Patient does have areas of left lateral thoracic spine that is erythemic related to pigtail drains.  Will monitor with removal of pressure to area to assess if area is revascularized or if this has caused a pressure injury.     Neurological:      Mental Status: He is unresponsive.      Comments: Eyes open with no response to stimuli       Picture taken during assessment                 Results Review:  Lab Results (last 48 hours)     Procedure Component Value Units Date/Time    POC Glucose Once [[id number]]  (Normal) Collected: 06/06/22 0504    Specimen: Blood Updated: 06/06/22 0515     Glucose 126 mg/dL      Comment: : [id number] Salvador LaceyMeter ID: [id number]       Phenytoin Level, Total [[id number]]  (Normal) Collected: 06/06/22 0347    Specimen: Blood Updated: 06/06/22 0449     Phenytoin Level 12.8 mcg/mL     Manual Differential [[id number]]  (Abnormal) Collected: 06/06/22 0347    Specimen: Blood Updated: 06/06/22 0424     Neutrophil % 77.3 %      Lymphocyte % 7.2 %      Monocyte % 8.2 %      Eosinophil % 2.1 %      Metamyelocyte % 2.1 %      Myelocyte % 3.1 %      Neutrophils Absolute 7.69 10*3/mm3      Lymphocytes Absolute 0.72 10*3/mm3      Monocytes Absolute 0.82 10*3/mm3      Eosinophils Absolute 0.21 10*3/mm3      Poikilocytes Mod/2+     Polychromasia Slight/1+     Spherocytes Slight/1+     WBC Morphology Normal     Platelet Morphology  Normal    CBC & Differential [[id number]]  (Abnormal) Collected: 06/06/22 0347    Specimen: Blood Updated: 06/06/22 0424    Narrative:      The following orders were created for panel order CBC & Differential.  Procedure                               Abnormality         Status                     ---------                               -----------         ------                     CBC Auto Differential[[id number]]        Abnormal            Final result                 Please view results for these tests on the individual orders.    CBC Auto Differential [[id number]]  (Abnormal) Collected: 06/06/22 0347    Specimen: Blood Updated: 06/06/22 0424     WBC 9.95 10*3/mm3      RBC 2.66 10*6/mm3      Hemoglobin 8.3 g/dL      Hematocrit 27.0 %      .5 fL      MCH 31.2 pg      MCHC 30.7 g/dL      RDW 14.6 %      RDW-SD 52.6 fl      MPV 9.2 fL      Platelets 274 10*3/mm3     Comprehensive Metabolic Panel [[id number]]  (Abnormal) Collected: 06/06/22 0347    Specimen: Blood Updated: 06/06/22 0422     Glucose 149 mg/dL      BUN 32 mg/dL      Creatinine 0.70 mg/dL      Sodium 137 mmol/L      Potassium 4.4 mmol/L      Comment: Slight hemolysis detected by analyzer. Results may be affected.        Chloride 104 mmol/L      CO2 31.0 mmol/L      Calcium 7.9 mg/dL      Total Protein 5.6 g/dL      Albumin 1.90 g/dL      ALT (SGPT) 23 U/L      AST (SGOT) 40 U/L      Alkaline Phosphatase 182 U/L      Total Bilirubin 0.3 mg/dL      Globulin 3.7 gm/dL      A/G Ratio 0.5 g/dL      BUN/Creatinine Ratio 45.7     Anion Gap 2.0 mmol/L      eGFR 94.9 mL/min/1.73      Comment: National Kidney Foundation and American Society of Nephrology (ASN) Task Force recommended calculation based on the Chronic Kidney Disease Epidemiology Collaboration (CKD-EPI) equation refit without adjustment for race.       Narrative:      GFR Normal >60  Chronic Kidney Disease <60  Kidney Failure <15      Blood Gas, Arterial - [[id number]]  (Abnormal) Collected:  06/06/22 0420    Specimen: Arterial Blood Updated: 06/06/22 0415     Site Left Radial     Denzel's Test Positive     pH, Arterial 7.455 pH units      Comment: 83 Value above reference range        pCO2, Arterial 46.3 mm Hg      Comment: 83 Value above reference range        pO2, Arterial 74.6 mm Hg      Comment: 84 Value below reference range        HCO3, Arterial 32.5 mmol/L      Comment: 83 Value above reference range        Base Excess, Arterial 7.7 mmol/L      Comment: 83 Value above reference range        O2 Saturation, Arterial 95.9 %      Temperature 37.0 C      Barometric Pressure for Blood Gas 744 mmHg      Modality Ventilator     FIO2 30 %      Ventilator Mode AC     Set Tidal Volume 450     Set Mech Resp Rate 16.0     PEEP 5.0     Collected by [id number]     Comment: Meter: [id number]     :  [id number]        pCO2, Temperature Corrected 46.3 mm Hg      pH, Temp Corrected 7.455 pH Units      pO2, Temperature Corrected 74.6 mm Hg     POC Glucose Once [[id number]]  (Abnormal) Collected: 06/06/22 0006    Specimen: Blood Updated: 06/06/22 0017     Glucose 144 mg/dL      Comment: : [id number] Salvador SimeoneyMeter ID: [id number]       POC Glucose Once [[id number]]  (Normal) Collected: 06/05/22 2000    Specimen: Blood Updated: 06/05/22 2012     Glucose 124 mg/dL      Comment: : [id number] Salvaodr SimeoneyMeter ID: [id number]       POC Glucose Once [[id number]]  (Normal) Collected: 06/05/22 1659    Specimen: Blood Updated: 06/05/22 1710     Glucose 120 mg/dL      Comment: : [id number] Jay MoralesMeter ID: [id number]       POC Glucose Once [[id number]]  (Abnormal) Collected: 06/05/22 1137    Specimen: Blood Updated: 06/05/22 1148     Glucose 133 mg/dL      Comment: : [id number] Jay PonceenMeter ID: [id number]       POC Glucose Once [[id number]]  (Abnormal) Collected: 06/05/22 0614    Specimen: Blood Updated: 06/05/22 0625     Glucose 162 mg/dL      Comment: : [id number] Franco LaceyMeter ID:  [id number]       Manual Differential [[id number]]  (Abnormal) Collected: 06/05/22 0406    Specimen: Blood Updated: 06/05/22 0510     Neutrophil % 80.4 %      Lymphocyte % 6.2 %      Monocyte % 5.2 %      Eosinophil % 2.1 %      Myelocyte % 3.1 %      Promyelocyte % 1.0 %      Atypical Lymphocyte % 2.1 %      Neutrophils Absolute 8.19 10*3/mm3      Lymphocytes Absolute 0.85 10*3/mm3      Monocytes Absolute 0.53 10*3/mm3      Eosinophils Absolute 0.21 10*3/mm3      Basophilic Stippling Slight/1+     Dacrocytes Slight/1+     Poikilocytes Mod/2+     Polychromasia Slight/1+     Vacuolated Neutrophils Slight/1+     Platelet Morphology Normal    Comprehensive Metabolic Panel [[id number]]  (Abnormal) Collected: 06/05/22 0406    Specimen: Blood Updated: 06/05/22 0445     Glucose 152 mg/dL      BUN 27 mg/dL      Creatinine 0.78 mg/dL      Sodium 136 mmol/L      Potassium 4.3 mmol/L      Chloride 102 mmol/L      CO2 29.0 mmol/L      Calcium 7.9 mg/dL      Total Protein 5.4 g/dL      Albumin 1.80 g/dL      ALT (SGPT) 22 U/L      AST (SGOT) 30 U/L      Alkaline Phosphatase 158 U/L      Total Bilirubin 0.5 mg/dL      Globulin 3.6 gm/dL      A/G Ratio 0.5 g/dL      BUN/Creatinine Ratio 34.6     Anion Gap 5.0 mmol/L      eGFR 91.9 mL/min/1.73      Comment: National Kidney Foundation and American Society of Nephrology (ASN) Task Force recommended calculation based on the Chronic Kidney Disease Epidemiology Collaboration (CKD-EPI) equation refit without adjustment for race.       Narrative:      GFR Normal >60  Chronic Kidney Disease <60  Kidney Failure <15      Phenytoin Level, Total [[id number]]  (Normal) Collected: 06/05/22 0406    Specimen: Blood Updated: 06/05/22 0445     Phenytoin Level 10.6 mcg/mL     C-reactive Protein [[id number]]  (Abnormal) Collected: 06/05/22 0406    Specimen: Blood Updated: 06/05/22 0445     C-Reactive Protein 11.99 mg/dL     Blood Gas, Arterial - [[id number]]  (Abnormal) Collected: 06/05/22 0441    Specimen:  Arterial Blood Updated: 06/05/22 0437     Site Left Radial     Denzel's Test Positive     pH, Arterial 7.415 pH units      pCO2, Arterial 48.7 mm Hg      Comment: 83 Value above reference range        pO2, Arterial 87.6 mm Hg      HCO3, Arterial 31.2 mmol/L      Comment: 83 Value above reference range        Base Excess, Arterial 5.9 mmol/L      Comment: 83 Value above reference range        O2 Saturation, Arterial 97.7 %      Temperature 37.0 C      Barometric Pressure for Blood Gas 746 mmHg      Modality Ventilator     FIO2 30 %      Ventilator Mode AC     Set Tidal Volume 450     Set Mech Resp Rate 16.0     PEEP 5.0     Collected by [id number]     Comment: Meter: [id number]     :  [id number]        pCO2, Temperature Corrected 48.7 mm Hg      pH, Temp Corrected 7.415 pH Units      pO2, Temperature Corrected 87.6 mm Hg     CBC & Differential [[id number]]  (Abnormal) Collected: 06/05/22 0406    Specimen: Blood Updated: 06/05/22 0428    Narrative:      The following orders were created for panel order CBC & Differential.  Procedure                               Abnormality         Status                     ---------                               -----------         ------                     CBC Auto Differential[[id number]]        Abnormal            Final result                 Please view results for these tests on the individual orders.    CBC Auto Differential [[id number]]  (Abnormal) Collected: 06/05/22 0406    Specimen: Blood Updated: 06/05/22 0428     WBC 10.19 10*3/mm3      RBC 2.63 10*6/mm3      Hemoglobin 8.3 g/dL      Hematocrit 26.2 %      MCV 99.6 fL      MCH 31.6 pg      MCHC 31.7 g/dL      RDW 14.4 %      RDW-SD 51.3 fl      MPV 9.2 fL      Platelets 326 10*3/mm3      nRBC 0.7 /100 WBC     POC Glucose Once [[id number]]  (Abnormal) Collected: 06/05/22 0038    Specimen: Blood Updated: 06/05/22 0049     Glucose 140 mg/dL      Comment: : [id number] Francohuong MagallonMeter ID: [id number]       Blood  Gas, Arterial - [[id number]]  (Abnormal) Collected: 06/04/22 2141    Specimen: Arterial Blood Updated: 06/04/22 2136     Site Left Radial     Denzel's Test Positive     pH, Arterial 7.432 pH units      pCO2, Arterial 47.7 mm Hg      Comment: 83 Value above reference range        pO2, Arterial 84.8 mm Hg      HCO3, Arterial 31.8 mmol/L      Comment: 83 Value above reference range        Base Excess, Arterial 6.7 mmol/L      Comment: 83 Value above reference range        O2 Saturation, Arterial 96.7 %      Temperature 37.0 C      Barometric Pressure for Blood Gas 746 mmHg      Modality Ventilator     FIO2 30 %      Ventilator Mode AC     Set Tidal Volume 450     Set Mech Resp Rate 16.0     PEEP 5.0     Collected by [id number]     Comment: Meter: [id number]     :  [id number]        pCO2, Temperature Corrected 47.7 mm Hg      pH, Temp Corrected 7.432 pH Units      pO2, Temperature Corrected 84.8 mm Hg     POC Glucose Once [[id number]]  (Normal) Collected: 06/04/22 2036    Specimen: Blood Updated: 06/04/22 2047     Glucose 102 mg/dL      Comment: : [id number] Jone RoldanonMeter ID: [id number]       POC Glucose Once [[id number]]  (Normal) Collected: 06/04/22 1739    Specimen: Blood Updated: 06/04/22 1751     Glucose 102 mg/dL      Comment: : ZACH Bernardo RyanMeter ID: [id number]       POC Glucose Once [[id number]]  (Normal) Collected: 06/04/22 1058    Specimen: Blood Updated: 06/04/22 1110     Glucose 110 mg/dL      Comment: : ZACH Bernardo RyanMeter ID: [id number]           Imaging Results (Last 72 Hours)     Procedure Component Value Units Date/Time    XR Chest 1 View [[id number]] Collected: 06/06/22 0721     Updated: 06/06/22 0726    Narrative:      HISTORY: Intubated     CXR: Frontal view the chest obtained     COMPARISON: 6/5/2022     FINDINGS: Tracheostomy tube remains in place. Right-sided  shunt.  Right upper extremity PICC line tip projects over the proximal SVC. The  two medial left  pleural pigtail catheters remain in place. No  appreciable pneumothorax. Lungs are hypoventilated. Vascular crowding  versus mild venous congestion. Bibasilar opacities may be atelectasis  versus pneumonia. Scattered bilateral calcified granuloma. No acute  regional bony pathology.       Impression:      1. Similar positioning of the medial left pleural pigtail catheters. No  pneumothorax. Hypoventilated lungs with vascular crowding and basilar  atelectasis versus pneumonia. Trace pleural effusions.  This report was finalized on 06/06/2022 07:23 by Dr. Christel Feliciano MD.    XR Chest 1 View [[id number]] Collected: 06/05/22 1445     Updated: 06/05/22 1449    Narrative:      HISTORY: Chest tube removal     CXR: Frontal view the chest obtained     COMPARISON: 6/5/2022 at 3:14 AM     FINDINGS: The 2 large left thoracotomy tubes have been removed. The 2  medially positioned left posterior pleural pigtail catheters remain in  place. No measurable pneumothorax. Small left pleural fluid collection.  Hypoventilated lungs with bibasilar opacities-patchy atelectasis versus  consolidation. Scattered granuloma.     Tracheostomy tube remains appropriate in position. Right upper extremity  PICC line tip projects over the SVC.       Impression:      1. Removal of the two large left thoracotomy chest tubes with retention  of the medial left pleural pigtail catheters. No measurable  pneumothorax. Small left pleural fluid collection. Bibasilar opacities.  This report was finalized on 06/05/2022 14:46 by Dr. Christel Feliciano MD.    XR Chest 1 View [[id number]] Collected: 06/05/22 0750     Updated: 06/05/22 0756    Narrative:      HISTORY: Intubated     CXR: Frontal view the chest obtained     COMPARISON: 6/4/2022 at 8:47 PM     FINDINGS: Tracheostomy tube appropriate in position. Right-sided   shunt. Right upper extremity PICC line tip projects over the SVC.  Multiple left-sided pleural catheters are stable in position.  Hypoventilated  lungs with prominent pulmonary vascular structures. There  are small pleural effusions. No appreciable pneumothorax. Scattered  bilateral calcified granuloma. Cardiomegaly.       Impression:      1. Hypoventilated lungs. Vascular crowding with likely pulmonary venous  congestion/mild edema. Multiple left-sided chest tubes similar in  position with no appreciable pneumothorax. Very small pleural effusions  suspected. Scattered calcified granuloma. Appropriate positioning of  tracheostomy tube.  This report was finalized on 06/05/2022 07:53 by Dr. Christel Feliciano MD.    CT Chest Without Contrast Diagnostic [[id number]] Collected: 06/05/22 0704     Updated: 06/05/22 0715    Narrative:      CT CHEST WO CONTRAST DIAGNOSTIC- 6/5/2022 5:50 AM CDT     HISTORY: pleural effusion; R13.10-Dysphagia, unspecified;  Z01.818-Encounter for other preprocedural examination; D32.9-Benign  neoplasm of meninges, unspecified; Z74.09-Other reduced mobility;  Z78.9-Other specified health status; G40.901-Epilepsy, unspecified, not  intractable, with status epilepticus; J96.01-Acute respiratory failure  with hypoxia; G91.0-Communicating hydrocephalus      COMPARISON: None     DOSE LENGTH PRODUCT: 405 mGy cm. Automated exposure control was also  utilized to decrease patient radiation dose.     TECHNIQUE: Axial images of the chest are obtained without IV contrast     FINDINGS:  The tracheostomy tube is in place with tip position above the  hernandez. The 2 small posterior apical pleural catheter is remain in place  with appropriate drainage of the posterior medial pleural fluid  collections compared to the prior study. The 2 larger left-sided  thoracotomy tubes remain in place. Only small pleural effusions are seen  on the current exam. There is stable cardiomegaly. Thoracic aorta normal  in caliber. No pericardial effusion. Calcified mediastinal and bilateral  hilar lymph nodes with no pathologic lymphadenopathy.     A right upper extremity PICC  line is in place with its tip in the  proximal SVC. A right sided peritoneal shunt identified. Minimal  subcutaneous emphysema within the right upper abdominal wall. Images of  the upper abdomen are degraded by the artifact from overlying lines and  EKG leads. Questionable biliary sludge with no biliary dilatation.  Adrenal glands are not imaged in their entirety.     Scattered calcified granulomas seen bilaterally. Vascular crowding.  Bilateral lower lobe consolidation may represent cysts atelectasis or  pneumonia. Only trace air seen within the left posterior pleural space.     Moderate degenerative change thoracic spine. No focal destructive  osseous lesions.       Impression:      1. Left chest tubes remain in place with appropriate evacuation of the  left pleural fluid. Only small pleural effusions seen on today's exam  with minimal air present in the posterior left pleural space.  2. Bibasilar consolidation may represent atelectasis and/or pneumonia.  Scattered bilateral calcified granuloma. Vascular crowding and/or mild  venous congestion. Stable cardiomegaly.  This report was finalized on 06/05/2022 07:12 by Dr. Christel Feliciano MD.    XR Chest 1 View [[id number]] Collected: 06/04/22 2102     Updated: 06/04/22 2107    Narrative:      EXAMINATION: Chest 1 view 6/4/2022     HISTORY: Respiratory decline.     FINDINGS: Upright frontal projection of chest is compared to prior exam  of earlier the same day. 2 small caliber pigtail catheters as well as 2  thoracostomy tubes are in place on the left. There is no pneumothorax.  Lungs are hypoventilated with bibasilar atelectasis. There is improved  aeration when compared to the previous exam. A PICC line and  tracheostomy tube remain in place.       Impression:      .  1. No change in the left-sided thoracostomy tubes. There is no  appreciable pneumothorax.  2. Improved aeration of the lungs but with persistent bilateral lower  lobe atelectasis.  This report was  finalized on 06/04/2022 21:04 by Dr. Guillaume Bey MD.    XR Chest 1 View [[id number]] Collected: 06/04/22 0817     Updated: 06/04/22 0825    Narrative:      HISTORY: Intubated     CXR: Frontal view the chest obtained     COMPARISON: 6/3/2022     FINDINGS: Tracheostomy tube tip appropriate in position. There are 2  small pigtail catheters of the left hemithorax with 2 larger lateral  left-sided chest tubes. Pleural catheters similar in position. No  pneumothorax. Small left pleural fluid collection. Hypoventilated lungs  with stable left greater than right basilar densities favoring  atelectasis. Scattered diffuse bilateral granuloma.     Right upper extremity PICC line tip projects over the SVC.       Impression:      1. Appropriate positioning of tracheostomy tube. Similar positioning of  the left-sided pleural catheters with small left pleural fluid  collection. Probable left greater than right basilar atelectasis. No  appreciable pneumothorax.  This report was finalized on 06/04/2022 08:22 by Dr. Christel Feliciano MD.    CT Head Without Contrast [[id number]] Collected: 06/03/22 1749     Updated: 06/03/22 1758    Narrative:      EXAMINATION: CT head without contrast 6/3/2022     DOSE: 936 mGycm. All CT scans are performed using dose optimization  techniques as appropriate to the performed exam and including at least  one of the following: Automated exposure control, adjustment of the mA  and/or kV according to size, and the use of the iterative reconstruction  technique..     HISTORY: Evaluate status post ventricular peritoneal shunt placement     FINDINGS: Today's exam is compared to prior study of earlier the same  day. A heterogeneous masslike density is noted within the right frontal  lobe with some peripheral hyperdensity likely representing postoperative  blood products. The patient's undergone right frontal craniotomy. A  ventriculostomy tube has been placed via right posterior parietal  approach with the  tip projecting through the posterior horn and body of  the right lateral ventricle. The tip is noted near the midline.  Ventricular size is stable from the previous exam. There is hemorrhage  layering dependently within the posterior horn of both lateral  ventricles stable from the previous exam. There is persistent mass  effect in the right frontal lobe with mild shift to the midline stable  from the previous exam. The basilar cisterns remain patent.       Impression:      1.. Ventriculoperitoneal shunt placed via right posterior parietal  approach with the catheter traversing the posterior horn and body of the  right lateral ventricle with the tip near the midline. Ventricle size is  stable from the previous exam. There is no evidence of complications.  2. Intraventricular hemorrhage is stable from the previous exam. There  is again a heterogeneous masslike density in the right frontal lobe with  recent craniotomy and mild mass effect with shift of the midline from  right to left within the frontal lobe. This is stable from the previous  exam.  This report was finalized on 06/03/2022 17:55 by Dr. Guillaume Bey MD.    CT Head Without Contrast [[id number]] Collected: 06/03/22 1223     Updated: 06/03/22 1228    Narrative:      EXAMINATION: CT HEAD WO CONTRAST-      6/3/2022 11:34 AM CDT     HISTORY: continued evaluation.  Surgical planning for VPS placement;  R13.10-Dysphagia, unspecified; Z01.818-Encounter for other preprocedural  examination; D32.9-Benign neoplasm of meninges, unspecified;  Z74.09-Other reduced mobility; Z78.9-Other specified health status;  G40.901-Epilepsy, unspecified, not intractable, with status epilepticus;  J96.01-Acute respiratory failure with hypoxia; G91.0-Communicati     In order to have a CT radiation dose as low as reasonably achievable  Automated Exposure Control was utilized for adjustment of the mA and/or  KV according to patient size.     DLP in mGycm= 874.     Noncontrast head  CT.  Axial, sagittal, and coronal imaging.     Comparison is made with May 27, 2022.     Right frontal craniotomy.  Interval partial resolution of scalp swelling and edema.     Persistent though decreased dependent intraventricular hemorrhage.  Ventricle size is stable.     There is no parenchymal hemorrhage or mass effect.     Summary:  1. Stable postoperative head CT.                                   This report was finalized on 06/03/2022 12:25 by Dr. Tomer Whelan MD.             ASSESSMENT/PLAN       Examination and evaluation of wound(s) was performed.    DIAGNOSIS:   Pressure injury of sacral area, stage 3  Pressure injury of forehead, unstageable  Bowel incontinence  Impaired mobility  Tracheostomy  Sedated due to medication  Obesity - Body mass index is 34.98 kg/m².     PLAN:   Discontinuing Opticell Ag to wound of sacrum.  Switching to barrier cream at this time.        Start     Ordered    06/06/22 1300  Wound Care  4 Times Daily      Question Answer Comment   Wound Locations Sacral area    Wound Care Instructions Apply thin layer of calazime barrier cream to sacral area.  If needing to remove all barrier cream, use 4 in 1 barrier cream cloths to do so.  Do this 4 times a day and prn        06/06/22 0923                    This document has been electronically signed by MARIO Montemayor on 6/6/2022 08:57 CDT       Time spent in face-to-face evaluation, chart review, planning and education 38 minutes with greater than 50% of time spent in coordination of care.  Discussed wound and management options with RN.  Assessment of areas of wounds and areas at risk, removed dressings, and pictures taken of wounds to update chart.

## 2022-06-07 ENCOUNTER — APPOINTMENT (OUTPATIENT)
Dept: GENERAL RADIOLOGY | Facility: HOSPITAL | Age: 77
End: 2022-06-07

## 2022-06-07 LAB
ALBUMIN SERPL-MCNC: 2 G/DL (ref 3.5–5.2)
ALBUMIN/GLOB SERPL: 0.6 G/DL
ALP SERPL-CCNC: 199 U/L (ref 39–117)
ALT SERPL W P-5'-P-CCNC: 27 U/L (ref 1–41)
ANION GAP SERPL CALCULATED.3IONS-SCNC: 7 MMOL/L (ref 5–15)
ARTERIAL PATENCY WRIST A: POSITIVE
AST SERPL-CCNC: 45 U/L (ref 1–40)
ATMOSPHERIC PRESS: 745 MMHG
BASE EXCESS BLDA CALC-SCNC: 7.8 MMOL/L (ref 0–2)
BASO STIPL COARSE BLD QL SMEAR: ABNORMAL
BDY SITE: ABNORMAL
BILIRUB SERPL-MCNC: 0.3 MG/DL (ref 0–1.2)
BODY TEMPERATURE: 37 C
BUN SERPL-MCNC: 29 MG/DL (ref 8–23)
BUN/CREAT SERPL: 53.7 (ref 7–25)
CALCIUM SPEC-SCNC: 7.7 MG/DL (ref 8.6–10.5)
CHLORIDE SERPL-SCNC: 104 MMOL/L (ref 98–107)
CO2 SERPL-SCNC: 27 MMOL/L (ref 22–29)
CREAT SERPL-MCNC: 0.54 MG/DL (ref 0.76–1.27)
DEPRECATED RDW RBC AUTO: 51.2 FL (ref 37–54)
EGFRCR SERPLBLD CKD-EPI 2021: 102.6 ML/MIN/1.73
EOSINOPHIL # BLD MANUAL: 0.2 10*3/MM3 (ref 0–0.4)
EOSINOPHIL NFR BLD MANUAL: 2 % (ref 0.3–6.2)
ERYTHROCYTE [DISTWIDTH] IN BLOOD BY AUTOMATED COUNT: 14.7 % (ref 12.3–15.4)
GLOBULIN UR ELPH-MCNC: 3.2 GM/DL
GLUCOSE BLDC GLUCOMTR-MCNC: 101 MG/DL (ref 70–130)
GLUCOSE BLDC GLUCOMTR-MCNC: 103 MG/DL (ref 70–130)
GLUCOSE BLDC GLUCOMTR-MCNC: 138 MG/DL (ref 70–130)
GLUCOSE BLDC GLUCOMTR-MCNC: 139 MG/DL (ref 70–130)
GLUCOSE BLDC GLUCOMTR-MCNC: 165 MG/DL (ref 70–130)
GLUCOSE BLDC GLUCOMTR-MCNC: 194 MG/DL (ref 70–130)
GLUCOSE SERPL-MCNC: 148 MG/DL (ref 65–99)
HCO3 BLDA-SCNC: 31.4 MMOL/L (ref 20–26)
HCT VFR BLD AUTO: 27.4 % (ref 37.5–51)
HGB BLD-MCNC: 8.5 G/DL (ref 13–17.7)
INHALED O2 CONCENTRATION: 30 %
LYMPHOCYTES # BLD MANUAL: 1.01 10*3/MM3 (ref 0.7–3.1)
LYMPHOCYTES NFR BLD MANUAL: 4 % (ref 5–12)
Lab: ABNORMAL
MCH RBC QN AUTO: 30.8 PG (ref 26.6–33)
MCHC RBC AUTO-ENTMCNC: 31 G/DL (ref 31.5–35.7)
MCV RBC AUTO: 99.3 FL (ref 79–97)
MODALITY: ABNORMAL
MONOCYTES # BLD: 0.4 10*3/MM3 (ref 0.1–0.9)
MYELOCYTES NFR BLD MANUAL: 3 % (ref 0–0)
NEUTROPHILS # BLD AUTO: 8.05 10*3/MM3 (ref 1.7–7)
NEUTROPHILS NFR BLD MANUAL: 80 % (ref 42.7–76)
PAW @ PEAK INSP FLOW SETTING VENT: 14 CMH2O
PCO2 BLDA: 39 MM HG (ref 35–45)
PCO2 TEMP ADJ BLD: 39 MM HG (ref 35–45)
PEEP RESPIRATORY: 6 CM[H2O]
PH BLDA: 7.51 PH UNITS (ref 7.35–7.45)
PH, TEMP CORRECTED: 7.51 PH UNITS (ref 7.35–7.45)
PHENYTOIN SERPL-MCNC: 15.6 MCG/ML (ref 10–20)
PLAT MORPH BLD: NORMAL
PLATELET # BLD AUTO: 251 10*3/MM3 (ref 140–450)
PMV BLD AUTO: 9.5 FL (ref 6–12)
PO2 BLDA: 89.3 MM HG (ref 83–108)
PO2 TEMP ADJ BLD: 89.3 MM HG (ref 83–108)
POLYCHROMASIA BLD QL SMEAR: ABNORMAL
POTASSIUM SERPL-SCNC: 4.5 MMOL/L (ref 3.5–5.2)
PROMYELOCYTES NFR BLD MANUAL: 1 % (ref 0–0)
PROT SERPL-MCNC: 5.2 G/DL (ref 6–8.5)
RBC # BLD AUTO: 2.76 10*6/MM3 (ref 4.14–5.8)
SAO2 % BLDCOA: 98.9 % (ref 94–99)
SET MECH RESP RATE: 16
SODIUM SERPL-SCNC: 138 MMOL/L (ref 136–145)
VARIANT LYMPHS NFR BLD MANUAL: 10 % (ref 19.6–45.3)
VENTILATOR MODE: ABNORMAL
WBC MORPH BLD: NORMAL
WBC NRBC COR # BLD: 10.06 10*3/MM3 (ref 3.4–10.8)

## 2022-06-07 PROCEDURE — 25010000002 HYDRALAZINE PER 20 MG: Performed by: NURSE PRACTITIONER

## 2022-06-07 PROCEDURE — 94799 UNLISTED PULMONARY SVC/PX: CPT

## 2022-06-07 PROCEDURE — 36600 WITHDRAWAL OF ARTERIAL BLOOD: CPT

## 2022-06-07 PROCEDURE — 82803 BLOOD GASES ANY COMBINATION: CPT

## 2022-06-07 PROCEDURE — 71045 X-RAY EXAM CHEST 1 VIEW: CPT

## 2022-06-07 PROCEDURE — 25010000002 HEPARIN (PORCINE) PER 1000 UNITS: Performed by: NURSE PRACTITIONER

## 2022-06-07 PROCEDURE — 85007 BL SMEAR W/DIFF WBC COUNT: CPT | Performed by: NURSE PRACTITIONER

## 2022-06-07 PROCEDURE — 82962 GLUCOSE BLOOD TEST: CPT

## 2022-06-07 PROCEDURE — 0 LEVETIRACETAM IN NACL 0.75% 1000 MG/100ML SOLUTION: Performed by: CLINICAL NURSE SPECIALIST

## 2022-06-07 PROCEDURE — 94003 VENT MGMT INPAT SUBQ DAY: CPT

## 2022-06-07 PROCEDURE — 80053 COMPREHEN METABOLIC PANEL: CPT | Performed by: NURSE PRACTITIONER

## 2022-06-07 PROCEDURE — 99231 SBSQ HOSP IP/OBS SF/LOW 25: CPT | Performed by: SURGERY

## 2022-06-07 PROCEDURE — 63710000001 INSULIN REGULAR HUMAN PER 5 UNITS: Performed by: NURSE PRACTITIONER

## 2022-06-07 PROCEDURE — 99024 POSTOP FOLLOW-UP VISIT: CPT | Performed by: NURSE PRACTITIONER

## 2022-06-07 PROCEDURE — 94664 DEMO&/EVAL PT USE INHALER: CPT

## 2022-06-07 PROCEDURE — 94761 N-INVAS EAR/PLS OXIMETRY MLT: CPT

## 2022-06-07 PROCEDURE — 85025 COMPLETE CBC W/AUTO DIFF WBC: CPT | Performed by: NURSE PRACTITIONER

## 2022-06-07 PROCEDURE — 97164 PT RE-EVAL EST PLAN CARE: CPT

## 2022-06-07 PROCEDURE — 99232 SBSQ HOSP IP/OBS MODERATE 35: CPT | Performed by: OTOLARYNGOLOGY

## 2022-06-07 PROCEDURE — 99233 SBSQ HOSP IP/OBS HIGH 50: CPT | Performed by: INTERNAL MEDICINE

## 2022-06-07 PROCEDURE — 63710000001 INSULIN DETEMIR PER 5 UNITS: Performed by: NURSE PRACTITIONER

## 2022-06-07 PROCEDURE — 80185 ASSAY OF PHENYTOIN TOTAL: CPT | Performed by: NURSE PRACTITIONER

## 2022-06-07 PROCEDURE — 25010000002 FOSPHENYTOIN 500 MG PE/10ML SOLUTION 10 ML VIAL: Performed by: NURSE PRACTITIONER

## 2022-06-07 PROCEDURE — 99291 CRITICAL CARE FIRST HOUR: CPT | Performed by: PSYCHIATRY & NEUROLOGY

## 2022-06-07 RX ADMIN — Medication 45 ML: at 20:26

## 2022-06-07 RX ADMIN — SODIUM CHLORIDE 275 MG PE: 9 INJECTION, SOLUTION INTRAVENOUS at 21:01

## 2022-06-07 RX ADMIN — LEVETIRACETAM 1000 MG: 10 INJECTION INTRAVENOUS at 08:36

## 2022-06-07 RX ADMIN — CHLORHEXIDINE GLUCONATE 15 ML: 1.2 RINSE ORAL at 20:25

## 2022-06-07 RX ADMIN — SODIUM CHLORIDE SOLN NEBU 3% 4 ML: 3 NEBU SOLN at 19:09

## 2022-06-07 RX ADMIN — Medication 1 PACKET: at 18:36

## 2022-06-07 RX ADMIN — HEPARIN SODIUM 5000 UNITS: 5000 INJECTION INTRAVENOUS; SUBCUTANEOUS at 08:35

## 2022-06-07 RX ADMIN — HYDRALAZINE HYDROCHLORIDE 10 MG: 20 INJECTION INTRAMUSCULAR; INTRAVENOUS at 02:47

## 2022-06-07 RX ADMIN — LEVOTHYROXINE SODIUM 125 MCG: 125 TABLET ORAL at 05:46

## 2022-06-07 RX ADMIN — INSULIN HUMAN 2 UNITS: 100 INJECTION, SOLUTION PARENTERAL at 00:39

## 2022-06-07 RX ADMIN — SODIUM CHLORIDE 275 MG PE: 9 INJECTION, SOLUTION INTRAVENOUS at 15:44

## 2022-06-07 RX ADMIN — LIOTHYRONINE SODIUM 25 MCG: 25 TABLET ORAL at 08:37

## 2022-06-07 RX ADMIN — SODIUM CHLORIDE SOLN NEBU 3% 4 ML: 3 NEBU SOLN at 06:49

## 2022-06-07 RX ADMIN — Medication 1 PACKET: at 20:28

## 2022-06-07 RX ADMIN — CARVEDILOL 6.25 MG: 6.25 TABLET, FILM COATED ORAL at 08:35

## 2022-06-07 RX ADMIN — POLYETHYLENE GLYCOL 3350 17 G: 17 POWDER, FOR SOLUTION ORAL at 08:35

## 2022-06-07 RX ADMIN — HYDRALAZINE HYDROCHLORIDE 10 MG: 20 INJECTION INTRAMUSCULAR; INTRAVENOUS at 21:02

## 2022-06-07 RX ADMIN — PANTOPRAZOLE SODIUM 40 MG: 40 INJECTION, POWDER, FOR SOLUTION INTRAVENOUS at 05:46

## 2022-06-07 RX ADMIN — LACOSAMIDE 200 MG: 10 INJECTION, SOLUTION INTRAVENOUS at 08:39

## 2022-06-07 RX ADMIN — Medication 1 PACKET: at 15:44

## 2022-06-07 RX ADMIN — HEPARIN SODIUM 5000 UNITS: 5000 INJECTION INTRAVENOUS; SUBCUTANEOUS at 20:25

## 2022-06-07 RX ADMIN — Medication 45 ML: at 08:35

## 2022-06-07 RX ADMIN — SODIUM CHLORIDE 275 MG PE: 9 INJECTION, SOLUTION INTRAVENOUS at 05:46

## 2022-06-07 RX ADMIN — Medication 1 PACKET: at 08:35

## 2022-06-07 RX ADMIN — ALBUTEROL SULFATE 2.5 MG: 2.5 SOLUTION RESPIRATORY (INHALATION) at 06:49

## 2022-06-07 RX ADMIN — ALBUTEROL SULFATE 2.5 MG: 2.5 SOLUTION RESPIRATORY (INHALATION) at 23:02

## 2022-06-07 RX ADMIN — CARVEDILOL 6.25 MG: 6.25 TABLET, FILM COATED ORAL at 18:36

## 2022-06-07 RX ADMIN — MUPIROCIN 1 APPLICATION: 20 OINTMENT TOPICAL at 20:26

## 2022-06-07 RX ADMIN — INSULIN DETEMIR 20 UNITS: 100 INJECTION, SOLUTION SUBCUTANEOUS at 20:49

## 2022-06-07 RX ADMIN — ALBUTEROL SULFATE 2.5 MG: 2.5 SOLUTION RESPIRATORY (INHALATION) at 14:24

## 2022-06-07 RX ADMIN — INSULIN HUMAN 2 UNITS: 100 INJECTION, SOLUTION PARENTERAL at 23:55

## 2022-06-07 RX ADMIN — HYDRALAZINE HYDROCHLORIDE 10 MG: 20 INJECTION INTRAMUSCULAR; INTRAVENOUS at 09:04

## 2022-06-07 RX ADMIN — CHLORHEXIDINE GLUCONATE 15 ML: 1.2 RINSE ORAL at 08:36

## 2022-06-07 RX ADMIN — MUPIROCIN 1 APPLICATION: 20 OINTMENT TOPICAL at 08:41

## 2022-06-07 RX ADMIN — LISINOPRIL 20 MG: 20 TABLET ORAL at 08:35

## 2022-06-07 RX ADMIN — LACOSAMIDE 200 MG: 10 INJECTION, SOLUTION INTRAVENOUS at 20:25

## 2022-06-07 RX ADMIN — LEVETIRACETAM 1000 MG: 10 INJECTION INTRAVENOUS at 20:25

## 2022-06-07 RX ADMIN — INSULIN HUMAN 8 UNITS: 100 INJECTION, SOLUTION PARENTERAL at 15:44

## 2022-06-07 NOTE — PROGRESS NOTES
[facility]  ENT PROGRESS NOTES  2022      Patient Identification:  Name: Hai Hernandez  Age: 77 y.o.  Sex: male  :  1945  MRN: [id number]                     Date of Admission: 5/10/2022      CC:    Postop day #5 status post tracheostomy  Subjective   Patient remains ventilated.      HISTORY   HPI, ROS, PMFSHx reviewed:   No changes       Objective     PE:    Temp:  [97.9 °F (36.6 °C)-98.5 °F (36.9 °C)] 98.2 °F (36.8 °C)  Heart Rate:  [52-79] 61  Resp:  [24-38] 28  BP: (116-189)/(52-83) 189/81  FiO2 (%):  [30 %] 30 %   Body mass index is 35.02 kg/m².     General appearance: chronically ill appearing.   Ability to Communicate: Patient is mechanically ventilated with tracheostomy tube and therefore unable to communicate   Facial exam: No facial deformities   Ears - right ear normal, left ear normal.   Nasal exam - normal and patent, no erythema, discharge or polyps.   Oropharyngeal exam - mucous membranes moist, pharynx normal without lesions.   Neck exam -tracheostomy site looks good with sutures intact.     CVS exam: normal rate and regular rhythm.   Chest: Normal chest excursion with mechanical ventilation.   No lymphadenopathy in the anterior or posterior neck, supraclavicular areas.   Neurological exam reveals neck supple without rigidity.    DATA      MEDICATIONS   I have reviewed the current MAR.     LABS AND IMAGING      I have reviewed the labs and imaging data since the patient was last seen.       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)    Status epilepticus (HCC)    Essential hypertension    Type 2 diabetes mellitus with hyperglycemia, without long-term current use of insulin (HCC)    Hypothyroidism (acquired)    Acute respiratory failure (secondary to status epilepticus)    Thrombocytopenia (HCC)    Cerebral parenchymal hemorrhage (HCC)    PAF (paroxysmal atrial fibrillation) (HCC)    Fever     Loculated pleural effusion    Acute deep vein thrombosis (DVT) of the ulnar and brachial veins of right upper extremity (HCC)    Dysphagia    Communicating hydrocephalus (HCC)    Status post tracheostomy      Plan     PLAN     Continue local wound care  Okay to remove sutures  We will follow-up          Geovany Davidson MD  6/7/2022  09:12 CDT

## 2022-06-07 NOTE — PROGRESS NOTES
"Patient name: Hai Hernandez  Patient : 1945  VISIT # [id number]  MR #[id number]    Procedure:Procedure(s):  CRANIOTOMY FOR TUMOR STERIOTACTIC WITH BRAIN LAB, right  Procedure Date:5/10/2022  POD:26 Days Post-Op    Subjective   Chief complaint: Shortness of breath    Patient remains mechanically ventilated to trach, FiO2 40%, PEEP 6 with O2 sat 96%.  No overnight events.  2 pleural catheters remain in place.       Objective     Visit Vitals  BP (!) 189/81   Pulse 61   Temp 98.2 °F (36.8 °C) (Axillary)   Resp 28   Ht 182.9 cm (72\")   Wt 117 kg (258 lb 3.2 oz)   SpO2 98%   BMI 35.02 kg/m²       Intake/Output Summary (Last 24 hours) at 2022 09  Last data filed at 2022 0823  Gross per 24 hour   Intake 1562 ml   Output 28 ml   Net 1534 ml     Left pleural drain 1: 58 ml/24 hours, serous  Left pleural drain 2: 20 ml24 hours, serous    Lab:     CBC:  Results from last 7 days   Lab Units 22  0401 22  0347 22  0406   WBC 10*3/mm3 10.06 9.95 10.19   HEMATOCRIT % 27.4* 27.0* 26.2*   PLATELETS 10*3/mm3 251 274 326          BMP:  Results from last 7 days   Lab Units 22  0401 22  0347 22  0406   SODIUM mmol/L 138 137 136   POTASSIUM mmol/L 4.5 4.4 4.3   CHLORIDE mmol/L 104 104 102   CO2 mmol/L 27.0 31.0* 29.0   GLUCOSE mg/dL 148* 149* 152*   BUN mg/dL 29* 32* 27*   CREATININE mg/dL 0.54* 0.70* 0.78          COAG:  Results from last 7 days   Lab Units 22  1755   INR  1.10*       IMAGES:       Imaging Results (Last 24 Hours)     Procedure Component Value Units Date/Time    XR Chest 1 View [[id number]] Collected: 22     Updated: 22    Narrative:      EXAMINATION:  XR CHEST 1 VW-  2022 3:20 AM CDT     HISTORY: Acute respiratory failure with hypoxia. On ventilator.     COMPARISON: 2022.     TECHNIQUE: Single view AP image.     FINDINGS:  A tracheostomy tube remains in place. There is  hypoventilation. There is vascular crowding. There are " patchy  infiltrates in the perihilar regions and lung bases. There are multiple  small nodules many of which are calcified on previous CT. There is mild  cardiomegaly. No acute bony abnormality is seen.       Impression:      1. Hypoventilation with vascular crowding.  2. Patchy infiltrates in the perihilar regions and lung bases.  Atelectasis versus pneumonia.  3. Old granulomatous disease.        This report was finalized on 06/07/2022 07:09 by Dr. Zachariah oFnseca MD.    CT Head Without Contrast [[id number]] Collected: 06/06/22 1629     Updated: 06/06/22 1642    Narrative:      EXAMINATION: CT HEAD WO CONTRAST-      6/6/2022 3:54 PM CDT     HISTORY: SP craniotomy for tumor.  SP  shunt placement.  Continued  neurologic decline.; R13.10-Dysphagia, unspecified; Z01.818-Encounter  for other preprocedural examination; D32.9-Benign neoplasm of meninges,  unspecified; Z74.09-Other reduced mobility; Z78.9-Other specified health  status; G40.901-Epilepsy, unspecified, not intractable, with status  epilepticus; J96.01-Acute respiratory failure with hypox     In order to have a CT radiation dose as low as reasonably achievable  Automated Exposure Control was utilized for adjustment of the mA and/or  KV according to patient size.     DLP in mGycm= 1231     The CT scan of the head is performed without intravenous contrast  enhancement. Images are acquired in axial plane with subsequent  reconstruction in coronal and sagittal planes.     Comparison is made with the previous study dated 6/3/2022.     This study is a very limited diagnostic value due to suboptimal exposure  and positioning and technique. There are extensive artifacts.     Evidence of a right frontal temporal craniotomy similar to the previous  study. The right frontal lobe underlying the craniotomy site is not well  evaluated due to the artifacts. Diffuse low density area may suggest  encephalomalacia/post surgical changes.     The intraventricular hemorrhage in  the occipital horn of the right  lateral ventricle is noted. There is a mild layering hemorrhage in the  occipital horn of the left lateral ventricle which is poorly visualized  and evaluated.     A right posterior parietal approach ventriculostomy catheter is in  place. No change. No significant change in size of the ventricles since  the previous study.     The gray-white matter differentiation may not be evaluated due to  extensive artifacts.     Images are reviewed in bone window show no acute bony abnormality. The  right frontal temporal craniotomy is similar to the previous study. It  may involve the superior posterior lateral aspect of the right orbit?.  This was not obvious in the previous study.     There is mucosal thickening involving the ethmoid and sphenoid sinuses.  There is bilateral mastoid effusion.       Impression:      1. A very limited diagnostic study due to extensive artifacts as  detailed above.  2. Right frontal temporal craniotomy. It appears to be involving the  superior posterior lateral aspect of the right orbit/roof of the orbit  which was not seen in the previous study.  3. Intraventricular hemorrhage in the occipital horn of the right  lateral ventricle. Poorly visualized layering hematoma in the occipital  horn of the left lateral ventricle.  4. A right parietal approach ventriculostomy catheter in place.  5. Persistent dilatation of the ventricles. No change.                             This report was finalized on 06/06/2022 16:39 by Dr. Aida Wong MD.        CXR: Left pleural drains x2 in stable position.  Left lung remains expanded.  Left lower lobe atelectasis.     Physical Exam:  General: Mechanical ventilation to trach.    Cardiovascular: Regular rate and rhythm without murmur, rubs, or gallops.    Pulmonary: Coarse mechanical breath sounds bilaterally without wheezing, rubs, or rales.  Chest: Left chest tubes x 2 to 20 cm suction. No air leak. Fluid is serosanguineous.    Abdomen: Soft, nondistended, and nontender.  Extremities: Warm, moves all extremities.  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  Repeat chest x-ray today at 1200  Daily chest x-ray  We will continue to follow, likely will sign off tomorrow if imaging remains stable  Discussed with nursing      MARIO Romo  06/07/22  09:23 CDT

## 2022-06-07 NOTE — PROGRESS NOTES
0750 Dr. Davidson stated that suture removal was needed. I removed suture at 0755. The pt had no complications and no bleeding. Will continue to monitor.

## 2022-06-07 NOTE — THERAPY RE-EVALUATION
Patient Name: Hai Hernandez  : 1945    MRN: [id number]                              Today's Date: 2022       Admit Date: 5/10/2022    Visit Dx:     ICD-10-CM ICD-9-CM   1. Dysphagia, unspecified type  R13.10 787.20   2. Preop testing  Z01.818 V72.84   3. Meningioma (HCC)  D32.9 225.2   4. Impaired mobility  Z74.09 799.89   5. Decreased activities of daily living (ADL)  Z78.9 V49.89   6. Status epilepticus (HCC)  G40.901 345.3   7. Acute respiratory failure with hypoxia (HCC)  J96.01 518.81   8. 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)   • Status epilepticus (HCC)   • Essential hypertension   • Type 2 diabetes mellitus with hyperglycemia, without long-term current use of insulin (HCC)   • Hypothyroidism (acquired)   • Acute respiratory failure (secondary to status epilepticus)   • Thrombocytopenia (HCC)   • Cerebral parenchymal hemorrhage (HCC)   • PAF (paroxysmal atrial fibrillation) (HCC)   • Fever   • Loculated pleural effusion   • Acute deep vein thrombosis (DVT) of the ulnar and brachial veins of right upper extremity (HCC)   • Dysphagia   • Communicating hydrocephalus (HCC)     Past Medical History:   Diagnosis Date   • Brain tumor (HCC)    • Depression    • Hearing loss    • History of cellulitis     right foot big toe   • Hypertension    • Pneumonia    • Right arm weakness     after cervial injury   • Sciatic pain     affects balance   • Thyroid disease    • Visual disturbance     due to brain tumor - right eye     Past Surgical History:   Procedure Laterality Date   • CATARACT EXTRACTION, BILATERAL     • COLONOSCOPY     • CRANIOTOMY FOR TUMOR Right 5/10/2022    Procedure: CRANIOTOMY FOR TUMOR STERIOTACTIC WITH BRAIN LAB, right;  Surgeon: Martell Downs MD;  Location: [facility] OR;   Service: Neurosurgery;  Laterality: Right;   • ENDOSCOPY W/ PEG TUBE PLACEMENT N/A 6/2/2022    Procedure: ESOPHAGOGASTRODUODENOSCOPY WITH PERCUTANEOUS ENDOSCOPIC GASTROSTOMY TUBE INSERTION WITH ANESTHESIA;  Surgeon: Melecio Lu MD;  Location: [facility] OR;  Service: Gastroenterology;  Laterality: N/A;   • NECK SURGERY     • SKIN CANCER EXCISION     • TONSILLECTOMY     • TRACHEOSTOMY N/A 6/2/2022    Procedure: TRACHEOSTOMY;  Surgeon: Geovany Davidson MD;  Location: [facility] OR;  Service: ENT;  Laterality: N/A;   •  SHUNT INSERTION Right 6/3/2022    Procedure: VENTRICULAR PERITONEAL SHUNT INSERTION WITH BRAIN LAB;  Surgeon: Martell Downs MD;  Location: [facility] OR;  Service: Neurosurgery;  Laterality: Right;      General Information     Row Name 06/07/22 0677          Physical Therapy Time and Intention    Document Type re-evaluation  6/3  shunt insertion, 6/2 tracheostomy. intracranial meningioma, hydrocephalus, post -op seizures  -YULIET     Mode of Treatment physical therapy  -YULIET     Row Name 06/07/22 1342          General Information    Patient Profile Reviewed yes  -YULIET     Existing Precautions/Restrictions fall;oxygen therapy device and L/min  trach, vent  -YULIET     Barriers to Rehab medically complex  -YULIET     Row Name 06/07/22 1345          Cognition    Orientation Status (Cognition) unable/difficult to assess  -YULIET     Row Name 06/07/22 1345          Safety Issues, Functional Mobility    Safety Issues Affecting Function (Mobility) ability to follow commands  -YULIET     Impairments Affecting Function (Mobility) range of motion (ROM);strength  Significnat B UE/LE edema  -YULIET           User Key  (r) = Recorded By, (t) = Taken By, (c) = Cosigned By    Initials Name Provider Type    Wayne Sánchez, PT DPT Physical Therapist               Mobility     Row Name 06/07/22 4754          Bed Mobility    Comment, (Bed Mobility) Not following commands to begin bed mobility  -YULIET           User Key  (r) =  Recorded By, (t) = Taken By, (c) = Cosigned By    Initials Name Provider Type    Wayne Sánchez PT DPT Physical Therapist               Obj/Interventions     [facility] Name 06/07/22 1345          Range of Motion Comprehensive    Comment, General Range of Motion B UE/LE PROM imapired ~ 25% (edema, increased tone B UE?)  -YULIET     Row Name 06/07/22 1345          Strength Comprehensive (MMT)    Comment, General Manual Muscle Testing (MMT) Assessment no movement observed in B UE/LE  -YULIET     [facility] Name 06/07/22 1345          Motor Skills    Motor Skills muscle tone  -YULIET     Muscle Tone other (see comments)  mild increased tone R UE vs tightness/edema?, mild/mod increased tone L UE vs tightness/edema?  -YULIET     Therapeutic Exercise --  B UE/LE PROM x 10 reps  -YULIET           User Key  (r) = Recorded By, (t) = Taken By, (c) = Cosigned By    Initials Name Provider Type    Wayne Sánchez PT DPT Physical Therapist               Goals/Plan     [facility] Name 06/07/22 1345          Bed Mobility Goal 1 (PT)    Activity/Assistive Device (Bed Mobility Goal 1, PT) sit to supine/supine to sit  -YULIET     [place] Level/Cues Needed (Bed Mobility Goal 1, PT) supervision required  -YULIET     Time Frame (Bed Mobility Goal 1, PT) long term goal (LTG);10 days  -YULIET     Progress/Outcomes (Bed Mobility Goal 1, PT) goal no longer appropriate  d/c goal  -YULIET     Row Name 06/07/22 1345          Transfer Goal 1 (PT)    Activity/Assistive Device (Transfer Goal 1, PT) sit-to-stand/stand-to-sit;bed-to-chair/chair-to-bed  -YULIET     [place] Level/Cues Needed (Transfer Goal 1, PT) supervision required  -YULIET     Time Frame (Transfer Goal 1, PT) long term goal (LTG);10 days  -YULIET     Progress/Outcome (Transfer Goal 1, PT) goal no longer appropriate  d/c goal  -YULIET     Row Name 06/07/22 1345          Gait Training Goal 1 (PT)    Activity/Assistive Device (Gait Training Goal 1, PT) gait (walking locomotion);decrease fall risk;improve balance and speed;increase  endurance/gait distance  -YULIET     [place] Level (Gait Training Goal 1, PT) supervision required  -YULIET     Distance (Gait Training Goal 1, PT) 200 ft  -YULIET     Time Frame (Gait Training Goal 1, PT) long term goal (LTG);10 days  -YULIET     Progress/Outcome (Gait Training Goal 1, PT) goal no longer appropriate  d/c goal  -YULIET     Row Name 06/07/22 1345          ROM Goal 1 (PT)    ROM Goal 1 (PT) B UE/LE PROM > AAROM x 20 reps, min assist.  -YULIET     Time Frame (ROM Goal 1, PT) long-term goal (LTG);10 days  -YULIET     Progress/Outcome (ROM Goal 1, PT) goal ongoing  -YULIET     Row Name 06/07/22 1345          Stairs Goal 1 (PT)    Activity/Assistive Device (Stairs Goal 1, PT) ascending stairs;descending stairs  -YULIET     [place] Level/Cues Needed (Stairs Goal 1, PT) supervision required  -YULIET     Number of Stairs (Stairs Goal 1, PT) 3-5 steps  -YULIET     Time Frame (Stairs Goal 1, PT) long term goal (LTG);10 days  -YULIET     Progress/Outcome (Stairs Goal 1, PT) goal no longer appropriate  d/c goal  -YULIET     Row Name 06/07/22 1345          Problem Specific Goal 1 (PT)    Problem Specific Goal 1 (PT) No new evidence of skin breakdown or contractures while on the vent  -YULIET     Time Frame (Problem Specific Goal 1, PT) long-term goal (LTG)  -YULIET     Progress/Outcome (Problem Specific Goal 1, PT) goal ongoing  -YULIET     Row Name 06/07/22 1345          Therapy Assessment/Plan (PT)    Planned Therapy Interventions (PT) bed mobility training;patient/family education;ROM (range of motion);strengthening  -YULIET           User Key  (r) = Recorded By, (t) = Taken By, (c) = Cosigned By    Initials Name Provider Type    Wayne Sánchez, PT DPT Physical Therapist               Clinical Impression     Row Name 06/07/22 1345          Pain Scale: FACES Pre/Post-Treatment    Pain: FACES Scale, Pretreatment 0-->no hurt  -YULIET     Row Name 06/07/22 1345          Plan of Care Review    Plan of Care Reviewed With patient  -YULIET     Outcome Evaluation PT completed  re-eval. He remains on the vent with trach. Eyes closed and he did not follow any commands for activity. B UE/LE significantly swollen and tight vs increased tone? Limits end range ROM. No movement observed in B UE/LE. PT completed PROM to B UE/LE x 10 reps. PT will cont with ROM, positioning, skin/joint management and command following while vented. Recommend d.c to LTACH for cont care.  -YULIET     Row Name 06/07/22 7195          Therapy Assessment/Plan (PT)    Patient/Family Therapy Goals Statement (PT) pt unable to state  -YULIET     Rehab Potential (PT) fair, will monitor progress closely  -YULIET     Criteria for Skilled Interventions Met (PT) yes;meets criteria  -YULIET     Therapy Frequency (PT) 2 times/day  -YULIET     Predicted Duration of Therapy Intervention (PT) until d.c  -YULIET     Row Name 06/07/22 2963          Positioning and Restraints    Pre-Treatment Position in bed  -YULIET     Post Treatment Position bed  -YULIET     In Bed fowlers;call light within reach;encouraged to call for assist;RUE elevated;LUE elevated;RLE elevated;LLE elevated  -YULIET           User Key  (r) = Recorded By, (t) = Taken By, (c) = Cosigned By    Initials Name Provider Type    Wayne Sánchez, PT DPT Physical Therapist               Outcome Measures     Row Name 06/07/22 3001          How much help from another person do you currently need...    Turning from your back to your side while in flat bed without using bedrails? 1  -YULIET     Moving from lying on back to sitting on the side of a flat bed without bedrails? 1  -YULIET     Moving to and from a bed to a chair (including a wheelchair)? 1  -YULIET     Standing up from a chair using your arms (e.g., wheelchair, bedside chair)? 1  -YULIET     Climbing 3-5 steps with a railing? 1  -YULIET     To walk in hospital room? 1  -YULIET     AM-PAC 6 Clicks Score (PT) 6  -YULIET     Highest level of mobility 2 --> Bed activities/dependent transfer  -YULIET     Row Name 06/07/22 2573          Functional Assessment    Outcome Measure  Options AM-PAC 6 Clicks Basic Mobility (PT)  -YULIET           User Key  (r) = Recorded By, (t) = Taken By, (c) = Cosigned By    Initials Name Provider Type    Wayne Sánchez PT DPT Physical Therapist                             Physical Therapy Education                 Title: PT OT SLP Therapies (In Progress)     Topic: Physical Therapy (In Progress)     Point: Mobility training (Done)     Learning Progress Summary           Patient Acceptance, E, VU,DU by YULIET at 5/11/2022 0745    Comment: benefits of activity, progression of PT POC                   Point: Home exercise program (In Progress)     Learning Progress Summary           Patient Acceptance, E, NR by YULIET at 6/7/2022 1345    Comment: Benefits of activity, B UE/LE exercises, positioning for joints and skin integrity                   Point: Body mechanics (Not Started)     Learner Progress:  Not documented in this visit.          Point: Precautions (Not Started)     Learner Progress:  Not documented in this visit.                      User Key     Initials Effective Dates Name Provider Type [facility] 08/02/16 -  Wayne Thomas PT DPT Physical Therapist PT              PT Recommendation and Plan  Planned Therapy Interventions (PT): bed mobility training, patient/family education, ROM (range of motion), strengthening  Plan of Care Reviewed With: patient  Outcome Evaluation: PT completed re-eval. He remains on the vent with trach. Eyes closed and he did not follow any commands for activity. B UE/LE significantly swollen and tight vs increased tone? Limits end range ROM. No movement observed in B UE/LE. PT completed PROM to B UE/LE x 10 reps. PT will cont with ROM, positioning, skin/joint management and command following while vented. Recommend d.c to [facility]ACH for cont care.     Time Calculation:    PT Charges     Row Name 06/07/22 4125             Time Calculation    Start Time 1345  -YULIET      Stop Time 1415  -YULIET      Time Calculation (min) 30 min   -YULIET      PT Received On 06/07/22  -YULIET      PT Goal Re-Cert Due Date 06/17/22  -YULIET            User Key  (r) = Recorded By, (t) = Taken By, (c) = Cosigned By    Initials Name Provider Type    Wayne Sánchez, PT DPT Physical Therapist              Therapy Charges for Today     Code Description Service Date Service Provider Modifiers Qty    48205380476 HC PT RE-EVAL ESTABLISHED PLAN 2 6/7/2022 Wayne Thomas, PT DPT GP 1          PT G-Codes  Outcome Measure Options: AM-PAC 6 Clicks Basic Mobility (PT)  AM-PAC 6 Clicks Score (PT): 6  AM-PAC 6 Clicks Score (OT): 12    Wayne Thomas, LANI DPT  6/7/2022

## 2022-06-07 NOTE — PROGRESS NOTES
[facility] Medicine Services  INPATIENT PROGRESS NOTE    Length of Stay: 28  Date of Admission: 5/10/2022  Primary Care Physician: Elisa Chacko MD    Subjective   Chief Complaint: Respiratory failure/status post tracheotomy/status post chest tube removal    HPI   Last 2 chest tube was removed today.  T-max 98.5, T-current 98.2.  Blood pressure slightly high, heart rate stable.    Review of Systems   Unable to assess secondary to the patient's intubated and sedated state.    All pertinent negatives and positives are as above. All other systems have been reviewed and are negative unless otherwise stated.     Objective    Temp:  [97.9 °F (36.6 °C)-98.5 °F (36.9 °C)] 98.2 °F (36.8 °C)  Heart Rate:  [54-79] 62  Resp:  [24-38] 27  BP: (116-189)/(52-83) 151/68  FiO2 (%):  [30 %-40 %] 40 %    Intake/Output Summary (Last 24 hours) at 6/7/2022 1102  Last data filed at 6/7/2022 0823  Gross per 24 hour   Intake 1562 ml   Output 28 ml   Net 1534 ml     Physical Exam  Vitals and nursing note reviewed.   HENT:      Head: Normocephalic.   Eyes:      Conjunctiva/sclera: Conjunctivae normal.      Pupils: Pupils are equal, round, and reactive to light.   Neck:      Vascular: No JVD.   Cardiovascular:      Rate and Rhythm: Normal rate and regular rhythm.      Heart sounds: Normal heart sounds.   Pulmonary:      Effort: No respiratory distress.      Breath sounds: No wheezing or rales.      Comments: Tracheotomy in place.  On the vent.  Diminished breath sound bilateral, clear.  Chest:      Chest wall: No tenderness.   Abdominal:      General: Bowel sounds are normal. There is no distension.      Palpations: Abdomen is soft.      Tenderness: There is no abdominal tenderness.      Comments: Obesity .  PEG tube in place.  Musculoskeletal:         General: No tenderness or deformity.      Cervical back: Neck supple.      Right lower leg: Edema present.      Left lower leg: Edema present.       Comments: +1 .   Skin:     General: Skin is warm and dry.      Capillary Refill: Capillary refill takes 2 to 3 seconds.      Findings: No rash.   Neurological:      Mental Status: He is oriented to person, place, and time.      Cranial Nerves: No cranial nerve deficit.      Motor: Weakness present. No abnormal muscle tone.      Coordination: Coordination abnormal.      Gait: Gait abnormal.      Deep Tendon Reflexes: Reflexes normal.   Results Review:  Lab Results (last 24 hours)     Procedure Component Value Units Date/Time    POC Glucose Once [[id number]]  (Abnormal) Collected: 06/07/22 0617    Specimen: Blood Updated: 06/07/22 0628     Glucose 138 mg/dL      Comment: : [id number] BerryieMeter ID: [id number]       Manual Differential [[id number]]  (Abnormal) Collected: 06/07/22 0401    Specimen: Blood Updated: 06/07/22 0510     Neutrophil % 80.0 %      Lymphocyte % 10.0 %      Monocyte % 4.0 %      Eosinophil % 2.0 %      Myelocyte % 3.0 %      Promyelocyte % 1.0 %      Neutrophils Absolute 8.05 10*3/mm3      Lymphocytes Absolute 1.01 10*3/mm3      Monocytes Absolute 0.40 10*3/mm3      Eosinophils Absolute 0.20 10*3/mm3      Basophilic Stippling Slight/1+     Polychromasia Slight/1+     WBC Morphology Normal     Platelet Morphology Normal    CBC & Differential [[id number]]  (Abnormal) Collected: 06/07/22 0401    Specimen: Blood Updated: 06/07/22 0510    Narrative:      The following orders were created for panel order CBC & Differential.  Procedure                               Abnormality         Status                     ---------                               -----------         ------                     CBC Auto Differential[[id number]]        Abnormal            Final result                 Please view results for these tests on the individual orders.    CBC Auto Differential [[id number]]  (Abnormal) Collected: 06/07/22 0401    Specimen: Blood Updated: 06/07/22 0510     WBC 10.06 10*3/mm3       RBC 2.76 10*6/mm3      Hemoglobin 8.5 g/dL      Hematocrit 27.4 %      MCV 99.3 fL      MCH 30.8 pg      MCHC 31.0 g/dL      RDW 14.7 %      RDW-SD 51.2 fl      MPV 9.5 fL      Platelets 251 10*3/mm3     Narrative:      The previously reported component NRBC is no longer being reported. Previous result was 0.3 /100 WBC (Reference Range: 0.0-0.2 /100 WBC) on 6/7/2022 at [address] [place] [address].    Comprehensive Metabolic Panel [[id number]]  (Abnormal) Collected: 06/07/22 0401    Specimen: Blood Updated: 06/07/22 0504     Glucose 148 mg/dL      BUN 29 mg/dL      Creatinine 0.54 mg/dL      Sodium 138 mmol/L      Potassium 4.5 mmol/L      Chloride 104 mmol/L      CO2 27.0 mmol/L      Calcium 7.7 mg/dL      Total Protein 5.2 g/dL      Albumin 2.00 g/dL      ALT (SGPT) 27 U/L      AST (SGOT) 45 U/L      Alkaline Phosphatase 199 U/L      Total Bilirubin 0.3 mg/dL      Globulin 3.2 gm/dL      A/G Ratio 0.6 g/dL      BUN/Creatinine Ratio 53.7     Anion Gap 7.0 mmol/L      eGFR 102.6 mL/min/1.73      Comment: National Kidney Foundation and American Society of Nephrology (ASN) Task Force recommended calculation based on the Chronic Kidney Disease Epidemiology Collaboration (CKD-EPI) equation refit without adjustment for race.       Narrative:      GFR Normal >60  Chronic Kidney Disease <60  Kidney Failure <15      Phenytoin Level, Total [[id number]]  (Normal) Collected: 06/07/22 0401    Specimen: Blood Updated: 06/07/22 0459     Phenytoin Level 15.6 mcg/mL     Blood Gas, Arterial - [[id number]]  (Abnormal) Collected: 06/07/22 0419    Specimen: Arterial Blood Updated: 06/07/22 0415     Site Right Radial     Denzel's Test Positive     pH, Arterial 7.514 pH units      Comment: 83 Value above reference range        pCO2, Arterial 39.0 mm Hg      pO2, Arterial 89.3 mm Hg      HCO3, Arterial 31.4 mmol/L      Comment: 83 Value above reference range        Base Excess, Arterial 7.8 mmol/L      Comment: 83 Value above reference range        O2  Saturation, Arterial 98.9 %      Temperature 37.0 C      Barometric Pressure for Blood Gas 745 mmHg      Modality Ventilator     FIO2 30 %      Ventilator Mode PC     Set [facility] Resp Rate 16.0     PEEP 6.0     PIP 14 cmH2O      Comment: Meter: [id number]     :  [id number]        Collected by [id number]     pCO2, Temperature Corrected 39.0 mm Hg      pH, Temp Corrected 7.514 pH Units      pO2, Temperature Corrected 89.3 mm Hg     POC Glucose Once [[id number]]  (Abnormal) Collected: 06/07/22 0032    Specimen: Blood Updated: 06/07/22 0044     Glucose 194 mg/dL      Comment: : [id number] Na JamieMeter ID: [id number]       POC Glucose Once [[id number]]  (Abnormal) Collected: 06/06/22 2110    Specimen: Blood Updated: 06/06/22 2121     Glucose 153 mg/dL      Comment: : [id number] Na JamieMeter ID: [id number]       POC Glucose Once [[id number]]  (Normal) Collected: 06/06/22 1628    Specimen: Blood Updated: 06/06/22 1640     Glucose 120 mg/dL      Comment: : [id number] Macho AshleyMeter ID: [id number]              Cultures:  No results found for: BLOODCX, URINECX, WOUNDCX, MRSACX, RESPCX, STOOLCX    Radiology Data:    Imaging Results (Last 24 Hours)     Procedure Component Value Units Date/Time    XR Chest 1 View [[id number]] Collected: 06/07/22 0708     Updated: 06/07/22 0712    Narrative:      EXAMINATION:  XR CHEST 1 VW-  6/7/2022 3:20 AM CDT     HISTORY: Acute respiratory failure with hypoxia. On ventilator.     COMPARISON: 6/6/2022.     TECHNIQUE: Single view AP image.     FINDINGS:  A tracheostomy tube remains in place. There is  hypoventilation. There is vascular crowding. There are patchy  infiltrates in the perihilar regions and lung bases. There are multiple  small nodules many of which are calcified on previous CT. There is mild  cardiomegaly. No acute bony abnormality is seen.       Impression:      1. Hypoventilation with vascular crowding.  2. Patchy infiltrates in the perihilar  regions and lung bases.  Atelectasis versus pneumonia.  3. Old granulomatous disease.        This report was finalized on 06/07/2022 07:09 by Dr. Zachariah Fonseca MD.    CT Head Without Contrast [[id number]] Collected: 06/06/22 1629     Updated: 06/06/22 1642    Narrative:      EXAMINATION: CT HEAD WO CONTRAST-      6/6/2022 3:54 PM CDT     HISTORY: SP craniotomy for tumor.  SP  shunt placement.  Continued  neurologic decline.; R13.10-Dysphagia, unspecified; Z01.818-Encounter  for other preprocedural examination; D32.9-Benign neoplasm of meninges,  unspecified; Z74.09-Other reduced mobility; Z78.9-Other specified health  status; G40.901-Epilepsy, unspecified, not intractable, with status  epilepticus; J96.01-Acute respiratory failure with hypox     In order to have a CT radiation dose as low as reasonably achievable  Automated Exposure Control was utilized for adjustment of the mA and/or  KV according to patient size.     DLP in mGycm= 1231     The CT scan of the head is performed without intravenous contrast  enhancement. Images are acquired in axial plane with subsequent  reconstruction in coronal and sagittal planes.     Comparison is made with the previous study dated 6/3/2022.     This study is a very limited diagnostic value due to suboptimal exposure  and positioning and technique. There are extensive artifacts.     Evidence of a right frontal temporal craniotomy similar to the previous  study. The right frontal lobe underlying the craniotomy site is not well  evaluated due to the artifacts. Diffuse low density area may suggest  encephalomalacia/post surgical changes.     The intraventricular hemorrhage in the occipital horn of the right  lateral ventricle is noted. There is a mild layering hemorrhage in the  occipital horn of the left lateral ventricle which is poorly visualized  and evaluated.     A right posterior parietal approach ventriculostomy catheter is in  place. No change. No significant change in  size of the ventricles since  the previous study.     The gray-white matter differentiation may not be evaluated due to  extensive artifacts.     Images are reviewed in bone window show no acute bony abnormality. The  right frontal temporal craniotomy is similar to the previous study. It  may involve the superior posterior lateral aspect of the right orbit?.  This was not obvious in the previous study.     There is mucosal thickening involving the ethmoid and sphenoid sinuses.  There is bilateral mastoid effusion.       Impression:      1. A very limited diagnostic study due to extensive artifacts as  detailed above.  2. Right frontal temporal craniotomy. It appears to be involving the  superior posterior lateral aspect of the right orbit/roof of the orbit  which was not seen in the previous study.  3. Intraventricular hemorrhage in the occipital horn of the right  lateral ventricle. Poorly visualized layering hematoma in the occipital  horn of the left lateral ventricle.  4. A right parietal approach ventriculostomy catheter in place.  5. Persistent dilatation of the ventricles. No change.                             This report was finalized on 06/06/2022 16:39 by Dr. Aida Wong MD.          No Known Allergies    Scheduled meds:   albuterol, 2.5 mg, Nebulization, Q8H - RT  carvedilol, 6.25 mg, Nasogastric, BID With Meals  chlorhexidine, 15 mL, Mouth/Throat, Q12H  fosphenytoin, 275 mg PE, Intravenous, Q8H  heparin (porcine), 5,000 Units, Subcutaneous, Q12H  insulin detemir, 20 Units, Subcutaneous, Nightly  insulin regular, 2-7 Units, Subcutaneous, Q6H  insulin regular, 8 Units, Subcutaneous, Q6H  lacosamide, 200 mg, Intravenous, Q12H  levETIRAcetam, 1,000 mg, Intravenous, Q12H  levothyroxine, 125 mcg, Nasogastric, Q AM  lidocaine, 10 mL, Intradermal, Once  liothyronine, 25 mcg, Nasogastric, Daily  lisinopril, 20 mg, Nasogastric, Q24H  mupirocin, 1 application, Topical, Q12H  Nutrisource fiber, 1 packet,  Nasogastric, 4x Daily  pantoprazole, 40 mg, Intravenous, Q AM  polyethylene glycol, 17 g, Oral, Daily  ProSource TF, 1 packet, Nasogastric, BID  ProSource TF, 45 mL, Per G Tube, BID  sodium chloride, 4 mL, Nebulization, BID - RT        PRN meds:  •  acetaminophen **OR** acetaminophen  •  alteplase  •  bisacodyl  •  butalbital-acetaminophen-caffeine  •  dextrose  •  dextrose  •  glucagon (human recombinant)  •  hold  •  hydrALAZINE  •  ipratropium-albuterol  •  labetalol  •  LORazepam  •  ondansetron **OR** ondansetron    Assessment/Plan       Meningioma (HCC)    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 The patient was admitted on 5/10 by Dr. Downs with neurosurgery.  He has prior history of known meningioma that was causing compression and visual changes.  He presented for craniotomy.  Hospital medicine was originally consulted on 5/14.  The patient had developed status epilepticus postsurgically and required intubation/mechanical ventilation.      Respiratory failure/left pleural effusion. He required intubation on 5/14 for airway protection.  This was done by Dr. Gunderson.  Pulmonary has since been consulted. Continue to monitor for readiness for spontaneous breathing trials and extubation in the future. Again, he was intubated for airway protection given his status epilepticus.  Propofol drip . albuterol luis antonio.  Zayra as  needed.  Morphine as needed.  Oxycodone as needed.  CT imaging of chest-  Left chest tubes remain in place with appropriate evacuation of the left pleural fluid, Only small pleural effusions seen on today's exam with minimal air present in the posterior left pleural space, Bibasilar consolidation may represent atelectasis and/or pneumonia, scattered bilateral calcified granuloma, Vascular crowding and/or mild venous congestion, Stable cardiomegaly.  Chest x-ray-Hypoventilation with vascular crowding, Patchy infiltrates in the perihilar regions and lung bases, atelectasis versus pneumonia, Old granulomatous disease.  Status post thoracotomy tube placement 6/26/2022.   Status post tracheotomy left chest 6/2/2022.   Status post tPA through chest tube.  Chest tube removed 6/7/2022.  Ventilator- assist-control, FiO2 30, tidal volume 450, rate 16, PEEP 5.     Hypotension.  Resolved.  Levophed drip off.     Seizure . consult neurology . IV Vimpat.  IV Keppra.  IV cerebyx.      Meningioma.  Decadron.  CT scan head without contrast Right frontal temporal craniotomy- involving the  superior posterior lateral aspect of the right orbit/roof of the orbit which was not seen in the previous study, Intraventricular hemorrhage in the occipital horn of the right lateral ventricle, Poorly visualized layering hematoma in the occipital  horn of the left lateral ventricle, right parietal approach ventriculostomy catheter in place, Persistent dilatation of the ventricles- No change.  Status post craniotomy 5/10/2022. He has had a lumbar drain placed by neurosurgery.    Ventriculoperitoneal shunt placement 6/3/2022.     Thrombocytopenia. His platelet count was 217,000 on 4/20/2022. This declined to a low of 72,000 on 5/17.  Peripheral smear on 5/17 showed no schistocytes with moderate peripheral thrombocytopenia.  PTT normal, PT/INR slightly elevated, fibrin split products high, and fibrinogen high.  Neurosurgery gave him a sixpack of  platelets on 5/18.  Thrombocytopenia resolved.     Fever. He had run steady fever from the afternoon of 5/21 through 5/22.  Dr. Escobar was contacted and placed him on vancomycin and cefepime.  He still has a lumbar drain and CSF was sampled on the morning of 5/22.  ..  He received vancomycin. Meropenem was started on 5/25 for 5 days.  Patient has finished vancomycin and meropenem antibiotics.  Fever resolved.     Hypertension/atrial fibrillation. He typically takes lisinopril. Resumed per tube at a lower dose than normal on 5/18. Titrated up on 5/19. Started carvedilol on 5/19. Titrated medications up to get off Cardene.  Unable to take anticoagulation.  Lisinopril.  Hydralazine as needed.  Echocardiogram- ejection fraction 70%-hyperdynamic, mild concentric hypertrophy, tricuspid regurgitation, right ventricle cavity moderately dilated, right atrial cavity dilated, no significant valvular pathology, atrial fibrillation rhythm.     Hypothyroidism. Free T4 and free T3 were very low as well. Started levothyroxine and liothyronine on 5/19. TRH pending since 5/19!!  I rechecked TSH, free T4, and free T3 on 5/29 to see where we are at.  TSH is now 3.950.  Free T4 has improve from 0.31 to 0.53.  Free T3 has improved from less than 0.40 to 1.60.  Synthroid . Cytomel .     Diabetes.  Hemoglobin A1c 8.2.  Sliding scale.   Levemir . regular insulin.     Anemia.  Hemoglobin stable.  No sign of acute bleed.     Reflux.  Protonix.  Zofran as needed.     Constipation.  MiraLAX.  Dulcolax suppository as needed.     Headaches.  Fioricet as needed.     Obesity.  BMI 31.     SCD for DVT prophylaxis.  Heparin prophylaxis.     Nutrition.  PEG tube placement 6/2/2022.     Blood culture- no growth 5 days.  Respiratory culture- Light growth (2+) Normal respiratory annia. No S. aureus or Pseudomonas aeruginosa detected. Final report.   Urine culture>100,000 CFU/mL Klebsiella aerogenes Abnormal   AFB culture-pending.  Anaerobic  culture-negative.  Body fluid culture-negative.  Fungal culture pending.  MRSA DNA probe-negative.  COVID-19-negative.    Electronically signed by Aden Kc MD, 06/07/22, 11:02 AM CDT.

## 2022-06-07 NOTE — PLAN OF CARE
Goal Outcome Evaluation:  Plan of Care Reviewed With: patient           Outcome Evaluation: PT completed re-eval. He remains on the vent with trach. Eyes closed and he did not follow any commands for activity. B UE/LE significantly swollen and tight vs increased tone? Limits end range ROM. No movement observed in B UE/LE. PT completed PROM to B UE/LE x 10 reps. PT will cont with ROM, positioning, skin/joint management and command following while vented. Recommend d.c to LTACH for cont care.

## 2022-06-07 NOTE — PROGRESS NOTES
PULMONARY AND CRITICAL CARE PROGRESS NOTE - [facility]    Patient: Hai Hernandez    1945    MR# [id number]    Acct# [id number]  06/07/22   08:08 CDT  Referring Provider: Martell Downs, *    Chief Complaint: Mechanically ventilated    Interval history: The patient remains intubated with trach to vent.  No sedation is infusing.  He will open eyes to noxious stimuli and grimaces to pain.  O2 sat 91% on 6 PEEP, 0.40FiO2.  x2 CT remain intact.  No other aggravating or alleviating factors.           Meds:  albuterol, 2.5 mg, Nebulization, Q8H - RT  carvedilol, 6.25 mg, Nasogastric, BID With Meals  chlorhexidine, 15 mL, Mouth/Throat, Q12H  fosphenytoin, 275 mg PE, Intravenous, Q8H  heparin (porcine), 5,000 Units, Subcutaneous, Q12H  insulin detemir, 20 Units, Subcutaneous, Nightly  insulin regular, 2-7 Units, Subcutaneous, Q6H  insulin regular, 8 Units, Subcutaneous, Q6H  lacosamide, 200 mg, Intravenous, Q12H  levETIRAcetam, 1,000 mg, Intravenous, Q12H  levothyroxine, 125 mcg, Nasogastric, Q AM  lidocaine, 10 mL, Intradermal, Once  liothyronine, 25 mcg, Nasogastric, Daily  lisinopril, 20 mg, Nasogastric, Q24H  mupirocin, 1 application, Topical, Q12H  Nutrisource fiber, 1 packet, Nasogastric, 4x Daily  pantoprazole, 40 mg, Intravenous, Q AM  polyethylene glycol, 17 g, Oral, Daily  ProSource TF, 1 packet, Nasogastric, BID  ProSource TF, 45 mL, Per G Tube, BID  sodium chloride, 4 mL, Nebulization, BID - RT      hold, 1 each  norepinephrine, 0.02-0.3 mcg/kg/min, Last Rate: Stopped (06/05/22 0835)  propofol, 5-50 mcg/kg/min, Last Rate: Stopped (06/05/22 0815)      Review of Systems:   Cannot obtain due to mechanical ventilated state     Ventilator Settings:        Resp Rate (Set): 16  Pressure Support (cm H2O): 8 cm H20  FiO2 (%): 30 %  PEEP/CPAP (cm H2O): 6 cm H20  Minute Ventilation (L/min) (Obs): 16.2 L/min  Resp Rate (Observed) Vent: 29  I:E Ratio (Set): 1:0.27  I:E Ratio (Obs): 1:2.30  PIP  Observed (cm H2O): 21 cm H2O  RSBI: 21.96  Physical Exam:  Temp:  [97.9 °F (36.6 °C)-98.5 °F (36.9 °C)] 98.3 °F (36.8 °C)  Heart Rate:  [52-79] 64  Resp:  [24-38] 28  BP: (116-176)/(52-83) 158/66  FiO2 (%):  [30 %] 30 %    Intake/Output Summary (Last 24 hours) at 6/7/2022 0808  Last data filed at 6/7/2022 0403  Gross per 24 hour   Intake 1032 ml   Output 28 ml   Net 1004 ml     SpO2 Percentage    06/07/22 0654 06/07/22 0700 06/07/22 0750   SpO2: 97% 96% 98%   Body mass index is 35.02 kg/m².   Physical Exam   Constitutional:       General: He is intubated and sedated     Appearance: He is ill-appearing. He is not toxic-appearing.      Interventions: He is sedated and intubated.   HENT:      Head: Normocephalic.      Comments: Craniotomy wound, trach     Nose: Nose normal.   Eyes:      General: No scleral icterus.  Cardiovascular: normal rate  Pulmonary:      Effort: No accessory muscle usage or respiratory distress. He is intubated.      Breath sounds: Decreased breath sounds in bases   Chest:      Chest wall: No edema.      Comments: Left sided chest tubes x2   Abdominal:      General: There is no distension.      Palpations: Abdomen is soft.   Genitourinary:     Comments: Mccrary  Musculoskeletal:      Right lower leg: Edema present.      Left lower leg: Edema present.      Comments: SCDs   Skin:     Findings: No rash.   Neurological:      Motor: opens eyes/grimaces to pain   Psychiatric:         Behavior: Behavior is not agitated.   Electronically signed by MARIO Aldrich, 6/7/2022, 08:08 CDT      Physician Substantive Portion: Medical Decision Making    Laboratory Data:  Results from last 7 days   Lab Units 06/07/22  0401 06/06/22  0347 06/05/22  0406   WBC 10*3/mm3 10.06 9.95 10.19   HEMOGLOBIN g/dL 8.5* 8.3* 8.3*   PLATELETS 10*3/mm3 251 274 326     Results from last 7 days   Lab Units 06/07/22  0401 06/06/22  0347 06/05/22  0406 06/01/22  0139 05/31/22  1755   SODIUM mmol/L 138 137 136   < >  --     POTASSIUM mmol/L 4.5 4.4 4.3   < >  --    BUN mg/dL 29* 32* 27*   < >  --    CREATININE mg/dL 0.54* 0.70* 0.78   < >  --    INR   --   --   --   --  1.10*    < > = values in this interval not displayed.     Results from last 7 days   Lab Units 06/07/22  0419 06/06/22  0420 06/05/22  0441   PH, ARTERIAL pH units 7.514* 7.455* 7.415   PCO2, ARTERIAL mm Hg 39.0 46.3* 48.7*   PO2 ART mm Hg 89.3 74.6* 87.6   FIO2 % 30 30 30     No results found for: BLOODCX, URINECX, WOUNDCX, MRSACX, RESPCX, STOOLCX  Recent films:  CT Head Without Contrast    Result Date: 6/6/2022  EXAMINATION: CT HEAD WO CONTRAST-   6/6/2022 3:54 PM CDT  HISTORY: SP craniotomy for tumor.  SP  shunt placement.  Continued neurologic decline.; R13.10-Dysphagia, unspecified; Z01.818-Encounter for other preprocedural examination; D32.9-Benign neoplasm of meninges, unspecified; Z74.09-Other reduced mobility; Z78.9-Other specified health status; G40.901-Epilepsy, unspecified, not intractable, with status epilepticus; J96.01-Acute respiratory failure with hypox  In order to have a CT radiation dose as low as reasonably achievable Automated Exposure Control was utilized for adjustment of the mA and/or KV according to patient size.  DLP in mGycm= 1231  The CT scan of the head is performed without intravenous contrast enhancement. Images are acquired in axial plane with subsequent reconstruction in coronal and sagittal planes.  Comparison is made with the previous study dated 6/3/2022.  This study is a very limited diagnostic value due to suboptimal exposure and positioning and technique. There are extensive artifacts.  Evidence of a right frontal temporal craniotomy similar to the previous study. The right frontal lobe underlying the craniotomy site is not well evaluated due to the artifacts. Diffuse low density area may suggest encephalomalacia/post surgical changes.  The intraventricular hemorrhage in the occipital horn of the right lateral ventricle is  noted. There is a mild layering hemorrhage in the occipital horn of the left lateral ventricle which is poorly visualized and evaluated.  A right posterior parietal approach ventriculostomy catheter is in place. No change. No significant change in size of the ventricles since the previous study.  The gray-white matter differentiation may not be evaluated due to extensive artifacts.  Images are reviewed in bone window show no acute bony abnormality. The right frontal temporal craniotomy is similar to the previous study. It may involve the superior posterior lateral aspect of the right orbit?. This was not obvious in the previous study.  There is mucosal thickening involving the ethmoid and sphenoid sinuses. There is bilateral mastoid effusion.      Impression: 1. A very limited diagnostic study due to extensive artifacts as detailed above. 2. Right frontal temporal craniotomy. It appears to be involving the superior posterior lateral aspect of the right orbit/roof of the orbit which was not seen in the previous study. 3. Intraventricular hemorrhage in the occipital horn of the right lateral ventricle. Poorly visualized layering hematoma in the occipital horn of the left lateral ventricle. 4. A right parietal approach ventriculostomy catheter in place. 5. Persistent dilatation of the ventricles. No change.          This report was finalized on 06/06/2022 16:39 by Dr. Aida Wong MD.    XR Chest 1 View    Result Date: 6/7/2022  EXAMINATION:  XR CHEST 1 VW-  6/7/2022 3:20 AM CDT  HISTORY: Acute respiratory failure with hypoxia. On ventilator.  COMPARISON: 6/6/2022.  TECHNIQUE: Single view AP image.  FINDINGS:  A tracheostomy tube remains in place. There is hypoventilation. There is vascular crowding. There are patchy infiltrates in the perihilar regions and lung bases. There are multiple small nodules many of which are calcified on previous CT. There is mild cardiomegaly. No acute bony abnormality is seen.       Impression: 1. Hypoventilation with vascular crowding. 2. Patchy infiltrates in the perihilar regions and lung bases. Atelectasis versus pneumonia. 3. Old granulomatous disease.   This report was finalized on 06/07/2022 07:09 by Dr. Zachariah Fonseca MD.    XR Chest 1 View    Result Date: 6/6/2022  HISTORY: Intubated  CXR: Frontal view the chest obtained  COMPARISON: 6/5/2022  FINDINGS: Tracheostomy tube remains in place. Right-sided  shunt. Right upper extremity PICC line tip projects over the proximal SVC. The two medial left pleural pigtail catheters remain in place. No appreciable pneumothorax. Lungs are hypoventilated. Vascular crowding versus mild venous congestion. Bibasilar opacities may be atelectasis versus pneumonia. Scattered bilateral calcified granuloma. No acute regional bony pathology.      Impression: 1. Similar positioning of the medial left pleural pigtail catheters. No pneumothorax. Hypoventilated lungs with vascular crowding and basilar atelectasis versus pneumonia. Trace pleural effusions. This report was finalized on 06/06/2022 07:23 by Dr. Christel Felciiano MD.    XR Chest 1 View    Result Date: 6/5/2022  HISTORY: Chest tube removal  CXR: Frontal view the chest obtained  COMPARISON: 6/5/2022 at 3:14 AM  FINDINGS: The 2 large left thoracotomy tubes have been removed. The 2 medially positioned left posterior pleural pigtail catheters remain in place. No measurable pneumothorax. Small left pleural fluid collection. Hypoventilated lungs with bibasilar opacities-patchy atelectasis versus consolidation. Scattered granuloma.  Tracheostomy tube remains appropriate in position. Right upper extremity PICC line tip projects over the SVC.      Impression: 1. Removal of the two large left thoracotomy chest tubes with retention of the medial left pleural pigtail catheters. No measurable pneumothorax. Small left pleural fluid collection. Bibasilar opacities. This report was finalized on 06/05/2022 14:46 by  Dr. Christel Feliciano MD.      My radiograph interpretation/independent review of imaging: Reviewed and agree with current interpretation.  No new imaging studies today.    Pulmonary Assessment:    1. Acute hypoxic respiratory failure  2. On mechanically assisted ventilation  3. Tracheostomy and PEG tube placement done for long-term care  4. Status postcraniotomy for meningioma  5. Encephalopathy status epilepticus  6. Loculated left-sided pleural effusion with multiple chest tubes removed now.   7. Hypertension  8. Type 2 diabetes mellitus  9. S/p ventriculoperitoneal shunt placement  10. Obesity    Recommend/plan:   · Patient is not doing any better.  His FiO2 requirement is increased to 40% now and his chest tubes were removed by CT surgery.  · Continue current ventilator settings were assist-control pressure control ventilation and is currently on 40% FiO2.  · Spontaneous breathing trial if tolerated.  Mental status unchanged patient withdraws to pain in the lower extremities but remains unresponsive although opens eyes.  He does not follow commands  · Neurologic status unchanged.  Neurology and neurosurgery following.   shunt placed.  Patient had surgery for meningioma.  · LTAC referral has been done.  Continue bronchial treatment routine respiratory care.  Pulmonary toilet  · He is afebrile.  He is off sedation.  He is not on any antibiotics.  · Continue routine respiratory care and bronchial treatment.  Pulmonary toilet.  · DVT and stress ulcer prophylaxis and pain and anxiety control.  · CODE STATUS: Full.  Overall prognosis: Guarded.  · Repeat labs and imaging studies from time to time.  · We will follow    This visit was performed by both a physician and an [facility] RN.  I personally evaluated and examined the patient.  I performed all aspects of the medical decision making as documented.    Electronically signed by     Murphy Kothari MD,  Pulmonologist/Intensivist,   6/7/2022 17:08 CDT

## 2022-06-07 NOTE — PROGRESS NOTES
Neurology Progress Note      Date of admission: 5/10/2022  4:49 AM  Date of visit: 6/7/2022    Chief Complaint:  Seizures    Subjective     Subjective:    Afebrile overnight.  Blood pressure was for the most part normotensive with exception of at 0900 hrs. this morning there was a single blood pressure of 189/81.  The propofol has been off for approximately 36 hours now.  There is been some improvement in mental status but not much.  He does have a trach and PEG.  He is breathing over the ventilator currently.  He is not waking up and following commands.        Medications:  Current Facility-Administered Medications   Medication Dose Route Frequency Provider Last Rate Last Admin   • acetaminophen (TYLENOL) tablet 650 mg  650 mg Oral Q4H PRN Stevie Parsons APRN   650 mg at 06/04/22 2029    Or   • acetaminophen (TYLENOL) suppository 650 mg  650 mg Rectal Q4H PRN Stevie Parsons APRN   650 mg at 05/23/22 0016   • albuterol (PROVENTIL) nebulizer solution 0.083% 2.5 mg/3mL  2.5 mg Nebulization Q8H - RT Stevie Parsons APRN   2.5 mg at 06/07/22 0649   • alteplase (CATHFLO/ACTIVASE) injection 2 mg  2 mg Intracatheter PRN Stevie Parsons APRN   New Syringe/Cartridge at 05/28/22 1330   • bisacodyl (DULCOLAX) suppository 10 mg  10 mg Rectal Daily PRN Stevie Parsons APRN   10 mg at 06/03/22 0749   • butalbital-acetaminophen-caffeine (FIORICET, ESGIC) -40 MG per tablet 1 tablet  1 tablet Oral Q4H PRN Stevie Parsons APRN   1 tablet at 05/22/22 1729   • carvedilol (COREG) tablet 6.25 mg  6.25 mg Nasogastric BID With Meals Stevie Parsons APRN   6.25 mg at 06/07/22 0835   • chlorhexidine (PERIDEX) 0.12 % solution 15 mL  15 mL Mouth/Throat Q12H Stevie Parsons APRN   15 mL at 06/07/22 0836   • dextrose (D50W) (25 g/50 mL) IV injection 25 g  25 g Intravenous Q15 Min PRN Stevie Parsons APRN   25 g at 06/02/22 1759   • dextrose (GLUTOSE) oral gel 15 g  15 g Oral Q15 Min PRN Stevie Parsons APRN   15 g at 05/22/22 4370   •  fosphenytoin (Cerebyx) 275 mg PE in sodium chloride 0.9 % 100 mL IVPB  275 mg PE Intravenous Q8H FeliztStevie APRN   275 mg PE at 06/07/22 0546   • glucagon (human recombinant) (GLUCAGEN DIAGNOSTIC) injection 1 mg  1 mg Intramuscular Q15 Min PRN Stevie Parsons APRN       • heparin (porcine) 5000 UNIT/ML injection 5,000 Units  5,000 Units Subcutaneous Q12H Stevie Parsons APRN   5,000 Units at 06/07/22 0835   • Hold Medication (Anticoagulation)  1 each Does not apply Continuous PRN Stevie Parsons APRN       • hydrALAZINE (APRESOLINE) injection 10 mg  10 mg Intravenous Q6H PRN Stevie Parsons APRN   10 mg at 06/07/22 0904   • insulin detemir (LEVEMIR) injection 20 Units  20 Units Subcutaneous Nightly Stevie Parsons APRN   20 Units at 06/06/22 2110   • insulin regular (humuLIN R,novoLIN R) injection 2-7 Units  2-7 Units Subcutaneous Q6H FeliztStevie APRN   2 Units at 06/07/22 0039   • insulin regular (humuLIN R,novoLIN R) injection 8 Units  8 Units Subcutaneous Q6H FeliztStevie APRN   8 Units at 06/06/22 0518   • ipratropium-albuterol (DUO-NEB) nebulizer solution 3 mL  3 mL Nebulization Q4H PRN Stevie Parsons APRN   3 mL at 05/25/22 1007   • labetalol (NORMODYNE,TRANDATE) injection 10 mg  10 mg Intravenous Q6H PRN FeliztStevie APRN   10 mg at 06/03/22 1310   • lacosamide (VIMPAT) injection 200 mg  200 mg Intravenous Q12H Stevie Parsons APRN   200 mg at 06/07/22 0839   • levETIRAcetam in NaCl 0.75% (KEPPRA) IVPB 1,000 mg  1,000 mg Intravenous Q12H Di Ward APRN   1,000 mg at 06/07/22 0836   • levothyroxine (SYNTHROID, LEVOTHROID) tablet 125 mcg  125 mcg Nasogastric Q AM Stevie Parsons APRN   125 mcg at 06/07/22 0546   • lidocaine (XYLOCAINE) 1 % injection 10 mL  10 mL Intradermal Once Stevie Parsons APRN       • liothyronine (CYTOMEL) tablet 25 mcg  25 mcg Nasogastric Daily Stevie Parsons APRN   25 mcg at 06/07/22 0837   • lisinopril (PRINIVIL,ZESTRIL) tablet 20 mg  20 mg Nasogastric Q24H Stevie Parsons  MARIO   20 mg at 06/07/22 0835   • LORazepam (ATIVAN) injection 2 mg  2 mg Intravenous Q2H PRN Donnell Freire MD   2 mg at 06/04/22 2038   • mupirocin (BACTROBAN) 2 % ointment 1 application  1 application Topical Q12H Stevie Parsons APRN   1 application at 06/07/22 0841   • norepinephrine (LEVOPHED) 8 mg in 250 mL NS infusion (premix)  0.02-0.3 mcg/kg/min Intravenous Titrated Stevie Parsons APRN   Stopped at 06/05/22 0835   • Nutrisource fiber pack 1 packet  1 packet Nasogastric 4x Daily RustStevie APRN   1 packet at 06/07/22 0835   • ondansetron (ZOFRAN) tablet 4 mg  4 mg Oral Q6H PRN FeliztStevie APRN        Or   • ondansetron (ZOFRAN) injection 4 mg  4 mg Intravenous Q6H PRN FeliztStevie APRN       • pantoprazole (PROTONIX) injection 40 mg  40 mg Intravenous Q AM RustStevie APRN   40 mg at 06/07/22 0546   • polyethylene glycol (MIRALAX) packet 17 g  17 g Oral Daily Stevie Parsons APRN   17 g at 06/07/22 0835   • propofol (DIPRIVAN) infusion 10 mg/mL 100 mL  5-50 mcg/kg/min Intravenous Titrated Stevie Parsons APRN   Stopped at 06/05/22 0815   • ProSource TF oral liquid 45 mL  1 packet Nasogastric BID Stevie Parsons APRN   45 mL at 06/04/22 2029   • ProSource TF oral liquid 45 mL  45 mL Per G Tube BID Aden Kc MD   45 mL at 06/07/22 0835   • sodium chloride 3 % nebulizer solution 4 mL  4 mL Nebulization BID - RT Stevie Parsons APRN   4 mL at 06/07/22 0649       Review of Systems:   -A 14 point review of systems is unobtainable    Objective     Objective      Vital Signs  Temp:  [97.9 °F (36.6 °C)-98.5 °F (36.9 °C)] 98.2 °F (36.8 °C)  Heart Rate:  [52-79] 61  Resp:  [24-38] 28  BP: (116-189)/(52-83) 189/81  FiO2 (%):  [30 %] 30 %    Physical Exam:    HEENT:  Neck supple.  Tracheostomy in place  CVS:  Regular rate and rhythm.  No murmurs  Carotid Examination:  No bruits  Lungs:  Clear to auscultation  Abdomen:  Non-tender, Non-distended.  PEG tube in place  Extremities:  No signs of peripheral  edema    Neurologic Exam:   Eyes open with any stimulation.  Pupils are equally reactive to light.  Blinks to threat bilaterally.  Gag intact.    Facial grimacing symmetric with noxious stimulation  Motor: (strength out of 5:  1= minimal movement, 2 = movement in plane of gravity, 3 = movement against gravity, 4 = movement against some resistance, 5 = full strength)    Facial grimacing to noxious stimulation but no signs of spontaneous movement of the extremities currently.    DTR:  2+ throughout in all four extremities  upgoing toe on left    Sensory:  Facial grimace to noxious stimulation    Coordination/Gait:  -No active tremors     Results Review:    I reviewed the patient's new clinical results.    Lab Results (last 24 hours)     Procedure Component Value Units Date/Time    POC Glucose Once [[id number]]  (Abnormal) Collected: 06/07/22 0617    Specimen: Blood Updated: 06/07/22 0628     Glucose 138 mg/dL      Comment: : [id number] Nashawna BerryieMeter ID: [id number]       Manual Differential [[id number]]  (Abnormal) Collected: 06/07/22 0401    Specimen: Blood Updated: 06/07/22 0510     Neutrophil % 80.0 %      Lymphocyte % 10.0 %      Monocyte % 4.0 %      Eosinophil % 2.0 %      Myelocyte % 3.0 %      Promyelocyte % 1.0 %      Neutrophils Absolute 8.05 10*3/mm3      Lymphocytes Absolute 1.01 10*3/mm3      Monocytes Absolute 0.40 10*3/mm3      Eosinophils Absolute 0.20 10*3/mm3      Basophilic Stippling Slight/1+     Polychromasia Slight/1+     WBC Morphology Normal     Platelet Morphology Normal    CBC & Differential [[id number]]  (Abnormal) Collected: 06/07/22 0401    Specimen: Blood Updated: 06/07/22 0510    Narrative:      The following orders were created for panel order CBC & Differential.  Procedure                               Abnormality         Status                     ---------                               -----------         ------                     CBC Auto Differential[[id number]]         Abnormal            Final result                 Please view results for these tests on the individual orders.    CBC Auto Differential [[id number]]  (Abnormal) Collected: 06/07/22 0401    Specimen: Blood Updated: 06/07/22 0510     WBC 10.06 10*3/mm3      RBC 2.76 10*6/mm3      Hemoglobin 8.5 g/dL      Hematocrit 27.4 %      MCV 99.3 fL      MCH 30.8 pg      MCHC 31.0 g/dL      RDW 14.7 %      RDW-SD 51.2 fl      MPV 9.5 fL      Platelets 251 10*3/mm3     Narrative:      The previously reported component NRBC is no longer being reported. Previous result was 0.3 /100 WBC (Reference Range: 0.0-0.2 /100 WBC) on 6/7/2022 at [address].    Comprehensive Metabolic Panel [[id number]]  (Abnormal) Collected: 06/07/22 0401    Specimen: Blood Updated: 06/07/22 0504     Glucose 148 mg/dL      BUN 29 mg/dL      Creatinine 0.54 mg/dL      Sodium 138 mmol/L      Potassium 4.5 mmol/L      Chloride 104 mmol/L      CO2 27.0 mmol/L      Calcium 7.7 mg/dL      Total Protein 5.2 g/dL      Albumin 2.00 g/dL      ALT (SGPT) 27 U/L      AST (SGOT) 45 U/L      Alkaline Phosphatase 199 U/L      Total Bilirubin 0.3 mg/dL      Globulin 3.2 gm/dL      A/G Ratio 0.6 g/dL      BUN/Creatinine Ratio 53.7     Anion Gap 7.0 mmol/L      eGFR 102.6 mL/min/1.73      Comment: National Kidney Foundation and American Society of Nephrology (ASN) Task Force recommended calculation based on the Chronic Kidney Disease Epidemiology Collaboration (CKD-EPI) equation refit without adjustment for race.       Narrative:      GFR Normal >60  Chronic Kidney Disease <60  Kidney Failure <15      Phenytoin Level, Total [[id number]]  (Normal) Collected: 06/07/22 0401    Specimen: Blood Updated: 06/07/22 0459     Phenytoin Level 15.6 mcg/mL     Blood Gas, Arterial - [[id number]]  (Abnormal) Collected: 06/07/22 0419    Specimen: Arterial Blood Updated: 06/07/22 0415     Site Right Radial     Denzel's Test Positive     pH, Arterial 7.514 pH units      Comment: 83 Value above  reference range        pCO2, Arterial 39.0 mm Hg      pO2, Arterial 89.3 mm Hg      HCO3, Arterial 31.4 mmol/L      Comment: 83 Value above reference range        Base Excess, Arterial 7.8 mmol/L      Comment: 83 Value above reference range        O2 Saturation, Arterial 98.9 %      Temperature 37.0 C      Barometric Pressure for Blood Gas 745 mmHg      Modality Ventilator     FIO2 30 %      Ventilator Mode PC     Set Mec Resp Rate 16.0     PEEP 6.0     PIP 14 cmH2O      Comment: Meter: [id number]     :  [id number]        Collected by [id number]     pCO2, Temperature Corrected 39.0 mm Hg      pH, Temp Corrected 7.514 pH Units      pO2, Temperature Corrected 89.3 mm Hg     POC Glucose Once [[id number]]  (Abnormal) Collected: 06/07/22 0032    Specimen: Blood Updated: 06/07/22 0044     Glucose 194 mg/dL      Comment: : [id number] Na JamieMeter ID: [id number]       POC Glucose Once [[id number]]  (Abnormal) Collected: 06/06/22 2110    Specimen: Blood Updated: 06/06/22 2121     Glucose 153 mg/dL      Comment: : [id number] Na JamieMeter ID: [id number]       POC Glucose Once [[id number]]  (Normal) Collected: 06/06/22 1628    Specimen: Blood Updated: 06/06/22 1640     Glucose 120 mg/dL      Comment: : [id number] Macho MargaritoleyMeter ID: [id number]           Imaging Results (Last 24 Hours)     Procedure Component Value Units Date/Time    XR Chest 1 View [[id number]] Collected: 06/07/22 0708     Updated: 06/07/22 0712    Narrative:      EXAMINATION:  XR CHEST 1 VW-  6/7/2022 3:20 AM CDT     HISTORY: Acute respiratory failure with hypoxia. On ventilator.     COMPARISON: 6/6/2022.     TECHNIQUE: Single view AP image.     FINDINGS:  A tracheostomy tube remains in place. There is  hypoventilation. There is vascular crowding. There are patchy  infiltrates in the perihilar regions and lung bases. There are multiple  small nodules many of which are calcified on previous CT. There is mild  cardiomegaly. No  acute bony abnormality is seen.       Impression:      1. Hypoventilation with vascular crowding.  2. Patchy infiltrates in the perihilar regions and lung bases.  Atelectasis versus pneumonia.  3. Old granulomatous disease.        This report was finalized on 06/07/2022 07:09 by Dr. Zachariah Fonseca MD.    CT Head Without Contrast [[id number]] Collected: 06/06/22 1629     Updated: 06/06/22 1642    Narrative:      EXAMINATION: CT HEAD WO CONTRAST-      6/6/2022 3:54 PM CDT     HISTORY: SP craniotomy for tumor.  SP  shunt placement.  Continued  neurologic decline.; R13.10-Dysphagia, unspecified; Z01.818-Encounter  for other preprocedural examination; D32.9-Benign neoplasm of meninges,  unspecified; Z74.09-Other reduced mobility; Z78.9-Other specified health  status; G40.901-Epilepsy, unspecified, not intractable, with status  epilepticus; J96.01-Acute respiratory failure with hypox     In order to have a CT radiation dose as low as reasonably achievable  Automated Exposure Control was utilized for adjustment of the mA and/or  KV according to patient size.     DLP in mGycm= 1231     The CT scan of the head is performed without intravenous contrast  enhancement. Images are acquired in axial plane with subsequent  reconstruction in coronal and sagittal planes.     Comparison is made with the previous study dated 6/3/2022.     This study is a very limited diagnostic value due to suboptimal exposure  and positioning and technique. There are extensive artifacts.     Evidence of a right frontal temporal craniotomy similar to the previous  study. The right frontal lobe underlying the craniotomy site is not well  evaluated due to the artifacts. Diffuse low density area may suggest  encephalomalacia/post surgical changes.     The intraventricular hemorrhage in the occipital horn of the right  lateral ventricle is noted. There is a mild layering hemorrhage in the  occipital horn of the left lateral ventricle which is poorly  visualized  and evaluated.     A right posterior parietal approach ventriculostomy catheter is in  place. No change. No significant change in size of the ventricles since  the previous study.     The gray-white matter differentiation may not be evaluated due to  extensive artifacts.     Images are reviewed in bone window show no acute bony abnormality. The  right frontal temporal craniotomy is similar to the previous study. It  may involve the superior posterior lateral aspect of the right orbit?.  This was not obvious in the previous study.     There is mucosal thickening involving the ethmoid and sphenoid sinuses.  There is bilateral mastoid effusion.       Impression:      1. A very limited diagnostic study due to extensive artifacts as  detailed above.  2. Right frontal temporal craniotomy. It appears to be involving the  superior posterior lateral aspect of the right orbit/roof of the orbit  which was not seen in the previous study.  3. Intraventricular hemorrhage in the occipital horn of the right  lateral ventricle. Poorly visualized layering hematoma in the occipital  horn of the left lateral ventricle.  4. A right parietal approach ventriculostomy catheter in place.  5. Persistent dilatation of the ventricles. No change.                             This report was finalized on 06/06/2022 16:39 by Dr. Aida Wong MD.        Phenytoin level today is 15.6 uncorrected  Zinc is normal at 54  Prealbumin is 12.7 which is below normal range.    EEG on 6/5 shows slowing.  Mouth movements were captured and had no epileptic correlate.    Repeat head CT without contrast on 6/6 showing the ventriculoperitoneal shunt is in place with no signs of ventriculomegaly.  No acute findings seen.    Calcium is low at 7.7 but albumin is also low at 2.  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 Seizures  --No seizure activity currently.  2.  Phenytoin level currently 15.6  3. Hypoalbuminemia .  PEG tube in place.   4. Anemia  5. Atrial fibrillation  6.  Status post ventriculoperitoneal shunt on 6/3  7.  Metabolic encephalopathy likely secondary to prolonged propofol use which is now improving.  8.  Rhythmic mouth movements not epileptic.    Plan:  · Fosphenytoin 275 mg every 8 hours  · Continue daily Dilantin levels  · On Vimpat 200 mg IV every 12 hours  · Keppra 500 mg every 12 hours -Will increase Keppra to 1000 mg every 12 hours 6/5  · Showing some slight signs of waking up.  He has been off the propofol for approximately 36 hours.  I anticipate a full 3 days off of propofol before we start seeing improvement based on the last time that we stopped propofol.      36 minutes of critical care time with adjustment of sedation, monitoring vital signs, neurologic exam.    Donnell Freire MD  06/07/22  09:08 CDT

## 2022-06-07 NOTE — PROGRESS NOTES
NEUROSURGERY DAILY PROGRESS NOTE    ASSESSMENT:   Hai Hernandez is a 77 y.o. with a significant comorbidity of acephalic migraines, thyroid disease status post radiation as a child, basal cell and squamous cell carcinoma of the skin. He presents in FU for known meningioma found on workup for right visual field changes. Physical exam findings of neurologically intact with resolution of all symptoms and mild decreased vision in right eye.  His imaging shows 22 x 24 x 13.5 mm right planum sphenoidale mass most suggestive of meningioma.    Past Medical History:   Diagnosis Date   • Brain tumor (HCC)    • Depression    • Hearing loss    • History of cellulitis     right foot big toe   • Hypertension    • Pneumonia    • Right arm weakness     after cervial injury   • Sciatic pain     affects balance   • Thyroid disease    • Visual disturbance     due to brain tumor - right eye     Active Hospital Problems    Diagnosis    • **Meningioma (HCC)    • Acute deep vein thrombosis (DVT) of the ulnar and brachial veins of right upper extremity (HCC)    • Loculated pleural effusion    • Fever    • PAF (paroxysmal atrial fibrillation) (HCC)    • Cerebral parenchymal hemorrhage (HCC)    • Essential hypertension    • Type 2 diabetes mellitus with hyperglycemia, without long-term current use of insulin (HCC)    • Hypothyroidism (acquired)    • Acute respiratory failure (secondary to status epilepticus)    • Thrombocytopenia (HCC)    • 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  Opens eyes to painful stimuli and kept them open.  Does not follow commands.  Nonverbal.  Weakly withdraws to tactile stimuli.    Intracranial meningioma   POD #28 (5/10/2022) Status post  postcraniotomy for tumor   Pathology: WHO 1 Meningioma   MRI with blood products in resection cavity but no evidence of cerebritis   CT head reviewed without expansion of SDH   Neurochecks per policy   Decadron off   Leakage from wound.  Stapled at bedside.  Keep for now.  If leaks again will oversew    Hydrocephalus   Lumbar drain removed   CSF unremarkable from 5/17 and 5/23.    4 Days Post-Op right posterior parietal  shunt placement   Shunt pumps and refills well   Postop CT stable   Repeat CT head today    Postop seizures, in status   Neurology managing   Cont Keppra, Dialntin, Vimpat   Stat Ativan   Follow dilantin levels   Repeat EEG unremarkable     CV: Cardene off   keep SBP <140.   Hypotension resolved and off pressors   Requiring hydralazine and labetalol PRNs   A-line removed secondary to limb ischemia  Pulm: Tracheostomy in place.  Appreciate ENT   Left chest tube placed x2 CT managing with TPA  : Keep jansen for now.   FEN: Hyponatremia, 127   3% NaCl DC   Poor glucose control.  Increase SSI     ENDO: Accu-Chek and treat per policy  GI: PEG tube placement today.  Appreciate GI  ID: Afebrile overnight,    DDX: infection vs drug fever   WBC 8.29   CRP in 20s, repeat pending   Broad spectrum abx, Meropenum and Vanc   Blood cult NGTD   Body fluid cx pending   CSF cultures pending, NGTD   UA+, 100K GNB   Heme:  DVT prophylaxis with SCD's.     Plt up 193 and HIT ab negative.     RUE clot.  Leave PICC for now.  Can not anticoagulate  Pain: NA  Dispo: DC pending seizure control   Pending extubation    CHIEF COMPLAINT:   Right visual field changes    Subjective  Symptom stable    Temp:  [97.9 °F (36.6 °C)-98.5 °F (36.9 °C)] 98.2 °F (36.8 °C)  Heart Rate:  [52-79] 60  Resp:  [24-38] 27  BP: (116-189)/(52-83) 151/68  FiO2 (%):  [30 %] 30 %    Objective:  General Appearance:  Well-appearing and in no acute distress.    Vital signs: (most recent): Blood pressure 151/68, pulse 60, temperature 98.2 °F (36.8 °C),  "temperature source Axillary, resp. rate 27, height 182.9 cm (72\"), weight 117 kg (258 lb 3.2 oz), SpO2 96 %.      Neurologic Exam     Mental Status   Level of consciousness: arousable by verbal stimuli ,  drowsy  Off propofol.  Opens eyes to painful stimuli and kept them open.  Does not follow commands.  Nonverbal.  Weakly withdraws to tactile stimuli.           Cranial Nerves     CN III, IV, VI   Pupils are equal, round, and reactive to light.  Extraocular motions are normal.     CN IX, X   CN IX normal.     Gait, Coordination, and Reflexes     Tremor   Resting tremor: absent  Intention tremor: absent  Action tremor: absent    Drains:   Urethral Catheter Non-latex;Silicone 16 Fr. (Active)       Output by Drain (mL) 06/06/22 0701 - 06/06/22 1900 06/06/22 1901 - 06/07/22 0700 06/07/22 0701 - 06/07/22 0953 Range Total   Requested LDAs do not have output data documented.       Imaging Results (Last 24 Hours)     Procedure Component Value Units Date/Time    XR Chest 1 View [[id number]] Collected: 06/07/22 0708     Updated: 06/07/22 0712    Narrative:      EXAMINATION:  XR CHEST 1 VW-  6/7/2022 3:20 AM CDT     HISTORY: Acute respiratory failure with hypoxia. On ventilator.     COMPARISON: 6/6/2022.     TECHNIQUE: Single view AP image.     FINDINGS:  A tracheostomy tube remains in place. There is  hypoventilation. There is vascular crowding. There are patchy  infiltrates in the perihilar regions and lung bases. There are multiple  small nodules many of which are calcified on previous CT. There is mild  cardiomegaly. No acute bony abnormality is seen.       Impression:      1. Hypoventilation with vascular crowding.  2. Patchy infiltrates in the perihilar regions and lung bases.  Atelectasis versus pneumonia.  3. Old granulomatous disease.        This report was finalized on 06/07/2022 07:09 by Dr. Zachariah Fonseca MD.    CT Head Without Contrast [[id number]] Collected: 06/06/22 1629     Updated: 06/06/22 1642    Narrative:   "    EXAMINATION: CT HEAD WO CONTRAST-      6/6/2022 3:54 PM CDT     HISTORY: SP craniotomy for tumor.  SP  shunt placement.  Continued  neurologic decline.; R13.10-Dysphagia, unspecified; Z01.818-Encounter  for other preprocedural examination; D32.9-Benign neoplasm of meninges,  unspecified; Z74.09-Other reduced mobility; Z78.9-Other specified health  status; G40.901-Epilepsy, unspecified, not intractable, with status  epilepticus; J96.01-Acute respiratory failure with hypox     In order to have a CT radiation dose as low as reasonably achievable  Automated Exposure Control was utilized for adjustment of the mA and/or  KV according to patient size.     DLP in mGycm= 1231     The CT scan of the head is performed without intravenous contrast  enhancement. Images are acquired in axial plane with subsequent  reconstruction in coronal and sagittal planes.     Comparison is made with the previous study dated 6/3/2022.     This study is a very limited diagnostic value due to suboptimal exposure  and positioning and technique. There are extensive artifacts.     Evidence of a right frontal temporal craniotomy similar to the previous  study. The right frontal lobe underlying the craniotomy site is not well  evaluated due to the artifacts. Diffuse low density area may suggest  encephalomalacia/post surgical changes.     The intraventricular hemorrhage in the occipital horn of the right  lateral ventricle is noted. There is a mild layering hemorrhage in the  occipital horn of the left lateral ventricle which is poorly visualized  and evaluated.     A right posterior parietal approach ventriculostomy catheter is in  place. No change. No significant change in size of the ventricles since  the previous study.     The gray-white matter differentiation may not be evaluated due to  extensive artifacts.     Images are reviewed in bone window show no acute bony abnormality. The  right frontal temporal craniotomy is similar to the  previous study. It  may involve the superior posterior lateral aspect of the right orbit?.  This was not obvious in the previous study.     There is mucosal thickening involving the ethmoid and sphenoid sinuses.  There is bilateral mastoid effusion.       Impression:      1. A very limited diagnostic study due to extensive artifacts as  detailed above.  2. Right frontal temporal craniotomy. It appears to be involving the  superior posterior lateral aspect of the right orbit/roof of the orbit  which was not seen in the previous study.  3. Intraventricular hemorrhage in the occipital horn of the right  lateral ventricle. Poorly visualized layering hematoma in the occipital  horn of the left lateral ventricle.  4. A right parietal approach ventriculostomy catheter in place.  5. Persistent dilatation of the ventricles. No change.                             This report was finalized on 06/06/2022 16:39 by Dr. Aida Wong MD.        Lab Results (last 24 hours)     Procedure Component Value Units Date/Time    POC Glucose Once [[id number]]  (Abnormal) Collected: 06/07/22 0617    Specimen: Blood Updated: 06/07/22 0628     Glucose 138 mg/dL      Comment: : [id number] Na JamalieMeter ID: [id number]       Manual Differential [[id number]]  (Abnormal) Collected: 06/07/22 0401    Specimen: Blood Updated: 06/07/22 0510     Neutrophil % 80.0 %      Lymphocyte % 10.0 %      Monocyte % 4.0 %      Eosinophil % 2.0 %      Myelocyte % 3.0 %      Promyelocyte % 1.0 %      Neutrophils Absolute 8.05 10*3/mm3      Lymphocytes Absolute 1.01 10*3/mm3      Monocytes Absolute 0.40 10*3/mm3      Eosinophils Absolute 0.20 10*3/mm3      Basophilic Stippling Slight/1+     Polychromasia Slight/1+     WBC Morphology Normal     Platelet Morphology Normal    CBC & Differential [[id number]]  (Abnormal) Collected: 06/07/22 0401    Specimen: Blood Updated: 06/07/22 0510    Narrative:      The following orders were created for panel order CBC &  Differential.  Procedure                               Abnormality         Status                     ---------                               -----------         ------                     CBC Auto Differential[[id number]]        Abnormal            Final result                 Please view results for these tests on the individual orders.    CBC Auto Differential [[id number]]  (Abnormal) Collected: 06/07/22 0401    Specimen: Blood Updated: 06/07/22 0510     WBC 10.06 10*3/mm3      RBC 2.76 10*6/mm3      Hemoglobin 8.5 g/dL      Hematocrit 27.4 %      MCV 99.3 fL      MCH 30.8 pg      MCHC 31.0 g/dL      RDW 14.7 %      RDW-SD 51.2 fl      MPV 9.5 fL      Platelets 251 10*3/mm3     Narrative:      The previously reported component NRBC is no longer being reported. Previous result was 0.3 /100 WBC (Reference Range: 0.0-0.2 /100 WBC) on 6/7/2022 at [facility].    Comprehensive Metabolic Panel [[id number]]  (Abnormal) Collected: 06/07/22 0401    Specimen: Blood Updated: 06/07/22 0504     Glucose 148 mg/dL      BUN 29 mg/dL      Creatinine 0.54 mg/dL      Sodium 138 mmol/L      Potassium 4.5 mmol/L      Chloride 104 mmol/L      CO2 27.0 mmol/L      Calcium 7.7 mg/dL      Total Protein 5.2 g/dL      Albumin 2.00 g/dL      ALT (SGPT) 27 U/L      AST (SGOT) 45 U/L      Alkaline Phosphatase 199 U/L      Total Bilirubin 0.3 mg/dL      Globulin 3.2 gm/dL      A/G Ratio 0.6 g/dL      BUN/Creatinine Ratio 53.7     Anion Gap 7.0 mmol/L      eGFR 102.6 mL/min/1.73      Comment: National Kidney Foundation and American Society of Nephrology (ASN) Task Force recommended calculation based on the Chronic Kidney Disease Epidemiology Collaboration (CKD-EPI) equation refit without adjustment for race.       Narrative:      GFR Normal >60  Chronic Kidney Disease <60  Kidney Failure <15      Phenytoin Level, Total [[id number]]  (Normal) Collected: 06/07/22 0401    Specimen: Blood Updated: 06/07/22 0459     Phenytoin Level 15.6 mcg/mL     Blood  Gas, Arterial - [[id number]]  (Abnormal) Collected: 06/07/22 0419    Specimen: Arterial Blood Updated: 06/07/22 0415     Site Right Radial     Denzel's Test Positive     pH, Arterial 7.514 pH units      Comment: 83 Value above reference range        pCO2, Arterial 39.0 mm Hg      pO2, Arterial 89.3 mm Hg      HCO3, Arterial 31.4 mmol/L      Comment: 83 Value above reference range        Base Excess, Arterial 7.8 mmol/L      Comment: 83 Value above reference range        O2 Saturation, Arterial 98.9 %      Temperature 37.0 C      Barometric Pressure for Blood Gas 745 mmHg      Modality Ventilator     FIO2 30 %      Ventilator Mode PC     Set [facility] Resp Rate 16.0     PEEP 6.0     PIP 14 cmH2O      Comment: Meter: [id number]     :  [id number]        Collected by [id number]     pCO2, Temperature Corrected 39.0 mm Hg      pH, Temp Corrected 7.514 pH Units      pO2, Temperature Corrected 89.3 mm Hg     POC Glucose Once [[id number]]  (Abnormal) Collected: 06/07/22 0032    Specimen: Blood Updated: 06/07/22 0044     Glucose 194 mg/dL      Comment: : [id number] Na JacksonMeter ID: [id number]       POC Glucose Once [[id number]]  (Abnormal) Collected: 06/06/22 2110    Specimen: Blood Updated: 06/06/22 2121     Glucose 153 mg/dL      Comment: : [id number] Na JacksonMeter ID: [id number]       POC Glucose Once [[id number]]  (Normal) Collected: 06/06/22 1628    Specimen: Blood Updated: 06/06/22 1640     Glucose 120 mg/dL      Comment: : [id number] Macho SpearsMeter ID: [id number]           [id number]  Stevie Parsons, APRN

## 2022-06-08 ENCOUNTER — APPOINTMENT (OUTPATIENT)
Dept: GENERAL RADIOLOGY | Facility: HOSPITAL | Age: 77
End: 2022-06-08

## 2022-06-08 LAB
ALBUMIN SERPL-MCNC: 2.1 G/DL (ref 3.5–5.2)
ALBUMIN/GLOB SERPL: 0.5 G/DL
ALP SERPL-CCNC: 188 U/L (ref 39–117)
ALT SERPL W P-5'-P-CCNC: 29 U/L (ref 1–41)
ANION GAP SERPL CALCULATED.3IONS-SCNC: 7 MMOL/L (ref 5–15)
ANISOCYTOSIS BLD QL: ABNORMAL
ARTERIAL PATENCY WRIST A: POSITIVE
AST SERPL-CCNC: 42 U/L (ref 1–40)
ATMOSPHERIC PRESS: 747 MMHG
BASE EXCESS BLDA CALC-SCNC: 6 MMOL/L (ref 0–2)
BASOPHILS # BLD MANUAL: 0.11 10*3/MM3 (ref 0–0.2)
BASOPHILS NFR BLD MANUAL: 1 % (ref 0–1.5)
BDY SITE: ABNORMAL
BILIRUB SERPL-MCNC: 0.3 MG/DL (ref 0–1.2)
BODY TEMPERATURE: 37 C
BUN SERPL-MCNC: 29 MG/DL (ref 8–23)
BUN/CREAT SERPL: 54.7 (ref 7–25)
CALCIUM SPEC-SCNC: 8.4 MG/DL (ref 8.6–10.5)
CHLORIDE SERPL-SCNC: 103 MMOL/L (ref 98–107)
CO2 SERPL-SCNC: 28 MMOL/L (ref 22–29)
CREAT SERPL-MCNC: 0.53 MG/DL (ref 0.76–1.27)
DEPRECATED RDW RBC AUTO: 52.5 FL (ref 37–54)
EGFRCR SERPLBLD CKD-EPI 2021: 103.2 ML/MIN/1.73
EOSINOPHIL # BLD MANUAL: 0.11 10*3/MM3 (ref 0–0.4)
EOSINOPHIL NFR BLD MANUAL: 1 % (ref 0.3–6.2)
ERYTHROCYTE [DISTWIDTH] IN BLOOD BY AUTOMATED COUNT: 15.1 % (ref 12.3–15.4)
GLOBULIN UR ELPH-MCNC: 4.2 GM/DL
GLUCOSE BLDC GLUCOMTR-MCNC: 118 MG/DL (ref 70–130)
GLUCOSE BLDC GLUCOMTR-MCNC: 136 MG/DL (ref 70–130)
GLUCOSE BLDC GLUCOMTR-MCNC: 159 MG/DL (ref 70–130)
GLUCOSE BLDC GLUCOMTR-MCNC: 186 MG/DL (ref 70–130)
GLUCOSE BLDC GLUCOMTR-MCNC: 188 MG/DL (ref 70–130)
GLUCOSE SERPL-MCNC: 96 MG/DL (ref 65–99)
HCO3 BLDA-SCNC: 30.4 MMOL/L (ref 20–26)
HCT VFR BLD AUTO: 28.5 % (ref 37.5–51)
HGB BLD-MCNC: 8.9 G/DL (ref 13–17.7)
INHALED O2 CONCENTRATION: 30 %
LYMPHOCYTES # BLD MANUAL: 0.54 10*3/MM3 (ref 0.7–3.1)
LYMPHOCYTES NFR BLD MANUAL: 7.1 % (ref 5–12)
Lab: ABNORMAL
MACROCYTES BLD QL SMEAR: ABNORMAL
MCH RBC QN AUTO: 31.2 PG (ref 26.6–33)
MCHC RBC AUTO-ENTMCNC: 31.2 G/DL (ref 31.5–35.7)
MCV RBC AUTO: 100 FL (ref 79–97)
METAMYELOCYTES NFR BLD MANUAL: 2 % (ref 0–0)
MODALITY: ABNORMAL
MONOCYTES # BLD: 0.76 10*3/MM3 (ref 0.1–0.9)
NEUTROPHILS # BLD AUTO: 8.93 10*3/MM3 (ref 1.7–7)
NEUTROPHILS NFR BLD MANUAL: 83.8 % (ref 42.7–76)
NRBC SPEC MANUAL: 1 /100 WBC (ref 0–0.2)
PAW @ PEAK INSP FLOW SETTING VENT: 14 CMH2O
PCO2 BLDA: 42.3 MM HG (ref 35–45)
PCO2 TEMP ADJ BLD: 42.3 MM HG (ref 35–45)
PEEP RESPIRATORY: 6 CM[H2O]
PH BLDA: 7.46 PH UNITS (ref 7.35–7.45)
PH, TEMP CORRECTED: 7.46 PH UNITS (ref 7.35–7.45)
PHENYTOIN SERPL-MCNC: 15.1 MCG/ML (ref 10–20)
PLAT MORPH BLD: NORMAL
PLATELET # BLD AUTO: 251 10*3/MM3 (ref 140–450)
PMV BLD AUTO: 9.3 FL (ref 6–12)
PO2 BLDA: 80 MM HG (ref 83–108)
PO2 TEMP ADJ BLD: 80 MM HG (ref 83–108)
POIKILOCYTOSIS BLD QL SMEAR: ABNORMAL
POLYCHROMASIA BLD QL SMEAR: ABNORMAL
POTASSIUM SERPL-SCNC: 4.4 MMOL/L (ref 3.5–5.2)
PROT SERPL-MCNC: 6.3 G/DL (ref 6–8.5)
RBC # BLD AUTO: 2.85 10*6/MM3 (ref 4.14–5.8)
SAO2 % BLDCOA: 97.2 % (ref 94–99)
SET MECH RESP RATE: 16
SODIUM SERPL-SCNC: 138 MMOL/L (ref 136–145)
VARIANT LYMPHS NFR BLD MANUAL: 5.1 % (ref 19.6–45.3)
VENTILATOR MODE: ABNORMAL
WBC MORPH BLD: NORMAL
WBC NRBC COR # BLD: 10.66 10*3/MM3 (ref 3.4–10.8)

## 2022-06-08 PROCEDURE — 94799 UNLISTED PULMONARY SVC/PX: CPT

## 2022-06-08 PROCEDURE — 99232 SBSQ HOSP IP/OBS MODERATE 35: CPT | Performed by: OTOLARYNGOLOGY

## 2022-06-08 PROCEDURE — 85007 BL SMEAR W/DIFF WBC COUNT: CPT | Performed by: NURSE PRACTITIONER

## 2022-06-08 PROCEDURE — 99233 SBSQ HOSP IP/OBS HIGH 50: CPT | Performed by: PSYCHIATRY & NEUROLOGY

## 2022-06-08 PROCEDURE — 80053 COMPREHEN METABOLIC PANEL: CPT | Performed by: NURSE PRACTITIONER

## 2022-06-08 PROCEDURE — 36600 WITHDRAWAL OF ARTERIAL BLOOD: CPT

## 2022-06-08 PROCEDURE — 63710000001 INSULIN REGULAR HUMAN PER 5 UNITS: Performed by: NURSE PRACTITIONER

## 2022-06-08 PROCEDURE — 63710000001 INSULIN DETEMIR PER 5 UNITS: Performed by: NURSE PRACTITIONER

## 2022-06-08 PROCEDURE — 25010000002 HEPARIN (PORCINE) PER 1000 UNITS: Performed by: NURSE PRACTITIONER

## 2022-06-08 PROCEDURE — 99232 SBSQ HOSP IP/OBS MODERATE 35: CPT | Performed by: NURSE PRACTITIONER

## 2022-06-08 PROCEDURE — 94761 N-INVAS EAR/PLS OXIMETRY MLT: CPT

## 2022-06-08 PROCEDURE — 71045 X-RAY EXAM CHEST 1 VIEW: CPT

## 2022-06-08 PROCEDURE — 99024 POSTOP FOLLOW-UP VISIT: CPT | Performed by: NURSE PRACTITIONER

## 2022-06-08 PROCEDURE — 82962 GLUCOSE BLOOD TEST: CPT

## 2022-06-08 PROCEDURE — 0 LEVETIRACETAM IN NACL 0.75% 1000 MG/100ML SOLUTION: Performed by: CLINICAL NURSE SPECIALIST

## 2022-06-08 PROCEDURE — 99233 SBSQ HOSP IP/OBS HIGH 50: CPT | Performed by: INTERNAL MEDICINE

## 2022-06-08 PROCEDURE — 94003 VENT MGMT INPAT SUBQ DAY: CPT

## 2022-06-08 PROCEDURE — 82803 BLOOD GASES ANY COMBINATION: CPT

## 2022-06-08 PROCEDURE — 80185 ASSAY OF PHENYTOIN TOTAL: CPT | Performed by: NURSE PRACTITIONER

## 2022-06-08 PROCEDURE — 85025 COMPLETE CBC W/AUTO DIFF WBC: CPT | Performed by: NURSE PRACTITIONER

## 2022-06-08 PROCEDURE — 97110 THERAPEUTIC EXERCISES: CPT

## 2022-06-08 PROCEDURE — 25010000002 FOSPHENYTOIN 500 MG PE/10ML SOLUTION 10 ML VIAL: Performed by: NURSE PRACTITIONER

## 2022-06-08 RX ORDER — NOREPINEPHRINE BIT/0.9 % NACL 8 MG/250ML
.02-.3 INFUSION BOTTLE (ML) INTRAVENOUS
Status: DISCONTINUED | OUTPATIENT
Start: 2022-06-08 | End: 2022-06-11

## 2022-06-08 RX ADMIN — ALBUTEROL SULFATE 2.5 MG: 2.5 SOLUTION RESPIRATORY (INHALATION) at 13:58

## 2022-06-08 RX ADMIN — PANTOPRAZOLE SODIUM 40 MG: 40 INJECTION, POWDER, FOR SOLUTION INTRAVENOUS at 06:11

## 2022-06-08 RX ADMIN — INSULIN HUMAN 8 UNITS: 100 INJECTION, SOLUTION PARENTERAL at 17:14

## 2022-06-08 RX ADMIN — HEPARIN SODIUM 5000 UNITS: 5000 INJECTION INTRAVENOUS; SUBCUTANEOUS at 20:00

## 2022-06-08 RX ADMIN — HEPARIN SODIUM 5000 UNITS: 5000 INJECTION INTRAVENOUS; SUBCUTANEOUS at 08:29

## 2022-06-08 RX ADMIN — LIOTHYRONINE SODIUM 25 MCG: 25 TABLET ORAL at 08:29

## 2022-06-08 RX ADMIN — MUPIROCIN 1 APPLICATION: 20 OINTMENT TOPICAL at 10:40

## 2022-06-08 RX ADMIN — CARVEDILOL 6.25 MG: 6.25 TABLET, FILM COATED ORAL at 17:16

## 2022-06-08 RX ADMIN — Medication 1 PACKET: at 20:03

## 2022-06-08 RX ADMIN — LEVETIRACETAM 1000 MG: 10 INJECTION INTRAVENOUS at 10:36

## 2022-06-08 RX ADMIN — ALBUTEROL SULFATE 2.5 MG: 2.5 SOLUTION RESPIRATORY (INHALATION) at 22:44

## 2022-06-08 RX ADMIN — INSULIN HUMAN 2 UNITS: 100 INJECTION, SOLUTION PARENTERAL at 06:20

## 2022-06-08 RX ADMIN — Medication 45 ML: at 10:40

## 2022-06-08 RX ADMIN — CARVEDILOL 6.25 MG: 6.25 TABLET, FILM COATED ORAL at 08:29

## 2022-06-08 RX ADMIN — SODIUM CHLORIDE 275 MG PE: 9 INJECTION, SOLUTION INTRAVENOUS at 22:38

## 2022-06-08 RX ADMIN — LEVETIRACETAM 1000 MG: 10 INJECTION INTRAVENOUS at 20:00

## 2022-06-08 RX ADMIN — CHLORHEXIDINE GLUCONATE 15 ML: 1.2 RINSE ORAL at 08:29

## 2022-06-08 RX ADMIN — LACOSAMIDE 200 MG: 10 INJECTION, SOLUTION INTRAVENOUS at 08:29

## 2022-06-08 RX ADMIN — LEVOTHYROXINE SODIUM 125 MCG: 125 TABLET ORAL at 06:11

## 2022-06-08 RX ADMIN — INSULIN HUMAN 8 UNITS: 100 INJECTION, SOLUTION PARENTERAL at 11:28

## 2022-06-08 RX ADMIN — POLYETHYLENE GLYCOL 3350 17 G: 17 POWDER, FOR SOLUTION ORAL at 08:29

## 2022-06-08 RX ADMIN — INSULIN DETEMIR 20 UNITS: 100 INJECTION, SOLUTION SUBCUTANEOUS at 20:16

## 2022-06-08 RX ADMIN — Medication 45 ML: at 10:41

## 2022-06-08 RX ADMIN — SODIUM CHLORIDE SOLN NEBU 3% 4 ML: 3 NEBU SOLN at 19:03

## 2022-06-08 RX ADMIN — SODIUM CHLORIDE SOLN NEBU 3% 4 ML: 3 NEBU SOLN at 06:22

## 2022-06-08 RX ADMIN — SODIUM CHLORIDE 275 MG PE: 9 INJECTION, SOLUTION INTRAVENOUS at 13:07

## 2022-06-08 RX ADMIN — LABETALOL HYDROCHLORIDE 10 MG: 5 INJECTION INTRAVENOUS at 01:11

## 2022-06-08 RX ADMIN — LISINOPRIL 20 MG: 20 TABLET ORAL at 08:29

## 2022-06-08 RX ADMIN — CHLORHEXIDINE GLUCONATE 15 ML: 1.2 RINSE ORAL at 20:03

## 2022-06-08 RX ADMIN — Medication 1 PACKET: at 10:40

## 2022-06-08 RX ADMIN — ALBUTEROL SULFATE 2.5 MG: 2.5 SOLUTION RESPIRATORY (INHALATION) at 06:22

## 2022-06-08 RX ADMIN — SODIUM CHLORIDE 275 MG PE: 9 INJECTION, SOLUTION INTRAVENOUS at 06:11

## 2022-06-08 RX ADMIN — LACOSAMIDE 200 MG: 10 INJECTION, SOLUTION INTRAVENOUS at 20:00

## 2022-06-08 NOTE — CASE MANAGEMENT/SOCIAL WORK
Continued Stay Note   Singh     Patient Name: Hai Hernandez  MRN: [id number]  Today's Date: 6/8/2022    Admit Date: 5/10/2022     Discharge Plan     Row Name 06/08/22 1045       Plan    Plan unclear    Plan Comments LTAC referral cancelled per physician.               Discharge Codes    No documentation.               Expected Discharge Date and Time     Expected Discharge Date Expected Discharge Time    Satinder 10, 2022             JOSE Cruz

## 2022-06-08 NOTE — SIGNIFICANT NOTE
06/08/22 0812   Readings   Plateau Pressure (cm H2O) 24 cm H2O   Driving Pressure (cm H2O) 20.4 cm H2O   Dynamic Compliance (L/cm H2O) 41 L/cm H2O

## 2022-06-08 NOTE — PROGRESS NOTES
"Patient name: Hai Hernandez  Patient : 1945  VISIT # [id number]  MR #[id number]    Procedure:Procedure(s):  CRANIOTOMY FOR TUMOR STERIOTACTIC WITH BRAIN LAB, right  Procedure Date:5/10/2022  POD:27 Days Post-Op    Subjective   Chief complaint: Shortness of breath    Patient remains mechanically ventilated to trach, FiO2 30%, PEEP 5 with O2 sat 96%.  No overnight events.   Last 2 remaining pleural catheters were removed yesterday without remark.    ROS: Unable to obtain due to mechanical ventilation and sedation    Objective     Visit Vitals  /72   Pulse 62   Temp 97.8 °F (36.6 °C) (Axillary)   Resp 27   Ht 182.9 cm (72\")   Wt 118 kg (259 lb 11.2 oz)   SpO2 97%   BMI 35.22 kg/m²       Intake/Output Summary (Last 24 hours) at 2022 0849  Last data filed at 2022 0800  Gross per 24 hour   Intake 2239 ml   Output 550 ml   Net 1689 ml       Lab:     CBC:  Results from last 7 days   Lab Units 22  0256 22  0401 22  0347   WBC 10*3/mm3 10.66 10.06 9.95   HEMATOCRIT % 28.5* 27.4* 27.0*   PLATELETS 10*3/mm3 251 251 274          BMP:  Results from last 7 days   Lab Units 22  0256 22  0401 22  0347   SODIUM mmol/L 138 138 137   POTASSIUM mmol/L 4.4 4.5 4.4   CHLORIDE mmol/L 103 104 104   CO2 mmol/L 28.0 27.0 31.0*   GLUCOSE mg/dL 96 148* 149*   BUN mg/dL 29* 29* 32*   CREATININE mg/dL 0.53* 0.54* 0.70*          COAG:        Invalid input(s): PT    IMAGES:       Imaging Results (Last 24 Hours)     Procedure Component Value Units Date/Time    XR Chest 1 View [[id number]] Collected: 22     Updated: 22    Narrative:      EXAM: XR CHEST 1 VW- 2022 3:15 AM CDT     HISTORY: On vent; R13.10-Dysphagia, unspecified; Z01.818-Encounter for  other preprocedural examination; D32.9-Benign neoplasm of meninges,  unspecified; Z74.09-Other reduced mobility; Z78.9-Other specified health  status; G40.901-Epilepsy, unspecified, not intractable, with " status  epilepticus; J96.01-Acute respiratory failure with hypoxia;  G91.0-Communicating hydrocephalus       COMPARISON: June 7, 2022.     TECHNIQUE: Frontal radiograph of the chest     FINDINGS:   Chronic interstitial changes present in the pulmonary parenchyma..  Cardiac silhouettes mildly enlarged. Endotracheal tube is present.  Faintly visualized right ventricular shunt catheter projects over the  right chest. Feeding gastrostomy tube is present in the stomach.     The osseous structures and surrounding soft tissues demonstrate no acute  abnormality.          Impression:      1. Stable chest.        This report was finalized on 06/08/2022 07:14 by Dr. Emanuel Frederick MD.    XR Chest 1 View [[id number]] Collected: 06/07/22 1232     Updated: 06/07/22 1236    Narrative:      EXAM: XR CHEST 1 VW- 6/7/2022 12:24 PM CDT     HISTORY: post chest tube removal; R13.10-Dysphagia, unspecified;  Z01.818-Encounter for other preprocedural examination; D32.9-Benign  neoplasm of meninges, unspecified; Z74.09-Other reduced mobility;  Z78.9-Other specified health status; G40.901-Epilepsy, unspecified, not  intractable, with status epilepticus; J96.01-Acute respiratory failure  with hypoxia; G91.0-Communicating hydrocephalus       COMPARISON: June 7, 2022 3:00 AM.     TECHNIQUE: Frontal radiograph of the chest     FINDINGS:   The lungs are clear. Removal left chest tube is noted. Tracheostomy tube  is present.. The cardiomediastinal silhouette and pulmonary vascularity  are within normal limits.      The osseous structures and surrounding soft tissues demonstrate no acute  abnormality.          Impression:      1. Stable chest with no acute cardiopulmonary process..        This report was finalized on 06/07/2022 12:33 by Dr. Emanuel Frederick MD.        CXR:  Left lung remains expanded following chest tube removal.  Left lower lobe atelectasis.     Physical Exam:  General: Mechanical ventilation to trach.    Cardiovascular: Regular rate  and rhythm without murmur, rubs, or gallops.    Pulmonary: Coarse mechanical breath sounds bilaterally without wheezing, rubs, or rales.  Abdomen: Soft, nondistended, and nontender.  Extremities: Warm, moves all extremities.  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  CT surgery will sign off.  Please do not hesitate to reconsult should any issues arise.  Discussed with nursing      MARIO Romo  06/08/22  08:49 CDT

## 2022-06-08 NOTE — PLAN OF CARE
Goal Outcome Evaluation:  Plan of Care Reviewed With: other (see comments)        Progress: no change  Outcome Evaluation: Ntn follow up. Pt remains intubated at this time. Pt received 51% of tube feeding goal volume over the past three days. Last BM 6/4, pt has laxative and stool softener ordered. Nutrisource fiber 1 pkt QID. Prosource liquid protein modular BID. Cont tube feeding as per orders. Cont to follow for plan of care.

## 2022-06-08 NOTE — PROGRESS NOTES
PULMONARY AND CRITICAL CARE PROGRESS NOTE - [facility]    Patient: Hai Henrandez    1945    MR# [id number]    Acct# [id number]  06/08/22   07:00 CDT  Referring Provider: Martell Downs, *    Chief Complaint: Mechanically ventilated    Interval history: The patient remains intubated with trach to vent.  No sedation is infusing.  He responds to touch of the BLE, did not follow commands or open eyes to voice. O2 sat 96% on 6 PEEP, 0.40FiO2. -571, MV 14.4, ET 30. Chest tubes removed yesterday. ABGs reviewed and stable. CXR stable.  No other aggravating or alleviating factors.           Meds:  albuterol, 2.5 mg, Nebulization, Q8H - RT  carvedilol, 6.25 mg, Nasogastric, BID With Meals  chlorhexidine, 15 mL, Mouth/Throat, Q12H  fosphenytoin, 275 mg PE, Intravenous, Q8H  heparin (porcine), 5,000 Units, Subcutaneous, Q12H  insulin detemir, 20 Units, Subcutaneous, Nightly  insulin regular, 2-7 Units, Subcutaneous, Q6H  insulin regular, 8 Units, Subcutaneous, Q6H  lacosamide, 200 mg, Intravenous, Q12H  levETIRAcetam, 1,000 mg, Intravenous, Q12H  levothyroxine, 125 mcg, Nasogastric, Q AM  lidocaine, 10 mL, Intradermal, Once  liothyronine, 25 mcg, Nasogastric, Daily  lisinopril, 20 mg, Nasogastric, Q24H  mupirocin, 1 application, Topical, Q12H  Nutrisource fiber, 1 packet, Nasogastric, 4x Daily  pantoprazole, 40 mg, Intravenous, Q AM  polyethylene glycol, 17 g, Oral, Daily  ProSource TF, 1 packet, Nasogastric, BID  ProSource TF, 45 mL, Per G Tube, BID  sodium chloride, 4 mL, Nebulization, BID - RT      hold, 1 each  norepinephrine, 0.02-0.3 mcg/kg/min, Last Rate: Stopped (06/05/22 0835)  propofol, 5-50 mcg/kg/min, Last Rate: Stopped (06/05/22 0815)      Review of Systems:   Cannot obtain due to mechanical ventilated state     Ventilator Settings:        Resp Rate (Set): 16  Pressure Support (cm H2O): 8 cm H20  FiO2 (%): 30 %  PEEP/CPAP (cm H2O): 6 cm H20  Minute Ventilation (L/min) (Obs):  14.9 L/min  Resp Rate (Observed) Vent: 29  I:E Ratio (Set): 1:0.27  I:E Ratio (Obs): 1:1.90  PIP Observed (cm H2O): 20 cm H2O  RSBI: 21.96  Physical Exam:  Temp:  [97.6 °F (36.4 °C)-98.7 °F (37.1 °C)] 97.6 °F (36.4 °C)  Heart Rate:  [55-64] 62  Resp:  [21-28] 27  BP: (125-189)/(58-81) 150/68  FiO2 (%):  [30 %-40 %] 30 %    Intake/Output Summary (Last 24 hours) at 6/8/2022 0700  Last data filed at 6/8/2022 0300  Gross per 24 hour   Intake 2297 ml   Output 550 ml   Net 1747 ml     SpO2 Percentage    06/08/22 0500 06/08/22 0600 06/08/22 0622   SpO2: 96% 96% 97%   Body mass index is 35.22 kg/m².   Physical Exam   Constitutional:       General: He is intubated and sedated     Appearance: He is ill-appearing. He is not toxic-appearing.      Interventions: He is sedated and intubated.   HENT:      Head: Normocephalic.      Comments: Craniotomy wound, trach     Nose: Nose normal.   Eyes:      General: No scleral icterus.  Cardiovascular: normal rate  Pulmonary:      Effort: No accessory muscle usage or respiratory distress. He is intubated.      Breath sounds: Decreased breath sounds in bases   Chest:      Chest wall: No edema.   Abdominal:      General: There is no distension.      Palpations: Abdomen is soft.   Genitourinary:     Comments: Mccrary  Musculoskeletal:      Right lower leg: Edema present.      Left lower leg: Edema present.      Comments: SCDs   Skin:     Findings: No rash.   Neurological:      Motor: opens eyes/grimaces to pain   Psychiatric:         Behavior: Behavior is not agitated.   Electronically signed by MARIO Nicholson, 6/8/2022, 07:00 CDT      Physician Substantive Portion: Medical Decision Making    Laboratory Data:  Results from last 7 days   Lab Units 06/08/22  0256 06/07/22  0401 06/06/22  0347   WBC 10*3/mm3 10.66 10.06 9.95   HEMOGLOBIN g/dL 8.9* 8.5* 8.3*   PLATELETS 10*3/mm3 251 251 274     Results from last 7 days   Lab Units 06/08/22  0256 06/07/22  0401 06/06/22  0347   SODIUM  mmol/L 138 138 137   POTASSIUM mmol/L 4.4 4.5 4.4   BUN mg/dL 29* 29* 32*   CREATININE mg/dL 0.53* 0.54* 0.70*     Results from last 7 days   Lab Units 06/08/22  0346 06/07/22  0419 06/06/22  0420   PH, ARTERIAL pH units 7.465* 7.514* 7.455*   PCO2, ARTERIAL mm Hg 42.3 39.0 46.3*   PO2 ART mm Hg 80.0* 89.3 74.6*   FIO2 % 30 30 30     No results found for: BLOODCX, URINECX, WOUNDCX, MRSACX, RESPCX, STOOLCX  Recent films:  CT Head Without Contrast    Result Date: 6/6/2022  EXAMINATION: CT HEAD WO CONTRAST-   6/6/2022 3:54 PM CDT  HISTORY: SP craniotomy for tumor.  SP  shunt placement.  Continued neurologic decline.; R13.10-Dysphagia, unspecified; Z01.818-Encounter for other preprocedural examination; D32.9-Benign neoplasm of meninges, unspecified; Z74.09-Other reduced mobility; Z78.9-Other specified health status; G40.901-Epilepsy, unspecified, not intractable, with status epilepticus; J96.01-Acute respiratory failure with hypox  In order to have a CT radiation dose as low as reasonably achievable Automated Exposure Control was utilized for adjustment of the mA and/or KV according to patient size.  DLP in mGycm= 1231  The CT scan of the head is performed without intravenous contrast enhancement. Images are acquired in axial plane with subsequent reconstruction in coronal and sagittal planes.  Comparison is made with the previous study dated 6/3/2022.  This study is a very limited diagnostic value due to suboptimal exposure and positioning and technique. There are extensive artifacts.  Evidence of a right frontal temporal craniotomy similar to the previous study. The right frontal lobe underlying the craniotomy site is not well evaluated due to the artifacts. Diffuse low density area may suggest encephalomalacia/post surgical changes.  The intraventricular hemorrhage in the occipital horn of the right lateral ventricle is noted. There is a mild layering hemorrhage in the occipital horn of the left lateral ventricle  which is poorly visualized and evaluated.  A right posterior parietal approach ventriculostomy catheter is in place. No change. No significant change in size of the ventricles since the previous study.  The gray-white matter differentiation may not be evaluated due to extensive artifacts.  Images are reviewed in bone window show no acute bony abnormality. The right frontal temporal craniotomy is similar to the previous study. It may involve the superior posterior lateral aspect of the right orbit?. This was not obvious in the previous study.  There is mucosal thickening involving the ethmoid and sphenoid sinuses. There is bilateral mastoid effusion.      Impression: 1. A very limited diagnostic study due to extensive artifacts as detailed above. 2. Right frontal temporal craniotomy. It appears to be involving the superior posterior lateral aspect of the right orbit/roof of the orbit which was not seen in the previous study. 3. Intraventricular hemorrhage in the occipital horn of the right lateral ventricle. Poorly visualized layering hematoma in the occipital horn of the left lateral ventricle. 4. A right parietal approach ventriculostomy catheter in place. 5. Persistent dilatation of the ventricles. No change.          This report was finalized on 06/06/2022 16:39 by Dr. Aida Wong MD.    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  TECHNIQUE: Frontal radiograph of the chest  FINDINGS: The lungs are clear. Removal left chest tube is noted.  Tracheostomy tube is present.. The cardiomediastinal silhouette and pulmonary vascularity are within normal limits.  The osseous structures and surrounding soft tissues demonstrate no acute abnormality.       Impression: 1. Stable chest with no acute cardiopulmonary process..   This report was finalized on 06/07/2022 12:33 by Dr. Emanuel Frederick MD.    XR Chest 1 View    Result Date: 6/7/2022  EXAMINATION:  XR CHEST 1 VW-  6/7/2022 3:20 AM CDT  HISTORY: Acute respiratory failure with hypoxia. On ventilator.  COMPARISON: 6/6/2022.  TECHNIQUE: Single view AP image.  FINDINGS:  A tracheostomy tube remains in place. There is hypoventilation. There is vascular crowding. There are patchy infiltrates in the perihilar regions and lung bases. There are multiple small nodules many of which are calcified on previous CT. There is mild cardiomegaly. No acute bony abnormality is seen.      Impression: 1. Hypoventilation with vascular crowding. 2. Patchy infiltrates in the perihilar regions and lung bases. Atelectasis versus pneumonia. 3. Old granulomatous disease.   This report was finalized on 06/07/2022 07:09 by Dr. Zachariah Fonseca MD.    My radiograph interpretation/independent review of imaging: no new films    Pulmonary Assessment:    1. Acute resp failure due to encephalopathy, still needing vent  2. Metabolic encephalopathy  3. Tracheostomy status  4. Meningioma  5. Pleural effusion    Recommend/plan:   · Decrease peep to 5  · Trache collar trials once awake enough    This visit was performed by both a physician and an [facility] RN.  I personally evaluated and examined the patient.  I performed all aspects of the medical decision making as documented.  Electronically signed by Carlos A Dasilva MD, 6/8/2022, 15:24 CDT

## 2022-06-08 NOTE — PLAN OF CARE
Goal Outcome Evaluation:  Plan of Care Reviewed With: patient        Progress: no change  Outcome Evaluation: Pt remains on vent. PROM BUE/LE performed and repostioned in bed.

## 2022-06-08 NOTE — H&P
[facility] Medicine Services  INPATIENT PROGRESS NOTE    Length of Stay: 29  Date of Admission: 5/10/2022  Primary Care Physician: Elisa Chacko MD    Subjective   Chief Complaint: Respiratory failure/status post tracheotomy/status post chest tube removal    HPI   T-max 98.7.  T-current 98.2.  Blood pressure stable, slight brachycardia.  Ongoing respiratory support.  All the chest tube has been removed.    Review of Systems   Unable to assess secondary to the patient's intubated and sedated state.    All pertinent negatives and positives are as above. All other systems have been reviewed and are negative unless otherwise stated.     Objective    Temp:  [97.6 °F (36.4 °C)-98.7 °F (37.1 °C)] 98.2 °F (36.8 °C)  Heart Rate:  [55-63] 58  Resp:  [21-28] 27  BP: (125-175)/(59-80) 146/69  FiO2 (%):  [30 %-40 %] 30 %    Intake/Output Summary (Last 24 hours) at 6/8/2022 1148  Last data filed at 6/8/2022 0800  Gross per 24 hour   Intake 2239 ml   Output 550 ml   Net 1689 ml     Physical Exam  Vitals and nursing note reviewed.   HENT:      Head: Normocephalic.   Eyes:      Conjunctiva/sclera: Conjunctivae normal.      Pupils: Pupils are equal, round, and reactive to light.   Neck:      Vascular: No JVD.   Cardiovascular:      Rate and Rhythm: Normal rate and regular rhythm.      Heart sounds: Normal heart sounds.   Pulmonary:      Effort: No respiratory distress.      Breath sounds: No wheezing or rales.      Comments: Tracheotomy in place.  On the vent.  Diminished breath sound bilateral, clear.  Chest:      Chest wall: No tenderness.   Abdominal:      General: Bowel sounds are normal. There is no distension.      Palpations: Abdomen is soft.      Tenderness: There is no abdominal tenderness.      Comments: Obesity .  PEG tube in place.  Musculoskeletal:         General: No tenderness or deformity.      Cervical back: Neck supple.      Right lower leg: Edema present.      Left lower  leg: Edema present.      Comments: +1 .   Skin:     General: Skin is warm and dry.      Capillary Refill: Capillary refill takes 2 to 3 seconds.      Findings: No rash.   Neurological:      Mental Status: He is oriented to person, place, and time.      Cranial Nerves: No cranial nerve deficit.      Motor: Weakness present. No abnormal muscle tone.      Coordination: Coordination abnormal.      Gait: Gait abnormal.      Deep Tendon Reflexes: Reflexes normal.   Results Review:  Lab Results (last 24 hours)     Procedure Component Value Units Date/Time    POC Glucose Once [[id number]]  (Abnormal) Collected: 06/08/22 1102    Specimen: Blood Updated: 06/08/22 1114     Glucose 136 mg/dL      Comment: : [id number] Ninirickey MaryanelynMeter ID: [id number]       AFB Culture - Body Fluid, Pleural Cavity [[id number]] Collected: 05/25/22 1042    Specimen: Body Fluid from Pleural Cavity Updated: 06/08/22 1102     AFB Culture No AFB isolated at 2 weeks     AFB Stain No acid fast bacilli seen on direct smear      No acid fast bacilli seen on concentrated smear    Fungus Culture - Body Fluid, Pleural Cavity [[id number]] Collected: 05/25/22 1042    Specimen: Body Fluid from Pleural Cavity Updated: 06/08/22 1102     Fungus Culture No fungus isolated at 2 weeks    POC Glucose Once [[id number]]  (Abnormal) Collected: 06/08/22 0824    Specimen: Blood Updated: 06/08/22 0835     Glucose 159 mg/dL      Comment: : [id number] Cone RobertMeter ID: [id number]       POC Glucose Once [[id number]]  (Abnormal) Collected: 06/08/22 0611    Specimen: Blood Updated: 06/08/22 0622     Glucose 186 mg/dL      Comment: : [id number] Na JamieMeter ID: [id number]       Manual Differential [[id number]]  (Abnormal) Collected: 06/08/22 0256    Specimen: Blood Updated: 06/08/22 0431     Neutrophil % 83.8 %      Lymphocyte % 5.1 %      Monocyte % 7.1 %      Eosinophil % 1.0 %      Basophil % 1.0 %      Metamyelocyte % 2.0 %      Neutrophils Absolute 8.93  10*3/mm3      Lymphocytes Absolute 0.54 10*3/mm3      Monocytes Absolute 0.76 10*3/mm3      Eosinophils Absolute 0.11 10*3/mm3      Basophils Absolute 0.11 10*3/mm3      nRBC 1.0 /100 WBC      Anisocytosis Slight/1+     Macrocytes Slight/1+     Poikilocytes Slight/1+     Polychromasia Slight/1+     WBC Morphology Normal     Platelet Morphology Normal    CBC & Differential [[id number]]  (Abnormal) Collected: 06/08/22 0256    Specimen: Blood Updated: 06/08/22 0431    Narrative:      The following orders were created for panel order CBC & Differential.  Procedure                               Abnormality         Status                     ---------                               -----------         ------                     CBC Auto Differential[[id number]]        Abnormal            Final result                 Please view results for these tests on the individual orders.    CBC Auto Differential [[id number]]  (Abnormal) Collected: 06/08/22 0256    Specimen: Blood Updated: 06/08/22 0431     WBC 10.66 10*3/mm3      RBC 2.85 10*6/mm3      Hemoglobin 8.9 g/dL      Hematocrit 28.5 %      .0 fL      MCH 31.2 pg      MCHC 31.2 g/dL      RDW 15.1 %      RDW-SD 52.5 fl      MPV 9.3 fL      Platelets 251 10*3/mm3     Narrative:      The previously reported component NRBC is no longer being reported. Previous result was 0.4 /100 WBC (Reference Range: 0.0-0.2 /100 WBC) on 6/8/2022 at 0348 T.    Phenytoin Level, Total [[id number]]  (Normal) Collected: 06/08/22 0256    Specimen: Blood Updated: 06/08/22 0403     Phenytoin Level 15.1 mcg/mL     Comprehensive Metabolic Panel [[id number]]  (Abnormal) Collected: 06/08/22 0256    Specimen: Blood Updated: 06/08/22 0401     Glucose 96 mg/dL      BUN 29 mg/dL      Creatinine 0.53 mg/dL      Sodium 138 mmol/L      Potassium 4.4 mmol/L      Chloride 103 mmol/L      CO2 28.0 mmol/L      Calcium 8.4 mg/dL      Total Protein 6.3 g/dL      Albumin 2.10 g/dL      ALT (SGPT) 29 U/L      AST  (SGOT) 42 U/L      Alkaline Phosphatase 188 U/L      Total Bilirubin 0.3 mg/dL      Globulin 4.2 gm/dL      A/G Ratio 0.5 g/dL      BUN/Creatinine Ratio 54.7     Anion Gap 7.0 mmol/L      eGFR 103.2 mL/min/1.73      Comment: National Kidney Foundation and American Society of Nephrology (ASN) Task Force recommended calculation based on the Chronic Kidney Disease Epidemiology Collaboration (CKD-EPI) equation refit without adjustment for race.       Narrative:      GFR Normal >60  Chronic Kidney Disease <60  Kidney Failure <15      Blood Gas, Arterial - [[id number]]  (Abnormal) Collected: 06/08/22 0346    Specimen: Arterial Blood Updated: 06/08/22 0341     Site Right Radial     Denzel's Test Positive     pH, Arterial 7.465 pH units      Comment: 83 Value above reference range        pCO2, Arterial 42.3 mm Hg      pO2, Arterial 80.0 mm Hg      Comment: 84 Value below reference range        HCO3, Arterial 30.4 mmol/L      Comment: 83 Value above reference range        Base Excess, Arterial 6.0 mmol/L      Comment: 83 Value above reference range        O2 Saturation, Arterial 97.2 %      Temperature 37.0 C      Barometric Pressure for Blood Gas 747 mmHg      Modality Ventilator     FIO2 30 %      Ventilator Mode PC     Set Mech Resp Rate 16.0     PEEP 6.0     PIP 14 cmH2O      Comment: Meter: [id number]     :  [id number]        Collected by [id number]     pCO2, Temperature Corrected 42.3 mm Hg      pH, Temp Corrected 7.465 pH Units      pO2, Temperature Corrected 80.0 mm Hg     POC Glucose Once [[id number]]  (Abnormal) Collected: 06/07/22 2333    Specimen: Blood Updated: 06/07/22 2344     Glucose 165 mg/dL      Comment: : [id number] Na BackandieMeter ID: [id number]       POC Glucose Once [[id number]]  (Normal) Collected: 06/07/22 2042    Specimen: Blood Updated: 06/07/22 2053     Glucose 101 mg/dL      Comment: : [id number] Na JamieMeter ID: [id number]       POC Glucose Once [[id number]]  (Normal)  Collected: 06/07/22 1835    Specimen: Blood Updated: 06/07/22 1847     Glucose 103 mg/dL      Comment: : [id number] Jay MoralesMeter ID: [id number]       POC Glucose Once [[id number]]  (Abnormal) Collected: 06/07/22 1234    Specimen: Blood Updated: 06/07/22 1245     Glucose 139 mg/dL      Comment: : [id number] Consuelo Cesar ID: [id number]              Cultures:  No results found for: BLOODCX, URINECX, WOUNDCX, MRSACX, RESPCX, STOOLCX    Radiology Data:    Imaging Results (Last 24 Hours)     Procedure Component Value Units Date/Time    XR Chest 1 View [[id number]] Collected: 06/08/22 0712     Updated: 06/08/22 0717    Narrative:      EXAM: XR CHEST 1 VW- 6/8/2022 3:15 AM CDT     HISTORY: On vent; R13.10-Dysphagia, unspecified; Z01.818-Encounter for  other preprocedural examination; D32.9-Benign neoplasm of meninges,  unspecified; Z74.09-Other reduced mobility; Z78.9-Other specified health  status; G40.901-Epilepsy, unspecified, not intractable, with status  epilepticus; J96.01-Acute respiratory failure with hypoxia;  G91.0-Communicating hydrocephalus       COMPARISON: June 7, 2022.     TECHNIQUE: Frontal radiograph of the chest     FINDINGS:   Chronic interstitial changes present in the pulmonary parenchyma..  Cardiac silhouettes mildly enlarged. Endotracheal tube is present.  Faintly visualized right ventricular shunt catheter projects over the  right chest. Feeding gastrostomy tube is present in the stomach.     The osseous structures and surrounding soft tissues demonstrate no acute  abnormality.          Impression:      1. Stable chest.        This report was finalized on 06/08/2022 07:14 by Dr. Emanuel Frederick MD.    XR Chest 1 View [[id number]] Collected: 06/07/22 1232     Updated: 06/07/22 1236    Narrative:      EXAM: XR CHEST 1 VW- 6/7/2022 12:24 PM CDT     HISTORY: post chest tube removal; R13.10-Dysphagia, unspecified;  Z01.818-Encounter for other preprocedural examination;  D32.9-Benign  neoplasm of meninges, unspecified; Z74.09-Other reduced mobility;  Z78.9-Other specified health status; G40.901-Epilepsy, unspecified, not  intractable, with status epilepticus; J96.01-Acute respiratory failure  with hypoxia; G91.0-Communicating hydrocephalus       COMPARISON: June 7, 2022 3:00 AM.     TECHNIQUE: Frontal radiograph of the chest     FINDINGS:   The lungs are clear. Removal left chest tube is noted. Tracheostomy tube  is present.. The cardiomediastinal silhouette and pulmonary vascularity  are within normal limits.      The osseous structures and surrounding soft tissues demonstrate no acute  abnormality.          Impression:      1. Stable chest with no acute cardiopulmonary process..        This report was finalized on 06/07/2022 12:33 by Dr. Emanuel Frederick MD.          No Known Allergies    Scheduled meds:   albuterol, 2.5 mg, Nebulization, Q8H - RT  carvedilol, 6.25 mg, Nasogastric, BID With Meals  chlorhexidine, 15 mL, Mouth/Throat, Q12H  fosphenytoin, 275 mg PE, Intravenous, Q8H  heparin (porcine), 5,000 Units, Subcutaneous, Q12H  insulin detemir, 20 Units, Subcutaneous, Nightly  insulin regular, 2-7 Units, Subcutaneous, Q6H  insulin regular, 8 Units, Subcutaneous, Q6H  lacosamide, 200 mg, Intravenous, Q12H  levETIRAcetam, 1,000 mg, Intravenous, Q12H  levothyroxine, 125 mcg, Nasogastric, Q AM  lidocaine, 10 mL, Intradermal, Once  liothyronine, 25 mcg, Nasogastric, Daily  lisinopril, 20 mg, Nasogastric, Q24H  mupirocin, 1 application, Topical, Q12H  Nutrisource fiber, 1 packet, Nasogastric, 4x Daily  pantoprazole, 40 mg, Intravenous, Q AM  polyethylene glycol, 17 g, Oral, Daily  ProSource TF, 1 packet, Nasogastric, BID  ProSource TF, 45 mL, Per G Tube, BID  sodium chloride, 4 mL, Nebulization, BID - RT        PRN meds:  •  acetaminophen **OR** acetaminophen  •  alteplase  •  bisacodyl  •  butalbital-acetaminophen-caffeine  •  dextrose  •  dextrose  •  glucagon (human recombinant)  •   hold  •  hydrALAZINE  •  ipratropium-albuterol  •  labetalol  •  LORazepam  •  ondansetron **OR** ondansetron    Assessment/Plan       Meningioma (HCC)    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 The patient was admitted on 5/10 by Dr. Downs with neurosurgery.  He has prior history of known meningioma that was causing compression and visual changes.  He presented for craniotomy.  Hospital medicine was originally consulted on 5/14.  The patient had developed status epilepticus postsurgically and required intubation/mechanical ventilation.      Respiratory failure/left pleural effusion. He required intubation on 5/14 for airway protection.  This was done by Dr. Gunderson.  Pulmonary has since been consulted. Continue to monitor for readiness for spontaneous breathing trials and extubation in the future. Again, he was intubated for airway protection given his status epilepticus.  Propofol drip . albuterol nebs.  DuoNebs as needed.  Morphine as needed.  Oxycodone as needed.  CT imaging of chest-  Left chest tubes remain in place with appropriate evacuation of the left pleural fluid, Only small pleural effusions seen on today's exam with minimal air present in the posterior left pleural space, Bibasilar consolidation may represent atelectasis and/or pneumonia, scattered bilateral calcified granuloma, Vascular crowding and/or mild venous  congestion, Stable cardiomegaly.  Chest x-ray-Hypoventilation with vascular crowding, Patchy infiltrates in the perihilar regions and lung bases, atelectasis versus pneumonia, Old granulomatous disease.  Status post thoracotomy tube placement 6/26/2022.   Status post tracheotomy left chest 6/2/2022.   Status post tPA through chest tube.  Chest tube removed 6/7/2022.  Ventilator- assist-control, FiO2 30, tidal volume 450, rate 16, PEEP 5.     Hypotension.  Resolved.  Levophed drip off.     Seizure . consult neurology . IV Vimpat.  IV Keppra.  IV cerebyx.      Meningioma.  Decadron.  CT scan head without contrast Right frontal temporal craniotomy- involving the  superior posterior lateral aspect of the right orbit/roof of the orbit which was not seen in the previous study, Intraventricular hemorrhage in the occipital horn of the right lateral ventricle, Poorly visualized layering hematoma in the occipital  horn of the left lateral ventricle, right parietal approach ventriculostomy catheter in place, Persistent dilatation of the ventricles- No change.  Status post craniotomy 5/10/2022. He has had a lumbar drain placed by neurosurgery.    Ventriculoperitoneal shunt placement 6/3/2022.     Thrombocytopenia. His platelet count was 217,000 on 4/20/2022. This declined to a low of 72,000 on 5/17.  Peripheral smear on 5/17 showed no schistocytes with moderate peripheral thrombocytopenia.  PTT normal, PT/INR slightly elevated, fibrin split products high, and fibrinogen high.  Neurosurgery gave him a sixpack of platelets on 5/18.  Thrombocytopenia resolved.     Fever. He had run steady fever from the afternoon of 5/21 through 5/22.  Dr. Escobar was contacted and placed him on vancomycin and cefepime.  He still has a lumbar drain and CSF was sampled on the morning of 5/22.  ..  He received vancomycin. Meropenem was started on 5/25 for 5 days.  Patient has finished vancomycin and meropenem antibiotics.  Fever  resolved.     Hypertension/atrial fibrillation. He typically takes lisinopril. Resumed per tube at a lower dose than normal on 5/18. Titrated up on 5/19. Started carvedilol on 5/19. Titrated medications up to get off Cardene.  Unable to take anticoagulation.  Lisinopril.  Hydralazine as needed.  Echocardiogram- ejection fraction 70%-hyperdynamic, mild concentric hypertrophy, tricuspid regurgitation, right ventricle cavity moderately dilated, right atrial cavity dilated, no significant valvular pathology, atrial fibrillation rhythm.     Hypothyroidism. Free T4 and free T3 were very low as well. Started levothyroxine and liothyronine on 5/19. TRH pending since 5/19!!  I rechecked TSH, free T4, and free T3 on 5/29 to see where we are at.  TSH is now 3.950.  Free T4 has improve from 0.31 to 0.53.  Free T3 has improved from less than 0.40 to 1.60.  Synthroid . Cytomel .     Diabetes.  Hemoglobin A1c 8.2.  Sliding scale.   Levemir . regular insulin.     Anemia.  Hemoglobin stable.  No sign of acute bleed.     Reflux.  Protonix.  Zofran as needed.     Constipation.  MiraLAX.  Dulcolax suppository as needed.     Headaches.  Fioricet as needed.     Obesity.  BMI 31.     SCD for DVT prophylaxis.  Heparin prophylaxis.     Nutrition.  PEG tube placement 6/2/2022.     Blood culture- no growth 5 days.  Respiratory culture- Light growth (2+) Normal respiratory annia. No S. aureus or Pseudomonas aeruginosa detected. Final report.   Urine culture>100,000 CFU/mL Klebsiella aerogenes Abnormal   AFB culture-pending.  Anaerobic culture-negative.  Body fluid culture-negative.  Fungal culture pending.  MRSA DNA probe-negative.  COVID-19-negative.    Electronically signed by Aden Kc MD, 06/08/22, 11:48 AM CDT.

## 2022-06-08 NOTE — THERAPY TREATMENT NOTE
Acute Care - Physical Therapy Treatment Note  [facility]     Patient Name: Hai Hernandez  : 1945  MRN: [id number]  Today's Date: 2022      Visit Dx:     ICD-10-CM ICD-9-CM   1. Dysphagia, unspecified type  R13.10 787.20   2. Preop testing  Z01.818 V72.84   3. Meningioma (HCC)  D32.9 225.2   4. Impaired mobility  Z74.09 799.89   5. Decreased activities of daily living (ADL)  Z78.9 V49.89   6. Status epilepticus (HCC)  G40.901 345.3   7. Acute respiratory failure with hypoxia (HCC)  J96.01 518.81   8. 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)   • Status epilepticus (HCC)   • Essential hypertension   • Type 2 diabetes mellitus with hyperglycemia, without long-term current use of insulin (HCC)   • Hypothyroidism (acquired)   • Acute respiratory failure (secondary to status epilepticus)   • Thrombocytopenia (HCC)   • Cerebral parenchymal hemorrhage (HCC)   • PAF (paroxysmal atrial fibrillation) (HCC)   • Fever   • Loculated pleural effusion   • Acute deep vein thrombosis (DVT) of the ulnar and brachial veins of right upper extremity (HCC)   • Dysphagia   • Communicating hydrocephalus (HCC)     Past Medical History:   Diagnosis Date   • Brain tumor (HCC)    • Depression    • Hearing loss    • History of cellulitis     right foot big toe   • Hypertension    • Pneumonia    • Right arm weakness     after cervial injury   • Sciatic pain     affects balance   • Thyroid disease    • Visual disturbance     due to brain tumor - right eye     Past Surgical History:   Procedure Laterality Date   • CATARACT EXTRACTION, BILATERAL     • COLONOSCOPY     • CRANIOTOMY FOR TUMOR Right 5/10/2022    Procedure: CRANIOTOMY FOR TUMOR STERIOTACTIC WITH BRAIN LAB, right;  Surgeon: Martell Downs MD;  Location: [facility];   Service: Neurosurgery;  Laterality: Right;   • ENDOSCOPY W/ PEG TUBE PLACEMENT N/A 6/2/2022    Procedure: ESOPHAGOGASTRODUODENOSCOPY WITH PERCUTANEOUS ENDOSCOPIC GASTROSTOMY TUBE INSERTION WITH ANESTHESIA;  Surgeon: Melecio Lu MD;  Location: [facility] OR;  Service: Gastroenterology;  Laterality: N/A;   • NECK SURGERY     • SKIN CANCER EXCISION     • TONSILLECTOMY     • TRACHEOSTOMY N/A 6/2/2022    Procedure: TRACHEOSTOMY;  Surgeon: Geovany Davidson MD;  Location: [facility] OR;  Service: ENT;  Laterality: N/A;   •  SHUNT INSERTION Right 6/3/2022    Procedure: VENTRICULAR PERITONEAL SHUNT INSERTION WITH BRAIN LAB;  Surgeon: Martell Downs MD;  Location: [facility] OR;  Service: Neurosurgery;  Laterality: Right;     PT Assessment (last 12 hours)     PT Evaluation and Treatment     Row Name 06/08/22 0849          Physical Therapy Time and Intention    Subjective Information --  -NW     Document Type therapy note (daily note)  -NW     Mode of Treatment physical therapy  -NW     Comment vent  -NW     Row Name 06/08/22 0849          General Information    Existing Precautions/Restrictions fall;oxygen therapy device and L/min  vent/trach  -NW     Row Name 06/08/22 0849          Pain    Pretreatment Pain Rating 0/10 - no pain  -NW     Posttreatment Pain Rating 0/10 - no pain  -NW     Row Name 06/08/22 0849          Pain Scale: Word Pre/Post-Treatment    Pain: Word Scale, Pretreatment 0 - no pain  -NW     Row Name 06/08/22 0849          Aerobic Exercise    Comment, Aerobic Exercise (Therapeutic Exercise) PROM BLEs/BUEs x10  reposition pt in bed  -NW     Row Name             Wound 05/10/22 0959 Right scalp Incision    Wound - Properties Group Placement Date: 05/10/22  -PAULINE Placement Time: 0959  -PAULINE Side: Right  -PAULNIE Location: scalp  -PAULINE Primary Wound Type: Incision  -PAULINE     Retired Wound - Properties Group Placement Date: 05/10/22  -PAULINE Placement Time: 0959  -PAULINE Side: Right  -PAULINE Location: scalp  -PAULINE Primary Wound  Type: Incision  -PAULINE     Retired Wound - Properties Group Date first assessed: 05/10/22  -PAULINE Time first assessed: 0959 -PAULINE Side: Right  -PAULINE Location: scalp  -PAULINE Primary Wound Type: Incision  -PAULINE     Row Name             Wound 05/15/22 1712 coccyx    Wound - Properties Group Placement Date: 05/15/22  -KS Placement Time: 1712 -KS Location: coccyx  -KS     Retired Wound - Properties Group Placement Date: 05/15/22  -KS Placement Time: 1712 -KS Location: coccyx  -KS     Retired Wound - Properties Group Date first assessed: 05/15/22  -KS Time first assessed: 1712 -KS Location: coccyx  -KS     Row Name             Wound 05/17/22 1623 scalp Pressure Injury    Wound - Properties Group Placement Date: 05/17/22  -KS Placement Time: 1623  -KS Present on Hospital Admission: N  -KS Location: scalp  -KS Primary Wound Type: Pressure inj  -KS     Retired Wound - Properties Group Placement Date: 05/17/22  -KS Placement Time: 1623  -KS Present on Hospital Admission: N  -KS Location: scalp  -KS Primary Wound Type: Pressure inj  -KS     Retired Wound - Properties Group Date first assessed: 05/17/22  -KS Time first assessed: 1623  -KS Present on Hospital Admission: N  -KS Location: scalp  -KS Primary Wound Type: Pressure inj  -KS     Row Name             Wound 06/03/22 1602 Right scalp Incision    Wound - Properties Group Placement Date: 06/03/22  -MK Placement Time: 1602  -MK Present on Hospital Admission: N  -MK Side: Right  -MK Location: scalp  -MK Primary Wound Type: Incision  -MK     Retired Wound - Properties Group Placement Date: 06/03/22  -MK Placement Time: 1602  -MK Present on Hospital Admission: N  -MK Side: Right  -MK Location: scalp  -MK Primary Wound Type: Incision  -MK     Retired Wound - Properties Group Date first assessed: 06/03/22  -MK Time first assessed: 1602  -MK Present on Hospital Admission: N  -MK Side: Right  -MK Location: scalp  -MK Primary Wound Type: Incision  -MK     Row Name             Wound 06/03/22  1602 abdomen Incision    Wound - Properties Group Placement Date: 06/03/22  - Placement Time: 1602 -MK Present on Hospital Admission: N  - Location: abdomen  -MK Primary Wound Type: Incision  -MK     Retired Wound - Properties Group Placement Date: 06/03/22  - Placement Time: 1602  -MK Present on Hospital Admission: N  - Location: abdomen  -MK Primary Wound Type: Incision  -MK     Retired Wound - Properties Group Date first assessed: 06/03/22  - Time first assessed: 1602  -MK Present on Hospital Admission: N  - Location: abdomen  -MK Primary Wound Type: Incision  -MK     Row Name 06/08/22 0849          Positioning and Restraints    Pre-Treatment Position in bed  -NW     Post Treatment Position bed  -NW     In Bed fowlers;patient within staff view  -NW           User Key  (r) = Recorded By, (t) = Taken By, (c) = Cosigned By    Initials Name Provider Type    Jessica Jesus, BRENDAN Physical Therapist Assistant    Carmen Mistry RN Registered Nurse    Surya Ball RN Registered Nurse    Leatha Solomon RN Registered Nurse                Physical Therapy Education                 Title: PT OT SLP Therapies (In Progress)     Topic: Physical Therapy (In Progress)     Point: Mobility training (Done)     Learning Progress Summary           Patient Acceptance, E, VU,DU by YULIET at 5/11/2022 0745    Comment: benefits of activity, progression of PT POC                   Point: Home exercise program (In Progress)     Learning Progress Summary           Patient Acceptance, E, NR by YULIET at 6/7/2022 1345    Comment: Benefits of activity, B UE/LE exercises, positioning for joints and skin integrity                   Point: Body mechanics (Not Started)     Learner Progress:  Not documented in this visit.          Point: Precautions (Not Started)     Learner Progress:  Not documented in this visit.                      User Key     Initials Effective Dates Name Provider Type Artur HORVATH 08/02/16 -   Wayne Thomas, PT DPT Physical Therapist PT              PT Recommendation and Plan     Plan of Care Reviewed With: patient  Progress: no change  Outcome Evaluation: Pt remains on vent. PROM BUE/LE performed and repostioned in bed.       Time Calculation:    PT Charges     Row Name 06/08/22 0913             Time Calculation    Start Time 0849  -NW      Stop Time 0913  -NW      Time Calculation (min) 24 min  -NW      PT Received On 06/08/22  -NW      PT Goal Re-Cert Due Date 06/17/22  -NW              Time Calculation- PT    Total Timed Code Minutes- PT 24 minute(s)  -NW              Timed Charges    97492 - PT Therapeutic Exercise Minutes 24  -NW              Total Minutes    Timed Charges Total Minutes 24  -NW       Total Minutes 24  -NW            User Key  (r) = Recorded By, (t) = Taken By, (c) = Cosigned By    Initials Name Provider Type    NW Jessica Koenig PTA Physical Therapist Assistant              Therapy Charges for Today     Code Description Service Date Service Provider Modifiers Qty    [id number] HC PT THER PROC EA 15 MIN 6/8/2022 Jessica Koenig PTA GP 2          PT G-Codes  Outcome Measure Options: AM-PAC 6 Clicks Basic Mobility (PT)  AM-PAC 6 Clicks Score (PT): 6  AM-PAC 6 Clicks Score (OT): 12    Jessica Koenig PTA  6/8/2022

## 2022-06-08 NOTE — OP NOTE
NEUROSURGERY OPERATIVE NOTE    Hai Hernandez  OR Date: 6/3/2022    Pre-op Diagnosis:   Communicating hydrocephalus (HCC) [G91.0]    Post-op Diagnosis:     Post-Op Diagnosis Codes:     * Communicating hydrocephalus (HCC) [G91.0]          Surgeon(s):  Martell Downs MD    Anesthesia: General    Staff:   Circulator: Leatha Montero, ANABELLE; Carey Diaz RN  Scrub Person: Ayah Lopez; Yayo Saleh    Procedure(s):  VENTRICULAR PERITONEAL SHUNT INSERTION WITH BRAIN LAB    INDICATIONS: Hai Hernandez is a 77 y.o. 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  Postoperative course was complicated by status epilepticus.  Due to baseline and large size of the ventricles a lumbar drain was performed.  Opening pressure was 17 cm of water.  Therefore the patient underwent several weeks of lumbar drainage.  Unfortunately this could not be weaned.  CSF was again sampled and found to be clean.  Therefore we elected to take the patient to the operating room for placement of a permanent ventriculoperitoneal shunt.    The risks and benefits of the procedure were discussed. The patient accepted and understood all potential risks and complications including 4% rate of infection, 50% rate of shunt failure at 2 years, intracranial hematoma, neck injury, vascular injury, damage to the brain, stroke, seizure, failure of shunt, need to return to the operating room.    PROCEDURE: He was brought to the operating room.  With the patient awake and the imaging permanently displayed a WHO timeout was performed.  Following this the patient underwent induction of general endotracheal airway, positioned supine with a bump under the  right shoulder, head turned towards contralateral side.     Next he was placed in Murray pins.  The stereotactic frame was placed on the Murray head burrows and then surface matching was used to co-register with a previously obtained  stereotactic head CT.  Accuracy of preparing was then confirmed with the external auditory meatus as well as eyes.  From this a 3-D projection of the optimal catheter placement was produced.  The right side of the scalp was prepped and draped in the usual sterile fashion using DuraPrep and allowed to dry for 5 minutes.     Next, the skin was infiltrated with 4% Marcaine with epinephrine.  Using a 15 blade scalpel, a small curvilinear incision was made at the Keen's point.  The periosteum was elevated, and a small pocket was made passing posteriorly.  Next attention was turned to the abdomen where a 2 cm midline abdominal incision 2 cm inferior to the xiphoid process was made.  This was carried down through the fat until the linea alba was encountered.  This was split with the Bovie.  Preperitoneal fat was then encountered.  Digital dissection was used to break through the peritoneum and into the peritoneal cavity.  A large vaginal packing forceps was then passed into the peritoneal cavity to ensure it had been punctured.    Next attention was turned to a passing incision made approximately 2-1/2 cm behind the ear.  This was made with 15 blade.  Next a shunt passer was inserted from a cranial to caudal position.  The shunt passer was passed over the clavicle and into the abdominal incision and the subcutaneous fat.  The catheter was then passed from an inferior to superior aspect and the shunt passer was removed.  A tendon passer was then inserted into the cranial incision out of the passing incision and the proximal aspect of the distal catheter was brought into the cranial incision.  This was then connected to the [employer] programmable valve set to 0.5.    Next the intracranial catheter was placed over a shunt guide.  This was then navigated using 3-D stereotactic navigation into the ventricle.  Brisk flow CSF was obtained on the first pass. No CSF was sent for routine analysis.  The proximal catheter was then  attached to the valve, the valve was settled into the pocket, and the distal catheter was pulled tight.  Brisk flow CSF and free drop of fluid was noted from the distal catheter.  This was then placed into the abdomen.    The right frontal incision was then closed with 3-0 Vicryl and staples.  The abdominal incision was closed with 0 Vicryl and 3-0 Vicryl on the skin.  This was then closed with staples.  The passing incision was closed with one 3-0 Vicryl and staples.    All counts were noted to be correct.  There were no complications.  The patient will create his/her baseline.  Postoperative CT and shunt series was ordered.      Estimated Blood Loss: minimal    Complications: None    Implants:   Implant Name Type Inv. Item Serial No.  Lot No. LRB No. Used Action   CATH VNT VIPUL ANTIBIOTIC IMPR [id number] - [id number] Implant CATH VNT VIPUL ANTIBIOTIC IMPR [id number]  [employer] [id number] Right 1 Implanted   HEMOST ABS SURGIFOAM  8X12 10MM - [id number] Implant HEMOST ABS SURGIFOAM  8X12 10MM  [employer]  DIV OF J AND J [id number] Right 1 Implanted   KT HEMOST ABS SURGIFOAM PORCN 1GRAM - [id number] Implant KT HEMOST ABS SURGIFOAM PORCN 1GRAM  [employer]  DIV OF J AND J [id number] Right 1 Implanted   SHNT STRATA VLV PROGRAMMABLE REG - [id number] Implant SHNT STRATA VLV PROGRAMMABLE REG  [employer] [id number] Right 1 Implanted       Specimens:                None      Drains:   Gastrostomy/Enterostomy Percutaneous endoscopic gastrostomy (PEG) 1 20 Fr. (Active)   Surrounding Skin Dry;Intact 06/08/22 1200   Securement taped to abdomen;anchored to abdomen with adhesive device 06/08/22 1200   Drain Status Other (Comment) 06/08/22 1200   Drainage Appearance None 06/08/22 1200   Site Description Unable to view 06/08/22 1200   Gastrostomy/Enterostomy Site Interventions Site assessed 06/08/22 0900   Dressing Status Clean;Dry;Intact 06/08/22 1200   Dressing Type Split gauze;Dry dressing 06/08/22 1200   Tube Feeding Frequency  Continuous 06/08/22 1200   Tube Feeding Product Impact Peptide 1.5 per 06/08/22 0400   Tube Feeding Strength Full strength 06/08/22 1200   Tube Feeding Method Continuous per pump 06/08/22 0400   Tube Feeding Rate (mL) 65 mL 06/08/22 0400   Tube Feeding Bag Changed No 06/08/22 1200   Tube Feeding Residual (mL) 10 mL 06/08/22 0400   Tube Feeding Residual Returned (mL) 10 mL 06/08/22 0400   Feeding Tube Flushed With Sterile water 06/07/22 0808   Flush/ Irrigation Intake (mL) 186 06/08/22 0800   Tube Feeding Intake (mL) 286 06/08/22 0800       [REMOVED] Chest Tube 1 Left Midaxillary (Removed)   Function -20 cm H2O 06/05/22 1200   Air Leak/Fluctuation air leak not present;dependent drainage cleared;fluctuation not present 06/05/22 1200   Patency Intervention Tip/tilt 06/05/22 1200   Drainage Description Serous 06/05/22 1200   Dressing Type Gauze 06/05/22 1200   Dressing Status Clean;Dry;Intact 06/05/22 1200   Dressing Intervention Dressing changed 06/01/22 0800   Site Assessment Not assessed 06/05/22 1200   Surrounding Skin Unable to view 06/05/22 1200   Securement tubing anchored to body distal to insertion site with tape 06/04/22 1200   Left Subcutaneous Emphysema none present 06/05/22 1200   Right Subcutaneous Emphysema none present 06/05/22 1200   Safety all connections secured 06/05/22 0859   Output (mL) 10 mL 06/05/22 0800       [REMOVED] Chest Tube Left Midaxillary (Removed)   Function -20 cm H2O 06/05/22 1200   Air Leak/Fluctuation air leak not present;dependent drainage cleared;fluctuation not present 06/05/22 1200   Patency Intervention Tip/tilt 06/05/22 1200   Drainage Description Serous 06/05/22 1200   Dressing Type Gauze 06/05/22 1200   Dressing Status Clean;Dry;Intact 06/05/22 1200   Dressing Intervention Dressing changed 06/03/22 2000   Site Assessment Not assessed 06/05/22 1200   Surrounding Skin Unable to view 06/05/22 1200   Securement tubing anchored to body distal to insertion site with tape 06/04/22  1200   Left Subcutaneous Emphysema none present 06/05/22 1200   Right Subcutaneous Emphysema none present 06/05/22 1200   Safety all connections secured 06/05/22 0859   Output (mL) 5 mL 06/05/22 1200       [REMOVED] Chest Tube Posterior Midaxillary (Removed)   Function -40 cm H2O 06/07/22 0808   Air Leak/Fluctuation dependent drainage cleared 06/07/22 0808   Patency Intervention Tip/tilt 06/07/22 0808   Drainage Description Yellow;Serous 06/07/22 0808   Dressing Type Transparent;Antimicrobial dressing/disc 06/07/22 0808   Dressing Status Clean;Intact;Dry 06/07/22 0808   Site Assessment Not assessed 06/07/22 0808   Surrounding Skin Unable to view 06/07/22 0808   Securement tubing anchored to body distal to insertion site with sutures 06/07/22 0808   Left Subcutaneous Emphysema none present 06/07/22 0808   Right Subcutaneous Emphysema none present 06/07/22 0808   Safety all connections secured;suction checked 06/07/22 0808   Output (mL) 20 mL 06/06/22 2324       [REMOVED] Chest Tube Posterior Midaxillary (Removed)   Function -40 cm H2O 06/07/22 0808   Air Leak/Fluctuation dependent drainage cleared 06/07/22 0808   Patency Intervention Tip/tilt 06/07/22 0808   Drainage Description Serous;Yellow 06/07/22 0808   Dressing Type Transparent;Antimicrobial dressing/disc 06/07/22 0808   Dressing Status Clean;Dry;Intact 06/07/22 0808   Site Assessment Not assessed 06/07/22 0808   Surrounding Skin Unable to view 06/07/22 0808   Securement tubing anchored to body distal to insertion site with sutures 06/07/22 0808   Left Subcutaneous Emphysema none present 06/07/22 0808   Right Subcutaneous Emphysema none present 06/07/22 0808   Safety all connections secured;suction checked 06/07/22 0808   Output (mL) 20 mL 06/06/22 0800       [REMOVED] NG/OG Tube Orogastric Center mouth (Removed)   Site Assessment Clean;Dry;Intact 05/16/22 0000   Securement secured to ETT 05/16/22 0000   Secured at (cm) 65 05/16/22 0000   NG/OG Site  Interventions Site assessed 05/16/22 0000   Status Clamped 05/16/22 0000   Surrounding Skin Dry;Intact 05/16/22 0000       [REMOVED] NG/OG Tube Nasogastric Right nostril (Removed)   Placement Verification X-ray 06/02/22 0800   Site Assessment Clean;Dry;Intact 06/02/22 0800   Securement taped to nostril center 06/02/22 0800   Secured at (cm) 65 06/02/22 0800   NG/OG Site Interventions Site assessed 06/02/22 0400   Dressing Intervention New dressing 06/02/22 0400   Status Clamped 06/02/22 0800   Surrounding Skin Dry;Intact 06/02/22 0800   Tube Feeding Frequency Continuous 06/01/22 2031   Tube Feeding Product Peptamen VHP 05/31/22 2000   Tube Feeding Strength Full strength 06/01/22 2031   Tube Feeding Method Continuous per pump 06/01/22 2031   Tube Feeding Rate (mL) 0 mL 06/02/22 0000   Tube Feeding Bag Changed No 06/01/22 2031   Tube Feeding Residual (mL) 0 mL 06/02/22 0000   Tube Feeding Residual Returned (mL) 0 mL 06/02/22 0000   Feeding Tube Flushed With Sterile water 06/01/22 2031   Flush/ Irrigation Intake (mL) 125 06/02/22 0021   Tube Feeding Intake (mL) 181 06/02/22 0021   Intake (mL) 150 mL 06/01/22 1517   Output (mL) 0 mL 05/25/22 1600       [REMOVED] Urethral Catheter Non-latex;Silicone 16 Fr. (Removed)   Daily Indications Required activity restriction from trauma, surgery, (e.g. unstable spine, fracture, hemodynamics) 05/11/22 0400   Site Assessment Clean;Skin intact 05/11/22 0400   Collection Container Standard drainage bag 05/11/22 0400   Securement Method Securing device 05/11/22 0400   Catheter care complete Yes 05/10/22 2000   Output (mL) 675 mL 05/11/22 0423       [REMOVED] Urethral Catheter Non-latex 16 Fr. (Removed)   Daily Indications Hourly Output in Critical Unstable Patient requiring Frequent Intervention (hemodynamics, titration or life supportive therapy) 05/23/22 1600   Site Assessment Clean;Skin intact 05/23/22 1200   Collection Container Standard drainage bag 05/23/22 1600   Securement  Method Securing device 05/23/22 1600   Catheter care complete Yes 05/23/22 1200   Output (mL) 125 mL 05/23/22 1308       [REMOVED] Urethral Catheter Silicone 16 Fr. (Removed)   Daily Indications Required activity restriction from trauma, surgery, (e.g. unstable spine, fracture, hemodynamics) 06/02/22 0400   Site Assessment Clean;Skin intact 06/02/22 0400   Collection Container Standard drainage bag 06/02/22 0400   Securement Method Securing device 06/02/22 0400   Catheter care complete Yes 06/02/22 0400   Irrigation/Instillation Indication patency maintenance 05/31/22 1600   Output (mL) 150 mL 06/02/22 0400       [REMOVED] External Urinary Catheter (Removed)   Site Assessment Clean;Skin intact 05/15/22 0030   Application/Removal skin care provided 05/15/22 0030   Collection Container Standard drainage bag 05/15/22 0030   Securement Method Securing device 05/15/22 0030   Output (mL) 300 mL 05/14/22 1800       [REMOVED] Lumbar Drain (Removed)   Drain Status Clamped 06/03/22 1200   CSF Color Yellow 06/03/22 1200   Site Description Unable to view 06/03/22 1200   Dressing Status Dry;Intact;Old drainage 06/03/22 1200   CSF Output (mL) 20 mL 06/03/22 1200       [REMOVED] Continuous Bladder Irrigation Triple-lumen 20 Fr (Removed)   Daily Indications Hourly Output in Critical Unstable Patient requiring Frequent Intervention (hemodynamics, titration or life supportive therapy) 06/07/22 0808   Site Assessment Clean;Skin intact 06/07/22 0808   Collection Container Standard drainage bag 06/07/22 0808   Securement Method Securing device 06/07/22 0808   Catheter care complete Yes 06/07/22 0808   CBI Irrigation Intake (mL) 0 mL 06/03/22 1202   CBI Mccrary Output (mL) 650 mL 06/07/22 1200   CBI Net Output (mL) 400 mL 06/03/22 1202

## 2022-06-08 NOTE — PROGRESS NOTES
NEUROSURGERY DAILY PROGRESS NOTE    ASSESSMENT:   Hai Hernandez is a 77 y.o. with a significant comorbidity of acephalic migraines, thyroid disease status post radiation as a child, basal cell and squamous cell carcinoma of the skin. He presents in FU for known meningioma found on workup for right visual field changes. Physical exam findings of neurologically intact with resolution of all symptoms and mild decreased vision in right eye.  His imaging shows 22 x 24 x 13.5 mm right planum sphenoidale mass most suggestive of meningioma.    Past Medical History:   Diagnosis Date   • Brain tumor (HCC)    • Depression    • Hearing loss    • History of cellulitis     right foot big toe   • Hypertension    • Pneumonia    • Right arm weakness     after cervial injury   • Sciatic pain     affects balance   • Thyroid disease    • Visual disturbance     due to brain tumor - right eye     Active Hospital Problems    Diagnosis    • **Meningioma (HCC)    • Acute deep vein thrombosis (DVT) of the ulnar and brachial veins of right upper extremity (HCC)    • Loculated pleural effusion    • Fever    • PAF (paroxysmal atrial fibrillation) (HCC)    • Cerebral parenchymal hemorrhage (HCC)    • Essential hypertension    • Type 2 diabetes mellitus with hyperglycemia, without long-term current use of insulin (HCC)    • Hypothyroidism (acquired)    • Acute respiratory failure (secondary to status epilepticus)    • Thrombocytopenia (HCC)    • 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  Intermittently opens eyes to painful stimuli.  Does not follow commands.  Nonverbal.  Weakly withdraws to tactile stimuli.    Intracranial meningioma   POD #29 (5/10/2022) Status post postcraniotomy  for tumor   Pathology: WHO 1 Meningioma   MRI with blood products in resection cavity but no evidence of cerebritis   CT head reviewed without expansion of SDH   Neurochecks per policy   Decadron off   Leakage from wound.  Stapled at bedside.  Keep for now.  If leaks again will oversew    Hydrocephalus   Lumbar drain removed   CSF unremarkable from 5/17 and 5/23.    5 Days Post-Op right posterior parietal  shunt placement   Shunt pumps and refills well   Postop CT stable   Repeat CT head today    Postop seizures, in status   Neurology managing   Cont Keppra, Dialntin, Vimpat   Stat Ativan   Follow dilantin levels   Repeat EEG unremarkable     CV: Cardene off   keep SBP <140.   Hypotension resolved and off pressors   Requiring hydralazine and labetalol PRNs   A-line removed secondary to limb ischemia  Pulm: Tracheostomy in place.  Appreciate ENT   Left chest tube placed x2 CT managing with TPA  : Keep jansen for now.   FEN: Hyponatremia, 127   3% NaCl DC   Poor glucose control.  Increase SSI     ENDO: Accu-Chek and treat per policy  GI: PEG tube placement today.  Appreciate GI  ID: Afebrile overnight,    DDX: infection vs drug fever   WBC 8.29   CRP in 20s, repeat pending   Broad spectrum abx, Meropenum and Vanc   Blood cult NGTD   Body fluid cx pending   CSF cultures pending, NGTD   UA+, 100K GNB   Heme:  DVT prophylaxis with SCD's.     Plt up 193 and HIT ab negative.     RUE clot.  Leave PICC for now.  Can not anticoagulate  Pain: NA  Dispo: DC pending seizure control   Pending extubation    CHIEF COMPLAINT:   Right visual field changes    Subjective  Symptom stable    Temp:  [97.6 °F (36.4 °C)-98.7 °F (37.1 °C)] 97.8 °F (36.6 °C)  Heart Rate:  [55-63] 62  Resp:  [21-28] 27  BP: (125-175)/(59-80) 158/72  FiO2 (%):  [30 %-40 %] 30 %    Objective:  General Appearance:  Well-appearing and in no acute distress.    Vital signs: (most recent): Blood pressure 158/72, pulse 62, temperature 97.8 °F (36.6 °C), temperature  "source Axillary, resp. rate 27, height 182.9 cm (72\"), weight 118 kg (259 lb 11.2 oz), SpO2 97 %.      Neurologic Exam     Mental Status   Level of consciousness: arousable by verbal stimuli ,  drowsy  Off propofol.  Intermittently opens eyes to painful stimuli.  Does not follow commands.  Nonverbal.  Weakly withdraws to tactile stimuli.           Cranial Nerves     CN III, IV, VI   Pupils are equal, round, and reactive to light.  Extraocular motions are normal.     CN IX, X   CN IX normal.     Gait, Coordination, and Reflexes     Tremor   Resting tremor: absent  Intention tremor: absent  Action tremor: absent    Drains:   Urethral Catheter Non-latex;Silicone 16 Fr. (Active)       Output by Drain (mL) 06/07/22 0701 - 06/07/22 1900 06/07/22 1901 - 06/08/22 0700 06/08/22 0701 - 06/08/22 0904 Range Total   Requested LDAs do not have output data documented.       Imaging Results (Last 24 Hours)     Procedure Component Value Units Date/Time    XR Chest 1 View [[id number]] Collected: 06/08/22 0712     Updated: 06/08/22 0717    Narrative:      EXAM: XR CHEST 1 VW- 6/8/2022 3:15 AM CDT     HISTORY: On vent; R13.10-Dysphagia, unspecified; Z01.818-Encounter for  other preprocedural examination; D32.9-Benign neoplasm of meninges,  unspecified; Z74.09-Other reduced mobility; Z78.9-Other specified health  status; G40.901-Epilepsy, unspecified, not intractable, with status  epilepticus; J96.01-Acute respiratory failure with hypoxia;  G91.0-Communicating hydrocephalus       COMPARISON: June 7, 2022.     TECHNIQUE: Frontal radiograph of the chest     FINDINGS:   Chronic interstitial changes present in the pulmonary parenchyma..  Cardiac silhouettes mildly enlarged. Endotracheal tube is present.  Faintly visualized right ventricular shunt catheter projects over the  right chest. Feeding gastrostomy tube is present in the stomach.     The osseous structures and surrounding soft tissues demonstrate no acute  abnormality.          " Impression:      1. Stable chest.        This report was finalized on 06/08/2022 07:14 by Dr. Emanuel Frederick MD.    XR Chest 1 View [[id number]] Collected: 06/07/22 1232     Updated: 06/07/22 1236    Narrative:      EXAM: XR CHEST 1 VW- 6/7/2022 12:24 PM CDT     HISTORY: post chest tube removal; R13.10-Dysphagia, unspecified;  Z01.818-Encounter for other preprocedural examination; D32.9-Benign  neoplasm of meninges, unspecified; Z74.09-Other reduced mobility;  Z78.9-Other specified health status; G40.901-Epilepsy, unspecified, not  intractable, with status epilepticus; J96.01-Acute respiratory failure  with hypoxia; G91.0-Communicating hydrocephalus       COMPARISON: June 7, 2022 3:00 AM.     TECHNIQUE: Frontal radiograph of the chest     FINDINGS:   The lungs are clear. Removal left chest tube is noted. Tracheostomy tube  is present.. The cardiomediastinal silhouette and pulmonary vascularity  are within normal limits.      The osseous structures and surrounding soft tissues demonstrate no acute  abnormality.          Impression:      1. Stable chest with no acute cardiopulmonary process..        This report was finalized on 06/07/2022 12:33 by Dr. Emanuel Frederick MD.        Lab Results (last 24 hours)     Procedure Component Value Units Date/Time    POC Glucose Once [[id number]]  (Abnormal) Collected: 06/08/22 0824    Specimen: Blood Updated: 06/08/22 0835     Glucose 159 mg/dL      Comment: : [id number] [facility] RobertMeter ID: [id number]       POC Glucose Once [[id number]]  (Abnormal) Collected: 06/08/22 0611    Specimen: Blood Updated: 06/08/22 0622     Glucose 186 mg/dL      Comment: : [id number] Na JamieMeter ID: [id number]       Manual Differential [[id number]]  (Abnormal) Collected: 06/08/22 0256    Specimen: Blood Updated: 06/08/22 0431     Neutrophil % 83.8 %      Lymphocyte % 5.1 %      Monocyte % 7.1 %      Eosinophil % 1.0 %      Basophil % 1.0 %      Metamyelocyte % 2.0 %      Neutrophils  Absolute 8.93 10*3/mm3      Lymphocytes Absolute 0.54 10*3/mm3      Monocytes Absolute 0.76 10*3/mm3      Eosinophils Absolute 0.11 10*3/mm3      Basophils Absolute 0.11 10*3/mm3      nRBC 1.0 /100 WBC      Anisocytosis Slight/1+     Macrocytes Slight/1+     Poikilocytes Slight/1+     Polychromasia Slight/1+     WBC Morphology Normal     Platelet Morphology Normal    CBC & Differential [[id number]]  (Abnormal) Collected: 06/08/22 0256    Specimen: Blood Updated: 06/08/22 0431    Narrative:      The following orders were created for panel order CBC & Differential.  Procedure                               Abnormality         Status                     ---------                               -----------         ------                     CBC Auto Differential[[id number]]        Abnormal            Final result                 Please view results for these tests on the individual orders.    CBC Auto Differential [[id number]]  (Abnormal) Collected: 06/08/22 0256    Specimen: Blood Updated: 06/08/22 0431     WBC 10.66 10*3/mm3      RBC 2.85 10*6/mm3      Hemoglobin 8.9 g/dL      Hematocrit 28.5 %      .0 fL      MCH 31.2 pg      MCHC 31.2 g/dL      RDW 15.1 %      RDW-SD 52.5 fl      MPV 9.3 fL      Platelets 251 10*3/mm3     Narrative:      The previously reported component NRBC is no longer being reported. Previous result was 0.4 /100 WBC (Reference Range: 0.0-0.2 /100 WBC) on 6/8/2022 at [address].    Phenytoin Level, Total [[id number]]  (Normal) Collected: 06/08/22 0256    Specimen: Blood Updated: 06/08/22 0403     Phenytoin Level 15.1 mcg/mL     Comprehensive Metabolic Panel [[id number]]  (Abnormal) Collected: 06/08/22 0256    Specimen: Blood Updated: 06/08/22 0401     Glucose 96 mg/dL      BUN 29 mg/dL      Creatinine 0.53 mg/dL      Sodium 138 mmol/L      Potassium 4.4 mmol/L      Chloride 103 mmol/L      CO2 28.0 mmol/L      Calcium 8.4 mg/dL      Total Protein 6.3 g/dL      Albumin 2.10 g/dL      ALT (SGPT) 29  U/L      AST (SGOT) 42 U/L      Alkaline Phosphatase 188 U/L      Total Bilirubin 0.3 mg/dL      Globulin 4.2 gm/dL      A/G Ratio 0.5 g/dL      BUN/Creatinine Ratio 54.7     Anion Gap 7.0 mmol/L      eGFR 103.2 mL/min/1.73      Comment: National Kidney Foundation and American Society of Nephrology (ASN) Task Force recommended calculation based on the Chronic Kidney Disease Epidemiology Collaboration (CKD-EPI) equation refit without adjustment for race.       Narrative:      GFR Normal >60  Chronic Kidney Disease <60  Kidney Failure <15      Blood Gas, Arterial - [[id number]]  (Abnormal) Collected: 06/08/22 0346    Specimen: Arterial Blood Updated: 06/08/22 0341     Site Right Radial     Denzel's Test Positive     pH, Arterial 7.465 pH units      Comment: 83 Value above reference range        pCO2, Arterial 42.3 mm Hg      pO2, Arterial 80.0 mm Hg      Comment: 84 Value below reference range        HCO3, Arterial 30.4 mmol/L      Comment: 83 Value above reference range        Base Excess, Arterial 6.0 mmol/L      Comment: 83 Value above reference range        O2 Saturation, Arterial 97.2 %      Temperature 37.0 C      Barometric Pressure for Blood Gas 747 mmHg      Modality Ventilator     FIO2 30 %      Ventilator Mode PC     Set Mech Resp Rate 16.0     PEEP 6.0     PIP 14 cmH2O      Comment: Meter: [id number]     :  [id number]        Collected by [id number]     pCO2, Temperature Corrected 42.3 mm Hg      pH, Temp Corrected 7.465 pH Units      pO2, Temperature Corrected 80.0 mm Hg     POC Glucose Once [[id number]]  (Abnormal) Collected: 06/07/22 2333    Specimen: Blood Updated: 06/07/22 2344     Glucose 165 mg/dL      Comment: : [id number] Na JamieMeter ID: [id number]       POC Glucose Once [[id number]]  (Normal) Collected: 06/07/22 2042    Specimen: Blood Updated: 06/07/22 2053     Glucose 101 mg/dL      Comment: : [id number] Na JamieMeter ID: [id number]       POC Glucose Once [[id number]]   (Normal) Collected: 06/07/22 1835    Specimen: Blood Updated: 06/07/22 1847     Glucose 103 mg/dL      Comment: : [id number] Jay Cruz ID: [id number]       POC Glucose Once [[id number]]  (Abnormal) Collected: 06/07/22 1234    Specimen: Blood Updated: 06/07/22 1245     Glucose 139 mg/dL      Comment: : [id number] Consuelo Cesar ID: [id number]           [id number]  Stevie Parsons, APRN

## 2022-06-08 NOTE — CONSULTS
20 gauge 1.75 inch USGPIV placed in left forearm with 1 attempt(s). 20 gauge 2.5 inch USGPIV placed in left  forearm with 2 attempt(s).

## 2022-06-08 NOTE — PROGRESS NOTES
Neurology Progress Note      Date of admission: 5/10/2022  4:49 AM  Date of visit: 6/8/2022    Chief Complaint:  Seizures    Subjective     Subjective:    Events overnight reviewed.  No significant changes.  He remains off of propofol.  He remains extremely somnolent.  Vital signs reviewed.  Afebrile overnight.  Blood pressure well maintained.  No signs of bradycardia.        Medications:  Current Facility-Administered Medications   Medication Dose Route Frequency Provider Last Rate Last Admin   • acetaminophen (TYLENOL) tablet 650 mg  650 mg Oral Q4H PRN Stevie Parsons APRN   650 mg at 06/04/22 2029    Or   • acetaminophen (TYLENOL) suppository 650 mg  650 mg Rectal Q4H PRN Stevie Parsons APRN   650 mg at 05/23/22 0016   • albuterol (PROVENTIL) nebulizer solution 0.083% 2.5 mg/3mL  2.5 mg Nebulization Q8H - RT Stevie Parsons APRN   2.5 mg at 06/08/22 0622   • alteplase (CATHFLO/ACTIVASE) injection 2 mg  2 mg Intracatheter PRN Stevie Parsons APRN   New Syringe/Cartridge at 05/28/22 1330   • bisacodyl (DULCOLAX) suppository 10 mg  10 mg Rectal Daily PRN Stevie Parsons APRN   10 mg at 06/03/22 0749   • butalbital-acetaminophen-caffeine (FIORICET, ESGIC) -40 MG per tablet 1 tablet  1 tablet Oral Q4H PRN Stevie Parsons APRN   1 tablet at 05/22/22 1729   • carvedilol (COREG) tablet 6.25 mg  6.25 mg Nasogastric BID With Meals Stevie Parsons APRN   6.25 mg at 06/08/22 0829   • chlorhexidine (PERIDEX) 0.12 % solution 15 mL  15 mL Mouth/Throat Q12H Stevie Parsons APRN   15 mL at 06/08/22 0829   • dextrose (D50W) (25 g/50 mL) IV injection 25 g  25 g Intravenous Q15 Min PRN Stevie Parsons APRN   25 g at 06/02/22 1759   • dextrose (GLUTOSE) oral gel 15 g  15 g Oral Q15 Min PRN Rust, Stevie M, APRN   15 g at 05/22/22 0527   • fosphenytoin (Cerebyx) 275 mg PE in sodium chloride 0.9 % 100 mL IVPB  275 mg PE Intravenous Q8H Stevie Parsons APRN   275 mg PE at 06/08/22 0611   • glucagon (human recombinant) (GLUCAGEN  DIAGNOSTIC) injection 1 mg  1 mg Intramuscular Q15 Min PRN Stevie Parsons APRN       • heparin (porcine) 5000 UNIT/ML injection 5,000 Units  5,000 Units Subcutaneous Q12H Stevie Parsons APRN   5,000 Units at 06/08/22 0829   • Hold Medication (Anticoagulation)  1 each Does not apply Continuous PRN Stevie Parsons APRN       • hydrALAZINE (APRESOLINE) injection 10 mg  10 mg Intravenous Q6H PRN Stevie Parsons APRN   10 mg at 06/07/22 2102   • insulin detemir (LEVEMIR) injection 20 Units  20 Units Subcutaneous Nightly Stevie Parsons APRN   20 Units at 06/07/22 2049   • insulin regular (humuLIN R,novoLIN R) injection 2-7 Units  2-7 Units Subcutaneous Q6H Stevie Parsons APRN   2 Units at 06/08/22 0620   • insulin regular (humuLIN R,novoLIN R) injection 8 Units  8 Units Subcutaneous Q6H FeliztStevie APRN   8 Units at 06/07/22 1544   • ipratropium-albuterol (DUO-NEB) nebulizer solution 3 mL  3 mL Nebulization Q4H PRN Stevie Parsons APRN   3 mL at 05/25/22 1007   • labetalol (NORMODYNE,TRANDATE) injection 10 mg  10 mg Intravenous Q6H PRN Stevie Parsons APRN   10 mg at 06/08/22 0111   • lacosamide (VIMPAT) injection 200 mg  200 mg Intravenous Q12H Stevie Parsons APRN   200 mg at 06/08/22 0829   • levETIRAcetam in NaCl 0.75% (KEPPRA) IVPB 1,000 mg  1,000 mg Intravenous Q12H Di Ward APRN   1,000 mg at 06/08/22 1036   • levothyroxine (SYNTHROID, LEVOTHROID) tablet 125 mcg  125 mcg Nasogastric Q AM Stevie Parsons APRN   125 mcg at 06/08/22 0611   • lidocaine (XYLOCAINE) 1 % injection 10 mL  10 mL Intradermal Once Stevie Parsons APRN       • liothyronine (CYTOMEL) tablet 25 mcg  25 mcg Nasogastric Daily Stevie Parsons APRN   25 mcg at 06/08/22 0829   • lisinopril (PRINIVIL,ZESTRIL) tablet 20 mg  20 mg Nasogastric Q24H Stevie Parsons APRN   20 mg at 06/08/22 0829   • LORazepam (ATIVAN) injection 2 mg  2 mg Intravenous Q2H PRN Donnell Freire MD   2 mg at 06/04/22 2038   • mupirocin (BACTROBAN) 2 % ointment 1  application  1 application Topical Q12H Stevie Parsons APRN   1 application at 06/08/22 1040   • norepinephrine (LEVOPHED) 8 mg in 250 mL NS infusion (premix)  0.02-0.3 mcg/kg/min Intravenous Titrated Martell Downs MD       • Nutrisource fiber pack 1 packet  1 packet Nasogastric 4x Daily Stevie Parsons APRN   1 packet at 06/08/22 1040   • ondansetron (ZOFRAN) tablet 4 mg  4 mg Oral Q6H PRN Stevie Parsons APRN        Or   • ondansetron (ZOFRAN) injection 4 mg  4 mg Intravenous Q6H PRN FeliztStevie APRN       • pantoprazole (PROTONIX) injection 40 mg  40 mg Intravenous Q AM Stevie Parsons APRN   40 mg at 06/08/22 0611   • polyethylene glycol (MIRALAX) packet 17 g  17 g Oral Daily Stevie Parsons APRN   17 g at 06/08/22 0829   • propofol (DIPRIVAN) infusion 10 mg/mL 100 mL  5-50 mcg/kg/min Intravenous Titrated Martell Downs MD       • ProSource TF oral liquid 45 mL  1 packet Nasogastric BID Stevie Parsons APRN   45 mL at 06/08/22 1041   • ProSource TF oral liquid 45 mL  45 mL Per G Tube BID Aden Kc MD   45 mL at 06/08/22 1040   • sodium chloride 3 % nebulizer solution 4 mL  4 mL Nebulization BID - RT Stevie Parsons APRN   4 mL at 06/08/22 0622       Review of Systems:   -A 14 point review of systems is unobtainable    Objective     Objective      Vital Signs  Temp:  [97.6 °F (36.4 °C)-98.7 °F (37.1 °C)] 98.2 °F (36.8 °C)  Heart Rate:  [55-63] 58  Resp:  [21-28] 27  BP: (125-175)/(59-80) 146/69  FiO2 (%):  [30 %-40 %] 30 %    Physical Exam:    HEENT:  Neck supple.  Tracheostomy in place  CVS:  Regular rate and rhythm.  No murmurs  Carotid Examination:  No bruits  Lungs:  Clear to auscultation  Abdomen:  Non-tender, Non-distended.  PEG tube in place  Extremities:  No signs of peripheral edema    Neurologic Exam:   Eyes open with any repeat stimulation.  Pupils are equally reactive to light.  Blinks to threat bilaterally.  Gag intact.    Facial grimacing symmetric with noxious  stimulation  Motor: (strength out of 5:  1= minimal movement, 2 = movement in plane of gravity, 3 = movement against gravity, 4 = movement against some resistance, 5 = full strength)    Facial grimacing to noxious stimulation but no signs of spontaneous movement of the extremities currently.    DTR:  2+ throughout in all four extremities  upgoing toe on left    Sensory:  Facial grimace to noxious stimulation    Coordination/Gait:  -No active tremors     Results Review:    I reviewed the patient's new clinical results.    Lab Results (last 24 hours)     Procedure Component Value Units Date/Time    POC Glucose Once [[id number]]  (Abnormal) Collected: 06/08/22 1102    Specimen: Blood Updated: 06/08/22 1114     Glucose 136 mg/dL      Comment: : [id number] Alana HornnMeter ID: [id number]       AFB Culture - Body Fluid, Pleural Cavity [[id number]] Collected: 05/25/22 1042    Specimen: Body Fluid from Pleural Cavity Updated: 06/08/22 1102     AFB Culture No AFB isolated at 2 weeks     AFB Stain No acid fast bacilli seen on direct smear      No acid fast bacilli seen on concentrated smear    Fungus Culture - Body Fluid, Pleural Cavity [[id number]] Collected: 05/25/22 1042    Specimen: Body Fluid from Pleural Cavity Updated: 06/08/22 1102     Fungus Culture No fungus isolated at 2 weeks    POC Glucose Once [[id number]]  (Abnormal) Collected: 06/08/22 0824    Specimen: Blood Updated: 06/08/22 0835     Glucose 159 mg/dL      Comment: : [id number] [facility]one RobertMeter ID: [id number]       POC Glucose Once [[id number]]  (Abnormal) Collected: 06/08/22 0611    Specimen: Blood Updated: 06/08/22 0622     Glucose 186 mg/dL      Comment: : [id number] Na BerryieMeter ID: [id number]       Manual Differential [[id number]]  (Abnormal) Collected: 06/08/22 0256    Specimen: Blood Updated: 06/08/22 0431     Neutrophil % 83.8 %      Lymphocyte % 5.1 %      Monocyte % 7.1 %      Eosinophil % 1.0 %      Basophil % 1.0 %       Metamyelocyte % 2.0 %      Neutrophils Absolute 8.93 10*3/mm3      Lymphocytes Absolute 0.54 10*3/mm3      Monocytes Absolute 0.76 10*3/mm3      Eosinophils Absolute 0.11 10*3/mm3      Basophils Absolute 0.11 10*3/mm3      nRBC 1.0 /100 WBC      Anisocytosis Slight/1+     Macrocytes Slight/1+     Poikilocytes Slight/1+     Polychromasia Slight/1+     WBC Morphology Normal     Platelet Morphology Normal    CBC & Differential [[id number]]  (Abnormal) Collected: 06/08/22 0256    Specimen: Blood Updated: 06/08/22 0431    Narrative:      The following orders were created for panel order CBC & Differential.  Procedure                               Abnormality         Status                     ---------                               -----------         ------                     CBC Auto Differential[[id number]]        Abnormal            Final result                 Please view results for these tests on the individual orders.    CBC Auto Differential [[id number]]  (Abnormal) Collected: 06/08/22 0256    Specimen: Blood Updated: 06/08/22 0431     WBC 10.66 10*3/mm3      RBC 2.85 10*6/mm3      Hemoglobin 8.9 g/dL      Hematocrit 28.5 %      .0 fL      MCH 31.2 pg      MCHC 31.2 g/dL      RDW 15.1 %      RDW-SD 52.5 fl      MPV 9.3 fL      Platelets 251 10*3/mm3     Narrative:      The previously reported component NRBC is no longer being reported. Previous result was 0.4 /100 WBC (Reference Range: 0.0-0.2 /100 WBC) on 6/8/2022 at [address] [place] [address].    Phenytoin Level, Total [[id number]]  (Normal) Collected: 06/08/22 0256    Specimen: Blood Updated: 06/08/22 0403     Phenytoin Level 15.1 mcg/mL     Comprehensive Metabolic Panel [[id number]]  (Abnormal) Collected: 06/08/22 0256    Specimen: Blood Updated: 06/08/22 0401     Glucose 96 mg/dL      BUN 29 mg/dL      Creatinine 0.53 mg/dL      Sodium 138 mmol/L      Potassium 4.4 mmol/L      Chloride 103 mmol/L      CO2 28.0 mmol/L      Calcium 8.4 mg/dL      Total Protein 6.3 g/dL       Albumin 2.10 g/dL      ALT (SGPT) 29 U/L      AST (SGOT) 42 U/L      Alkaline Phosphatase 188 U/L      Total Bilirubin 0.3 mg/dL      Globulin 4.2 gm/dL      A/G Ratio 0.5 g/dL      BUN/Creatinine Ratio 54.7     Anion Gap 7.0 mmol/L      eGFR 103.2 mL/min/1.73      Comment: National Kidney Foundation and American Society of Nephrology (ASN) Task Force recommended calculation based on the Chronic Kidney Disease Epidemiology Collaboration (CKD-EPI) equation refit without adjustment for race.       Narrative:      GFR Normal >60  Chronic Kidney Disease <60  Kidney Failure <15      Blood Gas, Arterial - [[id number]]  (Abnormal) Collected: 06/08/22 0346    Specimen: Arterial Blood Updated: 06/08/22 0341     Site Right Radial     Denzel's Test Positive     pH, Arterial 7.465 pH units      Comment: 83 Value above reference range        pCO2, Arterial 42.3 mm Hg      pO2, Arterial 80.0 mm Hg      Comment: 84 Value below reference range        HCO3, Arterial 30.4 mmol/L      Comment: 83 Value above reference range        Base Excess, Arterial 6.0 mmol/L      Comment: 83 Value above reference range        O2 Saturation, Arterial 97.2 %      Temperature 37.0 C      Barometric Pressure for Blood Gas 747 mmHg      Modality Ventilator     FIO2 30 %      Ventilator Mode PC     Set Mech Resp Rate 16.0     PEEP 6.0     PIP 14 cmH2O      Comment: Meter: [id number]     :  [id number]        Collected by [id number]     pCO2, Temperature Corrected 42.3 mm Hg      pH, Temp Corrected 7.465 pH Units      pO2, Temperature Corrected 80.0 mm Hg     POC Glucose Once [[id number]]  (Abnormal) Collected: 06/07/22 2333    Specimen: Blood Updated: 06/07/22 2344     Glucose 165 mg/dL      Comment: : [id number] Na BerryieMeter ID: [id number]       POC Glucose Once [[id number]]  (Normal) Collected: 06/07/22 2042    Specimen: Blood Updated: 06/07/22 2053     Glucose 101 mg/dL      Comment: : [id number] Na JamieMeter ID:  [id number]       POC Glucose Once [[id number]]  (Normal) Collected: 06/07/22 1835    Specimen: Blood Updated: 06/07/22 1847     Glucose 103 mg/dL      Comment: : [id number] Jay Cruz ID: [id number]       POC Glucose Once [[id number]]  (Abnormal) Collected: 06/07/22 1234    Specimen: Blood Updated: 06/07/22 1245     Glucose 139 mg/dL      Comment: : [id number] Consuelo Cesar ID: [id number]           Imaging Results (Last 24 Hours)     Procedure Component Value Units Date/Time    XR Chest 1 View [[id number]] Collected: 06/08/22 0712     Updated: 06/08/22 0717    Narrative:      EXAM: XR CHEST 1 VW- 6/8/2022 3:15 AM CDT     HISTORY: On vent; R13.10-Dysphagia, unspecified; Z01.818-Encounter for  other preprocedural examination; D32.9-Benign neoplasm of meninges,  unspecified; Z74.09-Other reduced mobility; Z78.9-Other specified health  status; G40.901-Epilepsy, unspecified, not intractable, with status  epilepticus; J96.01-Acute respiratory failure with hypoxia;  G91.0-Communicating hydrocephalus       COMPARISON: June 7, 2022.     TECHNIQUE: Frontal radiograph of the chest     FINDINGS:   Chronic interstitial changes present in the pulmonary parenchyma..  Cardiac silhouettes mildly enlarged. Endotracheal tube is present.  Faintly visualized right ventricular shunt catheter projects over the  right chest. Feeding gastrostomy tube is present in the stomach.     The osseous structures and surrounding soft tissues demonstrate no acute  abnormality.          Impression:      1. Stable chest.        This report was finalized on 06/08/2022 07:14 by Dr. Emanuel Frederick MD.    XR Chest 1 View [[id number]] Collected: 06/07/22 1232     Updated: 06/07/22 1236    Narrative:      EXAM: XR CHEST 1 VW- 6/7/2022 12:24 PM CDT     HISTORY: post chest tube removal; R13.10-Dysphagia, unspecified;  Z01.818-Encounter for other preprocedural examination; D32.9-Benign  neoplasm of meninges, unspecified; Z74.09-Other reduced  mobility;  Z78.9-Other specified health status; G40.901-Epilepsy, unspecified, not  intractable, with status epilepticus; J96.01-Acute respiratory failure  with hypoxia; G91.0-Communicating hydrocephalus       COMPARISON: June 7, 2022 3:00 AM.     TECHNIQUE: Frontal radiograph of the chest     FINDINGS:   The lungs are clear. Removal left chest tube is noted. Tracheostomy tube  is present.. The cardiomediastinal silhouette and pulmonary vascularity  are within normal limits.      The osseous structures and surrounding soft tissues demonstrate no acute  abnormality.          Impression:      1. Stable chest with no acute cardiopulmonary process..        This report was finalized on 06/07/2022 12:33 by Dr. Emanuel Frederick MD.        Phenytoin level today is 15.6 uncorrected  Zinc is normal at 54  Prealbumin is 12.7 which is below normal range.    EEG on 6/5 shows slowing.  Mouth movements were captured and had no epileptic correlate.    Repeat head CT without contrast on 6/6 showing the ventriculoperitoneal shunt is in place with no signs of ventriculomegaly.  No acute findings seen.     Phenytoin level this morning uncorrected is 15.1 all other labs reviewed.  Remains anemic at 8.9.  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 Seizures  --No seizure activity currently.  2.   Phenytoin level currently therapeutic  3. Hypoalbuminemia .  PEG tube in place.   4. Anemia  5. Atrial fibrillation  6.  Status post ventriculoperitoneal shunt on 6/3  7.  Metabolic encephalopathy  8.  Rhythmic mouth movements not epileptic.    Plan:  · Fosphenytoin 275 mg every 8 hours  · Continue daily Dilantin levels  · Currently therapeutic and most recent EEG showed no signs of subclinical seizure activity  · On Vimpat 200 mg IV every 12 hours  · Keppra 500 mg every 12 hours -Will increase Keppra to 1000 mg every 12 hours 6/5  · Continue with supportive care.  No evidence of ongoing subclinical status epilepticus.      Donnell Freire MD  06/08/22  11:25 CDT

## 2022-06-08 NOTE — PROGRESS NOTES
[facility]  ENT PROGRESS NOTES  2022      Patient Identification:  Name: Hai Hernandez  Age: 77 y.o.  Sex: male  :  1945  MRN: [id number]                     Date of Admission: 5/10/2022      CC:    Postop day #6 status post tracheostomy  Subjective   His chest tubes were removed yesterday.  He remains intubated and mechanically ventilated.  He is somnolent.    HISTORY   HPI, ROS, PMFSHx reviewed:   No changes  Cannot obtain due to mechanical ventilation.  The patient notably is critically ill and connected to a ventilator.  As such patient cannot communicate and provide any history whatsoever, including any history of present illness or interval history since arrival or review of systems. The interested reviewer may note this fact, as an attempt has been made at collecting and documenting these portions of the patient history, but this information is unobtainable despite attempted review and therefore cannot be documented at this time.            Objective     PE:    Temp:  [97.6 °F (36.4 °C)-98.7 °F (37.1 °C)] 98.2 °F (36.8 °C)  Heart Rate:  [55-63] 55  Resp:  [24-28] 27  BP: (146-175)/(59-85) 147/67  FiO2 (%):  [30 %] 30 %   Body mass index is 35.22 kg/m².     General appearance: chronically ill appearing.   Ability to Communicate: Patient is mechanically ventilated and unable to communicate with tracheostomy tube in place   Facial exam: no facial deformities, previous craniotomy noted   Ears - right ear normal, left ear normal.   Nasal exam - normal and patent, no erythema, discharge or polyps.   Oropharyngeal exam - mucous membranes moist, pharynx normal without lesions.   Neck exam -#8 cuffed tracheostomy tube in place.  The site looks good.     CVS exam: normal rate and regular rhythm.   Chest: Relatively good chest excursion with ventilation.   No lymphadenopathy in the anterior or posterior neck, supraclavicular areas.   Neurological exam reveals neck supple without  rigidity.    DATA      MEDICATIONS   I have reviewed the current MAR.     LABS AND IMAGING      I have reviewed the labs and imaging data since the patient was last seen.       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)    Status epilepticus (HCC)    Essential hypertension    Type 2 diabetes mellitus with hyperglycemia, without long-term current use of insulin (HCC)    Hypothyroidism (acquired)    Acute respiratory failure (secondary to status epilepticus)    Thrombocytopenia (HCC)    Cerebral parenchymal hemorrhage (HCC)    PAF (paroxysmal atrial fibrillation) (HCC)    Fever    Loculated pleural effusion    Acute deep vein thrombosis (DVT) of the ulnar and brachial veins of right upper extremity (HCC)    Dysphagia    Communicating hydrocephalus (HCC)     Status post tracheostomy with acute management      Plan     PLAN     Continue local wound care  Consider weaning to decannulation when the patient is able to liberate from the ventilator.  We will continue to follow          Geovany Davidson MD  6/8/2022  15:34 CDT

## 2022-06-09 ENCOUNTER — APPOINTMENT (OUTPATIENT)
Dept: GENERAL RADIOLOGY | Facility: HOSPITAL | Age: 77
End: 2022-06-09

## 2022-06-09 ENCOUNTER — APPOINTMENT (OUTPATIENT)
Dept: MRI IMAGING | Facility: HOSPITAL | Age: 77
End: 2022-06-09

## 2022-06-09 LAB
ALBUMIN SERPL-MCNC: 2.1 G/DL (ref 3.5–5.2)
ALBUMIN/GLOB SERPL: 0.5 G/DL
ALP SERPL-CCNC: 165 U/L (ref 39–117)
ALT SERPL W P-5'-P-CCNC: 27 U/L (ref 1–41)
ANION GAP SERPL CALCULATED.3IONS-SCNC: 6 MMOL/L (ref 5–15)
ARTERIAL PATENCY WRIST A: POSITIVE
AST SERPL-CCNC: 40 U/L (ref 1–40)
ATMOSPHERIC PRESS: 749 MMHG
BASE EXCESS BLDA CALC-SCNC: 6.9 MMOL/L (ref 0–2)
BASOPHILS # BLD AUTO: 0.07 10*3/MM3 (ref 0–0.2)
BASOPHILS NFR BLD AUTO: 0.6 % (ref 0–1.5)
BDY SITE: ABNORMAL
BILIRUB SERPL-MCNC: 0.3 MG/DL (ref 0–1.2)
BODY TEMPERATURE: 37 C
BUN SERPL-MCNC: 31 MG/DL (ref 8–23)
BUN/CREAT SERPL: 63.3 (ref 7–25)
CALCIUM SPEC-SCNC: 8.3 MG/DL (ref 8.6–10.5)
CHLORIDE SERPL-SCNC: 102 MMOL/L (ref 98–107)
CO2 SERPL-SCNC: 28 MMOL/L (ref 22–29)
CREAT SERPL-MCNC: 0.49 MG/DL (ref 0.76–1.27)
DEPRECATED RDW RBC AUTO: 53.5 FL (ref 37–54)
EGFRCR SERPLBLD CKD-EPI 2021: 105.7 ML/MIN/1.73
EOSINOPHIL # BLD AUTO: 0.23 10*3/MM3 (ref 0–0.4)
EOSINOPHIL NFR BLD AUTO: 2 % (ref 0.3–6.2)
ERYTHROCYTE [DISTWIDTH] IN BLOOD BY AUTOMATED COUNT: 15.3 % (ref 12.3–15.4)
GLOBULIN UR ELPH-MCNC: 3.9 GM/DL
GLUCOSE BLDC GLUCOMTR-MCNC: 102 MG/DL (ref 70–130)
GLUCOSE BLDC GLUCOMTR-MCNC: 129 MG/DL (ref 70–130)
GLUCOSE BLDC GLUCOMTR-MCNC: 131 MG/DL (ref 70–130)
GLUCOSE BLDC GLUCOMTR-MCNC: 188 MG/DL (ref 70–130)
GLUCOSE BLDC GLUCOMTR-MCNC: 66 MG/DL (ref 70–130)
GLUCOSE BLDC GLUCOMTR-MCNC: 81 MG/DL (ref 70–130)
GLUCOSE SERPL-MCNC: 66 MG/DL (ref 65–99)
HCO3 BLDA-SCNC: 30.8 MMOL/L (ref 20–26)
HCT VFR BLD AUTO: 26.8 % (ref 37.5–51)
HGB BLD-MCNC: 8.4 G/DL (ref 13–17.7)
IMM GRANULOCYTES # BLD AUTO: 0.29 10*3/MM3 (ref 0–0.05)
IMM GRANULOCYTES NFR BLD AUTO: 2.5 % (ref 0–0.5)
INHALED O2 CONCENTRATION: 30 %
LYMPHOCYTES # BLD AUTO: 1.13 10*3/MM3 (ref 0.7–3.1)
LYMPHOCYTES NFR BLD AUTO: 9.7 % (ref 19.6–45.3)
Lab: ABNORMAL
MCH RBC QN AUTO: 31.5 PG (ref 26.6–33)
MCHC RBC AUTO-ENTMCNC: 31.3 G/DL (ref 31.5–35.7)
MCV RBC AUTO: 100.4 FL (ref 79–97)
MODALITY: ABNORMAL
MONOCYTES # BLD AUTO: 1.23 10*3/MM3 (ref 0.1–0.9)
MONOCYTES NFR BLD AUTO: 10.6 % (ref 5–12)
NEUTROPHILS NFR BLD AUTO: 74.6 % (ref 42.7–76)
NEUTROPHILS NFR BLD AUTO: 8.69 10*3/MM3 (ref 1.7–7)
NRBC BLD AUTO-RTO: 0.3 /100 WBC (ref 0–0.2)
PAW @ PEAK INSP FLOW SETTING VENT: 14 CMH2O
PCO2 BLDA: 39.7 MM HG (ref 35–45)
PCO2 TEMP ADJ BLD: 39.7 MM HG (ref 35–45)
PEEP RESPIRATORY: 5 CM[H2O]
PH BLDA: 7.5 PH UNITS (ref 7.35–7.45)
PH, TEMP CORRECTED: 7.5 PH UNITS (ref 7.35–7.45)
PHENYTOIN SERPL-MCNC: 16.1 MCG/ML (ref 10–20)
PLATELET # BLD AUTO: 236 10*3/MM3 (ref 140–450)
PMV BLD AUTO: 9.2 FL (ref 6–12)
PO2 BLDA: 82.7 MM HG (ref 83–108)
PO2 TEMP ADJ BLD: 82.7 MM HG (ref 83–108)
POTASSIUM SERPL-SCNC: 4.2 MMOL/L (ref 3.5–5.2)
PROT SERPL-MCNC: 6 G/DL (ref 6–8.5)
RBC # BLD AUTO: 2.67 10*6/MM3 (ref 4.14–5.8)
SAO2 % BLDCOA: 97.6 % (ref 94–99)
SODIUM SERPL-SCNC: 136 MMOL/L (ref 136–145)
VENTILATOR MODE: ABNORMAL
WBC NRBC COR # BLD: 11.64 10*3/MM3 (ref 3.4–10.8)

## 2022-06-09 PROCEDURE — 94664 DEMO&/EVAL PT USE INHALER: CPT

## 2022-06-09 PROCEDURE — 94799 UNLISTED PULMONARY SVC/PX: CPT

## 2022-06-09 PROCEDURE — 82803 BLOOD GASES ANY COMBINATION: CPT

## 2022-06-09 PROCEDURE — 63710000001 INSULIN REGULAR HUMAN PER 5 UNITS: Performed by: NURSE PRACTITIONER

## 2022-06-09 PROCEDURE — 94003 VENT MGMT INPAT SUBQ DAY: CPT

## 2022-06-09 PROCEDURE — 85025 COMPLETE CBC W/AUTO DIFF WBC: CPT | Performed by: NURSE PRACTITIONER

## 2022-06-09 PROCEDURE — 82962 GLUCOSE BLOOD TEST: CPT

## 2022-06-09 PROCEDURE — 25010000002 FOSPHENYTOIN 500 MG PE/10ML SOLUTION 10 ML VIAL: Performed by: NURSE PRACTITIONER

## 2022-06-09 PROCEDURE — 99233 SBSQ HOSP IP/OBS HIGH 50: CPT | Performed by: INTERNAL MEDICINE

## 2022-06-09 PROCEDURE — 0 LEVETIRACETAM IN NACL 0.75% 1000 MG/100ML SOLUTION: Performed by: CLINICAL NURSE SPECIALIST

## 2022-06-09 PROCEDURE — 80185 ASSAY OF PHENYTOIN TOTAL: CPT | Performed by: NURSE PRACTITIONER

## 2022-06-09 PROCEDURE — 71045 X-RAY EXAM CHEST 1 VIEW: CPT

## 2022-06-09 PROCEDURE — 99024 POSTOP FOLLOW-UP VISIT: CPT | Performed by: NURSE PRACTITIONER

## 2022-06-09 PROCEDURE — 80053 COMPREHEN METABOLIC PANEL: CPT | Performed by: NURSE PRACTITIONER

## 2022-06-09 PROCEDURE — 97110 THERAPEUTIC EXERCISES: CPT

## 2022-06-09 PROCEDURE — 99233 SBSQ HOSP IP/OBS HIGH 50: CPT | Performed by: STUDENT IN AN ORGANIZED HEALTH CARE EDUCATION/TRAINING PROGRAM

## 2022-06-09 PROCEDURE — 99231 SBSQ HOSP IP/OBS SF/LOW 25: CPT | Performed by: OTOLARYNGOLOGY

## 2022-06-09 PROCEDURE — 94761 N-INVAS EAR/PLS OXIMETRY MLT: CPT

## 2022-06-09 PROCEDURE — 36600 WITHDRAWAL OF ARTERIAL BLOOD: CPT

## 2022-06-09 PROCEDURE — 63710000001 INSULIN DETEMIR PER 5 UNITS: Performed by: NURSE PRACTITIONER

## 2022-06-09 PROCEDURE — 70551 MRI BRAIN STEM W/O DYE: CPT

## 2022-06-09 PROCEDURE — 25010000002 HEPARIN (PORCINE) PER 1000 UNITS: Performed by: NURSE PRACTITIONER

## 2022-06-09 RX ADMIN — INSULIN HUMAN 2 UNITS: 100 INJECTION, SOLUTION PARENTERAL at 13:10

## 2022-06-09 RX ADMIN — CHLORHEXIDINE GLUCONATE 15 ML: 1.2 RINSE ORAL at 08:54

## 2022-06-09 RX ADMIN — SODIUM CHLORIDE SOLN NEBU 3% 4 ML: 3 NEBU SOLN at 18:50

## 2022-06-09 RX ADMIN — Medication 1 PACKET: at 20:29

## 2022-06-09 RX ADMIN — POLYETHYLENE GLYCOL 3350 17 G: 17 POWDER, FOR SOLUTION ORAL at 08:55

## 2022-06-09 RX ADMIN — Medication 1 PACKET: at 13:01

## 2022-06-09 RX ADMIN — Medication 45 ML: at 20:29

## 2022-06-09 RX ADMIN — LISINOPRIL 20 MG: 20 TABLET ORAL at 08:53

## 2022-06-09 RX ADMIN — CHLORHEXIDINE GLUCONATE 15 ML: 1.2 RINSE ORAL at 20:30

## 2022-06-09 RX ADMIN — INSULIN HUMAN 8 UNITS: 100 INJECTION, SOLUTION PARENTERAL at 13:10

## 2022-06-09 RX ADMIN — CARVEDILOL 6.25 MG: 6.25 TABLET, FILM COATED ORAL at 18:09

## 2022-06-09 RX ADMIN — Medication 1 PACKET: at 18:09

## 2022-06-09 RX ADMIN — LIOTHYRONINE SODIUM 25 MCG: 25 TABLET ORAL at 08:54

## 2022-06-09 RX ADMIN — ALBUTEROL SULFATE 2.5 MG: 2.5 SOLUTION RESPIRATORY (INHALATION) at 23:43

## 2022-06-09 RX ADMIN — LEVETIRACETAM 1000 MG: 10 INJECTION INTRAVENOUS at 08:54

## 2022-06-09 RX ADMIN — PANTOPRAZOLE SODIUM 40 MG: 40 INJECTION, POWDER, FOR SOLUTION INTRAVENOUS at 05:30

## 2022-06-09 RX ADMIN — SODIUM CHLORIDE 275 MG PE: 9 INJECTION, SOLUTION INTRAVENOUS at 21:35

## 2022-06-09 RX ADMIN — LEVETIRACETAM 1000 MG: 10 INJECTION INTRAVENOUS at 20:30

## 2022-06-09 RX ADMIN — Medication 45 ML: at 20:30

## 2022-06-09 RX ADMIN — MUPIROCIN 1 APPLICATION: 20 OINTMENT TOPICAL at 10:56

## 2022-06-09 RX ADMIN — Medication 1 PACKET: at 08:54

## 2022-06-09 RX ADMIN — CARVEDILOL 6.25 MG: 6.25 TABLET, FILM COATED ORAL at 08:53

## 2022-06-09 RX ADMIN — SODIUM CHLORIDE 275 MG PE: 9 INJECTION, SOLUTION INTRAVENOUS at 13:15

## 2022-06-09 RX ADMIN — LEVOTHYROXINE SODIUM 125 MCG: 125 TABLET ORAL at 05:30

## 2022-06-09 RX ADMIN — ALBUTEROL SULFATE 2.5 MG: 2.5 SOLUTION RESPIRATORY (INHALATION) at 14:06

## 2022-06-09 RX ADMIN — DEXTROSE MONOHYDRATE 25 G: 25 INJECTION, SOLUTION INTRAVENOUS at 18:07

## 2022-06-09 RX ADMIN — ALBUTEROL SULFATE 2.5 MG: 2.5 SOLUTION RESPIRATORY (INHALATION) at 06:11

## 2022-06-09 RX ADMIN — MUPIROCIN 1 APPLICATION: 20 OINTMENT TOPICAL at 20:30

## 2022-06-09 RX ADMIN — INSULIN DETEMIR 20 UNITS: 100 INJECTION, SOLUTION SUBCUTANEOUS at 20:52

## 2022-06-09 RX ADMIN — HEPARIN SODIUM 5000 UNITS: 5000 INJECTION INTRAVENOUS; SUBCUTANEOUS at 08:53

## 2022-06-09 RX ADMIN — SODIUM CHLORIDE 275 MG PE: 9 INJECTION, SOLUTION INTRAVENOUS at 05:30

## 2022-06-09 RX ADMIN — HEPARIN SODIUM 5000 UNITS: 5000 INJECTION INTRAVENOUS; SUBCUTANEOUS at 20:30

## 2022-06-09 RX ADMIN — LACOSAMIDE 200 MG: 10 INJECTION, SOLUTION INTRAVENOUS at 20:29

## 2022-06-09 RX ADMIN — SODIUM CHLORIDE SOLN NEBU 3% 4 ML: 3 NEBU SOLN at 06:11

## 2022-06-09 RX ADMIN — LACOSAMIDE 200 MG: 10 INJECTION, SOLUTION INTRAVENOUS at 09:00

## 2022-06-09 NOTE — SIGNIFICANT NOTE
06/09/22 0805   Readings   Plateau Pressure (cm H2O) 21 cm H2O   Driving Pressure (cm H2O) 16.7 cm H2O   Dynamic Compliance (L/cm H2O) 171 L/cm H2O

## 2022-06-09 NOTE — THERAPY TREATMENT NOTE
Acute Care - Physical Therapy Treatment Note  [facility]     Patient Name: Hai Hernandez  : 1945  MRN: [id number]  Today's Date: 2022      Visit Dx:     ICD-10-CM ICD-9-CM   1. Dysphagia, unspecified type  R13.10 787.20   2. Preop testing  Z01.818 V72.84   3. Meningioma (HCC)  D32.9 225.2   4. Impaired mobility  Z74.09 799.89   5. Decreased activities of daily living (ADL)  Z78.9 V49.89   6. Status epilepticus (HCC)  G40.901 345.3   7. Acute respiratory failure with hypoxia (HCC)  J96.01 518.81   8. 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)   • Status epilepticus (HCC)   • Essential hypertension   • Type 2 diabetes mellitus with hyperglycemia, without long-term current use of insulin (HCC)   • Hypothyroidism (acquired)   • Acute respiratory failure (secondary to status epilepticus)   • Thrombocytopenia (HCC)   • Cerebral parenchymal hemorrhage (HCC)   • PAF (paroxysmal atrial fibrillation) (HCC)   • Fever   • Loculated pleural effusion   • Acute deep vein thrombosis (DVT) of the ulnar and brachial veins of right upper extremity (HCC)   • Dysphagia   • Communicating hydrocephalus (HCC)     Past Medical History:   Diagnosis Date   • Brain tumor (HCC)    • Depression    • Hearing loss    • History of cellulitis     right foot big toe   • Hypertension    • Pneumonia    • Right arm weakness     after cervial injury   • Sciatic pain     affects balance   • Thyroid disease    • Visual disturbance     due to brain tumor - right eye     Past Surgical History:   Procedure Laterality Date   • CATARACT EXTRACTION, BILATERAL     • COLONOSCOPY     • CRANIOTOMY FOR TUMOR Right 5/10/2022    Procedure: CRANIOTOMY FOR TUMOR STERIOTACTIC WITH BRAIN LAB, right;  Surgeon: Martell Downs MD;  Location: [facility];   Service: Neurosurgery;  Laterality: Right;   • ENDOSCOPY W/ PEG TUBE PLACEMENT N/A 6/2/2022    Procedure: ESOPHAGOGASTRODUODENOSCOPY WITH PERCUTANEOUS ENDOSCOPIC GASTROSTOMY TUBE INSERTION WITH ANESTHESIA;  Surgeon: Melecio Lu MD;  Location: [facility];  Service: Gastroenterology;  Laterality: N/A;   • NECK SURGERY     • SKIN CANCER EXCISION     • TONSILLECTOMY     • TRACHEOSTOMY N/A 6/2/2022    Procedure: TRACHEOSTOMY;  Surgeon: Geovany Davidson MD;  Location: [facility] OR;  Service: ENT;  Laterality: N/A;   •  SHUNT INSERTION Right 6/3/2022    Procedure: VENTRICULAR PERITONEAL SHUNT INSERTION WITH BRAIN LAB;  Surgeon: Martell Downs MD;  Location: [facility] OR;  Service: Neurosurgery;  Laterality: Right;     PT Assessment (last 12 hours)     PT Evaluation and Treatment     Row Name 06/09/22 0737          Physical Therapy Time and Intention    Subjective Information --  unresponsive  -     Document Type therapy note (daily note)  -     Mode of Treatment physical therapy  -     Row Name 06/09/22 0737          General Information    Existing Precautions/Restrictions fall;oxygen therapy device and L/min  vent  -     Row Name 06/09/22 0737          Pain Scale: FACES Pre/Post-Treatment    Pain: FACES Scale, Pretreatment 0-->no hurt  -     Posttreatment Pain Rating 0-->no hurt  -     Row Name 06/09/22 0737          Aerobic Exercise    Comment, Aerobic Exercise (Therapeutic Exercise) PROM B UE/LE x 20 reps  -     Row Name             Wound 05/10/22 0959 Right scalp Incision    Wound - Properties Group Placement Date: 05/10/22  -PAULINE Placement Time: 0959 -JB Side: Right  -PAULINE Location: scalp  -PAULINE Primary Wound Type: Incision  -PAULINE     Retired Wound - Properties Group Placement Date: 05/10/22  -PAULINE Placement Time: 0959 -JB Side: Right  -PAULINE Location: scalp  -PAULINE Primary Wound Type: Incision  -PAULINE     Retired Wound - Properties Group Date first assessed: 05/10/22  -PAULINE Time first assessed: 0959 -JB  Side: Right  -PAULINE Location: scalp  -PAULINE Primary Wound Type: Incision  -PAULINE     Row Name             Wound 05/15/22 1712 coccyx    Wound - Properties Group Placement Date: 05/15/22  -KS Placement Time: 1712 -KS Location: coccyx  -KS     Retired Wound - Properties Group Placement Date: 05/15/22  -KS Placement Time: 1712 -KS Location: coccyx  -KS     Retired Wound - Properties Group Date first assessed: 05/15/22  -KS Time first assessed: 1712 -KS Location: coccyx  -KS     Row Name             Wound 05/17/22 1623 scalp Pressure Injury    Wound - Properties Group Placement Date: 05/17/22  -KS Placement Time: 1623  -KS Present on Hospital Admission: N  -KS Location: scalp  -KS Primary Wound Type: Pressure inj  -KS     Retired Wound - Properties Group Placement Date: 05/17/22  -KS Placement Time: 1623  -KS Present on Hospital Admission: N  -KS Location: scalp  -KS Primary Wound Type: Pressure inj  -KS     Retired Wound - Properties Group Date first assessed: 05/17/22  -KS Time first assessed: 1623  -KS Present on Hospital Admission: N  -KS Location: scalp  -KS Primary Wound Type: Pressure inj  -KS     Row Name             Wound 06/03/22 1602 Right scalp Incision    Wound - Properties Group Placement Date: 06/03/22  -MK Placement Time: 1602  -MK Present on Hospital Admission: N  -MK Side: Right  -MK Location: scalp  -MK Primary Wound Type: Incision  -MK     Retired Wound - Properties Group Placement Date: 06/03/22  -MK Placement Time: 1602  -MK Present on Hospital Admission: N  -MK Side: Right  -MK Location: scalp  -MK Primary Wound Type: Incision  -MK     Retired Wound - Properties Group Date first assessed: 06/03/22  -MK Time first assessed: 1602  -MK Present on Hospital Admission: N  -MK Side: Right  -MK Location: scalp  -MK Primary Wound Type: Incision  -MK     Row Name             Wound 06/03/22 1602 abdomen Incision    Wound - Properties Group Placement Date: 06/03/22  -MK Placement Time: 1602  -MK Present on Hospital  Admission: N  - Location: abdomen  -MK Primary Wound Type: Incision  -MK     Retired Wound - Properties Group Placement Date: 06/03/22  - Placement Time: 1602  -MK Present on Hospital Admission: N  - Location: abdomen  -MK Primary Wound Type: Incision  -MK     Retired Wound - Properties Group Date first assessed: 06/03/22  - Time first assessed: 1602  -MK Present on Hospital Admission: N  - Location: abdomen  -MK Primary Wound Type: Incision  -MK     Row Name 06/09/22 0737          Plan of Care Review    Plan of Care Reviewed With patient  -     Progress no change  -     Outcome Evaluation Pt. remains on vent and unresponsive. Pt. with stiffness and swelling in extremities. Performed PROM to all extremities to prevent contractures. Will continue ROM while on vent.  -     Row Name 06/09/22 0737          Positioning and Restraints    Pre-Treatment Position in bed  -     Post Treatment Position bed  -     In Bed fowlers;patient within staff view;side rails up x2;RUE elevated;LUE elevated;legs elevated;SCD pump applied  -           User Key  (r) = Recorded By, (t) = Taken By, (c) = Cosigned By    Initials Name Provider Type    Carmen Mistry RN Registered Nurse    Surya Ball RN Registered Nurse    Rosaura Thompson, BRENDAN Physical Therapist Assistant    Leatha Solomon RN Registered Nurse                Physical Therapy Education                 Title: PT OT SLP Therapies (In Progress)     Topic: Physical Therapy (In Progress)     Point: Mobility training (Done)     Learning Progress Summary           Patient Acceptance, E, VU,DU by YULIET at 5/11/2022 0745    Comment: benefits of activity, progression of PT POC                   Point: Home exercise program (In Progress)     Learning Progress Summary           Patient Acceptance, E, NR by YULIET at 6/7/2022 1345    Comment: Benefits of activity, B UE/LE exercises, positioning for joints and skin integrity                   Point: Body  mechanics (Not Started)     Learner Progress:  Not documented in this visit.          Point: Precautions (Not Started)     Learner Progress:  Not documented in this visit.                      User Key     Initials Effective Dates Name Provider Type Artur HORVATH 08/02/16 -  Wayne Thomas, PT DPT Physical Therapist PT              PT Recommendation and Plan     Plan of Care Reviewed With: patient  Progress: no change  Outcome Evaluation: Pt. remains on vent and unresponsive. Pt. with stiffness and swelling in extremities. Performed PROM to all extremities to prevent contractures. Will continue ROM while on vent.   Outcome Measures     Row Name 06/09/22 0737             How much help from another person do you currently need...    Turning from your back to your side while in flat bed without using bedrails? 1  -MF      Moving from lying on back to sitting on the side of a flat bed without bedrails? 1  -MF      Moving to and from a bed to a chair (including a wheelchair)? 1  -MF      Standing up from a chair using your arms (e.g., wheelchair, bedside chair)? 1  -MF      Climbing 3-5 steps with a railing? 1  -MF      To walk in hospital room? 1  -MF      AM-PAC 6 Clicks Score (PT) 6  -MF              Functional Assessment    Outcome Measure Options AM-PAC 6 Clicks Basic Mobility (PT)  -MF            User Key  (r) = Recorded By, (t) = Taken By, (c) = Cosigned By    Initials Name Provider Type    Rosaura Thompson PTA Physical Therapist Assistant                 Time Calculation:    PT Charges     Row Name 06/09/22 0808             Time Calculation    Start Time 0737  -      Stop Time 0803  -      Time Calculation (min) 26 min  -      PT Received On 06/09/22  -              Time Calculation- PT    Total Timed Code Minutes- PT 26 minute(s)  -MF              Timed Charges    24127 - PT Therapeutic Exercise Minutes 26  -MF              Total Minutes    Timed Charges Total Minutes 26  -MF       Total  Minutes 26  -MF            User Key  (r) = Recorded By, (t) = Taken By, (c) = Cosigned By    Initials Name Provider Type    Rosaura Thompson PTA Physical Therapist Assistant              Therapy Charges for Today     Code Description Service Date Service Provider Modifiers Qty    85967821668 HC PT THER PROC EA 15 MIN 6/9/2022 Rosaura Bravo PTA GP 2          PT G-Codes  Outcome Measure Options: AM-PAC 6 Clicks Basic Mobility (PT)  AM-PAC 6 Clicks Score (PT): 6  AM-PAC 6 Clicks Score (OT): 12    Rosaura Bravo PTA  6/9/2022     left great toe bed appears lifted away from nail bed but remains intact/intact

## 2022-06-09 NOTE — PROGRESS NOTES
Neurology Progress Note      Date of admission: 5/10/2022  4:49 AM  Date of visit: 6/9/2022    Chief Complaint:  Seizures    Subjective     Subjective:    No events overnight. No observed or reported seizure. Patient has been off sedation for 4 days. Transfer to [facility] on hold. Remains ventilated with trach. PEG. Patient opens eyes to voice.   Neurosurgery ordered MRI brain for this AM.       Dilantin level 16.1. calculated with albumin dilantin level 23.8.   Medications:  Current Facility-Administered Medications   Medication Dose Route Frequency Provider Last Rate Last Admin   • acetaminophen (TYLENOL) tablet 650 mg  650 mg Oral Q4H PRN Stevie Parsons APRN   650 mg at 06/04/22 2029    Or   • acetaminophen (TYLENOL) suppository 650 mg  650 mg Rectal Q4H PRN Stevie Parsons APRN   650 mg at 05/23/22 0016   • albuterol (PROVENTIL) nebulizer solution 0.083% 2.5 mg/3mL  2.5 mg Nebulization Q8H - RT Stevie Parsons APRN   2.5 mg at 06/09/22 0611   • alteplase (CATHFLO/ACTIVASE) injection 2 mg  2 mg Intracatheter PRN Stevie Parsons APRN   New Syringe/Cartridge at 05/28/22 1330   • bisacodyl (DULCOLAX) suppository 10 mg  10 mg Rectal Daily PRN Stevie Parsons APRN   10 mg at 06/03/22 0749   • butalbital-acetaminophen-caffeine (FIORICET, ESGIC) -40 MG per tablet 1 tablet  1 tablet Oral Q4H PRN Stevie Parsons APRN   1 tablet at 05/22/22 1729   • carvedilol (COREG) tablet 6.25 mg  6.25 mg Nasogastric BID With Meals Stevie Parsons APRN   6.25 mg at 06/08/22 1716   • chlorhexidine (PERIDEX) 0.12 % solution 15 mL  15 mL Mouth/Throat Q12H Stevie Parsons APRN   15 mL at 06/08/22 2003   • dextrose (D50W) (25 g/50 mL) IV injection 25 g  25 g Intravenous Q15 Min PRN Stevie Parsons APRN   25 g at 06/02/22 1759   • dextrose (GLUTOSE) oral gel 15 g  15 g Oral Q15 Min PRN Stevie Parsons APRN   15 g at 05/22/22 1799   • fosphenytoin (Cerebyx) 275 mg PE in sodium chloride 0.9 % 100 mL IVPB  275 mg PE Intravenous Q8H Stevie Parsons,  APRN   275 mg PE at 06/09/22 0530   • glucagon (human recombinant) (GLUCAGEN DIAGNOSTIC) injection 1 mg  1 mg Intramuscular Q15 Min PRN Stevie Parsons APRN       • heparin (porcine) 5000 UNIT/ML injection 5,000 Units  5,000 Units Subcutaneous Q12H Stevie Parsons APRN   5,000 Units at 06/08/22 2000   • Hold Medication (Anticoagulation)  1 each Does not apply Continuous PRN Stevie Parsons APRN       • hydrALAZINE (APRESOLINE) injection 10 mg  10 mg Intravenous Q6H PRN FeliztStevie APRN   10 mg at 06/07/22 2102   • insulin detemir (LEVEMIR) injection 20 Units  20 Units Subcutaneous Nightly Stevie Parsons APRN   20 Units at 06/08/22 2016   • insulin regular (humuLIN R,novoLIN R) injection 2-7 Units  2-7 Units Subcutaneous Q6H Stevie Parsons APRN   2 Units at 06/08/22 0620   • insulin regular (humuLIN R,novoLIN R) injection 8 Units  8 Units Subcutaneous Q6H Stevie Parsons APRN   8 Units at 06/08/22 1714   • ipratropium-albuterol (DUO-NEB) nebulizer solution 3 mL  3 mL Nebulization Q4H PRN Stevie Parsons APRN   3 mL at 05/25/22 1007   • labetalol (NORMODYNE,TRANDATE) injection 10 mg  10 mg Intravenous Q6H PRN Stevie Parsons APRN   10 mg at 06/08/22 0111   • lacosamide (VIMPAT) injection 200 mg  200 mg Intravenous Q12H Stevie Parsons APRN   200 mg at 06/08/22 2000   • levETIRAcetam in NaCl 0.75% (KEPPRA) IVPB 1,000 mg  1,000 mg Intravenous Q12H Di Ward APRN   1,000 mg at 06/08/22 2000   • levothyroxine (SYNTHROID, LEVOTHROID) tablet 125 mcg  125 mcg Nasogastric Q AM Stevie Parsons APRN   125 mcg at 06/09/22 0530   • lidocaine (XYLOCAINE) 1 % injection 10 mL  10 mL Intradermal Once Rust, MARIO Navarrete       • liothyronine (CYTOMEL) tablet 25 mcg  25 mcg Nasogastric Daily RustStevie APRN   25 mcg at 06/08/22 0829   • lisinopril (PRINIVIL,ZESTRIL) tablet 20 mg  20 mg Nasogastric Q24H RusStevie alexander APRN   20 mg at 06/08/22 0829   • LORazepam (ATIVAN) injection 2 mg  2 mg Intravenous Q2H PRN Donnell Freire,  MD   2 mg at 06/04/22 2038   • mupirocin (BACTROBAN) 2 % ointment 1 application  1 application Topical Q12H Stevie Parsons APRN   1 application at 06/08/22 1040   • norepinephrine (LEVOPHED) 8 mg in 250 mL NS infusion (premix)  0.02-0.3 mcg/kg/min Intravenous Titrated Martell Downs MD       • Nutrisource fiber pack 1 packet  1 packet Nasogastric 4x Daily Stevie Parsons APRN   1 packet at 06/08/22 2003   • ondansetron (ZOFRAN) tablet 4 mg  4 mg Oral Q6H PRN Stevie Parsons APRN        Or   • ondansetron (ZOFRAN) injection 4 mg  4 mg Intravenous Q6H PRN FeliztStevie APRN       • pantoprazole (PROTONIX) injection 40 mg  40 mg Intravenous Q AM RusStevie alexander APRN   40 mg at 06/09/22 0530   • polyethylene glycol (MIRALAX) packet 17 g  17 g Oral Daily Stevie Parsons APRN   17 g at 06/08/22 0829   • ProSource TF oral liquid 45 mL  1 packet Nasogastric BID Stevie Parsons APRN   45 mL at 06/08/22 1041   • ProSource TF oral liquid 45 mL  45 mL Per G Tube BID Aden Kc MD   45 mL at 06/08/22 1040   • sodium chloride 3 % nebulizer solution 4 mL  4 mL Nebulization BID - RT Stevie Parsons APRN   4 mL at 06/09/22 0611       Review of Systems:   -A 14 point review of systems is unobtainable    Objective     Objective      Vital Signs  Temp:  [98.1 °F (36.7 °C)-99.3 °F (37.4 °C)] 98.1 °F (36.7 °C)  Heart Rate:  [55-63] 59  Resp:  [18-35] 26  BP: (138-163)/(65-86) 153/81  FiO2 (%):  [30 %] 30 %    Physical Exam:    HEENT:  Neck supple.  Tracheostomy in place. Purulent drainage around trach  CVS:  Regular rate and rhythm.  No murmurs  Carotid Examination:  No bruits  Lungs:  Clear to auscultation  Abdomen:  Non-tender, Non-distended.  PEG tube in place  Extremities:  No signs of peripheral edema    Neurologic Exam:   Eyes open with any repeat stimulation.  Pupils are equally reactive to light.  Blinks to threat bilaterally.  Gag intact.  Facial grimacing symmetric with noxious stimulation    Motor: (strength out of 5:   1= minimal movement, 2 = movement in plane of gravity, 3 = movement against gravity, 4 = movement against some resistance, 5 = full strength)    Facial grimacing to noxious stimulation but no signs of spontaneous movement of the extremities currently.  Weak  to left on command and this was reproducable. No  on right.  No movement to bilateral lower extremities on command. Withdraw to noxious stimuli.    DTR:  2+ throughout in all four extremities  upgoing toe on left    Sensory:  Facial grimace to noxious stimulation    Coordination/Gait:  -No active tremors     Results Review:    I reviewed the patient's new clinical results.    Lab Results (last 24 hours)     Procedure Component Value Units Date/Time    POC Glucose Once [[id number]]  (Normal) Collected: 06/09/22 0525    Specimen: Blood Updated: 06/09/22 0536     Glucose 81 mg/dL      Comment: : [id number] Iron Szymanski ID: [id number]       Comprehensive Metabolic Panel [[id number]]  (Abnormal) Collected: 06/09/22 0445    Specimen: Blood Updated: 06/09/22 0518     Glucose 66 mg/dL      BUN 31 mg/dL      Creatinine 0.49 mg/dL      Sodium 136 mmol/L      Potassium 4.2 mmol/L      Chloride 102 mmol/L      CO2 28.0 mmol/L      Calcium 8.3 mg/dL      Total Protein 6.0 g/dL      Albumin 2.10 g/dL      ALT (SGPT) 27 U/L      AST (SGOT) 40 U/L      Alkaline Phosphatase 165 U/L      Total Bilirubin 0.3 mg/dL      Globulin 3.9 gm/dL      A/G Ratio 0.5 g/dL      BUN/Creatinine Ratio 63.3     Anion Gap 6.0 mmol/L      eGFR 105.7 mL/min/1.73      Comment: National Kidney Foundation and American Society of Nephrology (ASN) Task Force recommended calculation based on the Chronic Kidney Disease Epidemiology Collaboration (CKD-EPI) equation refit without adjustment for race.       Narrative:      GFR Normal >60  Chronic Kidney Disease <60  Kidney Failure <15      Phenytoin Level, Total [[id number]]  (Normal) Collected: 06/09/22 0445    Specimen: Blood Updated:  06/09/22 0518     Phenytoin Level 16.1 mcg/mL     CBC & Differential [[id number]]  (Abnormal) Collected: 06/09/22 0445    Specimen: Blood Updated: 06/09/22 0500    Narrative:      The following orders were created for panel order CBC & Differential.  Procedure                               Abnormality         Status                     ---------                               -----------         ------                     CBC Auto Differential[[id number]]        Abnormal            Final result                 Please view results for these tests on the individual orders.    CBC Auto Differential [[id number]]  (Abnormal) Collected: 06/09/22 0445    Specimen: Blood Updated: 06/09/22 0500     WBC 11.64 10*3/mm3      RBC 2.67 10*6/mm3      Hemoglobin 8.4 g/dL      Hematocrit 26.8 %      .4 fL      MCH 31.5 pg      MCHC 31.3 g/dL      RDW 15.3 %      RDW-SD 53.5 fl      MPV 9.2 fL      Platelets 236 10*3/mm3      Neutrophil % 74.6 %      Lymphocyte % 9.7 %      Monocyte % 10.6 %      Eosinophil % 2.0 %      Basophil % 0.6 %      Immature Grans % 2.5 %      Neutrophils, Absolute 8.69 10*3/mm3      Lymphocytes, Absolute 1.13 10*3/mm3      Monocytes, Absolute 1.23 10*3/mm3      Eosinophils, Absolute 0.23 10*3/mm3      Basophils, Absolute 0.07 10*3/mm3      Immature Grans, Absolute 0.29 10*3/mm3      nRBC 0.3 /100 WBC     Blood Gas, Arterial - [[id number]]  (Abnormal) Collected: 06/09/22 0422    Specimen: Arterial Blood Updated: 06/09/22 0418     Site Left Radial     Denzel's Test Positive     pH, Arterial 7.497 pH units      Comment: 83 Value above reference range        pCO2, Arterial 39.7 mm Hg      pO2, Arterial 82.7 mm Hg      Comment: 84 Value below reference range        HCO3, Arterial 30.8 mmol/L      Comment: 83 Value above reference range        Base Excess, Arterial 6.9 mmol/L      Comment: 83 Value above reference range        O2 Saturation, Arterial 97.6 %      Temperature 37.0 C      Barometric Pressure for  Blood Gas 749 mmHg      Modality Ventilator     FIO2 30 %      Ventilator Mode PC     PEEP 5.0     PIP 14 cmH2O      Comment: Meter: [id number]     :  [id number]        Collected by [id number]     pCO2, Temperature Corrected 39.7 mm Hg      pH, Temp Corrected 7.497 pH Units      pO2, Temperature Corrected 82.7 mm Hg     POC Glucose Once [[id number]]  (Normal) Collected: 06/09/22 0005    Specimen: Blood Updated: 06/09/22 0020     Glucose 129 mg/dL      Comment: : [id number] Jone AaronMeter ID: [id number]       POC Glucose Once [[id number]]  (Abnormal) Collected: 06/08/22 1958    Specimen: Blood Updated: 06/08/22 2009     Glucose 188 mg/dL      Comment: : [id number] Becerril AaronMeter ID: [id number]       POC Glucose Once [[id number]]  (Normal) Collected: 06/08/22 1659    Specimen: Blood Updated: 06/08/22 1720     Glucose 118 mg/dL      Comment: : [id number] Cone RobertMeter ID: [id number]       POC Glucose Once [[id number]]  (Abnormal) Collected: 06/08/22 1102    Specimen: Blood Updated: 06/08/22 1114     Glucose 136 mg/dL      Comment: : [id number] Alana Sharif ID: [id number]       AFB Culture - Body Fluid, Pleural Cavity [[id number]] Collected: 05/25/22 1042    Specimen: Body Fluid from Pleural Cavity Updated: 06/08/22 1102     AFB Culture No AFB isolated at 2 weeks     AFB Stain No acid fast bacilli seen on direct smear      No acid fast bacilli seen on concentrated smear    Fungus Culture - Body Fluid, Pleural Cavity [[id number]] Collected: 05/25/22 1042    Specimen: Body Fluid from Pleural Cavity Updated: 06/08/22 1102     Fungus Culture No fungus isolated at 2 weeks    POC Glucose Once [[id number]]  (Abnormal) Collected: 06/08/22 0824    Specimen: Blood Updated: 06/08/22 0835     Glucose 159 mg/dL      Comment: : [id number] Cone RobertMeter ID: [id number]           Imaging Results (Last 24 Hours)     Procedure Component Value Units Date/Time    XR Chest 1 View [[id number]] Collected:  06/09/22 0715     Updated: 06/09/22 0719    Narrative:      HISTORY: Intubated     CXR: Frontal view the chest obtained     COMPARISON: 6/8/2022     FINDINGS: Tracheostomy tube tip appropriate in position. Right IJ  central line tip projects over the lower SVC. Hypoventilated lungs.  Stable cardiomegaly. Similar prominence of the vascular markings in the  perihilar densities. Left lower lobe consolidation. Small left pleural  effusion. No pneumothorax. Degenerative change thoracic spine.       Impression:      1. Well-positioned life support lines.  2. Hypoventilated lungs. Cardiomegaly with vascular crowding and/or  perihilar edema. Small left pleural effusion with left lower lobe  atelectasis versus pneumonia.  This report was finalized on 06/09/2022 07:16 by Dr. Christel Feliciano MD.        Phenytoin level today is 15.6 uncorrected  Zinc is normal at 54  Prealbumin is 12.7 which is below normal range.    EEG on 6/5 shows slowing.  Mouth movements were captured and had no epileptic correlate.    Repeat head CT without contrast on 6/6 showing the ventriculoperitoneal shunt is in place with no signs of ventriculomegaly.  No acute findings seen.     Phenytoin level this morning uncorrected is 15.1 all other labs reviewed.  Remains anemic at 8.9.  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 Seizures  --No seizure activity currently.  2.  Phenytoin level currently therapeutic  3. Hypoalbuminemia .  PEG tube in place.   4. Anemia  5. Atrial fibrillation  6.  Status post ventriculoperitoneal shunt on 6/3  7.  Metabolic encephalopathy  8.  Rhythmic mouth movements not epileptic.  9. Remains ventilated with trach.    Plan:  · Fosphenytoin 275 mg every 8 hours  · Continue daily Dilantin levels  · Currently therapeutic and most recent EEG 6/5/2022 showed no signs of subclinical seizure activity  · On Vimpat 200 mg IV every 12 hours  · Keppra every 12 hours -increase Keppra to 1000 mg every 12 hours 6/5  · Continue with supportive care.  No evidence of ongoing subclinical status epilepticus.      Di Ward, APRN  06/09/22  08:23 CDT

## 2022-06-09 NOTE — PROGRESS NOTES
NEUROSURGERY DAILY PROGRESS NOTE    ASSESSMENT:   Hai Hernandez is a 77 y.o. with a significant comorbidity of acephalic migraines, thyroid disease status post radiation as a child, basal cell and squamous cell carcinoma of the skin. He presents in FU for known meningioma found on workup for right visual field changes. Physical exam findings of neurologically intact with resolution of all symptoms and mild decreased vision in right eye.  His imaging shows 22 x 24 x 13.5 mm right planum sphenoidale mass most suggestive of meningioma.    Past Medical History:   Diagnosis Date   • Brain tumor (HCC)    • Depression    • Hearing loss    • History of cellulitis     right foot big toe   • Hypertension    • Pneumonia    • Right arm weakness     after cervial injury   • Sciatic pain     affects balance   • Thyroid disease    • Visual disturbance     due to brain tumor - right eye     Active Hospital Problems    Diagnosis    • **Meningioma (HCC)    • Acute deep vein thrombosis (DVT) of the ulnar and brachial veins of right upper extremity (HCC)    • Loculated pleural effusion    • Fever    • PAF (paroxysmal atrial fibrillation) (HCC)    • Cerebral parenchymal hemorrhage (HCC)    • Essential hypertension    • Type 2 diabetes mellitus with hyperglycemia, without long-term current use of insulin (HCC)    • Hypothyroidism (acquired)    • Acute respiratory failure (secondary to status epilepticus)    • Thrombocytopenia (HCC)    • 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  Intermittently opens eyes to painful stimuli.  Does not follow commands.  Nonverbal.  Weakly withdraws to tactile stimuli.    Intracranial meningioma   POD #30 (5/10/2022) Status post postcraniotomy  for tumor   Pathology: WHO 1 Meningioma   MRI with blood products in resection cavity but no evidence of cerebritis   CT head reviewed without expansion of SDH   Neurochecks per policy   Decadron off   Leakage from wound.  Stapled at bedside.  Keep for now.  If leaks again will oversew    Hydrocephalus   Lumbar drain removed   CSF unremarkable from 5/17 and 5/23.    6 Days Post-Op right posterior parietal  shunt placement   Shunt pumps and refills well   Postop CT stable   MRI brain today.    Postop seizures, in status   Neurology managing   Cont Keppra, Dialntin, Vimpat   Stat Ativan   Follow dilantin levels   Repeat EEG unremarkable     CV: Cardene off   keep SBP <140.   Hypotension resolved and off pressors   Requiring hydralazine and labetalol PRNs   A-line removed secondary to limb ischemia  Pulm: Tracheostomy in place.  Appreciate ENT   Left chest tube placed x2 CT managing with TPA  : Keep jansen for now.   FEN: Hyponatremia, 127   3% NaCl DC   Poor glucose control.  Increase SSI     ENDO: Accu-Chek and treat per policy  GI: PEG tube placement today.  Appreciate GI  ID: Afebrile overnight,    DDX: infection vs drug fever   WBC 8.29   CRP in 20s, repeat pending   Broad spectrum abx, Meropenum and Vanc   Blood cult NGTD   Body fluid cx pending   CSF cultures pending, NGTD   UA+, 100K GNB   Heme:  DVT prophylaxis with SCD's.     Plt up 193 and HIT ab negative.     RUE clot.  Leave PICC for now.  Can not anticoagulate  Pain: NA  Dispo: DC pending seizure control   Pending extubation    CHIEF COMPLAINT:   Right visual field changes    Subjective  Symptom stable    Temp:  [98.1 °F (36.7 °C)-99.3 °F (37.4 °C)] 98.1 °F (36.7 °C)  Heart Rate:  [55-63] 59  Resp:  [18-35] 26  BP: (138-163)/(65-86) 153/81  FiO2 (%):  [30 %] 30 %    Objective:  General Appearance:  Well-appearing and in no acute distress.    Vital signs: (most recent): Blood pressure 153/81, pulse 59, temperature 98.1 °F (36.7 °C), temperature source  "Oral, resp. rate 26, height 182.9 cm (72\"), weight 117 kg (257 lb 12.8 oz), SpO2 96 %.      Neurologic Exam     Mental Status   Level of consciousness: arousable by verbal stimuli ,  drowsy  Off propofol.  Intermittently opens eyes to painful stimuli.  Does not follow commands.  Nonverbal.  Weakly withdraws to tactile stimuli.           Cranial Nerves     CN III, IV, VI   Pupils are equal, round, and reactive to light.  Extraocular motions are normal.     CN IX, X   CN IX normal.     Gait, Coordination, and Reflexes     Tremor   Resting tremor: absent  Intention tremor: absent  Action tremor: absent    Drains:   Urethral Catheter Non-latex;Silicone 16 Fr. (Active)       Output by Drain (mL) 06/08/22 0701 - 06/08/22 1900 06/08/22 1901 - 06/09/22 0700 06/09/22 0701 - 06/09/22 0835 Range Total   Requested LDAs do not have output data documented.       Imaging Results (Last 24 Hours)     Procedure Component Value Units Date/Time    XR Chest 1 View [[id number]] Collected: 06/09/22 0715     Updated: 06/09/22 0719    Narrative:      HISTORY: Intubated     CXR: Frontal view the chest obtained     COMPARISON: 6/8/2022     FINDINGS: Tracheostomy tube tip appropriate in position. Right IJ  central line tip projects over the lower SVC. Hypoventilated lungs.  Stable cardiomegaly. Similar prominence of the vascular markings in the  perihilar densities. Left lower lobe consolidation. Small left pleural  effusion. No pneumothorax. Degenerative change thoracic spine.       Impression:      1. Well-positioned life support lines.  2. Hypoventilated lungs. Cardiomegaly with vascular crowding and/or  perihilar edema. Small left pleural effusion with left lower lobe  atelectasis versus pneumonia.  This report was finalized on 06/09/2022 07:16 by Dr. Christel Feliciano MD.        Lab Results (last 24 hours)     Procedure Component Value Units Date/Time    POC Glucose Once [[id number]]  (Normal) Collected: 06/09/22 0525    Specimen: Blood " Updated: 06/09/22 0536     Glucose 81 mg/dL      Comment: : [id number] Iron Szymanski ID: [id number]       Comprehensive Metabolic Panel [[id number]]  (Abnormal) Collected: 06/09/22 0445    Specimen: Blood Updated: 06/09/22 0518     Glucose 66 mg/dL      BUN 31 mg/dL      Creatinine 0.49 mg/dL      Sodium 136 mmol/L      Potassium 4.2 mmol/L      Chloride 102 mmol/L      CO2 28.0 mmol/L      Calcium 8.3 mg/dL      Total Protein 6.0 g/dL      Albumin 2.10 g/dL      ALT (SGPT) 27 U/L      AST (SGOT) 40 U/L      Alkaline Phosphatase 165 U/L      Total Bilirubin 0.3 mg/dL      Globulin 3.9 gm/dL      A/G Ratio 0.5 g/dL      BUN/Creatinine Ratio 63.3     Anion Gap 6.0 mmol/L      eGFR 105.7 mL/min/1.73      Comment: National Kidney Foundation and American Society of Nephrology (ASN) Task Force recommended calculation based on the Chronic Kidney Disease Epidemiology Collaboration (CKD-EPI) equation refit without adjustment for race.       Narrative:      GFR Normal >60  Chronic Kidney Disease <60  Kidney Failure <15      Phenytoin Level, Total [[id number]]  (Normal) Collected: 06/09/22 0445    Specimen: Blood Updated: 06/09/22 0518     Phenytoin Level 16.1 mcg/mL     CBC & Differential [[id number]]  (Abnormal) Collected: 06/09/22 0445    Specimen: Blood Updated: 06/09/22 0500    Narrative:      The following orders were created for panel order CBC & Differential.  Procedure                               Abnormality         Status                     ---------                               -----------         ------                     CBC Auto Differential[[id number]]        Abnormal            Final result                 Please view results for these tests on the individual orders.    CBC Auto Differential [[id number]]  (Abnormal) Collected: 06/09/22 0445    Specimen: Blood Updated: 06/09/22 0500     WBC 11.64 10*3/mm3      RBC 2.67 10*6/mm3      Hemoglobin 8.4 g/dL      Hematocrit 26.8 %      .4 fL      MCH  31.5 pg      MCHC 31.3 g/dL      RDW 15.3 %      RDW-SD 53.5 fl      MPV 9.2 fL      Platelets 236 10*3/mm3      Neutrophil % 74.6 %      Lymphocyte % 9.7 %      Monocyte % 10.6 %      Eosinophil % 2.0 %      Basophil % 0.6 %      Immature Grans % 2.5 %      Neutrophils, Absolute 8.69 10*3/mm3      Lymphocytes, Absolute 1.13 10*3/mm3      Monocytes, Absolute 1.23 10*3/mm3      Eosinophils, Absolute 0.23 10*3/mm3      Basophils, Absolute 0.07 10*3/mm3      Immature Grans, Absolute 0.29 10*3/mm3      nRBC 0.3 /100 WBC     Blood Gas, Arterial - [[id number]]  (Abnormal) Collected: 06/09/22 0422    Specimen: Arterial Blood Updated: 06/09/22 0418     Site Left Radial     Denzel's Test Positive     pH, Arterial 7.497 pH units      Comment: 83 Value above reference range        pCO2, Arterial 39.7 mm Hg      pO2, Arterial 82.7 mm Hg      Comment: 84 Value below reference range        HCO3, Arterial 30.8 mmol/L      Comment: 83 Value above reference range        Base Excess, Arterial 6.9 mmol/L      Comment: 83 Value above reference range        O2 Saturation, Arterial 97.6 %      Temperature 37.0 C      Barometric Pressure for Blood Gas 749 mmHg      Modality Ventilator     FIO2 30 %      Ventilator Mode PC     PEEP 5.0     PIP 14 cmH2O      Comment: Meter: [id number]     :  [id number]        Collected by [id number]     pCO2, Temperature Corrected 39.7 mm Hg      pH, Temp Corrected 7.497 pH Units      pO2, Temperature Corrected 82.7 mm Hg     POC Glucose Once [[id number]]  (Normal) Collected: 06/09/22 0005    Specimen: Blood Updated: 06/09/22 0020     Glucose 129 mg/dL      Comment: : [id number] Jone AaronMeter ID: [id number]       POC Glucose Once [[id number]]  (Abnormal) Collected: 06/08/22 1958    Specimen: Blood Updated: 06/08/22 2009     Glucose 188 mg/dL      Comment: : [id number] Jone RoldanonMeter ID: [id number]       POC Glucose Once [[id number]]  (Normal) Collected: 06/08/22 1659    Specimen: Blood  Updated: 06/08/22 1720     Glucose 118 mg/dL      Comment: : [id number] Cone RobertMeter ID: [id number]       POC Glucose Once [[id number]]  (Abnormal) Collected: 06/08/22 1102    Specimen: Blood Updated: 06/08/22 1114     Glucose 136 mg/dL      Comment: : [id number] Alana HornnMeter ID: [id number]       AFB Culture - Body Fluid, Pleural Cavity [[id number]] Collected: 05/25/22 1042    Specimen: Body Fluid from Pleural Cavity Updated: 06/08/22 1102     AFB Culture No AFB isolated at 2 weeks     AFB Stain No acid fast bacilli seen on direct smear      No acid fast bacilli seen on concentrated smear    Fungus Culture - Body Fluid, Pleural Cavity [[id number]] Collected: 05/25/22 1042    Specimen: Body Fluid from Pleural Cavity Updated: 06/08/22 1102     Fungus Culture No fungus isolated at 2 weeks        [id number]  Stevie Parsons, APRN

## 2022-06-09 NOTE — PLAN OF CARE
Goal Outcome Evaluation:  Plan of Care Reviewed With: patient        Progress: no change  Outcome Evaluation: Pt. remains on vent and unresponsive. Pt. with stiffness and swelling in extremities. Performed PROM to all extremities to prevent contractures. Will continue ROM while on vent.

## 2022-06-09 NOTE — PLAN OF CARE
Goal Outcome Evaluation:  Plan of Care Reviewed With: other (see comments)      Progress: no change  Outcome Evaluation: Ntn follow up. Pt remains on vent. Pt received 90% of tube feeding over the past two days. Cont current tube feeding orders. Cont to follow for plan of care.

## 2022-06-09 NOTE — PROGRESS NOTES
PULMONARY AND CRITICAL CARE PROGRESS NOTE - [facility]    Patient: Hai Hernandez    1945    MR# [id number]    Acct# [id number]  06/09/22   08:10 CDT  Referring Provider: Martell Downs, *    Chief Complaint: Mechanically ventilated    Interval history: The patient remains intubated with trach to vent.  No sedation is infusing.  O2 sat 96% on 5 PEEP, 0.30 FiO2.  ABGs reviewed.  Pi decreased to 12 per Dr. Dasilva.  No other aggravating or alleviating factors.           Meds:  albuterol, 2.5 mg, Nebulization, Q8H - RT  carvedilol, 6.25 mg, Nasogastric, BID With Meals  chlorhexidine, 15 mL, Mouth/Throat, Q12H  fosphenytoin, 275 mg PE, Intravenous, Q8H  heparin (porcine), 5,000 Units, Subcutaneous, Q12H  insulin detemir, 20 Units, Subcutaneous, Nightly  insulin regular, 2-7 Units, Subcutaneous, Q6H  insulin regular, 8 Units, Subcutaneous, Q6H  lacosamide, 200 mg, Intravenous, Q12H  levETIRAcetam, 1,000 mg, Intravenous, Q12H  levothyroxine, 125 mcg, Nasogastric, Q AM  lidocaine, 10 mL, Intradermal, Once  liothyronine, 25 mcg, Nasogastric, Daily  lisinopril, 20 mg, Nasogastric, Q24H  mupirocin, 1 application, Topical, Q12H  Nutrisource fiber, 1 packet, Nasogastric, 4x Daily  pantoprazole, 40 mg, Intravenous, Q AM  polyethylene glycol, 17 g, Oral, Daily  ProSource TF, 1 packet, Nasogastric, BID  ProSource TF, 45 mL, Per G Tube, BID  sodium chloride, 4 mL, Nebulization, BID - RT      hold, 1 each  norepinephrine, 0.02-0.3 mcg/kg/min      Review of Systems:   Cannot obtain due to mechanical ventilated state     Ventilator Settings:        Resp Rate (Set): 16  Pressure Support (cm H2O): 8 cm H20  FiO2 (%): 30 %  PEEP/CPAP (cm H2O): 5 cm H20  Minute Ventilation (L/min) (Obs): 13.7 L/min  Resp Rate (Observed) Vent: 25  I:E Ratio (Set): 1:0.27  I:E Ratio (Obs): 1:1.90  PIP Observed (cm H2O): 18 cm H2O  RSBI: 21.96  Physical Exam:  Temp:  [98.1 °F (36.7 °C)-99.3 °F (37.4 °C)] 98.1 °F (36.7 °C)  Heart  Rate:  [55-63] 59  Resp:  [18-35] 26  BP: (138-163)/(65-86) 153/81  FiO2 (%):  [30 %] 30 %    Intake/Output Summary (Last 24 hours) at 6/9/2022 0810  Last data filed at 6/9/2022 0400  Gross per 24 hour   Intake 2018 ml   Output 500 ml   Net 1518 ml     SpO2 Percentage    06/09/22 0615 06/09/22 0630 06/09/22 0645   SpO2: 96% 99% 96%   Body mass index is 34.96 kg/m².   Physical Exam   Constitutional:       General: He is intubated     Appearance: He is ill-appearing. He is not toxic-appearing.      Interventions: He is intubated.   HENT:      Head: Normocephalic.      Comments: Craniotomy wound, trach     Nose: Nose normal.   Eyes:      General: No scleral icterus.  Cardiovascular: normal rate  Pulmonary:      Effort: No accessory muscle usage or respiratory distress. He is intubated.      Breath sounds: Decreased breath sounds in bases   Chest:      Chest wall: No edema.   Abdominal:      General: There is no distension.      Palpations: Abdomen is soft.   Genitourinary:     Comments: Mccrary  Musculoskeletal:      Right lower leg: Edema present.      Left lower leg: Edema present.      Comments: SCDs   Skin:     Findings: No rash.   Neurological:      Motor: opens eyes/grimaces to pain   Psychiatric:         Behavior: Behavior is not agitated.   Electronically signed by AMRIO Aldrich, 6/9/2022, 08:10 CDT      Physician Substantive Portion: Medical Decision Making    Laboratory Data:  Results from last 7 days   Lab Units 06/09/22  0445 06/08/22  0256 06/07/22  0401   WBC 10*3/mm3 11.64* 10.66 10.06   HEMOGLOBIN g/dL 8.4* 8.9* 8.5*   PLATELETS 10*3/mm3 236 251 251     Results from last 7 days   Lab Units 06/09/22  0445 06/08/22  0256 06/07/22  0401   SODIUM mmol/L 136 138 138   POTASSIUM mmol/L 4.2 4.4 4.5   BUN mg/dL 31* 29* 29*   CREATININE mg/dL 0.49* 0.53* 0.54*     Results from last 7 days   Lab Units 06/09/22  0422 06/08/22  0346 06/07/22  0419   PH, ARTERIAL pH units 7.497* 7.465* 7.514*   PCO2,  ARTERIAL mm Hg 39.7 42.3 39.0   PO2 ART mm Hg 82.7* 80.0* 89.3   FIO2 % 30 30 30     No results found for: BLOODCX, URINECX, WOUNDCX, MRSACX, RESPCX, STOOLCX  Recent films:  XR Chest 1 View    Result Date: 6/9/2022  HISTORY: Intubated  CXR: Frontal view the chest obtained  COMPARISON: 6/8/2022  FINDINGS: Tracheostomy tube tip appropriate in position. Right IJ central line tip projects over the lower SVC. Hypoventilated lungs. Stable cardiomegaly. Similar prominence of the vascular markings in the perihilar densities. Left lower lobe consolidation. Small left pleural effusion. No pneumothorax. Degenerative change thoracic spine.      Impression: 1. Well-positioned life support lines. 2. Hypoventilated lungs. Cardiomegaly with vascular crowding and/or perihilar edema. Small left pleural effusion with left lower lobe atelectasis versus pneumonia. This report was finalized on 06/09/2022 07:16 by Dr. Christel Feliciano MD.    XR Chest 1 View    Result Date: 6/8/2022  EXAM: XR CHEST 1 VW- 6/8/2022 3:15 AM CDT  HISTORY: On vent; R13.10-Dysphagia, unspecified; Z01.818-Encounter for other preprocedural examination; D32.9-Benign neoplasm of meninges, unspecified; Z74.09-Other reduced mobility; Z78.9-Other specified health status; G40.901-Epilepsy, unspecified, not intractable, with status epilepticus; J96.01-Acute respiratory failure with hypoxia; G91.0-Communicating hydrocephalus   COMPARISON: June 7, 2022.  TECHNIQUE: Frontal radiograph of the chest  FINDINGS: Chronic interstitial changes present in the pulmonary parenchyma.. Cardiac silhouettes mildly enlarged. Endotracheal tube is present. Faintly visualized right ventricular shunt catheter projects over the right chest. Feeding gastrostomy tube is present in the stomach.  The osseous structures and surrounding soft tissues demonstrate no acute abnormality.       Impression: 1. Stable chest.   This report was finalized on 06/08/2022 07:14 by Dr. Emanuel Frederick MD.    XR  Chest 1 View    Result Date: 6/7/2022  EXAM: XR CHEST 1 VW- 6/7/2022 12:24 PM CDT  HISTORY: post chest tube removal; R13.10-Dysphagia, unspecified; Z01.818-Encounter for other preprocedural examination; D32.9-Benign neoplasm of meninges, unspecified; Z74.09-Other reduced mobility; Z78.9-Other specified health status; G40.901-Epilepsy, unspecified, not intractable, with status epilepticus; J96.01-Acute respiratory failure with hypoxia; G91.0-Communicating hydrocephalus   COMPARISON: June 7, 2022 3:00 AM.  TECHNIQUE: Frontal radiograph of the chest  FINDINGS: The lungs are clear. Removal left chest tube is noted. Tracheostomy tube is present.. The cardiomediastinal silhouette and pulmonary vascularity are within normal limits.  The osseous structures and surrounding soft tissues demonstrate no acute abnormality.       Impression: 1. Stable chest with no acute cardiopulmonary process..   This report was finalized on 06/07/2022 12:33 by Dr. Emanuel Frederick MD.     My radiograph interpretation/independent review of imaging: Tracheostomy tube in good position.  Mildly prominent interstitial markings stable.    Pulmonary Assessment:    1. Acute respiratory failure due to multiple factors, requiring mechanical ventilation, stable  2. Tracheostomy status  3. Meningioma  4. Hydrocephalus  5. Metabolic alkalosis fairly stable    Recommend/plan:   · Decrease inspiratory pressure to 12 to encourage more patient participation in the mechanics of ventilation  · Continue PEEP on 5 cm.  Auto PEEP not as big a problem today as it has been  · Await MRI      This visit was performed by both a physician and an [facility] RN.  I personally evaluated and examined the patient.  I performed all aspects of the medical decision making as documented.  Electronically signed by Carlos A Dasilva MD, 6/9/2022, 09:17 CDT

## 2022-06-09 NOTE — PROGRESS NOTES
[facility]  ENT PROGRESS NOTES  2022      Patient Identification:  Name: Hai Hernandez  Age: 77 y.o.  Sex: male  :  1945  MRN: [id number]                     Date of Admission: 5/10/2022      CC:    Postop day #7 status post tracheostomy  Subjective   Patient remains mechanically ventilated.  He underwent an MRI exam today.  HISTORY   HPI, ROS, PMFSHx reviewed:   No changes       Objective     PE:    Temp:  [98.1 °F (36.7 °C)-99.3 °F (37.4 °C)] 98.2 °F (36.8 °C)  Heart Rate:  [55-63] 62  Resp:  [20-35] 26  BP: (137-165)/(66-86) 137/70  FiO2 (%):  [30 %] 30 %   Body mass index is 34.96 kg/m².     General appearance: chronically ill appearing.              Ability to Communicate: Patient is mechanically ventilated and unable to communicate with tracheostomy tube in place              Facial exam: no facial deformities, previous craniotomy noted which appears to be healing well              Ears - right ear normal, left ear normal.              Nasal exam - normal and patent, no erythema, discharge or polyps.              Oropharyngeal exam - mucous membranes moist, pharynx normal without lesions.              Neck exam -#8 cuffed tracheostomy tube in place.  The site looks good.  Sutures have been discontinued                 CVS exam: normal rate and regular rhythm.              Chest: Relatively good chest excursion with ventilation.              No lymphadenopathy in the anterior or posterior neck, supraclavicular areas.              Neurological exam reveals neck supple without rigidity.    DATA      MEDICATIONS   I have reviewed the current MAR.     LABS AND IMAGING      I have reviewed the labs and imaging data since the patient was last seen.       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)    Status epilepticus (HCC)    Essential hypertension    Type 2 diabetes mellitus with hyperglycemia, without long-term  current use of insulin (HCC)    Hypothyroidism (acquired)    Acute respiratory failure (secondary to status epilepticus)    Thrombocytopenia (HCC)    Cerebral parenchymal hemorrhage (HCC)    PAF (paroxysmal atrial fibrillation) (HCC)    Fever    Loculated pleural effusion    Acute deep vein thrombosis (DVT) of the ulnar and brachial veins of right upper extremity (HCC)    Dysphagia    Communicating hydrocephalus (HCC)   Tracheostomy dependence      Plan     PLAN     Continue tracheostomy care  We will continue to follow          Geovany Davidson MD  6/9/2022  16:28 CDT

## 2022-06-09 NOTE — PROGRESS NOTES
[facility] Medicine Services  INPATIENT PROGRESS NOTE    Length of Stay: 30  Date of Admission: 5/10/2022  Primary Care Physician: Elisa Chacko MD    Subjective   Chief Complaint: Respiratory failure/status post tracheotomy/status post chest tube removal    HPI   T-max 99.3.  T-current 98.2.  Blood pressure stable slight tachycardic.  Plan to go downstairs for MRI of the brain per CT surgeon.    Review of Systems   Unable to assess secondary to the patient's intubated and sedated state.    All pertinent negatives and positives are as above. All other systems have been reviewed and are negative unless otherwise stated.     Objective    Temp:  [98.1 °F (36.7 °C)-99.3 °F (37.4 °C)] 98.2 °F (36.8 °C)  Heart Rate:  [55-63] 59  Resp:  [18-35] 26  BP: (137-165)/(65-86) 137/70  FiO2 (%):  [30 %] 30 %    Intake/Output Summary (Last 24 hours) at 6/9/2022 1200  Last data filed at 6/9/2022 1000  Gross per 24 hour   Intake 2628 ml   Output 1100 ml   Net 1528 ml     Physical Exam  Vitals and nursing note reviewed.   HENT:      Head: Normocephalic.   Eyes:      Conjunctiva/sclera: Conjunctivae normal.      Pupils: Pupils are equal, round, and reactive to light.   Neck:      Vascular: No JVD.   Cardiovascular:      Rate and Rhythm: Normal rate and regular rhythm.      Heart sounds: Normal heart sounds.   Pulmonary:      Effort: No respiratory distress.      Breath sounds: No wheezing or rales.      Comments: Trach in place.  On the vent.  Diminished breath sound bilateral, rhonchi bilateral.  Chest:      Chest wall: No tenderness.   Abdominal:      General: Bowel sounds are normal. There is no distension.      Palpations: Abdomen is soft.      Tenderness: There is no abdominal tenderness.      Comments: Obesity .  PEG tube in place.  Musculoskeletal:         General: No tenderness or deformity.      Cervical back: Neck supple.      Right lower leg: Edema present.      Left lower leg:  Edema present.      Comments: +1 .   Skin:     General: Skin is warm and dry.      Capillary Refill: Capillary refill takes 2 to 3 seconds.      Findings: No rash.   Neurological:      Mental Status: He is oriented to person, place, and time.      Cranial Nerves: No cranial nerve deficit.      Motor: Weakness present. No abnormal muscle tone.      Coordination: Coordination abnormal.      Gait: Gait abnormal.      Deep Tendon Reflexes: Reflexes normal.   Results Review:  Lab Results (last 24 hours)     Procedure Component Value Units Date/Time    POC Glucose Once [[id number]]  (Normal) Collected: 06/09/22 0525    Specimen: Blood Updated: 06/09/22 0536     Glucose 81 mg/dL      Comment: : [id number] Iron Szymanski ID: [id number]       Comprehensive Metabolic Panel [[id number]]  (Abnormal) Collected: 06/09/22 0445    Specimen: Blood Updated: 06/09/22 0518     Glucose 66 mg/dL      BUN 31 mg/dL      Creatinine 0.49 mg/dL      Sodium 136 mmol/L      Potassium 4.2 mmol/L      Chloride 102 mmol/L      CO2 28.0 mmol/L      Calcium 8.3 mg/dL      Total Protein 6.0 g/dL      Albumin 2.10 g/dL      ALT (SGPT) 27 U/L      AST (SGOT) 40 U/L      Alkaline Phosphatase 165 U/L      Total Bilirubin 0.3 mg/dL      Globulin 3.9 gm/dL      A/G Ratio 0.5 g/dL      BUN/Creatinine Ratio 63.3     Anion Gap 6.0 mmol/L      eGFR 105.7 mL/min/1.73      Comment: National Kidney Foundation and American Society of Nephrology (ASN) Task Force recommended calculation based on the Chronic Kidney Disease Epidemiology Collaboration (CKD-EPI) equation refit without adjustment for race.       Narrative:      GFR Normal >60  Chronic Kidney Disease <60  Kidney Failure <15      Phenytoin Level, Total [[id number]]  (Normal) Collected: 06/09/22 0445    Specimen: Blood Updated: 06/09/22 0518     Phenytoin Level 16.1 mcg/mL     CBC & Differential [[id number]]  (Abnormal) Collected: 06/09/22 0445    Specimen: Blood Updated: 06/09/22 0500    Narrative:       The following orders were created for panel order CBC & Differential.  Procedure                               Abnormality         Status                     ---------                               -----------         ------                     CBC Auto Differential[[id number]]        Abnormal            Final result                 Please view results for these tests on the individual orders.    CBC Auto Differential [[id number]]  (Abnormal) Collected: 06/09/22 0445    Specimen: Blood Updated: 06/09/22 0500     WBC 11.64 10*3/mm3      RBC 2.67 10*6/mm3      Hemoglobin 8.4 g/dL      Hematocrit 26.8 %      .4 fL      MCH 31.5 pg      MCHC 31.3 g/dL      RDW 15.3 %      RDW-SD 53.5 fl      MPV 9.2 fL      Platelets 236 10*3/mm3      Neutrophil % 74.6 %      Lymphocyte % 9.7 %      Monocyte % 10.6 %      Eosinophil % 2.0 %      Basophil % 0.6 %      Immature Grans % 2.5 %      Neutrophils, Absolute 8.69 10*3/mm3      Lymphocytes, Absolute 1.13 10*3/mm3      Monocytes, Absolute 1.23 10*3/mm3      Eosinophils, Absolute 0.23 10*3/mm3      Basophils, Absolute 0.07 10*3/mm3      Immature Grans, Absolute 0.29 10*3/mm3      nRBC 0.3 /100 WBC     Blood Gas, Arterial - [[id number]]  (Abnormal) Collected: 06/09/22 0422    Specimen: Arterial Blood Updated: 06/09/22 0418     Site Left Radial     Denzel's Test Positive     pH, Arterial 7.497 pH units      Comment: 83 Value above reference range        pCO2, Arterial 39.7 mm Hg      pO2, Arterial 82.7 mm Hg      Comment: 84 Value below reference range        HCO3, Arterial 30.8 mmol/L      Comment: 83 Value above reference range        Base Excess, Arterial 6.9 mmol/L      Comment: 83 Value above reference range        O2 Saturation, Arterial 97.6 %      Temperature 37.0 C      Barometric Pressure for Blood Gas 749 mmHg      Modality Ventilator     FIO2 30 %      Ventilator Mode PC     PEEP 5.0     PIP 14 cmH2O      Comment: Meter: [id number]     :  [id number]         Collected by [id number]     pCO2, Temperature Corrected 39.7 mm Hg      pH, Temp Corrected 7.497 pH Units      pO2, Temperature Corrected 82.7 mm Hg     POC Glucose Once [[id number]]  (Normal) Collected: 06/09/22 0005    Specimen: Blood Updated: 06/09/22 0020     Glucose 129 mg/dL      Comment: : [id number] Jone RoldanonMeter ID: [id number]       POC Glucose Once [[id number]]  (Abnormal) Collected: 06/08/22 1958    Specimen: Blood Updated: 06/08/22 2009     Glucose 188 mg/dL      Comment: : [id number] Jone RoldanonMeter ID: [id number]       POC Glucose Once [[id number]]  (Normal) Collected: 06/08/22 1659    Specimen: Blood Updated: 06/08/22 1720     Glucose 118 mg/dL      Comment: : [id number] Jaren RobertMeter ID: [id number]              Cultures:  No results found for: BLOODCX, URINECX, WOUNDCX, MRSACX, RESPCX, STOOLCX    Radiology Data:    Imaging Results (Last 24 Hours)     Procedure Component Value Units Date/Time    XR Chest 1 View [[id number]] Collected: 06/09/22 0715     Updated: 06/09/22 0719    Narrative:      HISTORY: Intubated     CXR: Frontal view the chest obtained     COMPARISON: 6/8/2022     FINDINGS: Tracheostomy tube tip appropriate in position. Right IJ  central line tip projects over the lower SVC. Hypoventilated lungs.  Stable cardiomegaly. Similar prominence of the vascular markings in the  perihilar densities. Left lower lobe consolidation. Small left pleural  effusion. No pneumothorax. Degenerative change thoracic spine.       Impression:      1. Well-positioned life support lines.  2. Hypoventilated lungs. Cardiomegaly with vascular crowding and/or  perihilar edema. Small left pleural effusion with left lower lobe  atelectasis versus pneumonia.  This report was finalized on 06/09/2022 07:16 by Dr. Christel Feliciano MD.          No Known Allergies    Scheduled meds:   albuterol, 2.5 mg, Nebulization, Q8H - RT  carvedilol, 6.25 mg, Nasogastric, BID With Meals  chlorhexidine, 15 mL,  Mouth/Throat, Q12H  fosphenytoin, 275 mg PE, Intravenous, Q8H  heparin (porcine), 5,000 Units, Subcutaneous, Q12H  insulin detemir, 20 Units, Subcutaneous, Nightly  insulin regular, 2-7 Units, Subcutaneous, Q6H  insulin regular, 8 Units, Subcutaneous, Q6H  lacosamide, 200 mg, Intravenous, Q12H  levETIRAcetam, 1,000 mg, Intravenous, Q12H  levothyroxine, 125 mcg, Nasogastric, Q AM  lidocaine, 10 mL, Intradermal, Once  liothyronine, 25 mcg, Nasogastric, Daily  lisinopril, 20 mg, Nasogastric, Q24H  mupirocin, 1 application, Topical, Q12H  Nutrisource fiber, 1 packet, Nasogastric, 4x Daily  pantoprazole, 40 mg, Intravenous, Q AM  polyethylene glycol, 17 g, Oral, Daily  ProSource TF, 1 packet, Nasogastric, BID  ProSource TF, 45 mL, Per G Tube, BID  sodium chloride, 4 mL, Nebulization, BID - RT        PRN meds:  •  acetaminophen **OR** acetaminophen  •  alteplase  •  bisacodyl  •  butalbital-acetaminophen-caffeine  •  dextrose  •  dextrose  •  glucagon (human recombinant)  •  hold  •  hydrALAZINE  •  ipratropium-albuterol  •  labetalol  •  LORazepam  •  ondansetron **OR** ondansetron    Assessment/Plan       Meningioma (HCC)    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 The patient was admitted on 5/10 by Dr. Downs with neurosurgery.  He has prior history of known meningioma that was causing compression and visual changes.  He presented for  craniotomy.  Hospital medicine was originally consulted on 5/14.  The patient had developed status epilepticus postsurgically and required intubation/mechanical ventilation.      Respiratory failure/left pleural effusion. He required intubation on 5/14 for airway protection.  This was done by Dr. Gunderson.  Pulmonary has since been consulted. Continue to monitor for readiness for spontaneous breathing trials and extubation in the future. Again, he was intubated for airway protection given his status epilepticus.  Propofol drip . albuterol nebs.  DuoNebs as needed.  Morphine as needed.  Oxycodone as needed.  CT imaging of chest-  Left chest tubes remain in place with appropriate evacuation of the left pleural fluid, Only small pleural effusions seen on today's exam with minimal air present in the posterior left pleural space, Bibasilar consolidation may represent atelectasis and/or pneumonia, scattered bilateral calcified granuloma, Vascular crowding and/or mild venous congestion, Stable cardiomegaly.  Chest x-ray-Hypoventilation with vascular crowding, Patchy infiltrates in the perihilar regions and lung bases, atelectasis versus pneumonia, Old granulomatous disease.  Status post thoracotomy tube placement 6/26/2022.   Status post tracheotomy left chest 6/2/2022.   Status post tPA through chest tube.  Chest tube removed 6/7/2022.  Ventilator- assist-control, FiO2 30, tidal volume 450, rate 16, PEEP 5.     Hypotension.  Resolved.  Levophed drip off.     Seizure . consult neurology . IV Vimpat.  IV Keppra.  IV cerebyx.      Meningioma.  Decadron.  CT scan head without contrast Right frontal temporal craniotomy- involving the  superior posterior lateral aspect of the right orbit/roof of the orbit which was not seen in the previous study, Intraventricular hemorrhage in the occipital horn of the right lateral ventricle, Poorly visualized layering hematoma in the occipital  horn of the left lateral ventricle, right  parietal approach ventriculostomy catheter in place, Persistent dilatation of the ventricles- No change.  Status post craniotomy 5/10/2022. He has had a lumbar drain placed by neurosurgery.    Ventriculoperitoneal shunt placement 6/3/2022.     Thrombocytopenia. His platelet count was 217,000 on 4/20/2022. This declined to a low of 72,000 on 5/17.  Peripheral smear on 5/17 showed no schistocytes with moderate peripheral thrombocytopenia.  PTT normal, PT/INR slightly elevated, fibrin split products high, and fibrinogen high.  Neurosurgery gave him a sixpack of platelets on 5/18.  Thrombocytopenia resolved.     Fever. He had run steady fever from the afternoon of 5/21 through 5/22.  Dr. Escobar was contacted and placed him on vancomycin and cefepime.  He still has a lumbar drain and CSF was sampled on the morning of 5/22.  ..  He received vancomycin. Meropenem was started on 5/25 for 5 days.  Patient has finished vancomycin and meropenem antibiotics.  Fever resolved.     Hypertension/atrial fibrillation. He typically takes lisinopril. Resumed per tube at a lower dose than normal on 5/18. Titrated up on 5/19. Started carvedilol on 5/19. Titrated medications up to get off Cardene.  Unable to take anticoagulation.  Lisinopril.  Hydralazine as needed.  Echocardiogram- ejection fraction 70%-hyperdynamic, mild concentric hypertrophy, tricuspid regurgitation, right ventricle cavity moderately dilated, right atrial cavity dilated, no significant valvular pathology, atrial fibrillation rhythm.     Hypothyroidism. Free T4 and free T3 were very low as well. Started levothyroxine and liothyronine on 5/19. TRH pending since 5/19!!  I rechecked TSH, free T4, and free T3 on 5/29 to see where we are at.  TSH is now 3.950.  Free T4 has improve from 0.31 to 0.53.  Free T3 has improved from less than 0.40 to 1.60.  Synthroid . Cytomel .     Diabetes.  Hemoglobin A1c 8.2.  Sliding scale.   Levemir . regular insulin.     Anemia.  Hemoglobin  stable.  No sign of acute bleed.     Reflux.  Protonix.  Zofran as needed.     Constipation.  MiraLAX.  Dulcolax suppository as needed.     Headaches.  Fioricet as needed.     Obesity.  BMI 31.     SCD for DVT prophylaxis.  Heparin prophylaxis.     Nutrition.  PEG tube placement 6/2/2022.     Blood culture- no growth 5 days.  Respiratory culture- Light growth (2+) Normal respiratory annia. No S. aureus or Pseudomonas aeruginosa detected. Final report.   Urine culture>100,000 CFU/mL Klebsiella aerogenes Abnormal   AFB culture-pending.  Anaerobic culture-negative.  Body fluid culture-negative. Fungal culture-no growth in 2 weeks.  MRSA DNA probe-negative.  COVID-19-negative.    Electronically signed by Aden Kc MD, 06/09/22, 12:00 PM CDT.

## 2022-06-10 ENCOUNTER — APPOINTMENT (OUTPATIENT)
Dept: GENERAL RADIOLOGY | Facility: HOSPITAL | Age: 77
End: 2022-06-10

## 2022-06-10 ENCOUNTER — ANESTHESIA EVENT (OUTPATIENT)
Dept: PERIOP | Facility: HOSPITAL | Age: 77
End: 2022-06-10

## 2022-06-10 ENCOUNTER — ANESTHESIA (OUTPATIENT)
Dept: PERIOP | Facility: HOSPITAL | Age: 77
End: 2022-06-10

## 2022-06-10 PROBLEM — T81.40XA POSTOPERATIVE INFECTION: Status: ACTIVE | Noted: 2022-04-18

## 2022-06-10 PROBLEM — G93.6 CEREBRAL EDEMA: Status: ACTIVE | Noted: 2022-04-18

## 2022-06-10 PROBLEM — G93.6 CEREBRAL EDEMA: Status: RESOLVED | Noted: 2022-06-10 | Resolved: 2022-06-10

## 2022-06-10 PROBLEM — G93.6 CEREBRAL EDEMA: Status: ACTIVE | Noted: 2022-06-10

## 2022-06-10 LAB
A-A DO2: 387.2 MMHG
ALBUMIN SERPL-MCNC: 2 G/DL (ref 3.5–5.2)
ALBUMIN/GLOB SERPL: 0.5 G/DL
ALP SERPL-CCNC: 162 U/L (ref 39–117)
ALT SERPL W P-5'-P-CCNC: 28 U/L (ref 1–41)
ANION GAP SERPL CALCULATED.3IONS-SCNC: 6 MMOL/L (ref 5–15)
APPEARANCE CSF: CLEAR
ARTERIAL PATENCY WRIST A: ABNORMAL
ARTERIAL PATENCY WRIST A: POSITIVE
AST SERPL-CCNC: 39 U/L (ref 1–40)
ATMOSPHERIC PRESS: 748 MMHG
ATMOSPHERIC PRESS: 749 MMHG
BASE EXCESS BLDA CALC-SCNC: 4.6 MMOL/L (ref 0–2)
BASE EXCESS BLDA CALC-SCNC: 6.6 MMOL/L (ref 0–2)
BASOPHILS # BLD AUTO: 0.07 10*3/MM3 (ref 0–0.2)
BASOPHILS NFR BLD AUTO: 0.6 % (ref 0–1.5)
BDY SITE: ABNORMAL
BDY SITE: ABNORMAL
BILIRUB SERPL-MCNC: 0.3 MG/DL (ref 0–1.2)
BODY TEMPERATURE: 36.8 C
BODY TEMPERATURE: 37 C
BUN SERPL-MCNC: 30 MG/DL (ref 8–23)
BUN/CREAT SERPL: 57.7 (ref 7–25)
CA-I BLD-MCNC: 4.74 MG/DL (ref 4.6–5.4)
CALCIUM SPEC-SCNC: 8.1 MG/DL (ref 8.6–10.5)
CHLORIDE SERPL-SCNC: 100 MMOL/L (ref 98–107)
CO2 SERPL-SCNC: 27 MMOL/L (ref 22–29)
COHGB MFR BLD: 0.9 % (ref 0–5)
COLOR CSF: YELLOW
COLOR SPUN CSF: ABNORMAL
CREAT SERPL-MCNC: 0.52 MG/DL (ref 0.76–1.27)
DEPRECATED RDW RBC AUTO: 54.4 FL (ref 37–54)
EGFRCR SERPLBLD CKD-EPI 2021: 103.8 ML/MIN/1.73
EOSINOPHIL # BLD AUTO: 0.22 10*3/MM3 (ref 0–0.4)
EOSINOPHIL NFR BLD AUTO: 1.8 % (ref 0.3–6.2)
ERYTHROCYTE [DISTWIDTH] IN BLOOD BY AUTOMATED COUNT: 15.9 % (ref 12.3–15.4)
GLOBULIN UR ELPH-MCNC: 4 GM/DL
GLUCOSE BLDC GLUCOMTR-MCNC: 109 MG/DL (ref 70–130)
GLUCOSE BLDC GLUCOMTR-MCNC: 144 MG/DL (ref 70–130)
GLUCOSE BLDC GLUCOMTR-MCNC: 152 MG/DL (ref 70–130)
GLUCOSE BLDC GLUCOMTR-MCNC: 154 MG/DL (ref 70–130)
GLUCOSE BLDC GLUCOMTR-MCNC: 156 MG/DL (ref 70–130)
GLUCOSE BLDC GLUCOMTR-MCNC: 166 MG/DL (ref 70–130)
GLUCOSE CSF-MCNC: 76 MG/DL (ref 40–70)
GLUCOSE SERPL-MCNC: 171 MG/DL (ref 65–99)
HCO3 BLDA-SCNC: 29.3 MMOL/L (ref 20–26)
HCO3 BLDA-SCNC: 30.4 MMOL/L (ref 20–26)
HCT VFR BLD AUTO: 26.4 % (ref 37.5–51)
HCT VFR BLD CALC: 21.5 % (ref 38–51)
HGB BLD-MCNC: 8.2 G/DL (ref 13–17.7)
HGB BLDA-MCNC: 7 G/DL (ref 14–18)
IMM GRANULOCYTES # BLD AUTO: 0.26 10*3/MM3 (ref 0–0.05)
IMM GRANULOCYTES NFR BLD AUTO: 2.1 % (ref 0–0.5)
INHALED O2 CONCENTRATION: 100 %
INHALED O2 CONCENTRATION: 30 %
LYMPHOCYTES # BLD AUTO: 1.12 10*3/MM3 (ref 0.7–3.1)
LYMPHOCYTES NFR BLD AUTO: 9.2 % (ref 19.6–45.3)
LYMPHOCYTES NFR CSF MANUAL: 97 %
Lab: ABNORMAL
Lab: ABNORMAL
MCH RBC QN AUTO: 31.4 PG (ref 26.6–33)
MCHC RBC AUTO-ENTMCNC: 31.1 G/DL (ref 31.5–35.7)
MCV RBC AUTO: 101.1 FL (ref 79–97)
METHGB BLD QL: 1 % (ref 0–3)
METHOD: ABNORMAL
MODALITY: ABNORMAL
MODALITY: ABNORMAL
MONOCYTES # BLD AUTO: 1.4 10*3/MM3 (ref 0.1–0.9)
MONOCYTES NFR BLD AUTO: 11.4 % (ref 5–12)
MONOCYTES NFR CSF MANUAL: 3 % (ref 15–45)
NEUTROPHILS NFR BLD AUTO: 74.9 % (ref 42.7–76)
NEUTROPHILS NFR BLD AUTO: 9.16 10*3/MM3 (ref 1.7–7)
NOTE: ABNORMAL
NRBC BLD AUTO-RTO: 0.2 /100 WBC (ref 0–0.2)
NUC CELL # CSF MANUAL: 31 /MM3 (ref 0–8)
OXYHGB MFR BLDV: 98.1 % (ref 94–99)
PAW @ PEAK INSP FLOW SETTING VENT: 12 CMH2O
PCO2 BLDA: 39.7 MM HG (ref 35–45)
PCO2 BLDA: 43.6 MM HG (ref 35–45)
PCO2 TEMP ADJ BLD: 39.7 MM HG (ref 35–45)
PCO2 TEMP ADJ BLD: 43.2 MM HG (ref 35–45)
PEEP RESPIRATORY: 5 CM[H2O]
PH BLDA: 7.44 PH UNITS (ref 7.35–7.45)
PH BLDA: 7.49 PH UNITS (ref 7.35–7.45)
PH, TEMP CORRECTED: 7.44 PH UNITS (ref 7.35–7.45)
PH, TEMP CORRECTED: 7.49 PH UNITS (ref 7.35–7.45)
PHENYTOIN SERPL-MCNC: 15.5 MCG/ML (ref 10–20)
PLATELET # BLD AUTO: 213 10*3/MM3 (ref 140–450)
PMV BLD AUTO: 9.4 FL (ref 6–12)
PO2 BLDA: 271 MM HG (ref 83–108)
PO2 BLDA: 75.1 MM HG (ref 83–108)
PO2 TEMP ADJ BLD: 270 MM HG (ref 83–108)
PO2 TEMP ADJ BLD: 75.1 MM HG (ref 83–108)
POTASSIUM BLDA-SCNC: 3.9 MMOL/L (ref 3.5–5.2)
POTASSIUM SERPL-SCNC: 4.3 MMOL/L (ref 3.5–5.2)
PROT CSF-MCNC: 135.3 MG/DL (ref 15–45)
PROT SERPL-MCNC: 6 G/DL (ref 6–8.5)
RBC # BLD AUTO: 2.61 10*6/MM3 (ref 4.14–5.8)
RBC # CSF MANUAL: 505 /MM3 (ref 0–0)
SAO2 % BLDCOA: 100 % (ref 94–99)
SAO2 % BLDCOA: 96.8 % (ref 94–99)
SARS-COV-2 RNA PNL SPEC NAA+PROBE: NOT DETECTED
SODIUM BLDA-SCNC: 138 MMOL/L (ref 136–145)
SODIUM SERPL-SCNC: 133 MMOL/L (ref 136–145)
SPECIMEN VOL CSF: 18 ML
TUBE # CSF: 1
VENTILATOR MODE: ABNORMAL
VENTILATOR MODE: ABNORMAL
WBC NRBC COR # BLD: 12.23 10*3/MM3 (ref 3.4–10.8)

## 2022-06-10 PROCEDURE — 82375 ASSAY CARBOXYHB QUANT: CPT

## 2022-06-10 PROCEDURE — C1713 ANCHOR/SCREW BN/BN,TIS/BN: HCPCS | Performed by: NEUROLOGICAL SURGERY

## 2022-06-10 PROCEDURE — 99233 SBSQ HOSP IP/OBS HIGH 50: CPT | Performed by: INTERNAL MEDICINE

## 2022-06-10 PROCEDURE — 86789 WEST NILE VIRUS ANTIBODY: CPT | Performed by: NEUROLOGICAL SURGERY

## 2022-06-10 PROCEDURE — 36600 WITHDRAWAL OF ARTERIAL BLOOD: CPT

## 2022-06-10 PROCEDURE — 94799 UNLISTED PULMONARY SVC/PX: CPT

## 2022-06-10 PROCEDURE — 88331 PATH CONSLTJ SURG 1 BLK 1SPC: CPT | Performed by: PATHOLOGY

## 2022-06-10 PROCEDURE — 82803 BLOOD GASES ANY COMBINATION: CPT

## 2022-06-10 PROCEDURE — 87635 SARS-COV-2 COVID-19 AMP PRB: CPT | Performed by: NEUROLOGICAL SURGERY

## 2022-06-10 PROCEDURE — 61020 REMOVE BRAIN CAVITY FLUID: CPT | Performed by: NEUROLOGICAL SURGERY

## 2022-06-10 PROCEDURE — 94761 N-INVAS EAR/PLS OXIMETRY MLT: CPT

## 2022-06-10 PROCEDURE — 82962 GLUCOSE BLOOD TEST: CPT

## 2022-06-10 PROCEDURE — 25010000002 FOSPHENYTOIN 500 MG PE/10ML SOLUTION 10 ML VIAL: Performed by: NEUROLOGICAL SURGERY

## 2022-06-10 PROCEDURE — 0 LEVETIRACETAM IN NACL 0.75% 1000 MG/100ML SOLUTION: Performed by: NURSE ANESTHETIST, CERTIFIED REGISTERED

## 2022-06-10 PROCEDURE — 25010000002 FUROSEMIDE PER 20 MG: Performed by: NURSE ANESTHETIST, CERTIFIED REGISTERED

## 2022-06-10 PROCEDURE — 87075 CULTR BACTERIA EXCEPT BLOOD: CPT | Performed by: NEUROLOGICAL SURGERY

## 2022-06-10 PROCEDURE — 87205 SMEAR GRAM STAIN: CPT | Performed by: NEUROLOGICAL SURGERY

## 2022-06-10 PROCEDURE — 86787 VARICELLA-ZOSTER ANTIBODY: CPT | Performed by: NEUROLOGICAL SURGERY

## 2022-06-10 PROCEDURE — 94664 DEMO&/EVAL PT USE INHALER: CPT

## 2022-06-10 PROCEDURE — 25010000002 VANCOMYCIN 1 G RECONSTITUTED SOLUTION 1 EACH VIAL: Performed by: NEUROLOGICAL SURGERY

## 2022-06-10 PROCEDURE — 87070 CULTURE OTHR SPECIMN AEROBIC: CPT | Performed by: NEUROLOGICAL SURGERY

## 2022-06-10 PROCEDURE — 71045 X-RAY EXAM CHEST 1 VIEW: CPT

## 2022-06-10 PROCEDURE — 63710000001 INSULIN REGULAR HUMAN PER 5 UNITS: Performed by: NEUROLOGICAL SURGERY

## 2022-06-10 PROCEDURE — 0 LEVETIRACETAM IN NACL 0.75% 1000 MG/100ML SOLUTION: Performed by: CLINICAL NURSE SPECIALIST

## 2022-06-10 PROCEDURE — 25010000002 VANCOMYCIN 10 G RECONSTITUTED SOLUTION: Performed by: NEUROLOGICAL SURGERY

## 2022-06-10 PROCEDURE — 88307 TISSUE EXAM BY PATHOLOGIST: CPT | Performed by: NEUROLOGICAL SURGERY

## 2022-06-10 PROCEDURE — 83050 HGB METHEMOGLOBIN QUAN: CPT

## 2022-06-10 PROCEDURE — 25010000002 VANCOMYCIN 1 G RECONSTITUTED SOLUTION: Performed by: NURSE ANESTHETIST, CERTIFIED REGISTERED

## 2022-06-10 PROCEDURE — 85025 COMPLETE CBC W/AUTO DIFF WBC: CPT | Performed by: NURSE PRACTITIONER

## 2022-06-10 PROCEDURE — 63710000001 INSULIN REGULAR HUMAN PER 5 UNITS: Performed by: NURSE PRACTITIONER

## 2022-06-10 PROCEDURE — 87116 MYCOBACTERIA CULTURE: CPT | Performed by: NEUROLOGICAL SURGERY

## 2022-06-10 PROCEDURE — 82805 BLOOD GASES W/O2 SATURATION: CPT

## 2022-06-10 PROCEDURE — 86765 RUBEOLA ANTIBODY: CPT | Performed by: NEUROLOGICAL SURGERY

## 2022-06-10 PROCEDURE — 62142 RMVL B1 FLP/PROSTC PLATE SKL: CPT | Performed by: NEUROLOGICAL SURGERY

## 2022-06-10 PROCEDURE — 25010000002 ALBUMIN HUMAN 5% PER 50 ML: Performed by: NURSE ANESTHETIST, CERTIFIED REGISTERED

## 2022-06-10 PROCEDURE — 25010000002 CEFEPIME PER 500 MG: Performed by: NURSE ANESTHETIST, CERTIFIED REGISTERED

## 2022-06-10 PROCEDURE — 86788 WEST NILE VIRUS AB IGM: CPT | Performed by: NEUROLOGICAL SURGERY

## 2022-06-10 PROCEDURE — 25010000002 EPINEPHRINE 1 MG/ML SOLUTION 1 ML AMPULE: Performed by: NEUROLOGICAL SURGERY

## 2022-06-10 PROCEDURE — 87015 SPECIMEN INFECT AGNT CONCNTJ: CPT | Performed by: NEUROLOGICAL SURGERY

## 2022-06-10 PROCEDURE — 80053 COMPREHEN METABOLIC PANEL: CPT | Performed by: NURSE PRACTITIONER

## 2022-06-10 PROCEDURE — 31500 INSERT EMERGENCY AIRWAY: CPT | Performed by: NURSE ANESTHETIST, CERTIFIED REGISTERED

## 2022-06-10 PROCEDURE — 97110 THERAPEUTIC EXERCISES: CPT

## 2022-06-10 PROCEDURE — 0 LEVETIRACETAM IN NACL 0.75% 1000 MG/100ML SOLUTION: Performed by: NEUROLOGICAL SURGERY

## 2022-06-10 PROCEDURE — 89051 BODY FLUID CELL COUNT: CPT | Performed by: NEUROLOGICAL SURGERY

## 2022-06-10 PROCEDURE — 86695 HERPES SIMPLEX TYPE 1 TEST: CPT | Performed by: NEUROLOGICAL SURGERY

## 2022-06-10 PROCEDURE — 25010000002 FOSPHENYTOIN 500 MG PE/10ML SOLUTION 10 ML VIAL: Performed by: NURSE PRACTITIONER

## 2022-06-10 PROCEDURE — 87102 FUNGUS ISOLATION CULTURE: CPT | Performed by: NEUROLOGICAL SURGERY

## 2022-06-10 PROCEDURE — 94003 VENT MGMT INPAT SUBQ DAY: CPT

## 2022-06-10 PROCEDURE — 86735 MUMPS ANTIBODY: CPT | Performed by: NEUROLOGICAL SURGERY

## 2022-06-10 PROCEDURE — 86694 HERPES SIMPLEX NES ANTBDY: CPT | Performed by: NEUROLOGICAL SURGERY

## 2022-06-10 PROCEDURE — 84157 ASSAY OF PROTEIN OTHER: CPT | Performed by: NEUROLOGICAL SURGERY

## 2022-06-10 PROCEDURE — 63710000001 INSULIN DETEMIR PER 5 UNITS: Performed by: NEUROLOGICAL SURGERY

## 2022-06-10 PROCEDURE — 87176 TISSUE HOMOGENIZATION CULTR: CPT | Performed by: NEUROLOGICAL SURGERY

## 2022-06-10 PROCEDURE — 25010000002 PIPERACILLIN SOD-TAZOBACTAM PER 1 G: Performed by: INTERNAL MEDICINE

## 2022-06-10 PROCEDURE — 82945 GLUCOSE OTHER FLUID: CPT | Performed by: NEUROLOGICAL SURGERY

## 2022-06-10 PROCEDURE — P9041 ALBUMIN (HUMAN),5%, 50ML: HCPCS | Performed by: NURSE ANESTHETIST, CERTIFIED REGISTERED

## 2022-06-10 PROCEDURE — 88312 SPECIAL STAINS GROUP 1: CPT | Performed by: NEUROLOGICAL SURGERY

## 2022-06-10 PROCEDURE — 80185 ASSAY OF PHENYTOIN TOTAL: CPT | Performed by: NURSE PRACTITIONER

## 2022-06-10 PROCEDURE — 25010000002 DEXAMETHASONE PER 1 MG: Performed by: NURSE ANESTHETIST, CERTIFIED REGISTERED

## 2022-06-10 PROCEDURE — 87206 SMEAR FLUORESCENT/ACID STAI: CPT | Performed by: NEUROLOGICAL SURGERY

## 2022-06-10 PROCEDURE — 61070 BRAIN CANAL SHUNT PROCEDURE: CPT

## 2022-06-10 PROCEDURE — 25010000002 FENTANYL CITRATE (PF) 100 MCG/2ML SOLUTION: Performed by: NURSE ANESTHETIST, CERTIFIED REGISTERED

## 2022-06-10 PROCEDURE — 00B00ZX EXCISION OF BRAIN, OPEN APPROACH, DIAGNOSTIC: ICD-10-PCS | Performed by: NEUROLOGICAL SURGERY

## 2022-06-10 PROCEDURE — 86696 HERPES SIMPLEX TYPE 2 TEST: CPT | Performed by: NEUROLOGICAL SURGERY

## 2022-06-10 PROCEDURE — C1889 IMPLANT/INSERT DEVICE, NOC: HCPCS | Performed by: NEUROLOGICAL SURGERY

## 2022-06-10 DEVICE — ABSORBABLE HEMOSTAT (OXIDIZED REGENERATED CELLULOSE, U.S.P.)
Type: IMPLANTABLE DEVICE | Site: CRANIAL | Status: FUNCTIONAL
Brand: SURGICEL

## 2022-06-10 DEVICE — CLIPAPPLR SCLP HEMO PRELD 1P/U STRL: Type: IMPLANTABLE DEVICE | Site: SCALP | Status: FUNCTIONAL

## 2022-06-10 RX ORDER — SODIUM CHLORIDE 9 MG/ML
INJECTION, SOLUTION INTRAVENOUS CONTINUOUS PRN
Status: DISCONTINUED | OUTPATIENT
Start: 2022-06-10 | End: 2022-06-10 | Stop reason: SURG

## 2022-06-10 RX ORDER — ALBUMIN, HUMAN INJ 5% 5 %
SOLUTION INTRAVENOUS CONTINUOUS PRN
Status: DISCONTINUED | OUTPATIENT
Start: 2022-06-10 | End: 2022-06-10 | Stop reason: SURG

## 2022-06-10 RX ORDER — DEXAMETHASONE SODIUM PHOSPHATE 4 MG/ML
INJECTION, SOLUTION INTRA-ARTICULAR; INTRALESIONAL; INTRAMUSCULAR; INTRAVENOUS; SOFT TISSUE AS NEEDED
Status: DISCONTINUED | OUTPATIENT
Start: 2022-06-10 | End: 2022-06-10 | Stop reason: SURG

## 2022-06-10 RX ORDER — FUROSEMIDE 10 MG/ML
INJECTION INTRAMUSCULAR; INTRAVENOUS AS NEEDED
Status: DISCONTINUED | OUTPATIENT
Start: 2022-06-10 | End: 2022-06-10 | Stop reason: SURG

## 2022-06-10 RX ORDER — LIDOCAINE HYDROCHLORIDE 20 MG/ML
INJECTION, SOLUTION EPIDURAL; INFILTRATION; INTRACAUDAL; PERINEURAL AS NEEDED
Status: DISCONTINUED | OUTPATIENT
Start: 2022-06-10 | End: 2022-06-10 | Stop reason: SURG

## 2022-06-10 RX ORDER — ACETAMINOPHEN 160 MG
TABLET,DISINTEGRATING ORAL AS NEEDED
Status: DISCONTINUED | OUTPATIENT
Start: 2022-06-10 | End: 2022-06-10 | Stop reason: HOSPADM

## 2022-06-10 RX ORDER — FENTANYL CITRATE 50 UG/ML
INJECTION, SOLUTION INTRAMUSCULAR; INTRAVENOUS AS NEEDED
Status: DISCONTINUED | OUTPATIENT
Start: 2022-06-10 | End: 2022-06-10 | Stop reason: SURG

## 2022-06-10 RX ORDER — PHENYLEPHRINE HCL IN 0.9% NACL 1 MG/10 ML
SYRINGE (ML) INTRAVENOUS AS NEEDED
Status: DISCONTINUED | OUTPATIENT
Start: 2022-06-10 | End: 2022-06-10 | Stop reason: SURG

## 2022-06-10 RX ORDER — EPHEDRINE SULFATE 50 MG/ML
INJECTION, SOLUTION INTRAVENOUS AS NEEDED
Status: DISCONTINUED | OUTPATIENT
Start: 2022-06-10 | End: 2022-06-10 | Stop reason: SURG

## 2022-06-10 RX ORDER — VANCOMYCIN HYDROCHLORIDE 1 G/20ML
INJECTION, POWDER, LYOPHILIZED, FOR SOLUTION INTRAVENOUS AS NEEDED
Status: DISCONTINUED | OUTPATIENT
Start: 2022-06-10 | End: 2022-06-10 | Stop reason: SURG

## 2022-06-10 RX ORDER — ROCURONIUM BROMIDE 10 MG/ML
INJECTION, SOLUTION INTRAVENOUS AS NEEDED
Status: DISCONTINUED | OUTPATIENT
Start: 2022-06-10 | End: 2022-06-10 | Stop reason: SURG

## 2022-06-10 RX ORDER — HEPARIN SODIUM 5000 [USP'U]/ML
5000 INJECTION, SOLUTION INTRAVENOUS; SUBCUTANEOUS EVERY 12 HOURS SCHEDULED
Status: DISCONTINUED | OUTPATIENT
Start: 2022-06-11 | End: 2022-06-20 | Stop reason: HOSPADM

## 2022-06-10 RX ORDER — MANNITOL 20 G/100ML
INJECTION, SOLUTION INTRAVENOUS CONTINUOUS PRN
Status: DISCONTINUED | OUTPATIENT
Start: 2022-06-10 | End: 2022-06-10 | Stop reason: SURG

## 2022-06-10 RX ORDER — LEVETIRACETAM 10 MG/ML
INJECTION INTRAVASCULAR AS NEEDED
Status: DISCONTINUED | OUTPATIENT
Start: 2022-06-10 | End: 2022-06-10 | Stop reason: SURG

## 2022-06-10 RX ORDER — MAGNESIUM HYDROXIDE 1200 MG/15ML
LIQUID ORAL AS NEEDED
Status: DISCONTINUED | OUTPATIENT
Start: 2022-06-10 | End: 2022-06-10 | Stop reason: HOSPADM

## 2022-06-10 RX ORDER — CALCIUM CHLORIDE 100 MG/ML
INJECTION INTRAVENOUS; INTRAVENTRICULAR AS NEEDED
Status: DISCONTINUED | OUTPATIENT
Start: 2022-06-10 | End: 2022-06-10 | Stop reason: SURG

## 2022-06-10 RX ADMIN — FENTANYL CITRATE 50 MCG: 50 INJECTION, SOLUTION INTRAMUSCULAR; INTRAVENOUS at 15:16

## 2022-06-10 RX ADMIN — CARVEDILOL 6.25 MG: 6.25 TABLET, FILM COATED ORAL at 08:15

## 2022-06-10 RX ADMIN — ALBUMIN HUMAN: 0.05 INJECTION, SOLUTION INTRAVENOUS at 12:38

## 2022-06-10 RX ADMIN — SODIUM CHLORIDE SOLN NEBU 3% 4 ML: 3 NEBU SOLN at 06:22

## 2022-06-10 RX ADMIN — ALBUTEROL SULFATE 2.5 MG: 2.5 SOLUTION RESPIRATORY (INHALATION) at 23:09

## 2022-06-10 RX ADMIN — Medication 200 MCG: at 12:21

## 2022-06-10 RX ADMIN — LEVOTHYROXINE SODIUM 125 MCG: 125 TABLET ORAL at 05:04

## 2022-06-10 RX ADMIN — LIDOCAINE HYDROCHLORIDE 100 MG: 20 INJECTION, SOLUTION EPIDURAL; INFILTRATION; INTRACAUDAL; PERINEURAL at 11:56

## 2022-06-10 RX ADMIN — LEVETIRACETAM 1000 MG: 10 INJECTION INTRAVENOUS at 08:14

## 2022-06-10 RX ADMIN — ALBUMIN HUMAN: 0.05 INJECTION, SOLUTION INTRAVENOUS at 12:09

## 2022-06-10 RX ADMIN — LIDOCAINE HYDROCHLORIDE 100 MG: 20 INJECTION, SOLUTION EPIDURAL; INFILTRATION; INTRACAUDAL; PERINEURAL at 15:44

## 2022-06-10 RX ADMIN — CHLORHEXIDINE GLUCONATE 15 ML: 1.2 RINSE ORAL at 08:15

## 2022-06-10 RX ADMIN — INSULIN HUMAN 2 UNITS: 100 INJECTION, SOLUTION PARENTERAL at 19:01

## 2022-06-10 RX ADMIN — LEVETIRACETAM 1000 MG: 10 INJECTION INTRAVENOUS at 21:01

## 2022-06-10 RX ADMIN — SODIUM CHLORIDE SOLN NEBU 3% 4 ML: 3 NEBU SOLN at 19:19

## 2022-06-10 RX ADMIN — SODIUM CHLORIDE 275 MG PE: 9 INJECTION, SOLUTION INTRAVENOUS at 21:15

## 2022-06-10 RX ADMIN — INSULIN HUMAN 2 UNITS: 100 INJECTION, SOLUTION PARENTERAL at 23:20

## 2022-06-10 RX ADMIN — LEVETIRACETAM 1000 MG: 10 INJECTION INTRAVENOUS at 14:59

## 2022-06-10 RX ADMIN — SODIUM CHLORIDE: 9 INJECTION, SOLUTION INTRAVENOUS at 11:54

## 2022-06-10 RX ADMIN — Medication 1 PACKET: at 20:59

## 2022-06-10 RX ADMIN — LISINOPRIL 20 MG: 20 TABLET ORAL at 08:15

## 2022-06-10 RX ADMIN — Medication 45 ML: at 21:00

## 2022-06-10 RX ADMIN — DEXAMETHASONE SODIUM PHOSPHATE 10 MG: 4 INJECTION, SOLUTION INTRA-ARTICULAR; INTRALESIONAL; INTRAMUSCULAR; INTRAVENOUS; SOFT TISSUE at 13:40

## 2022-06-10 RX ADMIN — LACOSAMIDE 200 MG: 10 INJECTION, SOLUTION INTRAVENOUS at 08:16

## 2022-06-10 RX ADMIN — PANTOPRAZOLE SODIUM 40 MG: 40 INJECTION, POWDER, FOR SOLUTION INTRAVENOUS at 05:04

## 2022-06-10 RX ADMIN — SODIUM CHLORIDE 275 MG PE: 9 INJECTION, SOLUTION INTRAVENOUS at 05:03

## 2022-06-10 RX ADMIN — EPHEDRINE SULFATE 25 MG: 50 INJECTION INTRAVENOUS at 12:20

## 2022-06-10 RX ADMIN — MANNITOL: 20 INJECTION, SOLUTION INTRAVENOUS at 14:36

## 2022-06-10 RX ADMIN — CALCIUM CHLORIDE 1 G: 100 INJECTION INTRAVENOUS; INTRAVENTRICULAR at 12:11

## 2022-06-10 RX ADMIN — INSULIN HUMAN 2 UNITS: 100 INJECTION, SOLUTION PARENTERAL at 05:11

## 2022-06-10 RX ADMIN — EPHEDRINE SULFATE 20 MG: 50 INJECTION INTRAVENOUS at 14:10

## 2022-06-10 RX ADMIN — MANNITOL: 20 INJECTION, SOLUTION INTRAVENOUS at 12:58

## 2022-06-10 RX ADMIN — ROCURONIUM BROMIDE 75 MG: 10 SOLUTION INTRAVENOUS at 11:56

## 2022-06-10 RX ADMIN — MUPIROCIN 1 APPLICATION: 20 OINTMENT TOPICAL at 21:00

## 2022-06-10 RX ADMIN — LIOTHYRONINE SODIUM 25 MCG: 25 TABLET ORAL at 08:15

## 2022-06-10 RX ADMIN — LACOSAMIDE 200 MG: 10 INJECTION, SOLUTION INTRAVENOUS at 21:01

## 2022-06-10 RX ADMIN — FUROSEMIDE 20 MG: 10 INJECTION, SOLUTION INTRAMUSCULAR; INTRAVENOUS at 14:40

## 2022-06-10 RX ADMIN — Medication 2 G: at 14:01

## 2022-06-10 RX ADMIN — ALBUMIN HUMAN: 0.05 INJECTION, SOLUTION INTRAVENOUS at 12:25

## 2022-06-10 RX ADMIN — ROCURONIUM BROMIDE 20 MG: 10 SOLUTION INTRAVENOUS at 14:13

## 2022-06-10 RX ADMIN — SODIUM CHLORIDE 275 MG PE: 9 INJECTION, SOLUTION INTRAVENOUS at 16:30

## 2022-06-10 RX ADMIN — Medication 45 ML: at 21:02

## 2022-06-10 RX ADMIN — Medication 200 MCG: at 12:16

## 2022-06-10 RX ADMIN — FENTANYL CITRATE 50 MCG: 50 INJECTION, SOLUTION INTRAMUSCULAR; INTRAVENOUS at 15:25

## 2022-06-10 RX ADMIN — CHLORHEXIDINE GLUCONATE 15 ML: 1.2 RINSE ORAL at 21:00

## 2022-06-10 RX ADMIN — VANCOMYCIN HYDROCHLORIDE 1 G: 1 INJECTION, POWDER, LYOPHILIZED, FOR SOLUTION INTRAVENOUS at 13:39

## 2022-06-10 RX ADMIN — VANCOMYCIN HYDROCHLORIDE 1500 MG: 10 INJECTION, POWDER, LYOPHILIZED, FOR SOLUTION INTRAVENOUS at 22:46

## 2022-06-10 RX ADMIN — INSULIN DETEMIR 20 UNITS: 100 INJECTION, SOLUTION SUBCUTANEOUS at 20:59

## 2022-06-10 RX ADMIN — ALBUMIN HUMAN: 0.05 INJECTION, SOLUTION INTRAVENOUS at 11:56

## 2022-06-10 RX ADMIN — ALBUTEROL SULFATE 2.5 MG: 2.5 SOLUTION RESPIRATORY (INHALATION) at 06:22

## 2022-06-10 RX ADMIN — Medication 200 MCG: at 12:24

## 2022-06-10 RX ADMIN — TAZOBACTAM SODIUM AND PIPERACILLIN SODIUM 4.5 G: 500; 4 INJECTION, SOLUTION INTRAVENOUS at 09:54

## 2022-06-10 NOTE — OP NOTE
NEUROSURGERY OPERATIVE NOTE    Hai Hernandez  OR Date: 6/10/2022    Pre-op Diagnosis:   Postoperative infection, unspecified type, initial encounter [T81.40XA]    Post-op Diagnosis:     Post-Op Diagnosis Codes:     * Postoperative infection, unspecified type, initial encounter [T81.40XA]          Surgeon(s):  Martell Downs MD    Anesthesia: General    Staff:   Circulator: Surya Garcia RN; Sienna Gauthier RN; Carey Diaz RN  Scrub Person: Ayah Lopez; Angelique Burden; Yayo Saleh; Mansi Montgomery    Procedure(s):  CRANIECTOMY    INDICATIONS FOR PROCEDURE:   The patient is a 77 y.o. male with a past medical history of right frontal craniotomy for meningioma complicated by postoperative status and a lung empyema.  Following control of his pulmonary status and ventriculoperitoneal shunt placement the patient failed to show significant cognitive improvement.  An MRI showed significant right-sided cerebral edema.  Based on this with high suspicion of cerebritis we elected to bring the patient back to the operating room for exploration.     I discussed the risks of a  right sided decompressive craniectomy with placement of bone flap in the abdomen with his/her next of kin or power of . Risks including but not limited to infection, bleeding, damage to local structures, CSF leak, seizures, hydrocephalus, weakness, numbness, paralysis, cerebellar mutism or loss of bowel, and bladder function, stroke, coma, MI, or death.  We explicitly discussed that this was a life saving procedure that did not affect morbidity.      DESCRIPTION OF OPERATION AND FINDINGS:   The patient was brought to the main operating room where prophylactic intravenous antibiotics were administered. The patient had previously been intubated with an endotracheal tube and had TEDs, SCD hoses, and a Mccrary catheter placed.      He was placed in the supine position on a standard operating table with gel roll under  the right shoulder.  All pressure points were inspected and appropriately padded, and he  was secured to the table.  The patient was placed on a horseshoe.  Both his prior craniotomy site and his right parieto-occipital ventriculoperitoneal shunt incision site were draped into the field.    The hair was then clipped. The scalp was prepped with DuraPrep and allowed to dry for 5 minutes.  The patient was draped in the usual fashion including loban.  At this point a WHO surgical timeout was performed with all members of the surgical team.      A skin was incised along his prior right craniotomy site.  A cutaneous flap was then elevated using the Bovie and fan shaped periosteal.  There was no my purulence noted but cultures were sent from the subgaleal space.  Screwdriver was then used to remove the cranial flap and this was set aside and Betadine.  A rolled laparotomy sponge was placed underneath the flap and the flap was retracted with the temporalis muscle using [place] hooks.  Bovie cautery was used to continue to elevate the temporalis muscle until the inferior portion of the temporal squamous bone was identified.      Onto the craniotomy flap there was noted to be DuraGen from the prior operation.  This was  from the cerebrum.  There were few areas that look like frankness of pial purulence along the frontal lobe.  These were thoroughly debrided.  These were biopsied and sent for sample.  We then moved along the inferior aspect of the frontal lobe and the temporal lobe until we countered the cystic collections noted on the MRI.  These were found to be hemorrhagic and not frankly purulent.  Samples were taken.  The brain was then thoroughly washed with 1 L of vancomycin irrigation.  Due to the cerebral edema despite receiving mannitol and Decadron there was my swelling in the right frontal and temporal lobes.  Even with the shunt the craniotomy flap could not be replaced and therefore was set  aside.    At this point meticulous hemostasis was achieved.  The dura was laid over the exposed brain and DuraGen was placed over the dural defects.      A #15 blade stab incision was placed posterior to the incision and a 7 [place] flat CIERA was tunneled using a hemostat.  This was laid over the dura and DuraGen and under the musculocutaneous flap.  The muscle fascia galea was closed with interrupted 2-0 Vicryl sutures.  Monocryl was used for skin closure.  Incision was dressed with bacitracin ointment.    The patient was awoken and found to be at his neurological baseline. The patient remained intubated and the cranial flap felt soft.  The right pupil had returned to baseline at this point.    The blood loss of the operation was about 100 mL. There were no complications of the surgery per se. The needle count, sponge count, and the cottonoid counts were correct.          Estimated Blood Loss: 100ml    Complications: None.    Implants:   Implant Name Type Inv. Item Serial No.  Lot No. LRB No. Used Action   HEMOST ABS SURGIFOAM  8X12 10MM - [id number] Implant HEMOST ABS SURGIFOAM  8X12 10MM  [employer]  DIV OF  AND J [id number]  1 Implanted   CLIPAPPLR SCLP HEMO PRELD 1P/U STRL - [id number] Implant CLIPAPPLR SCLP HEMO PRELD 1P/U STRL  [employer] DIV OF  AND J   1 Implanted   KT HEMOST ABS SURGIFOAM PORCN 1GRAM - [id number] Implant KT HEMOST ABS SURGIFOAM PORCN 1GRAM  [employer]  DIV OF  AND J [id number]  1 Implanted   HEMOST ABS SURGICEL 4X8IN - [id number] Implant HEMOST ABS SURGICEL 4X8IN  [employer]  DIV OF  AND J [id number]  1 Implanted   DURALMATRIX DURAGEN PLS 3X3IN EA/5 - [id number] Implant DURALMATRIX DURAGEN PLS 3X3IN EA/5  [employer]TE[employer] [id number]  1 Implanted   PLT BURHL LEFORTE PRE/CONTRD TI 6HL 15X0.6MM SLVR - [id number] Implant PLT BURHL LEFORTE PRE/CONTRD TI 6HL 15X0.6MM SLVR  [employer]   3 Implanted and Explanted   PLT BURHL LEFORTE PRE/CONTRD TI 6HL 22X0.6MM VR - [id number] Implant PLT NEMESIO  LEFORTE PRE/CONTRD TI 6HL 22X0.6MM SLVR  [employer]   1 Implanted and Explanted   SCRW PLT NEURO LEFORTE L/P SLF/DRL 1.4X3.7MM SLVR - [id number] Implant SCRW PLT NEURO LEFORTE L/P SLF/DRL 1.4X3.7MM SLVR  [employer]   4 Implanted and Explanted       Specimens:                Specimens     ID Source Type Tests Collected By Collected At Frozen?    1 Head Wound · ANAEROBIC CULTURE (Canceled)  · WOUND CULTURE   Martell Downs MD 6/10/22 1226     Description: intercranial specimen superficial    2 Head Wound · ANAEROBIC CULTURE (Canceled)  · WOUND CULTURE   Martell Downs MD 6/10/22 1304     Description: cerebral wound culture    3 Brain Tissue · ANAEROBIC CULTURE  · FUNGAL CULTURE  · TISSUE / BONE CULTURE  · AFB CULTURE   Martell Downs MD 6/10/22 1326     Description: dural matrix from brain    4 Head Wound · ANAEROBIC CULTURE (Canceled)  · WOUND CULTURE   Martell Downs MD 6/10/22 1343     Description: deep wound culture    A Brain, Cerebral Cortex Tissue · ANAEROBIC CULTURE  · FUNGAL CULTURE  · TISSUE / BONE CULTURE  · TISSUE PATHOLOGY EXAM  · AFB CULTURE   Martell Downs MD 6/10/22 1327 Yes    Description: cerebrum frozen and permenant and culture    B Brain Tissue · ANAEROBIC CULTURE  · TISSUE / BONE CULTURE  · TISSUE PATHOLOGY EXAM   Martell Downs MD 6/10/22 1524     Description: cerebrolitis    Comment: Per MD, Pathology and Micro, gram stain, aerobic and anerobic cultures.    This specimen was not marked as sent.            Drains:   Closed/Suction Drain 1 Right Scalp Bulb (Active)       Gastrostomy/Enterostomy Percutaneous endoscopic gastrostomy (PEG) 1 20 Fr. (Active)   Surrounding Skin Dry;Intact 06/10/22 0800   Securement taped to abdomen 06/10/22 0800   Drain Status Clamped 06/10/22 0800   Drainage Appearance None 06/10/22 0800   Site Description Scabbed 06/10/22 0400   Gastrostomy/Enterostomy Site Interventions Site assessed;Site care performed  06/10/22 0800   Dressing Status Clean;Dry;Intact 06/10/22 0800   Dressing Intervention Dressing changed 06/09/22 0400   Dressing Type Split gauze;Dry dressing 06/10/22 0400   Tube Feeding Frequency Continuous 06/10/22 0400   Tube Feeding Product Impact Peptide 1.5 per 06/09/22 2000   Tube Feeding Strength Full strength 06/10/22 0400   Tube Feeding Method Continuous per pump 06/09/22 2000   Tube Feeding Rate (mL) 65 mL 06/09/22 2000   Tube Feeding Bag Changed No 06/10/22 0400   Tube Feeding Residual (mL) 5 mL 06/10/22 0400   Tube Feeding Residual Returned (mL) 5 mL 06/10/22 0400   Feeding Tube Flushed With Sterile water 06/10/22 0400   Flush/ Irrigation Intake (mL) 161 06/10/22 0400   Tube Feeding Intake (mL) 246 06/10/22 0400       Urethral Catheter Non-latex;Silicone 16 Fr. (Active)       [REMOVED] Chest Tube 1 Left Midaxillary (Removed)   Function -20 cm H2O 06/05/22 1200   Air Leak/Fluctuation air leak not present;dependent drainage cleared;fluctuation not present 06/05/22 1200   Patency Intervention Tip/tilt 06/05/22 1200   Drainage Description Serous 06/05/22 1200   Dressing Type Gauze 06/05/22 1200   Dressing Status Clean;Dry;Intact 06/05/22 1200   Dressing Intervention Dressing changed 06/01/22 0800   Site Assessment Not assessed 06/05/22 1200   Surrounding Skin Unable to view 06/05/22 1200   Securement tubing anchored to body distal to insertion site with tape 06/04/22 1200   Left Subcutaneous Emphysema none present 06/05/22 1200   Right Subcutaneous Emphysema none present 06/05/22 1200   Safety all connections secured 06/05/22 0859   Output (mL) 10 mL 06/05/22 0800       [REMOVED] Chest Tube Left Midaxillary (Removed)   Function -20 cm H2O 06/05/22 1200   Air Leak/Fluctuation air leak not present;dependent drainage cleared;fluctuation not present 06/05/22 1200   Patency Intervention Tip/tilt 06/05/22 1200   Drainage Description Serous 06/05/22 1200   Dressing Type Gauze 06/05/22 1200   Dressing Status  Clean;Dry;Intact 06/05/22 1200   Dressing Intervention Dressing changed 06/03/22 2000   Site Assessment Not assessed 06/05/22 1200   Surrounding Skin Unable to view 06/05/22 1200   Securement tubing anchored to body distal to insertion site with tape 06/04/22 1200   Left Subcutaneous Emphysema none present 06/05/22 1200   Right Subcutaneous Emphysema none present 06/05/22 1200   Safety all connections secured 06/05/22 0859   Output (mL) 5 mL 06/05/22 1200       [REMOVED] Chest Tube Posterior Midaxillary (Removed)   Function -40 cm H2O 06/07/22 0808   Air Leak/Fluctuation dependent drainage cleared 06/07/22 0808   Patency Intervention Tip/tilt 06/07/22 0808   Drainage Description Yellow;Serous 06/07/22 0808   Dressing Type Transparent;Antimicrobial dressing/disc 06/07/22 0808   Dressing Status Clean;Intact;Dry 06/07/22 0808   Site Assessment Not assessed 06/07/22 0808   Surrounding Skin Unable to view 06/07/22 0808   Securement tubing anchored to body distal to insertion site with sutures 06/07/22 0808   Left Subcutaneous Emphysema none present 06/07/22 0808   Right Subcutaneous Emphysema none present 06/07/22 0808   Safety all connections secured;suction checked 06/07/22 0808   Output (mL) 20 mL 06/06/22 2324       [REMOVED] Chest Tube Posterior Midaxillary (Removed)   Function -40 cm H2O 06/07/22 0808   Air Leak/Fluctuation dependent drainage cleared 06/07/22 0808   Patency Intervention Tip/tilt 06/07/22 0808   Drainage Description Serous;Yellow 06/07/22 0808   Dressing Type Transparent;Antimicrobial dressing/disc 06/07/22 0808   Dressing Status Clean;Dry;Intact 06/07/22 0808   Site Assessment Not assessed 06/07/22 0808   Surrounding Skin Unable to view 06/07/22 0808   Securement tubing anchored to body distal to insertion site with sutures 06/07/22 0808   Left Subcutaneous Emphysema none present 06/07/22 0808   Right Subcutaneous Emphysema none present 06/07/22 0808   Safety all connections secured;suction checked  06/07/22 0808   Output (mL) 20 mL 06/06/22 0800       [REMOVED] NG/OG Tube Orogastric Center mouth (Removed)   Site Assessment Clean;Dry;Intact 05/16/22 0000   Securement secured to ETT 05/16/22 0000   Secured at (cm) 65 05/16/22 0000   NG/OG Site Interventions Site assessed 05/16/22 0000   Status Clamped 05/16/22 0000   Surrounding Skin Dry;Intact 05/16/22 0000       [REMOVED] NG/OG Tube Nasogastric Right nostril (Removed)   Placement Verification X-ray 06/02/22 0800   Site Assessment Clean;Dry;Intact 06/02/22 0800   Securement taped to nostril center 06/02/22 0800   Secured at (cm) 65 06/02/22 0800   NG/OG Site Interventions Site assessed 06/02/22 0400   Dressing Intervention New dressing 06/02/22 0400   Status Clamped 06/02/22 0800   Surrounding Skin Dry;Intact 06/02/22 0800   Tube Feeding Frequency Continuous 06/01/22 2031   Tube Feeding Product Peptamen P 05/31/22 2000   Tube Feeding Strength Full strength 06/01/22 2031   Tube Feeding Method Continuous per pump 06/01/22 2031   Tube Feeding Rate (mL) 0 mL 06/02/22 0000   Tube Feeding Bag Changed No 06/01/22 2031   Tube Feeding Residual (mL) 0 mL 06/02/22 0000   Tube Feeding Residual Returned (mL) 0 mL 06/02/22 0000   Feeding Tube Flushed With Sterile water 06/01/22 2031   Flush/ Irrigation Intake (mL) 125 06/02/22 0021   Tube Feeding Intake (mL) 181 06/02/22 0021   Intake (mL) 150 mL 06/01/22 1517   Output (mL) 0 mL 05/25/22 1600       [REMOVED] Urethral Catheter Non-latex;Silicone 16 Fr. (Removed)   Daily Indications Required activity restriction from trauma, surgery, (e.g. unstable spine, fracture, hemodynamics) 05/11/22 0400   Site Assessment Clean;Skin intact 05/11/22 0400   Collection Container Standard drainage bag 05/11/22 0400   Securement Method Securing device 05/11/22 0400   Catheter care complete Yes 05/10/22 2000   Output (mL) 675 mL 05/11/22 0423       [REMOVED] Urethral Catheter Non-latex 16 Fr. (Removed)   Daily Indications Hourly Output in  Critical Unstable Patient requiring Frequent Intervention (hemodynamics, titration or life supportive therapy) 05/23/22 1600   Site Assessment Clean;Skin intact 05/23/22 1200   Collection Container Standard drainage bag 05/23/22 1600   Securement Method Securing device 05/23/22 1600   Catheter care complete Yes 05/23/22 1200   Output (mL) 125 mL 05/23/22 1308       [REMOVED] Urethral Catheter Silicone 16 Fr. (Removed)   Daily Indications Required activity restriction from trauma, surgery, (e.g. unstable spine, fracture, hemodynamics) 06/02/22 0400   Site Assessment Clean;Skin intact 06/02/22 0400   Collection Container Standard drainage bag 06/02/22 0400   Securement Method Securing device 06/02/22 0400   Catheter care complete Yes 06/02/22 0400   Irrigation/Instillation Indication patency maintenance 05/31/22 1600   Output (mL) 150 mL 06/02/22 0400       [REMOVED] External Urinary Catheter (Removed)   Site Assessment Clean;Skin intact 05/15/22 0030   Application/Removal skin care provided 05/15/22 0030   Collection Container Standard drainage bag 05/15/22 0030   Securement Method Securing device 05/15/22 0030   Output (mL) 300 mL 05/14/22 1800       [REMOVED] Lumbar Drain (Removed)   Drain Status Clamped 06/03/22 1200   CSF Color Yellow 06/03/22 1200   Site Description Unable to view 06/03/22 1200   Dressing Status Dry;Intact;Old drainage 06/03/22 1200   CSF Output (mL) 20 mL 06/03/22 1200       [REMOVED] Continuous Bladder Irrigation Triple-lumen 20 Fr (Removed)   Daily Indications Hourly Output in Critical Unstable Patient requiring Frequent Intervention (hemodynamics, titration or life supportive therapy) 06/07/22 0808   Site Assessment Clean;Skin intact 06/07/22 0808   Collection Container Standard drainage bag 06/07/22 0808   Securement Method Securing device 06/07/22 0808   Catheter care complete Yes 06/07/22 0808   CBI Irrigation Intake (mL) 0 mL 06/03/22 1202   CBI Mccrary Output (mL) 650 mL 06/07/22 1200    CBI Net Output (mL) 400 mL 06/03/22 1202

## 2022-06-10 NOTE — NURSING NOTE
Pt arrived to PACU, PPV provided by [facility], CRNA, pts vent in ICU, decision made to transport to ICU, with resp, CRNA and 2 PACU RN's

## 2022-06-10 NOTE — CASE MANAGEMENT/SOCIAL WORK
Continued Stay Note  HAYDEN Winters     Patient Name: Hai Hernandez  MRN: [id number]  Today's Date: 6/10/2022    Admit Date: 5/10/2022     Discharge Plan     Row Name 06/10/22 1059       Plan    Plan unclear    Plan Comments Note plans for return to OR today.  SW following and will assist as needed.               Discharge Codes    No documentation.               Expected Discharge Date and Time     Expected Discharge Date Expected Discharge Time    Satinder 10, 2022             JOSE Cruz

## 2022-06-10 NOTE — PROGRESS NOTES
"Pharmacy Dosing Service  Pharmacokinetics  Vancomycin Initial Evaluation    Assessment/Action/Plan:  Pharmacy consulted to dose Vancomycin for possible CNS infection. (Vancomycin 1000mg dose previously given at 13:39 today.) Initiated Vancomycin 1500mg IV Q12h.     AUC Model Data:  Regimen: 1500 mg IV every 12 hours  Exposure target: AUC24 (range) 400-600 mg/L.hr   AUC24,ss: 549 mg/L.hr  PAUC*: 81 %  Ctrough,ss: 17.6 mg/L  Pconc*: 39 %  Tox.: 13 %    Additional antimicrobial agent(s): Cefepime     Goal Vancomycin trough ~20 per Neurosurgery order. No drug levels ordered at this time. Pharmacy will continue to follow daily and adjust dose accordingly.     Subjective:  Hai Hernandez is a 77 y.o. male with a \"Pharmacy to Dose\" consult for Vancomycin for the treatment of possible CNS infection. Current end date: 6-24-22    Objective:  Ht: 182.9 cm (72\"); Wt: 105 kg (231 lb 13 oz)  Estimated Creatinine Clearance: 149.1 mL/min (A) (by C-G formula based on SCr of 0.52 mg/dL (L)).     Creatinine   Date Value Ref Range Status   06/10/2022 0.52 (L) 0.76 - 1.27 mg/dL Final   06/09/2022 0.49 (L) 0.76 - 1.27 mg/dL Final   06/08/2022 0.53 (L) 0.76 - 1.27 mg/dL Final   01/21/2022 1.00 0.60 - 1.30 mg/dL Final     Comment:     Serial Number: [id number]:  [id number]      Lab Results   Component Value Date    WBC 12.23 (H) 06/10/2022    WBC 11.64 (H) 06/09/2022    WBC 10.66 06/08/2022      Culture Results:  Microbiology Results (last 10 days)       Procedure Component Value - Date/Time    Wound Culture - Wound, Head [[id number]] Collected: 06/10/22 1343    Lab Status: Preliminary result Specimen: Wound from Head Updated: 06/10/22 1883     Gram Stain Rare (1+) WBCs seen      No organisms seen    Tissue / Bone Culture - Tissue, Brain, Cerebral Cortex [[id number]] Collected: 06/10/22 1327    Lab Status: Preliminary result Specimen: Tissue from Brain, Cerebral Cortex Updated: 06/10/22 1549     Gram Stain Moderate (3+) WBCs seen      No " organisms seen    Wound Culture - Wound, Head [[id number]] Collected: 06/10/22 1304    Lab Status: Preliminary result Specimen: Wound from Head Updated: 06/10/22 1551     Gram Stain No WBCs or organisms seen    Wound Culture - Wound, Head [[id number]] Collected: 06/10/22 1226    Lab Status: Preliminary result Specimen: Wound from Head Updated: 06/10/22 1555     Gram Stain Rare (1+) WBCs seen      No organisms seen    COVID PRE-OP / PRE-PROCEDURE SCREENING ORDER (NO ISOLATION) - Swab, Nasal Cavity [[id number]]  (Normal) Collected: 06/10/22 0744    Lab Status: Final result Specimen: Swab from Nasal Cavity Updated: 06/10/22 0857    Narrative:      The following orders were created for panel order COVID PRE-OP / PRE-PROCEDURE SCREENING ORDER (NO ISOLATION) - Swab, Nasal Cavity.  Procedure                               Abnormality         Status                     ---------                               -----------         ------                     COVID-19,[facility]...[[id number]]  Normal              Final result                 Please view results for these tests on the individual orders.    COVID-19,[facility] IN-HOUSE,Nasal Swab No Transport Media 3-4 HR TAT - Swab, Nasal Cavity [[id number]]  (Normal) Collected: 06/10/22 0744    Lab Status: Final result Specimen: Swab from Nasal Cavity Updated: 06/10/22 0857     COVID19 Not Detected    Narrative:      Fact sheet for providers: https://www.fda.gov/media/812593/download     Fact sheet for patients: https://www.fda.gov/media/648555/download    Test performed by PCR.    Consider negative results in combination with clinical observations, patient history, and epidemiological information.  Fact sheet for providers: https://www.fda.gov/media/997100/download     Fact sheet for patients: https://www.fda.gov/media/449443/download    Test performed by PCR.    Consider negative results in combination with clinical observations, patient history, and epidemiological information.     Culture, CSF - Cerebrospinal Fluid,  Shunt Aspirate [[id number]] Collected: 06/10/22 0729    Lab Status: Preliminary result Specimen: Cerebrospinal Fluid from  Shunt Aspirate Updated: 06/10/22 1228     Gram Stain Moderate (3+) WBCs seen      No organisms seen    COVID PRE-OP / PRE-PROCEDURE SCREENING ORDER (NO ISOLATION) - Swab, Nasal Cavity [[id number]]  (Normal) Collected: 06/02/22 0422    Lab Status: Final result Specimen: Swab from Nasal Cavity Updated: 06/02/22 0530    Narrative:      The following orders were created for panel order COVID PRE-OP / PRE-PROCEDURE SCREENING ORDER (NO ISOLATION) - Swab, Nasal Cavity.  Procedure                               Abnormality         Status                     ---------                               -----------         ------                     COVID-19,[facility]...[[id number]]  Normal              Final result                 Please view results for these tests on the individual orders.    COVID-19,[facility] IN-HOUSE,Nasal Swab No Transport Media 3-4 HR TAT - Swab, Nasal Cavity [[id number]]  (Normal) Collected: 06/02/22 0422    Lab Status: Final result Specimen: Swab from Nasal Cavity Updated: 06/02/22 0530     COVID19 Not Detected    Narrative:      Fact sheet for providers: https://www.fda.gov/media/981268/download     Fact sheet for patients: https://www.fda.gov/media/742391/download    Test performed by PCR.    Consider negative results in combination with clinical observations, patient history, and epidemiological information.            Yayo Conroy, PharmD   06/10/22 16:59 CDT

## 2022-06-10 NOTE — ANESTHESIA PROCEDURE NOTES
[facility]    General Information and Staff    Patient location during procedure: ICU    Additional Comments  Trach intact from unit

## 2022-06-10 NOTE — PROCEDURES
DATE: 6/10/2022    PROCEDURE: Ventriculoperitoneal shunt aspiration    INDICATION: Hai Hernandez is a 77 y.o. male who presents with altered mental status after right frontal craniotomy for meningioma month ago.  Radiographic imaging including confirms right cerebral edema.     Procedure was deemed emergent.    PROCEDURE DETAILS:  A timeout was completed verifying correct patient, procedure, site, position, and special equipment were available.      ChloraPrep was applied to the skin and allowed to dry.      The patient was placed in the left lateral decubitus position.    Butterfly needle was inserted and the shunt reservoir.  20 cc of straw-colored CSF was easily aspirated.     BLOOD LOSS: Minimal mL    SPECIMEN: Collected and sent for routine CSF labs including gram stain culture    COMPLICATIONS:  The patient tolerated the procedure well and there were no complications.  The patient remained at his neurological baseline.    Martell Downs MD

## 2022-06-10 NOTE — ANESTHESIA POSTPROCEDURE EVALUATION
Patient: Hai Hernandez    Procedure Summary     Date: 06/10/22 Room / Location: [facility] OR  /  [facility] OR    Anesthesia Start: 1154 Anesthesia Stop: 1623    Procedure: CRANIECTOMY (Bilateral Head) Diagnosis:       Postoperative infection, unspecified type, initial encounter      (Postoperative infection, unspecified type, initial encounter [T81.40XA])    Surgeons: Martell Downs MD Provider: Nidhi Hebert CRNA    Anesthesia Type: general ASA Status: 4          Anesthesia Type: general    Vitals  Vitals Value Taken Time   BP 81/48 06/10/22 1604   Temp 98.9 °F (37.2 °C) 06/10/22 1603   Pulse 65 06/10/22 1610   Resp 20 06/10/22 1610   SpO2 95 % 06/10/22 1610   Vitals shown include unvalidated device data.        Post Anesthesia Care and Evaluation    Patient location during evaluation: ICU  Patient participation: complete - patient participated  Pain management: adequate    Airway patency: patent (trach)  Anesthetic complications: No anesthetic complications  PONV Status: none  Cardiovascular status: acceptable  Respiratory status: acceptable, trach and ventilator  Hydration status: acceptable

## 2022-06-10 NOTE — THERAPY DISCHARGE NOTE
Acute Care - Speech Language Pathology Discharge Summary  [facility]       Patient Name: Hai Hernandez  : 1945  MRN: [id number]    Today's Date: 6/10/2022                   Admit Date: 5/10/2022      SLP Recommendation and Plan    Visit Dx:    ICD-10-CM ICD-9-CM   1. Dysphagia, unspecified type  R13.10 787.20   2. Preop testing  Z01.818 V72.84   3. Meningioma (HCC)  D32.9 225.2   4. Impaired mobility  Z74.09 799.89   5. Decreased activities of daily living (ADL)  Z78.9 V49.89   6. Status epilepticus ([facility])  G40.901 345.3   7. Acute respiratory failure with hypoxia ([facility])  J96.01 518.81   8. Communicating hydrocephalus ([facility])  G91.0 331.3   9. Postoperative infection, unspecified type, initial encounter  T81.40XA 998.59                SLP GOALS     Row Name 06/10/22 0815             Oral Nutrition/Hydration Goal 1 (SLP)    Oral Nutrition/Hydration Goal 1, SLP Pt will tolerate LRD without overt s/s of aspiration.  -MB      Time Frame (Oral Nutrition/Hydration Goal 1, SLP) by discharge  -MB      Barriers (Oral Nutrition/Hydration Goal 1, SLP) n/a  -MB      Progress/Outcomes (Oral Nutrition/Hydration Goal 1, SLP) goal not met;goal no longer appropriate  -MB            User Key  (r) = Recorded By, (t) = Taken By, (c) = Cosigned By    Initials Name Provider Type    Adal Ring CCC-SLP Speech and Language Pathologist                        SLP Discharge Summary  Anticipated Discharge Disposition (SLP): unknown  Reason for Discharge: other (see comments) (Not appropriate for re-eval)  Progress Toward Achieving Short/long Term Goals: other (see comments) (Not appropriate for re-eval)  Discharge Destination: other (see comments) (Still in acute care)      Adal Armas CCC-SLP  6/10/2022

## 2022-06-10 NOTE — PLAN OF CARE
Goal Outcome Evaluation:  Plan of Care Reviewed With: patient        Progress: no change  Outcome Evaluation: Pt. remains on vent, unresponsive. Performed PROM to all extremities. Will continue ROM while on vent.

## 2022-06-10 NOTE — THERAPY TREATMENT NOTE
Acute Care - Physical Therapy Treatment Note  [facility]     Patient Name: Hai Hernandez  : 1945  MRN: [id number]  Today's Date: 6/10/2022      Visit Dx:     ICD-10-CM ICD-9-CM   1. Dysphagia, unspecified type  R13.10 787.20   2. Preop testing  Z01.818 V72.84   3. Meningioma (HCC)  D32.9 225.2   4. Impaired mobility  Z74.09 799.89   5. Decreased activities of daily living (ADL)  Z78.9 V49.89   6. Status epilepticus (HCC)  G40.901 345.3   7. Acute respiratory failure with hypoxia (HCC)  J96.01 518.81   8. Communicating hydrocephalus (HCC)  G91.0 331.3   9. 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)   • Status epilepticus (HCC)   • Essential hypertension   • Type 2 diabetes mellitus with hyperglycemia, without long-term current use of insulin (HCC)   • Hypothyroidism (acquired)   • Acute respiratory failure (secondary to status epilepticus)   • Thrombocytopenia (HCC)   • Cerebral parenchymal hemorrhage (HCC)   • PAF (paroxysmal atrial fibrillation) (HCC)   • Fever   • Loculated pleural effusion   • Acute deep vein thrombosis (DVT) of the ulnar and brachial veins of right upper extremity (HCC)   • Dysphagia   • Communicating hydrocephalus (HCC)   • Postoperative infection     Past Medical History:   Diagnosis Date   • Brain tumor (HCC)    • Depression    • Hearing loss    • History of cellulitis     right foot big toe   • Hypertension    • Pneumonia    • Right arm weakness     after cervial injury   • Sciatic pain     affects balance   • Thyroid disease    • Visual disturbance     due to brain tumor - right eye     Past Surgical History:   Procedure Laterality Date   • CATARACT EXTRACTION, BILATERAL     • COLONOSCOPY     • CRANIOTOMY FOR TUMOR Right 5/10/2022    Procedure:  CRANIOTOMY FOR TUMOR STERIOTACTIC WITH BRAIN LAB, right;  Surgeon: Martell Downs MD;  Location:  [facility] OR;  Service: Neurosurgery;  Laterality: Right;   • ENDOSCOPY W/ PEG TUBE PLACEMENT N/A 6/2/2022    Procedure: ESOPHAGOGASTRODUODENOSCOPY WITH PERCUTANEOUS ENDOSCOPIC GASTROSTOMY TUBE INSERTION WITH ANESTHESIA;  Surgeon: Melecio Lu MD;  Location:  [facility] OR;  Service: Gastroenterology;  Laterality: N/A;   • NECK SURGERY     • SKIN CANCER EXCISION     • TONSILLECTOMY     • TRACHEOSTOMY N/A 6/2/2022    Procedure: TRACHEOSTOMY;  Surgeon: Geovany Davidson MD;  Location:  [facility] OR;  Service: ENT;  Laterality: N/A;   •  SHUNT INSERTION Right 6/3/2022    Procedure: VENTRICULAR PERITONEAL SHUNT INSERTION WITH BRAIN LAB;  Surgeon: Martell Downs MD;  Location:  [facility] OR;  Service: Neurosurgery;  Laterality: Right;     PT Assessment (last 12 hours)     PT Evaluation and Treatment     Row Name 06/10/22 0749          Physical Therapy Time and Intention    Subjective Information --  unresponsive  -     Document Type therapy note (daily note)  -     Mode of Treatment physical therapy  -     Row Name 06/10/22 0749          General Information    Existing Precautions/Restrictions fall;oxygen therapy device and L/min  vent, trach  -     Row Name 06/10/22 0749          Pain Scale: FACES Pre/Post-Treatment    Pain: FACES Scale, Pretreatment 0-->no hurt  -     Posttreatment Pain Rating 0-->no hurt  -     Row Name 06/10/22 0749          Aerobic Exercise    Comment, Aerobic Exercise (Therapeutic Exercise) PROM B UE/LEs x 20 reps  -     Row Name             Wound 05/10/22 0959 Right scalp Incision    Wound - Properties Group Placement Date: 05/10/22  -PAULINE Placement Time: 0959  -PAULINE Side: Right  -PAULINE Location: scalp  -PAULINE Primary Wound Type: Incision  -PAULINE     Retired Wound - Properties Group Placement Date: 05/10/22  -PAULINE Placement Time: 0959  -PAULINE Side: Right  -PAULINE Location: scalp  -PAULINE Primary Wound  Type: Incision  -PAULINE     Retired Wound - Properties Group Date first assessed: 05/10/22  -PAULINE Time first assessed: 0959 -PAULINE Side: Right  -PAULINE Location: scalp  -PAULINE Primary Wound Type: Incision  -PAULINE     Row Name             Wound 05/15/22 1712 coccyx    Wound - Properties Group Placement Date: 05/15/22  -KS Placement Time: 1712 -KS Location: coccyx  -KS     Retired Wound - Properties Group Placement Date: 05/15/22  -KS Placement Time: 1712 -KS Location: coccyx  -KS     Retired Wound - Properties Group Date first assessed: 05/15/22  -KS Time first assessed: 1712 -KS Location: coccyx  -KS     Row Name             Wound 05/17/22 1623 scalp Pressure Injury    Wound - Properties Group Placement Date: 05/17/22  -KS Placement Time: 1623  -KS Present on Hospital Admission: N  -KS Location: scalp  -KS Primary Wound Type: Pressure inj  -KS     Retired Wound - Properties Group Placement Date: 05/17/22  -KS Placement Time: 1623  -KS Present on Hospital Admission: N  -KS Location: scalp  -KS Primary Wound Type: Pressure inj  -KS     Retired Wound - Properties Group Date first assessed: 05/17/22  -KS Time first assessed: 1623  -KS Present on Hospital Admission: N  -KS Location: scalp  -KS Primary Wound Type: Pressure inj  -KS     Row Name             Wound 06/03/22 1602 Right scalp Incision    Wound - Properties Group Placement Date: 06/03/22  -MK Placement Time: 1602  -MK Present on Hospital Admission: N  -MK Side: Right  -MK Location: scalp  -MK Primary Wound Type: Incision  -MK     Retired Wound - Properties Group Placement Date: 06/03/22  -MK Placement Time: 1602  -MK Present on Hospital Admission: N  -MK Side: Right  -MK Location: scalp  -MK Primary Wound Type: Incision  -MK     Retired Wound - Properties Group Date first assessed: 06/03/22  -MK Time first assessed: 1602  -MK Present on Hospital Admission: N  -MK Side: Right  -MK Location: scalp  -MK Primary Wound Type: Incision  -MK     Row Name             Wound 06/03/22  1602 abdomen Incision    Wound - Properties Group Placement Date: 06/03/22 - Placement Time: 1602  -MK Present on Hospital Admission: N  - Location: abdomen  -MK Primary Wound Type: Incision  -MK     Retired Wound - Properties Group Placement Date: 06/03/22 - Placement Time: 1602  -MK Present on Hospital Admission: N  - Location: abdomen  -MK Primary Wound Type: Incision  -MK     Retired Wound - Properties Group Date first assessed: 06/03/22  - Time first assessed: 1602 -MK Present on Hospital Admission: N  - Location: abdomen  -MK Primary Wound Type: Incision  -MK     Row Name 06/10/22 0749          Plan of Care Review    Plan of Care Reviewed With patient  -     Progress no change  -     Outcome Evaluation Pt. remains on vent, unresponsive. Performed PROM to all extremities. Will continue ROM while on vent.  -     Row Name 06/10/22 0749          Positioning and Restraints    Pre-Treatment Position in bed  -     Post Treatment Position bed  -     In Bed fowlers;patient within staff view;side rails up x2;RUE elevated;LUE elevated;SCD pump applied;waffle boots/both  -           User Key  (r) = Recorded By, (t) = Taken By, (c) = Cosigned By    Initials Name Provider Type    Carmen Mistry RN Registered Nurse    Surya Ball RN Registered Nurse    Rosaura Thompson, BRENDAN Physical Therapist Assistant    Leatha Solomon RN Registered Nurse                Physical Therapy Education                 Title: PT OT SLP Therapies (In Progress)     Topic: Physical Therapy (In Progress)     Point: Mobility training (Done)     Learning Progress Summary           Patient Acceptance, E, VU,DU by YULIET at 5/11/2022 0745    Comment: benefits of activity, progression of PT POC                   Point: Home exercise program (In Progress)     Learning Progress Summary           Patient Acceptance, E, NR by YULIET at 6/7/2022 1345    Comment: Benefits of activity, B UE/LE exercises, positioning for  joints and skin integrity                   Point: Body mechanics (Not Started)     Learner Progress:  Not documented in this visit.          Point: Precautions (Not Started)     Learner Progress:  Not documented in this visit.                      User Key     Initials Effective Dates Name Provider Type Artur HORVATH 08/02/16 -  Wayne Thomas, PT DPT Physical Therapist PT              PT Recommendation and Plan     Plan of Care Reviewed With: patient  Progress: no change  Outcome Evaluation: Pt. remains on vent, unresponsive. Performed PROM to all extremities. Will continue ROM while on vent.   Outcome Measures     Row Name 06/10/22 0749 06/09/22 0737          How much help from another person do you currently need...    Turning from your back to your side while in flat bed without using bedrails? 1  -MF 1  -MF     Moving from lying on back to sitting on the side of a flat bed without bedrails? 1  -MF 1  -MF     Moving to and from a bed to a chair (including a wheelchair)? 1  -MF 1  -MF     Standing up from a chair using your arms (e.g., wheelchair, bedside chair)? 1  -MF 1  -MF     Climbing 3-5 steps with a railing? 1  -MF 1  -MF     To walk in hospital room? 1  -MF 1  -MF     AM-PAC 6 Clicks Score (PT) 6  -MF 6  -MF            Functional Assessment    Outcome Measure Options AM-PAC 6 Clicks Basic Mobility (PT)  -MF AM-PAC 6 Clicks Basic Mobility (PT)  -MF           User Key  (r) = Recorded By, (t) = Taken By, (c) = Cosigned By    Initials Name Provider Type    Rosaura Thompson, PTA Physical Therapist Assistant                 Time Calculation:    PT Charges     Row Name 06/10/22 0813             Time Calculation    Start Time 0749  -      Stop Time 0812  -      Time Calculation (min) 23 min  -      PT Received On 06/10/22  -              Time Calculation- PT    Total Timed Code Minutes- PT 23 minute(s)  -              Timed Charges    83199 - PT Therapeutic Exercise Minutes 23  -               Total Minutes    Timed Charges Total Minutes 23  -MF       Total Minutes 23  -MF            User Key  (r) = Recorded By, (t) = Taken By, (c) = Cosigned By    Initials Name Provider Type    Rosaura Thompson PTA Physical Therapist Assistant              Therapy Charges for Today     Code Description Service Date Service Provider Modifiers Qty    07551272672 HC PT THER PROC EA 15 MIN 6/9/2022 Rosaura Bravo PTA GP 2    63538630406 HC PT THER PROC EA 15 MIN 6/10/2022 Rosaura Bravo PTA GP 2          PT G-Codes  Outcome Measure Options: AM-PAC 6 Clicks Basic Mobility (PT)  AM-PAC 6 Clicks Score (PT): 6  AM-PAC 6 Clicks Score (OT): 12    Rosaura Bravo PTA  6/10/2022

## 2022-06-10 NOTE — PROGRESS NOTES
PULMONARY AND CRITICAL CARE PROGRESS NOTE - [facility]    Patient: Hai Hernandez    1945    MR# [id number]    Acct# [id number]  06/10/22   08:43 CDT  Referring Provider: Martell Downs, *    Chief Complaint: Mechanically ventilated    Interval history: The patient remains intubated with trach to vent.  No sedation is infusing.  O2 sat 96% on 5 PEEP, 0.30 FiO2.  MRI was abnormal yesterday per nursing.  Nursing states that patient will be going back to OR today for removal of shunt and placement of EVD.  Continue current mechanical vent.  No other aggravating or alleviating factors.           Meds:  albuterol, 2.5 mg, Nebulization, Q8H - RT  carvedilol, 6.25 mg, Nasogastric, BID With Meals  chlorhexidine, 15 mL, Mouth/Throat, Q12H  fosphenytoin, 275 mg PE, Intravenous, Q8H  heparin (porcine), 5,000 Units, Subcutaneous, Q12H  insulin detemir, 20 Units, Subcutaneous, Nightly  insulin regular, 2-7 Units, Subcutaneous, Q6H  insulin regular, 8 Units, Subcutaneous, Q6H  lacosamide, 200 mg, Intravenous, Q12H  levETIRAcetam, 1,000 mg, Intravenous, Q12H  levothyroxine, 125 mcg, Nasogastric, Q AM  lidocaine, 10 mL, Intradermal, Once  liothyronine, 25 mcg, Nasogastric, Daily  lisinopril, 20 mg, Nasogastric, Q24H  mupirocin, 1 application, Topical, Q12H  Nutrisource fiber, 1 packet, Nasogastric, 4x Daily  pantoprazole, 40 mg, Intravenous, Q AM  polyethylene glycol, 17 g, Oral, Daily  ProSource TF, 1 packet, Nasogastric, BID  ProSource TF, 45 mL, Per G Tube, BID  sodium chloride, 4 mL, Nebulization, BID - RT      hold, 1 each  norepinephrine, 0.02-0.3 mcg/kg/min      Review of Systems:   Cannot obtain due to mechanical ventilated state     Ventilator Settings:        Resp Rate (Set): 16  Pressure Support (cm H2O): 8 cm H20  FiO2 (%): 30 %  PEEP/CPAP (cm H2O): 5 cm H20  Minute Ventilation (L/min) (Obs): 14.2 L/min  Resp Rate (Observed) Vent: 32  I:E Ratio (Set): 1:0.27  I:E Ratio (Obs): 1:1.50  PIP  Observed (cm H2O): 19 cm H2O  RSBI: 21.96  Physical Exam:  Temp:  [98 °F (36.7 °C)-98.6 °F (37 °C)] 98.6 °F (37 °C)  Heart Rate:  [59-67] 65  Resp:  [26-32] 29  BP: (134-165)/(60-81) 157/76  FiO2 (%):  [30 %] 30 %    Intake/Output Summary (Last 24 hours) at 6/10/2022 0843  Last data filed at 6/10/2022 0400  Gross per 24 hour   Intake 2234.21 ml   Output 1700 ml   Net 534.21 ml     SpO2 Percentage    06/10/22 0500 06/10/22 0600 06/10/22 0622   SpO2: 96% 96% 96%   Body mass index is 31.44 kg/m².   Physical Exam   Constitutional:       General: He is intubated     Appearance: He is ill-appearing. He is not toxic-appearing.      Interventions: He is intubated.   HENT:      Head: Normocephalic.      Comments: Craniotomy wound, trach     Nose: Nose normal.   Eyes:      General: No scleral icterus.  Cardiovascular: normal rate  Pulmonary:      Effort: No accessory muscle usage or respiratory distress. He is intubated.      Breath sounds: few rhonchi    Abdominal:      General: There is no distension.      Palpations: Abdomen is soft.   Genitourinary:     Comments: not examined   Musculoskeletal:      Right lower leg: Edema present.      Left lower leg: Edema present.      Comments: SCDs   Skin:     Findings: No rash.   Neurological:      Motor: opens eyes/grimaces to pain   Psychiatric:         Behavior: Behavior is not agitated.   Electronically signed by MARIO Aldrich, 6/10/2022, 08:43 CDT      Physician Substantive Portion: Medical Decision Making    Laboratory Data:  Results from last 7 days   Lab Units 06/10/22  0351 06/09/22  0445 06/08/22  0256   WBC 10*3/mm3 12.23* 11.64* 10.66   HEMOGLOBIN g/dL 8.2* 8.4* 8.9*   PLATELETS 10*3/mm3 213 236 251     Results from last 7 days   Lab Units 06/10/22  0351 06/09/22  0445 06/08/22  0256   SODIUM mmol/L 133* 136 138   POTASSIUM mmol/L 4.3 4.2 4.4   BUN mg/dL 30* 31* 29*   CREATININE mg/dL 0.52* 0.49* 0.53*     Results from last 7 days   Lab Units 06/10/22  3814  06/09/22  0422 06/08/22  0346   PH, ARTERIAL pH units 7.493* 7.497* 7.465*   PCO2, ARTERIAL mm Hg 39.7 39.7 42.3   PO2 ART mm Hg 75.1* 82.7* 80.0*   FIO2 % 30 30 30     No results found for: BLOODCX, URINECX, WOUNDCX, MRSACX, RESPCX, STOOLCX  Recent films:  MRI Brain Without Contrast    Result Date: 6/9/2022  EXAMINATION:  MRI BRAIN WO CONTRAST-  6/9/2022 3:00 PM CDT  HISTORY: Altered mental status.; D32.9-Benign neoplasm of meninges, unspecified; Z74.09-Other reduced mobility; Z78.9-Other specified health status; G40.901-Epilepsy, unspecified, not intractable, with status epilepticus; J96.01-Acute respiratory failure with hypoxia; G91.0-Communicating hydrocephalus.  TECHNIQUE: Multiplanar imaging was performed in a high field magnet.  COMPARISON: 5/21/2022.  FINDINGS: There has been prior craniotomy for meningioma resection. There are residual blood products along the inferior aspect of the right frontal lobe measuring about 2.6 cm transversely. There is residual subdural hemorrhage lateral to the right frontal lobe. There is new T2 high signal seen best on the FLAIR sequences in the inferolateral right frontal lobe and involving the anterior tip of the right temporal lobe. The hemorrhagic byproducts demonstrates increased signal on the gradient echo sequence. There is decreased signal on the ADC map images which is likely related to blood product susceptibility. There is mild mass effect in the inferior aspect of the left frontal lobe with mild shifting of the midline structures to the left. This causes mass effect in the region of the optic nerve on the right. There is mild to moderate dilatation of the lateral and third ventricles, stable.      Impression: 1. Residual blood products along the inferior aspect of the right frontal lobe measuring about 2.6 cm transversely. This causes some continued mass effect along the inferior aspect of the right frontal lobe with mild shifting of structures to the left. 2.  Residual subdural blood products in the right frontal region laterally. 3. New areas of T2 high signal along the inferolateral right frontal lobe and along the anterior tip of the right temporal lobe are nonspecific. This likely represents edema. Infection is considered less likely given the absence of diffusion signal abnormality and diffusion restriction. Correlate clinically.   This report was finalized on 06/09/2022 17:20 by Dr. Zachariah Fonseca MD.    XR Chest 1 View    Result Date: 6/10/2022  EXAMINATION: XR CHEST 1 VW-  6/10/2022 3:25 AM CDT  HISTORY: On vent; R13.10-Dysphagia, unspecified; Z01.818-Encounter for other preprocedural examination; D32.9-Benign neoplasm of meninges, unspecified; Z74.09-Other reduced mobility; Z78.9-Other specified health status; G40.901-Epilepsy, unspecified, not intractable, with status epilepticus; J96.01-Acute respiratory failure with hypoxia; G91.0-Communicating hydrocephalus   A frontal projection of the chest is compared with the previous study dated 6/9/2022.  The lungs are poorly expanded.  There is left lower lung consolidation and probable left basal pleural effusion. No change.  There are multiple partially calcified small nodules/granulomas in both lungs. No change.  There are no pulmonary congestion or pneumothorax.  The heart size is not optimally evaluated due to the AP projection.  Tracheostomy tube is in place. No change.  A small caliber catheter seen along the right side of the neck and chest which may represent a ventriculoperitoneal shunt?.  No acute bony abnormality.      Impression: 1. Left lower lung consolidation and left basilar pleural effusion. 2. Chronic granulomatous lung disease. This report was finalized on 06/10/2022 07:25 by Dr. Aida Wogn MD.    XR Chest 1 View    Result Date: 6/9/2022  HISTORY: Intubated  CXR: Frontal view the chest obtained  COMPARISON: 6/8/2022  FINDINGS: Tracheostomy tube tip appropriate in position. Right IJ central line  tip projects over the lower SVC. Hypoventilated lungs. Stable cardiomegaly. Similar prominence of the vascular markings in the perihilar densities. Left lower lobe consolidation. Small left pleural effusion. No pneumothorax. Degenerative change thoracic spine.      Impression: 1. Well-positioned life support lines. 2. Hypoventilated lungs. Cardiomegaly with vascular crowding and/or perihilar edema. Small left pleural effusion with left lower lobe atelectasis versus pneumonia. This report was finalized on 06/09/2022 07:16 by Dr. Christel Feliciano MD.      My radiograph interpretation/independent review of imaging: left pleural effusion, atelectasis, tracheostomy in place    Pulmonary Assessment:    1. Cerebral edema  2. Left pleural effusion, atelectasis, consolidation  3. Tracheostomy status  4. Meningioma  5. S/p seizures  6. Leukocytosis  7. Hypotension requiring pressors    Recommend/plan:   · Neurosurgery plan for OR noted  · Not a candidate for spontaneous breathing trial today  · Continue physical therapy  · Continue enteral nutrition postop  · Respiratory culture, empiric antibiotics. Uncertain whether LLL process represents infection or ongoing atelectasis    This visit was performed by both a physician and an [facility] RN.  I personally evaluated and examined the patient.  I performed all aspects of the medical decision making as documented.  Electronically signed by Carlos A Dasilva MD, 6/10/2022, 08:51 CDT

## 2022-06-10 NOTE — ANESTHESIA PREPROCEDURE EVALUATION
Anesthesia Evaluation     Patient summary reviewed   no history of anesthetic complications:  NPO Solid Status: > 8 hours             Airway   Mallampati: I  TM distance: >3 FB  Neck ROM: full  No difficulty expected  Dental          Pulmonary    (+) pleural effusion (s/p ct guided drainage),   (-) COPD, asthma, sleep apnea, not a smoker    ROS comment: Trach and peg in place  On 30% fio2   Cardiovascular   Exercise tolerance: good (4-7 METS)    ECG reviewed    (+) hypertension, dysrhythmias Atrial Fib,   (-) past MI, CAD, cardiac stents, hyperlipidemia      Neuro/Psych  (+) seizures, headaches, psychiatric history Depression,      ROS Comment: memingioma causing visual disturbance  S/p crani  Developed status epilepticus post op  Recently had  shunt insertion, now having crani with VPS removal , external ventricular drain placement, MRI shows edema  GI/Hepatic/Renal/Endo    (+) obesity,   diabetes mellitus type 2, thyroid problem hypothyroidism  (-) liver disease, no renal disease    Musculoskeletal     Abdominal    Substance History      OB/GYN          Other   blood dyscrasia anemia,                       Anesthesia Plan    ASA 4     general     intravenous induction     Anesthetic plan, risks, benefits, and alternatives have been provided, discussed and informed consent has been obtained with: patient.        CODE STATUS:

## 2022-06-11 ENCOUNTER — APPOINTMENT (OUTPATIENT)
Dept: GENERAL RADIOLOGY | Facility: HOSPITAL | Age: 77
End: 2022-06-11

## 2022-06-11 ENCOUNTER — APPOINTMENT (OUTPATIENT)
Dept: CT IMAGING | Facility: HOSPITAL | Age: 77
End: 2022-06-11

## 2022-06-11 LAB
ALBUMIN SERPL-MCNC: 2.5 G/DL (ref 3.5–5.2)
ALBUMIN/GLOB SERPL: 0.7 G/DL
ALP SERPL-CCNC: 161 U/L (ref 39–117)
ALT SERPL W P-5'-P-CCNC: 38 U/L (ref 1–41)
ANION GAP SERPL CALCULATED.3IONS-SCNC: 7 MMOL/L (ref 5–15)
ARTERIAL PATENCY WRIST A: ABNORMAL
AST SERPL-CCNC: 48 U/L (ref 1–40)
ATMOSPHERIC PRESS: 746 MMHG
BASE EXCESS BLDA CALC-SCNC: 5.6 MMOL/L (ref 0–2)
BASOPHILS # BLD AUTO: 0.06 10*3/MM3 (ref 0–0.2)
BASOPHILS NFR BLD AUTO: 0.4 % (ref 0–1.5)
BDY SITE: ABNORMAL
BILIRUB SERPL-MCNC: 0.5 MG/DL (ref 0–1.2)
BODY TEMPERATURE: 37 C
BUN SERPL-MCNC: 29 MG/DL (ref 8–23)
BUN/CREAT SERPL: 43.3 (ref 7–25)
CALCIUM SPEC-SCNC: 8.4 MG/DL (ref 8.6–10.5)
CHLORIDE SERPL-SCNC: 102 MMOL/L (ref 98–107)
CO2 SERPL-SCNC: 28 MMOL/L (ref 22–29)
CREAT SERPL-MCNC: 0.67 MG/DL (ref 0.76–1.27)
CRP SERPL-MCNC: 17.25 MG/DL (ref 0–0.5)
DEPRECATED RDW RBC AUTO: 58.4 FL (ref 37–54)
EGFRCR SERPLBLD CKD-EPI 2021: 96.2 ML/MIN/1.73
EOSINOPHIL # BLD AUTO: 0.14 10*3/MM3 (ref 0–0.4)
EOSINOPHIL NFR BLD AUTO: 1 % (ref 0.3–6.2)
ERYTHROCYTE [DISTWIDTH] IN BLOOD BY AUTOMATED COUNT: 16.8 % (ref 12.3–15.4)
GLOBULIN UR ELPH-MCNC: 3.4 GM/DL
GLUCOSE BLDC GLUCOMTR-MCNC: 124 MG/DL (ref 70–130)
GLUCOSE BLDC GLUCOMTR-MCNC: 152 MG/DL (ref 70–130)
GLUCOSE BLDC GLUCOMTR-MCNC: 153 MG/DL (ref 70–130)
GLUCOSE BLDC GLUCOMTR-MCNC: 185 MG/DL (ref 70–130)
GLUCOSE SERPL-MCNC: 148 MG/DL (ref 65–99)
HCO3 BLDA-SCNC: 30.3 MMOL/L (ref 20–26)
HCT VFR BLD AUTO: 23.2 % (ref 37.5–51)
HGB BLD-MCNC: 7.2 G/DL (ref 13–17.7)
IMM GRANULOCYTES # BLD AUTO: 0.19 10*3/MM3 (ref 0–0.05)
IMM GRANULOCYTES NFR BLD AUTO: 1.3 % (ref 0–0.5)
INHALED O2 CONCENTRATION: 60 %
LYMPHOCYTES # BLD AUTO: 0.88 10*3/MM3 (ref 0.7–3.1)
LYMPHOCYTES NFR BLD AUTO: 6.2 % (ref 19.6–45.3)
Lab: ABNORMAL
MCH RBC QN AUTO: 32.1 PG (ref 26.6–33)
MCHC RBC AUTO-ENTMCNC: 31 G/DL (ref 31.5–35.7)
MCV RBC AUTO: 103.6 FL (ref 79–97)
MODALITY: ABNORMAL
MONOCYTES # BLD AUTO: 1.38 10*3/MM3 (ref 0.1–0.9)
MONOCYTES NFR BLD AUTO: 9.8 % (ref 5–12)
NEUTROPHILS NFR BLD AUTO: 11.48 10*3/MM3 (ref 1.7–7)
NEUTROPHILS NFR BLD AUTO: 81.3 % (ref 42.7–76)
NRBC BLD AUTO-RTO: 0 /100 WBC (ref 0–0.2)
PAW @ PEAK INSP FLOW SETTING VENT: 12 CMH2O
PCO2 BLDA: 45.1 MM HG (ref 35–45)
PCO2 TEMP ADJ BLD: 45.1 MM HG (ref 35–45)
PEEP RESPIRATORY: 5 CM[H2O]
PH BLDA: 7.44 PH UNITS (ref 7.35–7.45)
PH, TEMP CORRECTED: 7.44 PH UNITS (ref 7.35–7.45)
PHENYTOIN SERPL-MCNC: 17.6 MCG/ML (ref 10–20)
PLATELET # BLD AUTO: 171 10*3/MM3 (ref 140–450)
PMV BLD AUTO: 9.6 FL (ref 6–12)
PO2 BLDA: 176 MM HG (ref 83–108)
PO2 TEMP ADJ BLD: 176 MM HG (ref 83–108)
POTASSIUM SERPL-SCNC: 4.3 MMOL/L (ref 3.5–5.2)
PROT SERPL-MCNC: 5.9 G/DL (ref 6–8.5)
RBC # BLD AUTO: 2.24 10*6/MM3 (ref 4.14–5.8)
SAO2 % BLDCOA: >100.1 % (ref 94–99)
SET MECH RESP RATE: 16
SODIUM SERPL-SCNC: 137 MMOL/L (ref 136–145)
VENTILATOR MODE: ABNORMAL
WBC NRBC COR # BLD: 14.13 10*3/MM3 (ref 3.4–10.8)

## 2022-06-11 PROCEDURE — 99024 POSTOP FOLLOW-UP VISIT: CPT | Performed by: NEUROLOGICAL SURGERY

## 2022-06-11 PROCEDURE — 70450 CT HEAD/BRAIN W/O DYE: CPT

## 2022-06-11 PROCEDURE — 86140 C-REACTIVE PROTEIN: CPT | Performed by: NEUROLOGICAL SURGERY

## 2022-06-11 PROCEDURE — 25010000002 DEXAMETHASONE PER 1 MG: Performed by: NEUROLOGICAL SURGERY

## 2022-06-11 PROCEDURE — 82962 GLUCOSE BLOOD TEST: CPT

## 2022-06-11 PROCEDURE — 63710000001 INSULIN REGULAR HUMAN PER 5 UNITS: Performed by: NEUROLOGICAL SURGERY

## 2022-06-11 PROCEDURE — 80053 COMPREHEN METABOLIC PANEL: CPT | Performed by: NEUROLOGICAL SURGERY

## 2022-06-11 PROCEDURE — 80185 ASSAY OF PHENYTOIN TOTAL: CPT | Performed by: NEUROLOGICAL SURGERY

## 2022-06-11 PROCEDURE — 82803 BLOOD GASES ANY COMBINATION: CPT

## 2022-06-11 PROCEDURE — 94003 VENT MGMT INPAT SUBQ DAY: CPT

## 2022-06-11 PROCEDURE — 0 LEVETIRACETAM IN NACL 0.75% 1000 MG/100ML SOLUTION: Performed by: NEUROLOGICAL SURGERY

## 2022-06-11 PROCEDURE — 25010000002 FOSPHENYTOIN 500 MG PE/10ML SOLUTION 10 ML VIAL: Performed by: NEUROLOGICAL SURGERY

## 2022-06-11 PROCEDURE — 94799 UNLISTED PULMONARY SVC/PX: CPT

## 2022-06-11 PROCEDURE — 25010000002 HEPARIN (PORCINE) PER 1000 UNITS: Performed by: NEUROLOGICAL SURGERY

## 2022-06-11 PROCEDURE — 94761 N-INVAS EAR/PLS OXIMETRY MLT: CPT

## 2022-06-11 PROCEDURE — 97110 THERAPEUTIC EXERCISES: CPT

## 2022-06-11 PROCEDURE — 25010000002 HYDRALAZINE PER 20 MG: Performed by: NEUROLOGICAL SURGERY

## 2022-06-11 PROCEDURE — 25010000002 VANCOMYCIN 10 G RECONSTITUTED SOLUTION: Performed by: NEUROLOGICAL SURGERY

## 2022-06-11 PROCEDURE — 99233 SBSQ HOSP IP/OBS HIGH 50: CPT | Performed by: INTERNAL MEDICINE

## 2022-06-11 PROCEDURE — 25010000002 CEFEPIME PER 500 MG: Performed by: NEUROLOGICAL SURGERY

## 2022-06-11 PROCEDURE — 85025 COMPLETE CBC W/AUTO DIFF WBC: CPT | Performed by: NEUROLOGICAL SURGERY

## 2022-06-11 PROCEDURE — 63710000001 INSULIN DETEMIR PER 5 UNITS: Performed by: NEUROLOGICAL SURGERY

## 2022-06-11 PROCEDURE — 71045 X-RAY EXAM CHEST 1 VIEW: CPT

## 2022-06-11 RX ORDER — LANSOPRAZOLE
30 KIT EVERY MORNING
Status: DISCONTINUED | OUTPATIENT
Start: 2022-06-12 | End: 2022-06-20 | Stop reason: HOSPADM

## 2022-06-11 RX ORDER — LORAZEPAM 2 MG/ML
2 INJECTION INTRAMUSCULAR
Status: DISCONTINUED | OUTPATIENT
Start: 2022-06-11 | End: 2022-06-20 | Stop reason: HOSPADM

## 2022-06-11 RX ORDER — NOREPINEPHRINE BIT/0.9 % NACL 8 MG/250ML
.02-.3 INFUSION BOTTLE (ML) INTRAVENOUS
Status: DISCONTINUED | OUTPATIENT
Start: 2022-06-11 | End: 2022-06-20 | Stop reason: HOSPADM

## 2022-06-11 RX ORDER — DEXAMETHASONE SODIUM PHOSPHATE 4 MG/ML
4 INJECTION, SOLUTION INTRA-ARTICULAR; INTRALESIONAL; INTRAMUSCULAR; INTRAVENOUS; SOFT TISSUE EVERY 6 HOURS
Status: COMPLETED | OUTPATIENT
Start: 2022-06-11 | End: 2022-06-13

## 2022-06-11 RX ORDER — LEVALBUTEROL INHALATION SOLUTION 0.63 MG/3ML
0.63 SOLUTION RESPIRATORY (INHALATION) 3 TIMES DAILY PRN
Status: DISCONTINUED | OUTPATIENT
Start: 2022-06-11 | End: 2022-06-20 | Stop reason: HOSPADM

## 2022-06-11 RX ADMIN — CARVEDILOL 6.25 MG: 6.25 TABLET, FILM COATED ORAL at 18:10

## 2022-06-11 RX ADMIN — SODIUM CHLORIDE 275 MG PE: 9 INJECTION, SOLUTION INTRAVENOUS at 05:13

## 2022-06-11 RX ADMIN — HYDRALAZINE HYDROCHLORIDE 10 MG: 20 INJECTION INTRAMUSCULAR; INTRAVENOUS at 12:04

## 2022-06-11 RX ADMIN — SODIUM CHLORIDE 275 MG PE: 9 INJECTION, SOLUTION INTRAVENOUS at 21:54

## 2022-06-11 RX ADMIN — MUPIROCIN 1 APPLICATION: 20 OINTMENT TOPICAL at 08:04

## 2022-06-11 RX ADMIN — LEVETIRACETAM 1000 MG: 10 INJECTION INTRAVENOUS at 08:04

## 2022-06-11 RX ADMIN — SODIUM CHLORIDE 275 MG PE: 9 INJECTION, SOLUTION INTRAVENOUS at 14:07

## 2022-06-11 RX ADMIN — CARVEDILOL 6.25 MG: 6.25 TABLET, FILM COATED ORAL at 08:08

## 2022-06-11 RX ADMIN — LEVOTHYROXINE SODIUM 125 MCG: 125 TABLET ORAL at 05:13

## 2022-06-11 RX ADMIN — LIOTHYRONINE SODIUM 25 MCG: 25 TABLET ORAL at 08:03

## 2022-06-11 RX ADMIN — CEFEPIME 2 G: 2 INJECTION, POWDER, FOR SOLUTION INTRAVENOUS at 21:54

## 2022-06-11 RX ADMIN — Medication 45 ML: at 20:33

## 2022-06-11 RX ADMIN — DEXAMETHASONE SODIUM PHOSPHATE 4 MG: 4 INJECTION, SOLUTION INTRA-ARTICULAR; INTRALESIONAL; INTRAMUSCULAR; INTRAVENOUS; SOFT TISSUE at 08:04

## 2022-06-11 RX ADMIN — LISINOPRIL 20 MG: 20 TABLET ORAL at 12:04

## 2022-06-11 RX ADMIN — CHLORHEXIDINE GLUCONATE 15 ML: 1.2 RINSE ORAL at 20:33

## 2022-06-11 RX ADMIN — POLYETHYLENE GLYCOL 3350 17 G: 17 POWDER, FOR SOLUTION ORAL at 08:03

## 2022-06-11 RX ADMIN — PANTOPRAZOLE SODIUM 40 MG: 40 INJECTION, POWDER, FOR SOLUTION INTRAVENOUS at 05:13

## 2022-06-11 RX ADMIN — ALBUTEROL SULFATE 2.5 MG: 2.5 SOLUTION RESPIRATORY (INHALATION) at 06:23

## 2022-06-11 RX ADMIN — INSULIN HUMAN 2 UNITS: 100 INJECTION, SOLUTION PARENTERAL at 05:12

## 2022-06-11 RX ADMIN — Medication 1 PACKET: at 14:07

## 2022-06-11 RX ADMIN — Medication 1 PACKET: at 20:33

## 2022-06-11 RX ADMIN — MUPIROCIN 1 APPLICATION: 20 OINTMENT TOPICAL at 22:08

## 2022-06-11 RX ADMIN — VANCOMYCIN HYDROCHLORIDE 1500 MG: 10 INJECTION, POWDER, LYOPHILIZED, FOR SOLUTION INTRAVENOUS at 11:54

## 2022-06-11 RX ADMIN — HEPARIN SODIUM 5000 UNITS: 5000 INJECTION INTRAVENOUS; SUBCUTANEOUS at 20:33

## 2022-06-11 RX ADMIN — LEVETIRACETAM 1000 MG: 10 INJECTION INTRAVENOUS at 20:34

## 2022-06-11 RX ADMIN — HEPARIN SODIUM 5000 UNITS: 5000 INJECTION INTRAVENOUS; SUBCUTANEOUS at 08:03

## 2022-06-11 RX ADMIN — LACOSAMIDE 200 MG: 10 INJECTION, SOLUTION INTRAVENOUS at 08:03

## 2022-06-11 RX ADMIN — INSULIN DETEMIR 20 UNITS: 100 INJECTION, SOLUTION SUBCUTANEOUS at 21:01

## 2022-06-11 RX ADMIN — Medication 1 PACKET: at 08:03

## 2022-06-11 RX ADMIN — LACOSAMIDE 200 MG: 10 INJECTION, SOLUTION INTRAVENOUS at 20:34

## 2022-06-11 RX ADMIN — DEXAMETHASONE SODIUM PHOSPHATE 4 MG: 4 INJECTION, SOLUTION INTRA-ARTICULAR; INTRALESIONAL; INTRAMUSCULAR; INTRAVENOUS; SOFT TISSUE at 18:48

## 2022-06-11 RX ADMIN — INSULIN HUMAN 2 UNITS: 100 INJECTION, SOLUTION PARENTERAL at 12:08

## 2022-06-11 RX ADMIN — SODIUM CHLORIDE SOLN NEBU 3% 4 ML: 3 NEBU SOLN at 18:24

## 2022-06-11 RX ADMIN — Medication 1 PACKET: at 18:10

## 2022-06-11 RX ADMIN — INSULIN HUMAN 8 UNITS: 100 INJECTION, SOLUTION PARENTERAL at 12:09

## 2022-06-11 RX ADMIN — CHLORHEXIDINE GLUCONATE 15 ML: 1.2 RINSE ORAL at 08:04

## 2022-06-11 RX ADMIN — DEXAMETHASONE SODIUM PHOSPHATE 4 MG: 4 INJECTION, SOLUTION INTRA-ARTICULAR; INTRALESIONAL; INTRAMUSCULAR; INTRAVENOUS; SOFT TISSUE at 14:07

## 2022-06-11 RX ADMIN — Medication 45 ML: at 08:04

## 2022-06-11 RX ADMIN — SODIUM CHLORIDE SOLN NEBU 3% 4 ML: 3 NEBU SOLN at 06:23

## 2022-06-11 NOTE — PROGRESS NOTES
Neurology Progress Note      Chief Complaint:    Postoperative seizure  Status post craniectomy yesterday    Subjective     Subjective:  Patient remains on mechanical ventilation.  He is not presently not on sedation.  He attempts to open his eyes with sternal rub and noxious stimuli, but does not open his eyes.  He does not follow commands.  He withdraws all 4 extremities to noxious stimuli.  He did undergo craniectomy yesterday and debridement from infection with irrigation with vancomycin.  No document seizure-like activity.      Past Medical History:   Diagnosis Date   • Brain tumor (HCC)    • Depression    • Hearing loss    • History of cellulitis     right foot big toe   • Hypertension    • Pneumonia    • Right arm weakness     after cervial injury   • Sciatic pain     affects balance   • Thyroid disease    • Visual disturbance     due to brain tumor - right eye     Past Surgical History:   Procedure Laterality Date   • CATARACT EXTRACTION, BILATERAL     • COLONOSCOPY     • CRANIOTOMY FOR TUMOR Right 5/10/2022    Procedure: CRANIOTOMY FOR TUMOR STERIOTACTIC WITH BRAIN LAB, right;  Surgeon: Martell Downs MD;  Location: [facility] OR;  Service: Neurosurgery;  Laterality: Right;   • ENDOSCOPY W/ PEG TUBE PLACEMENT N/A 6/2/2022    Procedure: ESOPHAGOGASTRODUODENOSCOPY WITH PERCUTANEOUS ENDOSCOPIC GASTROSTOMY TUBE INSERTION WITH ANESTHESIA;  Surgeon: Melecio Lu MD;  Location: [facility] OR;  Service: Gastroenterology;  Laterality: N/A;   • NECK SURGERY     • SKIN CANCER EXCISION     • TONSILLECTOMY     • TRACHEOSTOMY N/A 6/2/2022    Procedure: TRACHEOSTOMY;  Surgeon: Geovany Davidson MD;  Location: [facility] OR;  Service: ENT;  Laterality: N/A;   •  SHUNT INSERTION Right 6/3/2022    Procedure: VENTRICULAR PERITONEAL SHUNT INSERTION WITH BRAIN LAB;  Surgeon: Martell Downs MD;  Location: [facility] OR;  Service: Neurosurgery;  Laterality: Right;     Family History   Problem Relation Age of Onset    • Cancer Sister    • Cancer Brother      Social History     Tobacco Use   • Smoking status: Never Smoker   • Smokeless tobacco: Never Used   Vaping Use   • Vaping Use: Never used   Substance Use Topics   • Alcohol use: Never   • Drug use: Never       Medications:  Current Facility-Administered Medications   Medication Dose Route Frequency Provider Last Rate Last Admin   • acetaminophen (TYLENOL) tablet 650 mg  650 mg Oral Q4H PRN Martell Downs MD   650 mg at 06/04/22 2029    Or   • acetaminophen (TYLENOL) suppository 650 mg  650 mg Rectal Q4H PRN Martell Downs MD   650 mg at 05/23/22 0016   • alteplase (CATHFLO/ACTIVASE) injection 2 mg  2 mg Intracatheter PRN Martell Downs MD   New Syringe/Cartridge at 05/28/22 1330   • bisacodyl (DULCOLAX) suppository 10 mg  10 mg Rectal Daily PRN Martell Downs MD   10 mg at 06/03/22 0749   • carvedilol (COREG) tablet 6.25 mg  6.25 mg Nasogastric BID With Meals Martell Downs MD   6.25 mg at 06/11/22 0808   • cefepime (MAXIPIME) 2 g/100 mL 0.9% NS (mbp)  2 g Intravenous Q8H Martell Downs MD       • chlorhexidine (PERIDEX) 0.12 % solution 15 mL  15 mL Mouth/Throat Q12H Martell Downs MD   15 mL at 06/11/22 0804   • dexamethasone (DECADRON) injection 4 mg  4 mg Intravenous Q6H Martell Downs MD   4 mg at 06/11/22 0804   • dextrose (D50W) (25 g/50 mL) IV injection 25 g  25 g Intravenous Q15 Min PRN Martell Downs MD   25 g at 06/09/22 1807   • dextrose (GLUTOSE) oral gel 15 g  15 g Oral Q15 Min PRN Martell Downs MD   15 g at 05/22/22 0527   • fosphenytoin (Cerebyx) 275 mg PE in sodium chloride 0.9 % 100 mL IVPB  275 mg PE Intravenous Q8H Martell Downs MD   275 mg PE at 06/11/22 0513   • glucagon (human recombinant) (GLUCAGEN DIAGNOSTIC) injection 1 mg  1 mg Intramuscular Q15 Min PRN Martell Downs MD       • heparin (porcine) 5000 UNIT/ML injection 5,000 Units  5,000  Units Subcutaneous Q12H Martell Downs MD   5,000 Units at 06/11/22 0803   • Hold Medication (Anticoagulation)  1 each Does not apply Continuous PRN Martell Downs MD       • hydrALAZINE (APRESOLINE) injection 10 mg  10 mg Intravenous Q6H PRN Martell Downs MD   10 mg at 06/11/22 1204   • insulin detemir (LEVEMIR) injection 20 Units  20 Units Subcutaneous Nightly Martell Downs MD   20 Units at 06/10/22 2059   • insulin regular (humuLIN R,novoLIN R) injection 2-7 Units  2-7 Units Subcutaneous Q6H Martell Downs MD   2 Units at 06/11/22 0512   • insulin regular (humuLIN R,novoLIN R) injection 8 Units  8 Units Subcutaneous Q6H Martell Downs MD   8 Units at 06/09/22 1310   • ipratropium-albuterol (DUO-NEB) nebulizer solution 3 mL  3 mL Nebulization Q4H PRN Martell Downs MD   3 mL at 05/25/22 1007   • labetalol (NORMODYNE,TRANDATE) injection 10 mg  10 mg Intravenous Q6H PRN Martell Downs MD   10 mg at 06/08/22 0111   • lacosamide (VIMPAT) injection 200 mg  200 mg Intravenous Q12H Martell Downs MD   200 mg at 06/11/22 0803   • [START ON 6/12/2022] lansoprazole (FIRST) oral suspension 30 mg  30 mg Per G Tube QAM Martell Downs MD       • levalbuterol (XOPENEX) nebulizer solution 0.63 mg  0.63 mg Nebulization TID PRN Martell Downs MD       • levETIRAcetam in NaCl 0.75% (KEPPRA) IVPB 1,000 mg  1,000 mg Intravenous Q12H Martell Downs MD   1,000 mg at 06/11/22 0804   • levothyroxine (SYNTHROID, LEVOTHROID) tablet 125 mcg  125 mcg Nasogastric Q AM Martell Downs MD   125 mcg at 06/11/22 0513   • lidocaine (XYLOCAINE) 1 % injection 10 mL  10 mL Intradermal Once Titsworth, Martell Eliu, MD       • liothyronine (CYTOMEL) tablet 25 mcg  25 mcg Nasogastric Daily Martell Downs MD   25 mcg at 06/11/22 0803   • lisinopril (PRINIVIL,ZESTRIL) tablet 20 mg  20 mg Nasogastric Q24H Martell Downs MD    20 mg at 06/11/22 1204   • LORazepam (ATIVAN) injection 2 mg  2 mg Intravenous Q2H PRN Martell Downs MD       • mupirocin (BACTROBAN) 2 % ointment 1 application  1 application Topical Q12H Martell Downs MD   1 application at 06/11/22 0804   • norepinephrine (LEVOPHED) 8 mg in 250 mL NS infusion (premix)  0.02-0.3 mcg/kg/min Intravenous Titrated Martell Downs MD   Held at 06/11/22 1054   • Nutrisource fiber pack 1 packet  1 packet Nasogastric 4x Daily Martell Downs MD   1 packet at 06/11/22 0803   • ondansetron (ZOFRAN) tablet 4 mg  4 mg Oral Q6H PRN Martell Downs MD        Or   • ondansetron (ZOFRAN) injection 4 mg  4 mg Intravenous Q6H PRN Martell Downs MD       • polyethylene glycol (MIRALAX) packet 17 g  17 g Oral Daily Martell Downs MD   17 g at 06/11/22 0803   • ProSource TF oral liquid 45 mL  45 mL Per G Tube BID Martell Downs MD   45 mL at 06/11/22 0804   • sodium chloride 3 % nebulizer solution 4 mL  4 mL Nebulization BID - RT Martell Downs MD   4 mL at 06/11/22 0623   • vancomycin 1500 mg/500 mL 0.9% NS IVPB ([facility])  15 mg/kg Intravenous Q12H Martell Downs MD   1,500 mg at 06/11/22 1154       Allergies:    Patient has no known allergies.    Review of Systems:   -A 14 point review of systems is completed and is negative.      Objective      Vital Signs  Temp:  [97.4 °F (36.3 °C)-98.9 °F (37.2 °C)] 98.6 °F (37 °C)  Heart Rate:  [55-71] 58  Resp:  [20-31] 30  BP: ()/(48-73) 178/60  Arterial Line BP: ()/(34-81) 127/45  FiO2 (%):  [35 %-70 %] 35 %    Physical Exam:     HEENT:  Neck supple.  Tracheostomy in place. Purulent drainage around trach  CVS:  Regular rate and rhythm.  No murmurs  Carotid Examination:  No bruits  Lungs:  Clear to auscultation  Abdomen:  Non-tender, Non-distended.  PEG tube in place  Extremities:  No signs of peripheral edema     Neurologic Exam:   Eyes open with any repeat  stimulation.    Pupils are equally reactive to light.    Blinks to threat bilaterally.    Gag intact.     Motor: (strength out of 5:  1= minimal movement, 2 = movement in plane of gravity, 3 = movement against gravity, 4 = movement against some resistance, 5 = full strength)     Facial grimacing to noxious stimulation but no signs of spontaneous movement of the extremities currently.  Did not follow commands for testing .  Withdraws all 4 extremities to noxious stimuli.     DTR:  2+ throughout in all four extremities  upgoing toe on left      Coordination/Gait:  -No tremors     Results Review:    I reviewed the patient's new clinical results and findings.    Lab Results (last 24 hours)     Procedure Component Value Units Date/Time    AFB Culture - Tissue, Brain [[id number]] Collected: 06/10/22 1326    Specimen: Tissue from Brain Updated: 06/11/22 1134     AFB Stain No acid fast bacilli seen on direct smear    AFB Culture - Tissue, Brain [[id number]] Collected: 06/10/22 1524    Specimen: Tissue from Brain Updated: 06/11/22 1133     AFB Stain No acid fast bacilli seen on direct smear    Encephalitis CSF - Cerebrospinal Fluid, Lumbar Puncture [[id number]] Collected: 06/10/22 0729    Specimen: Cerebrospinal Fluid from Lumbar Puncture Updated: 06/11/22 1133    AFB Culture - Tissue, Brain, Cerebral Cortex [[id number]] Collected: 06/10/22 1327    Specimen: Tissue from Brain, Cerebral Cortex Updated: 06/11/22 1129     AFB Stain No acid fast bacilli seen on direct smear    C-reactive Protein [[id number]]  (Abnormal) Collected: 06/11/22 0913    Specimen: Blood, Arterial Line Updated: 06/11/22 0943     C-Reactive Protein 17.25 mg/dL     Tissue / Bone Culture - Tissue, Brain [[id number]] Collected: 06/10/22 1326    Specimen: Tissue from Brain Updated: 06/11/22 0709     Gram Stain Rare (1+) WBCs per low power field      No organisms seen    POC Glucose Once [[id number]]  (Abnormal) Collected: 06/11/22 0510    Specimen: Blood  Updated: 06/11/22 0521     Glucose 185 mg/dL      Comment: : [id number] Iron Szymanski ID: [id number]       Phenytoin Level, Total [[id number]]  (Normal) Collected: 06/11/22 0356    Specimen: Blood Updated: 06/11/22 0433     Phenytoin Level 17.6 mcg/mL     Comprehensive Metabolic Panel [[id number]]  (Abnormal) Collected: 06/11/22 0356    Specimen: Blood Updated: 06/11/22 0431     Glucose 148 mg/dL      BUN 29 mg/dL      Creatinine 0.67 mg/dL      Sodium 137 mmol/L      Potassium 4.3 mmol/L      Chloride 102 mmol/L      CO2 28.0 mmol/L      Calcium 8.4 mg/dL      Total Protein 5.9 g/dL      Albumin 2.50 g/dL      ALT (SGPT) 38 U/L      AST (SGOT) 48 U/L      Alkaline Phosphatase 161 U/L      Total Bilirubin 0.5 mg/dL      Globulin 3.4 gm/dL      A/G Ratio 0.7 g/dL      BUN/Creatinine Ratio 43.3     Anion Gap 7.0 mmol/L      eGFR 96.2 mL/min/1.73      Comment: National Kidney Foundation and American Society of Nephrology (ASN) Task Force recommended calculation based on the Chronic Kidney Disease Epidemiology Collaboration (CKD-EPI) equation refit without adjustment for race.       Narrative:      GFR Normal >60  Chronic Kidney Disease <60  Kidney Failure <15      CBC & Differential [[id number]]  (Abnormal) Collected: 06/11/22 0356    Specimen: Blood Updated: 06/11/22 0414    Narrative:      The following orders were created for panel order CBC & Differential.  Procedure                               Abnormality         Status                     ---------                               -----------         ------                     CBC Auto Differential[[id number]]        Abnormal            Final result                 Please view results for these tests on the individual orders.    CBC Auto Differential [[id number]]  (Abnormal) Collected: 06/11/22 0356    Specimen: Blood Updated: 06/11/22 0414     WBC 14.13 10*3/mm3      RBC 2.24 10*6/mm3      Hemoglobin 7.2 g/dL      Hematocrit 23.2 %      .6 fL       MCH 32.1 pg      MCHC 31.0 g/dL      RDW 16.8 %      RDW-SD 58.4 fl      MPV 9.6 fL      Platelets 171 10*3/mm3      Neutrophil % 81.3 %      Lymphocyte % 6.2 %      Monocyte % 9.8 %      Eosinophil % 1.0 %      Basophil % 0.4 %      Immature Grans % 1.3 %      Neutrophils, Absolute 11.48 10*3/mm3      Lymphocytes, Absolute 0.88 10*3/mm3      Monocytes, Absolute 1.38 10*3/mm3      Eosinophils, Absolute 0.14 10*3/mm3      Basophils, Absolute 0.06 10*3/mm3      Immature Grans, Absolute 0.19 10*3/mm3      nRBC 0.0 /100 WBC     Blood Gas, Arterial - [[id number]]  (Abnormal) Collected: 06/11/22 0401    Specimen: Arterial Blood Updated: 06/11/22 0356     Site Arterial Line     Denzel's Test N/A     pH, Arterial 7.436 pH units      pCO2, Arterial 45.1 mm Hg      Comment: 83 Value above reference range        pO2, Arterial 176.0 mm Hg      Comment: 83 Value above reference range        HCO3, Arterial 30.3 mmol/L      Comment: 83 Value above reference range        Base Excess, Arterial 5.6 mmol/L      Comment: 83 Value above reference range        O2 Saturation, Arterial >100.1 %      Comment: 93 Value above reportable range > 100.1        Temperature 37.0 C      Barometric Pressure for Blood Gas 746 mmHg      Modality Ventilator     FIO2 60 %      Ventilator Mode PC     Set [facility] Resp Rate 16.0     PEEP 5.0     PIP 12 cmH2O      Comment: Meter: [id number]     :  JESSICA        Collected by [id number]     pCO2, Temperature Corrected 45.1 mm Hg      pH, Temp Corrected 7.436 pH Units      pO2, Temperature Corrected 176 mm Hg     POC Glucose Once [[id number]]  (Abnormal) Collected: 06/10/22 2315    Specimen: Blood Updated: 06/10/22 2328     Glucose 156 mg/dL      Comment: : [id number] Iron Szymanski ID: [id number]       POC Glucose Once [[id number]]  (Abnormal) Collected: 06/10/22 2058    Specimen: Blood Updated: 06/10/22 2113     Glucose 144 mg/dL      Comment: : [id number] Britney Flor ID:  [id number]       POC Glucose Once [[id number]]  (Abnormal) Collected: 06/10/22 1857    Specimen: Blood Updated: 06/10/22 1908     Glucose 166 mg/dL      Comment: : [id number] Rip XieMeter ID: [id number]       Respiratory Culture - Aspirate, Bronchus [[id number]] Collected: 06/10/22 1643    Specimen: Aspirate from Bronchus Updated: 06/10/22 1757     Gram Stain Many (4+) WBCs per low power field      Less than 25 Epithelial cells seen      No organisms seen    Tissue / Bone Culture - Tissue, Brain [[id number]] Collected: 06/10/22 1524    Specimen: Tissue from Brain Updated: 06/10/22 1753     Gram Stain No WBCs per low power field      No organisms seen    Tissue Pathology Exam [[id number]] Collected: 06/10/22 1327    Specimen: Tissue from Brain, Cerebral Cortex; Tissue from Brain Updated: 06/10/22 1639    Fungus Culture - Tissue, Brain [[id number]] Collected: 06/10/22 1524    Specimen: Tissue from Brain Updated: 06/10/22 1621    Anaerobic Culture - Tissue, Brain [[id number]] Collected: 06/10/22 1524    Specimen: Tissue from Brain Updated: 06/10/22 1621    POC Glucose Once [[id number]]  (Abnormal) Collected: 06/10/22 1606    Specimen: Blood Updated: 06/10/22 1617     Glucose 152 mg/dL      Comment: : [id number] Edd Niko ID: [id number]       Wound Culture - Wound, Head [[id number]] Collected: 06/10/22 1343    Specimen: Wound from Head Updated: 06/10/22 1603     Gram Stain Rare (1+) WBCs seen      No organisms seen    Wound Culture - Wound, Head [[id number]] Collected: 06/10/22 1226    Specimen: Wound from Head Updated: 06/10/22 1555     Gram Stain Rare (1+) WBCs seen      No organisms seen    Wound Culture - Wound, Head [[id number]] Collected: 06/10/22 1304    Specimen: Wound from Head Updated: 06/10/22 1551     Gram Stain No WBCs or organisms seen    Tissue / Bone Culture - Tissue, Brain, Cerebral Cortex [[id number]] Collected: 06/10/22 1327    Specimen: Tissue from Brain, Cerebral Cortex Updated: 06/10/22 1547      Gram Stain Moderate (3+) WBCs seen      No organisms seen    Anaerobic Culture - Tissue, Brain, Cerebral Cortex [[id number]] Collected: 06/10/22 1327    Specimen: Tissue from Brain, Cerebral Cortex Updated: 06/10/22 1428    Fungus Culture - Tissue, Brain, Cerebral Cortex [[id number]] Collected: 06/10/22 1327    Specimen: Tissue from Brain, Cerebral Cortex Updated: 06/10/22 1428    Anaerobic Culture - Tissue, Brain [[id number]] Collected: 06/10/22 1326    Specimen: Tissue from Brain Updated: 06/10/22 1404    Fungus Culture - Tissue, Brain [[id number]] Collected: 06/10/22 1326    Specimen: Tissue from Brain Updated: 06/10/22 1404    Blood Gas, Arterial With Co-Ox [[id number]]  (Abnormal) Collected: 06/10/22 1306    Specimen: Arterial Blood Updated: 06/10/22 1259     Site Arterial Line     Denzel's Test N/A     pH, Arterial 7.436 pH units      pCO2, Arterial 43.6 mm Hg      pO2, Arterial 271.0 mm Hg      Comment: 83 Value above reference range        HCO3, Arterial 29.3 mmol/L      Comment: 83 Value above reference range        Base Excess, Arterial 4.6 mmol/L      Comment: 83 Value above reference range        O2 Saturation, Arterial 100.0 %      Comment: 83 Value above reference range        Hemoglobin, Blood Gas 7.0 g/dL      Comment: 84 Value below reference range        Hematocrit, Blood Gas 21.5 %      Comment: 84 Value below reference range        Oxyhemoglobin 98.1 %      Methemoglobin 1.00 %      Carboxyhemoglobin 0.9 %      A-a DO2 387.2 mmHg      Temperature 36.8 C      Sodium, Arterial 138 mmol/L      Potassium, Arterial 3.9 mmol/L      Ionized Calcium 4.74 mg/dL      Barometric Pressure for Blood Gas 749 mmHg      Modality Ventilator     FIO2 100 %      Ventilator Mode NA     Note --     Collected by SURGERY     Comment: Meter: [id number]     :  [id number]        pH, Temp Corrected 7.439 pH Units      pCO2, Temperature Corrected 43.2 mm Hg      pO2, Temperature Corrected 270 mm Hg     Culture,  CSF - Cerebrospinal Fluid,  Shunt Aspirate [[id number]] Collected: 06/10/22 0729    Specimen: Cerebrospinal Fluid from  Shunt Aspirate Updated: 06/10/22 1228     Gram Stain Moderate (3+) WBCs seen      No organisms seen          Imaging Results (Last 24 Hours)     Procedure Component Value Units Date/Time    XR Chest 1 View [[id number]] Collected: 06/11/22 0819     Updated: 06/11/22 0823    Narrative:      EXAMINATION: XR CHEST 1 VW-     6/11/2022 4:24 AM CDT     HISTORY: On vent; R13.10-Dysphagia, unspecified; Z01.818-Encounter for  other preprocedural examination; D32.9-Benign neoplasm of meninges,  unspecified; Z74.09-Other reduced mobility; Z78.9-Other specified health  status; G40.901-Epilepsy, unspecified, not intractable, with status  epilepticus; J96.01-Acute respiratory failure with hypoxia;  G91.0-Communicating hydrocephalus; T81.40XA-Infection following a pr     1 view chest x-ray.  Comparison is made with yesterday's 3:12 AM.     Stable heart size.  Persistent left lower lobe consolidation.  Tracheostomy tube remains in place.     The left upper lobe and right lung are essentially clear.     No pneumothorax.     Right-sided  shunt tubing again seen.     Summary:  1. No significant change.  This report was finalized on 06/11/2022 08:20 by Dr. Tomer Whelan MD.    CT Head Without Contrast [[id number]] Collected: 06/11/22 0815     Updated: 06/11/22 0822    Narrative:      EXAMINATION: CT HEAD WO CONTRAST-      6/11/2022 7:04 AM CDT     HISTORY: Craniotomy, post-op; R13.10-Dysphagia, unspecified;  Z01.818-Encounter for other preprocedural examination; D32.9-Benign  neoplasm of meninges, unspecified; Z74.09-Other reduced mobility;  Z78.9-Other specified health status; G40.901-Epilepsy, unspecified, not  intractable, with status epilepticus; J96.01-Acute respiratory failure  with hypoxia; G91.0-Communicating hydrocephalus; T81.40XA-Infection fo     In order to have a CT radiation dose as low as reasonably  achievable  Automated Exposure Control was utilized for adjustment of the mA and/or  KV according to patient size.     DLP in mGycm= 1745.     Noncontrast head CT.  Axial, sagittal, and coronal sequences.     Comparison is made with a noncontrast head CT from June 6, 2022.     Interval surgery with right frontal craniectomy.  Extra-axial 2 cm focus of acute hemorrhage at the right frontal surgical  site.  Underlying right frontal lobe edema extending to the margin of the  frontal horn of the right lateral ventricle.  Right parietal intraventricular drain now present.  Small amount of dependent intraventricular hemorrhage is unchanged.     Ventricle size is stable or minimally decreased.  No midline shift.     Summary:  1. Repeat surgery with right frontal craniectomy. Focal acute hemorrhage  at the operative site.  2. Ventricular drain placement.  3. Stable or slightly decreased ventricle size.  4. No midline shift.                                   This report was finalized on 06/11/2022 08:19 by Dr. Tomer Whelan MD.          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)    Status epilepticus (HCC)    Essential hypertension    Type 2 diabetes mellitus with hyperglycemia, without long-term current use of insulin (HCC)    Hypothyroidism (acquired)    Acute respiratory failure (secondary to status epilepticus)    Thrombocytopenia (HCC)    Cerebral parenchymal hemorrhage (HCC)    PAF (paroxysmal atrial fibrillation) (HCC)    Fever    Loculated pleural effusion    Acute deep vein thrombosis (DVT) of the ulnar and brachial veins of right upper extremity (HCC)    Dysphagia    Communicating hydrocephalus (HCC)     Impression:    · Postoperative seizure  · S/p craniectomy and debridement yesterday  · Hypoalbuminemia   · S/p  shunt on 6/3/2022  · Tracheostomy  · PEG tube       Plan:  · Patient seen via telemedicine with Dr. Mendez  today.  · Fosphenytoin 275 mg every 8 hours  ? Continue daily Dilantin levels  ? Currently therapeutic and most recent EEG 6/5/2022 showed no signs of subclinical seizure activity  · On Vimpat 200 mg IV every 12 hours  · Begin to wean and DC Keppra  · We will then look at reducing additional antiepileptics as patient sometimes have difficulty waking up after they have been placed on multiple antiepileptics in rapid succession to suppress initial seizure activity.  We will continue observe closely for any breakthrough seizure.              Zac Mccoy PA-C  06/11/22  12:08 CDT

## 2022-06-11 NOTE — PROGRESS NOTES
PULMONARY AND CRITICAL CARE PROGRESS NOTE - [facility]    Patient: Hai Hernandez    1945    MR# [id number]    Acct# [id number]  06/11/22   10:06 CDT  Referring Provider: Martell Downs, *    Chief Complaint: Mechanically ventilated    Interval history: He is afebrile.  He is on no sedation.  No issues with surgery yesterday per nursing.  CIERA drain in place.  He is receiving nutrition via tube feeds.  Labs and x-ray stable.  Hemoglobin 7.2.  ABG showing adequate oxygenation.  FiO2 reduced to 0.35 at bedside.  He remains in pressure control.  Tidal volumes 490 or greater.  He did grimace during exam.  Not following commands.  Developed atrial fibrillation overnight.  Current rate 60.  Albuterol discontinued.  Xopenex ordered as needed.  No visitors present.    Meds:  carvedilol, 6.25 mg, Nasogastric, BID With Meals  cefepime, 2 g, Intravenous, Q8H  chlorhexidine, 15 mL, Mouth/Throat, Q12H  dexamethasone, 4 mg, Intravenous, Q6H  fosphenytoin, 275 mg PE, Intravenous, Q8H  heparin (porcine), 5,000 Units, Subcutaneous, Q12H  insulin detemir, 20 Units, Subcutaneous, Nightly  insulin regular, 2-7 Units, Subcutaneous, Q6H  insulin regular, 8 Units, Subcutaneous, Q6H  lacosamide, 200 mg, Intravenous, Q12H  [START ON 6/12/2022] lansoprazole, 30 mg, Per G Tube, QAM  levETIRAcetam, 1,000 mg, Intravenous, Q12H  levothyroxine, 125 mcg, Nasogastric, Q AM  lidocaine, 10 mL, Intradermal, Once  liothyronine, 25 mcg, Nasogastric, Daily  lisinopril, 20 mg, Nasogastric, Q24H  mupirocin, 1 application, Topical, Q12H  Nutrisource fiber, 1 packet, Nasogastric, 4x Daily  polyethylene glycol, 17 g, Oral, Daily  ProSource TF, 45 mL, Per G Tube, BID  sodium chloride, 4 mL, Nebulization, BID - RT  vancomycin, 15 mg/kg, Intravenous, Q12H      hold, 1 each  norepinephrine, 0.02-0.3 mcg/kg/min      Review of Systems:     Cannot obtain due to mechanical ventilated state     Ventilator Settings:        Resp Rate (Set):  16  Pressure Support (cm H2O): 8 cm H20  FiO2 (%): 45 %  PEEP/CPAP (cm H2O): 5 cm H20  Minute Ventilation (L/min) (Obs): 13.4 L/min  Resp Rate (Observed) Vent: 24  I:E Ratio (Set): 1:0.27  I:E Ratio (Obs): 1:1.90  PIP Observed (cm H2O): 18 cm H2O  RSBI: 21.96  Physical Exam:  Temp:  [97.4 °F (36.3 °C)-98.9 °F (37.2 °C)] 98.6 °F (37 °C)  Heart Rate:  [56-71] 56  Resp:  [20-31] 26  BP: ()/(48-73) 111/69  Arterial Line BP: ()/(34-81) 127/45  FiO2 (%):  [45 %-70 %] 45 %  Intake/Output Summary (Last 24 hours) at 6/11/2022 1006  Last data filed at 6/11/2022 0800  Gross per 24 hour   Intake 4496 ml   Output 3771 ml   Net 725 ml     SpO2 Percentage    06/11/22 0915 06/11/22 0930 06/11/22 0945   SpO2: 93% 95% 95%   Body mass index is 31.74 kg/m².   Physical Exam     Constitutional:       General: He is intubated     Appearance: He is ill-appearing. He is not toxic-appearing.      Interventions: He is intubated, no sedation  HENT:      Head: Normocephalic.      Comments: Craniotomy wound with CIERA drain, trach to vent     Nose: Nose normal.   Eyes:      General: No scleral icterus.  Cardiovascular:  Irregular rhythm, normal rate  Pulmonary:      Effort: No accessory muscle usage or respiratory distress. He is intubated.      Breath sounds: few rhonchi    Abdominal:      General: There is no distension.      Palpations: Abdomen is soft. Hyperactive bowel sounds  Genitourinary:     Comments: not examined   Musculoskeletal:      Right lower leg: Edema present.      Left lower leg: Edema present.      Comments: SCDs   Skin:     Findings: No rash.   Neurological:      Motor: opens eyes/grimaces to pain   Psychiatric:         Behavior: Behavior is not agitated.     Electronically signed by MARIO Weeks, 6/11/2022, 10:06 CDT      Physician Substantive Portion: Medical Decision Making    Laboratory Data:  Results from last 7 days   Lab Units 06/11/22  0356 06/10/22  0351 06/09/22  0445   WBC 10*3/mm3 14.13* 12.23*  11.64*   HEMOGLOBIN g/dL 7.2* 8.2* 8.4*   PLATELETS 10*3/mm3 171 213 236     Results from last 7 days   Lab Units 06/11/22  0356 06/10/22  1306 06/10/22  0351 06/09/22  0445   SODIUM mmol/L 137  --  133* 136   SODIUM, ARTERIAL mmol/L  --  138  --   --    POTASSIUM mmol/L 4.3  --  4.3 4.2   BUN mg/dL 29*  --  30* 31*   CREATININE mg/dL 0.67*  --  0.52* 0.49*     Results from last 7 days   Lab Units 06/11/22  0401 06/10/22  1306 06/10/22  0501   PH, ARTERIAL pH units 7.436 7.436 7.493*   PCO2, ARTERIAL mm Hg 45.1* 43.6 39.7   PO2 ART mm Hg 176.0* 271.0* 75.1*   FIO2 % 60 100 30     No results found for: BLOODCX, URINECX, WOUNDCX, MRSACX, RESPCX, STOOLCX  Recent films:  CT Head Without Contrast    Result Date: 6/11/2022  EXAMINATION: CT HEAD WO CONTRAST-   6/11/2022 7:04 AM CDT  HISTORY: Craniotomy, post-op; R13.10-Dysphagia, unspecified; Z01.818-Encounter for other preprocedural examination; D32.9-Benign neoplasm of meninges, unspecified; Z74.09-Other reduced mobility; Z78.9-Other specified health status; G40.901-Epilepsy, unspecified, not intractable, with status epilepticus; J96.01-Acute respiratory failure with hypoxia; G91.0-Communicating hydrocephalus; T81.40XA-Infection fo  In order to have a CT radiation dose as low as reasonably achievable Automated Exposure Control was utilized for adjustment of the mA and/or KV according to patient size.  DLP in mGycm= 1745.  Noncontrast head CT. Axial, sagittal, and coronal sequences.  Comparison is made with a noncontrast head CT from June 6, 2022.  Interval surgery with right frontal craniectomy. Extra-axial 2 cm focus of acute hemorrhage at the right frontal surgical site. Underlying right frontal lobe edema extending to the margin of the frontal horn of the right lateral ventricle. Right parietal intraventricular drain now present. Small amount of dependent intraventricular hemorrhage is unchanged.  Ventricle size is stable or minimally decreased. No midline shift.   Summary: 1. Repeat surgery with right frontal craniectomy. Focal acute hemorrhage at the operative site. 2. Ventricular drain placement. 3. Stable or slightly decreased ventricle size. 4. No midline shift.            This report was finalized on 06/11/2022 08:19 by Dr. Tomer Whelan MD.    MRI Brain Without Contrast    Result Date: 6/9/2022  EXAMINATION:  MRI BRAIN WO CONTRAST-  6/9/2022 3:00 PM CDT  HISTORY: Altered mental status.; D32.9-Benign neoplasm of meninges, unspecified; Z74.09-Other reduced mobility; Z78.9-Other specified health status; G40.901-Epilepsy, unspecified, not intractable, with status epilepticus; J96.01-Acute respiratory failure with hypoxia; G91.0-Communicating hydrocephalus.  TECHNIQUE: Multiplanar imaging was performed in a high field magnet.  COMPARISON: 5/21/2022.  FINDINGS: There has been prior craniotomy for meningioma resection. There are residual blood products along the inferior aspect of the right frontal lobe measuring about 2.6 cm transversely. There is residual subdural hemorrhage lateral to the right frontal lobe. There is new T2 high signal seen best on the FLAIR sequences in the inferolateral right frontal lobe and involving the anterior tip of the right temporal lobe. The hemorrhagic byproducts demonstrates increased signal on the gradient echo sequence. There is decreased signal on the ADC map images which is likely related to blood product susceptibility. There is mild mass effect in the inferior aspect of the left frontal lobe with mild shifting of the midline structures to the left. This causes mass effect in the region of the optic nerve on the right. There is mild to moderate dilatation of the lateral and third ventricles, stable.      Impression: 1. Residual blood products along the inferior aspect of the right frontal lobe measuring about 2.6 cm transversely. This causes some continued mass effect along the inferior aspect of the right frontal lobe with mild shifting  of structures to the left. 2. Residual subdural blood products in the right frontal region laterally. 3. New areas of T2 high signal along the inferolateral right frontal lobe and along the anterior tip of the right temporal lobe are nonspecific. This likely represents edema. Infection is considered less likely given the absence of diffusion signal abnormality and diffusion restriction. Correlate clinically.   This report was finalized on 06/09/2022 17:20 by Dr. Zachariah Fonseca MD.    XR Chest 1 View    Result Date: 6/11/2022  EXAMINATION: XR CHEST 1 VW-  6/11/2022 4:24 AM CDT  HISTORY: On vent; R13.10-Dysphagia, unspecified; Z01.818-Encounter for other preprocedural examination; D32.9-Benign neoplasm of meninges, unspecified; Z74.09-Other reduced mobility; Z78.9-Other specified health status; G40.901-Epilepsy, unspecified, not intractable, with status epilepticus; J96.01-Acute respiratory failure with hypoxia; G91.0-Communicating hydrocephalus; T81.40XA-Infection following a pr  1 view chest x-ray. Comparison is made with yesterday's 3:12 AM.  Stable heart size. Persistent left lower lobe consolidation. Tracheostomy tube remains in place.  The left upper lobe and right lung are essentially clear.  No pneumothorax.  Right-sided  shunt tubing again seen.  Summary: 1. No significant change. This report was finalized on 06/11/2022 08:20 by Dr. Tomer Whelan MD.    XR Chest 1 View    Result Date: 6/10/2022  EXAMINATION: XR CHEST 1 VW-  6/10/2022 3:25 AM CDT  HISTORY: On vent; R13.10-Dysphagia, unspecified; Z01.818-Encounter for other preprocedural examination; D32.9-Benign neoplasm of meninges, unspecified; Z74.09-Other reduced mobility; Z78.9-Other specified health status; G40.901-Epilepsy, unspecified, not intractable, with status epilepticus; J96.01-Acute respiratory failure with hypoxia; G91.0-Communicating hydrocephalus   A frontal projection of the chest is compared with the previous study dated 6/9/2022.  The  lungs are poorly expanded.  There is left lower lung consolidation and probable left basal pleural effusion. No change.  There are multiple partially calcified small nodules/granulomas in both lungs. No change.  There are no pulmonary congestion or pneumothorax.  The heart size is not optimally evaluated due to the AP projection.  Tracheostomy tube is in place. No change.  A small caliber catheter seen along the right side of the neck and chest which may represent a ventriculoperitoneal shunt?.  No acute bony abnormality.      Impression: 1. Left lower lung consolidation and left basilar pleural effusion. 2. Chronic granulomatous lung disease. This report was finalized on 06/10/2022 07:25 by Dr. Aida Wong MD.     My radiograph interpretation/independent review of imaging: shunt, trache tube ok, layering right pleural effusion    Pulmonary Assessment:    1. Acute resp failure requiring vent  2. S/p repeat craniotomy, CIERA drain, removal of plate  3. LLL consolidation/atelectasis    Recommend/plan:   · Continue abx cefepime and vancomycin, empiric for presumed LLL pneumonia  · Continue postop neurosurg care  · Titrate down fio2; continue vent as is otherwise  · Continue nutrition    This visit was performed by both a physician and an [facility] RN.  I personally evaluated and examined the patient.  I performed all aspects of the medical decision making as documented.  Electronically signed by Carlos A Dasilva MD, 6/11/2022, 10:16 CDT

## 2022-06-11 NOTE — PROGRESS NOTES
NEUROSURGERY DAILY PROGRESS NOTE    ASSESSMENT:   Hai Hernandez is a 77 y.o. with a significant comorbidity of acephalic migraines, thyroid disease status post radiation as a child, basal cell and squamous cell carcinoma of the skin. He presents in FU for known meningioma found on workup for right visual field changes. Physical exam findings of neurologically intact with resolution of all symptoms and mild decreased vision in right eye.  His imaging shows 22 x 24 x 13.5 mm right planum sphenoidale mass most suggestive of meningioma.    Past Medical History:   Diagnosis Date   • Brain tumor (HCC)    • Depression    • Hearing loss    • History of cellulitis     right foot big toe   • Hypertension    • Pneumonia    • Right arm weakness     after cervial injury   • Sciatic pain     affects balance   • Thyroid disease    • Visual disturbance     due to brain tumor - right eye     Active Hospital Problems    Diagnosis    • **Meningioma (HCC)    • Acute deep vein thrombosis (DVT) of the ulnar and brachial veins of right upper extremity (HCC)    • Loculated pleural effusion    • Fever    • PAF (paroxysmal atrial fibrillation) (HCC)    • Cerebral parenchymal hemorrhage (HCC)    • Essential hypertension    • Type 2 diabetes mellitus with hyperglycemia, without long-term current use of insulin (HCC)    • Hypothyroidism (acquired)    • Acute respiratory failure (secondary to status epilepticus)    • Thrombocytopenia (HCC)    • 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  Does not follow commands.  Nonverbal.  Weakly withdraws to tactile  "stimuli.    Intracranial meningioma   POD #31 (5/10/2022) Status post postcraniotomy for meningioma   POD#1 (6/10/2022) Craniectomy for cerebral edema and possible infection   CT reviewed and stable.  Small blood products at site of brain biopsy   Neurochecks per policy   Decadron off   Wound dry   Craniectomy precautions    Hydrocephalus   Lumbar drain removed   CSF unremarkable from 5/17 and 5/23 and 6/10.    S/P right posterior parietal  shunt placement   Shunt pumps and refills well    Postop seizures, in status   Neurology managing   Cont Keppra, Dialntin, Vimpat   Stat Ativan   Follow dilantin levels   Repeat EEG unremarkable     CV: Cardene off   keep SBP <160.   Hypotension resolved and off pressors   Requiring hydralazine and labetalol PRNs  Pulm: Tracheostomy in place.  Appreciate ENT   Left chest tube placed x2 CT managing with TPA  : Keep jansen for now.   FEN: Poor glucose control.  Increase SSI  ENDO: Accu-Chek and treat per policy  GI: PEG tube placement today.  Appreciate GI  ID: Afebrile overnight,    Mild Leukocytosis   Repeat CRP today   Broad spectrum abx, Vanc and Cefepime   CSF cultures pending, NGTD  Heme:  DVT prophylaxis with SCD's.     Plt up 193 and HIT ab negative.     RUE clot.  Leave PICC for now.  Can not anticoagulate  Pain: NA  Dispo: DC pending seizure control   Pending extubation    CHIEF COMPLAINT:   Right visual field changes    Subjective  Symptom stable    Temp:  [97.4 °F (36.3 °C)-98.9 °F (37.2 °C)] 98.8 °F (37.1 °C)  Heart Rate:  [57-71] 61  Resp:  [20-31] 27  BP: ()/(48-78) 115/64  Arterial Line BP: ()/(34-56) 142/47  FiO2 (%):  [45 %-70 %] 45 %    Objective:  General Appearance:  Well-appearing and in no acute distress.    Vital signs: (most recent): Blood pressure 115/64, pulse 61, temperature 98.8 °F (37.1 °C), temperature source Axillary, resp. rate 27, height 182.9 cm (72\"), weight 106 kg (234 lb), SpO2 100 %.      Neurologic Exam     Mental Status "   Level of consciousness: arousable by verbal stimuli ,  drowsy  Off propofol.  Intermittently opens eyes to painful stimuli.  Does not follow commands.  Nonverbal.  Weakly withdraws to tactile stimuli.           Cranial Nerves     CN III, IV, VI   Pupils are equal, round, and reactive to light.  Extraocular motions are normal.     CN IX, X   CN IX normal.     Gait, Coordination, and Reflexes     Tremor   Resting tremor: absent  Intention tremor: absent  Action tremor: absent    Drains:   Urethral Catheter Non-latex;Silicone 16 Fr. (Active)       Output by Drain (mL) 06/10/22 0701 - 06/10/22 1900 06/10/22 1901 - 06/11/22 0700 06/11/22 0701 - 06/11/22 0819 Range Total   Closed/Suction Drain 1 Right Scalp Bulb  18  18       Imaging Results (Last 24 Hours)     Procedure Component Value Units Date/Time    CT Head Without Contrast [[id number]] Resulted: 06/11/22 0814     Updated: 06/11/22 0723    XR Chest 1 View [[id number]] Resulted: 06/11/22 0424     Updated: 06/11/22 0424        Lab Results (last 24 hours)     Procedure Component Value Units Date/Time    Tissue / Bone Culture - Tissue, Brain [[id number]] Collected: 06/10/22 1326    Specimen: Tissue from Brain Updated: 06/11/22 0709     Gram Stain Rare (1+) WBCs per low power field      No organisms seen    POC Glucose Once [[id number]]  (Abnormal) Collected: 06/11/22 0510    Specimen: Blood Updated: 06/11/22 0521     Glucose 185 mg/dL      Comment: : [id number] [facility] ID: [id number]       Phenytoin Level, Total [[id number]]  (Normal) Collected: 06/11/22 0356    Specimen: Blood Updated: 06/11/22 0433     Phenytoin Level 17.6 mcg/mL     Comprehensive Metabolic Panel [[id number]]  (Abnormal) Collected: 06/11/22 0356    Specimen: Blood Updated: 06/11/22 0431     Glucose 148 mg/dL      BUN 29 mg/dL      Creatinine 0.67 mg/dL      Sodium 137 mmol/L      Potassium 4.3 mmol/L      Chloride 102 mmol/L      CO2 28.0 mmol/L      Calcium 8.4 mg/dL      Total Protein  5.9 g/dL      Albumin 2.50 g/dL      ALT (SGPT) 38 U/L      AST (SGOT) 48 U/L      Alkaline Phosphatase 161 U/L      Total Bilirubin 0.5 mg/dL      Globulin 3.4 gm/dL      A/G Ratio 0.7 g/dL      BUN/Creatinine Ratio 43.3     Anion Gap 7.0 mmol/L      eGFR 96.2 mL/min/1.73      Comment: National Kidney Foundation and American Society of Nephrology (ASN) Task Force recommended calculation based on the Chronic Kidney Disease Epidemiology Collaboration (CKD-EPI) equation refit without adjustment for race.       Narrative:      GFR Normal >60  Chronic Kidney Disease <60  Kidney Failure <15      CBC & Differential [[id number]]  (Abnormal) Collected: 06/11/22 0356    Specimen: Blood Updated: 06/11/22 0414    Narrative:      The following orders were created for panel order CBC & Differential.  Procedure                               Abnormality         Status                     ---------                               -----------         ------                     CBC Auto Differential[[id number]]        Abnormal            Final result                 Please view results for these tests on the individual orders.    CBC Auto Differential [[id number]]  (Abnormal) Collected: 06/11/22 0356    Specimen: Blood Updated: 06/11/22 0414     WBC 14.13 10*3/mm3      RBC 2.24 10*6/mm3      Hemoglobin 7.2 g/dL      Hematocrit 23.2 %      .6 fL      MCH 32.1 pg      MCHC 31.0 g/dL      RDW 16.8 %      RDW-SD 58.4 fl      MPV 9.6 fL      Platelets 171 10*3/mm3      Neutrophil % 81.3 %      Lymphocyte % 6.2 %      Monocyte % 9.8 %      Eosinophil % 1.0 %      Basophil % 0.4 %      Immature Grans % 1.3 %      Neutrophils, Absolute 11.48 10*3/mm3      Lymphocytes, Absolute 0.88 10*3/mm3      Monocytes, Absolute 1.38 10*3/mm3      Eosinophils, Absolute 0.14 10*3/mm3      Basophils, Absolute 0.06 10*3/mm3      Immature Grans, Absolute 0.19 10*3/mm3      nRBC 0.0 /100 WBC     Blood Gas, Arterial - [[id number]]  (Abnormal) Collected:  06/11/22 0401    Specimen: Arterial Blood Updated: 06/11/22 0356     Site Arterial Line     Denzel's Test N/A     pH, Arterial 7.436 pH units      pCO2, Arterial 45.1 mm Hg      Comment: 83 Value above reference range        pO2, Arterial 176.0 mm Hg      Comment: 83 Value above reference range        HCO3, Arterial 30.3 mmol/L      Comment: 83 Value above reference range        Base Excess, Arterial 5.6 mmol/L      Comment: 83 Value above reference range        O2 Saturation, Arterial >100.1 %      Comment: 93 Value above reportable range > 100.1        Temperature 37.0 C      Barometric Pressure for Blood Gas 746 mmHg      Modality Ventilator     FIO2 60 %      Ventilator Mode PC     Set Mech Resp Rate 16.0     PEEP 5.0     PIP 12 cmH2O      Comment: Meter: [id number]     :  JESSICA        Collected by [id number]     pCO2, Temperature Corrected 45.1 mm Hg      pH, Temp Corrected 7.436 pH Units      pO2, Temperature Corrected 176 mm Hg     POC Glucose Once [[id number]]  (Abnormal) Collected: 06/10/22 2315    Specimen: Blood Updated: 06/10/22 2328     Glucose 156 mg/dL      Comment: : [id number] [employer] ID: [id number]       POC Glucose Once [[id number]]  (Abnormal) Collected: 06/10/22 2058    Specimen: Blood Updated: 06/10/22 2113     Glucose 144 mg/dL      Comment: : [id number] [employer]ige[employer]eter ID: [id number]       POC Glucose Once [[id number]]  (Abnormal) Collected: 06/10/22 1857    Specimen: Blood Updated: 06/10/22 1908     Glucose 166 mg/dL      Comment: : [id number] [employer] ID: [id number]       Respiratory Culture - Aspirate, Bronchus [[id number]] Collected: 06/10/22 1643    Specimen: Aspirate from Bronchus Updated: 06/10/22 1757     Gram Stain Many (4+) WBCs per low power field      Less than 25 Epithelial cells seen      No organisms seen    Tissue / Bone Culture - Tissue, Brain [[id number]] Collected: 06/10/22 1524    Specimen: Tissue from Brain Updated: 06/10/22  1753     Gram Stain No WBCs per low power field      No organisms seen    Tissue Pathology Exam [[id number]] Collected: 06/10/22 1327    Specimen: Tissue from Brain, Cerebral Cortex; Tissue from Brain Updated: 06/10/22 1639    Fungus Culture - Tissue, Brain [[id number]] Collected: 06/10/22 1524    Specimen: Tissue from Brain Updated: 06/10/22 1621    AFB Culture - Tissue, Brain [[id number]] Collected: 06/10/22 1524    Specimen: Tissue from Brain Updated: 06/10/22 1621    Anaerobic Culture - Tissue, Brain [[id number]] Collected: 06/10/22 1524    Specimen: Tissue from Brain Updated: 06/10/22 1621    POC Glucose Once [[id number]]  (Abnormal) Collected: 06/10/22 1606    Specimen: Blood Updated: 06/10/22 1617     Glucose 152 mg/dL      Comment: : [id number] Edd BethMeter ID: [id number]       Wound Culture - Wound, Head [[id number]] Collected: 06/10/22 1343    Specimen: Wound from Head Updated: 06/10/22 1603     Gram Stain Rare (1+) WBCs seen      No organisms seen    Wound Culture - Wound, Head [[id number]] Collected: 06/10/22 1226    Specimen: Wound from Head Updated: 06/10/22 1555     Gram Stain Rare (1+) WBCs seen      No organisms seen    Wound Culture - Wound, Head [[id number]] Collected: 06/10/22 1304    Specimen: Wound from Head Updated: 06/10/22 1551     Gram Stain No WBCs or organisms seen    Tissue / Bone Culture - Tissue, Brain, Cerebral Cortex [[id number]] Collected: 06/10/22 1327    Specimen: Tissue from Brain, Cerebral Cortex Updated: 06/10/22 1549     Gram Stain Moderate (3+) WBCs seen      No organisms seen    Anaerobic Culture - Tissue, Brain, Cerebral Cortex [[id number]] Collected: 06/10/22 1327    Specimen: Tissue from Brain, Cerebral Cortex Updated: 06/10/22 1428    Fungus Culture - Tissue, Brain, Cerebral Cortex [[id number]] Collected: 06/10/22 1327    Specimen: Tissue from Brain, Cerebral Cortex Updated: 06/10/22 1428    AFB Culture - Tissue, Brain, Cerebral Cortex [[id number]] Collected: 06/10/22 1324     Specimen: Tissue from Brain, Cerebral Cortex Updated: 06/10/22 1428    Anaerobic Culture - Tissue, Brain [[id number]] Collected: 06/10/22 1326    Specimen: Tissue from Brain Updated: 06/10/22 1404    Fungus Culture - Tissue, Brain [[id number]] Collected: 06/10/22 1326    Specimen: Tissue from Brain Updated: 06/10/22 1404    AFB Culture - Tissue, Brain [[id number]] Collected: 06/10/22 1326    Specimen: Tissue from Brain Updated: 06/10/22 1404    Blood Gas, Arterial With Co-Ox [[id number]]  (Abnormal) Collected: 06/10/22 1306    Specimen: Arterial Blood Updated: 06/10/22 1259     Site Arterial Line     Denzel's Test N/A     pH, Arterial 7.436 pH units      pCO2, Arterial 43.6 mm Hg      pO2, Arterial 271.0 mm Hg      Comment: 83 Value above reference range        HCO3, Arterial 29.3 mmol/L      Comment: 83 Value above reference range        Base Excess, Arterial 4.6 mmol/L      Comment: 83 Value above reference range        O2 Saturation, Arterial 100.0 %      Comment: 83 Value above reference range        Hemoglobin, Blood Gas 7.0 g/dL      Comment: 84 Value below reference range        Hematocrit, Blood Gas 21.5 %      Comment: 84 Value below reference range        Oxyhemoglobin 98.1 %      Methemoglobin 1.00 %      Carboxyhemoglobin 0.9 %      A-a DO2 387.2 mmHg      Temperature 36.8 C      Sodium, Arterial 138 mmol/L      Potassium, Arterial 3.9 mmol/L      Ionized Calcium 4.74 mg/dL      Barometric Pressure for Blood Gas 749 mmHg      Modality Ventilator     FIO2 100 %      Ventilator Mode NA     Note --     Collected by SURGERY     Comment: Meter: [id number]     :  [id number]        pH, Temp Corrected 7.439 pH Units      pCO2, Temperature Corrected 43.2 mm Hg      pO2, Temperature Corrected 270 mm Hg     Culture, CSF - Cerebrospinal Fluid,  Shunt Aspirate [[id number]] Collected: 06/10/22 0729    Specimen: Cerebrospinal Fluid from  Shunt Aspirate Updated: 06/10/22 1228     Gram Stain Moderate (3+) WBCs  seen      No organisms seen    Cell Count With Differential, CSF [[id number]]  (Abnormal) Collected: 06/10/22 0729    Specimen: Cerebrospinal Fluid from  Shunt Aspirate Updated: 06/10/22 0916    Narrative:      The following orders were created for panel order Cell Count With Differential, CSF.  Procedure                               Abnormality         Status                     ---------                               -----------         ------                     Cell Count, CSF - Cerebr...[[id number]]  Abnormal            Final result               Spinal fluid differentia...[[id number]]  Abnormal            Final result                 Please view results for these tests on the individual orders.    Spinal fluid differential - Cerebrospinal Fluid,  Shunt Aspirate [[id number]]  (Abnormal) Collected: 06/10/22 0729    Specimen: Cerebrospinal Fluid from  Shunt Aspirate Updated: 06/10/22 0916     Lymphocytes, CSF 97 %      Monocytes, CSF 3 %     COVID PRE-OP / PRE-PROCEDURE SCREENING ORDER (NO ISOLATION) - Swab, Nasal Cavity [[id number]]  (Normal) Collected: 06/10/22 0744    Specimen: Swab from Nasal Cavity Updated: 06/10/22 0857    Narrative:      The following orders were created for panel order COVID PRE-OP / PRE-PROCEDURE SCREENING ORDER (NO ISOLATION) - Swab, Nasal Cavity.  Procedure                               Abnormality         Status                     ---------                               -----------         ------                     COVID-19,[facility] IN-SARAY...[[id number]]  Normal              Final result                 Please view results for these tests on the individual orders.    COVID-19,[facility] IN-HOUSE,Nasal Swab No Transport Media 3-4 HR TAT - Swab, Nasal Cavity [[id number]]  (Normal) Collected: 06/10/22 0744    Specimen: Swab from Nasal Cavity Updated: 06/10/22 0857     COVID19 Not Detected    Narrative:      Fact sheet for providers: https://www.fda.gov/media/088517/download     Fact  sheet for patients: https://www.fda.gov/media/846434/download    Test performed by PCR.    Consider negative results in combination with clinical observations, patient history, and epidemiological information.  Fact sheet for providers: https://www.fda.gov/media/726795/download     Fact sheet for patients: https://www.fda.gov/media/366673/download    Test performed by PCR.    Consider negative results in combination with clinical observations, patient history, and epidemiological information.    Protein, CSF - Cerebrospinal Fluid,  Shunt Aspirate [[id number]]  (Abnormal) Collected: 06/10/22 0729    Specimen: Cerebrospinal Fluid from  Shunt Aspirate Updated: 06/10/22 0829     Protein, Total (CSF) 135.3 mg/dL     Glucose, CSF - Cerebrospinal Fluid,  Shunt Aspirate [[id number]]  (Abnormal) Collected: 06/10/22 0729    Specimen: Cerebrospinal Fluid from  Shunt Aspirate Updated: 06/10/22 0829     Glucose, CSF 76 mg/dL     Cell Count, CSF - Cerebrospinal Fluid,  Shunt Aspirate [[id number]]  (Abnormal) Collected: 06/10/22 0729    Specimen: Cerebrospinal Fluid from  Shunt Aspirate Updated: 06/10/22 0829     Color, CSF Yellow     Supernatant Color, CSF Xanthrochromic     Appearance, CSF Clear     RBC,  /mm3      Nucleated Cells, CSF 31 /mm3      Volume, CSF 18.0 mL      Tube Number, CSF 1     Method: Hemacytometer Method        [id number]  Martell Downs MD

## 2022-06-11 NOTE — PROGRESS NOTES
[facility] Medicine Services  INPATIENT PROGRESS NOTE    Length of Stay: 32  Date of Admission: 5/10/2022  Primary Care Physician: Elisa Chacko MD    Subjective   Chief Complaint: Respiratory failure/status post tracheotomy/status post chest tube removal    HPI   T-max 98.9.  T-current 98.6.  Blood pressure slightly elevated, arterial line stable.  FiO2 35%, heart rate is 58 status postcraniotomy yesterday repeat shunt still in place.  Culture from yesterday still pending.    Review of Systems   Unable to assess secondary to the patient's intubated and sedated state.    All pertinent negatives and positives are as above. All other systems have been reviewed and are negative unless otherwise stated.     Objective    Temp:  [97.4 °F (36.3 °C)-98.9 °F (37.2 °C)] 98.6 °F (37 °C)  Heart Rate:  [55-71] 58  Resp:  [20-31] 30  BP: ()/(48-73) 178/60  Arterial Line BP: ()/(34-81) 127/45  FiO2 (%):  [35 %-70 %] 35 %    Intake/Output Summary (Last 24 hours) at 6/11/2022 1305  Last data filed at 6/11/2022 0800  Gross per 24 hour   Intake 2996 ml   Output 3021 ml   Net -25 ml     Physical Exam  Vitals and nursing note reviewed.   HENT:      Head: Normocephalic.   Eyes:      Conjunctiva/sclera: Conjunctivae normal.      Pupils: Pupils are equal, round, and reactive to light.   Neck:      Vascular: No JVD.   Cardiovascular:      Rate and Rhythm: Normal rate and regular rhythm.      Heart sounds: Normal heart sounds.   Pulmonary:      Effort: No respiratory distress.      Breath sounds: No wheezing or rales.      Comments: Trach in place.  On the vent.  Diminished breath sound bilateral, rhonchi bilateral.  Chest:      Chest wall: No tenderness.   Abdominal:      General: Bowel sounds are normal. There is no distension.      Palpations: Abdomen is soft.      Tenderness: There is no abdominal tenderness.      Comments: Obesity .  PEG tube in place.  Musculoskeletal:          General: No tenderness or deformity.      Cervical back: Neck supple.      Right lower leg: Edema present.      Left lower leg: Edema present.      Comments: +1 .   Skin:     General: Skin is warm and dry.      Capillary Refill: Capillary refill takes 2 to 3 seconds.      Findings: No rash.   Neurological:      Mental Status: He is oriented to person, place, and time.      Cranial Nerves: No cranial nerve deficit.      Motor: Weakness present. No abnormal muscle tone.      Coordination: Coordination abnormal.      Gait: Gait abnormal.      Deep Tendon Reflexes: Reflexes normal.   Results Review:  Lab Results (last 24 hours)     Procedure Component Value Units Date/Time    POC Glucose Once [[id number]]  (Abnormal) Collected: 06/11/22 1200    Specimen: Blood Updated: 06/11/22 1217     Glucose 152 mg/dL      Comment: : [id number] Erwin PablooryMeter ID: [id number]       AFB Culture - Tissue, Brain [[id number]] Collected: 06/10/22 1326    Specimen: Tissue from Brain Updated: 06/11/22 1134     AFB Stain No acid fast bacilli seen on direct smear    AFB Culture - Tissue, Brain [[id number]] Collected: 06/10/22 1524    Specimen: Tissue from Brain Updated: 06/11/22 1133     AFB Stain No acid fast bacilli seen on direct smear    Encephalitis CSF - Cerebrospinal Fluid, Lumbar Puncture [[id number]] Collected: 06/10/22 0729    Specimen: Cerebrospinal Fluid from Lumbar Puncture Updated: 06/11/22 1133    AFB Culture - Tissue, Brain, Cerebral Cortex [[id number]] Collected: 06/10/22 1327    Specimen: Tissue from Brain, Cerebral Cortex Updated: 06/11/22 1129     AFB Stain No acid fast bacilli seen on direct smear    C-reactive Protein [[id number]]  (Abnormal) Collected: 06/11/22 0913    Specimen: Blood, Arterial Line Updated: 06/11/22 0943     C-Reactive Protein 17.25 mg/dL     Tissue / Bone Culture - Tissue, Brain [[id number]] Collected: 06/10/22 1326    Specimen: Tissue from Brain Updated: 06/11/22 0709     Gram Stain Rare (1+)  WBCs per low power field      No organisms seen    POC Glucose Once [[id number]]  (Abnormal) Collected: 06/11/22 0510    Specimen: Blood Updated: 06/11/22 0521     Glucose 185 mg/dL      Comment: : [id number] Iron Szymanski ID: [id number]       Phenytoin Level, Total [[id number]]  (Normal) Collected: 06/11/22 0356    Specimen: Blood Updated: 06/11/22 0433     Phenytoin Level 17.6 mcg/mL     Comprehensive Metabolic Panel [[id number]]  (Abnormal) Collected: 06/11/22 0356    Specimen: Blood Updated: 06/11/22 0431     Glucose 148 mg/dL      BUN 29 mg/dL      Creatinine 0.67 mg/dL      Sodium 137 mmol/L      Potassium 4.3 mmol/L      Chloride 102 mmol/L      CO2 28.0 mmol/L      Calcium 8.4 mg/dL      Total Protein 5.9 g/dL      Albumin 2.50 g/dL      ALT (SGPT) 38 U/L      AST (SGOT) 48 U/L      Alkaline Phosphatase 161 U/L      Total Bilirubin 0.5 mg/dL      Globulin 3.4 gm/dL      A/G Ratio 0.7 g/dL      BUN/Creatinine Ratio 43.3     Anion Gap 7.0 mmol/L      eGFR 96.2 mL/min/1.73      Comment: National Kidney Foundation and American Society of Nephrology (ASN) Task Force recommended calculation based on the Chronic Kidney Disease Epidemiology Collaboration (CKD-EPI) equation refit without adjustment for race.       Narrative:      GFR Normal >60  Chronic Kidney Disease <60  Kidney Failure <15      CBC & Differential [[id number]]  (Abnormal) Collected: 06/11/22 0356    Specimen: Blood Updated: 06/11/22 0414    Narrative:      The following orders were created for panel order CBC & Differential.  Procedure                               Abnormality         Status                     ---------                               -----------         ------                     CBC Auto Differential[[id number]]        Abnormal            Final result                 Please view results for these tests on the individual orders.    CBC Auto Differential [[id number]]  (Abnormal) Collected: 06/11/22 0356    Specimen: Blood  Updated: 06/11/22 0414     WBC 14.13 10*3/mm3      RBC 2.24 10*6/mm3      Hemoglobin 7.2 g/dL      Hematocrit 23.2 %      .6 fL      MCH 32.1 pg      MCHC 31.0 g/dL      RDW 16.8 %      RDW-SD 58.4 fl      MPV 9.6 fL      Platelets 171 10*3/mm3      Neutrophil % 81.3 %      Lymphocyte % 6.2 %      Monocyte % 9.8 %      Eosinophil % 1.0 %      Basophil % 0.4 %      Immature Grans % 1.3 %      Neutrophils, Absolute 11.48 10*3/mm3      Lymphocytes, Absolute 0.88 10*3/mm3      Monocytes, Absolute 1.38 10*3/mm3      Eosinophils, Absolute 0.14 10*3/mm3      Basophils, Absolute 0.06 10*3/mm3      Immature Grans, Absolute 0.19 10*3/mm3      nRBC 0.0 /100 WBC     Blood Gas, Arterial - [[id number]]  (Abnormal) Collected: 06/11/22 0401    Specimen: Arterial Blood Updated: 06/11/22 0356     Site Arterial Line     Denzel's Test N/A     pH, Arterial 7.436 pH units      pCO2, Arterial 45.1 mm Hg      Comment: 83 Value above reference range        pO2, Arterial 176.0 mm Hg      Comment: 83 Value above reference range        HCO3, Arterial 30.3 mmol/L      Comment: 83 Value above reference range        Base Excess, Arterial 5.6 mmol/L      Comment: 83 Value above reference range        O2 Saturation, Arterial >100.1 %      Comment: 93 Value above reportable range > 100.1        Temperature 37.0 C      Barometric Pressure for Blood Gas 746 mmHg      Modality Ventilator     FIO2 60 %      Ventilator Mode PC     Set [facility] Resp Rate 16.0     PEEP 5.0     PIP 12 cmH2O      Comment: Meter: [id number]     :  JESSICA        Collected by [id number]     pCO2, Temperature Corrected 45.1 mm Hg      pH, Temp Corrected 7.436 pH Units      pO2, Temperature Corrected 176 mm Hg     POC Glucose Once [[id number]]  (Abnormal) Collected: 06/10/22 2315    Specimen: Blood Updated: 06/10/22 2328     Glucose 156 mg/dL      Comment: : [id number] Iron KnappsMeter ID: [id number]       POC Glucose Once [[id number]]  (Abnormal) Collected:  06/10/22 2058    Specimen: Blood Updated: 06/10/22 2113     Glucose 144 mg/dL      Comment: : [id number] Britney PaigeMeter ID: [id number]       POC Glucose Once [[id number]]  (Abnormal) Collected: 06/10/22 1857    Specimen: Blood Updated: 06/10/22 1908     Glucose 166 mg/dL      Comment: : [id number] Rip GregoryMeter ID: [id number]       Respiratory Culture - Aspirate, Bronchus [[id number]] Collected: 06/10/22 1643    Specimen: Aspirate from Bronchus Updated: 06/10/22 1757     Gram Stain Many (4+) WBCs per low power field      Less than 25 Epithelial cells seen      No organisms seen    Tissue / Bone Culture - Tissue, Brain [[id number]] Collected: 06/10/22 1524    Specimen: Tissue from Brain Updated: 06/10/22 1753     Gram Stain No WBCs per low power field      No organisms seen    Tissue Pathology Exam [[id number]] Collected: 06/10/22 1327    Specimen: Tissue from Brain, Cerebral Cortex; Tissue from Brain Updated: 06/10/22 1639    Fungus Culture - Tissue, Brain [[id number]] Collected: 06/10/22 1524    Specimen: Tissue from Brain Updated: 06/10/22 1621    Anaerobic Culture - Tissue, Brain [[id number]] Collected: 06/10/22 1524    Specimen: Tissue from Brain Updated: 06/10/22 1621    POC Glucose Once [[id number]]  (Abnormal) Collected: 06/10/22 1606    Specimen: Blood Updated: 06/10/22 1617     Glucose 152 mg/dL      Comment: : [id number] Edd LopezhMeter ID: [id number]       Wound Culture - Wound, Head [[id number]] Collected: 06/10/22 1343    Specimen: Wound from Head Updated: 06/10/22 1603     Gram Stain Rare (1+) WBCs seen      No organisms seen    Wound Culture - Wound, Head [[id number]] Collected: 06/10/22 1226    Specimen: Wound from Head Updated: 06/10/22 1555     Gram Stain Rare (1+) WBCs seen      No organisms seen    Wound Culture - Wound, Head [[id number]] Collected: 06/10/22 1304    Specimen: Wound from Head Updated: 06/10/22 1551     Gram Stain No WBCs or organisms seen    Tissue / Bone Culture - Tissue,  Brain, Cerebral Cortex [[id number]] Collected: 06/10/22 1327    Specimen: Tissue from Brain, Cerebral Cortex Updated: 06/10/22 1549     Gram Stain Moderate (3+) WBCs seen      No organisms seen    Anaerobic Culture - Tissue, Brain, Cerebral Cortex [[id number]] Collected: 06/10/22 1327    Specimen: Tissue from Brain, Cerebral Cortex Updated: 06/10/22 1428    Fungus Culture - Tissue, Brain, Cerebral Cortex [[id number]] Collected: 06/10/22 1327    Specimen: Tissue from Brain, Cerebral Cortex Updated: 06/10/22 1428    Anaerobic Culture - Tissue, Brain [[id number]] Collected: 06/10/22 1326    Specimen: Tissue from Brain Updated: 06/10/22 1404    Fungus Culture - Tissue, Brain [[id number]] Collected: 06/10/22 1326    Specimen: Tissue from Brain Updated: 06/10/22 1404           Cultures:  No results found for: BLOODCX, URINECX, WOUNDCX, MRSACX, RESPCX, STOOLCX    Radiology Data:    Imaging Results (Last 24 Hours)     Procedure Component Value Units Date/Time    XR Chest 1 View [[id number]] Collected: 06/11/22 0819     Updated: 06/11/22 0823    Narrative:      EXAMINATION: XR CHEST 1 VW-     6/11/2022 4:24 AM CDT     HISTORY: On vent; R13.10-Dysphagia, unspecified; Z01.818-Encounter for  other preprocedural examination; D32.9-Benign neoplasm of meninges,  unspecified; Z74.09-Other reduced mobility; Z78.9-Other specified health  status; G40.901-Epilepsy, unspecified, not intractable, with status  epilepticus; J96.01-Acute respiratory failure with hypoxia;  G91.0-Communicating hydrocephalus; T81.40XA-Infection following a pr     1 view chest x-ray.  Comparison is made with yesterday's 3:12 AM.     Stable heart size.  Persistent left lower lobe consolidation.  Tracheostomy tube remains in place.     The left upper lobe and right lung are essentially clear.     No pneumothorax.     Right-sided  shunt tubing again seen.     Summary:  1. No significant change.  This report was finalized on 06/11/2022 08:20 by Dr. Tomer Whelan MD.     CT Head Without Contrast [[id number]] Collected: 06/11/22 0815     Updated: 06/11/22 0822    Narrative:      EXAMINATION: CT HEAD WO CONTRAST-      6/11/2022 7:04 AM CDT     HISTORY: Craniotomy, post-op; R13.10-Dysphagia, unspecified;  Z01.818-Encounter for other preprocedural examination; D32.9-Benign  neoplasm of meninges, unspecified; Z74.09-Other reduced mobility;  Z78.9-Other specified health status; G40.901-Epilepsy, unspecified, not  intractable, with status epilepticus; J96.01-Acute respiratory failure  with hypoxia; G91.0-Communicating hydrocephalus; T81.40XA-Infection fo     In order to have a CT radiation dose as low as reasonably achievable  Automated Exposure Control was utilized for adjustment of the mA and/or  KV according to patient size.     DLP in mGycm= 1745.     Noncontrast head CT.  Axial, sagittal, and coronal sequences.     Comparison is made with a noncontrast head CT from June 6, 2022.     Interval surgery with right frontal craniectomy.  Extra-axial 2 cm focus of acute hemorrhage at the right frontal surgical  site.  Underlying right frontal lobe edema extending to the margin of the  frontal horn of the right lateral ventricle.  Right parietal intraventricular drain now present.  Small amount of dependent intraventricular hemorrhage is unchanged.     Ventricle size is stable or minimally decreased.  No midline shift.     Summary:  1. Repeat surgery with right frontal craniectomy. Focal acute hemorrhage  at the operative site.  2. Ventricular drain placement.  3. Stable or slightly decreased ventricle size.  4. No midline shift.                                   This report was finalized on 06/11/2022 08:19 by Dr. Tomer Whelan MD.          No Known Allergies    Scheduled meds:   carvedilol, 6.25 mg, Nasogastric, BID With Meals  cefepime, 2 g, Intravenous, Q8H  chlorhexidine, 15 mL, Mouth/Throat, Q12H  dexamethasone, 4 mg, Intravenous, Q6H  fosphenytoin, 275 mg PE, Intravenous, Q8H  heparin  (porcine), 5,000 Units, Subcutaneous, Q12H  insulin detemir, 20 Units, Subcutaneous, Nightly  insulin regular, 2-7 Units, Subcutaneous, Q6H  insulin regular, 8 Units, Subcutaneous, Q6H  lacosamide, 200 mg, Intravenous, Q12H  [START ON 6/12/2022] lansoprazole, 30 mg, Per G Tube, QAM  levETIRAcetam, 1,000 mg, Intravenous, Q12H  levothyroxine, 125 mcg, Nasogastric, Q AM  lidocaine, 10 mL, Intradermal, Once  liothyronine, 25 mcg, Nasogastric, Daily  lisinopril, 20 mg, Nasogastric, Q24H  mupirocin, 1 application, Topical, Q12H  Nutrisource fiber, 1 packet, Nasogastric, 4x Daily  polyethylene glycol, 17 g, Oral, Daily  ProSource TF, 45 mL, Per G Tube, BID  sodium chloride, 4 mL, Nebulization, BID - RT  vancomycin, 15 mg/kg, Intravenous, Q12H        PRN meds:  •  acetaminophen **OR** acetaminophen  •  alteplase  •  bisacodyl  •  dextrose  •  dextrose  •  glucagon (human recombinant)  •  hold  •  hydrALAZINE  •  ipratropium-albuterol  •  labetalol  •  levalbuterol  •  LORazepam  •  ondansetron **OR** ondansetron    Assessment/Plan       Meningioma (HCC)    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 The patient was admitted on 5/10 by Dr. Downs with neurosurgery.  He has prior history of known meningioma that was causing compression and visual changes.  He presented for craniotomy.  Hospital medicine was originally  consulted on 5/14.  The patient had developed status epilepticus postsurgically and required intubation/mechanical ventilation.      Respiratory failure/left pleural effusion. He required intubation on 5/14 for airway protection.  This was done by Dr. Gunderson.  Pulmonary has since been consulted. Continue to monitor for readiness for spontaneous breathing trials and extubation in the future. Again, he was intubated for airway protection given his status epilepticus.  Propofol drip . albuterol nebs.  DuoNebs as needed.  Morphine as needed.  Oxycodone as needed.  CT imaging of chest-  Left chest tubes remain in place with appropriate evacuation of the left pleural fluid, Only small pleural effusions seen on today's exam with minimal air present in the posterior left pleural space, Bibasilar consolidation may represent atelectasis and/or pneumonia, scattered bilateral calcified granuloma, Vascular crowding and/or mild venous congestion, Stable cardiomegaly.  Chest x-ray-Hypoventilation with vascular crowding, Patchy infiltrates in the perihilar regions and lung bases, atelectasis versus pneumonia, Old granulomatous disease.  Status post thoracotomy tube placement 6/26/2022.   Status post tracheotomy left chest 6/2/2022.   Status post tPA through chest tube.  Chest tube removed 6/7/2022.  Ventilator- pressure support, FiO2 60,  rate 16, PIP 12.     Hypotension.  Resolved.  Levophed drip off.     Seizure . consult neurology . IV Vimpat.  IV Keppra.  IV cerebyx.      Meningioma.  Decadron.  CT scan head without contrast- Repeat surgery with right frontal craniectomy, Focal acute hemorrhage at the operative site, Ventricular drain placement, Stable or slightly decreased ventricle size,  No midline shift.  Status post another craniotomy 6/10/2022.   Ventriculoperitoneal shunt placement 6/3/2022.     Thrombocytopenia. His platelet count was 217,000 on 4/20/2022. This declined to a low of 72,000 on 5/17.  Peripheral smear  on 5/17 showed no schistocytes with moderate peripheral thrombocytopenia.  PTT normal, PT/INR slightly elevated, fibrin split products high, and fibrinogen high.  Neurosurgery gave him a sixpack of platelets on 5/18.  Thrombocytopenia resolved.     Fever. He had run steady fever from the afternoon of 5/21 through 5/22.  Dr. Escobar was contacted and placed him on vancomycin and cefepime.  He still has a lumbar drain and CSF was sampled on the morning of 5/22.  ..  He received vancomycin. Meropenem was started on 5/25 for 5 days.  Patient has finished vancomycin and meropenem antibiotics.  Fever resolved.  Leukocytosis slight increase.     Hypertension/atrial fibrillation. He typically takes lisinopril. Resumed per tube at a lower dose than normal on 5/18. Titrated up on 5/19. Started carvedilol on 5/19. Titrated medications up to get off Cardene.  Unable to take anticoagulation.  Lisinopril.  Hydralazine as needed.  Echocardiogram- ejection fraction 70%-hyperdynamic, mild concentric hypertrophy, tricuspid regurgitation, right ventricle cavity moderately dilated, right atrial cavity dilated, no significant valvular pathology, atrial fibrillation rhythm.     Hypothyroidism. Free T4 and free T3 were very low as well. Started levothyroxine and liothyronine on 5/19. TRH pending since 5/19!!  I rechecked TSH, free T4, and free T3 on 5/29 to see where we are at.  TSH is now 3.950.  Free T4 has improve from 0.31 to 0.53.  Free T3 has improved from less than 0.40 to 1.60.  Synthroid . Cytomel .     Diabetes.  Hemoglobin A1c 8.2.  Sliding scale.   Levemir . regular insulin.     Anemia.  Hemoglobin decrease.  No sign of acute bleed.     Reflux.  Protonix.  Zofran as needed.     Constipation.  MiraLAX.  Dulcolax suppository as needed.     Headaches.  Fioricet as needed.     Obesity.  BMI 31.     SCD for DVT prophylaxis.  Heparin prophylaxis.     Nutrition.  PEG tube placement 6/2/2022.     AFB culture pending.  Anaerobic culture  pending.  Fungal culture pending.  Respiratory culture pending.  Tissue/bone culture pending.  Wound culture pending.    Electronically signed by Aden Kc MD, 06/11/22, 1:05 PM CDT.

## 2022-06-11 NOTE — SIGNIFICANT NOTE
06/11/22 0621   Readings   PEEP Intrinsic (cm H2O) 4.8 cm H2O   Plateau Pressure (cm H2O) 12 cm H2O   Driving Pressure (cm H2O) 7.2 cm H2O   Static Compliance (L/cm H2O) 52   Dynamic Compliance (L/cm H2O) 61 L/cm H2O

## 2022-06-11 NOTE — PROGRESS NOTES
"Pharmacy Dosing Service  Pharmacokinetics  Vancomycin Follow-up Evaluation    Assessment/Action/Plan:  Current Order: Vancomycin 1500 mg IVPB every 12 hours  Current end date:6/24/2022  Levels: Vancomycin trough ordered before dose due on 6/12/2022 at 1130   Additional antimicrobial agent(s): Cefepime  Goal Vancomycin trough ~20 per Neurosurgery order  Continue Vancomycin 1500mg iv q12h. Pharmacy will continue to follow daily and adjust dose accordingly.     Subjective:  Hai Hernandez is a 77 y.o. male currently on Vancomycin for the treatment of possible CNS infection, day 2 of treatment.    AUC Model Data:  Regimen: 1500 mg IV every 12 hours.  Exposure target: AUC24 (range)400-600 mg/L.hr   AUC24,ss: 589 mg/L.hr  PAUC*: 85 %  Ctrough,ss: 19.3 mg/L  Pconc*: 47 %  Tox.: 16 %      Objective:  Ht: 182.9 cm (72\"); Wt: 106 kg (234 lb)  Estimated Creatinine Clearance: 116.2 mL/min (A) (by C-G formula based on SCr of 0.67 mg/dL (L)).   Creatinine   Date Value Ref Range Status   06/11/2022 0.67 (L) 0.76 - 1.27 mg/dL Final   06/10/2022 0.52 (L) 0.76 - 1.27 mg/dL Final   06/09/2022 0.49 (L) 0.76 - 1.27 mg/dL Final   01/21/2022 1.00 0.60 - 1.30 mg/dL Final     Comment:     Serial Number: [id number]:  [id number]      Lab Results   Component Value Date    WBC 14.13 (H) 06/11/2022    WBC 12.23 (H) 06/10/2022    WBC 11.64 (H) 06/09/2022        Culture Results:  Microbiology Results (last 10 days)       Procedure Component Value - Date/Time    Respiratory Culture - Aspirate, Bronchus [[id number]] Collected: 06/10/22 1641    Lab Status: Preliminary result Specimen: Aspirate from Bronchus Updated: 06/10/22 5249     Gram Stain Many (4+) WBCs per low power field      Less than 25 Epithelial cells seen      No organisms seen    Tissue / Bone Culture - Tissue, Brain [[id number]] Collected: 06/10/22 1524    Lab Status: Preliminary result Specimen: Tissue from Brain Updated: 06/10/22 4119     Gram Stain No WBCs per low power field      " No organisms seen    Wound Culture - Wound, Head [[id number]] Collected: 06/10/22 1343    Lab Status: Preliminary result Specimen: Wound from Head Updated: 06/10/22 1603     Gram Stain Rare (1+) WBCs seen      No organisms seen    Tissue / Bone Culture - Tissue, Brain, Cerebral Cortex [[id number]] Collected: 06/10/22 1327    Lab Status: Preliminary result Specimen: Tissue from Brain, Cerebral Cortex Updated: 06/10/22 1549     Gram Stain Moderate (3+) WBCs seen      No organisms seen    Tissue / Bone Culture - Tissue, Brain [[id number]] Collected: 06/10/22 1326    Lab Status: Preliminary result Specimen: Tissue from Brain Updated: 06/11/22 0709     Gram Stain Rare (1+) WBCs per low power field      No organisms seen    Wound Culture - Wound, Head [[id number]] Collected: 06/10/22 1304    Lab Status: Preliminary result Specimen: Wound from Head Updated: 06/10/22 1551     Gram Stain No WBCs or organisms seen    Wound Culture - Wound, Head [[id number]] Collected: 06/10/22 1226    Lab Status: Preliminary result Specimen: Wound from Head Updated: 06/10/22 1555     Gram Stain Rare (1+) WBCs seen      No organisms seen    COVID PRE-OP / PRE-PROCEDURE SCREENING ORDER (NO ISOLATION) - Swab, Nasal Cavity [[id number]]  (Normal) Collected: 06/10/22 0744    Lab Status: Final result Specimen: Swab from Nasal Cavity Updated: 06/10/22 0857    Narrative:      The following orders were created for panel order COVID PRE-OP / PRE-PROCEDURE SCREENING ORDER (NO ISOLATION) - Swab, Nasal Cavity.  Procedure                               Abnormality         Status                     ---------                               -----------         ------                     COVID-19,[facility]...[[id number]]  Normal              Final result                 Please view results for these tests on the individual orders.    COVID-19,[facility]-HOUSE,Nasal Swab No Transport Media 3-4 HR TAT - Swab, Nasal Cavity [[id number]]  (Normal) Collected: 06/10/22  0744    Lab Status: Final result Specimen: Swab from Nasal Cavity Updated: 06/10/22 0857     COVID19 Not Detected    Narrative:      Fact sheet for providers: https://www.fda.gov/media/326922/download     Fact sheet for patients: https://www.fda.gov/media/130317/download    Test performed by PCR.    Consider negative results in combination with clinical observations, patient history, and epidemiological information.  Fact sheet for providers: https://www.fda.gov/media/922668/download     Fact sheet for patients: https://www.fda.gov/media/827369/download    Test performed by PCR.    Consider negative results in combination with clinical observations, patient history, and epidemiological information.    Culture, CSF - Cerebrospinal Fluid,  Shunt Aspirate [[id number]] Collected: 06/10/22 0729    Lab Status: Preliminary result Specimen: Cerebrospinal Fluid from  Shunt Aspirate Updated: 06/10/22 1228     Gram Stain Moderate (3+) WBCs seen      No organisms seen    COVID PRE-OP / PRE-PROCEDURE SCREENING ORDER (NO ISOLATION) - Swab, Nasal Cavity [[id number]]  (Normal) Collected: 06/02/22 0422    Lab Status: Final result Specimen: Swab from Nasal Cavity Updated: 06/02/22 0530    Narrative:      The following orders were created for panel order COVID PRE-OP / PRE-PROCEDURE SCREENING ORDER (NO ISOLATION) - Swab, Nasal Cavity.  Procedure                               Abnormality         Status                     ---------                               -----------         ------                     COVID-19,[facility]...[[id number]]  Normal              Final result                 Please view results for these tests on the individual orders.    COVID-19,[facility]HOU[facility],Nasal Swab No Transport Media 3-4 HR TAT - Swab, Nasal Cavity [[id number]]  (Normal) Collected: 06/02/22 0422    Lab Status: Final result Specimen: Swab from Nasal Cavity Updated: 06/02/22 0530     COVID19 Not Detected    Narrative:      Fact sheet for  providers: https://www.fda.gov/media/201619/download     Fact sheet for patients: https://www.fda.gov/media/786002/download    Test performed by PCR.    Consider negative results in combination with clinical observations, patient history, and epidemiological information.            Aristides Momin, PharmD   06/11/22 09:47 CDT

## 2022-06-11 NOTE — PROGRESS NOTES
0721 PT TRANSPORTED TO CT SCAN WITH PORTABLE VENT.  ETCO2 STABLE AROUND 36 MMHG AND PULSE OX STABLE AROUND 99%.  PT RETURNED TO ICU 10  AND PLACED ON SAME VENT SETTINGS.  NO COMPLICATIONS.

## 2022-06-12 ENCOUNTER — APPOINTMENT (OUTPATIENT)
Dept: GENERAL RADIOLOGY | Facility: HOSPITAL | Age: 77
End: 2022-06-12

## 2022-06-12 LAB
ALBUMIN SERPL-MCNC: 2.4 G/DL (ref 3.5–5.2)
ALBUMIN/GLOB SERPL: 0.6 G/DL
ALP SERPL-CCNC: 257 U/L (ref 39–117)
ALT SERPL W P-5'-P-CCNC: 61 U/L (ref 1–41)
ANION GAP SERPL CALCULATED.3IONS-SCNC: 6 MMOL/L (ref 5–15)
ARTERIAL PATENCY WRIST A: ABNORMAL
AST SERPL-CCNC: 80 U/L (ref 1–40)
ATMOSPHERIC PRESS: 745 MMHG
BASE EXCESS BLDA CALC-SCNC: 5.3 MMOL/L (ref 0–2)
BASOPHILS # BLD AUTO: 0.05 10*3/MM3 (ref 0–0.2)
BASOPHILS NFR BLD AUTO: 0.4 % (ref 0–1.5)
BDY SITE: ABNORMAL
BILIRUB SERPL-MCNC: 0.3 MG/DL (ref 0–1.2)
BODY TEMPERATURE: 37 C
BUN SERPL-MCNC: 34 MG/DL (ref 8–23)
BUN/CREAT SERPL: 55.7 (ref 7–25)
CALCIUM SPEC-SCNC: 8.2 MG/DL (ref 8.6–10.5)
CHLORIDE SERPL-SCNC: 106 MMOL/L (ref 98–107)
CO2 SERPL-SCNC: 28 MMOL/L (ref 22–29)
CREAT SERPL-MCNC: 0.61 MG/DL (ref 0.76–1.27)
CRP SERPL-MCNC: 18.69 MG/DL (ref 0–0.5)
DEPRECATED RDW RBC AUTO: 59.7 FL (ref 37–54)
EGFRCR SERPLBLD CKD-EPI 2021: 98.9 ML/MIN/1.73
EOSINOPHIL # BLD AUTO: 0.07 10*3/MM3 (ref 0–0.4)
EOSINOPHIL NFR BLD AUTO: 0.5 % (ref 0.3–6.2)
ERYTHROCYTE [DISTWIDTH] IN BLOOD BY AUTOMATED COUNT: 16.9 % (ref 12.3–15.4)
GLOBULIN UR ELPH-MCNC: 3.7 GM/DL
GLUCOSE BLDC GLUCOMTR-MCNC: 117 MG/DL (ref 70–130)
GLUCOSE BLDC GLUCOMTR-MCNC: 137 MG/DL (ref 70–130)
GLUCOSE BLDC GLUCOMTR-MCNC: 192 MG/DL (ref 70–130)
GLUCOSE BLDC GLUCOMTR-MCNC: 219 MG/DL (ref 70–130)
GLUCOSE BLDC GLUCOMTR-MCNC: 78 MG/DL (ref 70–130)
GLUCOSE SERPL-MCNC: 81 MG/DL (ref 65–99)
HCO3 BLDA-SCNC: 29.1 MMOL/L (ref 20–26)
HCT VFR BLD AUTO: 23.1 % (ref 37.5–51)
HGB BLD-MCNC: 7 G/DL (ref 13–17.7)
IMM GRANULOCYTES # BLD AUTO: 0.16 10*3/MM3 (ref 0–0.05)
IMM GRANULOCYTES NFR BLD AUTO: 1.2 % (ref 0–0.5)
INHALED O2 CONCENTRATION: 35 %
LYMPHOCYTES # BLD AUTO: 1.05 10*3/MM3 (ref 0.7–3.1)
LYMPHOCYTES NFR BLD AUTO: 7.8 % (ref 19.6–45.3)
Lab: ABNORMAL
MCH RBC QN AUTO: 31.7 PG (ref 26.6–33)
MCHC RBC AUTO-ENTMCNC: 30.3 G/DL (ref 31.5–35.7)
MCV RBC AUTO: 104.5 FL (ref 79–97)
MODALITY: ABNORMAL
MONOCYTES # BLD AUTO: 1.41 10*3/MM3 (ref 0.1–0.9)
MONOCYTES NFR BLD AUTO: 10.5 % (ref 5–12)
NEUTROPHILS NFR BLD AUTO: 10.66 10*3/MM3 (ref 1.7–7)
NEUTROPHILS NFR BLD AUTO: 79.6 % (ref 42.7–76)
NRBC BLD AUTO-RTO: 0 /100 WBC (ref 0–0.2)
PAW @ PEAK INSP FLOW SETTING VENT: 12 CMH2O
PCO2 BLDA: 38.4 MM HG (ref 35–45)
PCO2 TEMP ADJ BLD: 38.4 MM HG (ref 35–45)
PEEP RESPIRATORY: 5 CM[H2O]
PH BLDA: 7.49 PH UNITS (ref 7.35–7.45)
PH, TEMP CORRECTED: 7.49 PH UNITS (ref 7.35–7.45)
PHENYTOIN SERPL-MCNC: 16.4 MCG/ML (ref 10–20)
PLATELET # BLD AUTO: 193 10*3/MM3 (ref 140–450)
PMV BLD AUTO: 9.9 FL (ref 6–12)
PO2 BLDA: 88.2 MM HG (ref 83–108)
PO2 TEMP ADJ BLD: 88.2 MM HG (ref 83–108)
POTASSIUM SERPL-SCNC: 4.1 MMOL/L (ref 3.5–5.2)
PROT SERPL-MCNC: 6.1 G/DL (ref 6–8.5)
RBC # BLD AUTO: 2.21 10*6/MM3 (ref 4.14–5.8)
SAO2 % BLDCOA: 98.4 % (ref 94–99)
SET MECH RESP RATE: 16
SODIUM SERPL-SCNC: 140 MMOL/L (ref 136–145)
VANCOMYCIN TROUGH SERPL-MCNC: 25.6 MCG/ML (ref 5–20)
VENTILATOR MODE: ABNORMAL
WBC NRBC COR # BLD: 13.4 10*3/MM3 (ref 3.4–10.8)

## 2022-06-12 PROCEDURE — 63710000001 INSULIN DETEMIR PER 5 UNITS: Performed by: NEUROLOGICAL SURGERY

## 2022-06-12 PROCEDURE — 85025 COMPLETE CBC W/AUTO DIFF WBC: CPT | Performed by: NEUROLOGICAL SURGERY

## 2022-06-12 PROCEDURE — 99233 SBSQ HOSP IP/OBS HIGH 50: CPT | Performed by: INTERNAL MEDICINE

## 2022-06-12 PROCEDURE — 94799 UNLISTED PULMONARY SVC/PX: CPT

## 2022-06-12 PROCEDURE — 80185 ASSAY OF PHENYTOIN TOTAL: CPT | Performed by: NEUROLOGICAL SURGERY

## 2022-06-12 PROCEDURE — 63710000001 LEVALBUTEROL PER 0.5 MG: Performed by: NEUROLOGICAL SURGERY

## 2022-06-12 PROCEDURE — 63710000001 INSULIN REGULAR HUMAN PER 5 UNITS: Performed by: NEUROLOGICAL SURGERY

## 2022-06-12 PROCEDURE — 0 LEVETIRACETAM IN NACL 0.75% 1000 MG/100ML SOLUTION: Performed by: NEUROLOGICAL SURGERY

## 2022-06-12 PROCEDURE — 94003 VENT MGMT INPAT SUBQ DAY: CPT

## 2022-06-12 PROCEDURE — 82962 GLUCOSE BLOOD TEST: CPT

## 2022-06-12 PROCEDURE — 86140 C-REACTIVE PROTEIN: CPT | Performed by: NEUROLOGICAL SURGERY

## 2022-06-12 PROCEDURE — 94761 N-INVAS EAR/PLS OXIMETRY MLT: CPT

## 2022-06-12 PROCEDURE — 99233 SBSQ HOSP IP/OBS HIGH 50: CPT | Performed by: STUDENT IN AN ORGANIZED HEALTH CARE EDUCATION/TRAINING PROGRAM

## 2022-06-12 PROCEDURE — 71045 X-RAY EXAM CHEST 1 VIEW: CPT

## 2022-06-12 PROCEDURE — 25010000002 HYDRALAZINE PER 20 MG: Performed by: NEUROLOGICAL SURGERY

## 2022-06-12 PROCEDURE — 25010000002 VANCOMYCIN 10 G RECONSTITUTED SOLUTION: Performed by: NEUROLOGICAL SURGERY

## 2022-06-12 PROCEDURE — 80202 ASSAY OF VANCOMYCIN: CPT | Performed by: NEUROLOGICAL SURGERY

## 2022-06-12 PROCEDURE — 25010000002 CEFEPIME PER 500 MG: Performed by: NEUROLOGICAL SURGERY

## 2022-06-12 PROCEDURE — 25010000002 HEPARIN (PORCINE) PER 1000 UNITS: Performed by: NEUROLOGICAL SURGERY

## 2022-06-12 PROCEDURE — 97110 THERAPEUTIC EXERCISES: CPT

## 2022-06-12 PROCEDURE — 25010000002 LORAZEPAM PER 2 MG: Performed by: NEUROLOGICAL SURGERY

## 2022-06-12 PROCEDURE — 25010000002 DEXAMETHASONE PER 1 MG: Performed by: NEUROLOGICAL SURGERY

## 2022-06-12 PROCEDURE — 80053 COMPREHEN METABOLIC PANEL: CPT | Performed by: NEUROLOGICAL SURGERY

## 2022-06-12 PROCEDURE — 0 HYDROMORPHONE 1 MG/ML SOLUTION: Performed by: FAMILY MEDICINE

## 2022-06-12 PROCEDURE — 82803 BLOOD GASES ANY COMBINATION: CPT

## 2022-06-12 PROCEDURE — 25010000002 FOSPHENYTOIN 500 MG PE/10ML SOLUTION 10 ML VIAL: Performed by: NEUROLOGICAL SURGERY

## 2022-06-12 RX ORDER — LATANOPROST 50 UG/ML
1 SOLUTION/ DROPS OPHTHALMIC NIGHTLY
Status: DISCONTINUED | OUTPATIENT
Start: 2022-06-12 | End: 2022-06-20 | Stop reason: HOSPADM

## 2022-06-12 RX ORDER — OXYCODONE HCL 5 MG/5 ML
5 SOLUTION, ORAL ORAL EVERY 6 HOURS PRN
Status: DISPENSED | OUTPATIENT
Start: 2022-06-12 | End: 2022-06-19

## 2022-06-12 RX ADMIN — LEVETIRACETAM 1000 MG: 10 INJECTION INTRAVENOUS at 08:34

## 2022-06-12 RX ADMIN — Medication 1 PACKET: at 12:23

## 2022-06-12 RX ADMIN — LEVETIRACETAM 1000 MG: 10 INJECTION INTRAVENOUS at 20:29

## 2022-06-12 RX ADMIN — INSULIN HUMAN 8 UNITS: 100 INJECTION, SOLUTION PARENTERAL at 12:23

## 2022-06-12 RX ADMIN — GUAIFENESIN 200 MG: 200 SOLUTION ORAL at 16:14

## 2022-06-12 RX ADMIN — SODIUM CHLORIDE 275 MG PE: 9 INJECTION, SOLUTION INTRAVENOUS at 05:54

## 2022-06-12 RX ADMIN — HYDRALAZINE HYDROCHLORIDE 10 MG: 20 INJECTION INTRAMUSCULAR; INTRAVENOUS at 00:27

## 2022-06-12 RX ADMIN — CEFEPIME 2 G: 2 INJECTION, POWDER, FOR SOLUTION INTRAVENOUS at 14:40

## 2022-06-12 RX ADMIN — SODIUM CHLORIDE SOLN NEBU 3% 4 ML: 3 NEBU SOLN at 19:36

## 2022-06-12 RX ADMIN — DEXAMETHASONE SODIUM PHOSPHATE 4 MG: 4 INJECTION, SOLUTION INTRA-ARTICULAR; INTRALESIONAL; INTRAMUSCULAR; INTRAVENOUS; SOFT TISSUE at 12:31

## 2022-06-12 RX ADMIN — Medication 1 PACKET: at 08:34

## 2022-06-12 RX ADMIN — INSULIN DETEMIR 20 UNITS: 100 INJECTION, SOLUTION SUBCUTANEOUS at 20:44

## 2022-06-12 RX ADMIN — LATANOPROST 1 DROP: 50 SOLUTION OPHTHALMIC at 20:28

## 2022-06-12 RX ADMIN — LORAZEPAM 2 MG: 2 INJECTION INTRAMUSCULAR at 03:52

## 2022-06-12 RX ADMIN — Medication 1 PACKET: at 20:28

## 2022-06-12 RX ADMIN — INSULIN HUMAN 2 UNITS: 100 INJECTION, SOLUTION PARENTERAL at 12:23

## 2022-06-12 RX ADMIN — Medication 45 ML: at 08:34

## 2022-06-12 RX ADMIN — VANCOMYCIN HYDROCHLORIDE 1500 MG: 10 INJECTION, POWDER, LYOPHILIZED, FOR SOLUTION INTRAVENOUS at 01:41

## 2022-06-12 RX ADMIN — LACOSAMIDE 200 MG: 10 INJECTION, SOLUTION INTRAVENOUS at 08:35

## 2022-06-12 RX ADMIN — INSULIN HUMAN 8 UNITS: 100 INJECTION, SOLUTION PARENTERAL at 17:45

## 2022-06-12 RX ADMIN — SODIUM CHLORIDE 275 MG PE: 9 INJECTION, SOLUTION INTRAVENOUS at 21:36

## 2022-06-12 RX ADMIN — LEVALBUTEROL HYDROCHLORIDE 0.63 MG: 0.63 SOLUTION RESPIRATORY (INHALATION) at 10:46

## 2022-06-12 RX ADMIN — INSULIN HUMAN 3 UNITS: 100 INJECTION, SOLUTION PARENTERAL at 00:23

## 2022-06-12 RX ADMIN — SODIUM CHLORIDE 275 MG PE: 9 INJECTION, SOLUTION INTRAVENOUS at 14:39

## 2022-06-12 RX ADMIN — HEPARIN SODIUM 5000 UNITS: 5000 INJECTION INTRAVENOUS; SUBCUTANEOUS at 08:32

## 2022-06-12 RX ADMIN — CARVEDILOL 6.25 MG: 6.25 TABLET, FILM COATED ORAL at 17:45

## 2022-06-12 RX ADMIN — CHLORHEXIDINE GLUCONATE 15 ML: 1.2 RINSE ORAL at 20:30

## 2022-06-12 RX ADMIN — LISINOPRIL 20 MG: 20 TABLET ORAL at 08:32

## 2022-06-12 RX ADMIN — Medication 1 PACKET: at 17:46

## 2022-06-12 RX ADMIN — MUPIROCIN 1 APPLICATION: 20 OINTMENT TOPICAL at 20:30

## 2022-06-12 RX ADMIN — CEFEPIME 2 G: 2 INJECTION, POWDER, FOR SOLUTION INTRAVENOUS at 21:36

## 2022-06-12 RX ADMIN — VANCOMYCIN HYDROCHLORIDE 1500 MG: 10 INJECTION, POWDER, LYOPHILIZED, FOR SOLUTION INTRAVENOUS at 12:46

## 2022-06-12 RX ADMIN — IPRATROPIUM BROMIDE AND ALBUTEROL SULFATE 3 ML: 2.5; .5 SOLUTION RESPIRATORY (INHALATION) at 04:47

## 2022-06-12 RX ADMIN — GUAIFENESIN 200 MG: 200 SOLUTION ORAL at 10:48

## 2022-06-12 RX ADMIN — MUPIROCIN 1 APPLICATION: 20 OINTMENT TOPICAL at 10:48

## 2022-06-12 RX ADMIN — HEPARIN SODIUM 5000 UNITS: 5000 INJECTION INTRAVENOUS; SUBCUTANEOUS at 20:27

## 2022-06-12 RX ADMIN — Medication 45 ML: at 20:28

## 2022-06-12 RX ADMIN — SODIUM CHLORIDE SOLN NEBU 3% 4 ML: 3 NEBU SOLN at 10:46

## 2022-06-12 RX ADMIN — INSULIN HUMAN 8 UNITS: 100 INJECTION, SOLUTION PARENTERAL at 00:23

## 2022-06-12 RX ADMIN — LACOSAMIDE 200 MG: 10 INJECTION, SOLUTION INTRAVENOUS at 20:27

## 2022-06-12 RX ADMIN — LEVOTHYROXINE SODIUM 125 MCG: 125 TABLET ORAL at 05:54

## 2022-06-12 RX ADMIN — HYDROMORPHONE HYDROCHLORIDE 1 MG: 1 INJECTION, SOLUTION INTRAMUSCULAR; INTRAVENOUS; SUBCUTANEOUS at 04:10

## 2022-06-12 RX ADMIN — LANSOPRAZOLE 30 MG: KIT at 08:34

## 2022-06-12 RX ADMIN — DEXAMETHASONE SODIUM PHOSPHATE 4 MG: 4 INJECTION, SOLUTION INTRA-ARTICULAR; INTRALESIONAL; INTRAMUSCULAR; INTRAVENOUS; SOFT TISSUE at 20:28

## 2022-06-12 RX ADMIN — DEXAMETHASONE SODIUM PHOSPHATE 4 MG: 4 INJECTION, SOLUTION INTRA-ARTICULAR; INTRALESIONAL; INTRAMUSCULAR; INTRAVENOUS; SOFT TISSUE at 03:12

## 2022-06-12 RX ADMIN — POLYETHYLENE GLYCOL 3350 17 G: 17 POWDER, FOR SOLUTION ORAL at 08:32

## 2022-06-12 RX ADMIN — CARVEDILOL 6.25 MG: 6.25 TABLET, FILM COATED ORAL at 08:33

## 2022-06-12 RX ADMIN — CHLORHEXIDINE GLUCONATE 15 ML: 1.2 RINSE ORAL at 08:33

## 2022-06-12 RX ADMIN — ACETAMINOPHEN 650 MG: 325 TABLET ORAL at 20:28

## 2022-06-12 RX ADMIN — DEXAMETHASONE SODIUM PHOSPHATE 4 MG: 4 INJECTION, SOLUTION INTRA-ARTICULAR; INTRALESIONAL; INTRAMUSCULAR; INTRAVENOUS; SOFT TISSUE at 06:44

## 2022-06-12 RX ADMIN — GUAIFENESIN 200 MG: 200 SOLUTION ORAL at 20:27

## 2022-06-12 RX ADMIN — OXYCODONE HYDROCHLORIDE 5 MG: 5 SOLUTION ORAL at 15:07

## 2022-06-12 RX ADMIN — CEFEPIME 2 G: 2 INJECTION, POWDER, FOR SOLUTION INTRAVENOUS at 05:54

## 2022-06-12 RX ADMIN — LIOTHYRONINE SODIUM 25 MCG: 25 TABLET ORAL at 08:33

## 2022-06-12 NOTE — PROGRESS NOTES
"Pharmacy Dosing Service  Pharmacokinetics  Vancomycin Follow-up Evaluation     Assessment/Action/Plan:  Current Order: Vancomycin 1500 mg IVPB every 12 hours  Current end date:6/24/2022  Levels: 6/12/2022 0515 Vancomycin \"trough\" = 25.6 (3.5 hours post 1500 mg dose) Vancomycin dose administered 2 hours late from schedule and \"trough\" drawn 5.25 hours early, schedule adjusted on MAR and trough re-ordered before tomorrow's dose.  Additional antimicrobial agent(s): Cefepime  Goal Vancomycin trough ~20 per Neurosurgery order  Continue Vancomycin 1500mg iv q12h. Pharmacy will continue to follow daily and adjust dose accordingly.      Subjective:  Hai Hernandez is a 77 y.o. male currently on Vancomycin for the treatment of possible CNS infection, day 3 of treatment.    AUC Model Data:  Regimen: 1500 mg IV every 12 hours.  Exposure target: AUC24 (range)400-600 mg/L.hr   AUC24,ss: 562 mg/L.hr  PAUC*: 92 %  Ctrough,ss: 18.0 mg/L  Pconc*: 39 %  Tox.: 14 %    Objective:  Ht: 182.9 cm (72\"); Wt: 107 kg (234 lb 14.4 oz)  Estimated Creatinine Clearance: 128.2 mL/min (A) (by C-G formula based on SCr of 0.61 mg/dL (L)).   Creatinine   Date Value Ref Range Status   06/12/2022 0.61 (L) 0.76 - 1.27 mg/dL Final   06/11/2022 0.67 (L) 0.76 - 1.27 mg/dL Final   06/10/2022 0.52 (L) 0.76 - 1.27 mg/dL Final   01/21/2022 1.00 0.60 - 1.30 mg/dL Final     Comment:     Serial Number: [id number]:  [id number]      Lab Results   Component Value Date    WBC 13.40 (H) 06/12/2022    WBC 14.13 (H) 06/11/2022    WBC 12.23 (H) 06/10/2022         Lab Results   Component Value Date    VANCOTROUGH 25.60 (C) 06/12/2022       Culture Results:  Microbiology Results (last 10 days)       Procedure Component Value - Date/Time    Respiratory Culture - Aspirate, Bronchus [[id number]] Collected: 06/10/22 1643    Lab Status: Preliminary result Specimen: Aspirate from Bronchus Updated: 06/12/22 0649     Respiratory Culture Rare The culture consists of normal " respiratory annia. This is a preliminary report; final report to follow.     Gram Stain Many (4+) WBCs per low power field      Less than 25 Epithelial cells seen      No organisms seen    Tissue / Bone Culture - Tissue, Brain [[id number]] Collected: 06/10/22 1524    Lab Status: Preliminary result Specimen: Tissue from Brain Updated: 06/11/22 1400     Tissue Culture No growth     Gram Stain No WBCs per low power field      No organisms seen    AFB Culture - Tissue, Brain [[id number]] Collected: 06/10/22 1524    Lab Status: Preliminary result Specimen: Tissue from Brain Updated: 06/11/22 1133     AFB Stain No acid fast bacilli seen on direct smear    Wound Culture - Wound, Head [[id number]] Collected: 06/10/22 1343    Lab Status: Preliminary result Specimen: Wound from Head Updated: 06/11/22 1400     Wound Culture No growth     Gram Stain Rare (1+) WBCs seen      No organisms seen    Tissue / Bone Culture - Tissue, Brain, Cerebral Cortex [[id number]] Collected: 06/10/22 1327    Lab Status: Preliminary result Specimen: Tissue from Brain, Cerebral Cortex Updated: 06/11/22 1401     Tissue Culture No growth     Gram Stain Moderate (3+) WBCs seen      No organisms seen    AFB Culture - Tissue, Brain, Cerebral Cortex [[id number]] Collected: 06/10/22 1327    Lab Status: Preliminary result Specimen: Tissue from Brain, Cerebral Cortex Updated: 06/11/22 1129     AFB Stain No acid fast bacilli seen on direct smear    Tissue / Bone Culture - Tissue, Brain [[id number]] Collected: 06/10/22 1326    Lab Status: Preliminary result Specimen: Tissue from Brain Updated: 06/11/22 1349     Tissue Culture No growth     Gram Stain Rare (1+) WBCs per low power field      No organisms seen    AFB Culture - Tissue, Brain [[id number]] Collected: 06/10/22 1326    Lab Status: Preliminary result Specimen: Tissue from Brain Updated: 06/11/22 1134     AFB Stain No acid fast bacilli seen on direct smear    Wound Culture - Wound, Head [[id number]] Collected:  06/10/22 1304    Lab Status: Preliminary result Specimen: Wound from Head Updated: 06/11/22 1400     Wound Culture No growth     Gram Stain No WBCs or organisms seen    Wound Culture - Wound, Head [[id number]] Collected: 06/10/22 1226    Lab Status: Preliminary result Specimen: Wound from Head Updated: 06/11/22 1400     Wound Culture No growth     Gram Stain Rare (1+) WBCs seen      No organisms seen    COVID PRE-OP / PRE-PROCEDURE SCREENING ORDER (NO ISOLATION) - Swab, Nasal Cavity [[id number]]  (Normal) Collected: 06/10/22 0744    Lab Status: Final result Specimen: Swab from Nasal Cavity Updated: 06/10/22 0857    Narrative:      The following orders were created for panel order COVID PRE-OP / PRE-PROCEDURE SCREENING ORDER (NO ISOLATION) - Swab, Nasal Cavity.  Procedure                               Abnormality         Status                     ---------                               -----------         ------                     COVID-19,[facility]...[[id number]]  Normal              Final result                 Please view results for these tests on the individual orders.    COVID-19,[facility]-HOUSE,Nasal Swab No Transport Media 3-4 HR TAT - Swab, Nasal Cavity [[id number]]  (Normal) Collected: 06/10/22 0744    Lab Status: Final result Specimen: Swab from Nasal Cavity Updated: 06/10/22 0857     COVID19 Not Detected    Narrative:      Fact sheet for providers: https://www.fda.gov/media/334991/download     Fact sheet for patients: https://www.fda.gov/media/062121/download    Test performed by PCR.    Consider negative results in combination with clinical observations, patient history, and epidemiological information.  Fact sheet for providers: https://www.fda.gov/media/071499/download     Fact sheet for patients: https://www.fda.gov/media/077764/download    Test performed by PCR.    Consider negative results in combination with clinical observations, patient history, and epidemiological information.    Culture,  CSF - Cerebrospinal Fluid,  Shunt Aspirate [[id number]] Collected: 06/10/22 0729    Lab Status: Preliminary result Specimen: Cerebrospinal Fluid from  Shunt Aspirate Updated: 06/11/22 1351     CSF Culture No growth     Gram Stain Moderate (3+) WBCs seen      No organisms seen            Aristides Momin, PharmD   06/12/22 08:06 CDT

## 2022-06-12 NOTE — PROGRESS NOTES
Neurology Progress Note      Chief Complaint:    Postoperative seizure    Subjective     Subjective:  Patient remains mechanically ventilated off of sedation.  He did not demonstrate any eye-opening with sternal rub.  He did seem to cough against the vent when doing so.  He did not follow commands today on squeezing his hands or moving extremities.    Corrected fosphenytoin level today is 21.6.      Past Medical History:   Diagnosis Date   • Brain tumor (HCC)    • Depression    • Hearing loss    • History of cellulitis     right foot big toe   • Hypertension    • Pneumonia    • Right arm weakness     after cervial injury   • Sciatic pain     affects balance   • Thyroid disease    • Visual disturbance     due to brain tumor - right eye     Past Surgical History:   Procedure Laterality Date   • CATARACT EXTRACTION, BILATERAL     • COLONOSCOPY     • CRANIOTOMY Bilateral 6/10/2022    Procedure: CRANIECTOMY;  Surgeon: Martell Downs MD;  Location: [facility] OR;  Service: Neurosurgery;  Laterality: Bilateral;   • CRANIOTOMY FOR TUMOR Right 5/10/2022    Procedure: CRANIOTOMY FOR TUMOR STERIOTACTIC WITH BRAIN LAB, right;  Surgeon: Martell Downs MD;  Location: [facility] OR;  Service: Neurosurgery;  Laterality: Right;   • ENDOSCOPY W/ PEG TUBE PLACEMENT N/A 6/2/2022    Procedure: ESOPHAGOGASTRODUODENOSCOPY WITH PERCUTANEOUS ENDOSCOPIC GASTROSTOMY TUBE INSERTION WITH ANESTHESIA;  Surgeon: Melecio Lu MD;  Location: [facility] OR;  Service: Gastroenterology;  Laterality: N/A;   • NECK SURGERY     • SKIN CANCER EXCISION     • TONSILLECTOMY     • TRACHEOSTOMY N/A 6/2/2022    Procedure: TRACHEOSTOMY;  Surgeon: Geovany Davidson MD;  Location: [facility] OR;  Service: ENT;  Laterality: N/A;   •  SHUNT INSERTION Right 6/3/2022    Procedure: VENTRICULAR PERITONEAL SHUNT INSERTION WITH BRAIN LAB;  Surgeon: Martell Downs MD;  Location: [facility] OR;  Service: Neurosurgery;  Laterality: Right;     Family  History   Problem Relation Age of Onset   • Cancer Sister    • Cancer Brother      Social History     Tobacco Use   • Smoking status: Never Smoker   • Smokeless tobacco: Never Used   Vaping Use   • Vaping Use: Never used   Substance Use Topics   • Alcohol use: Never   • Drug use: Never       Medications:  Current Facility-Administered Medications   Medication Dose Route Frequency Provider Last Rate Last Admin   • acetaminophen (TYLENOL) tablet 650 mg  650 mg Oral Q4H PRN Martell Downs MD   650 mg at 06/04/22 2029    Or   • acetaminophen (TYLENOL) suppository 650 mg  650 mg Rectal Q4H PRN Martell Downs MD   650 mg at 05/23/22 0016   • alteplase (CATHFLO/ACTIVASE) injection 2 mg  2 mg Intracatheter PRN Martell Downs MD   New Syringe/Cartridge at 05/28/22 1330   • bisacodyl (DULCOLAX) suppository 10 mg  10 mg Rectal Daily PRN Martell Downs MD   10 mg at 06/03/22 0749   • carvedilol (COREG) tablet 6.25 mg  6.25 mg Nasogastric BID With Meals Martell Downs MD   6.25 mg at 06/12/22 0833   • cefepime (MAXIPIME) 2 g/100 mL 0.9% NS (mbp)  2 g Intravenous Q8H Martell Downs MD   2 g at 06/12/22 0554   • chlorhexidine (PERIDEX) 0.12 % solution 15 mL  15 mL Mouth/Throat Q12H Martell Downs MD   15 mL at 06/12/22 0833   • dexamethasone (DECADRON) injection 4 mg  4 mg Intravenous Q6H Martell Downs MD   4 mg at 06/12/22 0644   • dextrose (D50W) (25 g/50 mL) IV injection 25 g  25 g Intravenous Q15 Min PRN Martell Downs MD   25 g at 06/09/22 1807   • dextrose (GLUTOSE) oral gel 15 g  15 g Oral Q15 Min PRN Martell Downs MD   15 g at 05/22/22 0527   • fosphenytoin (Cerebyx) 275 mg PE in sodium chloride 0.9 % 100 mL IVPB  275 mg PE Intravenous Q8H Martell Downs MD   275 mg PE at 06/12/22 0554   • glucagon (human recombinant) (GLUCAGEN DIAGNOSTIC) injection 1 mg  1 mg Intramuscular Q15 Min PRN Martell Downs MD       •  guaiFENesin (ROBITUSSIN) 100 MG/5ML oral solution 200 mg  200 mg Per PEG Tube TID Daly Perea APRN   200 mg at 06/12/22 1048   • heparin (porcine) 5000 UNIT/ML injection 5,000 Units  5,000 Units Subcutaneous Q12H Martell Downs MD   5,000 Units at 06/12/22 0832   • Hold Medication (Anticoagulation)  1 each Does not apply Continuous PRN Martell Downs MD       • hydrALAZINE (APRESOLINE) injection 10 mg  10 mg Intravenous Q6H PRN Martell Downs MD   10 mg at 06/12/22 0027   • insulin detemir (LEVEMIR) injection 20 Units  20 Units Subcutaneous Nightly Martell Downs MD   20 Units at 06/11/22 2101   • insulin regular (humuLIN R,novoLIN R) injection 2-7 Units  2-7 Units Subcutaneous Q6H Martell Downs MD   3 Units at 06/12/22 0023   • insulin regular (humuLIN R,novoLIN R) injection 8 Units  8 Units Subcutaneous Q6H Martell Downs MD   8 Units at 06/12/22 0023   • ipratropium-albuterol (DUO-NEB) nebulizer solution 3 mL  3 mL Nebulization Q4H PRN Martell Downs MD   3 mL at 06/12/22 0447   • labetalol (NORMODYNE,TRANDATE) injection 10 mg  10 mg Intravenous Q6H PRN Martell Downs MD   10 mg at 06/08/22 0111   • lacosamide (VIMPAT) injection 200 mg  200 mg Intravenous Q12H Martell Downs MD   200 mg at 06/12/22 0835   • lansoprazole (FIRST) oral suspension 30 mg  30 mg Per G Tube QAM Martell Downs MD   30 mg at 06/12/22 0834   • levalbuterol (XOPENEX) nebulizer solution 0.63 mg  0.63 mg Nebulization TID PRN Martell Downs MD   0.63 mg at 06/12/22 1046   • levETIRAcetam in NaCl 0.75% (KEPPRA) IVPB 1,000 mg  1,000 mg Intravenous Q12H Titsworth, Martell Eliu, MD   1,000 mg at 06/12/22 0834   • levothyroxine (SYNTHROID, LEVOTHROID) tablet 125 mcg  125 mcg Nasogastric Q AM Martell Downs MD   125 mcg at 06/12/22 0554   • lidocaine (XYLOCAINE) 1 % injection 10 mL  10 mL Intradermal Once Martell Downs MD        • liothyronine (CYTOMEL) tablet 25 mcg  25 mcg Nasogastric Daily Martell Downs MD   25 mcg at 06/12/22 0833   • lisinopril (PRINIVIL,ZESTRIL) tablet 20 mg  20 mg Nasogastric Q24H Martell Downs MD   20 mg at 06/12/22 0832   • LORazepam (ATIVAN) injection 2 mg  2 mg Intravenous Q2H PRN Martell Downs MD   2 mg at 06/12/22 0352   • mupirocin (BACTROBAN) 2 % ointment 1 application  1 application Topical Q12H Martell Downs MD   1 application at 06/12/22 1048   • norepinephrine (LEVOPHED) 8 mg in 250 mL NS infusion (premix)  0.02-0.3 mcg/kg/min Intravenous Titrated Martell Downs MD   Held at 06/11/22 1054   • Nutrisource fiber pack 1 packet  1 packet Nasogastric 4x Daily Martell Downs MD   1 packet at 06/12/22 0834   • ondansetron (ZOFRAN) tablet 4 mg  4 mg Oral Q6H PRN Martell Downs MD        Or   • ondansetron (ZOFRAN) injection 4 mg  4 mg Intravenous Q6H PRN Martell Downs MD       • oxyCODONE (ROXICODONE) 5 MG/5ML solution 5 mg  5 mg Per PEG Tube Q6H PRN Daly Perea APRN       • polyethylene glycol (MIRALAX) packet 17 g  17 g Oral Daily Martell Downs MD   17 g at 06/12/22 0832   • ProSource TF oral liquid 45 mL  45 mL Per G Tube BID Martell Downs MD   45 mL at 06/12/22 0834   • sodium chloride 3 % nebulizer solution 4 mL  4 mL Nebulization BID - RT Martell Downs MD   4 mL at 06/12/22 1046   • vancomycin 1500 mg/500 mL 0.9% NS IVPB ([facility])  15 mg/kg Intravenous Q12H Martell Downs MD   1,500 mg at 06/12/22 0141       Allergies:    Patient has no known allergies.    Review of Systems:   -Review of systems could not be obtained due to mechanical ventilation and decreased level of consciousness.      Objective      Vital Signs  Temp:  [97.3 °F (36.3 °C)-99.2 °F (37.3 °C)] 98.1 °F (36.7 °C)  Heart Rate:  [58-69] 59  Resp:  [20-42] 27  BP: (106-178)/(49-78) 106/49  Arterial Line BP: (114-188)/(35-72)  115/35  FiO2 (%):  [35 %] 35 %    Physical Exam:     HEENT:  Neck supple.  Tracheostomy in place.   CVS:  Regular rate and rhythm.  No murmurs  Carotid Examination:  No bruits  Lungs:  Clear to auscultation  Abdomen:  Non-tender, Non-distended.  PEG tube in place  Extremities:  No signs of peripheral edema     Neurologic Exam:   Coughs against the vent with sternal rub and noxious stimuliPupils are equally reactive to light.    Did not blink to threat today.Gag intact.     Motor: (strength out of 5:  1= minimal movement, 2 = movement in plane of gravity, 3 = movement against gravity, 4 = movement against some resistance, 5 = full strength)     Did not move any extremities spontaneously  Withdrew to noxious stimuli in the right upper extremity and move the left toes slightly with noxious stimuli, but I did not see any other reflexive response or withdrawal in the right lower or left upper extremity.     DTR:  2+ throughout in all four extremities  upgoing toe on left     Results Review:    I reviewed the patient's new clinical results and findings.    Lab Results (last 24 hours)     Procedure Component Value Units Date/Time    AFB Culture - Tissue, Brain, Cerebral Cortex [[id number]] Collected: 06/10/22 1327    Specimen: Tissue from Brain, Cerebral Cortex Updated: 06/12/22 1148     AFB Stain No acid fast bacilli seen on direct smear      No acid fast bacilli seen on concentrated smear    AFB Culture - Tissue, Brain [[id number]] Collected: 06/10/22 1326    Specimen: Tissue from Brain Updated: 06/12/22 1147     AFB Stain No acid fast bacilli seen on direct smear      No acid fast bacilli seen on concentrated smear    AFB Culture - Tissue, Brain [[id number]] Collected: 06/10/22 1524    Specimen: Tissue from Brain Updated: 06/12/22 1147     AFB Stain No acid fast bacilli seen on direct smear      No acid fast bacilli seen on concentrated smear    Tissue / Bone Culture - Tissue, Brain, Cerebral Cortex [[id number]] Collected:  06/10/22 1327    Specimen: Tissue from Brain, Cerebral Cortex Updated: 06/12/22 0838     Tissue Culture No growth at 2 days     Gram Stain Moderate (3+) WBCs seen      No organisms seen    Tissue / Bone Culture - Tissue, Brain [[id number]] Collected: 06/10/22 1524    Specimen: Tissue from Brain Updated: 06/12/22 0838     Tissue Culture No growth at 2 days     Gram Stain No WBCs per low power field      No organisms seen    Wound Culture - Wound, Head [[id number]] Collected: 06/10/22 1226    Specimen: Wound from Head Updated: 06/12/22 0837     Wound Culture No growth at 2 days     Gram Stain Rare (1+) WBCs seen      No organisms seen    Wound Culture - Wound, Head [[id number]] Collected: 06/10/22 1304    Specimen: Wound from Head Updated: 06/12/22 0837     Wound Culture No growth at 2 days     Gram Stain No WBCs or organisms seen    Wound Culture - Wound, Head [[id number]] Collected: 06/10/22 1343    Specimen: Wound from Head Updated: 06/12/22 0837     Wound Culture No growth at 2 days     Gram Stain Rare (1+) WBCs seen      No organisms seen    Culture, CSF - Cerebrospinal Fluid,  Shunt Aspirate [[id number]] Collected: 06/10/22 0729    Specimen: Cerebrospinal Fluid from  Shunt Aspirate Updated: 06/12/22 0834     CSF Culture No growth at 2 days     Gram Stain Moderate (3+) WBCs seen      No organisms seen    Tissue / Bone Culture - Tissue, Brain [[id number]] Collected: 06/10/22 1326    Specimen: Tissue from Brain Updated: 06/12/22 0832     Tissue Culture No growth at 2 days     Gram Stain Rare (1+) WBCs per low power field      No organisms seen    Respiratory Culture - Aspirate, Bronchus [[id number]] Collected: 06/10/22 1643    Specimen: Aspirate from Bronchus Updated: 06/12/22 0649     Respiratory Culture Rare The culture consists of normal respiratory annia. This is a preliminary report; final report to follow.     Gram Stain Many (4+) WBCs per low power field      Less than 25 Epithelial cells seen      No organisms  seen    POC Glucose Once [[id number]]  (Normal) Collected: 06/12/22 0612    Specimen: Blood Updated: 06/12/22 0627     Glucose 78 mg/dL      Comment: : [id number] Berto Carvajal ID: [id number]       Phenytoin Level, Total [[id number]]  (Normal) Collected: 06/12/22 0515    Specimen: Blood Updated: 06/12/22 0612     Phenytoin Level 16.4 mcg/mL     C-reactive Protein [[id number]]  (Abnormal) Collected: 06/12/22 0515    Specimen: Blood Updated: 06/12/22 0611     C-Reactive Protein 18.69 mg/dL     Comprehensive Metabolic Panel [[id number]]  (Abnormal) Collected: 06/12/22 0515    Specimen: Blood Updated: 06/12/22 0611     Glucose 81 mg/dL      BUN 34 mg/dL      Creatinine 0.61 mg/dL      Sodium 140 mmol/L      Potassium 4.1 mmol/L      Chloride 106 mmol/L      CO2 28.0 mmol/L      Calcium 8.2 mg/dL      Total Protein 6.1 g/dL      Albumin 2.40 g/dL      ALT (SGPT) 61 U/L      AST (SGOT) 80 U/L      Alkaline Phosphatase 257 U/L      Total Bilirubin 0.3 mg/dL      Globulin 3.7 gm/dL      A/G Ratio 0.6 g/dL      BUN/Creatinine Ratio 55.7     Anion Gap 6.0 mmol/L      eGFR 98.9 mL/min/1.73      Comment: National Kidney Foundation and American Society of Nephrology (ASN) Task Force recommended calculation based on the Chronic Kidney Disease Epidemiology Collaboration (CKD-EPI) equation refit without adjustment for race.       Narrative:      GFR Normal >60  Chronic Kidney Disease <60  Kidney Failure <15      Vancomycin, Trough [[id number]]  (Abnormal) Collected: 06/12/22 0515    Specimen: Blood Updated: 06/12/22 0547     Vancomycin Trough 25.60 mcg/mL     CBC & Differential [[id number]]  (Abnormal) Collected: 06/12/22 0515    Specimen: Blood Updated: 06/12/22 0526    Narrative:      The following orders were created for panel order CBC & Differential.  Procedure                               Abnormality         Status                     ---------                               -----------         ------                      CBC Auto Differential[[id number]]        Abnormal            Final result                 Please view results for these tests on the individual orders.    CBC Auto Differential [[id number]]  (Abnormal) Collected: 06/12/22 0515    Specimen: Blood Updated: 06/12/22 0526     WBC 13.40 10*3/mm3      RBC 2.21 10*6/mm3      Hemoglobin 7.0 g/dL      Hematocrit 23.1 %      .5 fL      MCH 31.7 pg      MCHC 30.3 g/dL      RDW 16.9 %      RDW-SD 59.7 fl      MPV 9.9 fL      Platelets 193 10*3/mm3      Neutrophil % 79.6 %      Lymphocyte % 7.8 %      Monocyte % 10.5 %      Eosinophil % 0.5 %      Basophil % 0.4 %      Immature Grans % 1.2 %      Neutrophils, Absolute 10.66 10*3/mm3      Lymphocytes, Absolute 1.05 10*3/mm3      Monocytes, Absolute 1.41 10*3/mm3      Eosinophils, Absolute 0.07 10*3/mm3      Basophils, Absolute 0.05 10*3/mm3      Immature Grans, Absolute 0.16 10*3/mm3      nRBC 0.0 /100 WBC     Blood Gas, Arterial - [[id number]]  (Abnormal) Collected: 06/12/22 0412    Specimen: Arterial Blood Updated: 06/12/22 0407     Site Arterial Line     Denzel's Test N/A     pH, Arterial 7.488 pH units      Comment: 83 Value above reference range        pCO2, Arterial 38.4 mm Hg      pO2, Arterial 88.2 mm Hg      HCO3, Arterial 29.1 mmol/L      Comment: 83 Value above reference range        Base Excess, Arterial 5.3 mmol/L      Comment: 83 Value above reference range        O2 Saturation, Arterial 98.4 %      Temperature 37.0 C      Barometric Pressure for Blood Gas 745 mmHg      Modality Ventilator     FIO2 35 %      Ventilator Mode PC     Set [facility] Resp Rate 16.0     PEEP 5.0     PIP 12 cmH2O      Comment: Meter: [id number]     :  [id number]        Collected by [id number]     pCO2, Temperature Corrected 38.4 mm Hg      pH, Temp Corrected 7.488 pH Units      pO2, Temperature Corrected 88.2 mm Hg     POC Glucose Once [[id number]]  (Abnormal) Collected: 06/12/22 0008    Specimen: Blood Updated: 06/12/22 0030      Glucose 219 mg/dL      Comment: : [id number] Berto HansontanyMeter ID: [id number]       POC Glucose Once [[id number]]  (Abnormal) Collected: 06/11/22 2054    Specimen: Blood Updated: 06/11/22 2122     Glucose 153 mg/dL      Comment: : [id number] Berto HillsyMeter ID: [id number]       POC Glucose Once [[id number]]  (Normal) Collected: 06/11/22 1805    Specimen: Blood Updated: 06/11/22 1816     Glucose 124 mg/dL      Comment: : [id number] Rip XieMeter ID: [id number]       POC Glucose Once [[id number]]  (Abnormal) Collected: 06/11/22 1200    Specimen: Blood Updated: 06/11/22 1217     Glucose 152 mg/dL      Comment: : [id number] Rip XieMeter ID: [id number]             Imaging Results (Last 24 Hours)     Procedure Component Value Units Date/Time    XR Chest 1 View [[id number]] Collected: 06/12/22 1033     Updated: 06/12/22 1037    Narrative:      EXAMINATION: XR CHEST 1 VW-     6/12/2022 3:25 AM CDT     HISTORY: On vent; R13.10-Dysphagia, unspecified; Z01.818-Encounter for  other preprocedural examination; D32.9-Benign neoplasm of meninges,  unspecified; Z74.09-Other reduced mobility; Z78.9-Other specified health  status; G40.901-Epilepsy, unspecified, not intractable, with status  epilepticus; J96.01-Acute respiratory failure with hypoxia;  G91.0-Communicating hydrocephalus; T81.40XA-Infection following a pr     1 view chest x-ray.     Comparison is made with yesterday at 3:31 AM.     Stable heart size.  Tracheostomy tube remains in place.  Right  shunt tubing noted.     Low lung volumes with persistent consolidation of the left lower lobe.     No pneumothorax and no sign of heart failure.     Multiple small calcified granulomas throughout both lungs.     Summary:  1. No significant change from yesterday.  This report was finalized on 06/12/2022 10:34 by Dr. Tomer Whelan MD.          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)    Status epilepticus (HCC)    Essential hypertension    Type 2 diabetes mellitus with hyperglycemia, without long-term current use of insulin (HCC)    Hypothyroidism (acquired)    Acute respiratory failure (secondary to status epilepticus)    Thrombocytopenia (HCC)    Cerebral parenchymal hemorrhage (HCC)    PAF (paroxysmal atrial fibrillation) (HCC)    Fever    Loculated pleural effusion    Acute deep vein thrombosis (DVT) of the ulnar and brachial veins of right upper extremity (HCC)    Dysphagia    Communicating hydrocephalus (HCC)     Impression:     · Postoperative seizure  · S/p craniectomy and debridement yesterday  · Hypoalbuminemia   · S/p  shunt on 6/3/2022  · Tracheostomy  · PEG tube        Plan:  · Patient seen via telemedicine with Dr. Carrillo today.  · Begin reducing fosphenytoin to 200 mg every 8 hours.  Eventual plan to discontinue.  · Continue Keppra 1000 mg twice daily.  · Continue Vimpat 200 mg IV every 12 hours  · Continue to work on reducing antiepileptic medication, as able, in an effort to see if this helps the patient improve his level of consciousness and responsiveness.  · Neurology will continue to follow            Zac Mccoy PA-C  06/12/22  11:56 CDT

## 2022-06-12 NOTE — PROGRESS NOTES
PULMONARY AND CRITICAL CARE PROGRESS NOTE - [facility]    Patient: Hai Hernandez    1945    MR# [id number]    Acct# [id number]  06/12/22   09:33 CDT  Referring Provider: Martell Downs, *    Chief Complaint: Mechanically ventilated    Interval history: He is afebrile.  He is receiving nutrition via tube feeds.  SCDs in place.  Minimal output from CIERA drain per nursing.  They are concerned about him having some pain.  Requesting as needed medication.  Thick secretions suctioned per nursing.  Chest x-ray stable.  ABGs adequate.  Hemoglobin 7.0.  Saturation 96 on PEEP of 5 and FiO2 0.35.  Tidal volume 425 or greater in pressure control.  No visitors present.    Meds:  carvedilol, 6.25 mg, Nasogastric, BID With Meals  cefepime, 2 g, Intravenous, Q8H  chlorhexidine, 15 mL, Mouth/Throat, Q12H  dexamethasone, 4 mg, Intravenous, Q6H  fosphenytoin, 275 mg PE, Intravenous, Q8H  heparin (porcine), 5,000 Units, Subcutaneous, Q12H  insulin detemir, 20 Units, Subcutaneous, Nightly  insulin regular, 2-7 Units, Subcutaneous, Q6H  insulin regular, 8 Units, Subcutaneous, Q6H  lacosamide, 200 mg, Intravenous, Q12H  lansoprazole, 30 mg, Per G Tube, QAM  levETIRAcetam, 1,000 mg, Intravenous, Q12H  levothyroxine, 125 mcg, Nasogastric, Q AM  lidocaine, 10 mL, Intradermal, Once  liothyronine, 25 mcg, Nasogastric, Daily  lisinopril, 20 mg, Nasogastric, Q24H  mupirocin, 1 application, Topical, Q12H  Nutrisource fiber, 1 packet, Nasogastric, 4x Daily  polyethylene glycol, 17 g, Oral, Daily  ProSource TF, 45 mL, Per G Tube, BID  sodium chloride, 4 mL, Nebulization, BID - RT  vancomycin, 15 mg/kg, Intravenous, Q12H      hold, 1 each  norepinephrine, 0.02-0.3 mcg/kg/min, Last Rate: Stopped (06/11/22 1054)      Review of Systems:     Cannot obtain due to mechanical ventilated state     Ventilator Settings:        Resp Rate (Set): 16  Pressure Support (cm H2O): 8 cm H20  FiO2 (%): 35 %  PEEP/CPAP (cm H2O): 5 cm  H20  Minute Ventilation (L/min) (Obs): 10.4 L/min  Resp Rate (Observed) Vent: 32  I:E Ratio (Set): 1:0.27  I:E Ratio (Obs): 1:1.40  PIP Observed (cm H2O): 20 cm H2O  RSBI: 21.96  Physical Exam:  Temp:  [97.3 °F (36.3 °C)-99.2 °F (37.3 °C)] 97.3 °F (36.3 °C)  Heart Rate:  [55-69] 62  Resp:  [20-42] 32  BP: (112-178)/(50-78) 122/54  Arterial Line BP: (121-188)/(39-72) 130/39  FiO2 (%):  [35 %] 35 %  Intake/Output Summary (Last 24 hours) at 6/12/2022 0933  Last data filed at 6/12/2022 0400  Gross per 24 hour   Intake 3083.97 ml   Output 1729 ml   Net 1354.97 ml     SpO2 Percentage    06/12/22 0630 06/12/22 0632 06/12/22 0700   SpO2: 94% 94% 96%   Body mass index is 31.86 kg/m².   Physical Exam     Constitutional:       General: He is intubated     Appearance: He is ill-appearing. He is not toxic-appearing.      Interventions: He is intubated, no sedation  HENT:      Head: Normocephalic.      Comments: Craniotomy wound with CIERA drain, trach to vent     Nose: Nose normal.   Eyes:      General: No scleral icterus.  Cardiovascular:  Irregular rhythm, normal rate  Pulmonary:      Effort: No accessory muscle usage or respiratory distress. He is intubated.      Breath sounds: few rhonchi, diminished in bases  Abdominal:      General: There is no distension. PEG/TF     Palpations: Abdomen is soft. Hyperactive bowel sounds  Genitourinary:     Comments: not examined  Musculoskeletal:      Right lower leg: Edema present.      Left lower leg: Edema present.      Comments: SCDs   Skin:     Findings: No rash.   Neurological:      Motor: opens eyes/grimaces to pain   Psychiatric:         Behavior: Behavior is not agitated.     Electronically signed by MARIO Weeks, 6/12/2022, 09:33 CDT      Physician Substantive Portion: Medical Decision Making    Laboratory Data:  Results from last 7 days   Lab Units 06/12/22  0515 06/11/22  0356 06/10/22  0351   WBC 10*3/mm3 13.40* 14.13* 12.23*   HEMOGLOBIN g/dL 7.0* 7.2* 8.2*   PLATELETS  10*3/mm3 193 171 213     Results from last 7 days   Lab Units 06/12/22  0515 06/11/22  0356 06/10/22  1306 06/10/22  0351   SODIUM mmol/L 140 137  --  133*   SODIUM, ARTERIAL mmol/L  --   --  138  --    POTASSIUM mmol/L 4.1 4.3  --  4.3   BUN mg/dL 34* 29*  --  30*   CREATININE mg/dL 0.61* 0.67*  --  0.52*     Results from last 7 days   Lab Units 06/12/22  0412 06/11/22  0401 06/10/22  1306   PH, ARTERIAL pH units 7.488* 7.436 7.436   PCO2, ARTERIAL mm Hg 38.4 45.1* 43.6   PO2 ART mm Hg 88.2 176.0* 271.0*   FIO2 % 35 60 100     Wound Culture   Date Value Ref Range Status   06/10/2022 No growth at 2 days  Preliminary   06/10/2022 No growth at 2 days  Preliminary   06/10/2022 No growth at 2 days  Preliminary     Respiratory Culture   Date Value Ref Range Status   06/10/2022   Preliminary    Rare The culture consists of normal respiratory annia. This is a preliminary report; final report to follow.     Recent films:  CT Head Without Contrast    Result Date: 6/11/2022  EXAMINATION: CT HEAD WO CONTRAST-   6/11/2022 7:04 AM CDT  HISTORY: Craniotomy, post-op; R13.10-Dysphagia, unspecified; Z01.818-Encounter for other preprocedural examination; D32.9-Benign neoplasm of meninges, unspecified; Z74.09-Other reduced mobility; Z78.9-Other specified health status; G40.901-Epilepsy, unspecified, not intractable, with status epilepticus; J96.01-Acute respiratory failure with hypoxia; G91.0-Communicating hydrocephalus; T81.40XA-Infection fo  In order to have a CT radiation dose as low as reasonably achievable Automated Exposure Control was utilized for adjustment of the mA and/or KV according to patient size.  DLP in mGycm= 1745.  Noncontrast head CT. Axial, sagittal, and coronal sequences.  Comparison is made with a noncontrast head CT from June 6, 2022.  Interval surgery with right frontal craniectomy. Extra-axial 2 cm focus of acute hemorrhage at the right frontal surgical site. Underlying right frontal lobe edema extending to  the margin of the frontal horn of the right lateral ventricle. Right parietal intraventricular drain now present. Small amount of dependent intraventricular hemorrhage is unchanged.  Ventricle size is stable or minimally decreased. No midline shift.  Summary: 1. Repeat surgery with right frontal craniectomy. Focal acute hemorrhage at the operative site. 2. Ventricular drain placement. 3. Stable or slightly decreased ventricle size. 4. No midline shift.            This report was finalized on 06/11/2022 08:19 by Dr. Tomer Whelan MD.    XR Chest 1 View    Result Date: 6/11/2022  EXAMINATION: XR CHEST 1 VW-  6/11/2022 4:24 AM CDT  HISTORY: On vent; R13.10-Dysphagia, unspecified; Z01.818-Encounter for other preprocedural examination; D32.9-Benign neoplasm of meninges, unspecified; Z74.09-Other reduced mobility; Z78.9-Other specified health status; G40.901-Epilepsy, unspecified, not intractable, with status epilepticus; J96.01-Acute respiratory failure with hypoxia; G91.0-Communicating hydrocephalus; T81.40XA-Infection following a pr  1 view chest x-ray. Comparison is made with yesterday's 3:12 AM.  Stable heart size. Persistent left lower lobe consolidation. Tracheostomy tube remains in place.  The left upper lobe and right lung are essentially clear.  No pneumothorax.  Right-sided  shunt tubing again seen.  Summary: 1. No significant change. This report was finalized on 06/11/2022 08:20 by Dr. Tomer Whelan MD.     My radiograph interpretation/independent review of imaging: infiltrate on left stable    Pulmonary Assessment:    1. Acute resp failure requiring vent  2. Encephalopathy  3. Pleural effusion stable  4. Possible LLL pneumonia  5. Hydrocephalus s/p shunt  6. pain    Recommend/plan:   · D/c daily abg  · Robitussin for secretion thinning  · Trache care  · Monitor etco2  · Prn pain meds    This visit was performed by both a physician and an [facility] RN.  I personally evaluated and examined the  patient.  I performed all aspects of the medical decision making as documented.  Electronically signed by Carlos A Dasilva MD, 6/12/2022, 10:49 CDT

## 2022-06-12 NOTE — PROGRESS NOTES
NEUROSURGERY DAILY PROGRESS NOTE    ASSESSMENT:   Hai Hernandez is a 77 y.o. with a significant comorbidity of acephalic migraines, thyroid disease status post radiation as a child, basal cell and squamous cell carcinoma of the skin. He presents in FU for known meningioma found on workup for right visual field changes. Physical exam findings of neurologically intact with resolution of all symptoms and mild decreased vision in right eye.  His imaging shows 22 x 24 x 13.5 mm right planum sphenoidale mass most suggestive of meningioma.    Past Medical History:   Diagnosis Date   • Brain tumor (HCC)    • Depression    • Hearing loss    • History of cellulitis     right foot big toe   • Hypertension    • Pneumonia    • Right arm weakness     after cervial injury   • Sciatic pain     affects balance   • Thyroid disease    • Visual disturbance     due to brain tumor - right eye     Active Hospital Problems    Diagnosis    • **Meningioma (HCC)    • Acute deep vein thrombosis (DVT) of the ulnar and brachial veins of right upper extremity (HCC)    • Loculated pleural effusion    • Fever    • PAF (paroxysmal atrial fibrillation) (HCC)    • Cerebral parenchymal hemorrhage (HCC)    • Essential hypertension    • Type 2 diabetes mellitus with hyperglycemia, without long-term current use of insulin (HCC)    • Hypothyroidism (acquired)    • Acute respiratory failure (secondary to status epilepticus)    • Thrombocytopenia (HCC)    • 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  Does not follow commands.  Nonverbal.  Weakly withdraws to tactile  "stimuli.    Intracranial meningioma   POD #32 (5/10/2022) Status post postcraniotomy for meningioma   POD#2 (6/10/2022) Craniectomy for cerebral edema and possible infection   CT reviewed and stable.  Small blood products at site of brain biopsy   Neurochecks per policy   Decadron 4mg Q6   Wound dry   Craniectomy precautions   CIERA removed    Hydrocephalus   Lumbar drain removed   CSF unremarkable from 5/17 and 5/23 and 6/10.    S/P right posterior parietal  shunt placement   Shunt pumps and refills well    Postop seizures, in status   Neurology managing   Cont Keppra, Dialntin, Vimpat   Stat Ativan   Follow dilantin levels   Repeat EEG unremarkable     CV: Cardene off   keep SBP <160.   One bout of HTN overnight and resolved with PRN pain medication   Requiring hydralazine and labetalol PRNs  Pulm: Tracheostomy in place.  Appreciate ENT   Left chest tube placed x2 CT managing with TPA  : Keep jansen for now.   FEN: Poor glucose control.  Increase SSI  ENDO: Accu-Chek and treat per policy  GI: PEG tube placement today.  Appreciate GI  ID: Afebrile overnight,    Mild Leukocytosis   Repeat CRP today   Broad spectrum abx, Vanc and Cefepime   CSF cultures pending, NGTD  Heme:  DVT prophylaxis with SCD's.     Plt up 193 and HIT ab negative.     RUE clot.  Leave PICC for now.  Can not anticoagulate  Pain: NA  Dispo: DC pending seizure control   Pending extubation    CHIEF COMPLAINT:   Right visual field changes    Subjective  Symptom stable    Temp:  [97.3 °F (36.3 °C)-99.2 °F (37.3 °C)] 98.1 °F (36.7 °C)  Heart Rate:  [59-69] 59  Resp:  [20-39] 27  BP: (106-162)/(49-78) 106/49  Arterial Line BP: (114-188)/(35-72) 115/35  FiO2 (%):  [35 %] 35 %    Objective:  General Appearance:  Well-appearing and in no acute distress.    Vital signs: (most recent): Blood pressure 106/49, pulse 59, temperature 98.1 °F (36.7 °C), temperature source Axillary, resp. rate 27, height 182.9 cm (72\"), weight 107 kg (234 lb 14.4 oz), SpO2 95 %. "      Neurologic Exam     Mental Status   Level of consciousness: arousable by verbal stimuli ,  drowsy  Off propofol.  Intermittently opens eyes to painful stimuli.  Does not follow commands.  Nonverbal.  Weakly withdraws to tactile stimuli.           Cranial Nerves     CN III, IV, VI   Pupils are equal, round, and reactive to light.  Extraocular motions are normal.     CN IX, X   CN IX normal.     Gait, Coordination, and Reflexes     Tremor   Resting tremor: absent  Intention tremor: absent  Action tremor: absent    Drains:   Urethral Catheter Non-latex;Silicone 16 Fr. (Active)       Output by Drain (mL) 06/11/22 0701 - 06/11/22 1900 06/11/22 1901 - 06/12/22 0700 06/12/22 0701 - 06/12/22 1222 Range Total   Closed/Suction Drain 1 Right Scalp Bulb 20 17 5 42       Imaging Results (Last 24 Hours)     Procedure Component Value Units Date/Time    XR Chest 1 View [[id number]] Collected: 06/12/22 1033     Updated: 06/12/22 1037    Narrative:      EXAMINATION: XR CHEST 1 VW-     6/12/2022 3:25 AM CDT     HISTORY: On vent; R13.10-Dysphagia, unspecified; Z01.818-Encounter for  other preprocedural examination; D32.9-Benign neoplasm of meninges,  unspecified; Z74.09-Other reduced mobility; Z78.9-Other specified health  status; G40.901-Epilepsy, unspecified, not intractable, with status  epilepticus; J96.01-Acute respiratory failure with hypoxia;  G91.0-Communicating hydrocephalus; T81.40XA-Infection following a pr     1 view chest x-ray.     Comparison is made with yesterday at 3:31 AM.     Stable heart size.  Tracheostomy tube remains in place.  Right  shunt tubing noted.     Low lung volumes with persistent consolidation of the left lower lobe.     No pneumothorax and no sign of heart failure.     Multiple small calcified granulomas throughout both lungs.     Summary:  1. No significant change from yesterday.  This report was finalized on 06/12/2022 10:34 by Dr. Tomer Whelan MD.        Lab Results (last 24 hours)      Procedure Component Value Units Date/Time    AFB Culture - Tissue, Brain, Cerebral Cortex [[id number]] Collected: 06/10/22 1327    Specimen: Tissue from Brain, Cerebral Cortex Updated: 06/12/22 1148     AFB Stain No acid fast bacilli seen on direct smear      No acid fast bacilli seen on concentrated smear    AFB Culture - Tissue, Brain [[id number]] Collected: 06/10/22 1326    Specimen: Tissue from Brain Updated: 06/12/22 1147     AFB Stain No acid fast bacilli seen on direct smear      No acid fast bacilli seen on concentrated smear    AFB Culture - Tissue, Brain [[id number]] Collected: 06/10/22 1524    Specimen: Tissue from Brain Updated: 06/12/22 1147     AFB Stain No acid fast bacilli seen on direct smear      No acid fast bacilli seen on concentrated smear    Tissue / Bone Culture - Tissue, Brain, Cerebral Cortex [[id number]] Collected: 06/10/22 1327    Specimen: Tissue from Brain, Cerebral Cortex Updated: 06/12/22 0838     Tissue Culture No growth at 2 days     Gram Stain Moderate (3+) WBCs seen      No organisms seen    Tissue / Bone Culture - Tissue, Brain [[id number]] Collected: 06/10/22 1524    Specimen: Tissue from Brain Updated: 06/12/22 0838     Tissue Culture No growth at 2 days     Gram Stain No WBCs per low power field      No organisms seen    Wound Culture - Wound, Head [[id number]] Collected: 06/10/22 1226    Specimen: Wound from Head Updated: 06/12/22 0837     Wound Culture No growth at 2 days     Gram Stain Rare (1+) WBCs seen      No organisms seen    Wound Culture - Wound, Head [[id number]] Collected: 06/10/22 1304    Specimen: Wound from Head Updated: 06/12/22 0837     Wound Culture No growth at 2 days     Gram Stain No WBCs or organisms seen    Wound Culture - Wound, Head [[id number]] Collected: 06/10/22 1343    Specimen: Wound from Head Updated: 06/12/22 0837     Wound Culture No growth at 2 days     Gram Stain Rare (1+) WBCs seen      No organisms seen    Culture, CSF - Cerebrospinal Fluid,   Shunt Aspirate [[id number]] Collected: 06/10/22 0729    Specimen: Cerebrospinal Fluid from  Shunt Aspirate Updated: 06/12/22 0834     CSF Culture No growth at 2 days     Gram Stain Moderate (3+) WBCs seen      No organisms seen    Tissue / Bone Culture - Tissue, Brain [[id number]] Collected: 06/10/22 1326    Specimen: Tissue from Brain Updated: 06/12/22 0832     Tissue Culture No growth at 2 days     Gram Stain Rare (1+) WBCs per low power field      No organisms seen    Respiratory Culture - Aspirate, Bronchus [[id number]] Collected: 06/10/22 1643    Specimen: Aspirate from Bronchus Updated: 06/12/22 0649     Respiratory Culture Rare The culture consists of normal respiratory annia. This is a preliminary report; final report to follow.     Gram Stain Many (4+) WBCs per low power field      Less than 25 Epithelial cells seen      No organisms seen    POC Glucose Once [[id number]]  (Normal) Collected: 06/12/22 0612    Specimen: Blood Updated: 06/12/22 0627     Glucose 78 mg/dL      Comment: : [id number] Berto Soriaeter ID: [id number]       Phenytoin Level, Total [[id number]]  (Normal) Collected: 06/12/22 0515    Specimen: Blood Updated: 06/12/22 0612     Phenytoin Level 16.4 mcg/mL     C-reactive Protein [[id number]]  (Abnormal) Collected: 06/12/22 0515    Specimen: Blood Updated: 06/12/22 0611     C-Reactive Protein 18.69 mg/dL     Comprehensive Metabolic Panel [[id number]]  (Abnormal) Collected: 06/12/22 0515    Specimen: Blood Updated: 06/12/22 0611     Glucose 81 mg/dL      BUN 34 mg/dL      Creatinine 0.61 mg/dL      Sodium 140 mmol/L      Potassium 4.1 mmol/L      Chloride 106 mmol/L      CO2 28.0 mmol/L      Calcium 8.2 mg/dL      Total Protein 6.1 g/dL      Albumin 2.40 g/dL      ALT (SGPT) 61 U/L      AST (SGOT) 80 U/L      Alkaline Phosphatase 257 U/L      Total Bilirubin 0.3 mg/dL      Globulin 3.7 gm/dL      A/G Ratio 0.6 g/dL      BUN/Creatinine Ratio 55.7     Anion Gap 6.0 mmol/L      eGFR 98.9  mL/min/1.73      Comment: National Kidney Foundation and American Society of Nephrology (ASN) Task Force recommended calculation based on the Chronic Kidney Disease Epidemiology Collaboration (CKD-EPI) equation refit without adjustment for race.       Narrative:      GFR Normal >60  Chronic Kidney Disease <60  Kidney Failure <15      Vancomycin, Trough [[id number]]  (Abnormal) Collected: 06/12/22 0515    Specimen: Blood Updated: 06/12/22 0547     Vancomycin Trough 25.60 mcg/mL     CBC & Differential [[id number]]  (Abnormal) Collected: 06/12/22 0515    Specimen: Blood Updated: 06/12/22 0526    Narrative:      The following orders were created for panel order CBC & Differential.  Procedure                               Abnormality         Status                     ---------                               -----------         ------                     CBC Auto Differential[[id number]]        Abnormal            Final result                 Please view results for these tests on the individual orders.    CBC Auto Differential [[id number]]  (Abnormal) Collected: 06/12/22 0515    Specimen: Blood Updated: 06/12/22 0526     WBC 13.40 10*3/mm3      RBC 2.21 10*6/mm3      Hemoglobin 7.0 g/dL      Hematocrit 23.1 %      .5 fL      MCH 31.7 pg      MCHC 30.3 g/dL      RDW 16.9 %      RDW-SD 59.7 fl      MPV 9.9 fL      Platelets 193 10*3/mm3      Neutrophil % 79.6 %      Lymphocyte % 7.8 %      Monocyte % 10.5 %      Eosinophil % 0.5 %      Basophil % 0.4 %      Immature Grans % 1.2 %      Neutrophils, Absolute 10.66 10*3/mm3      Lymphocytes, Absolute 1.05 10*3/mm3      Monocytes, Absolute 1.41 10*3/mm3      Eosinophils, Absolute 0.07 10*3/mm3      Basophils, Absolute 0.05 10*3/mm3      Immature Grans, Absolute 0.16 10*3/mm3      nRBC 0.0 /100 WBC     Blood Gas, Arterial - [[id number]]  (Abnormal) Collected: 06/12/22 0412    Specimen: Arterial Blood Updated: 06/12/22 0407     Site Arterial Line     Denzel's Test N/A     pH,  Arterial 7.488 pH units      Comment: 83 Value above reference range        pCO2, Arterial 38.4 mm Hg      pO2, Arterial 88.2 mm Hg      HCO3, Arterial 29.1 mmol/L      Comment: 83 Value above reference range        Base Excess, Arterial 5.3 mmol/L      Comment: 83 Value above reference range        O2 Saturation, Arterial 98.4 %      Temperature 37.0 C      Barometric Pressure for Blood Gas 745 mmHg      Modality Ventilator     FIO2 35 %      Ventilator Mode PC     Set [facility] Resp Rate 16.0     PEEP 5.0     PIP 12 cmH2O      Comment: Meter: [id number]     :  [id number]        Collected by [id number]     pCO2, Temperature Corrected 38.4 mm Hg      pH, Temp Corrected 7.488 pH Units      pO2, Temperature Corrected 88.2 mm Hg     POC Glucose Once [[id number]]  (Abnormal) Collected: 06/12/22 0008    Specimen: Blood Updated: 06/12/22 0030     Glucose 219 mg/dL      Comment: : [id number] Berto BrittanyMeter ID: [id number]       POC Glucose Once [[id number]]  (Abnormal) Collected: 06/11/22 2054    Specimen: Blood Updated: 06/11/22 2122     Glucose 153 mg/dL      Comment: : [id number] Berto BrittanyMeter ID: [id number]       POC Glucose Once [[id number]]  (Normal) Collected: 06/11/22 1805    Specimen: Blood Updated: 06/11/22 1816     Glucose 124 mg/dL      Comment: : [id number] Rip Jenkins ID: [id number]           [id number]  Martell Downs MD

## 2022-06-12 NOTE — SIGNIFICANT NOTE
06/12/22 0627   Readings   PEEP Intrinsic (cm H2O) 4.8 cm H2O   Plateau Pressure (cm H2O) 17 cm H2O   Driving Pressure (cm H2O) 12.7 cm H2O   Static Compliance (L/cm H2O) 37   Dynamic Compliance (L/cm H2O) 73 L/cm H2O

## 2022-06-12 NOTE — PROGRESS NOTES
[facility] Medicine Services  INPATIENT PROGRESS NOTE    Length of Stay: 33  Date of Admission: 5/10/2022  Primary Care Physician: Elisa Chacko MD    Subjective   Chief Complaint: Respiratory failure/status post tracheotomy/status post chest tube removal    HPI   Blood pressure stable.  Temperature 99.2.  T-current 97.3.  Heart rate stable.  Episode of hypotension last night, resolved with Dilaudid.  That is probably body pain related.    Review of Systems   Unable to assess secondary to the patient's intubated and sedated state.    All pertinent negatives and positives are as above. All other systems have been reviewed and are negative unless otherwise stated.     Objective    Temp:  [97.3 °F (36.3 °C)-99.2 °F (37.3 °C)] 97.3 °F (36.3 °C)  Heart Rate:  [55-69] 62  Resp:  [20-42] 32  BP: (112-178)/(50-78) 122/54  Arterial Line BP: (119-188)/(39-72) 130/39  FiO2 (%):  [35 %] 35 %    Intake/Output Summary (Last 24 hours) at 6/12/2022 0929  Last data filed at 6/12/2022 0400  Gross per 24 hour   Intake 3083.97 ml   Output 1729 ml   Net 1354.97 ml     Physical Exam  Vitals and nursing note reviewed.   HENT:      Head: Normocephalic.   Eyes:      Conjunctiva/sclera: Conjunctivae normal.      Pupils: Pupils are equal, round, and reactive to light.   Neck:      Vascular: No JVD.   Cardiovascular:      Rate and Rhythm: Normal rate and regular rhythm.      Heart sounds: Normal heart sounds.   Pulmonary:      Effort: No respiratory distress.      Breath sounds: No wheezing or rales.      Comments: Trach in place.  On the vent.  Diminished breath sound bilateral, clear.  Chest:      Chest wall: No tenderness.   Abdominal:      General: Bowel sounds are normal. There is no distension.      Palpations: Abdomen is soft.      Tenderness: There is no abdominal tenderness.      Comments: Obesity .  PEG tube in place.  Musculoskeletal:         General: No tenderness or deformity.       Cervical back: Neck supple.      Right lower leg: Edema present.      Left lower leg: Edema present.      Comments: Trace to +1 .   Skin:     General: Skin is warm and dry.      Capillary Refill: Capillary refill takes 2 to 3 seconds.      Findings: No rash.   Neurological:      Mental Status: He is oriented to person, place, and time.      Cranial Nerves: No cranial nerve deficit.      Motor: Weakness present. No abnormal muscle tone.      Coordination: Coordination abnormal.      Gait: Gait abnormal.      Deep Tendon Reflexes: Reflexes normal.   Results Review:  Lab Results (last 24 hours)     Procedure Component Value Units Date/Time    Tissue / Bone Culture - Tissue, Brain, Cerebral Cortex [[id number]] Collected: 06/10/22 1327    Specimen: Tissue from Brain, Cerebral Cortex Updated: 06/12/22 0838     Tissue Culture No growth at 2 days     Gram Stain Moderate (3+) WBCs seen      No organisms seen    Tissue / Bone Culture - Tissue, Brain [[id number]] Collected: 06/10/22 1524    Specimen: Tissue from Brain Updated: 06/12/22 0838     Tissue Culture No growth at 2 days     Gram Stain No WBCs per low power field      No organisms seen    Wound Culture - Wound, Head [[id number]] Collected: 06/10/22 1226    Specimen: Wound from Head Updated: 06/12/22 0837     Wound Culture No growth at 2 days     Gram Stain Rare (1+) WBCs seen      No organisms seen    Wound Culture - Wound, Head [[id number]] Collected: 06/10/22 1304    Specimen: Wound from Head Updated: 06/12/22 0837     Wound Culture No growth at 2 days     Gram Stain No WBCs or organisms seen    Wound Culture - Wound, Head [[id number]] Collected: 06/10/22 1343    Specimen: Wound from Head Updated: 06/12/22 0837     Wound Culture No growth at 2 days     Gram Stain Rare (1+) WBCs seen      No organisms seen    Culture, CSF - Cerebrospinal Fluid,  Shunt Aspirate [[id number]] Collected: 06/10/22 0729    Specimen: Cerebrospinal Fluid from  Shunt Aspirate Updated:  06/12/22 0834     CSF Culture No growth at 2 days     Gram Stain Moderate (3+) WBCs seen      No organisms seen    Tissue / Bone Culture - Tissue, Brain [[id number]] Collected: 06/10/22 1326    Specimen: Tissue from Brain Updated: 06/12/22 0832     Tissue Culture No growth at 2 days     Gram Stain Rare (1+) WBCs per low power field      No organisms seen    Respiratory Culture - Aspirate, Bronchus [[id number]] Collected: 06/10/22 1643    Specimen: Aspirate from Bronchus Updated: 06/12/22 0649     Respiratory Culture Rare The culture consists of normal respiratory annia. This is a preliminary report; final report to follow.     Gram Stain Many (4+) WBCs per low power field      Less than 25 Epithelial cells seen      No organisms seen    POC Glucose Once [[id number]]  (Normal) Collected: 06/12/22 0612    Specimen: Blood Updated: 06/12/22 0627     Glucose 78 mg/dL      Comment: : [id number] Thomson BrittanyMeter ID: [id number]       Phenytoin Level, Total [[id number]]  (Normal) Collected: 06/12/22 0515    Specimen: Blood Updated: 06/12/22 0612     Phenytoin Level 16.4 mcg/mL     C-reactive Protein [[id number]]  (Abnormal) Collected: 06/12/22 0515    Specimen: Blood Updated: 06/12/22 0611     C-Reactive Protein 18.69 mg/dL     Comprehensive Metabolic Panel [[id number]]  (Abnormal) Collected: 06/12/22 0515    Specimen: Blood Updated: 06/12/22 0611     Glucose 81 mg/dL      BUN 34 mg/dL      Creatinine 0.61 mg/dL      Sodium 140 mmol/L      Potassium 4.1 mmol/L      Chloride 106 mmol/L      CO2 28.0 mmol/L      Calcium 8.2 mg/dL      Total Protein 6.1 g/dL      Albumin 2.40 g/dL      ALT (SGPT) 61 U/L      AST (SGOT) 80 U/L      Alkaline Phosphatase 257 U/L      Total Bilirubin 0.3 mg/dL      Globulin 3.7 gm/dL      A/G Ratio 0.6 g/dL      BUN/Creatinine Ratio 55.7     Anion Gap 6.0 mmol/L      eGFR 98.9 mL/min/1.73      Comment: National Kidney Foundation and American Society of Nephrology (ASN) Task Force recommended  calculation based on the Chronic Kidney Disease Epidemiology Collaboration (CKD-EPI) equation refit without adjustment for race.       Narrative:      GFR Normal >60  Chronic Kidney Disease <60  Kidney Failure <15      Vancomycin, Trough [[id number]]  (Abnormal) Collected: 06/12/22 0515    Specimen: Blood Updated: 06/12/22 0547     Vancomycin Trough 25.60 mcg/mL     CBC & Differential [[id number]]  (Abnormal) Collected: 06/12/22 0515    Specimen: Blood Updated: 06/12/22 0526    Narrative:      The following orders were created for panel order CBC & Differential.  Procedure                               Abnormality         Status                     ---------                               -----------         ------                     CBC Auto Differential[[id number]]        Abnormal            Final result                 Please view results for these tests on the individual orders.    CBC Auto Differential [[id number]]  (Abnormal) Collected: 06/12/22 0515    Specimen: Blood Updated: 06/12/22 0526     WBC 13.40 10*3/mm3      RBC 2.21 10*6/mm3      Hemoglobin 7.0 g/dL      Hematocrit 23.1 %      .5 fL      MCH 31.7 pg      MCHC 30.3 g/dL      RDW 16.9 %      RDW-SD 59.7 fl      MPV 9.9 fL      Platelets 193 10*3/mm3      Neutrophil % 79.6 %      Lymphocyte % 7.8 %      Monocyte % 10.5 %      Eosinophil % 0.5 %      Basophil % 0.4 %      Immature Grans % 1.2 %      Neutrophils, Absolute 10.66 10*3/mm3      Lymphocytes, Absolute 1.05 10*3/mm3      Monocytes, Absolute 1.41 10*3/mm3      Eosinophils, Absolute 0.07 10*3/mm3      Basophils, Absolute 0.05 10*3/mm3      Immature Grans, Absolute 0.16 10*3/mm3      nRBC 0.0 /100 WBC     Blood Gas, Arterial - [[id number]]  (Abnormal) Collected: 06/12/22 0412    Specimen: Arterial Blood Updated: 06/12/22 0407     Site Arterial Line     Denzel's Test N/A     pH, Arterial 7.488 pH units      Comment: 83 Value above reference range        pCO2, Arterial 38.4 mm Hg      pO2,  Arterial 88.2 mm Hg      HCO3, Arterial 29.1 mmol/L      Comment: 83 Value above reference range        Base Excess, Arterial 5.3 mmol/L      Comment: 83 Value above reference range        O2 Saturation, Arterial 98.4 %      Temperature 37.0 C      Barometric Pressure for Blood Gas 745 mmHg      Modality Ventilator     FIO2 35 %      Ventilator Mode PC     Set [facility] Resp Rate 16.0     PEEP 5.0     PIP 12 cmH2O      Comment: Meter: [id number]     :  [id number]        Collected by [id number]     pCO2, Temperature Corrected 38.4 mm Hg      pH, Temp Corrected 7.488 pH Units      pO2, Temperature Corrected 88.2 mm Hg     POC Glucose Once [[id number]]  (Abnormal) Collected: 06/12/22 0008    Specimen: Blood Updated: 06/12/22 0030     Glucose 219 mg/dL      Comment: : [id number] Berto BrittanyMeter ID: [id number]       POC Glucose Once [[id number]]  (Abnormal) Collected: 06/11/22 2054    Specimen: Blood Updated: 06/11/22 2122     Glucose 153 mg/dL      Comment: : [id number] Berto BrittanyMeter ID: [id number]       POC Glucose Once [[id number]]  (Normal) Collected: 06/11/22 1805    Specimen: Blood Updated: 06/11/22 1816     Glucose 124 mg/dL      Comment: : [id number] Rip XieMeter ID: [id number]       POC Glucose Once [[id number]]  (Abnormal) Collected: 06/11/22 1200    Specimen: Blood Updated: 06/11/22 1217     Glucose 152 mg/dL      Comment: : [id number] Rip XieMeter ID: [id number]       AFB Culture - Tissue, Brain [[id number]] Collected: 06/10/22 1326    Specimen: Tissue from Brain Updated: 06/11/22 1134     AFB Stain No acid fast bacilli seen on direct smear    AFB Culture - Tissue, Brain [[id number]] Collected: 06/10/22 1524    Specimen: Tissue from Brain Updated: 06/11/22 1133     AFB Stain No acid fast bacilli seen on direct smear    Encephalitis CSF - Cerebrospinal Fluid, Lumbar Puncture [[id number]] Collected: 06/10/22 0729    Specimen: Cerebrospinal Fluid from Lumbar Puncture  Updated: 06/11/22 1133    AFB Culture - Tissue, Brain, Cerebral Cortex [[id number]] Collected: 06/10/22 1327    Specimen: Tissue from Brain, Cerebral Cortex Updated: 06/11/22 1129     AFB Stain No acid fast bacilli seen on direct smear    C-reactive Protein [[id number]]  (Abnormal) Collected: 06/11/22 0913    Specimen: Blood, Arterial Line Updated: 06/11/22 0943     C-Reactive Protein 17.25 mg/dL            Cultures:  Wound Culture   Date Value Ref Range Status   06/10/2022 No growth at 2 days  Preliminary   06/10/2022 No growth at 2 days  Preliminary   06/10/2022 No growth at 2 days  Preliminary     Respiratory Culture   Date Value Ref Range Status   06/10/2022   Preliminary    Rare The culture consists of normal respiratory annia. This is a preliminary report; final report to follow.       Radiology Data:    Imaging Results (Last 24 Hours)     Procedure Component Value Units Date/Time    XR Chest 1 View [[id number]] Resulted: 06/12/22 0423     Updated: 06/12/22 0424          No Known Allergies    Scheduled meds:   carvedilol, 6.25 mg, Nasogastric, BID With Meals  cefepime, 2 g, Intravenous, Q8H  chlorhexidine, 15 mL, Mouth/Throat, Q12H  dexamethasone, 4 mg, Intravenous, Q6H  fosphenytoin, 275 mg PE, Intravenous, Q8H  heparin (porcine), 5,000 Units, Subcutaneous, Q12H  insulin detemir, 20 Units, Subcutaneous, Nightly  insulin regular, 2-7 Units, Subcutaneous, Q6H  insulin regular, 8 Units, Subcutaneous, Q6H  lacosamide, 200 mg, Intravenous, Q12H  lansoprazole, 30 mg, Per G Tube, QAM  levETIRAcetam, 1,000 mg, Intravenous, Q12H  levothyroxine, 125 mcg, Nasogastric, Q AM  lidocaine, 10 mL, Intradermal, Once  liothyronine, 25 mcg, Nasogastric, Daily  lisinopril, 20 mg, Nasogastric, Q24H  mupirocin, 1 application, Topical, Q12H  Nutrisource fiber, 1 packet, Nasogastric, 4x Daily  polyethylene glycol, 17 g, Oral, Daily  ProSource TF, 45 mL, Per G Tube, BID  sodium chloride, 4 mL, Nebulization, BID - RT  vancomycin, 15  mg/kg, Intravenous, Q12H        PRN meds:  •  acetaminophen **OR** acetaminophen  •  alteplase  •  bisacodyl  •  dextrose  •  dextrose  •  glucagon (human recombinant)  •  hold  •  hydrALAZINE  •  ipratropium-albuterol  •  labetalol  •  levalbuterol  •  LORazepam  •  ondansetron **OR** ondansetron    Assessment/Plan       Meningioma (HCC)    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 The patient was admitted on 5/10 by Dr. Downs with neurosurgery.  He has prior history of known meningioma that was causing compression and visual changes.  He presented for craniotomy.  Hospital medicine was originally consulted on 5/14.  The patient had developed status epilepticus postsurgically and required intubation/mechanical ventilation.      Respiratory failure/left pleural effusion. He required intubation on 5/14 for airway protection.  This was done by Dr. Gunderson.  Pulmonary has since been consulted. Continue to monitor for readiness for spontaneous breathing trials and extubation in the future. Again, he was intubated for airway protection given his status epilepticus.  Propofol drip . albuterol nebs.  DuoNebs as needed.  Morphine as needed.  Oxycodone as needed.  CT imaging of chest-  Left chest tubes remain in place with appropriate evacuation of the left pleural fluid, Only small pleural effusions seen on today's  exam with minimal air present in the posterior left pleural space, Bibasilar consolidation may represent atelectasis and/or pneumonia, scattered bilateral calcified granuloma, Vascular crowding and/or mild venous congestion, Stable cardiomegaly.  Chest x-ray-Hypoventilation with vascular crowding, Patchy infiltrates in the perihilar regions and lung bases, atelectasis versus pneumonia, Old granulomatous disease.  Status post thoracotomy tube placement 6/26/2022.   Status post tracheotomy left chest 6/2/2022.   Status post tPA through chest tube.  Chest tube removed 6/7/2022.  Ventilator- pressure support, FiO2 35,  rate 16, PIP 12, PEEP 5.     Hypotension.  Resolved.  Levophed drip off.     Seizure . consult neurology . IV Vimpat.  IV Keppra.  IV cerebyx.      Meningioma.  Decadron.  CT scan head without contrast- Repeat surgery with right frontal craniectomy, Focal acute hemorrhage at the operative site, Ventricular drain placement, Stable or slightly decreased ventricle size,  No midline shift.  Status post another craniotomy 6/10/2022.  Total of 2 craniotomy so far.  Ventriculoperitoneal shunt placement 6/3/2022.     Thrombocytopenia. His platelet count was 217,000 on 4/20/2022. This declined to a low of 72,000 on 5/17.  Peripheral smear on 5/17 showed no schistocytes with moderate peripheral thrombocytopenia.  PTT normal, PT/INR slightly elevated, fibrin split products high, and fibrinogen high.  Neurosurgery gave him a sixpack of platelets on 5/18.  Thrombocytopenia resolved.     Fever. He had run steady fever from the afternoon of 5/21 through 5/22.  Dr. Escobar was contacted and placed him on vancomycin and cefepime.  He still has a lumbar drain and CSF was sampled on the morning of 5/22.  ..  He received vancomycin. Meropenem was started on 5/25 for 5 days.  Patient has finished vancomycin and meropenem antibiotics.  Fever resolved.  Leukocytosis stable.     Hypertension/atrial fibrillation. He typically  takes lisinopril. Resumed per tube at a lower dose than normal on 5/18. Titrated up on 5/19. Started carvedilol on 5/19. Titrated medications up to get off Cardene.  Unable to take anticoagulation.  Lisinopril.  Hydralazine as needed.  Echocardiogram- ejection fraction 70%-hyperdynamic, mild concentric hypertrophy, tricuspid regurgitation, right ventricle cavity moderately dilated, right atrial cavity dilated, no significant valvular pathology, atrial fibrillation rhythm.     Hypothyroidism. Free T4 and free T3 were very low as well. Started levothyroxine and liothyronine on 5/19. TRH pending since 5/19!!  I rechecked TSH, free T4, and free T3 on 5/29 to see where we are at.  TSH is now 3.950.  Free T4 has improve from 0.31 to 0.53.  Free T3 has improved from less than 0.40 to 1.60.  Synthroid . Cytomel .     Diabetes.  Hemoglobin A1c 8.2.  Sliding scale.   Levemir . regular insulin.     Anemia.  Hemoglobin  stable.  No sign of acute bleed.     Reflux.  Protonix.  Zofran as needed.     Constipation.  MiraLAX.  Dulcolax suppository as needed.     Headaches.  Fioricet as needed.     Obesity.  BMI 31.     SCD for DVT prophylaxis.  Heparin prophylaxis.     Nutrition.  PEG tube placement 6/2/2022.     AFB culture pending, no acid fast bacilli seen on direct smear.  Anaerobic culture pending.  Fungal culture pending.  Respiratory culture-normal respiratory annia. Tissue/bone culture-no growth in 2 days.  Wound culture-no growth in 2 days     Electronically signed by Aden Kc MD, 06/12/22, 9:29 AM CDT.

## 2022-06-13 ENCOUNTER — APPOINTMENT (OUTPATIENT)
Dept: CT IMAGING | Facility: HOSPITAL | Age: 77
End: 2022-06-13

## 2022-06-13 ENCOUNTER — APPOINTMENT (OUTPATIENT)
Dept: GENERAL RADIOLOGY | Facility: HOSPITAL | Age: 77
End: 2022-06-13

## 2022-06-13 ENCOUNTER — APPOINTMENT (OUTPATIENT)
Dept: NEUROLOGY | Facility: HOSPITAL | Age: 77
End: 2022-06-13

## 2022-06-13 LAB
ABO GROUP BLD: NORMAL
ALBUMIN SERPL-MCNC: 2.4 G/DL (ref 3.5–5.2)
ALBUMIN/GLOB SERPL: 0.6 G/DL
ALP SERPL-CCNC: 366 U/L (ref 39–117)
ALT SERPL W P-5'-P-CCNC: 106 U/L (ref 1–41)
ANION GAP SERPL CALCULATED.3IONS-SCNC: 4 MMOL/L (ref 5–15)
ARTERIAL PATENCY WRIST A: ABNORMAL
AST SERPL-CCNC: 145 U/L (ref 1–40)
ATMOSPHERIC PRESS: 747 MMHG
BACTERIA SPEC AEROBE CULT: NORMAL
BACTERIA SPEC RESP CULT: NORMAL
BASE EXCESS BLDA CALC-SCNC: 4.4 MMOL/L (ref 0–2)
BASOPHILS # BLD AUTO: 0.04 10*3/MM3 (ref 0–0.2)
BASOPHILS NFR BLD AUTO: 0.4 % (ref 0–1.5)
BDY SITE: ABNORMAL
BILIRUB SERPL-MCNC: 0.4 MG/DL (ref 0–1.2)
BLD GP AB SCN SERPL QL: NEGATIVE
BODY TEMPERATURE: 37 C
BUN SERPL-MCNC: 37 MG/DL (ref 8–23)
BUN/CREAT SERPL: 64.9 (ref 7–25)
CALCIUM SPEC-SCNC: 8.3 MG/DL (ref 8.6–10.5)
CHLORIDE SERPL-SCNC: 105 MMOL/L (ref 98–107)
CO2 SERPL-SCNC: 29 MMOL/L (ref 22–29)
CREAT SERPL-MCNC: 0.57 MG/DL (ref 0.76–1.27)
CYTO UR: NORMAL
DEPRECATED RDW RBC AUTO: 61.1 FL (ref 37–54)
EGFRCR SERPLBLD CKD-EPI 2021: 101 ML/MIN/1.73
EOSINOPHIL # BLD AUTO: 0.14 10*3/MM3 (ref 0–0.4)
EOSINOPHIL NFR BLD AUTO: 1.3 % (ref 0.3–6.2)
ERYTHROCYTE [DISTWIDTH] IN BLOOD BY AUTOMATED COUNT: 17 % (ref 12.3–15.4)
GLOBULIN UR ELPH-MCNC: 3.9 GM/DL
GLUCOSE BLDC GLUCOMTR-MCNC: 118 MG/DL (ref 70–130)
GLUCOSE BLDC GLUCOMTR-MCNC: 123 MG/DL (ref 70–130)
GLUCOSE BLDC GLUCOMTR-MCNC: 147 MG/DL (ref 70–130)
GLUCOSE BLDC GLUCOMTR-MCNC: 177 MG/DL (ref 70–130)
GLUCOSE BLDC GLUCOMTR-MCNC: 230 MG/DL (ref 70–130)
GLUCOSE SERPL-MCNC: 165 MG/DL (ref 65–99)
GRAM STN SPEC: NORMAL
HCO3 BLDA-SCNC: 28.4 MMOL/L (ref 20–26)
HCT VFR BLD AUTO: 22.5 % (ref 37.5–51)
HGB BLD-MCNC: 6.8 G/DL (ref 13–17.7)
IMM GRANULOCYTES # BLD AUTO: 0.21 10*3/MM3 (ref 0–0.05)
IMM GRANULOCYTES NFR BLD AUTO: 1.9 % (ref 0–0.5)
INHALED O2 CONCENTRATION: 35 %
LAB AP CASE REPORT: NORMAL
LYMPHOCYTES # BLD AUTO: 0.98 10*3/MM3 (ref 0.7–3.1)
LYMPHOCYTES NFR BLD AUTO: 8.9 % (ref 19.6–45.3)
Lab: ABNORMAL
Lab: NORMAL
Lab: NORMAL
MCH RBC QN AUTO: 31.3 PG (ref 26.6–33)
MCHC RBC AUTO-ENTMCNC: 30.2 G/DL (ref 31.5–35.7)
MCV RBC AUTO: 103.7 FL (ref 79–97)
MODALITY: ABNORMAL
MONOCYTES # BLD AUTO: 1.04 10*3/MM3 (ref 0.1–0.9)
MONOCYTES NFR BLD AUTO: 9.5 % (ref 5–12)
NEUTROPHILS NFR BLD AUTO: 78 % (ref 42.7–76)
NEUTROPHILS NFR BLD AUTO: 8.56 10*3/MM3 (ref 1.7–7)
NRBC BLD AUTO-RTO: 0 /100 WBC (ref 0–0.2)
PATH REPORT.FINAL DX SPEC: NORMAL
PATH REPORT.GROSS SPEC: NORMAL
PAW @ PEAK INSP FLOW SETTING VENT: 12 CMH2O
PCO2 BLDA: 39 MM HG (ref 35–45)
PCO2 TEMP ADJ BLD: 39 MM HG (ref 35–45)
PEEP RESPIRATORY: 5 CM[H2O]
PH BLDA: 7.47 PH UNITS (ref 7.35–7.45)
PH, TEMP CORRECTED: 7.47 PH UNITS (ref 7.35–7.45)
PHENYTOIN SERPL-MCNC: 15.7 MCG/ML (ref 10–20)
PLATELET # BLD AUTO: 206 10*3/MM3 (ref 140–450)
PMV BLD AUTO: 10 FL (ref 6–12)
PO2 BLDA: 88.1 MM HG (ref 83–108)
PO2 TEMP ADJ BLD: 88.1 MM HG (ref 83–108)
POTASSIUM SERPL-SCNC: 4.1 MMOL/L (ref 3.5–5.2)
PROT SERPL-MCNC: 6.3 G/DL (ref 6–8.5)
RBC # BLD AUTO: 2.17 10*6/MM3 (ref 4.14–5.8)
RH BLD: POSITIVE
SAO2 % BLDCOA: 98.4 % (ref 94–99)
SET MECH RESP RATE: 16
SODIUM SERPL-SCNC: 138 MMOL/L (ref 136–145)
T&S EXPIRATION DATE: NORMAL
VANCOMYCIN TROUGH SERPL-MCNC: 16.2 MCG/ML (ref 5–20)
VANCOMYCIN TROUGH SERPL-MCNC: 24.1 MCG/ML (ref 5–20)
VENTILATOR MODE: ABNORMAL
WBC NRBC COR # BLD: 10.97 10*3/MM3 (ref 3.4–10.8)

## 2022-06-13 PROCEDURE — 63710000001 INSULIN REGULAR HUMAN PER 5 UNITS: Performed by: NEUROLOGICAL SURGERY

## 2022-06-13 PROCEDURE — 63710000001 INSULIN DETEMIR PER 5 UNITS: Performed by: NEUROLOGICAL SURGERY

## 2022-06-13 PROCEDURE — 94003 VENT MGMT INPAT SUBQ DAY: CPT

## 2022-06-13 PROCEDURE — 94799 UNLISTED PULMONARY SVC/PX: CPT

## 2022-06-13 PROCEDURE — 25010000002 IOPAMIDOL 61 % SOLUTION: Performed by: NEUROLOGICAL SURGERY

## 2022-06-13 PROCEDURE — 85025 COMPLETE CBC W/AUTO DIFF WBC: CPT | Performed by: NEUROLOGICAL SURGERY

## 2022-06-13 PROCEDURE — 99233 SBSQ HOSP IP/OBS HIGH 50: CPT | Performed by: INTERNAL MEDICINE

## 2022-06-13 PROCEDURE — 86900 BLOOD TYPING SEROLOGIC ABO: CPT

## 2022-06-13 PROCEDURE — 25010000002 VANCOMYCIN 10 G RECONSTITUTED SOLUTION: Performed by: NEUROLOGICAL SURGERY

## 2022-06-13 PROCEDURE — 0 LEVETIRACETAM IN NACL 0.75% 1000 MG/100ML SOLUTION: Performed by: NEUROLOGICAL SURGERY

## 2022-06-13 PROCEDURE — 25010000002 FUROSEMIDE PER 20 MG: Performed by: FAMILY MEDICINE

## 2022-06-13 PROCEDURE — P9016 RBC LEUKOCYTES REDUCED: HCPCS

## 2022-06-13 PROCEDURE — 95819 EEG AWAKE AND ASLEEP: CPT | Performed by: PSYCHIATRY & NEUROLOGY

## 2022-06-13 PROCEDURE — 25010000002 HEPARIN (PORCINE) PER 1000 UNITS: Performed by: NEUROLOGICAL SURGERY

## 2022-06-13 PROCEDURE — 80185 ASSAY OF PHENYTOIN TOTAL: CPT | Performed by: NEUROLOGICAL SURGERY

## 2022-06-13 PROCEDURE — 25010000002 HYDRALAZINE PER 20 MG: Performed by: NEUROLOGICAL SURGERY

## 2022-06-13 PROCEDURE — 86850 RBC ANTIBODY SCREEN: CPT | Performed by: FAMILY MEDICINE

## 2022-06-13 PROCEDURE — 95819 EEG AWAKE AND ASLEEP: CPT

## 2022-06-13 PROCEDURE — 82962 GLUCOSE BLOOD TEST: CPT

## 2022-06-13 PROCEDURE — 25010000002 DEXAMETHASONE PER 1 MG: Performed by: NEUROLOGICAL SURGERY

## 2022-06-13 PROCEDURE — 86923 COMPATIBILITY TEST ELECTRIC: CPT

## 2022-06-13 PROCEDURE — 25010000002 FOSPHENYTOIN 500 MG PE/10ML SOLUTION 10 ML VIAL: Performed by: NEUROLOGICAL SURGERY

## 2022-06-13 PROCEDURE — 80053 COMPREHEN METABOLIC PANEL: CPT | Performed by: NEUROLOGICAL SURGERY

## 2022-06-13 PROCEDURE — 74178 CT ABD&PLV WO CNTR FLWD CNTR: CPT

## 2022-06-13 PROCEDURE — 99024 POSTOP FOLLOW-UP VISIT: CPT | Performed by: NURSE PRACTITIONER

## 2022-06-13 PROCEDURE — 25010000002 DEXAMETHASONE PER 1 MG: Performed by: NURSE PRACTITIONER

## 2022-06-13 PROCEDURE — 71045 X-RAY EXAM CHEST 1 VIEW: CPT

## 2022-06-13 PROCEDURE — 25010000002 CEFEPIME PER 500 MG: Performed by: NEUROLOGICAL SURGERY

## 2022-06-13 PROCEDURE — 99233 SBSQ HOSP IP/OBS HIGH 50: CPT | Performed by: PSYCHIATRY & NEUROLOGY

## 2022-06-13 PROCEDURE — 82803 BLOOD GASES ANY COMBINATION: CPT

## 2022-06-13 PROCEDURE — 80202 ASSAY OF VANCOMYCIN: CPT | Performed by: NEUROLOGICAL SURGERY

## 2022-06-13 PROCEDURE — 36430 TRANSFUSION BLD/BLD COMPNT: CPT

## 2022-06-13 PROCEDURE — 97110 THERAPEUTIC EXERCISES: CPT

## 2022-06-13 PROCEDURE — 86901 BLOOD TYPING SEROLOGIC RH(D): CPT | Performed by: FAMILY MEDICINE

## 2022-06-13 PROCEDURE — 86900 BLOOD TYPING SEROLOGIC ABO: CPT | Performed by: FAMILY MEDICINE

## 2022-06-13 PROCEDURE — 94761 N-INVAS EAR/PLS OXIMETRY MLT: CPT

## 2022-06-13 PROCEDURE — 74018 RADEX ABDOMEN 1 VIEW: CPT

## 2022-06-13 RX ORDER — DEXAMETHASONE 2 MG/1
2 TABLET ORAL EVERY 12 HOURS SCHEDULED
Status: COMPLETED | OUTPATIENT
Start: 2022-06-16 | End: 2022-06-17

## 2022-06-13 RX ORDER — DEXAMETHASONE 0.5 MG/1
0.5 TABLET ORAL DAILY
Status: DISCONTINUED | OUTPATIENT
Start: 2022-06-20 | End: 2022-06-20 | Stop reason: HOSPADM

## 2022-06-13 RX ORDER — FUROSEMIDE 10 MG/ML
40 INJECTION INTRAMUSCULAR; INTRAVENOUS ONCE
Status: COMPLETED | OUTPATIENT
Start: 2022-06-13 | End: 2022-06-13

## 2022-06-13 RX ORDER — DEXAMETHASONE 2 MG/1
1 TABLET ORAL EVERY 12 HOURS SCHEDULED
Status: COMPLETED | OUTPATIENT
Start: 2022-06-18 | End: 2022-06-19

## 2022-06-13 RX ORDER — DEXAMETHASONE 4 MG/1
4 TABLET ORAL EVERY 12 HOURS SCHEDULED
Status: COMPLETED | OUTPATIENT
Start: 2022-06-14 | End: 2022-06-15

## 2022-06-13 RX ADMIN — DEXAMETHASONE SODIUM PHOSPHATE 4 MG: 4 INJECTION, SOLUTION INTRA-ARTICULAR; INTRALESIONAL; INTRAMUSCULAR; INTRAVENOUS; SOFT TISSUE at 08:38

## 2022-06-13 RX ADMIN — CARVEDILOL 6.25 MG: 6.25 TABLET, FILM COATED ORAL at 08:38

## 2022-06-13 RX ADMIN — Medication 45 ML: at 20:32

## 2022-06-13 RX ADMIN — Medication 45 ML: at 08:39

## 2022-06-13 RX ADMIN — Medication 1 PACKET: at 20:33

## 2022-06-13 RX ADMIN — SODIUM CHLORIDE 275 MG PE: 9 INJECTION, SOLUTION INTRAVENOUS at 21:04

## 2022-06-13 RX ADMIN — HYDRALAZINE HYDROCHLORIDE 10 MG: 20 INJECTION INTRAMUSCULAR; INTRAVENOUS at 04:03

## 2022-06-13 RX ADMIN — MUPIROCIN 1 APPLICATION: 20 OINTMENT TOPICAL at 08:39

## 2022-06-13 RX ADMIN — Medication 1 PACKET: at 08:42

## 2022-06-13 RX ADMIN — FUROSEMIDE 40 MG: 10 INJECTION, SOLUTION INTRAMUSCULAR; INTRAVENOUS at 09:54

## 2022-06-13 RX ADMIN — MUPIROCIN 1 APPLICATION: 20 OINTMENT TOPICAL at 20:26

## 2022-06-13 RX ADMIN — HEPARIN SODIUM 5000 UNITS: 5000 INJECTION INTRAVENOUS; SUBCUTANEOUS at 20:26

## 2022-06-13 RX ADMIN — LEVETIRACETAM 1000 MG: 10 INJECTION INTRAVENOUS at 08:38

## 2022-06-13 RX ADMIN — DEXAMETHASONE SODIUM PHOSPHATE 4 MG: 4 INJECTION, SOLUTION INTRA-ARTICULAR; INTRALESIONAL; INTRAMUSCULAR; INTRAVENOUS; SOFT TISSUE at 20:26

## 2022-06-13 RX ADMIN — DEXAMETHASONE SODIUM PHOSPHATE 4 MG: 4 INJECTION, SOLUTION INTRA-ARTICULAR; INTRALESIONAL; INTRAMUSCULAR; INTRAVENOUS; SOFT TISSUE at 01:48

## 2022-06-13 RX ADMIN — Medication 1 PACKET: at 11:06

## 2022-06-13 RX ADMIN — INSULIN HUMAN 8 UNITS: 100 INJECTION, SOLUTION PARENTERAL at 17:08

## 2022-06-13 RX ADMIN — LANSOPRAZOLE 30 MG: KIT at 09:55

## 2022-06-13 RX ADMIN — VANCOMYCIN HYDROCHLORIDE 1500 MG: 10 INJECTION, POWDER, LYOPHILIZED, FOR SOLUTION INTRAVENOUS at 16:11

## 2022-06-13 RX ADMIN — CARVEDILOL 6.25 MG: 6.25 TABLET, FILM COATED ORAL at 17:09

## 2022-06-13 RX ADMIN — LACOSAMIDE 200 MG: 10 INJECTION, SOLUTION INTRAVENOUS at 08:38

## 2022-06-13 RX ADMIN — IOPAMIDOL 100 ML: 612 INJECTION, SOLUTION INTRAVENOUS at 18:21

## 2022-06-13 RX ADMIN — SODIUM CHLORIDE 275 MG PE: 9 INJECTION, SOLUTION INTRAVENOUS at 15:17

## 2022-06-13 RX ADMIN — OXYCODONE HYDROCHLORIDE 5 MG: 5 SOLUTION ORAL at 03:54

## 2022-06-13 RX ADMIN — INSULIN HUMAN 2 UNITS: 100 INJECTION, SOLUTION PARENTERAL at 06:15

## 2022-06-13 RX ADMIN — LIOTHYRONINE SODIUM 25 MCG: 25 TABLET ORAL at 08:38

## 2022-06-13 RX ADMIN — GUAIFENESIN 200 MG: 200 SOLUTION ORAL at 08:38

## 2022-06-13 RX ADMIN — ACETAMINOPHEN 650 MG: 325 TABLET ORAL at 21:04

## 2022-06-13 RX ADMIN — SODIUM CHLORIDE SOLN NEBU 3% 4 ML: 3 NEBU SOLN at 18:31

## 2022-06-13 RX ADMIN — GUAIFENESIN 200 MG: 200 SOLUTION ORAL at 15:17

## 2022-06-13 RX ADMIN — CHLORHEXIDINE GLUCONATE 15 ML: 1.2 RINSE ORAL at 20:26

## 2022-06-13 RX ADMIN — Medication 1 PACKET: at 17:12

## 2022-06-13 RX ADMIN — CHLORHEXIDINE GLUCONATE 15 ML: 1.2 RINSE ORAL at 08:39

## 2022-06-13 RX ADMIN — CEFEPIME 2 G: 2 INJECTION, POWDER, FOR SOLUTION INTRAVENOUS at 15:17

## 2022-06-13 RX ADMIN — LEVETIRACETAM 1000 MG: 10 INJECTION INTRAVENOUS at 20:27

## 2022-06-13 RX ADMIN — HEPARIN SODIUM 5000 UNITS: 5000 INJECTION INTRAVENOUS; SUBCUTANEOUS at 08:38

## 2022-06-13 RX ADMIN — LISINOPRIL 20 MG: 20 TABLET ORAL at 08:38

## 2022-06-13 RX ADMIN — LACOSAMIDE 200 MG: 10 INJECTION, SOLUTION INTRAVENOUS at 20:32

## 2022-06-13 RX ADMIN — LATANOPROST 1 DROP: 50 SOLUTION OPHTHALMIC at 20:28

## 2022-06-13 RX ADMIN — SODIUM CHLORIDE 275 MG PE: 9 INJECTION, SOLUTION INTRAVENOUS at 06:16

## 2022-06-13 RX ADMIN — POLYETHYLENE GLYCOL 3350 17 G: 17 POWDER, FOR SOLUTION ORAL at 08:38

## 2022-06-13 RX ADMIN — LEVOTHYROXINE SODIUM 125 MCG: 125 TABLET ORAL at 06:16

## 2022-06-13 RX ADMIN — DEXAMETHASONE SODIUM PHOSPHATE 4 MG: 4 INJECTION, SOLUTION INTRA-ARTICULAR; INTRALESIONAL; INTRAMUSCULAR; INTRAVENOUS; SOFT TISSUE at 13:39

## 2022-06-13 RX ADMIN — GUAIFENESIN 200 MG: 200 SOLUTION ORAL at 20:26

## 2022-06-13 RX ADMIN — INSULIN HUMAN 3 UNITS: 100 INJECTION, SOLUTION PARENTERAL at 17:09

## 2022-06-13 RX ADMIN — CEFEPIME 2 G: 2 INJECTION, POWDER, FOR SOLUTION INTRAVENOUS at 21:05

## 2022-06-13 RX ADMIN — INSULIN DETEMIR 20 UNITS: 100 INJECTION, SOLUTION SUBCUTANEOUS at 20:36

## 2022-06-13 RX ADMIN — VANCOMYCIN HYDROCHLORIDE 1500 MG: 10 INJECTION, POWDER, LYOPHILIZED, FOR SOLUTION INTRAVENOUS at 01:48

## 2022-06-13 RX ADMIN — CEFEPIME 2 G: 2 INJECTION, POWDER, FOR SOLUTION INTRAVENOUS at 06:16

## 2022-06-13 RX ADMIN — SODIUM CHLORIDE SOLN NEBU 3% 4 ML: 3 NEBU SOLN at 06:38

## 2022-06-13 NOTE — PROGRESS NOTES
PULMONARY AND CRITICAL CARE PROGRESS NOTE - [facility]    Patient: Hai Hernandez    1945    MR# [id number]    Acct# [id number]  06/13/22   08:29 CDT  Referring Provider: Martell Dowsn, *    Chief Complaint: Mechanically ventilated    Interval history: The patient remains intubated with trach to mechanical vent.  He is minimally responsive.  O2 sat 97%, ET 31%, on 5 PEEP, 0.35 FiO2.  ABG removed.  Pi decreased to 10 per Dr. Dasilva.  Hgb 6.8 today.  Nursing reports he will receive 1 unit PRBC today.  No other aggravating or alleviating factors.     Meds:  carvedilol, 6.25 mg, Nasogastric, BID With Meals  cefepime, 2 g, Intravenous, Q8H  chlorhexidine, 15 mL, Mouth/Throat, Q12H  dexamethasone, 4 mg, Intravenous, Q6H  fosphenytoin, 275 mg PE, Intravenous, Q8H  guaiFENesin, 200 mg, Per PEG Tube, TID  heparin (porcine), 5,000 Units, Subcutaneous, Q12H  insulin detemir, 20 Units, Subcutaneous, Nightly  insulin regular, 2-7 Units, Subcutaneous, Q6H  insulin regular, 8 Units, Subcutaneous, Q6H  lacosamide, 200 mg, Intravenous, Q12H  lansoprazole, 30 mg, Per G Tube, QAM  latanoprost, 1 drop, Both Eyes, Nightly  levETIRAcetam, 1,000 mg, Intravenous, Q12H  levothyroxine, 125 mcg, Nasogastric, Q AM  lidocaine, 10 mL, Intradermal, Once  liothyronine, 25 mcg, Nasogastric, Daily  lisinopril, 20 mg, Nasogastric, Q24H  mupirocin, 1 application, Topical, Q12H  Nutrisource fiber, 1 packet, Nasogastric, 4x Daily  polyethylene glycol, 17 g, Oral, Daily  ProSource TF, 45 mL, Per G Tube, BID  sodium chloride, 4 mL, Nebulization, BID - RT  vancomycin, 15 mg/kg, Intravenous, Q12H      hold, 1 each  norepinephrine, 0.02-0.3 mcg/kg/min, Last Rate: Stopped (06/11/22 1054)      Review of Systems:     Cannot obtain due to mechanical ventilated state     Ventilator Settings:        Resp Rate (Set): 16  Pressure Support (cm H2O): 8 cm H20  FiO2 (%): 35 %  PEEP/CPAP (cm H2O): 5 cm H20  Minute Ventilation (L/min)  (Obs): 11.6 L/min  Resp Rate (Observed) Vent: 23  I:E Ratio (Set): 1:0.27  I:E Ratio (Obs): 1:2.30  PIP Observed (cm H2O): 18 cm H2O  RSBI: 21.96  Physical Exam:  Temp:  [98 °F (36.7 °C)-100.2 °F (37.9 °C)] 99.3 °F (37.4 °C)  Heart Rate:  [58-72] 72  Resp:  [26-30] 26  BP: ()/(47-87) 139/67  Arterial Line BP: ()/(32-77) 171/65  FiO2 (%):  [35 %] 35 %    Intake/Output Summary (Last 24 hours) at 6/13/2022 0829  Last data filed at 6/13/2022 0400  Gross per 24 hour   Intake 3635.4 ml   Output 2065 ml   Net 1570.4 ml     SpO2 Percentage    06/13/22 0530 06/13/22 0600 06/13/22 0638   SpO2: 96% 97% 96%   Body mass index is 31.86 kg/m².   Physical Exam   Constitutional:       General: He is intubated     Appearance: He is ill-appearing. He is not toxic-appearing.      Interventions: He is intubated, no sedation  HENT:      Head: Normocephalic.      Comments: Craniotomy wound, trach to vent     Nose: Nose normal.   Eyes:      General: No scleral icterus.  Cardiovascular:  Irregular rhythm, normal rate  Pulmonary:      Effort: No accessory muscle usage or respiratory distress. He is intubated.      Breath sounds: few rhonchi, diminished in bases  Abdominal:      General: There is no distension. PEG/TF     Palpations: Abdomen is firm  Genitourinary:     Comments: jansen  Musculoskeletal:      Right lower leg: Edema present.      Left lower leg: Edema present.      Comments: SCDs   Skin:     Findings: No rash.   Neurological:      Motor: opens eyes/grimaces to pain   Psychiatric:         Behavior: Behavior is not agitated.     Electronically signed by MARIO Aldrich, 6/13/2022, 08:29 CDT      Physician Substantive Portion: Medical Decision Making    Laboratory Data:  Results from last 7 days   Lab Units 06/13/22  0607 06/12/22  0515 06/11/22  0356   WBC 10*3/mm3 10.97* 13.40* 14.13*   HEMOGLOBIN g/dL 6.8* 7.0* 7.2*   PLATELETS 10*3/mm3 206 193 171     Results from last 7 days   Lab Units 06/13/22  0607  06/12/22  0515 06/11/22  0356   SODIUM mmol/L 138 140 137   POTASSIUM mmol/L 4.1 4.1 4.3   BUN mg/dL 37* 34* 29*   CREATININE mg/dL 0.57* 0.61* 0.67*     Results from last 7 days   Lab Units 06/13/22  0423 06/12/22  0412 06/11/22  0401   PH, ARTERIAL pH units 7.471* 7.488* 7.436   PCO2, ARTERIAL mm Hg 39.0 38.4 45.1*   PO2 ART mm Hg 88.1 88.2 176.0*   FIO2 % 35 35 60     Wound Culture   Date Value Ref Range Status   06/10/2022 No growth at 2 days  Preliminary   06/10/2022 No growth at 2 days  Preliminary   06/10/2022 No growth at 2 days  Preliminary     Respiratory Culture   Date Value Ref Range Status   06/10/2022   Preliminary    Rare The culture consists of normal respiratory annia. This is a preliminary report; final report to follow.     Recent films:  XR Chest 1 View    Result Date: 6/13/2022  XR CHEST 1 VW- 6/13/2022 3:20 AM CDT  HISTORY: On vent; R13.10-Dysphagia, unspecified; Z01.818-Encounter for other preprocedural examination; D32.9-Benign neoplasm of meninges, unspecified; Z74.09-Other reduced mobility; Z78.9-Other specified health status; G40.901-Epilepsy, unspecified, not intractable, with status epilepticus; J96.01-Acute respiratory failure with hypoxia; G91.0-Communicating hydrocephalus; T81.40XA-Infection following a pr  COMPARISON: Chest exam dated 6/12/2022.  FINDINGS:  Atelectasis in the left base. Additional new discoid atelectasis in the right base. No new consolidation. No pleural effusion or pneumothorax. Heart size is stable. Pulmonary vasculature are nondilated. Underlying scattered calcified lung granulomas. Tracheostomy is in place. Partially imaged ventriculoperitoneal shunt catheter. The osseous structures and surrounding soft tissues demonstrate no acute abnormality.      Impression: 1. Slightly increased basilar atelectasis. Otherwise stable.   This report was finalized on 06/13/2022 07:42 by Dr Travis Lorenzo, .    XR Chest 1 View    Result Date: 6/12/2022  EXAMINATION: XR CHEST 1  VW-  6/12/2022 3:25 AM CDT  HISTORY: On vent; R13.10-Dysphagia, unspecified; Z01.818-Encounter for other preprocedural examination; D32.9-Benign neoplasm of meninges, unspecified; Z74.09-Other reduced mobility; Z78.9-Other specified health status; G40.901-Epilepsy, unspecified, not intractable, with status epilepticus; J96.01-Acute respiratory failure with hypoxia; G91.0-Communicating hydrocephalus; T81.40XA-Infection following a pr  1 view chest x-ray.  Comparison is made with yesterday at 3:31 AM.  Stable heart size. Tracheostomy tube remains in place. Right  shunt tubing noted.  Low lung volumes with persistent consolidation of the left lower lobe.  No pneumothorax and no sign of heart failure.  Multiple small calcified granulomas throughout both lungs.  Summary: 1. No significant change from yesterday. This report was finalized on 06/12/2022 10:34 by Dr. Tomer Whelan MD.     My radiograph interpretation/independent review of imaging: tracheostomy tube in place.  Bilat infiltrates    Pulmonary Assessment:    1. Acute resp failure with hypoxia stable  2. Encephalopathy stable    Recommend/plan:   · Decrease Pi to 10 cm  · Continue other vent parameters.  · Continue abx      This visit was performed by both a physician and an [facility] RN.  I personally evaluated and examined the patient.  I performed all aspects of the medical decision making as documented.  Electronically signed by Carlos A Dasilva MD, 6/13/2022, 08:59 CDT

## 2022-06-13 NOTE — PLAN OF CARE
Patient still unable to follow any commands. Will withdraw to pain and open eyes to pain as well. Very taught abdomen, KUB showed possible ileus, CT abd and pelvis pending. No high residuals present, tube feedings on hold currently. 1 unit PRBCs given for Hgb 6.8. Occult stool ordered with next BM. Repeat EEG done as well per neurology. 40 of lasix given with a great response, urine output 2700 ml on this shift. VSS, Safety measures in place, turned Q2.

## 2022-06-13 NOTE — PROGRESS NOTES
[facility] Medicine Services  INPATIENT PROGRESS NOTE    Patient Name: Hai Hernandez  Date of Admission: 5/10/2022  Today's Date: 06/13/22  Length of Stay: 34  Primary Care Physician: Elisa Chacko MD    Subjective   Chief Complaint: Intubated  HPI   Intubated.  Off sedation  Non-verbal, does not follow commands        Review of Systems   Unable to perform ROS: Intubated        All pertinent negatives and positives are as above. All other systems have been reviewed and are negative unless otherwise stated.     Objective    Temp:  [98 °F (36.7 °C)-100.2 °F (37.9 °C)] 98.5 °F (36.9 °C)  Heart Rate:  [58-72] 60  Resp:  [26-30] 26  BP: ()/(47-87) 149/75  Arterial Line BP: ()/(32-77) 168/57  FiO2 (%):  [35 %] 35 %  Physical Exam  Vitals reviewed.   Constitutional:       Appearance: He is well-developed.   HENT:      Head: Normocephalic and atraumatic.      Right Ear: External ear normal.      Left Ear: External ear normal.      Nose: Nose normal.   Eyes:      General: No scleral icterus.        Right eye: No discharge.         Left eye: No discharge.      Conjunctiva/sclera: Conjunctivae normal.      Pupils: Pupils are equal, round, and reactive to light.   Neck:      Thyroid: No thyromegaly.      Trachea: No tracheal deviation.   Cardiovascular:      Rate and Rhythm: Normal rate and regular rhythm.      Heart sounds: Normal heart sounds. No murmur heard.    No friction rub. No gallop.   Pulmonary:      Effort: Pulmonary effort is normal. No respiratory distress.      Breath sounds: Normal breath sounds. No stridor. No wheezing or rales.   Chest:      Chest wall: No tenderness.   Abdominal:      General: Bowel sounds are normal. There is no distension.      Palpations: Abdomen is soft. There is no mass.      Tenderness: There is no abdominal tenderness. There is no guarding or rebound.      Hernia: No hernia is present.   Musculoskeletal:         General: No  deformity. Normal range of motion.      Cervical back: Normal range of motion and neck supple.   Lymphadenopathy:      Cervical: No cervical adenopathy.   Skin:     General: Skin is warm and dry.      Coloration: Skin is not pale.      Findings: No erythema or rash.   Neurological:      Comments: Level of consciousness: arousable by verbal stimuli ,  drowsy  Off propofol.  Intermittently opens eyes to painful stimuli.  Does not follow commands.  Nonverbal.  Weakly withdraws to tactile stimuli.            Cranial Nerves      CN III, IV, VI   Pupils are equal, round, and reactive to light.  Extraocular motions are normal.      CN IX, X   CN IX normal.         Psychiatric:         Behavior: Behavior normal.         Thought Content: Thought content normal.         Judgment: Judgment normal.           Results Review:  I have reviewed the labs, radiology results, and diagnostic studies.    Laboratory Data:   Results from last 7 days   Lab Units 06/13/22  0607 06/12/22  0515 06/11/22  0356   WBC 10*3/mm3 10.97* 13.40* 14.13*   HEMOGLOBIN g/dL 6.8* 7.0* 7.2*   HEMATOCRIT % 22.5* 23.1* 23.2*   PLATELETS 10*3/mm3 206 193 171        Results from last 7 days   Lab Units 06/13/22  0607 06/12/22  0515 06/11/22  0356   SODIUM mmol/L 138 140 137   POTASSIUM mmol/L 4.1 4.1 4.3   CHLORIDE mmol/L 105 106 102   CO2 mmol/L 29.0 28.0 28.0   BUN mg/dL 37* 34* 29*   CREATININE mg/dL 0.57* 0.61* 0.67*   CALCIUM mg/dL 8.3* 8.2* 8.4*   BILIRUBIN mg/dL 0.4 0.3 0.5   ALK PHOS U/L 366* 257* 161*   ALT (SGPT) U/L 106* 61* 38   AST (SGOT) U/L 145* 80* 48*   GLUCOSE mg/dL 165* 81 148*       Culture Data:   Wound Culture   Date Value Ref Range Status   06/10/2022 No growth at 3 days  Final   06/10/2022 No growth at 3 days  Final   06/10/2022 No growth at 3 days  Final     Respiratory Culture   Date Value Ref Range Status   06/10/2022   Preliminary    Rare The culture consists of normal respiratory annia. This is a preliminary report; final report to  follow.       Radiology Data:   Imaging Results (Last 24 Hours)     Procedure Component Value Units Date/Time    XR Abdomen KUB [[id number]] Resulted: 06/13/22 0935     Updated: 06/13/22 0935    XR Chest 1 View [[id number]] Collected: 06/13/22 0739     Updated: 06/13/22 0745    Narrative:      XR CHEST 1 VW- 6/13/2022 3:20 AM CDT     HISTORY: On vent; R13.10-Dysphagia, unspecified; Z01.818-Encounter for  other preprocedural examination; D32.9-Benign neoplasm of meninges,  unspecified; Z74.09-Other reduced mobility; Z78.9-Other specified health  status; G40.901-Epilepsy, unspecified, not intractable, with status  epilepticus; J96.01-Acute respiratory failure with hypoxia;  G91.0-Communicating hydrocephalus; T81.40XA-Infection following a pr     COMPARISON: Chest exam dated 6/12/2022.     FINDINGS:      Atelectasis in the left base. Additional new discoid atelectasis in the  right base. No new consolidation. No pleural effusion or pneumothorax.  Heart size is stable. Pulmonary vasculature are nondilated. Underlying  scattered calcified lung granulomas. Tracheostomy is in place. Partially  imaged ventriculoperitoneal shunt catheter. The osseous structures and  surrounding soft tissues demonstrate no acute abnormality.       Impression:      1. Slightly increased basilar atelectasis. Otherwise stable.        This report was finalized on 06/13/2022 07:42 by Dr Travis Lorenzo, .    XR Chest 1 View [[id number]] Collected: 06/12/22 1033     Updated: 06/12/22 1037    Narrative:      EXAMINATION: XR CHEST 1 VW-     6/12/2022 3:25 AM CDT     HISTORY: On vent; R13.10-Dysphagia, unspecified; Z01.818-Encounter for  other preprocedural examination; D32.9-Benign neoplasm of meninges,  unspecified; Z74.09-Other reduced mobility; Z78.9-Other specified health  status; G40.901-Epilepsy, unspecified, not intractable, with status  epilepticus; J96.01-Acute respiratory failure with hypoxia;  G91.0-Communicating hydrocephalus;  T81.40XA-Infection following a pr     1 view chest x-ray.     Comparison is made with yesterday at 3:31 AM.     Stable heart size.  Tracheostomy tube remains in place.  Right  shunt tubing noted.     Low lung volumes with persistent consolidation of the left lower lobe.     No pneumothorax and no sign of heart failure.     Multiple small calcified granulomas throughout both lungs.     Summary:  1. No significant change from yesterday.  This report was finalized on 06/12/2022 10:34 by Dr. Tomer Whelan MD.          I have reviewed the patient's current medications.     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  Meningioma  -NS managing  POD #33 (5/10/2022) Status post postcraniotomy for meningioma              POD#3 (6/10/2022) Craniectomy for cerebral edema and possible infection    2.  Status Epilepticus/Seizures  -Cerebyx  -Vimpat  -Dilantin  -EEG planned for today  -Neurology following    3.  Acute Respiratory failure with prolonged mechanical ventilation s/p Trach and PEG  -Pulm following    4.  Hydrocephalus/Cerebral Edema  POD#3  (6/10/2022) Craniectomy for cerebral edema and possible infection  -NS managing  -Decadron  -Lumbar drain removed  -s/p Right Posterial parietal  shunt placement    5.  Left pleural effusion s/p Chest tube which has since been removed without issue  -monitor     6.  A-Fib  -Coreg  -Not able to tolerate AC    7.  RUE DVT  -monitor  -not able to tolerated AC           Discharge Planning: I expect the patient to be discharged to LTAC in ? days  Electronically signed by Sha Beatty MD, 06/13/22, 09:46 CDT.

## 2022-06-13 NOTE — THERAPY TREATMENT NOTE
Acute Care - Physical Therapy Treatment Note  [facility]     Patient Name: Hai Hernandez  : 1945  MRN: [id number]  Today's Date: 2022      Visit Dx:     ICD-10-CM ICD-9-CM   1. Dysphagia, unspecified type  R13.10 787.20   2. Preop testing  Z01.818 V72.84   3. Meningioma (HCC)  D32.9 225.2   4. Impaired mobility  Z74.09 799.89   5. Decreased activities of daily living (ADL)  Z78.9 V49.89   6. Status epilepticus (HCC)  G40.901 345.3   7. Acute respiratory failure with hypoxia (HCC)  J96.01 518.81   8. Communicating hydrocephalus (HCC)  G91.0 331.3   9. 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)   • Status epilepticus (HCC)   • Essential hypertension   • Type 2 diabetes mellitus with hyperglycemia, without long-term current use of insulin (HCC)   • Hypothyroidism (acquired)   • Acute respiratory failure (secondary to status epilepticus)   • Thrombocytopenia (HCC)   • Cerebral parenchymal hemorrhage (HCC)   • PAF (paroxysmal atrial fibrillation) (HCC)   • Fever   • Loculated pleural effusion   • Acute deep vein thrombosis (DVT) of the ulnar and brachial veins of right upper extremity (HCC)   • Dysphagia   • Communicating hydrocephalus (HCC)   • Cerebral edema (HCC)     Past Medical History:   Diagnosis Date   • Brain tumor (HCC)    • Depression    • Hearing loss    • History of cellulitis     right foot big toe   • Hypertension    • Pneumonia    • Right arm weakness     after cervial injury   • Sciatic pain     affects balance   • Thyroid disease    • Visual disturbance     due to brain tumor - right eye     Past Surgical History:   Procedure Laterality Date   • CATARACT EXTRACTION, BILATERAL     • COLONOSCOPY     • CRANIOTOMY Bilateral 6/10/2022    Procedure: CRANIECTOMY;   Surgeon: Martell Downs MD;  Location:  [facility];  Service: Neurosurgery;  Laterality: Bilateral;   • CRANIOTOMY FOR TUMOR Right 5/10/2022    Procedure: CRANIOTOMY FOR TUMOR STERIOTACTIC WITH BRAIN LAB, right;  Surgeon: Martell Downs MD;  Location:  [facility];  Service: Neurosurgery;  Laterality: Right;   • ENDOSCOPY W/ PEG TUBE PLACEMENT N/A 6/2/2022    Procedure: ESOPHAGOGASTRODUODENOSCOPY WITH PERCUTANEOUS ENDOSCOPIC GASTROSTOMY TUBE INSERTION WITH ANESTHESIA;  Surgeon: Melecio Lu MD;  Location:  [facility];  Service: Gastroenterology;  Laterality: N/A;   • NECK SURGERY     • SKIN CANCER EXCISION     • TONSILLECTOMY     • TRACHEOSTOMY N/A 6/2/2022    Procedure: TRACHEOSTOMY;  Surgeon: Geovany Davidson MD;  Location:  [facility];  Service: ENT;  Laterality: N/A;   •  SHUNT INSERTION Right 6/3/2022    Procedure: VENTRICULAR PERITONEAL SHUNT INSERTION WITH BRAIN LAB;  Surgeon: Martell Downs MD;  Location:  [facility];  Service: Neurosurgery;  Laterality: Right;     PT Assessment (last 12 hours)     PT Evaluation and Treatment     Row Name 06/13/22 0744          Physical Therapy Time and Intention    Subjective Information --  vent  -CIERA     Comment very edamatous. No sedation  -CIERA     Row Name 06/13/22 0744          General Information    Existing Precautions/Restrictions fall;oxygen therapy device and L/min  trach / vent  -CIERA     Row Name 06/13/22 0744          Pain Scale: FACES Pre/Post-Treatment    Posttreatment Pain Rating 0-->no hurt  -CIERA     Row Name 06/13/22 0744          Motor Skills    Comments, Therapeutic Exercise PROM all 4 extremities x15. Stretch B castrocs 2 x30 seconds each  -CIERA     Row Name             Wound 05/15/22 1712 coccyx    Wound - Properties Group Placement Date: 05/15/22  -KS Placement Time: 1712 -KS Location: coccyx  -KS     Retired Wound - Properties Group Placement Date: 05/15/22  -KS Placement Time: 1712 -KS Location: coccyx  -KS     Retired Wound -  Properties Group Date first assessed: 05/15/22  -KS Time first assessed: 1712  -KS Location: coccyx  -KS     Row Name             Wound 05/17/22 1623 scalp Pressure Injury    Wound - Properties Group Placement Date: 05/17/22  -KS Placement Time: 1623  -KS Present on Hospital Admission: N  -KS Location: scalp  -KS Primary Wound Type: Pressure inj  -KS     Retired Wound - Properties Group Placement Date: 05/17/22  -KS Placement Time: 1623  -KS Present on Hospital Admission: N  -KS Location: scalp  -KS Primary Wound Type: Pressure inj  -KS     Retired Wound - Properties Group Date first assessed: 05/17/22  -KS Time first assessed: 1623  -KS Present on Hospital Admission: N  -KS Location: scalp  -KS Primary Wound Type: Pressure inj  -KS     Row Name             Wound 06/03/22 1602 abdomen Incision    Wound - Properties Group Placement Date: 06/03/22  -MK Placement Time: 1602  -MK Present on Hospital Admission: N  -MK Location: abdomen  -MK Primary Wound Type: Incision  -MK     Retired Wound - Properties Group Placement Date: 06/03/22  -MK Placement Time: 1602  -MK Present on Hospital Admission: N  -MK Location: abdomen  -MK Primary Wound Type: Incision  -MK     Retired Wound - Properties Group Date first assessed: 06/03/22  -MK Time first assessed: 1602  -MK Present on Hospital Admission: N  -MK Location: abdomen  -MK Primary Wound Type: Incision  -MK     Row Name             Wound 06/10/22 1409 Right scalp Incision    Wound - Properties Group Placement Date: 06/10/22  -PAULINE Placement Time: 1409  -PAULINE Side: Right  -PAULINE Location: scalp  -PAULINE Primary Wound Type: Incision  -PAULINE     Retired Wound - Properties Group Placement Date: 06/10/22  -PAULINE Placement Time: 1409  -PAULINE Side: Right  -PAULINE Location: scalp  -PAULINE Primary Wound Type: Incision  -PAULINE     Retired Wound - Properties Group Date first assessed: 06/10/22  -PAULINE Time first assessed: 1409  -PAULINE Side: Right  -PAULINE Location: scalp  -PAULINE Primary Wound Type: Incision  -PAULINE     Row Name              Wound 06/11/22 2259 throat    Wound - Properties Group Placement Date: 06/11/22  -PC Placement Time: 2259  -PC Present on Hospital Admission: N  -PC Location: throat  -PC     Retired Wound - Properties Group Placement Date: 06/11/22  -PC Placement Time: 2259  -PC Present on Hospital Admission: N  -PC Location: throat  -PC     Retired Wound - Properties Group Date first assessed: 06/11/22  -PC Time first assessed: 2259  -PC Present on Hospital Admission: N  -PC Location: throat  -PC     Row Name 06/13/22 0744          Positioning and Restraints    Pre-Treatment Position in bed  -CIERA     In Bed notified nsg;fowlers;side rails up x3;RUE elevated;LUE elevated;SCD pump applied;waffle boots/both  -CIERA           User Key  (r) = Recorded By, (t) = Taken By, (c) = Cosigned By    Initials Name Provider Type    Gloria Corley, BRENDAN Physical Therapist Assistant    Carmen Mistry RN Registered Nurse    Surya Ball RN Registered Nurse    Jayna Treviño RN Registered Nurse    Leatha Solomon RN Registered Nurse                Physical Therapy Education                 Title: PT OT SLP Therapies (In Progress)     Topic: Physical Therapy (In Progress)     Point: Mobility training (Done)     Learning Progress Summary           Patient Acceptance, E, VU,DU by YULIET at 5/11/2022 0745    Comment: benefits of activity, progression of PT POC                   Point: Home exercise program (In Progress)     Learning Progress Summary           Patient Acceptance, E, NR by YULIET at 6/7/2022 1345    Comment: Benefits of activity, B UE/LE exercises, positioning for joints and skin integrity                   Point: Body mechanics (Not Started)     Learner Progress:  Not documented in this visit.          Point: Precautions (Not Started)     Learner Progress:  Not documented in this visit.                      User Key     Initials Effective Dates Name Provider Type Artur HORVATH 08/02/16 -  Wayne Thomas, PT DPT  Physical Therapist PT              PT Recommendation and Plan         Outcome Measures     Row Name 06/13/22 0800 06/12/22 0800 06/11/22 1300       How much help from another person do you currently need...    Turning from your back to your side while in flat bed without using bedrails? 1  -CIERA 1  -CIERA 1  -CIERA    Moving from lying on back to sitting on the side of a flat bed without bedrails? 1  -CIERA 1  -CIERA 1  -CIERA    Moving to and from a bed to a chair (including a wheelchair)? 1  -CIERA 1  -CIERA 1  -CIERA    Standing up from a chair using your arms (e.g., wheelchair, bedside chair)? 1  -CIERA 1  -CIERA 1  -CIERA    Climbing 3-5 steps with a railing? 1  -CIERA 1  -CIERA 1  -CIERA    To walk in hospital room? 1  -CIERA 1  -CIERA 1  -CIERA    AM-PAC 6 Clicks Score (PT) 6  -CIEAR 6  -CIERA 6  -CIERA          User Key  (r) = Recorded By, (t) = Taken By, (c) = Cosigned By    Initials Name Provider Type    Gloria Corley PTA Physical Therapist Assistant                 Time Calculation:    PT Charges     Row Name 06/13/22 0814             Time Calculation    Start Time 0744  -CIERA      Stop Time 0810  -CIERA      Time Calculation (min) 26 min  -CIERA              Timed Charges    54653 - PT Therapeutic Exercise Minutes 26  -CIERA              Total Minutes    Timed Charges Total Minutes 26  -CIERA       Total Minutes 26  -CIERA            User Key  (r) = Recorded By, (t) = Taken By, (c) = Cosigned By    Initials Name Provider Type    Gloria Corley PTA Physical Therapist Assistant              Therapy Charges for Today     Code Description Service Date Service Provider Modifiers Qty    45602239122 HC PT THER PROC EA 15 MIN 6/12/2022 Gloria Hobson PTA GP 2    67344408732 HC PT THER PROC EA 15 MIN 6/13/2022 Gloria Hobson PTA GP 2          PT G-Codes  Outcome Measure Options: AM-PAC 6 Clicks Basic Mobility (PT)  AM-PAC 6 Clicks Score (PT): 6  AM-PAC 6 Clicks Score (OT): 12    Gloria Hobson PTA  6/13/2022

## 2022-06-13 NOTE — PLAN OF CARE
Goal Outcome Evaluation:      Patient tolerates and will continue to benefit from PROM.            Lab results within limits per anesthesia protocol; OK for surgery.      Recent Results (from the past 12 hour(s))   CREATININE    Collection Time: 06/13/17  2:39 PM   Result Value Ref Range    Creatinine 0.73 0.6 - 1.0 MG/DL   POTASSIUM    Collection Time: 06/13/17  2:39 PM   Result Value Ref Range    Potassium 4.3 3.5 - 5.1 mmol/L

## 2022-06-13 NOTE — PROGRESS NOTES
"Pharmacy Dosing Service  Pharmacokinetics  Vancomycin Follow-up Evaluation    Assessment/Action/Plan:  Current Order: Vancomycin 1500 mg IVPB every 12 hours  Current end date:6/24/2022  Levels: 6/13/2022 0607 Vancomycin \"trough\" = 24.1 (4.3 hours post 1500mg dose); 6/13/2022 1517 Vancomycin trough = 16.2 (13.5 hours post 1500mg) 6/12/2022 0515 Vancomycin \"trough\" = 25.6 (3.5 hours post 1500 mg dose) Vancomycin dose administered 2 hours late from schedule and \"trough\" drawn 5.25 hours early, schedule adjusted on MAR.  Additional antimicrobial agent(s): Cefepime  Goal Vancomycin trough ~20 per Neurosurgery order  AUC24,ss: 545 mg/L.hr (goal 400-600)  Continue Vancomycin 1500mg iv q12h. Pharmacy will continue to follow daily and adjust dose accordingly.      Subjective:  Hai Hernandez is a 77 y.o. male currently on Vancomycin for the treatment of possible CNS infection, day 4 of treatment.    AUC Model Data:  Regimen: 1500 mg IV every 12 hours.  Exposure target: AUC24 (range)400-600 mg/L.hr   AUC24,ss: 545 mg/L.hr  PAUC*: 99 %  Ctrough,ss: 17.5 mg/L  Pconc*: 24 %  Tox.: 13 %      Objective:  Ht: 182.9 cm (72\"); Wt: 107 kg (234 lb 14.4 oz)  Estimated Creatinine Clearance: 137.2 mL/min (A) (by C-G formula based on SCr of 0.57 mg/dL (L)).   Creatinine   Date Value Ref Range Status   06/13/2022 0.57 (L) 0.76 - 1.27 mg/dL Final   06/12/2022 0.61 (L) 0.76 - 1.27 mg/dL Final   06/11/2022 0.67 (L) 0.76 - 1.27 mg/dL Final   01/21/2022 1.00 0.60 - 1.30 mg/dL Final     Comment:     Serial Number: [id number]:  [id number]      Lab Results   Component Value Date    WBC 10.97 (H) 06/13/2022    WBC 13.40 (H) 06/12/2022    WBC 14.13 (H) 06/11/2022         Lab Results   Component Value Date    [facility] 16.20 06/13/2022       Culture Results:  Microbiology Results (last 10 days)       Procedure Component Value - Date/Time    Respiratory Culture - Aspirate, Bronchus [[id number]] Collected: 06/10/22 1643    Lab Status: Final result " Specimen: Aspirate from Bronchus Updated: 06/13/22 1049     Respiratory Culture Rare Normal respiratory annia. No S. aureus or Pseudomonas aeruginosa detected. Final report.     Gram Stain Many (4+) WBCs per low power field      Less than 25 Epithelial cells seen      No organisms seen    Anaerobic Culture - Tissue, Brain [[id number]]  (Normal) Collected: 06/10/22 1524    Lab Status: Preliminary result Specimen: Tissue from Brain Updated: 06/13/22 0612     Anaerobic Culture No anaerobes isolated at 3 days    Tissue / Bone Culture - Tissue, Brain [[id number]] Collected: 06/10/22 1524    Lab Status: Final result Specimen: Tissue from Brain Updated: 06/13/22 0943     Tissue Culture No growth at 3 days     Gram Stain No WBCs per low power field      No organisms seen    AFB Culture - Tissue, Brain [[id number]] Collected: 06/10/22 1524    Lab Status: Preliminary result Specimen: Tissue from Brain Updated: 06/12/22 1147     AFB Stain No acid fast bacilli seen on direct smear      No acid fast bacilli seen on concentrated smear    Wound Culture - Wound, Head [[id number]] Collected: 06/10/22 1343    Lab Status: Final result Specimen: Wound from Head Updated: 06/13/22 0943     Wound Culture No growth at 3 days     Gram Stain Rare (1+) WBCs seen      No organisms seen    Anaerobic Culture - Tissue, Brain, Cerebral Cortex [[id number]]  (Normal) Collected: 06/10/22 1327    Lab Status: Preliminary result Specimen: Tissue from Brain, Cerebral Cortex Updated: 06/13/22 0612     Anaerobic Culture No anaerobes isolated at 3 days    Tissue / Bone Culture - Tissue, Brain, Cerebral Cortex [[id number]] Collected: 06/10/22 1327    Lab Status: Final result Specimen: Tissue from Brain, Cerebral Cortex Updated: 06/13/22 0942     Tissue Culture No growth at 3 days     Gram Stain Moderate (3+) WBCs seen      No organisms seen    AFB Culture - Tissue, Brain, Cerebral Cortex [[id number]] Collected: 06/10/22 1327    Lab Status: Preliminary result  Specimen: Tissue from Brain, Cerebral Cortex Updated: 06/12/22 1148     AFB Stain No acid fast bacilli seen on direct smear      No acid fast bacilli seen on concentrated smear    Tissue / Bone Culture - Tissue, Brain [[id number]] Collected: 06/10/22 1326    Lab Status: Final result Specimen: Tissue from Brain Updated: 06/13/22 0935     Tissue Culture No growth at 3 days     Gram Stain Rare (1+) WBCs per low power field      No organisms seen    AFB Culture - Tissue, Brain [[id number]] Collected: 06/10/22 1326    Lab Status: Preliminary result Specimen: Tissue from Brain Updated: 06/12/22 1147     AFB Stain No acid fast bacilli seen on direct smear      No acid fast bacilli seen on concentrated smear    Wound Culture - Wound, Head [[id number]] Collected: 06/10/22 1304    Lab Status: Final result Specimen: Wound from Head Updated: 06/13/22 0943     Wound Culture No growth at 3 days     Gram Stain No WBCs or organisms seen    Wound Culture - Wound, Head [[id number]] Collected: 06/10/22 1226    Lab Status: Final result Specimen: Wound from Head Updated: 06/13/22 0946     Wound Culture No growth at 3 days     Gram Stain Rare (1+) WBCs seen      No organisms seen    COVID PRE-OP / PRE-PROCEDURE SCREENING ORDER (NO ISOLATION) - Swab, Nasal Cavity [[id number]]  (Normal) Collected: 06/10/22 0744    Lab Status: Final result Specimen: Swab from Nasal Cavity Updated: 06/10/22 0857    Narrative:      The following orders were created for panel order COVID PRE-OP / PRE-PROCEDURE SCREENING ORDER (NO ISOLATION) - Swab, Nasal Cavity.  Procedure                               Abnormality         Status                     ---------                               -----------         ------                     COVID-19,[facility] IN-SAR[facility]...[[id number]]  Normal              Final result                 Please view results for these tests on the individual orders.    COVID-19,[facility] IN-HOUSE,Nasal Swab No Transport Media 3-4 HR TAT - Swab, Nasal  Cavity [[id number]]  (Normal) Collected: 06/10/22 0744    Lab Status: Final result Specimen: Swab from Nasal Cavity Updated: 06/10/22 0857     COVID19 Not Detected    Narrative:      Fact sheet for providers: https://www.fda.gov/media/845575/download     Fact sheet for patients: https://www.fda.gov/media/318742/download    Test performed by PCR.    Consider negative results in combination with clinical observations, patient history, and epidemiological information.  Fact sheet for providers: https://www.fda.gov/media/122177/download     Fact sheet for patients: https://www.fda.gov/media/782660/download    Test performed by PCR.    Consider negative results in combination with clinical observations, patient history, and epidemiological information.    Culture, CSF - Cerebrospinal Fluid,  Shunt Aspirate [[id number]] Collected: 06/10/22 0729    Lab Status: Final result Specimen: Cerebrospinal Fluid from  Shunt Aspirate Updated: 06/13/22 0936     CSF Culture No growth at 3 days     Gram Stain Moderate (3+) WBCs seen      No organisms seen            Aristides Momin, PharmD   06/13/22 15:59 CDT

## 2022-06-13 NOTE — SIGNIFICANT NOTE
06/13/22 0634   Readings   ETCO2 (mmHg) 33 mmHg   Resp Rate (Observed) Vent 23   I:E Ratio (Obs) 1:2.30   Vt, Exp (observed, mL) 482 mL   Minute Ventilation (L/min) (Obs) 11.6 L/min   PIP Observed (cm H2O) 18 cm H2O   MAP (cm H2O) 9.1   PEEP Observed cmH2O 4.8 cmH2O   Plateau Pressure (cm H2O) 17 cm H2O   Driving Pressure (cm H2O) 12.2 cm H2O   Dynamic Compliance (L/cm H2O) 53 L/cm H2O

## 2022-06-13 NOTE — PROGRESS NOTES
Neurology Progress Note      Chief Complaint:    Postoperative seizure    Subjective     Subjective:  Patient remains mechanically ventilated off of sedation.  Opens eyes to verbal and sternal rub stimulation.      Corrected Dilantin level is 20.7.      Past Medical History:   Diagnosis Date   • Brain tumor (HCC)    • Depression    • Hearing loss    • History of cellulitis     right foot big toe   • Hypertension    • Pneumonia    • Right arm weakness     after cervial injury   • Sciatic pain     affects balance   • Thyroid disease    • Visual disturbance     due to brain tumor - right eye     Past Surgical History:   Procedure Laterality Date   • CATARACT EXTRACTION, BILATERAL     • COLONOSCOPY     • CRANIOTOMY Bilateral 6/10/2022    Procedure: CRANIECTOMY;  Surgeon: Martell Downs MD;  Location: [facility] OR;  Service: Neurosurgery;  Laterality: Bilateral;   • CRANIOTOMY FOR TUMOR Right 5/10/2022    Procedure: CRANIOTOMY FOR TUMOR STERIOTACTIC WITH BRAIN LAB, right;  Surgeon: Martell Downs MD;  Location: [facility] OR;  Service: Neurosurgery;  Laterality: Right;   • ENDOSCOPY W/ PEG TUBE PLACEMENT N/A 6/2/2022    Procedure: ESOPHAGOGASTRODUODENOSCOPY WITH PERCUTANEOUS ENDOSCOPIC GASTROSTOMY TUBE INSERTION WITH ANESTHESIA;  Surgeon: Melecio Lu MD;  Location: [facility] OR;  Service: Gastroenterology;  Laterality: N/A;   • NECK SURGERY     • SKIN CANCER EXCISION     • TONSILLECTOMY     • TRACHEOSTOMY N/A 6/2/2022    Procedure: TRACHEOSTOMY;  Surgeon: Geovany Davidson MD;  Location: [facility] OR;  Service: ENT;  Laterality: N/A;   •  SHUNT INSERTION Right 6/3/2022    Procedure: VENTRICULAR PERITONEAL SHUNT INSERTION WITH BRAIN LAB;  Surgeon: Martell Downs MD;  Location: [facility] OR;  Service: Neurosurgery;  Laterality: Right;     Family History   Problem Relation Age of Onset   • Cancer Sister    • Cancer Brother      Social History     Tobacco Use   • Smoking status: Never Smoker   •  Smokeless tobacco: Never Used   Vaping Use   • Vaping Use: Never used   Substance Use Topics   • Alcohol use: Never   • Drug use: Never       Medications:  Current Facility-Administered Medications   Medication Dose Route Frequency Provider Last Rate Last Admin   • acetaminophen (TYLENOL) tablet 650 mg  650 mg Oral Q4H PRN Martell Downs MD   650 mg at 06/12/22 2028    Or   • acetaminophen (TYLENOL) suppository 650 mg  650 mg Rectal Q4H PRN Martell Downs MD   650 mg at 05/23/22 0016   • alteplase (CATHFLO/ACTIVASE) injection 2 mg  2 mg Intracatheter PRN Martell Downs MD   New Syringe/Cartridge at 05/28/22 1330   • bisacodyl (DULCOLAX) suppository 10 mg  10 mg Rectal Daily PRN Martell Downs MD   10 mg at 06/03/22 0749   • carvedilol (COREG) tablet 6.25 mg  6.25 mg Nasogastric BID With Meals Martell Downs MD   6.25 mg at 06/13/22 0838   • cefepime (MAXIPIME) 2 g/100 mL 0.9% NS (mbp)  2 g Intravenous Q8H Martell Downs MD   2 g at 06/13/22 0616   • chlorhexidine (PERIDEX) 0.12 % solution 15 mL  15 mL Mouth/Throat Q12H Martell Downs MD   15 mL at 06/13/22 0839   • dexamethasone (DECADRON) injection 4 mg  4 mg Intravenous Q6H Stevie Parsons APRN   4 mg at 06/13/22 0838   • [START ON 6/14/2022] dexamethasone (DECADRON) tablet 4 mg  4 mg Per PEG Tube Q12H Stevie Parsons APRN        Followed by   • [START ON 6/16/2022] dexamethasone (DECADRON) tablet 2 mg  2 mg Per PEG Tube Q12H Stevie Parsons APRN        Followed by   • [START ON 6/18/2022] dexamethasone (DECADRON) tablet 1 mg  1 mg Per PEG Tube Q12H Stevie Parsons APRN        Followed by   • [START ON 6/20/2022] dexamethasone (DECADRON) tablet 0.5 mg  0.5 mg Per PEG Tube Daily Stevie Parsons, APRN       • dextrose (D50W) (25 g/50 mL) IV injection 25 g  25 g Intravenous Q15 Min PRN Martell Downs MD   25 g at 06/09/22 1807   • dextrose (GLUTOSE) oral gel 15 g  15 g Oral Q15 Min PRN Yareli,  Martell Nowak MD   15 g at 05/22/22 0527   • fosphenytoin (Cerebyx) 275 mg PE in sodium chloride 0.9 % 100 mL IVPB  275 mg PE Intravenous Q8H Martell Downs MD   275 mg PE at 06/13/22 0616   • glucagon (human recombinant) (GLUCAGEN DIAGNOSTIC) injection 1 mg  1 mg Intramuscular Q15 Min PRN Martell Downs MD       • guaiFENesin (ROBITUSSIN) 100 MG/5ML oral solution 200 mg  200 mg Per PEG Tube TID Daly Perea, APRN   200 mg at 06/13/22 0838   • heparin (porcine) 5000 UNIT/ML injection 5,000 Units  5,000 Units Subcutaneous Q12H Martell Downs MD   5,000 Units at 06/13/22 0838   • Hold Medication (Anticoagulation)  1 each Does not apply Continuous PRN Martell Downs MD       • hydrALAZINE (APRESOLINE) injection 10 mg  10 mg Intravenous Q6H PRN Martell Downs MD   10 mg at 06/13/22 0403   • insulin detemir (LEVEMIR) injection 20 Units  20 Units Subcutaneous Nightly Martell Downs MD   20 Units at 06/12/22 2044   • insulin regular (humuLIN R,novoLIN R) injection 2-7 Units  2-7 Units Subcutaneous Q6H Martell Downs MD   2 Units at 06/13/22 0615   • insulin regular (humuLIN R,novoLIN R) injection 8 Units  8 Units Subcutaneous Q6H Martell Downs MD   8 Units at 06/12/22 1745   • ipratropium-albuterol (DUO-NEB) nebulizer solution 3 mL  3 mL Nebulization Q4H PRN Martell Downs MD   3 mL at 06/12/22 0447   • labetalol (NORMODYNE,TRANDATE) injection 10 mg  10 mg Intravenous Q6H PRN Martell Downs MD   10 mg at 06/08/22 0111   • lacosamide (VIMPAT) injection 200 mg  200 mg Intravenous Q12H Martell Downs MD   200 mg at 06/13/22 0838   • lansoprazole (FIRST) oral suspension 30 mg  30 mg Per G Tube QAM Martell Downs MD   30 mg at 06/13/22 0955   • latanoprost (XALATAN) 0.005 % ophthalmic solution 1 drop  1 drop Both Eyes Nightly Martell Downs MD   1 drop at 06/12/22 2028   • levalbuterol (XOPENEX) nebulizer  solution 0.63 mg  0.63 mg Nebulization TID PRN Martell Downs MD   0.63 mg at 06/12/22 1046   • levETIRAcetam in NaCl 0.75% (KEPPRA) IVPB 1,000 mg  1,000 mg Intravenous Q12H Martell Downs MD   1,000 mg at 06/13/22 0838   • levothyroxine (SYNTHROID, LEVOTHROID) tablet 125 mcg  125 mcg Nasogastric Q AM Martell Downs MD   125 mcg at 06/13/22 0616   • lidocaine (XYLOCAINE) 1 % injection 10 mL  10 mL Intradermal Once Martell Downs MD       • liothyronine (CYTOMEL) tablet 25 mcg  25 mcg Nasogastric Daily Martell Downs MD   25 mcg at 06/13/22 0838   • lisinopril (PRINIVIL,ZESTRIL) tablet 20 mg  20 mg Nasogastric Q24H Martell Downs MD   20 mg at 06/13/22 0838   • LORazepam (ATIVAN) injection 2 mg  2 mg Intravenous Q2H PRN Martell Downs MD   2 mg at 06/12/22 0352   • mupirocin (BACTROBAN) 2 % ointment 1 application  1 application Topical Q12H Martell Downs MD   1 application at 06/13/22 0839   • norepinephrine (LEVOPHED) 8 mg in 250 mL NS infusion (premix)  0.02-0.3 mcg/kg/min Intravenous Titrated Martell Downs MD   Held at 06/11/22 1054   • Nutrisource fiber pack 1 packet  1 packet Nasogastric 4x Daily Martell Downs MD   1 packet at 06/13/22 1106   • ondansetron (ZOFRAN) tablet 4 mg  4 mg Oral Q6H PRN Martell Downs MD        Or   • ondansetron (ZOFRAN) injection 4 mg  4 mg Intravenous Q6H PRN Martell Downs MD       • oxyCODONE (ROXICODONE) 5 MG/5ML solution 5 mg  5 mg Per PEG Tube Q6H PRN Daly Perea APRN   5 mg at 06/13/22 0354   • polyethylene glycol (MIRALAX) packet 17 g  17 g Oral Daily Martell Downs MD   17 g at 06/13/22 0838   • ProSource TF oral liquid 45 mL  45 mL Per G Tube BID Martell Downs MD   45 mL at 06/13/22 0839   • sodium chloride 3 % nebulizer solution 4 mL  4 mL Nebulization BID - RT Martell Downs MD   4 mL at 06/13/22 0638   • vancomycin 1500 mg/500 mL  0.9% NS IVPB ([facility])  15 mg/kg Intravenous Q12H Martell Downs MD   1,500 mg at 06/13/22 0148       Allergies:    Patient has no known allergies.    Review of Systems:   -A 14 point review of systems is completed and is negative.      Objective      Vital Signs  Temp:  [97.9 °F (36.6 °C)-100.2 °F (37.9 °C)] 97.9 °F (36.6 °C)  Heart Rate:  [58-72] 60  Resp:  [26-30] 26  BP: (110-168)/(54-87) 137/74  Arterial Line BP: (107-182)/(35-77) 168/57  FiO2 (%):  [35 %] 35 %    Physical Exam:     HEENT:  Neck supple.  Tracheostomy in place.   CVS:  Regular rate and rhythm.  No murmurs  Carotid Examination:  No bruits  Lungs:  Clear to auscultation  Abdomen:  Non-tender, Non-distended.  PEG tube in place  Extremities:  No signs of peripheral edema     Neurologic Exam:   Coughs against the vent with sternal rub and noxious stimuli  Pupils are equally reactive to light.    Gag intact.     Motor:    Did not move any extremities spontaneously  Withdrew to noxious stimuli in all extremities.     DTR:  2+ throughout in all four extremities  upgoing toe on left     Results Review:    I reviewed the patient's new clinical results and findings.    Lab Results (last 24 hours)     Procedure Component Value Units Date/Time    POC Glucose Once [[id number]]  (Abnormal) Collected: 06/13/22 1103    Specimen: Blood Updated: 06/13/22 1114     Glucose 147 mg/dL      Comment: : ROMELANETA Bernardo RyanMeter ID: [id number]       Respiratory Culture - Aspirate, Bronchus [[id number]] Collected: 06/10/22 1643    Specimen: Aspirate from Bronchus Updated: 06/13/22 1049     Respiratory Culture Rare Normal respiratory annia. No S. aureus or Pseudomonas aeruginosa detected. Final report.     Gram Stain Many (4+) WBCs per low power field      Less than 25 Epithelial cells seen      No organisms seen    Tissue Pathology Exam [[id number]] Collected: 06/10/22 1327    Specimen: Tissue from Brain, Cerebral Cortex; Tissue from Brain Updated: 06/13/22  1004     Note to Patients --     This report may contain a detailed description of human tissue sent by a health care provider to the laboratory for pathologic evaluation. The content of this report is essential for diagnosis and may provide important critical findings. This information may be unfamiliar to patients to review without a medical professional present. It is advised that the patient review this report in the presence of a health care provider who can answer questions and explain the results.       Case Report --     Surgical Pathology Report                         Case: [id number]                                  Authorizing Provider:  Martell Downs MD Collected:           06/10/2022 01:27 PM          Ordering Location:     [facility] OR  Received:            06/10/2022 01:38 PM          Pathologist:           Vamshi Benson MD                                                        Intraop:               Dioni Morgan MD                                                      Specimens:   1) - Brain, Cerebral Cortex, cerebrum frozen and permenant and culture                              2) - Brain, CEREBROLITIS                                                                    Final Diagnosis --     1-2.  Brain, cerebrum, craniotomy:  Severe mixed inflammation, necrosis, and reactive gliosis.  No evidence of malignancy.  GMS stain is negative for fungal organisms.       Intraoperative Consultation --         FROZEN SECTION DIAGNOSIS:   Cerebrum, FS1A:  No malignancy identified.  No evidence inflammation or active infection.    Reported to Dr. Martell Downs on 6/10/2022 at 13:57 CDT by Dioni Morgan MD.       Gross Description --     1. Brain, Cerebral Cortex.  Received fresh for frozen section and cultures labeled with the patient name, date of birth, and designated cerebrum.  The specimen consists of a Telfa pad with 2 fragments of yellow-pink soft tissue  aggregating to 0.9 x 0.7 x 0.2 cm.  1 fragment as large amounts of tan-brown possible blood clot.  The fragment with blood clot is sterilely removed from the container and a representative section is submitted for frozen section.  The frozen section remnant is wrapped in lens paper and submitted in block 1A.  The remaining tissue is wrapped in lens paper and submitted in block 1B.     The remaining tissue is sent to microbiology.      2. Brain.  Received fresh from microbiology labeled with the patient name, date of birth, designated cerebrum.  The specimen consists of a Telfa pad with 3 red-tan soft tissue fragments aggregating to 0.9 x 0.8 x 0.2 cm.  The fragments are wrapped in lens paper and submitted in block 2A.           Microscopic Description --     Microscopic examination performed.      Wound Culture - Wound, Head [[id number]] Collected: 06/10/22 1226    Specimen: Wound from Head Updated: 06/13/22 0946     Wound Culture No growth at 3 days     Gram Stain Rare (1+) WBCs seen      No organisms seen    Wound Culture - Wound, Head [[id number]] Collected: 06/10/22 1304    Specimen: Wound from Head Updated: 06/13/22 0943     Wound Culture No growth at 3 days     Gram Stain No WBCs or organisms seen    Wound Culture - Wound, Head [[id number]] Collected: 06/10/22 1343    Specimen: Wound from Head Updated: 06/13/22 0943     Wound Culture No growth at 3 days     Gram Stain Rare (1+) WBCs seen      No organisms seen    Tissue / Bone Culture - Tissue, Brain [[id number]] Collected: 06/10/22 1524    Specimen: Tissue from Brain Updated: 06/13/22 0943     Tissue Culture No growth at 3 days     Gram Stain No WBCs per low power field      No organisms seen    Tissue / Bone Culture - Tissue, Brain, Cerebral Cortex [[id number]] Collected: 06/10/22 1327    Specimen: Tissue from Brain, Cerebral Cortex Updated: 06/13/22 0942     Tissue Culture No growth at 3 days     Gram Stain Moderate (3+) WBCs seen      No organisms seen     Culture, CSF - Cerebrospinal Fluid,  Shunt Aspirate [[id number]] Collected: 06/10/22 0729    Specimen: Cerebrospinal Fluid from  Shunt Aspirate Updated: 06/13/22 0936     CSF Culture No growth at 3 days     Gram Stain Moderate (3+) WBCs seen      No organisms seen    Tissue / Bone Culture - Tissue, Brain [[id number]] Collected: 06/10/22 1326    Specimen: Tissue from Brain Updated: 06/13/22 0935     Tissue Culture No growth at 3 days     Gram Stain Rare (1+) WBCs per low power field      No organisms seen    Vancomycin, Trough [[id number]]  (Abnormal) Collected: 06/13/22 0607    Specimen: Blood Updated: 06/13/22 0643     Vancomycin Trough 24.10 mcg/mL     Phenytoin Level, Total [[id number]]  (Normal) Collected: 06/13/22 0607    Specimen: Blood Updated: 06/13/22 0642     Phenytoin Level 15.7 mcg/mL     Comprehensive Metabolic Panel [[id number]]  (Abnormal) Collected: 06/13/22 0607    Specimen: Blood Updated: 06/13/22 0639     Glucose 165 mg/dL      BUN 37 mg/dL      Creatinine 0.57 mg/dL      Sodium 138 mmol/L      Potassium 4.1 mmol/L      Chloride 105 mmol/L      CO2 29.0 mmol/L      Calcium 8.3 mg/dL      Total Protein 6.3 g/dL      Albumin 2.40 g/dL      ALT (SGPT) 106 U/L      AST (SGOT) 145 U/L      Alkaline Phosphatase 366 U/L      Total Bilirubin 0.4 mg/dL      Globulin 3.9 gm/dL      A/G Ratio 0.6 g/dL      BUN/Creatinine Ratio 64.9     Anion Gap 4.0 mmol/L      eGFR 101.0 mL/min/1.73      Comment: National Kidney Foundation and American Society of Nephrology (ASN) Task Force recommended calculation based on the Chronic Kidney Disease Epidemiology Collaboration (CKD-EPI) equation refit without adjustment for race.       Narrative:      GFR Normal >60  Chronic Kidney Disease <60  Kidney Failure <15      CBC & Differential [[id number]]  (Abnormal) Collected: 06/13/22 0607    Specimen: Blood Updated: 06/13/22 0637    Narrative:      The following orders were created for panel order CBC & Differential.  Procedure                                Abnormality         Status                     ---------                               -----------         ------                     CBC Auto Differential[[id number]]        Abnormal            Final result                 Please view results for these tests on the individual orders.    CBC Auto Differential [[id number]]  (Abnormal) Collected: 06/13/22 0607    Specimen: Blood Updated: 06/13/22 0637     WBC 10.97 10*3/mm3      RBC 2.17 10*6/mm3      Hemoglobin 6.8 g/dL      Hematocrit 22.5 %      .7 fL      MCH 31.3 pg      MCHC 30.2 g/dL      RDW 17.0 %      RDW-SD 61.1 fl      MPV 10.0 fL      Platelets 206 10*3/mm3      Neutrophil % 78.0 %      Lymphocyte % 8.9 %      Monocyte % 9.5 %      Eosinophil % 1.3 %      Basophil % 0.4 %      Immature Grans % 1.9 %      Neutrophils, Absolute 8.56 10*3/mm3      Lymphocytes, Absolute 0.98 10*3/mm3      Monocytes, Absolute 1.04 10*3/mm3      Eosinophils, Absolute 0.14 10*3/mm3      Basophils, Absolute 0.04 10*3/mm3      Immature Grans, Absolute 0.21 10*3/mm3      nRBC 0.0 /100 WBC     POC Glucose Once [[id number]]  (Abnormal) Collected: 06/13/22 0610    Specimen: Blood Updated: 06/13/22 0632     Glucose 177 mg/dL      Comment: : [id number] Berto HansontanyMeter ID: [id number]       Anaerobic Culture - Tissue, Brain [[id number]]  (Normal) Collected: 06/10/22 1524    Specimen: Tissue from Brain Updated: 06/13/22 0612     Anaerobic Culture No anaerobes isolated at 3 days    Anaerobic Culture - Tissue, Brain, Cerebral Cortex [[id number]]  (Normal) Collected: 06/10/22 1327    Specimen: Tissue from Brain, Cerebral Cortex Updated: 06/13/22 0612     Anaerobic Culture No anaerobes isolated at 3 days    Blood Gas, Arterial - [[id number]]  (Abnormal) Collected: 06/13/22 0423    Specimen: Arterial Blood Updated: 06/13/22 0417     Site Arterial Line     Denzel's Test N/A     pH, Arterial 7.471 pH units      Comment: 83 Value above reference range         pCO2, Arterial 39.0 mm Hg      pO2, Arterial 88.1 mm Hg      HCO3, Arterial 28.4 mmol/L      Comment: 83 Value above reference range        Base Excess, Arterial 4.4 mmol/L      Comment: 83 Value above reference range        O2 Saturation, Arterial 98.4 %      Temperature 37.0 C      Barometric Pressure for Blood Gas 747 mmHg      Modality Ventilator     FIO2 35 %      Ventilator Mode PC     Set Mech Resp Rate 16.0     PEEP 5.0     PIP 12 cmH2O      Comment: Meter: [id number]     :  [id number]        Collected by [id number]     pCO2, Temperature Corrected 39.0 mm Hg      pH, Temp Corrected 7.471 pH Units      pO2, Temperature Corrected 88.1 mm Hg     POC Glucose Once [[id number]]  (Normal) Collected: 06/12/22 2357    Specimen: Blood Updated: 06/13/22 0018     Glucose 123 mg/dL      Comment: : [id number] Thomson BrittanyMeter ID: [id number]       POC Glucose Once [[id number]]  (Normal) Collected: 06/12/22 2024    Specimen: Blood Updated: 06/12/22 2045     Glucose 117 mg/dL      Comment: : [id number] Thomson BrittanyMeter ID: [id number]       POC Glucose Once [[id number]]  (Abnormal) Collected: 06/12/22 1742    Specimen: Blood Updated: 06/12/22 1753     Glucose 137 mg/dL      Comment: : [id number] Rip PablolorenaMeter ID: [id number]             Imaging Results (Last 24 Hours)     Procedure Component Value Units Date/Time    XR Abdomen KUB [[id number]] Collected: 06/13/22 1011     Updated: 06/13/22 1016    Narrative:      EXAMINATION: XR ABDOMEN KUB- 6/13/2022 10:11 AM CDT     HISTORY: Tight Abdomen.; R13.10-Dysphagia, unspecified;  Z01.818-Encounter for other preprocedural examination; D32.9-Benign  neoplasm of meninges, unspecified; Z74.09-Other reduced mobility;  Z78.9-Other specified health status; G40.901-Epilepsy, unspecified, not  intractable, with status epilepticus; J96.01-Acute respiratory failure  with hypoxia; G91.0-Communicating hydrocephalus; T81.40XA-Infection  follow.     REPORT: A supine  view of the abdomen was obtained.     COMPARISON: KUB 5/15/2022.     The NG tube and feeding tube seen before are no longer identified. There  is respiratory motion artifact. Gas is seen within the GI tract  diffusely without significant distention. The pattern is relatively  nonspecific but could be related to developing adynamic ileus. There are  advanced degenerative changes throughout the lumbar spine.       Impression:      Nonspecific bowel gas pattern with questionable developing  ileus.  This report was finalized on 06/13/2022 10:13 by Dr. Antony Thomas MD.    XR Chest 1 View [[id number]] Collected: 06/13/22 0739     Updated: 06/13/22 0745    Narrative:      XR CHEST 1 VW- 6/13/2022 3:20 AM CDT     HISTORY: On vent; R13.10-Dysphagia, unspecified; Z01.818-Encounter for  other preprocedural examination; D32.9-Benign neoplasm of meninges,  unspecified; Z74.09-Other reduced mobility; Z78.9-Other specified health  status; G40.901-Epilepsy, unspecified, not intractable, with status  epilepticus; J96.01-Acute respiratory failure with hypoxia;  G91.0-Communicating hydrocephalus; T81.40XA-Infection following a pr     COMPARISON: Chest exam dated 6/12/2022.     FINDINGS:      Atelectasis in the left base. Additional new discoid atelectasis in the  right base. No new consolidation. No pleural effusion or pneumothorax.  Heart size is stable. Pulmonary vasculature are nondilated. Underlying  scattered calcified lung granulomas. Tracheostomy is in place. Partially  imaged ventriculoperitoneal shunt catheter. The osseous structures and  surrounding soft tissues demonstrate no acute abnormality.       Impression:      1. Slightly increased basilar atelectasis. Otherwise stable.        This report was finalized on 06/13/2022 07:42 by Dr Travis Lorenzo, .          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)    Status epilepticus  (HCC)    Essential hypertension    Type 2 diabetes mellitus with hyperglycemia, without long-term current use of insulin (HCC)    Hypothyroidism (acquired)    Acute respiratory failure (secondary to status epilepticus)    Thrombocytopenia (HCC)    Cerebral parenchymal hemorrhage (HCC)    PAF (paroxysmal atrial fibrillation) (HCC)    Fever    Loculated pleural effusion    Acute deep vein thrombosis (DVT) of the ulnar and brachial veins of right upper extremity (HCC)    Dysphagia    Communicating hydrocephalus (HCC)     Impression:     · Postoperative seizure  · S/p craniectomy and debridement yesterday  · Hypoalbuminemia   · S/p  shunt on 6/3/2022  · Tracheostomy  · PEG tube        Plan:  · Screening EEG today as we wean AEDs.  · Begin reducing fosphenytoin to 200 mg every 8 hours.  Eventual plan to discontinue.  · Continue Keppra 1000 mg twice daily.  · Continue Vimpat 200 mg IV every 12 hours  · Continue to work on reducing antiepileptic medication, as able, in an effort to see if this helps the patient improve his level of consciousness and responsiveness.  · Neurology will continue to follow            Zac Mccoy PA-C  06/13/22  12:31 CDT

## 2022-06-13 NOTE — PROGRESS NOTES
NEUROSURGERY DAILY PROGRESS NOTE    ASSESSMENT:   Hai Hernandez is a 77 y.o. with a significant comorbidity of acephalic migraines, thyroid disease status post radiation as a child, basal cell and squamous cell carcinoma of the skin. He presents in FU for known meningioma found on workup for right visual field changes. Physical exam findings of neurologically intact with resolution of all symptoms and mild decreased vision in right eye.  His imaging shows 22 x 24 x 13.5 mm right planum sphenoidale mass most suggestive of meningioma.    Past Medical History:   Diagnosis Date   • Brain tumor (HCC)    • Depression    • Hearing loss    • History of cellulitis     right foot big toe   • Hypertension    • Pneumonia    • Right arm weakness     after cervial injury   • Sciatic pain     affects balance   • Thyroid disease    • Visual disturbance     due to brain tumor - right eye     Active Hospital Problems    Diagnosis    • **Meningioma (HCC)    • Acute deep vein thrombosis (DVT) of the ulnar and brachial veins of right upper extremity (HCC)    • Loculated pleural effusion    • Fever    • PAF (paroxysmal atrial fibrillation) (HCC)    • Cerebral parenchymal hemorrhage (HCC)    • Essential hypertension    • Type 2 diabetes mellitus with hyperglycemia, without long-term current use of insulin (HCC)    • Hypothyroidism (acquired)    • Acute respiratory failure (secondary to status epilepticus)    • Thrombocytopenia (HCC)    • 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  Does not follow commands.  Nonverbal.  Weakly withdraws to tactile  "stimuli.    Intracranial meningioma   POD #33 (5/10/2022) Status post postcraniotomy for meningioma   POD#3 (6/10/2022) Craniectomy for cerebral edema and possible infection   CT reviewed and stable.  Small blood products at site of brain biopsy   Neurochecks per policy   Start Decadron taper   Wound dry   Craniectomy precautions   CIERA removed    Hydrocephalus   Lumbar drain removed   CSF unremarkable from 5/17 and 5/23 and 6/10.    S/P right posterior parietal  shunt placement   Shunt pumps and refills well    Postop seizures, in status   Neurology managing   Cont Keppra, Dialntin, Vimpat   Stat Ativan   Follow dilantin levels   EEG today     CV: Cardene off   keep SBP <160.   One bout of HTN overnight and resolved with PRN pain medication   Requiring hydralazine and labetalol PRNs  Pulm: Tracheostomy in place.  Appreciate ENT   Left chest tube placed x2 CT managing with TPA  : Keep jansen for now.   FEN: Poor glucose control.  Increase SSI  ENDO: Accu-Chek and treat per policy  GI: PEG tube placement today.  Appreciate GI  ID: Afebrile overnight,    Mild Leukocytosis   Repeat CRP today   Broad spectrum abx, Vanc and Cefepime   CSF cultures pending, NGTD  Heme:  DVT prophylaxis with SCD's.     Plt up 193 and HIT ab negative.     RUE clot.  Leave PICC for now.  Can not anticoagulate  Pain: NA  Dispo: DC pending seizure control   Pending extubation    CHIEF COMPLAINT:   Right visual field changes    Subjective  Symptom stable    Temp:  [98 °F (36.7 °C)-100.2 °F (37.9 °C)] 99.3 °F (37.4 °C)  Heart Rate:  [58-72] 72  Resp:  [26-30] 26  BP: ()/(47-87) 139/67  Arterial Line BP: ()/(32-77) 171/65  FiO2 (%):  [35 %] 35 %    Objective:  General Appearance:  Well-appearing and in no acute distress.    Vital signs: (most recent): Blood pressure 139/67, pulse 72, temperature 99.3 °F (37.4 °C), temperature source Axillary, resp. rate 26, height 182.9 cm (72\"), weight 107 kg (234 lb 14.4 oz), SpO2 96 %.  "     Neurologic Exam     Mental Status   Level of consciousness: arousable by verbal stimuli ,  drowsy  Off propofol.  Intermittently opens eyes to painful stimuli.  Does not follow commands.  Nonverbal.  Weakly withdraws to tactile stimuli.           Cranial Nerves     CN III, IV, VI   Pupils are equal, round, and reactive to light.  Extraocular motions are normal.     CN IX, X   CN IX normal.     Gait, Coordination, and Reflexes     Tremor   Resting tremor: absent  Intention tremor: absent  Action tremor: absent    Drains:   Urethral Catheter Non-latex;Silicone 16 Fr. (Active)       Output by Drain (mL) 06/12/22 0701 - 06/12/22 1900 06/12/22 1901 - 06/13/22 0700 06/13/22 0701 - 06/13/22 0915 Range Total   Requested LDAs do not have output data documented.       Imaging Results (Last 24 Hours)     Procedure Component Value Units Date/Time    XR Chest 1 View [[id number]] Collected: 06/13/22 0739     Updated: 06/13/22 0745    Narrative:      XR CHEST 1 VW- 6/13/2022 3:20 AM CDT     HISTORY: On vent; R13.10-Dysphagia, unspecified; Z01.818-Encounter for  other preprocedural examination; D32.9-Benign neoplasm of meninges,  unspecified; Z74.09-Other reduced mobility; Z78.9-Other specified health  status; G40.901-Epilepsy, unspecified, not intractable, with status  epilepticus; J96.01-Acute respiratory failure with hypoxia;  G91.0-Communicating hydrocephalus; T81.40XA-Infection following a pr     COMPARISON: Chest exam dated 6/12/2022.     FINDINGS:      Atelectasis in the left base. Additional new discoid atelectasis in the  right base. No new consolidation. No pleural effusion or pneumothorax.  Heart size is stable. Pulmonary vasculature are nondilated. Underlying  scattered calcified lung granulomas. Tracheostomy is in place. Partially  imaged ventriculoperitoneal shunt catheter. The osseous structures and  surrounding soft tissues demonstrate no acute abnormality.       Impression:      1. Slightly increased basilar  atelectasis. Otherwise stable.        This report was finalized on 06/13/2022 07:42 by Dr Travis Lorenzo, .    XR Chest 1 View [[id number]] Collected: 06/12/22 1033     Updated: 06/12/22 1037    Narrative:      EXAMINATION: XR CHEST 1 VW-     6/12/2022 3:25 AM CDT     HISTORY: On vent; R13.10-Dysphagia, unspecified; Z01.818-Encounter for  other preprocedural examination; D32.9-Benign neoplasm of meninges,  unspecified; Z74.09-Other reduced mobility; Z78.9-Other specified health  status; G40.901-Epilepsy, unspecified, not intractable, with status  epilepticus; J96.01-Acute respiratory failure with hypoxia;  G91.0-Communicating hydrocephalus; T81.40XA-Infection following a pr     1 view chest x-ray.     Comparison is made with yesterday at 3:31 AM.     Stable heart size.  Tracheostomy tube remains in place.  Right  shunt tubing noted.     Low lung volumes with persistent consolidation of the left lower lobe.     No pneumothorax and no sign of heart failure.     Multiple small calcified granulomas throughout both lungs.     Summary:  1. No significant change from yesterday.  This report was finalized on 06/12/2022 10:34 by Dr. Tomer Whelan MD.        Lab Results (last 24 hours)     Procedure Component Value Units Date/Time    Vancomycin, Trough [[id number]]  (Abnormal) Collected: 06/13/22 0607    Specimen: Blood Updated: 06/13/22 0643     Vancomycin Trough 24.10 mcg/mL     Phenytoin Level, Total [[id number]]  (Normal) Collected: 06/13/22 0607    Specimen: Blood Updated: 06/13/22 0642     Phenytoin Level 15.7 mcg/mL     Comprehensive Metabolic Panel [[id number]]  (Abnormal) Collected: 06/13/22 0607    Specimen: Blood Updated: 06/13/22 0639     Glucose 165 mg/dL      BUN 37 mg/dL      Creatinine 0.57 mg/dL      Sodium 138 mmol/L      Potassium 4.1 mmol/L      Chloride 105 mmol/L      CO2 29.0 mmol/L      Calcium 8.3 mg/dL      Total Protein 6.3 g/dL      Albumin 2.40 g/dL      ALT (SGPT) 106 U/L      AST (SGOT) 145 U/L       Alkaline Phosphatase 366 U/L      Total Bilirubin 0.4 mg/dL      Globulin 3.9 gm/dL      A/G Ratio 0.6 g/dL      BUN/Creatinine Ratio 64.9     Anion Gap 4.0 mmol/L      eGFR 101.0 mL/min/1.73      Comment: National Kidney Foundation and American Society of Nephrology (ASN) Task Force recommended calculation based on the Chronic Kidney Disease Epidemiology Collaboration (CKD-EPI) equation refit without adjustment for race.       Narrative:      GFR Normal >60  Chronic Kidney Disease <60  Kidney Failure <15      CBC & Differential [[id number]]  (Abnormal) Collected: 06/13/22 0607    Specimen: Blood Updated: 06/13/22 0637    Narrative:      The following orders were created for panel order CBC & Differential.  Procedure                               Abnormality         Status                     ---------                               -----------         ------                     CBC Auto Differential[[id number]]        Abnormal            Final result                 Please view results for these tests on the individual orders.    CBC Auto Differential [[id number]]  (Abnormal) Collected: 06/13/22 0607    Specimen: Blood Updated: 06/13/22 0637     WBC 10.97 10*3/mm3      RBC 2.17 10*6/mm3      Hemoglobin 6.8 g/dL      Hematocrit 22.5 %      .7 fL      MCH 31.3 pg      MCHC 30.2 g/dL      RDW 17.0 %      RDW-SD 61.1 fl      MPV 10.0 fL      Platelets 206 10*3/mm3      Neutrophil % 78.0 %      Lymphocyte % 8.9 %      Monocyte % 9.5 %      Eosinophil % 1.3 %      Basophil % 0.4 %      Immature Grans % 1.9 %      Neutrophils, Absolute 8.56 10*3/mm3      Lymphocytes, Absolute 0.98 10*3/mm3      Monocytes, Absolute 1.04 10*3/mm3      Eosinophils, Absolute 0.14 10*3/mm3      Basophils, Absolute 0.04 10*3/mm3      Immature Grans, Absolute 0.21 10*3/mm3      nRBC 0.0 /100 WBC     POC Glucose Once [[id number]]  (Abnormal) Collected: 06/13/22 0610    Specimen: Blood Updated: 06/13/22 0632     Glucose 177 mg/dL      Comment:  : [id number] Thomson BrittanyMeter ID: [id number]       Anaerobic Culture - Tissue, Brain [[id number]]  (Normal) Collected: 06/10/22 1524    Specimen: Tissue from Brain Updated: 06/13/22 0612     Anaerobic Culture No anaerobes isolated at 3 days    Anaerobic Culture - Tissue, Brain, Cerebral Cortex [[id number]]  (Normal) Collected: 06/10/22 1327    Specimen: Tissue from Brain, Cerebral Cortex Updated: 06/13/22 0612     Anaerobic Culture No anaerobes isolated at 3 days    Blood Gas, Arterial - [[id number]]  (Abnormal) Collected: 06/13/22 0423    Specimen: Arterial Blood Updated: 06/13/22 0417     Site Arterial Line     Denzel's Test N/A     pH, Arterial 7.471 pH units      Comment: 83 Value above reference range        pCO2, Arterial 39.0 mm Hg      pO2, Arterial 88.1 mm Hg      HCO3, Arterial 28.4 mmol/L      Comment: 83 Value above reference range        Base Excess, Arterial 4.4 mmol/L      Comment: 83 Value above reference range        O2 Saturation, Arterial 98.4 %      Temperature 37.0 C      Barometric Pressure for Blood Gas 747 mmHg      Modality Ventilator     FIO2 35 %      Ventilator Mode PC     Set Mech Resp Rate 16.0     PEEP 5.0     PIP 12 cmH2O      Comment: Meter: [id number]     :  [id number]        Collected by [id number]     pCO2, Temperature Corrected 39.0 mm Hg      pH, Temp Corrected 7.471 pH Units      pO2, Temperature Corrected 88.1 mm Hg     POC Glucose Once [[id number]]  (Normal) Collected: 06/12/22 2357    Specimen: Blood Updated: 06/13/22 0018     Glucose 123 mg/dL      Comment: : [id number] Berto BrittanyMeter ID: [id number]       POC Glucose Once [[id number]]  (Normal) Collected: 06/12/22 2024    Specimen: Blood Updated: 06/12/22 2045     Glucose 117 mg/dL      Comment: : [id number] Thomson BrittanyMeter ID: [id number]       POC Glucose Once [[id number]]  (Abnormal) Collected: 06/12/22 1742    Specimen: Blood Updated: 06/12/22 1753     Glucose 137 mg/dL      Comment:  : [id number] Rip XieMeter ID: [id number]       POC Glucose Once [[id number]]  (Abnormal) Collected: 06/12/22 1208    Specimen: Blood Updated: 06/12/22 1223     Glucose 192 mg/dL      Comment: : [id number] Rip XieMeter ID: [id number]       AFB Culture - Tissue, Brain, Cerebral Cortex [[id number]] Collected: 06/10/22 1327    Specimen: Tissue from Brain, Cerebral Cortex Updated: 06/12/22 1148     AFB Stain No acid fast bacilli seen on direct smear      No acid fast bacilli seen on concentrated smear    AFB Culture - Tissue, Brain [[id number]] Collected: 06/10/22 1326    Specimen: Tissue from Brain Updated: 06/12/22 1147     AFB Stain No acid fast bacilli seen on direct smear      No acid fast bacilli seen on concentrated smear    AFB Culture - Tissue, Brain [[id number]] Collected: 06/10/22 1524    Specimen: Tissue from Brain Updated: 06/12/22 1147     AFB Stain No acid fast bacilli seen on direct smear      No acid fast bacilli seen on concentrated smear        [id number]  Stevie Parsons, APRN

## 2022-06-14 ENCOUNTER — APPOINTMENT (OUTPATIENT)
Dept: GENERAL RADIOLOGY | Facility: HOSPITAL | Age: 77
End: 2022-06-14

## 2022-06-14 ENCOUNTER — APPOINTMENT (OUTPATIENT)
Dept: ULTRASOUND IMAGING | Facility: HOSPITAL | Age: 77
End: 2022-06-14

## 2022-06-14 LAB
ALBUMIN SERPL-MCNC: 2.7 G/DL (ref 3.5–5.2)
ALBUMIN/GLOB SERPL: 0.6 G/DL
ALP SERPL-CCNC: 472 U/L (ref 39–117)
ALT SERPL W P-5'-P-CCNC: 175 U/L (ref 1–41)
ANION GAP SERPL CALCULATED.3IONS-SCNC: 8 MMOL/L (ref 5–15)
ARTERIAL PATENCY WRIST A: POSITIVE
AST SERPL-CCNC: 248 U/L (ref 1–40)
ATMOSPHERIC PRESS: 748 MMHG
BASE EXCESS BLDA CALC-SCNC: 4.9 MMOL/L (ref 0–2)
BASOPHILS # BLD AUTO: 0.05 10*3/MM3 (ref 0–0.2)
BASOPHILS NFR BLD AUTO: 0.5 % (ref 0–1.5)
BDY SITE: ABNORMAL
BH BB BLOOD EXPIRATION DATE: NORMAL
BH BB BLOOD TYPE BARCODE: 6200
BH BB DISPENSE STATUS: NORMAL
BH BB PRODUCT CODE: NORMAL
BH BB UNIT NUMBER: NORMAL
BILIRUB SERPL-MCNC: 0.6 MG/DL (ref 0–1.2)
BODY TEMPERATURE: 37 C
BUN SERPL-MCNC: 36 MG/DL (ref 8–23)
BUN/CREAT SERPL: 66.7 (ref 7–25)
CALCIUM SPEC-SCNC: 8.4 MG/DL (ref 8.6–10.5)
CHLORIDE SERPL-SCNC: 103 MMOL/L (ref 98–107)
CO2 SERPL-SCNC: 28 MMOL/L (ref 22–29)
CREAT SERPL-MCNC: 0.54 MG/DL (ref 0.76–1.27)
CROSSMATCH INTERPRETATION: NORMAL
DEPRECATED RDW RBC AUTO: 63.2 FL (ref 37–54)
EGFRCR SERPLBLD CKD-EPI 2021: 102.6 ML/MIN/1.73
EOSINOPHIL # BLD AUTO: 0.14 10*3/MM3 (ref 0–0.4)
EOSINOPHIL NFR BLD AUTO: 1.5 % (ref 0.3–6.2)
ERYTHROCYTE [DISTWIDTH] IN BLOOD BY AUTOMATED COUNT: 18.1 % (ref 12.3–15.4)
GLOBULIN UR ELPH-MCNC: 4.2 GM/DL
GLUCOSE BLDC GLUCOMTR-MCNC: 119 MG/DL (ref 70–130)
GLUCOSE BLDC GLUCOMTR-MCNC: 144 MG/DL (ref 70–130)
GLUCOSE BLDC GLUCOMTR-MCNC: 170 MG/DL (ref 70–130)
GLUCOSE BLDC GLUCOMTR-MCNC: 171 MG/DL (ref 70–130)
GLUCOSE BLDC GLUCOMTR-MCNC: 191 MG/DL (ref 70–130)
GLUCOSE SERPL-MCNC: 159 MG/DL (ref 65–99)
HCO3 BLDA-SCNC: 29.1 MMOL/L (ref 20–26)
HCT VFR BLD AUTO: 24.3 % (ref 37.5–51)
HGB BLD-MCNC: 7.7 G/DL (ref 13–17.7)
IMM GRANULOCYTES # BLD AUTO: 0.21 10*3/MM3 (ref 0–0.05)
IMM GRANULOCYTES NFR BLD AUTO: 2.2 % (ref 0–0.5)
INHALED O2 CONCENTRATION: 35 %
LYMPHOCYTES # BLD AUTO: 0.95 10*3/MM3 (ref 0.7–3.1)
LYMPHOCYTES NFR BLD AUTO: 9.9 % (ref 19.6–45.3)
Lab: ABNORMAL
MCH RBC QN AUTO: 31.7 PG (ref 26.6–33)
MCHC RBC AUTO-ENTMCNC: 31.7 G/DL (ref 31.5–35.7)
MCV RBC AUTO: 100 FL (ref 79–97)
MODALITY: ABNORMAL
MONOCYTES # BLD AUTO: 1.23 10*3/MM3 (ref 0.1–0.9)
MONOCYTES NFR BLD AUTO: 12.8 % (ref 5–12)
NEUTROPHILS NFR BLD AUTO: 7.01 10*3/MM3 (ref 1.7–7)
NEUTROPHILS NFR BLD AUTO: 73.1 % (ref 42.7–76)
NRBC BLD AUTO-RTO: 0 /100 WBC (ref 0–0.2)
PAW @ PEAK INSP FLOW SETTING VENT: 10 CMH2O
PCO2 BLDA: 40.9 MM HG (ref 35–45)
PCO2 TEMP ADJ BLD: 40.9 MM HG (ref 35–45)
PEEP RESPIRATORY: 5 CM[H2O]
PH BLDA: 7.46 PH UNITS (ref 7.35–7.45)
PH, TEMP CORRECTED: 7.46 PH UNITS (ref 7.35–7.45)
PHENYTOIN SERPL-MCNC: 16.2 MCG/ML (ref 10–20)
PLATELET # BLD AUTO: 198 10*3/MM3 (ref 140–450)
PMV BLD AUTO: 9.7 FL (ref 6–12)
PO2 BLDA: 82.4 MM HG (ref 83–108)
PO2 TEMP ADJ BLD: 82.4 MM HG (ref 83–108)
POTASSIUM SERPL-SCNC: 4.5 MMOL/L (ref 3.5–5.2)
PROT SERPL-MCNC: 6.9 G/DL (ref 6–8.5)
RBC # BLD AUTO: 2.43 10*6/MM3 (ref 4.14–5.8)
SAO2 % BLDCOA: 97.3 % (ref 94–99)
SET MECH RESP RATE: 16
SODIUM SERPL-SCNC: 139 MMOL/L (ref 136–145)
UNIT  ABO: NORMAL
UNIT  RH: NORMAL
VENTILATOR MODE: ABNORMAL
WBC NRBC COR # BLD: 9.59 10*3/MM3 (ref 3.4–10.8)

## 2022-06-14 PROCEDURE — 99233 SBSQ HOSP IP/OBS HIGH 50: CPT | Performed by: INTERNAL MEDICINE

## 2022-06-14 PROCEDURE — 25010000002 LORAZEPAM PER 2 MG: Performed by: NEUROLOGICAL SURGERY

## 2022-06-14 PROCEDURE — 36600 WITHDRAWAL OF ARTERIAL BLOOD: CPT

## 2022-06-14 PROCEDURE — 82803 BLOOD GASES ANY COMBINATION: CPT

## 2022-06-14 PROCEDURE — 25010000002 VANCOMYCIN 10 G RECONSTITUTED SOLUTION: Performed by: NEUROLOGICAL SURGERY

## 2022-06-14 PROCEDURE — 25010000002 FOSPHENYTOIN 500 MG PE/10ML SOLUTION 10 ML VIAL: Performed by: NEUROLOGICAL SURGERY

## 2022-06-14 PROCEDURE — 99024 POSTOP FOLLOW-UP VISIT: CPT | Performed by: NURSE PRACTITIONER

## 2022-06-14 PROCEDURE — 94799 UNLISTED PULMONARY SVC/PX: CPT

## 2022-06-14 PROCEDURE — 82962 GLUCOSE BLOOD TEST: CPT

## 2022-06-14 PROCEDURE — C1751 CATH, INF, PER/CENT/MIDLINE: HCPCS

## 2022-06-14 PROCEDURE — 02HV33Z INSERTION OF INFUSION DEVICE INTO SUPERIOR VENA CAVA, PERCUTANEOUS APPROACH: ICD-10-PCS | Performed by: INTERNAL MEDICINE

## 2022-06-14 PROCEDURE — 87205 SMEAR GRAM STAIN: CPT | Performed by: NURSE PRACTITIONER

## 2022-06-14 PROCEDURE — 63710000001 DEXAMETHASONE PER 0.25 MG: Performed by: NURSE PRACTITIONER

## 2022-06-14 PROCEDURE — 80053 COMPREHEN METABOLIC PANEL: CPT | Performed by: NEUROLOGICAL SURGERY

## 2022-06-14 PROCEDURE — 87070 CULTURE OTHR SPECIMN AEROBIC: CPT | Performed by: NURSE PRACTITIONER

## 2022-06-14 PROCEDURE — 71045 X-RAY EXAM CHEST 1 VIEW: CPT

## 2022-06-14 PROCEDURE — 25010000002 CEFEPIME PER 500 MG: Performed by: NEUROLOGICAL SURGERY

## 2022-06-14 PROCEDURE — 25010000002 FOSPHENYTOIN 500 MG PE/10ML SOLUTION 10 ML VIAL: Performed by: PHYSICIAN ASSISTANT

## 2022-06-14 PROCEDURE — 25010000002 HEPARIN (PORCINE) PER 1000 UNITS: Performed by: NEUROLOGICAL SURGERY

## 2022-06-14 PROCEDURE — 93971 EXTREMITY STUDY: CPT

## 2022-06-14 PROCEDURE — 94003 VENT MGMT INPAT SUBQ DAY: CPT

## 2022-06-14 PROCEDURE — 25010000002 FUROSEMIDE PER 20 MG: Performed by: INTERNAL MEDICINE

## 2022-06-14 PROCEDURE — 99232 SBSQ HOSP IP/OBS MODERATE 35: CPT | Performed by: NURSE PRACTITIONER

## 2022-06-14 PROCEDURE — 85025 COMPLETE CBC W/AUTO DIFF WBC: CPT | Performed by: NEUROLOGICAL SURGERY

## 2022-06-14 PROCEDURE — 93971 EXTREMITY STUDY: CPT | Performed by: SURGERY

## 2022-06-14 PROCEDURE — 80185 ASSAY OF PHENYTOIN TOTAL: CPT | Performed by: NEUROLOGICAL SURGERY

## 2022-06-14 PROCEDURE — 25010000002 HYDRALAZINE PER 20 MG: Performed by: NEUROLOGICAL SURGERY

## 2022-06-14 PROCEDURE — 97110 THERAPEUTIC EXERCISES: CPT

## 2022-06-14 PROCEDURE — 63710000001 INSULIN REGULAR HUMAN PER 5 UNITS: Performed by: NEUROLOGICAL SURGERY

## 2022-06-14 PROCEDURE — 63710000001 INSULIN DETEMIR PER 5 UNITS: Performed by: NEUROLOGICAL SURGERY

## 2022-06-14 PROCEDURE — 99233 SBSQ HOSP IP/OBS HIGH 50: CPT | Performed by: PSYCHIATRY & NEUROLOGY

## 2022-06-14 PROCEDURE — 0 LEVETIRACETAM IN NACL 0.75% 1000 MG/100ML SOLUTION: Performed by: NEUROLOGICAL SURGERY

## 2022-06-14 PROCEDURE — 99231 SBSQ HOSP IP/OBS SF/LOW 25: CPT | Performed by: OTOLARYNGOLOGY

## 2022-06-14 RX ORDER — LIDOCAINE HYDROCHLORIDE 10 MG/ML
10 INJECTION, SOLUTION INFILTRATION; PERINEURAL ONCE
Status: DISCONTINUED | OUTPATIENT
Start: 2022-06-14 | End: 2022-06-20 | Stop reason: HOSPADM

## 2022-06-14 RX ORDER — LACTULOSE 20 G/30ML
20 SOLUTION ORAL DAILY
Status: DISCONTINUED | OUTPATIENT
Start: 2022-06-14 | End: 2022-06-20 | Stop reason: HOSPADM

## 2022-06-14 RX ORDER — FUROSEMIDE 10 MG/ML
40 INJECTION INTRAMUSCULAR; INTRAVENOUS ONCE
Status: COMPLETED | OUTPATIENT
Start: 2022-06-14 | End: 2022-06-14

## 2022-06-14 RX ADMIN — LACOSAMIDE 200 MG: 10 INJECTION, SOLUTION INTRAVENOUS at 20:22

## 2022-06-14 RX ADMIN — VANCOMYCIN HYDROCHLORIDE 1500 MG: 10 INJECTION, POWDER, LYOPHILIZED, FOR SOLUTION INTRAVENOUS at 14:00

## 2022-06-14 RX ADMIN — CEFEPIME 2 G: 2 INJECTION, POWDER, FOR SOLUTION INTRAVENOUS at 21:01

## 2022-06-14 RX ADMIN — HEPARIN SODIUM 5000 UNITS: 5000 INJECTION INTRAVENOUS; SUBCUTANEOUS at 20:22

## 2022-06-14 RX ADMIN — DEXAMETHASONE 4 MG: 4 TABLET ORAL at 08:21

## 2022-06-14 RX ADMIN — POLYETHYLENE GLYCOL 3350 17 G: 17 POWDER, FOR SOLUTION ORAL at 08:16

## 2022-06-14 RX ADMIN — CHLORHEXIDINE GLUCONATE 15 ML: 1.2 RINSE ORAL at 08:15

## 2022-06-14 RX ADMIN — CEFEPIME 2 G: 2 INJECTION, POWDER, FOR SOLUTION INTRAVENOUS at 14:24

## 2022-06-14 RX ADMIN — LACTULOSE 20 G: 20 SOLUTION ORAL at 13:57

## 2022-06-14 RX ADMIN — FUROSEMIDE 40 MG: 10 INJECTION, SOLUTION INTRAVENOUS at 08:41

## 2022-06-14 RX ADMIN — DEXAMETHASONE 4 MG: 4 TABLET ORAL at 21:00

## 2022-06-14 RX ADMIN — SODIUM CHLORIDE SOLN NEBU 3% 4 ML: 3 NEBU SOLN at 06:48

## 2022-06-14 RX ADMIN — Medication 1 PACKET: at 17:06

## 2022-06-14 RX ADMIN — LACOSAMIDE 200 MG: 10 INJECTION, SOLUTION INTRAVENOUS at 08:15

## 2022-06-14 RX ADMIN — Medication 45 ML: at 20:23

## 2022-06-14 RX ADMIN — Medication 1 PACKET: at 11:32

## 2022-06-14 RX ADMIN — CEFEPIME 2 G: 2 INJECTION, POWDER, FOR SOLUTION INTRAVENOUS at 06:22

## 2022-06-14 RX ADMIN — LEVETIRACETAM 1000 MG: 10 INJECTION INTRAVENOUS at 08:16

## 2022-06-14 RX ADMIN — GUAIFENESIN 200 MG: 200 SOLUTION ORAL at 20:23

## 2022-06-14 RX ADMIN — ACETAMINOPHEN 650 MG: 325 TABLET ORAL at 21:00

## 2022-06-14 RX ADMIN — CARVEDILOL 6.25 MG: 6.25 TABLET, FILM COATED ORAL at 17:06

## 2022-06-14 RX ADMIN — INSULIN HUMAN 2 UNITS: 100 INJECTION, SOLUTION PARENTERAL at 05:04

## 2022-06-14 RX ADMIN — Medication 45 ML: at 08:21

## 2022-06-14 RX ADMIN — SODIUM CHLORIDE 275 MG PE: 9 INJECTION, SOLUTION INTRAVENOUS at 05:40

## 2022-06-14 RX ADMIN — IPRATROPIUM BROMIDE AND ALBUTEROL SULFATE 3 ML: 2.5; .5 SOLUTION RESPIRATORY (INHALATION) at 06:48

## 2022-06-14 RX ADMIN — SODIUM CHLORIDE 200 MG PE: 9 INJECTION, SOLUTION INTRAVENOUS at 21:01

## 2022-06-14 RX ADMIN — VANCOMYCIN HYDROCHLORIDE 1500 MG: 10 INJECTION, POWDER, LYOPHILIZED, FOR SOLUTION INTRAVENOUS at 02:11

## 2022-06-14 RX ADMIN — MUPIROCIN 1 APPLICATION: 20 OINTMENT TOPICAL at 20:23

## 2022-06-14 RX ADMIN — LABETALOL HYDROCHLORIDE 10 MG: 5 INJECTION INTRAVENOUS at 03:37

## 2022-06-14 RX ADMIN — SODIUM CHLORIDE 275 MG PE: 9 INJECTION, SOLUTION INTRAVENOUS at 13:58

## 2022-06-14 RX ADMIN — SODIUM CHLORIDE SOLN NEBU 3% 4 ML: 3 NEBU SOLN at 19:34

## 2022-06-14 RX ADMIN — HYDRALAZINE HYDROCHLORIDE 10 MG: 20 INJECTION INTRAMUSCULAR; INTRAVENOUS at 02:11

## 2022-06-14 RX ADMIN — Medication 1 PACKET: at 08:24

## 2022-06-14 RX ADMIN — LEVOTHYROXINE SODIUM 125 MCG: 125 TABLET ORAL at 05:01

## 2022-06-14 RX ADMIN — LIOTHYRONINE SODIUM 25 MCG: 25 TABLET ORAL at 08:16

## 2022-06-14 RX ADMIN — LISINOPRIL 20 MG: 20 TABLET ORAL at 08:15

## 2022-06-14 RX ADMIN — CHLORHEXIDINE GLUCONATE 15 ML: 1.2 RINSE ORAL at 20:22

## 2022-06-14 RX ADMIN — Medication 1 PACKET: at 20:22

## 2022-06-14 RX ADMIN — DOCUSATE SODIUM LIQUID 50 MG: 100 LIQUID ORAL at 11:30

## 2022-06-14 RX ADMIN — LANSOPRAZOLE 30 MG: KIT at 10:07

## 2022-06-14 RX ADMIN — LEVETIRACETAM 1000 MG: 10 INJECTION INTRAVENOUS at 20:23

## 2022-06-14 RX ADMIN — LATANOPROST 1 DROP: 50 SOLUTION OPHTHALMIC at 20:23

## 2022-06-14 RX ADMIN — GUAIFENESIN 200 MG: 200 SOLUTION ORAL at 15:53

## 2022-06-14 RX ADMIN — GUAIFENESIN 200 MG: 200 SOLUTION ORAL at 08:15

## 2022-06-14 RX ADMIN — INSULIN DETEMIR 20 UNITS: 100 INJECTION, SOLUTION SUBCUTANEOUS at 20:28

## 2022-06-14 RX ADMIN — MUPIROCIN 1 APPLICATION: 20 OINTMENT TOPICAL at 08:24

## 2022-06-14 RX ADMIN — INSULIN HUMAN 2 UNITS: 100 INJECTION, SOLUTION PARENTERAL at 11:41

## 2022-06-14 RX ADMIN — LORAZEPAM 2 MG: 2 INJECTION INTRAMUSCULAR at 03:58

## 2022-06-14 RX ADMIN — CARVEDILOL 6.25 MG: 6.25 TABLET, FILM COATED ORAL at 08:16

## 2022-06-14 NOTE — PROGRESS NOTES
NEUROSURGERY DAILY PROGRESS NOTE    ASSESSMENT:   Hai Hernandez is a 77 y.o. with a significant comorbidity of acephalic migraines, thyroid disease status post radiation as a child, basal cell and squamous cell carcinoma of the skin. He presents in FU for known meningioma found on workup for right visual field changes. Physical exam findings of neurologically intact with resolution of all symptoms and mild decreased vision in right eye.  His imaging shows 22 x 24 x 13.5 mm right planum sphenoidale mass most suggestive of meningioma.    Past Medical History:   Diagnosis Date   • Brain tumor (HCC)    • Depression    • Hearing loss    • History of cellulitis     right foot big toe   • Hypertension    • Pneumonia    • Right arm weakness     after cervial injury   • Sciatic pain     affects balance   • Thyroid disease    • Visual disturbance     due to brain tumor - right eye     Active Hospital Problems    Diagnosis    • **Meningioma (HCC)    • Acute deep vein thrombosis (DVT) of the ulnar and brachial veins of right upper extremity (HCC)    • Loculated pleural effusion    • Fever    • PAF (paroxysmal atrial fibrillation) (HCC)    • Cerebral parenchymal hemorrhage (HCC)    • Essential hypertension    • Type 2 diabetes mellitus with hyperglycemia, without long-term current use of insulin (HCC)    • Hypothyroidism (acquired)    • Acute respiratory failure (secondary to status epilepticus)    • Thrombocytopenia (HCC)    • 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  Does not follow commands.  Nonverbal.  Weakly withdraws to tactile  "stimuli.    Intracranial meningioma   POD #34 (5/10/2022) Status post postcraniotomy for meningioma   POD#4 (6/10/2022) Craniectomy for cerebral edema and possible infection   CT reviewed and stable.  Small blood products at site of brain biopsy   Neurochecks per policy   Start Decadron taper   Wound dry   Craniectomy precautions   CIERA removed    Hydrocephalus   Lumbar drain removed   CSF unremarkable from 5/17 and 5/23 and 6/10.    S/P right posterior parietal  shunt placement   Shunt pumps and refills well    Postop seizures, in status   Neurology managing   Cont Keppra, Dialntin, Vimpat   Stat Ativan   Follow dilantin levels   EEG results pending     CV: Cardene off   keep SBP <160.   One bout of HTN overnight and resolved with PRN pain medication   Requiring hydralazine and labetalol PRNs  Pulm: Tracheostomy in place.  Appreciate ENT   Left chest tube placed x2 CT managing with TPA  : Keep jansen for now.   FEN: Poor glucose control.  Increase SSI  ENDO: Accu-Chek and treat per policy  GI: PEG tube placement today.  Appreciate GI  ID: Afebrile overnight,    Mild Leukocytosis   Repeat CRP today   Broad spectrum abx, Vanc and Cefepime   CSF cultures pending, NGTD  Heme:  DVT prophylaxis with SCD's.     Plt up 193 and HIT ab negative.     RUE clot.  Leave PICC for now.  Can not anticoagulate  Pain: NA  Dispo: DC pending seizure control   Pending extubation    CHIEF COMPLAINT:   Right visual field changes    Subjective  Symptom stable    Temp:  [97.9 °F (36.6 °C)-99.4 °F (37.4 °C)] 99.4 °F (37.4 °C)  Heart Rate:  [53-75] 58  Resp:  [21-36] 24  BP: (113-199)/() 137/68  FiO2 (%):  [35 %] 35 %    Objective:  General Appearance:  Well-appearing and in no acute distress.    Vital signs: (most recent): Blood pressure 137/68, pulse 58, temperature 99.4 °F (37.4 °C), temperature source Oral, resp. rate 24, height 182.9 cm (72\"), weight 109 kg (241 lb), SpO2 95 %.      Neurologic Exam     Mental Status   Level of " consciousness: arousable by verbal stimuli ,  drowsy  Off propofol.  Intermittently opens eyes to painful stimuli.  Does not follow commands.  Nonverbal.  Weakly withdraws to tactile stimuli.           Cranial Nerves     CN III, IV, VI   Pupils are equal, round, and reactive to light.  Extraocular motions are normal.     CN IX, X   CN IX normal.     Gait, Coordination, and Reflexes     Tremor   Resting tremor: absent  Intention tremor: absent  Action tremor: absent    Drains:   Urethral Catheter Non-latex;Silicone 16 Fr. (Active)       Output by Drain (mL) 06/13/22 0701 - 06/13/22 1900 06/13/22 1901 - 06/14/22 0700 06/14/22 0701 - 06/14/22 0925 Range Total   Requested LDAs do not have output data documented.       Imaging Results (Last 24 Hours)     Procedure Component Value Units Date/Time    XR Chest 1 View [[id number]] Collected: 06/14/22 0702     Updated: 06/14/22 0707    Narrative:      EXAMINATION:  XR CHEST 1 VW-  6/14/2022 3:20 AM CDT     HISTORY: On ventilator.     COMPARISON: 6/13/2022.     TECHNIQUE: Single view AP image.     FINDINGS:  A tracheostomy tube remains in place. There is probable  ventriculoperitoneal shunt tubing traversing the right neck and right  chest. There is hypoventilation with vascular crowding. There is patchy  infiltrate in the left perihilar region and left lung base. There is  mild atelectasis at the right lung base. The left heart border is  partially obscured. There is cardiomegaly. There are degenerative  changes of the spine and shoulders.       Impression:      1. Hypoventilation with vascular crowding.  2. Patchy infiltrate in the mid and lower lung zone on the left. The  left heart border is obscured. This could represent pneumonia.  Atelectasis is also considered. There is mild atelectasis at the right  lung base.        This report was finalized on 06/14/2022 07:04 by Dr. Zachariah Fonseca MD.    CT Abdomen Pelvis With & Without Contrast [[id number]] Collected: 06/13/22 9667      Updated: 06/13/22 1841    Narrative:      EXAMINATION:  CT ABDOMEN PELVIS W WO CONTRAST-  6/13/2022 6:11 PM CDT     HISTORY: Abdominal distention. R13.10-Dysphagia, unspecified;  Z01.818-Encounter for other preprocedural examination.     TECHNIQUE: Spiral CT was performed of the abdomen and pelvis without and  with IV contrast. Multiplanar images were reconstructed.     DLP: 4672 mGy-cm. Automated dosage reduction technique was utilized to  reduce patient dosage.     COMPARISON: 5/23/2020.      LUNG BASES: The visualized left lower lobe is completely collapsed.  There is pleural effusion on the left. There appears to be some air  droplets in the pleural space on the left. There is right lower lobe  vascular crowding and atelectasis.     LIVER AND SPLEEN: There is increased density within the gallbladder.  This could be related to sludge. There are no dense gallstones. There is  breathing motion artifact. The liver and spleen appear to be  unremarkable.     PANCREAS: There is breathing motion artifact. No definite acute  pancreatic abnormality is seen.     KIDNEYS, ADRENALS AND URINARY BLADDER: There is breathing motion  artifact. The adrenal glands are normal in size. There are  nonobstructive renal stones bilaterally. There is a probable small cyst  in the upper pole left kidney measuring less than 1 cm. There is an  ill-defined area of low density in the left kidney laterally in the mid  to lower pole. The ureters are nondilated. There is thickening of the  bladder wall. There is air in the bladder. There is a Mccrary catheter in  the bladder.     BOWEL: No oral contrast was given. There is a gastrostomy tube. There is  air and fluid in the stomach. Small bowel loops are nondilated. There is  underdistention of portions of the colon.     OTHER: There are bilateral fat-containing inguinal hernias. There is a  small fat-containing umbilical hernia. There is diffuse body wall edema.  There is presacral edema. There  is mild atheromatous disease of the  aortoiliac vessels. There are degenerative changes of the spine. There  are degenerative changes of both hips.       Impression:      1. Likely complete left lower lobe collapse/atelectasis. There is fluid  and air within the pleural space at the left lung base. There may be a  split pleural sign in the left lung base. The findings are worrisome for  empyema. There is mild atelectasis in the right lung base.  2. The gallbladder is dense. This could be due to sludge in the  gallbladder. There are no focal dense gallstones.  3. Area of ill-defined low density in the mid to lower pole left kidney  laterally is nonspecific. This could represent an area of  pyelonephritis. A mass is felt to be less likely. This is not present on  5/23/2022. There is a probable small cyst in the upper pole left kidney  that is difficult to evaluate due to breathing motion artifact.  4. Thickening of the bladder wall may be related to muscular  hypertrophy. Inflammation and infection are also included in the  differential. There is a Mccrary catheter in the bladder. There is air in  the bladder.  5. Bilateral fat-containing inguinal hernias. Very small fat-containing  umbilical hernia.  6. Mild to moderate diffuse body wall edema. Presacral edema. Other  nonacute findings, as discussed above.  This report was finalized on 06/13/2022 18:38 by Dr. Zachariah Fonseca MD.    XR Abdomen KUB [[id number]] Collected: 06/13/22 1011     Updated: 06/13/22 1016    Narrative:      EXAMINATION: XR ABDOMEN KUB- 6/13/2022 10:11 AM CDT     HISTORY: Tight Abdomen.; R13.10-Dysphagia, unspecified;  Z01.818-Encounter for other preprocedural examination; D32.9-Benign  neoplasm of meninges, unspecified; Z74.09-Other reduced mobility;  Z78.9-Other specified health status; G40.901-Epilepsy, unspecified, not  intractable, with status epilepticus; J96.01-Acute respiratory failure  with hypoxia; G91.0-Communicating hydrocephalus;  T81.40XA-Infection  follow.     REPORT: A supine view of the abdomen was obtained.     COMPARISON: KUB 5/15/2022.     The NG tube and feeding tube seen before are no longer identified. There  is respiratory motion artifact. Gas is seen within the GI tract  diffusely without significant distention. The pattern is relatively  nonspecific but could be related to developing adynamic ileus. There are  advanced degenerative changes throughout the lumbar spine.       Impression:      Nonspecific bowel gas pattern with questionable developing  ileus.  This report was finalized on 06/13/2022 10:13 by Dr. Antony Thomas MD.        Lab Results (last 24 hours)     Procedure Component Value Units Date/Time    Anaerobic Culture - Tissue, Brain [[id number]]  (Normal) Collected: 06/10/22 1326    Specimen: Tissue from Brain Updated: 06/14/22 0645     Anaerobic Culture No anaerobes isolated at 3 days    CBC & Differential [[id number]]  (Abnormal) Collected: 06/14/22 0608    Specimen: Blood Updated: 06/14/22 0629    Narrative:      The following orders were created for panel order CBC & Differential.  Procedure                               Abnormality         Status                     ---------                               -----------         ------                     CBC Auto Differential[[id number]]        Abnormal            Final result                 Please view results for these tests on the individual orders.    CBC Auto Differential [[id number]]  (Abnormal) Collected: 06/14/22 0608    Specimen: Blood Updated: 06/14/22 0629     WBC 9.59 10*3/mm3      RBC 2.43 10*6/mm3      Hemoglobin 7.7 g/dL      Hematocrit 24.3 %      .0 fL      MCH 31.7 pg      MCHC 31.7 g/dL      RDW 18.1 %      RDW-SD 63.2 fl      MPV 9.7 fL      Platelets 198 10*3/mm3      Neutrophil % 73.1 %      Lymphocyte % 9.9 %      Monocyte % 12.8 %      Eosinophil % 1.5 %      Basophil % 0.5 %      Immature Grans % 2.2 %      Neutrophils, Absolute 7.01  10*3/mm3      Lymphocytes, Absolute 0.95 10*3/mm3      Monocytes, Absolute 1.23 10*3/mm3      Eosinophils, Absolute 0.14 10*3/mm3      Basophils, Absolute 0.05 10*3/mm3      Immature Grans, Absolute 0.21 10*3/mm3      nRBC 0.0 /100 WBC     POC Glucose Once [[id number]]  (Abnormal) Collected: 06/14/22 0500    Specimen: Blood Updated: 06/14/22 0512     Glucose 170 mg/dL      Comment: : [id number] Salvador MagallonMeter ID: [id number]       Phenytoin Level, Total [[id number]]  (Normal) Collected: 06/14/22 0405    Specimen: Blood Updated: 06/14/22 0444     Phenytoin Level 16.2 mcg/mL     Comprehensive Metabolic Panel [[id number]]  (Abnormal) Collected: 06/14/22 0405    Specimen: Blood Updated: 06/14/22 0439     Glucose 159 mg/dL      BUN 36 mg/dL      Creatinine 0.54 mg/dL      Sodium 139 mmol/L      Potassium 4.5 mmol/L      Chloride 103 mmol/L      CO2 28.0 mmol/L      Calcium 8.4 mg/dL      Total Protein 6.9 g/dL      Albumin 2.70 g/dL      ALT (SGPT) 175 U/L      AST (SGOT) 248 U/L      Alkaline Phosphatase 472 U/L      Total Bilirubin 0.6 mg/dL      Globulin 4.2 gm/dL      A/G Ratio 0.6 g/dL      BUN/Creatinine Ratio 66.7     Anion Gap 8.0 mmol/L      eGFR 102.6 mL/min/1.73      Comment: National Kidney Foundation and American Society of Nephrology (ASN) Task Force recommended calculation based on the Chronic Kidney Disease Epidemiology Collaboration (CKD-EPI) equation refit without adjustment for race.       Narrative:      GFR Normal >60  Chronic Kidney Disease <60  Kidney Failure <15      Blood Gas, Arterial - [[id number]]  (Abnormal) Collected: 06/14/22 0417    Specimen: Arterial Blood Updated: 06/14/22 0413     Site Left Radial     Denzel's Test Positive     pH, Arterial 7.461 pH units      Comment: 83 Value above reference range        pCO2, Arterial 40.9 mm Hg      pO2, Arterial 82.4 mm Hg      Comment: 84 Value below reference range        HCO3, Arterial 29.1 mmol/L      Comment: 83 Value above reference range         Base Excess, Arterial 4.9 mmol/L      Comment: 83 Value above reference range        O2 Saturation, Arterial 97.3 %      Temperature 37.0 C      Barometric Pressure for Blood Gas 748 mmHg      Modality Ventilator     FIO2 35 %      Ventilator Mode PC     Set Mech Resp Rate 16.0     PEEP 5.0     PIP 10 cmH2O      Comment: Meter: [id number]     :  [id number]        Collected by [id number]     pCO2, Temperature Corrected 40.9 mm Hg      pH, Temp Corrected 7.461 pH Units      pO2, Temperature Corrected 82.4 mm Hg     POC Glucose Once [[id number]]  (Normal) Collected: 06/14/22 0004    Specimen: Blood Updated: 06/14/22 0015     Glucose 119 mg/dL      Comment: : [id number] Franco LaceyMeter ID: [id number]       POC Glucose Once [[id number]]  (Normal) Collected: 06/13/22 2034    Specimen: Blood Updated: 06/13/22 2045     Glucose 118 mg/dL      Comment: : [id number] Franco LaceyMeter ID: [id number]       POC Glucose Once [[id number]]  (Abnormal) Collected: 06/13/22 1703    Specimen: Blood Updated: 06/13/22 1714     Glucose 230 mg/dL      Comment: : RFBAILEENERBlaise Bernardo RyanMeter ID: [id number]       Vancomycin, Trough [[id number]]  (Normal) Collected: 06/13/22 1517    Specimen: Blood Updated: 06/13/22 1544     Vancomycin Trough 16.20 mcg/mL     POC Glucose Once [[id number]]  (Abnormal) Collected: 06/13/22 1103    Specimen: Blood Updated: 06/13/22 1114     Glucose 147 mg/dL      Comment: : RFBAILEENERBlaise Bernardo RyanMeter ID: [id number]       Respiratory Culture - Aspirate, Bronchus [[id number]] Collected: 06/10/22 1643    Specimen: Aspirate from Bronchus Updated: 06/13/22 1049     Respiratory Culture Rare Normal respiratory annia. No S. aureus or Pseudomonas aeruginosa detected. Final report.     Gram Stain Many (4+) WBCs per low power field      Less than 25 Epithelial cells seen      No organisms seen    Tissue Pathology Exam [[id number]] Collected: 06/10/22 1327    Specimen: Tissue from Brain,  Cerebral Cortex; Tissue from Brain Updated: 06/13/22 1004     Note to Patients --     This report may contain a detailed description of human tissue sent by a health care provider to the laboratory for pathologic evaluation. The content of this report is essential for diagnosis and may provide important critical findings. This information may be unfamiliar to patients to review without a medical professional present. It is advised that the patient review this report in the presence of a health care provider who can answer questions and explain the results.       Case Report --     Surgical Pathology Report                         Case: [id number]                                  Authorizing Provider:  Martell Downs MD Collected:           06/10/2022 01:27 PM          Ordering Location:     [facility] OR  Received:            06/10/2022 01:38 PM          Pathologist:           Vamshi Benson MD                                                        Intraop:               Dioni Morgan MD                                                      Specimens:   1) - Brain, Cerebral Cortex, cerebrum frozen and permenant and culture                              2) - Brain, CEREBROLITIS                                                                    Final Diagnosis --     1-2.  Brain, cerebrum, craniotomy:  Severe mixed inflammation, necrosis, and reactive gliosis.  No evidence of malignancy.  GMS stain is negative for fungal organisms.       Intraoperative Consultation --         FROZEN SECTION DIAGNOSIS:   Cerebrum, FS1A:  No malignancy identified.  No evidence inflammation or active infection.    Reported to Dr. Martell Downs on 6/10/2022 at 13:57 CDT by Dioni Morgan MD.       Gross Description --     1. Brain, Cerebral Cortex.  Received fresh for frozen section and cultures labeled with the patient name, date of birth, and designated cerebrum.  The specimen consists of a Telfa  pad with 2 fragments of yellow-pink soft tissue aggregating to 0.9 x 0.7 x 0.2 cm.  1 fragment as large amounts of tan-brown possible blood clot.  The fragment with blood clot is sterilely removed from the container and a representative section is submitted for frozen section.  The frozen section remnant is wrapped in lens paper and submitted in block 1A.  The remaining tissue is wrapped in lens paper and submitted in block 1B.     The remaining tissue is sent to microbiology.      2. Brain.  Received fresh from microbiology labeled with the patient name, date of birth, designated cerebrum.  The specimen consists of a Telfa pad with 3 red-tan soft tissue fragments aggregating to 0.9 x 0.8 x 0.2 cm.  The fragments are wrapped in lens paper and submitted in block 2A.           Microscopic Description --     Microscopic examination performed.      Wound Culture - Wound, Head [[id number]] Collected: 06/10/22 1226    Specimen: Wound from Head Updated: 06/13/22 0946     Wound Culture No growth at 3 days     Gram Stain Rare (1+) WBCs seen      No organisms seen    Wound Culture - Wound, Head [[id number]] Collected: 06/10/22 1304    Specimen: Wound from Head Updated: 06/13/22 0943     Wound Culture No growth at 3 days     Gram Stain No WBCs or organisms seen    Wound Culture - Wound, Head [[id number]] Collected: 06/10/22 1343    Specimen: Wound from Head Updated: 06/13/22 0943     Wound Culture No growth at 3 days     Gram Stain Rare (1+) WBCs seen      No organisms seen    Tissue / Bone Culture - Tissue, Brain [[id number]] Collected: 06/10/22 1524    Specimen: Tissue from Brain Updated: 06/13/22 0943     Tissue Culture No growth at 3 days     Gram Stain No WBCs per low power field      No organisms seen    Tissue / Bone Culture - Tissue, Brain, Cerebral Cortex [[id number]] Collected: 06/10/22 1327    Specimen: Tissue from Brain, Cerebral Cortex Updated: 06/13/22 0942     Tissue Culture No growth at 3 days     Gram Stain  Moderate (3+) WBCs seen      No organisms seen    Culture, CSF - Cerebrospinal Fluid,  Shunt Aspirate [[id number]] Collected: 06/10/22 0729    Specimen: Cerebrospinal Fluid from  Shunt Aspirate Updated: 06/13/22 0936     CSF Culture No growth at 3 days     Gram Stain Moderate (3+) WBCs seen      No organisms seen    Tissue / Bone Culture - Tissue, Brain [[id number]] Collected: 06/10/22 1326    Specimen: Tissue from Brain Updated: 06/13/22 0935     Tissue Culture No growth at 3 days     Gram Stain Rare (1+) WBCs per low power field      No organisms seen        [id number]  Stevie Parsons, APRN

## 2022-06-14 NOTE — PLAN OF CARE
Patient neuro exam remains the same. Left IJ central line placed for difficult IV access and possibility of clot in the left arm. US completed awaiting results. Dr. Ortega assessed patient for possible chest tube placement for fluid collection on the left side, but determined there was not enough fluid to place a drain at this time. Wound care added opticell AG for ulceration around trach site. Lactulose given and the patient had a loose BM. VSS. Safety measures in place.

## 2022-06-14 NOTE — CONSULTS
Referring Provider: Dr. Beatty  Reason for Consultation: Possible empyema    Patient Care Team:  Elisa Chacko MD as PCP - General (Internal Medicine)  Stevie Parsons APRN as Nurse Practitioner (Nurse Practitioner)  Martell Downs MD as Surgeon (Neurosurgery)    Chief complaint respiratory distress    Subjective .     History of present illness: Mr. Hernandez is 77-year-old male with a pertinent past medical history of diabetes, hypertension, depression, and meningioma.  He was admitted on 5/10/2022 for craniotomy for tumor steriotactic with brain LAB on the right by Dr. Downs.  Initially postoperatively he was recovering without remark and discharge planning is in process.  On May 14, 2022 he began to have seizures and subsequently RRT was called.  He was ultimately sedated and intubated for airway protection and pulmonology was consulted for vent management.  Neurology is also following.  Lumbar drain was ultimately placed.  He was noted to have a left loculated pleural effusion for which large bore chest tube x2 was placed per CT surgery followed by 2 pigtail catheters to replace and radiology.  The effusion was drained without remark and all chest tubes were removed.  Since that time, there is been concern for possible developing ileus for which a subsequent CT abdomen pelvis was obtained revealing left lower lobe collapse/atelectasis along with fluid and air within the pleural space at the left lung base.  Findings worrisome for empyema.  Cardiothoracic surgery has been reconsulted for management of this possible empyema.  He remains intubated, FiO2 35%, PEEP 5.  He is not sedated.  He has been started on cefepime and vancomycin.  He remains on tube feeds at 65 mL/h.  WBC is within normal range at 9.5.      History  Code Status and Medical Interventions:   Ordered at: 05/10/22 9564     Level Of Support Discussed With:    Next of Kin (If No Surrogate)    Patient     Code Status (Patient has no  pulse and is not breathing):    CPR (Attempt to Resuscitate)     Medical Interventions (Patient has pulse or is breathing):    Full Support         Past Medical History:   Diagnosis Date   • Brain tumor (HCC)    • Depression    • Hearing loss    • History of cellulitis     right foot big toe   • Hypertension    • Pneumonia    • Right arm weakness     after cervial injury   • Sciatic pain     affects balance   • Thyroid disease    • Visual disturbance     due to brain tumor - right eye   ,   Past Surgical History:   Procedure Laterality Date   • CATARACT EXTRACTION, BILATERAL     • COLONOSCOPY     • CRANIOTOMY Bilateral 6/10/2022    Procedure: CRANIECTOMY;  Surgeon: Martell Downs MD;  Location: [facility] OR;  Service: Neurosurgery;  Laterality: Bilateral;   • CRANIOTOMY FOR TUMOR Right 5/10/2022    Procedure: CRANIOTOMY FOR TUMOR STERIOTACTIC WITH BRAIN LAB, right;  Surgeon: Martell Downs MD;  Location: [facility] OR;  Service: Neurosurgery;  Laterality: Right;   • ENDOSCOPY W/ PEG TUBE PLACEMENT N/A 6/2/2022    Procedure: ESOPHAGOGASTRODUODENOSCOPY WITH PERCUTANEOUS ENDOSCOPIC GASTROSTOMY TUBE INSERTION WITH ANESTHESIA;  Surgeon: Melecio Lu MD;  Location: [facility] OR;  Service: Gastroenterology;  Laterality: N/A;   • NECK SURGERY     • SKIN CANCER EXCISION     • TONSILLECTOMY     • TRACHEOSTOMY N/A 6/2/2022    Procedure: TRACHEOSTOMY;  Surgeon: Geovany Davidson MD;  Location: [facility] OR;  Service: ENT;  Laterality: N/A;   •  SHUNT INSERTION Right 6/3/2022    Procedure: VENTRICULAR PERITONEAL SHUNT INSERTION WITH BRAIN LAB;  Surgeon: Martell Downs MD;  Location: [facility] OR;  Service: Neurosurgery;  Laterality: Right;   ,   Family History   Problem Relation Age of Onset   • Cancer Sister    • Cancer Brother    ,   Social History     Tobacco Use   • Smoking status: Never Smoker   • Smokeless tobacco: Never Used   Vaping Use   • Vaping Use: Never used   Substance Use Topics   • Alcohol  "use: Never   • Drug use: Never   ,   Medications Prior to Admission   Medication Sig Dispense Refill Last Dose   • levothyroxine (SYNTHROID, LEVOTHROID) 125 MCG tablet Take 125 mcg by mouth Daily.   Past Week at Unknown time   • lisinopril (PRINIVIL,ZESTRIL) 20 MG tablet Take 20 mg by mouth Daily.   5/9/2022 at Unknown time   • Lumigan 0.01 % ophthalmic drops Administer 1 drop to both eyes Every Night.   5/9/2022 at Unknown time   • vitamin D (ERGOCALCIFEROL) 1.25 MG (35432 UT) capsule capsule Take 50,000 Units by mouth 1 (One) Time Per Week.   5/6/2022 at Unknown time   , Allergies: Patient has no known allergies.    Review of Systems  Review of Systems   Reason unable to perform ROS: Unable to obtain due to sedation and mechanical ventilation.        Objective     Vital Signs   Visit Vitals  /68   Pulse 58   Temp 99.4 °F (37.4 °C) (Oral)   Resp 24   Ht 182.9 cm (72\")   Wt 109 kg (241 lb)   SpO2 95%   BMI 32.69 kg/m²       Physical Exam  Vitals reviewed.   Constitutional:       General: He is not in acute distress.     Appearance: He is ill-appearing.      Comments: mechanically ventilated to trach   HENT:      Head: Normocephalic.   Eyes:      Pupils: Pupils are equal, round, and reactive to light.   Cardiovascular:      Rate and Rhythm: Normal rate and regular rhythm.      Heart sounds: Normal heart sounds. No murmur heard.  Pulmonary:      Breath sounds: No wheezing or rales.      Comments: Coarse mechanical breath sounds.  Diminished on the left.  Abdominal:      General: There is no distension.      Palpations: Abdomen is soft.      Tenderness: There is no abdominal tenderness.   Musculoskeletal:         General: Swelling present.      Right lower leg: Edema present.      Left lower leg: Edema present.   Skin:     General: Skin is warm and dry.   Neurological:      Comments: Mechanically ventilated to trach.           LAB:       IMAGES:       Imaging Results (Last 24 Hours)     Procedure Component Value " Units Date/Time    XR Chest 1 View [[id number]] Collected: 06/14/22 0702     Updated: 06/14/22 0707    Narrative:      EXAMINATION:  XR CHEST 1 VW-  6/14/2022 3:20 AM CDT     HISTORY: On ventilator.     COMPARISON: 6/13/2022.     TECHNIQUE: Single view AP image.     FINDINGS:  A tracheostomy tube remains in place. There is probable  ventriculoperitoneal shunt tubing traversing the right neck and right  chest. There is hypoventilation with vascular crowding. There is patchy  infiltrate in the left perihilar region and left lung base. There is  mild atelectasis at the right lung base. The left heart border is  partially obscured. There is cardiomegaly. There are degenerative  changes of the spine and shoulders.       Impression:      1. Hypoventilation with vascular crowding.  2. Patchy infiltrate in the mid and lower lung zone on the left. The  left heart border is obscured. This could represent pneumonia.  Atelectasis is also considered. There is mild atelectasis at the right  lung base.        This report was finalized on 06/14/2022 07:04 by Dr. Zachariah Fonseca MD.    CT Abdomen Pelvis With & Without Contrast [[id number]] Collected: 06/13/22 1830     Updated: 06/13/22 1841    Narrative:      EXAMINATION:  CT ABDOMEN PELVIS W WO CONTRAST-  6/13/2022 6:11 PM CDT     HISTORY: Abdominal distention. R13.10-Dysphagia, unspecified;  Z01.818-Encounter for other preprocedural examination.     TECHNIQUE: Spiral CT was performed of the abdomen and pelvis without and  with IV contrast. Multiplanar images were reconstructed.     DLP: 4672 mGy-cm. Automated dosage reduction technique was utilized to  reduce patient dosage.     COMPARISON: 5/23/2020.      LUNG BASES: The visualized left lower lobe is completely collapsed.  There is pleural effusion on the left. There appears to be some air  droplets in the pleural space on the left. There is right lower lobe  vascular crowding and atelectasis.     LIVER AND SPLEEN: There is  increased density within the gallbladder.  This could be related to sludge. There are no dense gallstones. There is  breathing motion artifact. The liver and spleen appear to be  unremarkable.     PANCREAS: There is breathing motion artifact. No definite acute  pancreatic abnormality is seen.     KIDNEYS, ADRENALS AND URINARY BLADDER: There is breathing motion  artifact. The adrenal glands are normal in size. There are  nonobstructive renal stones bilaterally. There is a probable small cyst  in the upper pole left kidney measuring less than 1 cm. There is an  ill-defined area of low density in the left kidney laterally in the mid  to lower pole. The ureters are nondilated. There is thickening of the  bladder wall. There is air in the bladder. There is a Mccrary catheter in  the bladder.     BOWEL: No oral contrast was given. There is a gastrostomy tube. There is  air and fluid in the stomach. Small bowel loops are nondilated. There is  underdistention of portions of the colon.     OTHER: There are bilateral fat-containing inguinal hernias. There is a  small fat-containing umbilical hernia. There is diffuse body wall edema.  There is presacral edema. There is mild atheromatous disease of the  aortoiliac vessels. There are degenerative changes of the spine. There  are degenerative changes of both hips.       Impression:      1. Likely complete left lower lobe collapse/atelectasis. There is fluid  and air within the pleural space at the left lung base. There may be a  split pleural sign in the left lung base. The findings are worrisome for  empyema. There is mild atelectasis in the right lung base.  2. The gallbladder is dense. This could be due to sludge in the  gallbladder. There are no focal dense gallstones.  3. Area of ill-defined low density in the mid to lower pole left kidney  laterally is nonspecific. This could represent an area of  pyelonephritis. A mass is felt to be less likely. This is not present  on  5/23/2022. There is a probable small cyst in the upper pole left kidney  that is difficult to evaluate due to breathing motion artifact.  4. Thickening of the bladder wall may be related to muscular  hypertrophy. Inflammation and infection are also included in the  differential. There is a Mccrary catheter in the bladder. There is air in  the bladder.  5. Bilateral fat-containing inguinal hernias. Very small fat-containing  umbilical hernia.  6. Mild to moderate diffuse body wall edema. Presacral edema. Other  nonacute findings, as discussed above.  This report was finalized on 06/13/2022 18:38 by Dr. Zachariah Fonseca MD.    XR Abdomen KUB [[id number]] Collected: 06/13/22 1011     Updated: 06/13/22 1016    Narrative:      EXAMINATION: XR ABDOMEN KUB- 6/13/2022 10:11 AM CDT     HISTORY: Tight Abdomen.; R13.10-Dysphagia, unspecified;  Z01.818-Encounter for other preprocedural examination; D32.9-Benign  neoplasm of meninges, unspecified; Z74.09-Other reduced mobility;  Z78.9-Other specified health status; G40.901-Epilepsy, unspecified, not  intractable, with status epilepticus; J96.01-Acute respiratory failure  with hypoxia; G91.0-Communicating hydrocephalus; T81.40XA-Infection  follow.     REPORT: A supine view of the abdomen was obtained.     COMPARISON: KUB 5/15/2022.     The NG tube and feeding tube seen before are no longer identified. There  is respiratory motion artifact. Gas is seen within the GI tract  diffusely without significant distention. The pattern is relatively  nonspecific but could be related to developing adynamic ileus. There are  advanced degenerative changes throughout the lumbar spine.       Impression:      Nonspecific bowel gas pattern with questionable developing  ileus.  This report was finalized on 06/13/2022 10:13 by Dr. Antony Thomas MD.          CXR: Hypoventilation with pulmonary vascular congestion.  Noted infiltrate at the mid and left lower lung zone.  Bibasilar atelectasis.              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 Ortega.  We will plan for bedside ultrasound this afternoon to assess for possible fluid collection.  Possible placement of left pigtail catheter for drainage.    Continue daily chest x-ray  Discussed with nursing.      Vidya Hendrickson, MARIO  06/14/22  08:42 CDT

## 2022-06-14 NOTE — PLAN OF CARE
Goal Outcome Evaluation:              Outcome Evaluation: NTN follow up. 72-hr review meeting 91% EEN and 85% estimated pro need with TF at goal, Prosource and Nutrisource modulars. KUB suggesting developing adynamic ileus 6/13 but tolerating TF at goal today. No BM since 6/9. Continue with EN as ordered. NTN following per protocol.

## 2022-06-14 NOTE — THERAPY TREATMENT NOTE
Acute Care - Physical Therapy Treatment Note  [facility]     Patient Name: Hai Hernandez  : 1945  MRN: [id number]  Today's Date: 2022      Visit Dx:     ICD-10-CM ICD-9-CM   1. Dysphagia, unspecified type  R13.10 787.20   2. Preop testing  Z01.818 V72.84   3. Meningioma (HCC)  D32.9 225.2   4. Impaired mobility  Z74.09 799.89   5. Decreased activities of daily living (ADL)  Z78.9 V49.89   6. Status epilepticus (HCC)  G40.901 345.3   7. Acute respiratory failure with hypoxia (HCC)  J96.01 518.81   8. Communicating hydrocephalus (HCC)  G91.0 331.3   9. 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)   • Status epilepticus (HCC)   • Essential hypertension   • Type 2 diabetes mellitus with hyperglycemia, without long-term current use of insulin (HCC)   • Hypothyroidism (acquired)   • Acute respiratory failure (secondary to status epilepticus)   • Thrombocytopenia (HCC)   • Cerebral parenchymal hemorrhage (HCC)   • PAF (paroxysmal atrial fibrillation) (HCC)   • Fever   • Loculated pleural effusion   • Acute deep vein thrombosis (DVT) of the ulnar and brachial veins of right upper extremity (HCC)   • Dysphagia   • Communicating hydrocephalus (HCC)   • Cerebral edema (HCC)     Past Medical History:   Diagnosis Date   • Brain tumor (HCC)    • Depression    • Hearing loss    • History of cellulitis     right foot big toe   • Hypertension    • Pneumonia    • Right arm weakness     after cervial injury   • Sciatic pain     affects balance   • Thyroid disease    • Visual disturbance     due to brain tumor - right eye     Past Surgical History:   Procedure Laterality Date   • CATARACT EXTRACTION, BILATERAL     • COLONOSCOPY     • CRANIOTOMY Bilateral 6/10/2022    Procedure: CRANIECTOMY;   Surgeon: Martell Downs MD;  Location: [facility] OR;  Service: Neurosurgery;  Laterality: Bilateral;   • CRANIOTOMY FOR TUMOR Right 5/10/2022    Procedure: CRANIOTOMY FOR TUMOR STERIOTACTIC WITH BRAIN LAB, right;  Surgeon: Martell Downs MD;  Location:  [facility] OR;  Service: Neurosurgery;  Laterality: Right;   • ENDOSCOPY W/ PEG TUBE PLACEMENT N/A 6/2/2022    Procedure: ESOPHAGOGASTRODUODENOSCOPY WITH PERCUTANEOUS ENDOSCOPIC GASTROSTOMY TUBE INSERTION WITH ANESTHESIA;  Surgeon: Melecio Lu MD;  Location: [facility] OR;  Service: Gastroenterology;  Laterality: N/A;   • NECK SURGERY     • SKIN CANCER EXCISION     • TONSILLECTOMY     • TRACHEOSTOMY N/A 6/2/2022    Procedure: TRACHEOSTOMY;  Surgeon: Geovany Davidson MD;  Location:  [facility] OR;  Service: ENT;  Laterality: N/A;   •  SHUNT INSERTION Right 6/3/2022    Procedure: VENTRICULAR PERITONEAL SHUNT INSERTION WITH BRAIN LAB;  Surgeon: Martell Downs MD;  Location:  [facility] OR;  Service: Neurosurgery;  Laterality: Right;     PT Assessment (last 12 hours)     PT Evaluation and Treatment     Row Name 06/14/22 0747          Physical Therapy Time and Intention    Subjective Information --  unresponsive  -     Document Type therapy note (daily note)  -     Mode of Treatment physical therapy  -     Row Name 06/14/22 0747          General Information    Existing Precautions/Restrictions fall;oxygen therapy device and L/min  vent, trach  -     Row Name 06/14/22 0701          Pain Scale: FACES Pre/Post-Treatment    Pain: FACES Scale, Pretreatment 0-->no hurt  -     Posttreatment Pain Rating 0-->no hurt  -     Row Name 06/14/22 0747          Aerobic Exercise    Comment, Aerobic Exercise (Therapeutic Exercise) PROM B UE/LEs x 20 reps  -     Row Name             Wound 05/15/22 1712 coccyx    Wound - Properties Group Placement Date: 05/15/22  -KS Placement Time: 1712 -[facility]  -[facility]     Retired Wound - Properties Group Placement  Date: 05/15/22  -KS Placement Time: 1712 -KS Location: coccyx  -KS     Retired Wound - Properties Group Date first assessed: 05/15/22  -KS Time first assessed: 1712 -KS Location: coccyx  -KS     Row Name             Wound 05/17/22 1623 scalp Pressure Injury    Wound - Properties Group Placement Date: 05/17/22  -KS Placement Time: 1623  -KS Present on Hospital Admission: N  -KS Location: scalp  -KS Primary Wound Type: Pressure inj  -KS     Retired Wound - Properties Group Placement Date: 05/17/22  -KS Placement Time: 1623  -KS Present on Hospital Admission: N  -KS Location: scalp  -KS Primary Wound Type: Pressure inj  -KS     Retired Wound - Properties Group Date first assessed: 05/17/22  -KS Time first assessed: 1623  -KS Present on Hospital Admission: N  -KS Location: scalp  -KS Primary Wound Type: Pressure inj  -KS     Row Name             Wound 06/03/22 1602 abdomen Incision    Wound - Properties Group Placement Date: 06/03/22  -MK Placement Time: 1602  -MK Present on Hospital Admission: N  -MK Location: abdomen  -MK Primary Wound Type: Incision  -MK     Retired Wound - Properties Group Placement Date: 06/03/22  -MK Placement Time: 1602  -MK Present on Hospital Admission: N  -MK Location: abdomen  -MK Primary Wound Type: Incision  -MK     Retired Wound - Properties Group Date first assessed: 06/03/22  -MK Time first assessed: 1602  -MK Present on Hospital Admission: N  -MK Location: abdomen  -MK Primary Wound Type: Incision  -MK     Row Name             Wound 06/10/22 1409 Right scalp Incision    Wound - Properties Group Placement Date: 06/10/22  -PAULINE Placement Time: 1409  -PAULINE Side: Right  -PAULINE Location: scalp  -PAULINE Primary Wound Type: Incision  -PAULINE     Retired Wound - Properties Group Placement Date: 06/10/22  -PAULINE Placement Time: 1409  -PAULINE Side: Right  -PAULINE Location: scalp  -PAULINE Primary Wound Type: Incision  -PAULINE     Retired Wound - Properties Group Date first assessed: 06/10/22  -PAULINE Time first assessed: 1409  -PAULINE  Side: Right  -PAULINE Location: scalp  -PAULINE Primary Wound Type: Incision  -PAULINE     Row Name             Wound 06/11/22 2259 throat    Wound - Properties Group Placement Date: 06/11/22  -PC Placement Time: 2259 -PC Present on Hospital Admission: N  -PC Location: throat  -PC     Retired Wound - Properties Group Placement Date: 06/11/22  -PC Placement Time: 2259 -PC Present on Hospital Admission: N  -PC Location: throat  -PC     Retired Wound - Properties Group Date first assessed: 06/11/22  -PC Time first assessed: 2259 -PC Present on Hospital Admission: N  -PC Location: throat  -PC     Row Name 06/14/22 0747          Plan of Care Review    Plan of Care Reviewed With patient  -MF     Progress no change  -     Outcome Evaluation Pt. remains on vent. Pt. unresponsive. Performed PROM to all extremities. Pt. is very swollen all over. Repositioned UEs with pillows to help reduce swelling. Will continue ROM while on vent.  -MF     Row Name 06/14/22 0747          Positioning and Restraints    Pre-Treatment Position in bed  -     Post Treatment Position bed  -MF     In Bed notified nsg;fowlers;patient within staff view;side rails up x2;RUE elevated;LUE elevated;SCD pump applied;legs elevated;waffle boots/both  -           User Key  (r) = Recorded By, (t) = Taken By, (c) = Cosigned By    Initials Name Provider Type    Carmen Mistry RN Registered Nurse    Surya Ball RN Registered Nurse    Rosaura Thompson, BRENDAN Physical Therapist Assistant    PC Jayna Tan, RN Registered Nurse    Leatha Solomon RN Registered Nurse                Physical Therapy Education                 Title: PT OT SLP Therapies (In Progress)     Topic: Physical Therapy (In Progress)     Point: Mobility training (Done)     Learning Progress Summary           Patient Acceptance, E, VU,DU by YULIET at 5/11/2022 0702    Comment: benefits of activity, progression of PT POC                   Point: Home exercise program (In Progress)      Learning Progress Summary           Patient Acceptance, E, NR by YULIET at 6/7/2022 3602    Comment: Benefits of activity, B UE/LE exercises, positioning for joints and skin integrity                   Point: Body mechanics (Not Started)     Learner Progress:  Not documented in this visit.          Point: Precautions (Not Started)     Learner Progress:  Not documented in this visit.                      User Key     Initials Effective Dates Name Provider Type Discipline    YULIET 08/02/16 -  Wayne Thomas, PT DPT Physical Therapist PT              PT Recommendation and Plan     Plan of Care Reviewed With: patient  Progress: no change  Outcome Evaluation: Pt. remains on vent. Pt. unresponsive. Performed PROM to all extremities. Pt. is very swollen all over. Repositioned UEs with pillows to help reduce swelling. Will continue ROM while on vent.   Outcome Measures     Row Name 06/14/22 0747 06/13/22 0800 06/12/22 0800       How much help from another person do you currently need...    Turning from your back to your side while in flat bed without using bedrails? 1  -MF 1  -CIERA 1  -CIERA    Moving from lying on back to sitting on the side of a flat bed without bedrails? 1  -MF 1  -CIERA 1  -CIERA    Moving to and from a bed to a chair (including a wheelchair)? 1  -MF 1  -CIERA 1  -CIERA    Standing up from a chair using your arms (e.g., wheelchair, bedside chair)? 1  -MF 1  -CIERA 1  -CIERA    Climbing 3-5 steps with a railing? 1  -MF 1  -CIERA 1  -ICERA    To walk in hospital room? 1  -MF 1  -CIERA 1  -CIERA    AM-PAC 6 Clicks Score (PT) 6  -MF 6  -CIERA 6  -CIERA       Functional Assessment    Outcome Measure Options AM-PAC 6 Clicks Basic Mobility (PT)  - -- --    Row Name 06/11/22 1300             How much help from another person do you currently need...    Turning from your back to your side while in flat bed without using bedrails? 1  -CIERA      Moving from lying on back to sitting on the side of a flat bed without bedrails? 1  -CIERA      Moving to and from a  bed to a chair (including a wheelchair)? 1  -CIERA      Standing up from a chair using your arms (e.g., wheelchair, bedside chair)? 1  -CIERA      Climbing 3-5 steps with a railing? 1  -CIERA      To walk in hospital room? 1  -CIERA      AM-PAC 6 Clicks Score (PT) 6  -CIERA            User Key  (r) = Recorded By, (t) = Taken By, (c) = Cosigned By    Initials Name Provider Type    Gloria Corley PTA Physical Therapist Assistant    Rosaura Thompson PTA Physical Therapist Assistant                 Time Calculation:    PT Charges     Row Name 06/14/22 0814             Time Calculation    Start Time 0747  -      Stop Time 0810  -      Time Calculation (min) 23 min  -      PT Received On 06/14/22  -              Time Calculation- PT    Total Timed Code Minutes- PT 23 minute(s)  -              Timed Charges    45824 - PT Therapeutic Exercise Minutes 23  -MF              Total Minutes    Timed Charges Total Minutes 23  -MF       Total Minutes 23  -MF            User Key  (r) = Recorded By, (t) = Taken By, (c) = Cosigned By    Initials Name Provider Type    Rosaura Thompson PTA Physical Therapist Assistant              Therapy Charges for Today     Code Description Service Date Service Provider Modifiers Qty    [id number] HC PT THER PROC EA 15 MIN 6/14/2022 Rosaura Bravo PTA GP 2          PT G-Codes  Outcome Measure Options: AM-PAC 6 Clicks Basic Mobility (PT)  AM-PAC 6 Clicks Score (PT): 6  AM-PAC 6 Clicks Score (OT): 12    Rosaura Bravo PTA  6/14/2022

## 2022-06-14 NOTE — PROGRESS NOTES
[facility] Medicine Services  INPATIENT PROGRESS NOTE    Patient Name: Hai Hernandez  Date of Admission: 5/10/2022  Today's Date: 06/14/22  Length of Stay: 35  Primary Care Physician: Eilsa Chacko MD    Subjective   Chief Complaint: Intubated  HPI   Intubated.  Off sedation  Non-verbal, not following commands  Has had issues with IV access, Left arm more swollen today.        Review of Systems   Unable to perform ROS: Intubated        All pertinent negatives and positives are as above. All other systems have been reviewed and are negative unless otherwise stated.     Objective    Temp:  [97.9 °F (36.6 °C)-99.4 °F (37.4 °C)] 99.4 °F (37.4 °C)  Heart Rate:  [53-75] 58  Resp:  [21-36] 24  BP: (113-199)/() 145/67  FiO2 (%):  [35 %] 35 %  Physical Exam  Vitals reviewed.   Constitutional:       Appearance: He is well-developed.   HENT:      Head: Normocephalic and atraumatic.      Right Ear: External ear normal.      Left Ear: External ear normal.      Nose: Nose normal.   Eyes:      General: No scleral icterus.        Right eye: No discharge.         Left eye: No discharge.      Conjunctiva/sclera: Conjunctivae normal.      Pupils: Pupils are equal, round, and reactive to light.   Neck:      Thyroid: No thyromegaly.      Trachea: No tracheal deviation.   Cardiovascular:      Rate and Rhythm: Normal rate and regular rhythm.      Heart sounds: Normal heart sounds. No murmur heard.    No friction rub. No gallop.   Pulmonary:      Effort: Pulmonary effort is normal. No respiratory distress.      Breath sounds: Normal breath sounds. No stridor. No wheezing or rales.   Chest:      Chest wall: No tenderness.   Abdominal:      General: Bowel sounds are normal. There is no distension.      Palpations: Abdomen is soft. There is no mass.      Tenderness: There is no abdominal tenderness. There is no guarding or rebound.      Hernia: No hernia is present.   Musculoskeletal:          General: No deformity. Normal range of motion.      Cervical back: Normal range of motion and neck supple.   Lymphadenopathy:      Cervical: No cervical adenopathy.   Skin:     General: Skin is warm and dry.      Coloration: Skin is not pale.      Findings: No erythema or rash.   Neurological:      Comments: Level of consciousness: arousable by verbal stimuli ,  drowsy  Off propofol.  Intermittently opens eyes to painful stimuli.  Does not follow commands.  Nonverbal.  Weakly withdraws to tactile stimuli.            Cranial Nerves      CN III, IV, VI   Pupils are equal, round, and reactive to light.  Extraocular motions are normal.      CN IX, X   CN IX normal.         Psychiatric:         Behavior: Behavior normal.         Thought Content: Thought content normal.         Judgment: Judgment normal.           Results Review:  I have reviewed the labs, radiology results, and diagnostic studies.    Laboratory Data:   Results from last 7 days   Lab Units 06/14/22  0608 06/13/22  0607 06/12/22  0515   WBC 10*3/mm3 9.59 10.97* 13.40*   HEMOGLOBIN g/dL 7.7* 6.8* 7.0*   HEMATOCRIT % 24.3* 22.5* 23.1*   PLATELETS 10*3/mm3 198 206 193        Results from last 7 days   Lab Units 06/14/22  0405 06/13/22  0607 06/12/22  0515   SODIUM mmol/L 139 138 140   POTASSIUM mmol/L 4.5 4.1 4.1   CHLORIDE mmol/L 103 105 106   CO2 mmol/L 28.0 29.0 28.0   BUN mg/dL 36* 37* 34*   CREATININE mg/dL 0.54* 0.57* 0.61*   CALCIUM mg/dL 8.4* 8.3* 8.2*   BILIRUBIN mg/dL 0.6 0.4 0.3   ALK PHOS U/L 472* 366* 257*   ALT (SGPT) U/L 175* 106* 61*   AST (SGOT) U/L 248* 145* 80*   GLUCOSE mg/dL 159* 165* 81       Culture Data:   Wound Culture   Date Value Ref Range Status   06/10/2022 No growth at 3 days  Final   06/10/2022 No growth at 3 days  Final   06/10/2022 No growth at 3 days  Final     Respiratory Culture   Date Value Ref Range Status   06/10/2022   Preliminary    Rare The culture consists of normal respiratory annia. This is a preliminary report;  final report to follow.       Radiology Data:   Imaging Results (Last 24 Hours)     Procedure Component Value Units Date/Time    XR Chest 1 View [[id number]] Collected: 06/14/22 0702     Updated: 06/14/22 0707    Narrative:      EXAMINATION:  XR CHEST 1 VW-  6/14/2022 3:20 AM CDT     HISTORY: On ventilator.     COMPARISON: 6/13/2022.     TECHNIQUE: Single view AP image.     FINDINGS:  A tracheostomy tube remains in place. There is probable  ventriculoperitoneal shunt tubing traversing the right neck and right  chest. There is hypoventilation with vascular crowding. There is patchy  infiltrate in the left perihilar region and left lung base. There is  mild atelectasis at the right lung base. The left heart border is  partially obscured. There is cardiomegaly. There are degenerative  changes of the spine and shoulders.       Impression:      1. Hypoventilation with vascular crowding.  2. Patchy infiltrate in the mid and lower lung zone on the left. The  left heart border is obscured. This could represent pneumonia.  Atelectasis is also considered. There is mild atelectasis at the right  lung base.        This report was finalized on 06/14/2022 07:04 by Dr. Zachariah Fonseca MD.    CT Abdomen Pelvis With & Without Contrast [[id number]] Collected: 06/13/22 1830     Updated: 06/13/22 1841    Narrative:      EXAMINATION:  CT ABDOMEN PELVIS W WO CONTRAST-  6/13/2022 6:11 PM CDT     HISTORY: Abdominal distention. R13.10-Dysphagia, unspecified;  Z01.818-Encounter for other preprocedural examination.     TECHNIQUE: Spiral CT was performed of the abdomen and pelvis without and  with IV contrast. Multiplanar images were reconstructed.     DLP: 4672 mGy-cm. Automated dosage reduction technique was utilized to  reduce patient dosage.     COMPARISON: 5/23/2020.      LUNG BASES: The visualized left lower lobe is completely collapsed.  There is pleural effusion on the left. There appears to be some air  droplets in the pleural space on  the left. There is right lower lobe  vascular crowding and atelectasis.     LIVER AND SPLEEN: There is increased density within the gallbladder.  This could be related to sludge. There are no dense gallstones. There is  breathing motion artifact. The liver and spleen appear to be  unremarkable.     PANCREAS: There is breathing motion artifact. No definite acute  pancreatic abnormality is seen.     KIDNEYS, ADRENALS AND URINARY BLADDER: There is breathing motion  artifact. The adrenal glands are normal in size. There are  nonobstructive renal stones bilaterally. There is a probable small cyst  in the upper pole left kidney measuring less than 1 cm. There is an  ill-defined area of low density in the left kidney laterally in the mid  to lower pole. The ureters are nondilated. There is thickening of the  bladder wall. There is air in the bladder. There is a Mccrary catheter in  the bladder.     BOWEL: No oral contrast was given. There is a gastrostomy tube. There is  air and fluid in the stomach. Small bowel loops are nondilated. There is  underdistention of portions of the colon.     OTHER: There are bilateral fat-containing inguinal hernias. There is a  small fat-containing umbilical hernia. There is diffuse body wall edema.  There is presacral edema. There is mild atheromatous disease of the  aortoiliac vessels. There are degenerative changes of the spine. There  are degenerative changes of both hips.       Impression:      1. Likely complete left lower lobe collapse/atelectasis. There is fluid  and air within the pleural space at the left lung base. There may be a  split pleural sign in the left lung base. The findings are worrisome for  empyema. There is mild atelectasis in the right lung base.  2. The gallbladder is dense. This could be due to sludge in the  gallbladder. There are no focal dense gallstones.  3. Area of ill-defined low density in the mid to lower pole left kidney  laterally is nonspecific. This  could represent an area of  pyelonephritis. A mass is felt to be less likely. This is not present on  5/23/2022. There is a probable small cyst in the upper pole left kidney  that is difficult to evaluate due to breathing motion artifact.  4. Thickening of the bladder wall may be related to muscular  hypertrophy. Inflammation and infection are also included in the  differential. There is a Mccrary catheter in the bladder. There is air in  the bladder.  5. Bilateral fat-containing inguinal hernias. Very small fat-containing  umbilical hernia.  6. Mild to moderate diffuse body wall edema. Presacral edema. Other  nonacute findings, as discussed above.  This report was finalized on 06/13/2022 18:38 by Dr. Zachariah Fonseca MD.          I have reviewed the patient's current medications.     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  Meningioma  -NS managing  Status post postcraniotomy for meningioma  S/p  Craniectomy for cerebral edema and possible infection    2.  Status Epilepticus/Seizures  -Cerebyx  -Vimpat  -Dilantin  -Neurology  following    3.  Acute Respiratory failure with prolonged mechanical ventilation s/p Trach and PEG  -Pulm following    4.  Hydrocephalus/Cerebral Edema  POD#3 (6/10/2022) Craniectomy for cerebral edema and possible infection  -NS managing  -Decadron  -Lumbar drain removed  -s/p Right Posterial parietal  shunt placement    5.  Left pleural effusion s/p Chest tube which has since been removed without issue  -monitor     6.  A-Fib  -Coreg  -Not able to tolerate AC    7.  RUE DVT  -monitor  -not able to tolerated AC     8.  LUE Swelling  -Check duplex scan    9.  ?Empyema on CT scan  -re-consult CTS for their input      Discharge Planning: I expect the patient to be discharged to LTAC in ? days  Electronically signed by Sha Beatty MD, 06/14/22, 10:31 CDT.     Never smoker

## 2022-06-14 NOTE — CASE MANAGEMENT/SOCIAL WORK
Continued Stay Note  [facility]     Patient Name: Hai Hernandez  MRN: [id number]  Today's Date: 6/14/2022    Admit Date: 5/10/2022     Discharge Plan     Row Name 06/14/22 0918       Plan    Plan unclear    Plan Comments SW following, will assist as needed.               Discharge Codes    No documentation.                     JOSE Cruz

## 2022-06-14 NOTE — CONSULTS
[facility]  INPATIENT WOUND CONSULTATION    Today's Date: 06/14/22    Patient Name: Hai Hernandez  MRN: [id number]  CSN: [id number]  PCP: Elisa Chacko MD  Referring Provider:   Consulting Provider (From admission, onward)    Start Ordered     Status Ordering Provider    06/12/22 1627 06/12/22 1627  Inpatient Wound Care Provider Consult  Once        Specialty:  Wound Care  Provider:  Aurelia Thomas APRN    Acknowledged ARVIND CANALES    05/17/22 1235 05/17/22 1235  Inpatient Wound Care Provider Consult  Once        Specialty:  Wound Care  Provider:  Aurelia Thomas APRN    Completed MARTELL DOWNS         Attending Provider: Martell Downs, *  Length of Stay: 35    SUBJECTIVE   Chief Complaint: Wounds of trach site    HPI: Hai Hernandez, a 77 y.o.male, presents with a past medical history of meningioma, diabetes, hypertension, and thyroid disease.  A full past medical history as listed below.  Patient was admitted on 5/10 for craniotomy by Dr. Downs for at home or stereotactic with brain lab.  Patient initially did well postoperatively with plans for discharge until patient started having seizures and RRT was called on 5/14 patient was ultimately sedated and intubated and lumbar drain was placed patient.    Inpatient wound care was initially consulted due to wound to coccyx and scalp.  Inpatient wound care is reconsulted due to wound to trach site.  Patient is found to have pressure injury from trach plate at trach site.  This is a hospital-acquired pressure injury.  Wound was first assessed on 6/11 and patient was admitted on 5/10.  This is medical device related to tracheostomy plate.  Patient remains sedated and on ventilator.  There is concern for ileus at this time.  Patient is minimally responsive.    Visit Dx:    ICD-10-CM ICD-9-CM   1. Dysphagia, unspecified type  R13.10 787.20   2. Preop testing  Z01.818 V72.84   3. Meningioma (HCC)  D32.9 225.2   4.  Impaired mobility  Z74.09 799.89   5. Decreased activities of daily living (ADL)  Z78.9 V49.89   6. Status epilepticus (HCC)  G40.901 345.3   7. Acute respiratory failure with hypoxia (HCC)  J96.01 518.81   8. Communicating hydrocephalus (HCC)  G91.0 331.3   9. 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)   • Status epilepticus (HCC)   • Essential hypertension   • Type 2 diabetes mellitus with hyperglycemia, without long-term current use of insulin (HCC)   • Hypothyroidism (acquired)   • Acute respiratory failure (secondary to status epilepticus)   • Thrombocytopenia (HCC)   • Cerebral parenchymal hemorrhage (HCC)   • PAF (paroxysmal atrial fibrillation) (HCC)   • Fever   • Loculated pleural effusion   • Acute deep vein thrombosis (DVT) of the ulnar and brachial veins of right upper extremity (HCC)   • Dysphagia   • Communicating hydrocephalus (HCC)   • Cerebral edema (HCC)       History:   Past Medical History:   Diagnosis Date   • Brain tumor (HCC)    • Depression    • Hearing loss    • History of cellulitis     right foot big toe   • Hypertension    • Pneumonia    • Right arm weakness     after cervial injury   • Sciatic pain     affects balance   • Thyroid disease    • Visual disturbance     due to brain tumor - right eye     Past Surgical History:   Procedure Laterality Date   • CATARACT EXTRACTION, BILATERAL     • COLONOSCOPY     • CRANIOTOMY Bilateral 6/10/2022    Procedure: CRANIECTOMY;  Surgeon: Martell Downs MD;  Location: [facility] OR;  Service: Neurosurgery;  Laterality: Bilateral;   • CRANIOTOMY FOR TUMOR Right 5/10/2022    Procedure: CRANIOTOMY FOR TUMOR STERIOTACTIC WITH BRAIN LAB, right;  Surgeon: Martell Downs MD;  Location: [facility] OR;  Service:  Neurosurgery;  Laterality: Right;   • ENDOSCOPY W/ PEG TUBE PLACEMENT N/A 6/2/2022    Procedure: ESOPHAGOGASTRODUODENOSCOPY WITH PERCUTANEOUS ENDOSCOPIC GASTROSTOMY TUBE INSERTION WITH ANESTHESIA;  Surgeon: Melecio Lu MD;  Location: [facility] OR;  Service: Gastroenterology;  Laterality: N/A;   • NECK SURGERY     • SKIN CANCER EXCISION     • TONSILLECTOMY     • TRACHEOSTOMY N/A 6/2/2022    Procedure: TRACHEOSTOMY;  Surgeon: Geovany Davidson MD;  Location: [facility] OR;  Service: ENT;  Laterality: N/A;   •  SHUNT INSERTION Right 6/3/2022    Procedure: VENTRICULAR PERITONEAL SHUNT INSERTION WITH BRAIN LAB;  Surgeon: Martell Downs MD;  Location: [facility] OR;  Service: Neurosurgery;  Laterality: Right;     Social History     Socioeconomic History   • Marital status:    Tobacco Use   • Smoking status: Never Smoker   • Smokeless tobacco: Never Used   Vaping Use   • Vaping Use: Never used   Substance and Sexual Activity   • Alcohol use: Never   • Drug use: Never   • Sexual activity: Defer     Family History   Problem Relation Age of Onset   • Cancer Sister    • Cancer Brother        Allergies:  No Known Allergies    Medications:    Current Facility-Administered Medications:   •  acetaminophen (TYLENOL) tablet 650 mg, 650 mg, Oral, Q4H PRN, 650 mg at 06/13/22 2104 **OR** acetaminophen (TYLENOL) suppository 650 mg, 650 mg, Rectal, Q4H PRN, Martell Downs MD, 650 mg at 05/23/22 0016  •  alteplase (CATHFLO/ACTIVASE) injection 2 mg, 2 mg, Intracatheter, PRN, Martell Downs MD, New Syringe/Cartridge at 05/28/22 1330  •  bisacodyl (DULCOLAX) suppository 10 mg, 10 mg, Rectal, Daily PRN, Martell Downs MD, 10 mg at 06/03/22 0749  •  carvedilol (COREG) tablet 6.25 mg, 6.25 mg, Nasogastric, BID With Meals, Martell Downs MD, 6.25 mg at 06/14/22 0816  •  cefepime (MAXIPIME) 2 g/100 mL 0.9% NS (mbp), 2 g, Intravenous, Q8H, Martell Downs MD, 2 g at 06/14/22 0622  •   chlorhexidine (PERIDEX) 0.12 % solution 15 mL, 15 mL, Mouth/Throat, Q12H, Martell Downs MD, 15 mL at 06/14/22 0815  •  dexamethasone (DECADRON) tablet 4 mg, 4 mg, Per PEG Tube, Q12H, 4 mg at 06/14/22 0821 **FOLLOWED BY** [START ON 6/16/2022] dexamethasone (DECADRON) tablet 2 mg, 2 mg, Per PEG Tube, Q12H **FOLLOWED BY** [START ON 6/18/2022] dexamethasone (DECADRON) tablet 1 mg, 1 mg, Per PEG Tube, Q12H **FOLLOWED BY** [START ON 6/20/2022] dexamethasone (DECADRON) tablet 0.5 mg, 0.5 mg, Per PEG Tube, Daily, Stevie Parsons, APRN  •  dextrose (D50W) (25 g/50 mL) IV injection 25 g, 25 g, Intravenous, Q15 Min PRN, Martell Downs MD, 25 g at 06/09/22 1807  •  dextrose (GLUTOSE) oral gel 15 g, 15 g, Oral, Q15 Min PRN, Martell Downs MD, 15 g at 05/22/22 0527  •  docusate (COLACE) 50 MG/5ML liquid 50 mg, 50 mg, Oral, Daily, Sha Beatty MD  •  fosphenytoin (Cerebyx) 275 mg PE in sodium chloride 0.9 % 100 mL IVPB, 275 mg PE, Intravenous, Q8H, Martell Downs MD, 275 mg PE at 06/14/22 0540  •  glucagon (human recombinant) (GLUCAGEN DIAGNOSTIC) injection 1 mg, 1 mg, Intramuscular, Q15 Min PRN, Martell Downs MD  •  guaiFENesin (ROBITUSSIN) 100 MG/5ML oral solution 200 mg, 200 mg, Per PEG Tube, TID, Daly Perea, APRN, 200 mg at 06/14/22 0815  •  heparin (porcine) 5000 UNIT/ML injection 5,000 Units, 5,000 Units, Subcutaneous, Q12H, Martell Downs MD, 5,000 Units at 06/13/22 2026  •  hydrALAZINE (APRESOLINE) injection 10 mg, 10 mg, Intravenous, Q6H PRN, Martell Downs MD, 10 mg at 06/14/22 0211  •  insulin detemir (LEVEMIR) injection 20 Units, 20 Units, Subcutaneous, Nightly, Martell Downs MD, 20 Units at 06/13/22 2036  •  insulin regular (humuLIN R,novoLIN R) injection 2-7 Units, 2-7 Units, Subcutaneous, Q6H, Martell Downs MD, 2 Units at 06/14/22 0504  •  ipratropium-albuterol (DUO-NEB) nebulizer solution 3 mL, 3 mL, Nebulization, Q4H  PRN, Martell Downs MD, 3 mL at 06/14/22 0648  •  labetalol (NORMODYNE,TRANDATE) injection 10 mg, 10 mg, Intravenous, Q6H PRN, Martell Downs MD, 10 mg at 06/14/22 0337  •  lacosamide (VIMPAT) injection 200 mg, 200 mg, Intravenous, Q12H, Martell Downs MD, 200 mg at 06/14/22 0815  •  lansoprazole (FIRST) oral suspension 30 mg, 30 mg, Per G Tube, QAM, Martell Downs MD, 30 mg at 06/13/22 0955  •  latanoprost (XALATAN) 0.005 % ophthalmic solution 1 drop, 1 drop, Both Eyes, Nightly, Martell Downs MD, 1 drop at 06/13/22 2028  •  levalbuterol (XOPENEX) nebulizer solution 0.63 mg, 0.63 mg, Nebulization, TID PRN, Martell Downs MD, 0.63 mg at 06/12/22 1046  •  levETIRAcetam in NaCl 0.75% (KEPPRA) IVPB 1,000 mg, 1,000 mg, Intravenous, Q12H, Martell Downs MD, 1,000 mg at 06/14/22 0816  •  levothyroxine (SYNTHROID, LEVOTHROID) tablet 125 mcg, 125 mcg, Nasogastric, Q AM, Martell Downs MD, 125 mcg at 06/14/22 0501  •  lidocaine (XYLOCAINE) 1 % injection 10 mL, 10 mL, Intradermal, Once, Martell Downs MD  •  liothyronine (CYTOMEL) tablet 25 mcg, 25 mcg, Nasogastric, Daily, Martell Downs MD, 25 mcg at 06/14/22 0816  •  lisinopril (PRINIVIL,ZESTRIL) tablet 20 mg, 20 mg, Nasogastric, Q24H, Martell Downs MD, 20 mg at 06/14/22 0815  •  LORazepam (ATIVAN) injection 2 mg, 2 mg, Intravenous, Q2H PRN, Martell Downs MD, 2 mg at 06/14/22 0358  •  mupirocin (BACTROBAN) 2 % ointment 1 application, 1 application, Topical, Q12H, Martell Downs MD, 1 application at 06/14/22 0824  •  norepinephrine (LEVOPHED) 8 mg in 250 mL NS infusion (premix), 0.02-0.3 mcg/kg/min, Intravenous, Titrated, Martell Downs MD, Held at 06/11/22 1054  •  Nutrisource fiber pack 1 packet, 1 packet, Nasogastric, 4x Daily, Martell Downs MD, 1 packet at 06/14/22 0824  •  ondansetron (ZOFRAN) tablet 4 mg, 4 mg, Oral, Q6H PRN **OR**  ondansetron (ZOFRAN) injection 4 mg, 4 mg, Intravenous, Q6H PRN, Martell Downs MD  •  oxyCODONE (ROXICODONE) 5 MG/5ML solution 5 mg, 5 mg, Per PEG Tube, Q6H PRN, Daly Perea APRN, 5 mg at 06/13/22 0354  •  polyethylene glycol (MIRALAX) packet 17 g, 17 g, Oral, Daily, Martell Downs MD, 17 g at 06/14/22 0816  •  ProSource TF oral liquid 45 mL, 45 mL, Per G Tube, BID, Martell Downs MD, 45 mL at 06/14/22 0821  •  sodium chloride 3 % nebulizer solution 4 mL, 4 mL, Nebulization, BID - RT, Martell Downs MD, 4 mL at 06/14/22 0648  •  vancomycin 1500 mg/500 mL 0.9% NS IVPB ([facility]), 15 mg/kg, Intravenous, Q12H, Martell Downs MD, 1,500 mg at 06/14/22 0211    Review of Systems:   Review of Systems   Unable to perform ROS: Patient unresponsive   Patient is on ventilator      OBJECTIVE     Vitals:    06/14/22 0930   BP:    Pulse: 55   Resp:    Temp:    SpO2: 95%       PHYSICAL EXAM  Physical Exam  Vitals and nursing note reviewed.   Constitutional:       Appearance: He is obese. He is ill-appearing.      Interventions: He is sedated and intubated.   HENT:      Head: Normocephalic and atraumatic.   Eyes:      General: Lids are normal.         Right eye: No discharge.         Left eye: No discharge.   Neck:      Trachea: Tracheostomy present.      Comments: Wound at tracheostomy site due to trach plate.  See skin  Cardiovascular:      Rate and Rhythm: Regular rhythm. Bradycardia present.   Pulmonary:      Effort: No respiratory distress. He is intubated.   Abdominal:      General: Abdomen is protuberant. There is distension.      Comments: Concern for ileus with firm distended abdomen   Genitourinary:     Comments: Mccrary catheter in place  Skin:     General: Skin is warm and dry.      Findings: Wound present.      Comments: Unstageable pressure injury at tracheostomy site present.  Wound base is covered with yellow adherent slough.  Edges are irregular.  No signs of infection.   Scant amount of serous drainage.  Pressure injury of sacral area continues to improve.  No signs of infection.   Neurological:      Mental Status: He is unresponsive.              Results Review:  Lab Results (last 48 hours)     Procedure Component Value Units Date/Time    Anaerobic Culture - Tissue, Brain [[id number]]  (Normal) Collected: 06/10/22 1326    Specimen: Tissue from Brain Updated: 06/14/22 0645     Anaerobic Culture No anaerobes isolated at 3 days    CBC & Differential [[id number]]  (Abnormal) Collected: 06/14/22 0608    Specimen: Blood Updated: 06/14/22 0629    Narrative:      The following orders were created for panel order CBC & Differential.  Procedure                               Abnormality         Status                     ---------                               -----------         ------                     CBC Auto Differential[[id number]]        Abnormal            Final result                 Please view results for these tests on the individual orders.    CBC Auto Differential [[id number]]  (Abnormal) Collected: 06/14/22 0608    Specimen: Blood Updated: 06/14/22 0629     WBC 9.59 10*3/mm3      RBC 2.43 10*6/mm3      Hemoglobin 7.7 g/dL      Hematocrit 24.3 %      .0 fL      MCH 31.7 pg      MCHC 31.7 g/dL      RDW 18.1 %      RDW-SD 63.2 fl      MPV 9.7 fL      Platelets 198 10*3/mm3      Neutrophil % 73.1 %      Lymphocyte % 9.9 %      Monocyte % 12.8 %      Eosinophil % 1.5 %      Basophil % 0.5 %      Immature Grans % 2.2 %      Neutrophils, Absolute 7.01 10*3/mm3      Lymphocytes, Absolute 0.95 10*3/mm3      Monocytes, Absolute 1.23 10*3/mm3      Eosinophils, Absolute 0.14 10*3/mm3      Basophils, Absolute 0.05 10*3/mm3      Immature Grans, Absolute 0.21 10*3/mm3      nRBC 0.0 /100 WBC     POC Glucose Once [[id number]]  (Abnormal) Collected: 06/14/22 0500    Specimen: Blood Updated: 06/14/22 0512     Glucose 170 mg/dL      Comment: : [id number] Salvador LaceyMeter ID: [id number]        Phenytoin Level, Total [[id number]]  (Normal) Collected: 06/14/22 0405    Specimen: Blood Updated: 06/14/22 0444     Phenytoin Level 16.2 mcg/mL     Comprehensive Metabolic Panel [[id number]]  (Abnormal) Collected: 06/14/22 0405    Specimen: Blood Updated: 06/14/22 0439     Glucose 159 mg/dL      BUN 36 mg/dL      Creatinine 0.54 mg/dL      Sodium 139 mmol/L      Potassium 4.5 mmol/L      Chloride 103 mmol/L      CO2 28.0 mmol/L      Calcium 8.4 mg/dL      Total Protein 6.9 g/dL      Albumin 2.70 g/dL      ALT (SGPT) 175 U/L      AST (SGOT) 248 U/L      Alkaline Phosphatase 472 U/L      Total Bilirubin 0.6 mg/dL      Globulin 4.2 gm/dL      A/G Ratio 0.6 g/dL      BUN/Creatinine Ratio 66.7     Anion Gap 8.0 mmol/L      eGFR 102.6 mL/min/1.73      Comment: National Kidney Foundation and American Society of Nephrology (ASN) Task Force recommended calculation based on the Chronic Kidney Disease Epidemiology Collaboration (CKD-EPI) equation refit without adjustment for race.       Narrative:      GFR Normal >60  Chronic Kidney Disease <60  Kidney Failure <15      Blood Gas, Arterial - [[id number]]  (Abnormal) Collected: 06/14/22 0417    Specimen: Arterial Blood Updated: 06/14/22 0413     Site Left Radial     Denzel's Test Positive     pH, Arterial 7.461 pH units      Comment: 83 Value above reference range        pCO2, Arterial 40.9 mm Hg      pO2, Arterial 82.4 mm Hg      Comment: 84 Value below reference range        HCO3, Arterial 29.1 mmol/L      Comment: 83 Value above reference range        Base Excess, Arterial 4.9 mmol/L      Comment: 83 Value above reference range        O2 Saturation, Arterial 97.3 %      Temperature 37.0 C      Barometric Pressure for Blood Gas 748 mmHg      Modality Ventilator     FIO2 35 %      Ventilator Mode PC     Set Mech Resp Rate 16.0     PEEP 5.0     PIP 10 cmH2O      Comment: Meter: [id number]     :  [id number]        Collected by [id number]     pCO2, Temperature Corrected  40.9 mm Hg      pH, Temp Corrected 7.461 pH Units      pO2, Temperature Corrected 82.4 mm Hg     POC Glucose Once [[id number]]  (Normal) Collected: 06/14/22 0004    Specimen: Blood Updated: 06/14/22 0015     Glucose 119 mg/dL      Comment: : [id number] aSlvador LaceyMeter ID: [id number]       POC Glucose Once [[id number]]  (Normal) Collected: 06/13/22 2034    Specimen: Blood Updated: 06/13/22 2045     Glucose 118 mg/dL      Comment: : [id number] Salvador LaceyMeter ID: [id number]       POC Glucose Once [[id number]]  (Abnormal) Collected: 06/13/22 1703    Specimen: Blood Updated: 06/13/22 1714     Glucose 230 mg/dL      Comment: : ZACH Bernardo RyanMeter ID: [id number]       Vancomycin, Trough [[id number]]  (Normal) Collected: 06/13/22 1517    Specimen: Blood Updated: 06/13/22 1544     Vancomycin Trough 16.20 mcg/mL     POC Glucose Once [[id number]]  (Abnormal) Collected: 06/13/22 1103    Specimen: Blood Updated: 06/13/22 1114     Glucose 147 mg/dL      Comment: : HEATHERNERBlaise Bernardo RyanMeter ID: [id number]       Respiratory Culture - Aspirate, Bronchus [[id number]] Collected: 06/10/22 1643    Specimen: Aspirate from Bronchus Updated: 06/13/22 1049     Respiratory Culture Rare Normal respiratory annia. No S. aureus or Pseudomonas aeruginosa detected. Final report.     Gram Stain Many (4+) WBCs per low power field      Less than 25 Epithelial cells seen      No organisms seen    Tissue Pathology Exam [[id number]] Collected: 06/10/22 1327    Specimen: Tissue from Brain, Cerebral Cortex; Tissue from Brain Updated: 06/13/22 1004     Note to Patients --     This report may contain a detailed description of human tissue sent by a health care provider to the laboratory for pathologic evaluation. The content of this report is essential for diagnosis and may provide important critical findings. This information may be unfamiliar to patients to review without a medical professional present. It is advised  that the patient review this report in the presence of a health care provider who can answer questions and explain the results.       Case Report --     Surgical Pathology Report                         Case: [id number]                                  Authorizing Provider:  Martell Downs MD Collected:           06/10/2022 01:27 PM          Ordering Location:     [facility] OR  Received:            06/10/2022 01:38 PM          Pathologist:           Vamshi Benson MD                                                        Intraop:               Dioni Morgan MD                                                      Specimens:   1) - Brain, Cerebral Cortex, cerebrum frozen and permenant and culture                              2) - Brain, CEREBROLITIS                                                                    Final Diagnosis --     1-2.  Brain, cerebrum, craniotomy:  Severe mixed inflammation, necrosis, and reactive gliosis.  No evidence of malignancy.  GMS stain is negative for fungal organisms.       Intraoperative Consultation --         FROZEN SECTION DIAGNOSIS:   Cerebrum, FS1A:  No malignancy identified.  No evidence inflammation or active infection.    Reported to Dr. Martell Downs on 6/10/2022 at 13:57 CDT by Dioni Morgan MD.       Gross Description --     1. Brain, Cerebral Cortex.  Received fresh for frozen section and cultures labeled with the patient name, date of birth, and designated cerebrum.  The specimen consists of a Telfa pad with 2 fragments of yellow-pink soft tissue aggregating to 0.9 x 0.7 x 0.2 cm.  1 fragment as large amounts of tan-brown possible blood clot.  The fragment with blood clot is sterilely removed from the container and a representative section is submitted for frozen section.  The frozen section remnant is wrapped in lens paper and submitted in block 1A.  The remaining tissue is wrapped in lens paper and submitted in block 1B.      The remaining tissue is sent to microbiology.      2. Brain.  Received fresh from microbiology labeled with the patient name, date of birth, designated cerebrum.  The specimen consists of a Telfa pad with 3 red-tan soft tissue fragments aggregating to 0.9 x 0.8 x 0.2 cm.  The fragments are wrapped in lens paper and submitted in block 2A.           Microscopic Description --     Microscopic examination performed.      Wound Culture - Wound, Head [[id number]] Collected: 06/10/22 1226    Specimen: Wound from Head Updated: 06/13/22 0946     Wound Culture No growth at 3 days     Gram Stain Rare (1+) WBCs seen      No organisms seen    Wound Culture - Wound, Head [[id number]] Collected: 06/10/22 1304    Specimen: Wound from Head Updated: 06/13/22 0943     Wound Culture No growth at 3 days     Gram Stain No WBCs or organisms seen    Wound Culture - Wound, Head [[id number]] Collected: 06/10/22 1343    Specimen: Wound from Head Updated: 06/13/22 0943     Wound Culture No growth at 3 days     Gram Stain Rare (1+) WBCs seen      No organisms seen    Tissue / Bone Culture - Tissue, Brain [[id number]] Collected: 06/10/22 1524    Specimen: Tissue from Brain Updated: 06/13/22 0943     Tissue Culture No growth at 3 days     Gram Stain No WBCs per low power field      No organisms seen    Tissue / Bone Culture - Tissue, Brain, Cerebral Cortex [[id number]] Collected: 06/10/22 1327    Specimen: Tissue from Brain, Cerebral Cortex Updated: 06/13/22 0942     Tissue Culture No growth at 3 days     Gram Stain Moderate (3+) WBCs seen      No organisms seen    Culture, CSF - Cerebrospinal Fluid,  Shunt Aspirate [[id number]] Collected: 06/10/22 0729    Specimen: Cerebrospinal Fluid from  Shunt Aspirate Updated: 06/13/22 0936     CSF Culture No growth at 3 days     Gram Stain Moderate (3+) WBCs seen      No organisms seen    Tissue / Bone Culture - Tissue, Brain [[id number]] Collected: 06/10/22 1326    Specimen: Tissue from Brain Updated:  06/13/22 0935     Tissue Culture No growth at 3 days     Gram Stain Rare (1+) WBCs per low power field      No organisms seen    Vancomycin, Trough [[id number]]  (Abnormal) Collected: 06/13/22 0607    Specimen: Blood Updated: 06/13/22 0643     Vancomycin Trough 24.10 mcg/mL     Phenytoin Level, Total [[id number]]  (Normal) Collected: 06/13/22 0607    Specimen: Blood Updated: 06/13/22 0642     Phenytoin Level 15.7 mcg/mL     Comprehensive Metabolic Panel [[id number]]  (Abnormal) Collected: 06/13/22 0607    Specimen: Blood Updated: 06/13/22 0639     Glucose 165 mg/dL      BUN 37 mg/dL      Creatinine 0.57 mg/dL      Sodium 138 mmol/L      Potassium 4.1 mmol/L      Chloride 105 mmol/L      CO2 29.0 mmol/L      Calcium 8.3 mg/dL      Total Protein 6.3 g/dL      Albumin 2.40 g/dL      ALT (SGPT) 106 U/L      AST (SGOT) 145 U/L      Alkaline Phosphatase 366 U/L      Total Bilirubin 0.4 mg/dL      Globulin 3.9 gm/dL      A/G Ratio 0.6 g/dL      BUN/Creatinine Ratio 64.9     Anion Gap 4.0 mmol/L      eGFR 101.0 mL/min/1.73      Comment: National Kidney Foundation and American Society of Nephrology (ASN) Task Force recommended calculation based on the Chronic Kidney Disease Epidemiology Collaboration (CKD-EPI) equation refit without adjustment for race.       Narrative:      GFR Normal >60  Chronic Kidney Disease <60  Kidney Failure <15      CBC & Differential [[id number]]  (Abnormal) Collected: 06/13/22 0607    Specimen: Blood Updated: 06/13/22 0637    Narrative:      The following orders were created for panel order CBC & Differential.  Procedure                               Abnormality         Status                     ---------                               -----------         ------                     CBC Auto Differential[[id number]]        Abnormal            Final result                 Please view results for these tests on the individual orders.    CBC Auto Differential [[id number]]  (Abnormal) Collected: 06/13/22  0607    Specimen: Blood Updated: 06/13/22 0637     WBC 10.97 10*3/mm3      RBC 2.17 10*6/mm3      Hemoglobin 6.8 g/dL      Hematocrit 22.5 %      .7 fL      MCH 31.3 pg      MCHC 30.2 g/dL      RDW 17.0 %      RDW-SD 61.1 fl      MPV 10.0 fL      Platelets 206 10*3/mm3      Neutrophil % 78.0 %      Lymphocyte % 8.9 %      Monocyte % 9.5 %      Eosinophil % 1.3 %      Basophil % 0.4 %      Immature Grans % 1.9 %      Neutrophils, Absolute 8.56 10*3/mm3      Lymphocytes, Absolute 0.98 10*3/mm3      Monocytes, Absolute 1.04 10*3/mm3      Eosinophils, Absolute 0.14 10*3/mm3      Basophils, Absolute 0.04 10*3/mm3      Immature Grans, Absolute 0.21 10*3/mm3      nRBC 0.0 /100 WBC     POC Glucose Once [[id number]]  (Abnormal) Collected: 06/13/22 0610    Specimen: Blood Updated: 06/13/22 0632     Glucose 177 mg/dL      Comment: : [id number] Berto HillsyMeter ID: [id number]       Anaerobic Culture - Tissue, Brain [[id number]]  (Normal) Collected: 06/10/22 1524    Specimen: Tissue from Brain Updated: 06/13/22 0612     Anaerobic Culture No anaerobes isolated at 3 days    Anaerobic Culture - Tissue, Brain, Cerebral Cortex [[id number]]  (Normal) Collected: 06/10/22 1327    Specimen: Tissue from Brain, Cerebral Cortex Updated: 06/13/22 0612     Anaerobic Culture No anaerobes isolated at 3 days    Blood Gas, Arterial - [[id number]]  (Abnormal) Collected: 06/13/22 0423    Specimen: Arterial Blood Updated: 06/13/22 0417     Site Arterial Line     Denzel's Test N/A     pH, Arterial 7.471 pH units      Comment: 83 Value above reference range        pCO2, Arterial 39.0 mm Hg      pO2, Arterial 88.1 mm Hg      HCO3, Arterial 28.4 mmol/L      Comment: 83 Value above reference range        Base Excess, Arterial 4.4 mmol/L      Comment: 83 Value above reference range        O2 Saturation, Arterial 98.4 %      Temperature 37.0 C      Barometric Pressure for Blood Gas 747 mmHg      Modality Ventilator     FIO2 35 %      Ventilator  Mode PC     Set [facility] Resp Rate 16.0     PEEP 5.0     PIP 12 cmH2O      Comment: Meter: [id number]     :  [id number]        Collected by [id number]     pCO2, Temperature Corrected 39.0 mm Hg      pH, Temp Corrected 7.471 pH Units      pO2, Temperature Corrected 88.1 mm Hg     POC Glucose Once [[id number]]  (Normal) Collected: 06/12/22 2357    Specimen: Blood Updated: 06/13/22 0018     Glucose 123 mg/dL      Comment: : [id number] Thomson BrittanyMeter ID: [id number]       POC Glucose Once [[id number]]  (Normal) Collected: 06/12/22 2024    Specimen: Blood Updated: 06/12/22 2045     Glucose 117 mg/dL      Comment: : [id number] Thomson BrittanyMeter ID: [id number]       POC Glucose Once [[id number]]  (Abnormal) Collected: 06/12/22 1742    Specimen: Blood Updated: 06/12/22 1753     Glucose 137 mg/dL      Comment: : [id number] Ripchase ShahoryMeter ID: [id number]       POC Glucose Once [[id number]]  (Abnormal) Collected: 06/12/22 1208    Specimen: Blood Updated: 06/12/22 1223     Glucose 192 mg/dL      Comment: : [id number] Ripchase ShahoryMeter ID: [id number]       AFB Culture - Tissue, Brain, Cerebral Cortex [[id number]] Collected: 06/10/22 1327    Specimen: Tissue from Brain, Cerebral Cortex Updated: 06/12/22 1148     AFB Stain No acid fast bacilli seen on direct smear      No acid fast bacilli seen on concentrated smear    AFB Culture - Tissue, Brain [[id number]] Collected: 06/10/22 1326    Specimen: Tissue from Brain Updated: 06/12/22 1147     AFB Stain No acid fast bacilli seen on direct smear      No acid fast bacilli seen on concentrated smear    AFB Culture - Tissue, Brain [[id number]] Collected: 06/10/22 1524    Specimen: Tissue from Brain Updated: 06/12/22 1147     AFB Stain No acid fast bacilli seen on direct smear      No acid fast bacilli seen on concentrated smear        Imaging Results (Last 72 Hours)     Procedure Component Value Units Date/Time    XR Chest 1 View [[id number]] Collected: 06/14/22  0702     Updated: 06/14/22 0707    Narrative:      EXAMINATION:  XR CHEST 1 VW-  6/14/2022 3:20 AM CDT     HISTORY: On ventilator.     COMPARISON: 6/13/2022.     TECHNIQUE: Single view AP image.     FINDINGS:  A tracheostomy tube remains in place. There is probable  ventriculoperitoneal shunt tubing traversing the right neck and right  chest. There is hypoventilation with vascular crowding. There is patchy  infiltrate in the left perihilar region and left lung base. There is  mild atelectasis at the right lung base. The left heart border is  partially obscured. There is cardiomegaly. There are degenerative  changes of the spine and shoulders.       Impression:      1. Hypoventilation with vascular crowding.  2. Patchy infiltrate in the mid and lower lung zone on the left. The  left heart border is obscured. This could represent pneumonia.  Atelectasis is also considered. There is mild atelectasis at the right  lung base.        This report was finalized on 06/14/2022 07:04 by Dr. Zachariah Fonseca MD.    CT Abdomen Pelvis With & Without Contrast [[id number]] Collected: 06/13/22 1830     Updated: 06/13/22 1841    Narrative:      EXAMINATION:  CT ABDOMEN PELVIS W WO CONTRAST-  6/13/2022 6:11 PM CDT     HISTORY: Abdominal distention. R13.10-Dysphagia, unspecified;  Z01.818-Encounter for other preprocedural examination.     TECHNIQUE: Spiral CT was performed of the abdomen and pelvis without and  with IV contrast. Multiplanar images were reconstructed.     DLP: 4672 mGy-cm. Automated dosage reduction technique was utilized to  reduce patient dosage.     COMPARISON: 5/23/2020.      LUNG BASES: The visualized left lower lobe is completely collapsed.  There is pleural effusion on the left. There appears to be some air  droplets in the pleural space on the left. There is right lower lobe  vascular crowding and atelectasis.     LIVER AND SPLEEN: There is increased density within the gallbladder.  This could be related to sludge.  There are no dense gallstones. There is  breathing motion artifact. The liver and spleen appear to be  unremarkable.     PANCREAS: There is breathing motion artifact. No definite acute  pancreatic abnormality is seen.     KIDNEYS, ADRENALS AND URINARY BLADDER: There is breathing motion  artifact. The adrenal glands are normal in size. There are  nonobstructive renal stones bilaterally. There is a probable small cyst  in the upper pole left kidney measuring less than 1 cm. There is an  ill-defined area of low density in the left kidney laterally in the mid  to lower pole. The ureters are nondilated. There is thickening of the  bladder wall. There is air in the bladder. There is a Mccrary catheter in  the bladder.     BOWEL: No oral contrast was given. There is a gastrostomy tube. There is  air and fluid in the stomach. Small bowel loops are nondilated. There is  underdistention of portions of the colon.     OTHER: There are bilateral fat-containing inguinal hernias. There is a  small fat-containing umbilical hernia. There is diffuse body wall edema.  There is presacral edema. There is mild atheromatous disease of the  aortoiliac vessels. There are degenerative changes of the spine. There  are degenerative changes of both hips.       Impression:      1. Likely complete left lower lobe collapse/atelectasis. There is fluid  and air within the pleural space at the left lung base. There may be a  split pleural sign in the left lung base. The findings are worrisome for  empyema. There is mild atelectasis in the right lung base.  2. The gallbladder is dense. This could be due to sludge in the  gallbladder. There are no focal dense gallstones.  3. Area of ill-defined low density in the mid to lower pole left kidney  laterally is nonspecific. This could represent an area of  pyelonephritis. A mass is felt to be less likely. This is not present on  5/23/2022. There is a probable small cyst in the upper pole left kidney  that is  difficult to evaluate due to breathing motion artifact.  4. Thickening of the bladder wall may be related to muscular  hypertrophy. Inflammation and infection are also included in the  differential. There is a Mccrary catheter in the bladder. There is air in  the bladder.  5. Bilateral fat-containing inguinal hernias. Very small fat-containing  umbilical hernia.  6. Mild to moderate diffuse body wall edema. Presacral edema. Other  nonacute findings, as discussed above.  This report was finalized on 06/13/2022 18:38 by Dr. Zachariah Fonseca MD.    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     REPORT: A supine view of the abdomen was obtained.     COMPARISON: KUB 5/15/2022.     The NG tube and feeding tube seen before are no longer identified. There  is respiratory motion artifact. Gas is seen within the GI tract  diffusely without significant distention. The pattern is relatively  nonspecific but could be related to developing adynamic ileus. There are  advanced degenerative changes throughout the lumbar spine.       Impression:      Nonspecific bowel gas pattern with questionable developing  ileus.  This report was finalized on 06/13/2022 10:13 by Dr. Antony Thomas MD.    XR Chest 1 View [[id number]] Collected: 06/13/22 0739     Updated: 06/13/22 0745    Narrative:      XR CHEST 1 VW- 6/13/2022 3:20 AM CDT     HISTORY: On vent; R13.10-Dysphagia, unspecified; Z01.818-Encounter for  other preprocedural  examination; D32.9-Benign neoplasm of meninges,  unspecified; Z74.09-Other reduced mobility; Z78.9-Other specified health  status; G40.901-Epilepsy, unspecified, not intractable, with status  epilepticus; J96.01-Acute respiratory failure with hypoxia;  G91.0-Communicating hydrocephalus; T81.40XA-Infection following a pr     COMPARISON: Chest exam dated 6/12/2022.     FINDINGS:      Atelectasis in the left base. Additional new discoid atelectasis in the  right base. No new consolidation. No pleural effusion or pneumothorax.  Heart size is stable. Pulmonary vasculature are nondilated. Underlying  scattered calcified lung granulomas. Tracheostomy is in place. Partially  imaged ventriculoperitoneal shunt catheter. The osseous structures and  surrounding soft tissues demonstrate no acute abnormality.       Impression:      1. Slightly increased basilar atelectasis. Otherwise stable.        This report was finalized on 06/13/2022 07:42 by Dr Travis Lorenzo, .    XR Chest 1 View [[id number]] Collected: 06/12/22 1033     Updated: 06/12/22 1037    Narrative:      EXAMINATION: XR CHEST 1 VW-     6/12/2022 3:25 AM CDT     HISTORY: On vent; R13.10-Dysphagia, unspecified; Z01.818-Encounter for  other preprocedural examination; D32.9-Benign neoplasm of meninges,  unspecified; Z74.09-Other reduced mobility; Z78.9-Other specified health  status; G40.901-Epilepsy, unspecified, not intractable, with status  epilepticus; J96.01-Acute respiratory failure with hypoxia;  G91.0-Communicating hydrocephalus; T81.40XA-Infection following a pr     1 view chest x-ray.     Comparison is made with yesterday at 3:31 AM.     Stable heart size.  Tracheostomy tube remains in place.  Right  shunt tubing noted.     Low lung volumes with persistent consolidation of the left lower lobe.     No pneumothorax and no sign of heart failure.     Multiple small calcified granulomas throughout both lungs.     Summary:  1. No significant change from  yesterday.  This report was finalized on 06/12/2022 10:34 by Dr. Tomer Whelan MD.             ASSESSMENT/PLAN       Examination and evaluation of wound(s) was performed.    DIAGNOSIS:   Pressure injury of trach site, unstageable  Pressure injury of sacral area, stage 3  Tracheostomy  Sedated due to medication  Obesity - Body mass index is 34.98 kg/m².     PLAN:     Start     Ordered    06/14/22 1200  Wound Care  Every 4 Hours      Comments: Check dressing every 2-4 hours.  Med change at least every 12 hours and as needed if saturated.   Question Answer Comment   Wound Locations Trach site    Wound Care Instructions Clean the area with normal saline.  Apply barrier wipe/skin protectant wipe to periwound skin including all skin seconds and contact light.  Place Opticell AG over wounds and cover with nonadhesive OPTifoam dressing.        06/14/22 1001                      This document has been electronically signed by MARIO Montemayor on 6/14/2022 09:55 CDT

## 2022-06-14 NOTE — PROGRESS NOTES
PULMONARY AND CRITICAL CARE PROGRESS NOTE - [facility]    Patient: Hai Hernandez    1945    MR# [id number]    Acct# [id number]  06/14/22   08:43 CDT  Referring Provider: Martell Downs, *    Chief Complaint: Mechanically ventilated    Interval history: The patient remains intubated with trach to mechanical vent.  He is minimally responsive.  O2 sat 95%, ET 31%, on 5 PEEP, 0.35 FiO2.    ABG stable-transition to PRN.  Hbg 7.7 today.  He received 1 unit of PRBC yesterday.  Abdomen is still distended but mildly less firm than yesterday.  Nursing reports that he responded well to Lasix.  We will give Lasix 40 mg IV x1 again today.  Obtain respiratory culture as chest x-ray shows left-sided infiltrate.  Nursing reports thick brown secretions from trach.  Nursing reports CT surgery has been reconsulted as well.  KUB shows possible ileus.  Nursing reports no residuals.  No other aggravating or alleviating factors.     Meds:  carvedilol, 6.25 mg, Nasogastric, BID With Meals  cefepime, 2 g, Intravenous, Q8H  chlorhexidine, 15 mL, Mouth/Throat, Q12H  dexamethasone, 4 mg, Per PEG Tube, Q12H   Followed by  [START ON 6/16/2022] dexamethasone, 2 mg, Per PEG Tube, Q12H   Followed by  [START ON 6/18/2022] dexamethasone, 1 mg, Per PEG Tube, Q12H   Followed by  [START ON 6/20/2022] dexamethasone, 0.5 mg, Per PEG Tube, Daily  docusate, 50 mg, Oral, Daily  fosphenytoin, 275 mg PE, Intravenous, Q8H  guaiFENesin, 200 mg, Per PEG Tube, TID  heparin (porcine), 5,000 Units, Subcutaneous, Q12H  insulin detemir, 20 Units, Subcutaneous, Nightly  insulin regular, 2-7 Units, Subcutaneous, Q6H  lacosamide, 200 mg, Intravenous, Q12H  lansoprazole, 30 mg, Per G Tube, QAM  latanoprost, 1 drop, Both Eyes, Nightly  levETIRAcetam, 1,000 mg, Intravenous, Q12H  levothyroxine, 125 mcg, Nasogastric, Q AM  lidocaine, 10 mL, Intradermal, Once  liothyronine, 25 mcg, Nasogastric, Daily  lisinopril, 20 mg, Nasogastric,  Q24H  mupirocin, 1 application, Topical, Q12H  Nutrisource fiber, 1 packet, Nasogastric, 4x Daily  polyethylene glycol, 17 g, Oral, Daily  ProSource TF, 45 mL, Per G Tube, BID  sodium chloride, 4 mL, Nebulization, BID - RT  vancomycin, 15 mg/kg, Intravenous, Q12H      norepinephrine, 0.02-0.3 mcg/kg/min, Last Rate: Stopped (06/11/22 1054)      Review of Systems:     Cannot obtain due to mechanical ventilated state     Ventilator Settings:        Resp Rate (Set): 16  Pressure Support (cm H2O): 8 cm H20  FiO2 (%): 35 %  PEEP/CPAP (cm H2O): 5 cm H20  Minute Ventilation (L/min) (Obs): 11.1 L/min  Resp Rate (Observed) Vent: 24  I:E Ratio (Set): 1:0.27  I:E Ratio (Obs): 1:3.10  PIP Observed (cm H2O): 16 cm H2O  RSBI: 21.96  Physical Exam:  Temp:  [97.9 °F (36.6 °C)-99.4 °F (37.4 °C)] 99.4 °F (37.4 °C)  Heart Rate:  [53-75] 58  Resp:  [21-36] 24  BP: (113-199)/() 137/68  FiO2 (%):  [35 %] 35 %    Intake/Output Summary (Last 24 hours) at 6/14/2022 0843  Last data filed at 6/14/2022 0816  Gross per 24 hour   Intake 4909.84 ml   Output 4850 ml   Net 59.84 ml     SpO2 Percentage    06/14/22 0715 06/14/22 0730 06/14/22 0745   SpO2: 96% 95% 95%   Body mass index is 32.69 kg/m².   Physical Exam   Constitutional:       General: He is intubated     Appearance: He is ill-appearing. He is not toxic-appearing.      Interventions: He is intubated, no sedation  HENT:      Head: Normocephalic.      Comments: Craniotomy wound, trach to vent     Nose: Nose normal.   Eyes:      General: No scleral icterus.  Cardiovascular:  Irregular rhythm, normal rate  Pulmonary:      Effort: No accessory muscle usage or respiratory distress. He is intubated.      Breath sounds: few rhonchi, diminished in bases  Abdominal:      General: There is no distension. PEG/TF     Palpations: Abdomen is taught  Genitourinary:     Comments: jansen  Musculoskeletal:      Right lower leg: Edema present.      Left lower leg: Edema present.      Comments:  SCDs   Skin:     Findings: No rash.   Neurological:      Motor: opens eyes to noxious stimuli/grimaces to pain   Psychiatric:         Behavior: Behavior is not agitated.     Electronically signed by MARIO Aldrich, 6/14/2022, 08:43 CDT      Physician Substantive Portion: Medical Decision Making    Laboratory Data:  Results from last 7 days   Lab Units 06/14/22  0608 06/13/22  0607 06/12/22  0515   WBC 10*3/mm3 9.59 10.97* 13.40*   HEMOGLOBIN g/dL 7.7* 6.8* 7.0*   PLATELETS 10*3/mm3 198 206 193     Results from last 7 days   Lab Units 06/14/22  0405 06/13/22  0607 06/12/22  0515   SODIUM mmol/L 139 138 140   POTASSIUM mmol/L 4.5 4.1 4.1   BUN mg/dL 36* 37* 34*   CREATININE mg/dL 0.54* 0.57* 0.61*     Results from last 7 days   Lab Units 06/14/22  0417 06/13/22  0423 06/12/22  0412   PH, ARTERIAL pH units 7.461* 7.471* 7.488*   PCO2, ARTERIAL mm Hg 40.9 39.0 38.4   PO2 ART mm Hg 82.4* 88.1 88.2   FIO2 % 35 35 35     Wound Culture   Date Value Ref Range Status   06/10/2022 No growth at 2 days  Preliminary   06/10/2022 No growth at 2 days  Preliminary   06/10/2022 No growth at 2 days  Preliminary     Respiratory Culture   Date Value Ref Range Status   06/10/2022   Preliminary    Rare The culture consists of normal respiratory annia. This is a preliminary report; final report to follow.     Recent films:  CT Abdomen Pelvis With & Without Contrast    Result Date: 6/13/2022  EXAMINATION:  CT ABDOMEN PELVIS W WO CONTRAST-  6/13/2022 6:11 PM CDT  HISTORY: Abdominal distention. R13.10-Dysphagia, unspecified; Z01.818-Encounter for other preprocedural examination.  TECHNIQUE: Spiral CT was performed of the abdomen and pelvis without and with IV contrast. Multiplanar images were reconstructed.  DLP: 4672 mGy-cm. Automated dosage reduction technique was utilized to reduce patient dosage.  COMPARISON: 5/23/2020.  LUNG BASES: The visualized left lower lobe is completely collapsed. There is pleural effusion on the left.  There appears to be some air droplets in the pleural space on the left. There is right lower lobe vascular crowding and atelectasis.  LIVER AND SPLEEN: There is increased density within the gallbladder. This could be related to sludge. There are no dense gallstones. There is breathing motion artifact. The liver and spleen appear to be unremarkable.  PANCREAS: There is breathing motion artifact. No definite acute pancreatic abnormality is seen.  KIDNEYS, ADRENALS AND URINARY BLADDER: There is breathing motion artifact. The adrenal glands are normal in size. There are nonobstructive renal stones bilaterally. There is a probable small cyst in the upper pole left kidney measuring less than 1 cm. There is an ill-defined area of low density in the left kidney laterally in the mid to lower pole. The ureters are nondilated. There is thickening of the bladder wall. There is air in the bladder. There is a Mccrary catheter in the bladder.  BOWEL: No oral contrast was given. There is a gastrostomy tube. There is air and fluid in the stomach. Small bowel loops are nondilated. There is underdistention of portions of the colon.  OTHER: There are bilateral fat-containing inguinal hernias. There is a small fat-containing umbilical hernia. There is diffuse body wall edema. There is presacral edema. There is mild atheromatous disease of the aortoiliac vessels. There are degenerative changes of the spine. There are degenerative changes of both hips.      Impression: 1. Likely complete left lower lobe collapse/atelectasis. There is fluid and air within the pleural space at the left lung base. There may be a split pleural sign in the left lung base. The findings are worrisome for empyema. There is mild atelectasis in the right lung base. 2. The gallbladder is dense. This could be due to sludge in the gallbladder. There are no focal dense gallstones. 3. Area of ill-defined low density in the mid to lower pole left kidney laterally is  nonspecific. This could represent an area of pyelonephritis. A mass is felt to be less likely. This is not present on 5/23/2022. There is a probable small cyst in the upper pole left kidney that is difficult to evaluate due to breathing motion artifact. 4. Thickening of the bladder wall may be related to muscular hypertrophy. Inflammation and infection are also included in the differential. There is a Mccrary catheter in the bladder. There is air in the bladder. 5. Bilateral fat-containing inguinal hernias. Very small fat-containing umbilical hernia. 6. Mild to moderate diffuse body wall edema. Presacral edema. Other nonacute findings, as discussed above. This report was finalized on 06/13/2022 18:38 by Dr. Zachariah Fonseca MD.    XR Chest 1 View    Result Date: 6/14/2022  EXAMINATION:  XR CHEST 1 VW-  6/14/2022 3:20 AM CDT  HISTORY: On ventilator.  COMPARISON: 6/13/2022.  TECHNIQUE: Single view AP image.  FINDINGS:  A tracheostomy tube remains in place. There is probable ventriculoperitoneal shunt tubing traversing the right neck and right chest. There is hypoventilation with vascular crowding. There is patchy infiltrate in the left perihilar region and left lung base. There is mild atelectasis at the right lung base. The left heart border is partially obscured. There is cardiomegaly. There are degenerative changes of the spine and shoulders.      Impression: 1. Hypoventilation with vascular crowding. 2. Patchy infiltrate in the mid and lower lung zone on the left. The left heart border is obscured. This could represent pneumonia. Atelectasis is also considered. There is mild atelectasis at the right lung base.   This report was finalized on 06/14/2022 07:04 by Dr. Zachariah Fonseca MD.    XR Chest 1 View    Result Date: 6/13/2022  XR CHEST 1 VW- 6/13/2022 3:20 AM CDT  HISTORY: On vent; R13.10-Dysphagia, unspecified; Z01.818-Encounter for other preprocedural examination; D32.9-Benign neoplasm of meninges, unspecified;  Z74.09-Other reduced mobility; Z78.9-Other specified health status; G40.901-Epilepsy, unspecified, not intractable, with status epilepticus; J96.01-Acute respiratory failure with hypoxia; G91.0-Communicating hydrocephalus; T81.40XA-Infection following a pr  COMPARISON: Chest exam dated 6/12/2022.  FINDINGS:  Atelectasis in the left base. Additional new discoid atelectasis in the right base. No new consolidation. No pleural effusion or pneumothorax. Heart size is stable. Pulmonary vasculature are nondilated. Underlying scattered calcified lung granulomas. Tracheostomy is in place. Partially imaged ventriculoperitoneal shunt catheter. The osseous structures and surrounding soft tissues demonstrate no acute abnormality.      Impression: 1. Slightly increased basilar atelectasis. Otherwise stable.   This report was finalized on 06/13/2022 07:42 by Dr Travis Lorenzo, .    XR Abdomen KUB    Result Date: 6/13/2022  EXAMINATION: XR ABDOMEN KUB- 6/13/2022 10:11 AM CDT  HISTORY: Tight Abdomen.; R13.10-Dysphagia, unspecified; Z01.818-Encounter for other preprocedural examination; D32.9-Benign neoplasm of meninges, unspecified; Z74.09-Other reduced mobility; Z78.9-Other specified health status; G40.901-Epilepsy, unspecified, not intractable, with status epilepticus; J96.01-Acute respiratory failure with hypoxia; G91.0-Communicating hydrocephalus; T81.40XA-Infection follow.  REPORT: A supine view of the abdomen was obtained.  COMPARISON: KUB 5/15/2022.  The NG tube and feeding tube seen before are no longer identified. There is respiratory motion artifact. Gas is seen within the GI tract diffusely without significant distention. The pattern is relatively nonspecific but could be related to developing adynamic ileus. There are advanced degenerative changes throughout the lumbar spine.      Impression: Nonspecific bowel gas pattern with questionable developing ileus. This report was finalized on 06/13/2022 10:13 by Dr. Hardy  MD Martha.      My radiograph interpretation/independent review of imaging: LLL atelectasis/consolidation    Pulmonary Assessment:    1. Acute resp failure requiring vent  2. Pleural effusion/LLL pneumonia  3. S/p hydrocephalus  4. Possible ileus radiographically but tolerating nutrition via g tube  5. Apparent resp distress overnight and mild cheyne moralez breathing pattern    Recommend/plan:   · Diurese  · Continue cefepime for resp infection  · Increase Pi to 12  · D/c routine abg    This visit was performed by both a physician and an [facility] RN.  I personally evaluated and examined the patient.  I performed all aspects of the medical decision making as documented.  Electronically signed by Carlos A Dasilva MD, 6/14/2022, 09:06 CDT

## 2022-06-14 NOTE — PROGRESS NOTES
Neurology Progress Note      Chief Complaint:    Postoperative seizure    Subjective     Subjective:  No new seizure events noted.  Repeat EEG study demonstrates generalized slowing but no epileptiform discharges.  Remains off sedation, but does not follow commands.  Does not open eyes with stimulation        Past Medical History:   Diagnosis Date   • Brain tumor (HCC)    • Depression    • Hearing loss    • History of cellulitis     right foot big toe   • Hypertension    • Pneumonia    • Right arm weakness     after cervial injury   • Sciatic pain     affects balance   • Thyroid disease    • Visual disturbance     due to brain tumor - right eye     Past Surgical History:   Procedure Laterality Date   • CATARACT EXTRACTION, BILATERAL     • COLONOSCOPY     • CRANIOTOMY Bilateral 6/10/2022    Procedure: CRANIECTOMY;  Surgeon: Martell Downs MD;  Location: [facility] OR;  Service: Neurosurgery;  Laterality: Bilateral;   • CRANIOTOMY FOR TUMOR Right 5/10/2022    Procedure: CRANIOTOMY FOR TUMOR STERIOTACTIC WITH BRAIN LAB, right;  Surgeon: Martell Downs MD;  Location: [facility] OR;  Service: Neurosurgery;  Laterality: Right;   • ENDOSCOPY W/ PEG TUBE PLACEMENT N/A 6/2/2022    Procedure: ESOPHAGOGASTRODUODENOSCOPY WITH PERCUTANEOUS ENDOSCOPIC GASTROSTOMY TUBE INSERTION WITH ANESTHESIA;  Surgeon: Melecio Lu MD;  Location: [facility] OR;  Service: Gastroenterology;  Laterality: N/A;   • NECK SURGERY     • SKIN CANCER EXCISION     • TONSILLECTOMY     • TRACHEOSTOMY N/A 6/2/2022    Procedure: TRACHEOSTOMY;  Surgeon: Geovany Davidson MD;  Location: [facility] OR;  Service: ENT;  Laterality: N/A;   •  SHUNT INSERTION Right 6/3/2022    Procedure: VENTRICULAR PERITONEAL SHUNT INSERTION WITH BRAIN LAB;  Surgeon: Martell Downs MD;  Location: [facility] OR;  Service: Neurosurgery;  Laterality: Right;     Family History   Problem Relation Age of Onset   • Cancer Sister    • Cancer Brother      Social History      Tobacco Use   • Smoking status: Never Smoker   • Smokeless tobacco: Never Used   Vaping Use   • Vaping Use: Never used   Substance Use Topics   • Alcohol use: Never   • Drug use: Never       Medications:  Current Facility-Administered Medications   Medication Dose Route Frequency Provider Last Rate Last Admin   • acetaminophen (TYLENOL) tablet 650 mg  650 mg Oral Q4H PRN Martell Downs MD   650 mg at 06/13/22 2104    Or   • acetaminophen (TYLENOL) suppository 650 mg  650 mg Rectal Q4H PRN Martell Downs MD   650 mg at 05/23/22 0016   • alteplase (CATHFLO/ACTIVASE) injection 2 mg  2 mg Intracatheter PRN Martell Downs MD   New Syringe/Cartridge at 05/28/22 1330   • bisacodyl (DULCOLAX) suppository 10 mg  10 mg Rectal Daily PRN Martell Downs MD   10 mg at 06/03/22 0749   • carvedilol (COREG) tablet 6.25 mg  6.25 mg Nasogastric BID With Meals Martell Downs MD   6.25 mg at 06/14/22 0816   • cefepime (MAXIPIME) 2 g/100 mL 0.9% NS (mbp)  2 g Intravenous Q8H Martell Downs MD   2 g at 06/14/22 0622   • chlorhexidine (PERIDEX) 0.12 % solution 15 mL  15 mL Mouth/Throat Q12H Martell Downs MD   15 mL at 06/14/22 0815   • dexamethasone (DECADRON) tablet 4 mg  4 mg Per PEG Tube Q12H Stevie Parsons APRN   4 mg at 06/14/22 0821    Followed by   • [START ON 6/16/2022] dexamethasone (DECADRON) tablet 2 mg  2 mg Per PEG Tube Q12H Stevie Parsons APRN        Followed by   • [START ON 6/18/2022] dexamethasone (DECADRON) tablet 1 mg  1 mg Per PEG Tube Q12H Stevie Parsons APRN        Followed by   • [START ON 6/20/2022] dexamethasone (DECADRON) tablet 0.5 mg  0.5 mg Per PEG Tube Daily Stevie Parsons APRN       • dextrose (D50W) (25 g/50 mL) IV injection 25 g  25 g Intravenous Q15 Min PRN Martell Downs MD   25 g at 06/09/22 1807   • dextrose (GLUTOSE) oral gel 15 g  15 g Oral Q15 Min PRN Martell Downs MD   15 g at 05/22/22 0541   • docusate (COLACE)  50 MG/5ML liquid 50 mg  50 mg Oral Daily Sha Beatty MD   50 mg at 06/14/22 1130   • fosphenytoin (Cerebyx) 275 mg PE in sodium chloride 0.9 % 100 mL IVPB  275 mg PE Intravenous Q8H Martell Downs MD   275 mg PE at 06/14/22 1358   • glucagon (human recombinant) (GLUCAGEN DIAGNOSTIC) injection 1 mg  1 mg Intramuscular Q15 Min PRN Martell Downs MD       • guaiFENesin (ROBITUSSIN) 100 MG/5ML oral solution 200 mg  200 mg Per PEG Tube TID Daly Perea, APRN   200 mg at 06/14/22 0815   • heparin (porcine) 5000 UNIT/ML injection 5,000 Units  5,000 Units Subcutaneous Q12H Martell Downs MD   5,000 Units at 06/13/22 2026   • hydrALAZINE (APRESOLINE) injection 10 mg  10 mg Intravenous Q6H PRN Martell Downs MD   10 mg at 06/14/22 0211   • insulin detemir (LEVEMIR) injection 20 Units  20 Units Subcutaneous Nightly Martell Downs MD   20 Units at 06/13/22 2036   • insulin regular (humuLIN R,novoLIN R) injection 2-7 Units  2-7 Units Subcutaneous Q6H Martell Downs MD   2 Units at 06/14/22 1141   • ipratropium-albuterol (DUO-NEB) nebulizer solution 3 mL  3 mL Nebulization Q4H PRN Martell Downs MD   3 mL at 06/14/22 0648   • labetalol (NORMODYNE,TRANDATE) injection 10 mg  10 mg Intravenous Q6H PRN Martell Downs MD   10 mg at 06/14/22 0337   • lacosamide (VIMPAT) injection 200 mg  200 mg Intravenous Q12H Martell Downs MD   200 mg at 06/14/22 0815   • lactulose solution 20 g  20 g Per PEG Tube Daily Sha Beatty MD   20 g at 06/14/22 1357   • lansoprazole (FIRST) oral suspension 30 mg  30 mg Per G Tube QAM Martell Downs MD   30 mg at 06/14/22 1007   • latanoprost (XALATAN) 0.005 % ophthalmic solution 1 drop  1 drop Both Eyes Nightly Martell Downs MD   1 drop at 06/13/22 2028   • levalbuterol (XOPENEX) nebulizer solution 0.63 mg  0.63 mg Nebulization TID PRN Martell Downs MD   0.63 mg at 06/12/22  1046   • levETIRAcetam in NaCl 0.75% (KEPPRA) IVPB 1,000 mg  1,000 mg Intravenous Q12H Martell Downs MD   1,000 mg at 06/14/22 0816   • levothyroxine (SYNTHROID, LEVOTHROID) tablet 125 mcg  125 mcg Nasogastric Q AM Martell Downs MD   125 mcg at 06/14/22 0501   • lidocaine (XYLOCAINE) 1 % injection 10 mL  10 mL Intradermal Once Martell Downs MD       • lidocaine (XYLOCAINE) 1 % injection 10 mL  10 mL Infiltration Once Pilo Ortega MD       • liothyronine (CYTOMEL) tablet 25 mcg  25 mcg Nasogastric Daily Martell Downs MD   25 mcg at 06/14/22 0816   • lisinopril (PRINIVIL,ZESTRIL) tablet 20 mg  20 mg Nasogastric Q24H Martell Downs MD   20 mg at 06/14/22 0815   • LORazepam (ATIVAN) injection 2 mg  2 mg Intravenous Q2H PRN Martell Downs MD   2 mg at 06/14/22 0358   • mupirocin (BACTROBAN) 2 % ointment 1 application  1 application Topical Q12H Martell Downs MD   1 application at 06/14/22 0824   • norepinephrine (LEVOPHED) 8 mg in 250 mL NS infusion (premix)  0.02-0.3 mcg/kg/min Intravenous Titrated Martell Downs MD   Held at 06/11/22 1054   • Nutrisource fiber pack 1 packet  1 packet Nasogastric 4x Daily Martell Downs MD   1 packet at 06/14/22 1132   • ondansetron (ZOFRAN) tablet 4 mg  4 mg Oral Q6H PRN Martell Downs MD        Or   • ondansetron (ZOFRAN) injection 4 mg  4 mg Intravenous Q6H PRN Martell Downs MD       • oxyCODONE (ROXICODONE) 5 MG/5ML solution 5 mg  5 mg Per PEG Tube Q6H PRN Daly Perea APRN   5 mg at 06/13/22 0354   • polyethylene glycol (MIRALAX) packet 17 g  17 g Oral Daily Martell Downs MD   17 g at 06/14/22 0816   • ProSource TF oral liquid 45 mL  45 mL Per G Tube BID Martell Downs MD   45 mL at 06/14/22 0821   • sodium chloride 3 % nebulizer solution 4 mL  4 mL Nebulization BID - RT Martell Downs MD   4 mL at 06/14/22 0648   • vancomycin 1500 mg/500 mL  0.9% NS IVPB ([facility])  15 mg/kg Intravenous Q12H Martell Downs MD   1,500 mg at 06/14/22 1400       Allergies:    Patient has no known allergies.    Review of Systems:   -A 14 point review of systems is completed and is negative.      Objective      Vital Signs  Temp:  [98.2 °F (36.8 °C)-99.4 °F (37.4 °C)] 99.3 °F (37.4 °C)  Heart Rate:  [53-74] 60  Resp:  [21-36] 24  BP: (113-199)/(52-97) 146/60  FiO2 (%):  [35 %] 35 %    Physical Exam:     HEENT:  Neck supple.  Tracheostomy in place.   CVS:  Regular rate and rhythm.  No murmurs  Carotid Examination:  No bruits  Lungs:  Clear to auscultation  Abdomen:  Non-tender, Non-distended.  PEG tube in place  Extremities: Edema of the dorsum of the bilateral hands.     Neurologic Exam:   Coughs against the vent with sternal rub and noxious stimuli  Pupils are equally reactive to light.    Gag intact.     Motor:    Did not move any extremities spontaneously  Withdrew to noxious stimuli in all extremities.     DTR:  2+ throughout in all four extremities  upgoing toe on left       Results Review:    I reviewed the patient's new clinical results and findings.    Lab Results (last 24 hours)     Procedure Component Value Units Date/Time    POC Glucose Once [[id number]]  (Abnormal) Collected: 06/14/22 1127    Specimen: Blood Updated: 06/14/22 1138     Glucose 171 mg/dL      Comment: : [email or web address] Bernardo RyanMeter ID: [id number]       Anaerobic Culture - Tissue, Brain [[id number]]  (Normal) Collected: 06/10/22 1326    Specimen: Tissue from Brain Updated: 06/14/22 0645     Anaerobic Culture No anaerobes isolated at 3 days    CBC & Differential [[id number]]  (Abnormal) Collected: 06/14/22 0608    Specimen: Blood Updated: 06/14/22 0629    Narrative:      The following orders were created for panel order CBC & Differential.  Procedure                               Abnormality         Status                     ---------                               -----------         ------                      CBC Auto Differential[[id number]]        Abnormal            Final result                 Please view results for these tests on the individual orders.    CBC Auto Differential [[id number]]  (Abnormal) Collected: 06/14/22 0608    Specimen: Blood Updated: 06/14/22 0629     WBC 9.59 10*3/mm3      RBC 2.43 10*6/mm3      Hemoglobin 7.7 g/dL      Hematocrit 24.3 %      .0 fL      MCH 31.7 pg      MCHC 31.7 g/dL      RDW 18.1 %      RDW-SD 63.2 fl      MPV 9.7 fL      Platelets 198 10*3/mm3      Neutrophil % 73.1 %      Lymphocyte % 9.9 %      Monocyte % 12.8 %      Eosinophil % 1.5 %      Basophil % 0.5 %      Immature Grans % 2.2 %      Neutrophils, Absolute 7.01 10*3/mm3      Lymphocytes, Absolute 0.95 10*3/mm3      Monocytes, Absolute 1.23 10*3/mm3      Eosinophils, Absolute 0.14 10*3/mm3      Basophils, Absolute 0.05 10*3/mm3      Immature Grans, Absolute 0.21 10*3/mm3      nRBC 0.0 /100 WBC     POC Glucose Once [[id number]]  (Abnormal) Collected: 06/14/22 0500    Specimen: Blood Updated: 06/14/22 0512     Glucose 170 mg/dL      Comment: : [id number] Salvador LaceyMeter ID: [id number]       Phenytoin Level, Total [[id number]]  (Normal) Collected: 06/14/22 0405    Specimen: Blood Updated: 06/14/22 0444     Phenytoin Level 16.2 mcg/mL     Comprehensive Metabolic Panel [[id number]]  (Abnormal) Collected: 06/14/22 0405    Specimen: Blood Updated: 06/14/22 0439     Glucose 159 mg/dL      BUN 36 mg/dL      Creatinine 0.54 mg/dL      Sodium 139 mmol/L      Potassium 4.5 mmol/L      Chloride 103 mmol/L      CO2 28.0 mmol/L      Calcium 8.4 mg/dL      Total Protein 6.9 g/dL      Albumin 2.70 g/dL      ALT (SGPT) 175 U/L      AST (SGOT) 248 U/L      Alkaline Phosphatase 472 U/L      Total Bilirubin 0.6 mg/dL      Globulin 4.2 gm/dL      A/G Ratio 0.6 g/dL      BUN/Creatinine Ratio 66.7     Anion Gap 8.0 mmol/L      eGFR 102.6 mL/min/1.73      Comment: National Kidney Foundation and American Society of  Nephrology (ASN) Task Force recommended calculation based on the Chronic Kidney Disease Epidemiology Collaboration (CKD-EPI) equation refit without adjustment for race.       Narrative:      GFR Normal >60  Chronic Kidney Disease <60  Kidney Failure <15      Blood Gas, Arterial - [[id number]]  (Abnormal) Collected: 06/14/22 0417    Specimen: Arterial Blood Updated: 06/14/22 0413     Site Left Radial     Denzel's Test Positive     pH, Arterial 7.461 pH units      Comment: 83 Value above reference range        pCO2, Arterial 40.9 mm Hg      pO2, Arterial 82.4 mm Hg      Comment: 84 Value below reference range        HCO3, Arterial 29.1 mmol/L      Comment: 83 Value above reference range        Base Excess, Arterial 4.9 mmol/L      Comment: 83 Value above reference range        O2 Saturation, Arterial 97.3 %      Temperature 37.0 C      Barometric Pressure for Blood Gas 748 mmHg      Modality Ventilator     FIO2 35 %      Ventilator Mode PC     Set Mech Resp Rate 16.0     PEEP 5.0     PIP 10 cmH2O      Comment: Meter: [id number]     :  [id number]        Collected by [id number]     pCO2, Temperature Corrected 40.9 mm Hg      pH, Temp Corrected 7.461 pH Units      pO2, Temperature Corrected 82.4 mm Hg     POC Glucose Once [[id number]]  (Normal) Collected: 06/14/22 0004    Specimen: Blood Updated: 06/14/22 0015     Glucose 119 mg/dL      Comment: : [id number] Salvador LaceyMeter ID: [id number]       POC Glucose Once [[id number]]  (Normal) Collected: 06/13/22 2034    Specimen: Blood Updated: 06/13/22 2045     Glucose 118 mg/dL      Comment: : [id number] Franco LaceyMeter ID: [id number]       POC Glucose Once [[id number]]  (Abnormal) Collected: 06/13/22 1703    Specimen: Blood Updated: 06/13/22 1714     Glucose 230 mg/dL      Comment: : [id number] Tova RyanMeter ID: [id number]       Vancomycin, Trough [[id number]]  (Normal) Collected: 06/13/22 1517    Specimen: Blood Updated: 06/13/22 1544      Vancomycin Trough 16.20 mcg/mL           Imaging Results (Last 24 Hours)     Procedure Component Value Units Date/Time    US Venous Doppler Upper Extremity Left (duplex) [[id number]] Resulted: 06/14/22 1309     Updated: 06/14/22 1343    XR Chest 1 View [[id number]] Collected: 06/14/22 1218     Updated: 06/14/22 1222    Narrative:      EXAMINATION:  XR CHEST 1 VW-  6/14/2022 11:16 AM CDT     HISTORY: Central line placement. Lung infiltrates.     COMPARISON: 6/14/2022.     TECHNIQUE: Single view AP image.     FINDINGS:  There is a new left IJ central venous catheter with catheter  tip near the brachiocephalic vein and superior vena cava junction. There  is no evidence of pneumothorax. There is hypoventilation. There is  consolidation in the left perihilar region and left lung base. The left  heart border is partially obscured. There is mild right perihilar  infiltrate. There is cardiomegaly. There is blunting of the left  costophrenic angle suggesting a small pleural effusion.       Impression:      1. New left IJ central venous catheter with no evidence of pneumothorax,  as described.  2. Hypoventilation.  3. Parenchymal infiltrate on the left worrisome for pneumonia. Probable  small pleural effusion on the left.  4. Right perihilar opacity likely due to vascular crowding and  hypoventilation. Patchy pneumonia on the right is not ruled out.        This report was finalized on 06/14/2022 12:19 by Dr. Zachariah Fonseca MD.    XR Chest 1 View [[id number]] Collected: 06/14/22 0702     Updated: 06/14/22 0707    Narrative:      EXAMINATION:  XR CHEST 1 VW-  6/14/2022 3:20 AM CDT     HISTORY: On ventilator.     COMPARISON: 6/13/2022.     TECHNIQUE: Single view AP image.     FINDINGS:  A tracheostomy tube remains in place. There is probable  ventriculoperitoneal shunt tubing traversing the right neck and right  chest. There is hypoventilation with vascular crowding. There is patchy  infiltrate in the left perihilar region and left  lung base. There is  mild atelectasis at the right lung base. The left heart border is  partially obscured. There is cardiomegaly. There are degenerative  changes of the spine and shoulders.       Impression:      1. Hypoventilation with vascular crowding.  2. Patchy infiltrate in the mid and lower lung zone on the left. The  left heart border is obscured. This could represent pneumonia.  Atelectasis is also considered. There is mild atelectasis at the right  lung base.        This report was finalized on 06/14/2022 07:04 by Dr. Zachariah Fonseca MD.    CT Abdomen Pelvis With & Without Contrast [[id number]] Collected: 06/13/22 1830     Updated: 06/13/22 1841    Narrative:      EXAMINATION:  CT ABDOMEN PELVIS W WO CONTRAST-  6/13/2022 6:11 PM CDT     HISTORY: Abdominal distention. R13.10-Dysphagia, unspecified;  Z01.818-Encounter for other preprocedural examination.     TECHNIQUE: Spiral CT was performed of the abdomen and pelvis without and  with IV contrast. Multiplanar images were reconstructed.     DLP: 4672 mGy-cm. Automated dosage reduction technique was utilized to  reduce patient dosage.     COMPARISON: 5/23/2020.      LUNG BASES: The visualized left lower lobe is completely collapsed.  There is pleural effusion on the left. There appears to be some air  droplets in the pleural space on the left. There is right lower lobe  vascular crowding and atelectasis.     LIVER AND SPLEEN: There is increased density within the gallbladder.  This could be related to sludge. There are no dense gallstones. There is  breathing motion artifact. The liver and spleen appear to be  unremarkable.     PANCREAS: There is breathing motion artifact. No definite acute  pancreatic abnormality is seen.     KIDNEYS, ADRENALS AND URINARY BLADDER: There is breathing motion  artifact. The adrenal glands are normal in size. There are  nonobstructive renal stones bilaterally. There is a probable small cyst  in the upper pole left kidney  measuring less than 1 cm. There is an  ill-defined area of low density in the left kidney laterally in the mid  to lower pole. The ureters are nondilated. There is thickening of the  bladder wall. There is air in the bladder. There is a Mccrary catheter in  the bladder.     BOWEL: No oral contrast was given. There is a gastrostomy tube. There is  air and fluid in the stomach. Small bowel loops are nondilated. There is  underdistention of portions of the colon.     OTHER: There are bilateral fat-containing inguinal hernias. There is a  small fat-containing umbilical hernia. There is diffuse body wall edema.  There is presacral edema. There is mild atheromatous disease of the  aortoiliac vessels. There are degenerative changes of the spine. There  are degenerative changes of both hips.       Impression:      1. Likely complete left lower lobe collapse/atelectasis. There is fluid  and air within the pleural space at the left lung base. There may be a  split pleural sign in the left lung base. The findings are worrisome for  empyema. There is mild atelectasis in the right lung base.  2. The gallbladder is dense. This could be due to sludge in the  gallbladder. There are no focal dense gallstones.  3. Area of ill-defined low density in the mid to lower pole left kidney  laterally is nonspecific. This could represent an area of  pyelonephritis. A mass is felt to be less likely. This is not present on  5/23/2022. There is a probable small cyst in the upper pole left kidney  that is difficult to evaluate due to breathing motion artifact.  4. Thickening of the bladder wall may be related to muscular  hypertrophy. Inflammation and infection are also included in the  differential. There is a Mccrary catheter in the bladder. There is air in  the bladder.  5. Bilateral fat-containing inguinal hernias. Very small fat-containing  umbilical hernia.  6. Mild to moderate diffuse body wall edema. Presacral edema. Other  nonacute findings,  as discussed above.  This report was finalized on 06/13/2022 18:38 by Dr. Zachariah Fonseca MD.          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)    Status epilepticus (HCC)    Essential hypertension    Type 2 diabetes mellitus with hyperglycemia, without long-term current use of insulin (HCC)    Hypothyroidism (acquired)    Acute respiratory failure (secondary to status epilepticus)    Thrombocytopenia (HCC)    Cerebral parenchymal hemorrhage (HCC)    PAF (paroxysmal atrial fibrillation) (HCC)    Fever    Loculated pleural effusion    Acute deep vein thrombosis (DVT) of the ulnar and brachial veins of right upper extremity (HCC)    Dysphagia    Communicating hydrocephalus (HCC)     Impression:     · Postoperative seizure  · S/p craniectomy and debridement   · Hypoalbuminemia   · S/p  shunt on 6/3/2022  · Tracheostomy  · PEG tube        Plan:  · EEG only demonstrated background slowing and no sharp discharges.  · Continue to reduce fosphenytoin.  Eventual plan to discontinue.  · Continue Keppra 1000 mg twice daily.  · Continue Vimpat 200 mg IV every 12 hours  · Keppra would be the most likely of the 3 antiepileptics to be contributing to ongoing sedation, however, will first begin with reducing the fosphenytoin as it has the greater chance of drug-drug interactions and difficulty managing with his low albumin and nutritional state.  Ultimately, the patient is at relatively low risk for recurrent seizure and will likely be able to eventually wean off all antiepileptic medications.  This will allow him to have as clean a medication regimen as possible, decreasing risk of side effects and drug interactions.  We will also allow him opportunity to improve his level of alertness, however, his other medical comorbidities are the likely culprit behind his impaired level of consciousness at this time.  · Neurology will continue to  follow          Zac Mccoy PA-C  06/14/22  14:19 CDT

## 2022-06-14 NOTE — PROGRESS NOTES
[facility] Emergency Department Otolaryngology Head and Neck Surgery  INPATIENT PROGRESS NOTE    Patient Name: Hai Hernandez  : 1945   MRN: [id number]  Date of Admission: 5/10/2022  Today's Date: 22  Length of Stay: 35  I010/1    Martell Downs, *   Primary Care Physician: Elisa Chacko MD  Surgical Procedures Since Admission:  Procedure(s):  CRANIOTOMY FOR TUMOR STERIOTACTIC WITH BRAIN LAB, right  Surgeon:  Martell Downs MD  Status:  35 Days Post-Op  -------------------    Procedure(s):  TRACHEOSTOMY  ESOPHAGOGASTRODUODENOSCOPY WITH PERCUTANEOUS ENDOSCOPIC GASTROSTOMY TUBE INSERTION WITH ANESTHESIA  Surgeon:  Geovany Davidson MD  Status:  12 Days Post-Op  -------------------    Procedure(s):  VENTRICULAR PERITONEAL SHUNT INSERTION WITH BRAIN LAB  Surgeon:  Martell Downs MD  Status:  11 Days Post-Op  -------------------    Procedure(s):  ESOPHAGOGASTRODUODENOSCOPY WITH PERCUTANEOUS ENDOSCOPIC GASTROSTOMY TUBE INSERTION AT BEDSIDE W DELANOS  Surgeon:  Melecio Lu MD  Status:  * No surgery date entered *  -------------------    Procedure(s):  CRANIECTOMY  Surgeon:  Martell Downs MD  Status:  4 Days Post-Op  -------------------    Subjective   Subjective   Chief Complaint: Tracheostomy management  HPI   Accompanied by: Pulmonary service, nursing staff  Since last examined, Hai Hernandez has remained on mechanical ventilation, trach in position.  He is unable to give any history.  Nursing staff reports patient has been stable on the ventilator.  He requires further chest intervention.  Patient is seen, chart is reviewed    Review of Systems   No change from prior inquiry  All pertinent negatives and positives are as above. All other systems have been reviewed and are negative unless otherwise stated.   Objective   Objective   Vitals:   Temp:  [97.9 °F (36.6 °C)-99.4 °F (37.4 °C)] 99.4 °F (37.4 °C)  Heart Rate:  [53-75] 58  Resp:  [21-36] 24  BP:  (113-199)/() 137/68  FiO2 (%):  [35 %] 35 %  Output by Drain (mL) 06/13/22 0701 - 06/13/22 1900 06/13/22 1901 - 06/14/22 0700 06/14/22 0701 - 06/14/22 0854 Range Total   Urethral Catheter Non-latex;Silicone 16 Fr. [id number]       Physical Exam  Constitutional:       General: He is not in acute distress.     Appearance: He is well-developed. He is obese. He is ill-appearing.      Interventions: He is sedated and intubated.      Comments: Lying in bed, mechanical ventilation, trach in position  Does not respond to my voice   HENT:      Head: Normocephalic.        Comments: Right-sided shunt     Right Ear: External ear normal.      Left Ear: External ear normal.      Nose: Nose normal.      Mouth/Throat:      Lips: Pink.      Mouth: Mucous membranes are dry.      Dentition: Normal dentition. No gum lesions.      Tongue: No lesions.      Comments: Macroglossia-moderate  Prolapse-moderate  Eyes:      General: Lids are normal.      Conjunctiva/sclera: Conjunctivae normal.   Neck:      Comments: Neck-increased girth    TRACHEOSTOMY SITE:    Tracheostomy tube type: Shiley #8 cuffed DIC Legacy trach    Stoma: Healing appropriately    Secretions: Mild crusting    Voice: Not evaluated  Date placed: 6/2/2022  Date last changed: Never    Pulmonary:      Effort: No tachypnea, prolonged expiration or respiratory distress. He is intubated.      Comments: Mechanical ventilation, trach in position  Neurological:      Comments: Not responding   Psychiatric:      Comments: Not evaluated           Result Review    Result Review:  I have personally reviewed the results from the time of this admission to 6/14/2022 08:54 CDT and agree with these findings:  [x]  Laboratory  []  Microbiology  []  Radiology  []  EKG/Telemetry   []  Cardiology/Vascular   []  Pathology  [x]  Old records  []  Other:  Most notable findings include: Anemia, alkalosis, hypoxia  Progress Notes by Carlos A Dasilva MD (06/13/2022 08:29)   Progress  Notes by Stevie Parsons APRN (06/13/2022 09:15)   Progress Notes by Sha Beatty MD (06/13/2022 09:46)   Progress Notes by Zca Mccoy PA-C (06/13/2022 12:31)   Progress Notes by Vidya Chandler APRN (06/14/2022 08:43)   Consults by Vidya Hendrickson APRN (06/14/2022 08:41)   Blood Gas, Arterial - (06/14/2022 04:17)   CBC & Differential (06/14/2022 06:08)       Assessment & Plan   Assessment / Plan   Brief Patient Summary:  Hai Hernandez is a 77 y.o. male who remains on mechanical ventilation, trach in position.  He has stable encephalopathy at this point time.  The patient has a stable trach in position.  I will continue current tracheostomy tube care.      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  I will continue current tracheostomy tube care.  • Respiratory failure-patient remains on mechanical ventilation.  He is followed by the pulmonary service.  • Encephalopathy-patient is followed by neurosurgery and  neurology.  Discussed Plan with nursing staff  Following patient as in-patient. Further recommendations will be made based on serial examinations.    Medications/Orders for this encounter: No new medications ordered.  No New orders written.    Discharge Planning: Per primary team        DVT prophylaxis:  Medical and mechanical DVT prophylaxis orders are present.     Electronically signed by Leodan Hathaway Jr, MD, 06/14/22, 8:54 AM CDT.

## 2022-06-14 NOTE — PLAN OF CARE
Goal Outcome Evaluation:  Plan of Care Reviewed With: patient        Progress: no change  Outcome Evaluation: Pt. remains on vent. Pt. unresponsive. Performed PROM to all extremities. Pt. is very swollen all over. Repositioned UEs with pillows to help reduce swelling. Will continue ROM while on vent.

## 2022-06-14 NOTE — PROCEDURES
"Insert Central Line At Bedside    Date/Time: 6/14/2022 10:59 AM  Performed by: Max Olvera MD  Authorized by: Max Olvera MD   Consent: Verbal consent obtained. Written consent obtained.  Risks and benefits: risks, benefits and alternatives were discussed  Consent given by: power of   Patient understanding: patient states understanding of the procedure being performed  Patient consent: the patient's understanding of the procedure matches consent given  Procedure consent: procedure consent matches procedure scheduled  Relevant documents: relevant documents present and verified  Required items: required blood products, implants, devices, and special equipment available  Patient identity confirmed: anonymous protocol, patient vented/unresponsive  Time out: Immediately prior to procedure a \"time out\" was called to verify the correct patient, procedure, equipment, support staff and site/side marked as required.  Indications: vascular access  Anesthesia: local infiltration    Anesthesia:  Local Anesthetic: lidocaine 1% without epinephrine  Anesthetic total: 7 mL  Preparation: skin prepped with ChloraPrep  Skin prep agent dried: skin prep agent completely dried prior to procedure  Sterile barriers: all five maximum sterile barriers used - cap, mask, sterile gown, sterile gloves, and large sterile sheet  Hand hygiene: hand hygiene performed prior to central venous catheter insertion  Location details: left internal jugular  Patient position: flat  Catheter type: triple lumen  Catheter size: 7 Fr (20 cm )  Pre-procedure: landmarks identified  Ultrasound guidance: yes  Sterile ultrasound techniques: sterile gel and sterile probe covers were used  Number of attempts: 1  Successful placement: yes  Post-procedure: line sutured and dressing applied  Assessment: blood return through all ports,  free fluid flow,  placement verified by x-ray and no pneumothorax on x-ray  Patient tolerance: patient tolerated " the procedure well with no immediate complications  Comments: Just above the heart, appears in the SVC.  Line placed due to no other sites for adequate long-term IV access.

## 2022-06-15 ENCOUNTER — APPOINTMENT (OUTPATIENT)
Dept: GENERAL RADIOLOGY | Facility: HOSPITAL | Age: 77
End: 2022-06-15

## 2022-06-15 LAB
ALBUMIN SERPL-MCNC: 2.3 G/DL (ref 3.5–5.2)
ALBUMIN/GLOB SERPL: 0.6 G/DL
ALP SERPL-CCNC: 403 U/L (ref 39–117)
ALT SERPL W P-5'-P-CCNC: 149 U/L (ref 1–41)
ANION GAP SERPL CALCULATED.3IONS-SCNC: 6 MMOL/L (ref 5–15)
AST SERPL-CCNC: 148 U/L (ref 1–40)
BACTERIA SPEC ANAEROBE CULT: NORMAL
BACTERIA SPEC ANAEROBE CULT: NORMAL
BASOPHILS # BLD AUTO: 0.07 10*3/MM3 (ref 0–0.2)
BASOPHILS NFR BLD AUTO: 0.8 % (ref 0–1.5)
BILIRUB SERPL-MCNC: 0.4 MG/DL (ref 0–1.2)
BUN SERPL-MCNC: 33 MG/DL (ref 8–23)
BUN/CREAT SERPL: 76.7 (ref 7–25)
CALCIUM SPEC-SCNC: 8.1 MG/DL (ref 8.6–10.5)
CHLORIDE SERPL-SCNC: 103 MMOL/L (ref 98–107)
CO2 SERPL-SCNC: 29 MMOL/L (ref 22–29)
CREAT SERPL-MCNC: 0.43 MG/DL (ref 0.76–1.27)
DEPRECATED RDW RBC AUTO: 63 FL (ref 37–54)
EGFRCR SERPLBLD CKD-EPI 2021: 109.9 ML/MIN/1.73
EOSINOPHIL # BLD AUTO: 0.33 10*3/MM3 (ref 0–0.4)
EOSINOPHIL NFR BLD AUTO: 3.7 % (ref 0.3–6.2)
ERYTHROCYTE [DISTWIDTH] IN BLOOD BY AUTOMATED COUNT: 17.5 % (ref 12.3–15.4)
GLOBULIN UR ELPH-MCNC: 3.9 GM/DL
GLUCOSE BLDC GLUCOMTR-MCNC: 121 MG/DL (ref 70–130)
GLUCOSE BLDC GLUCOMTR-MCNC: 139 MG/DL (ref 70–130)
GLUCOSE BLDC GLUCOMTR-MCNC: 152 MG/DL (ref 70–130)
GLUCOSE BLDC GLUCOMTR-MCNC: 155 MG/DL (ref 70–130)
GLUCOSE BLDC GLUCOMTR-MCNC: 157 MG/DL (ref 70–130)
GLUCOSE BLDC GLUCOMTR-MCNC: 179 MG/DL (ref 70–130)
GLUCOSE BLDC GLUCOMTR-MCNC: 191 MG/DL (ref 70–130)
GLUCOSE SERPL-MCNC: 148 MG/DL (ref 65–99)
HCT VFR BLD AUTO: 24.7 % (ref 37.5–51)
HGB BLD-MCNC: 7.5 G/DL (ref 13–17.7)
IMM GRANULOCYTES # BLD AUTO: 0.19 10*3/MM3 (ref 0–0.05)
IMM GRANULOCYTES NFR BLD AUTO: 2.1 % (ref 0–0.5)
LYMPHOCYTES # BLD AUTO: 0.87 10*3/MM3 (ref 0.7–3.1)
LYMPHOCYTES NFR BLD AUTO: 9.8 % (ref 19.6–45.3)
MCH RBC QN AUTO: 31 PG (ref 26.6–33)
MCHC RBC AUTO-ENTMCNC: 30.4 G/DL (ref 31.5–35.7)
MCV RBC AUTO: 102.1 FL (ref 79–97)
MONOCYTES # BLD AUTO: 1.15 10*3/MM3 (ref 0.1–0.9)
MONOCYTES NFR BLD AUTO: 13 % (ref 5–12)
NEUTROPHILS NFR BLD AUTO: 6.23 10*3/MM3 (ref 1.7–7)
NEUTROPHILS NFR BLD AUTO: 70.6 % (ref 42.7–76)
NRBC BLD AUTO-RTO: 0 /100 WBC (ref 0–0.2)
PHENYTOIN SERPL-MCNC: 14.9 MCG/ML (ref 10–20)
PLATELET # BLD AUTO: 195 10*3/MM3 (ref 140–450)
PMV BLD AUTO: 9.9 FL (ref 6–12)
POTASSIUM SERPL-SCNC: 3.9 MMOL/L (ref 3.5–5.2)
PROT SERPL-MCNC: 6.2 G/DL (ref 6–8.5)
RBC # BLD AUTO: 2.42 10*6/MM3 (ref 4.14–5.8)
SODIUM SERPL-SCNC: 138 MMOL/L (ref 136–145)
WBC NRBC COR # BLD: 8.84 10*3/MM3 (ref 3.4–10.8)

## 2022-06-15 PROCEDURE — 94799 UNLISTED PULMONARY SVC/PX: CPT

## 2022-06-15 PROCEDURE — 25010000002 CEFEPIME PER 500 MG: Performed by: NEUROLOGICAL SURGERY

## 2022-06-15 PROCEDURE — 25010000002 HEPARIN (PORCINE) PER 1000 UNITS: Performed by: NEUROLOGICAL SURGERY

## 2022-06-15 PROCEDURE — 94003 VENT MGMT INPAT SUBQ DAY: CPT

## 2022-06-15 PROCEDURE — 80185 ASSAY OF PHENYTOIN TOTAL: CPT | Performed by: NEUROLOGICAL SURGERY

## 2022-06-15 PROCEDURE — 82962 GLUCOSE BLOOD TEST: CPT

## 2022-06-15 PROCEDURE — 25010000002 HYDRALAZINE PER 20 MG: Performed by: NEUROLOGICAL SURGERY

## 2022-06-15 PROCEDURE — 99233 SBSQ HOSP IP/OBS HIGH 50: CPT | Performed by: INTERNAL MEDICINE

## 2022-06-15 PROCEDURE — 0 LEVETIRACETAM IN NACL 0.75% 1000 MG/100ML SOLUTION: Performed by: NEUROLOGICAL SURGERY

## 2022-06-15 PROCEDURE — 99233 SBSQ HOSP IP/OBS HIGH 50: CPT | Performed by: PHYSICIAN ASSISTANT

## 2022-06-15 PROCEDURE — 97110 THERAPEUTIC EXERCISES: CPT

## 2022-06-15 PROCEDURE — 25010000002 VANCOMYCIN 10 G RECONSTITUTED SOLUTION: Performed by: NEUROLOGICAL SURGERY

## 2022-06-15 PROCEDURE — 63710000001 DEXAMETHASONE PER 0.25 MG: Performed by: NURSE PRACTITIONER

## 2022-06-15 PROCEDURE — 71045 X-RAY EXAM CHEST 1 VIEW: CPT

## 2022-06-15 PROCEDURE — 25010000002 FOSPHENYTOIN 500 MG PE/10ML SOLUTION 10 ML VIAL: Performed by: PHYSICIAN ASSISTANT

## 2022-06-15 PROCEDURE — 63710000001 INSULIN REGULAR HUMAN PER 5 UNITS: Performed by: NEUROLOGICAL SURGERY

## 2022-06-15 PROCEDURE — 85025 COMPLETE CBC W/AUTO DIFF WBC: CPT | Performed by: NEUROLOGICAL SURGERY

## 2022-06-15 PROCEDURE — 99024 POSTOP FOLLOW-UP VISIT: CPT | Performed by: NURSE PRACTITIONER

## 2022-06-15 PROCEDURE — 63710000001 INSULIN DETEMIR PER 5 UNITS: Performed by: NEUROLOGICAL SURGERY

## 2022-06-15 PROCEDURE — 80053 COMPREHEN METABOLIC PANEL: CPT | Performed by: NEUROLOGICAL SURGERY

## 2022-06-15 RX ORDER — BISACODYL 10 MG
10 SUPPOSITORY, RECTAL RECTAL DAILY PRN
Status: DISCONTINUED | OUTPATIENT
Start: 2022-06-15 | End: 2022-06-20 | Stop reason: HOSPADM

## 2022-06-15 RX ADMIN — LEVOTHYROXINE SODIUM 125 MCG: 125 TABLET ORAL at 05:17

## 2022-06-15 RX ADMIN — LANSOPRAZOLE 30 MG: KIT at 08:10

## 2022-06-15 RX ADMIN — LACOSAMIDE 200 MG: 10 INJECTION, SOLUTION INTRAVENOUS at 20:05

## 2022-06-15 RX ADMIN — INSULIN DETEMIR 20 UNITS: 100 INJECTION, SOLUTION SUBCUTANEOUS at 20:17

## 2022-06-15 RX ADMIN — DEXAMETHASONE 4 MG: 4 TABLET ORAL at 20:02

## 2022-06-15 RX ADMIN — CEFEPIME 2 G: 2 INJECTION, POWDER, FOR SOLUTION INTRAVENOUS at 15:04

## 2022-06-15 RX ADMIN — CARVEDILOL 6.25 MG: 6.25 TABLET, FILM COATED ORAL at 08:08

## 2022-06-15 RX ADMIN — CARVEDILOL 6.25 MG: 6.25 TABLET, FILM COATED ORAL at 18:12

## 2022-06-15 RX ADMIN — HEPARIN SODIUM 5000 UNITS: 5000 INJECTION INTRAVENOUS; SUBCUTANEOUS at 20:05

## 2022-06-15 RX ADMIN — SODIUM CHLORIDE 200 MG PE: 9 INJECTION, SOLUTION INTRAVENOUS at 05:17

## 2022-06-15 RX ADMIN — POLYETHYLENE GLYCOL 3350 17 G: 17 POWDER, FOR SOLUTION ORAL at 08:09

## 2022-06-15 RX ADMIN — GUAIFENESIN 200 MG: 200 SOLUTION ORAL at 18:12

## 2022-06-15 RX ADMIN — INSULIN HUMAN 2 UNITS: 100 INJECTION, SOLUTION PARENTERAL at 12:06

## 2022-06-15 RX ADMIN — Medication 1 PACKET: at 20:02

## 2022-06-15 RX ADMIN — LACOSAMIDE 200 MG: 10 INJECTION, SOLUTION INTRAVENOUS at 08:07

## 2022-06-15 RX ADMIN — IPRATROPIUM BROMIDE AND ALBUTEROL SULFATE 3 ML: 2.5; .5 SOLUTION RESPIRATORY (INHALATION) at 06:50

## 2022-06-15 RX ADMIN — VANCOMYCIN HYDROCHLORIDE 1500 MG: 10 INJECTION, POWDER, LYOPHILIZED, FOR SOLUTION INTRAVENOUS at 15:03

## 2022-06-15 RX ADMIN — INSULIN HUMAN 2 UNITS: 100 INJECTION, SOLUTION PARENTERAL at 23:45

## 2022-06-15 RX ADMIN — LACTULOSE 20 G: 20 SOLUTION ORAL at 08:07

## 2022-06-15 RX ADMIN — CHLORHEXIDINE GLUCONATE 15 ML: 1.2 RINSE ORAL at 20:03

## 2022-06-15 RX ADMIN — DOCUSATE SODIUM LIQUID 50 MG: 100 LIQUID ORAL at 08:07

## 2022-06-15 RX ADMIN — CHLORHEXIDINE GLUCONATE 15 ML: 1.2 RINSE ORAL at 08:08

## 2022-06-15 RX ADMIN — LEVETIRACETAM 1000 MG: 10 INJECTION INTRAVENOUS at 20:02

## 2022-06-15 RX ADMIN — SODIUM CHLORIDE 150 MG PE: 9 INJECTION, SOLUTION INTRAVENOUS at 15:43

## 2022-06-15 RX ADMIN — SODIUM CHLORIDE SOLN NEBU 3% 4 ML: 3 NEBU SOLN at 06:50

## 2022-06-15 RX ADMIN — SODIUM CHLORIDE 150 MG PE: 9 INJECTION, SOLUTION INTRAVENOUS at 22:24

## 2022-06-15 RX ADMIN — LEVETIRACETAM 1000 MG: 10 INJECTION INTRAVENOUS at 08:08

## 2022-06-15 RX ADMIN — LATANOPROST 1 DROP: 50 SOLUTION OPHTHALMIC at 20:03

## 2022-06-15 RX ADMIN — DEXAMETHASONE 4 MG: 4 TABLET ORAL at 08:08

## 2022-06-15 RX ADMIN — MUPIROCIN 1 APPLICATION: 20 OINTMENT TOPICAL at 20:03

## 2022-06-15 RX ADMIN — Medication 45 ML: at 20:02

## 2022-06-15 RX ADMIN — HYDRALAZINE HYDROCHLORIDE 10 MG: 20 INJECTION INTRAMUSCULAR; INTRAVENOUS at 23:44

## 2022-06-15 RX ADMIN — VANCOMYCIN HYDROCHLORIDE 1500 MG: 10 INJECTION, POWDER, LYOPHILIZED, FOR SOLUTION INTRAVENOUS at 00:46

## 2022-06-15 RX ADMIN — SODIUM CHLORIDE SOLN NEBU 3% 4 ML: 3 NEBU SOLN at 19:23

## 2022-06-15 RX ADMIN — HEPARIN SODIUM 5000 UNITS: 5000 INJECTION INTRAVENOUS; SUBCUTANEOUS at 08:08

## 2022-06-15 RX ADMIN — LISINOPRIL 20 MG: 20 TABLET ORAL at 08:08

## 2022-06-15 RX ADMIN — Medication 1 PACKET: at 18:12

## 2022-06-15 RX ADMIN — GUAIFENESIN 200 MG: 200 SOLUTION ORAL at 20:02

## 2022-06-15 RX ADMIN — LIOTHYRONINE SODIUM 25 MCG: 25 TABLET ORAL at 08:08

## 2022-06-15 RX ADMIN — CEFEPIME 2 G: 2 INJECTION, POWDER, FOR SOLUTION INTRAVENOUS at 05:17

## 2022-06-15 RX ADMIN — INSULIN HUMAN 2 UNITS: 100 INJECTION, SOLUTION PARENTERAL at 00:12

## 2022-06-15 RX ADMIN — CEFEPIME 2 G: 2 INJECTION, POWDER, FOR SOLUTION INTRAVENOUS at 21:01

## 2022-06-15 RX ADMIN — MUPIROCIN 1 APPLICATION: 20 OINTMENT TOPICAL at 08:09

## 2022-06-15 RX ADMIN — Medication 45 ML: at 08:08

## 2022-06-15 RX ADMIN — HYDRALAZINE HYDROCHLORIDE 10 MG: 20 INJECTION INTRAMUSCULAR; INTRAVENOUS at 06:36

## 2022-06-15 RX ADMIN — Medication 1 PACKET: at 08:08

## 2022-06-15 RX ADMIN — Medication 1 PACKET: at 12:07

## 2022-06-15 RX ADMIN — GUAIFENESIN 200 MG: 200 SOLUTION ORAL at 08:07

## 2022-06-15 RX ADMIN — ACETAMINOPHEN 650 MG: 325 TABLET ORAL at 20:17

## 2022-06-15 NOTE — CASE MANAGEMENT/SOCIAL WORK
Continued Stay Note   [facility]     Patient Name: Hai Hernandez  MRN: [id number]  Today's Date: 6/15/2022    Admit Date: 5/10/2022     Discharge Plan     Row Name 06/15/22 0947       Plan    Plan LTAC placement in [facility].    Plan Comments Social service consult received for LTAC placement in [facility].  Note need for neurology.  Referrals made to [facility] and [facility].  This now appears to be the only companies that manage LTAC's in the [facility].  CLOVIS spoke directly with Christel from [facility] to advise of need for neurology.  Most stand alone LTAC's do not have specialty services on staff.  Awaiting response from Christel regarding neurolgy services at [facility]'s in [facility].  Referral faxed to [facility], [place], [place], awaiting response.               Discharge Codes    No documentation.                     NAVEEN CruzW

## 2022-06-15 NOTE — THERAPY TREATMENT NOTE
Acute Care - Physical Therapy Treatment Note  [facility]     Patient Name: Hai Hernandez  : 1945  MRN: [id number]  Today's Date: 6/15/2022      Visit Dx:     ICD-10-CM ICD-9-CM   1. Dysphagia, unspecified type  R13.10 787.20   2. Preop testing  Z01.818 V72.84   3. Meningioma (HCC)  D32.9 225.2   4. Impaired mobility  Z74.09 799.89   5. Decreased activities of daily living (ADL)  Z78.9 V49.89   6. Status epilepticus (HCC)  G40.901 345.3   7. Acute respiratory failure with hypoxia (HCC)  J96.01 518.81   8. Communicating hydrocephalus (HCC)  G91.0 331.3   9. Postoperative infection, unspecified type, initial encounter  T81.40XA 998.59   10. 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)   • Status epilepticus (HCC)   • Essential hypertension   • Type 2 diabetes mellitus with hyperglycemia, without long-term current use of insulin (HCC)   • Hypothyroidism (acquired)   • Acute respiratory failure (secondary to status epilepticus)   • Thrombocytopenia (HCC)   • Cerebral parenchymal hemorrhage (HCC)   • PAF (paroxysmal atrial fibrillation) (HCC)   • Fever   • Loculated pleural effusion   • Acute deep vein thrombosis (DVT) of the ulnar and brachial veins of right upper extremity (HCC)   • Dysphagia   • Communicating hydrocephalus (HCC)   • Cerebral edema (HCC)     Past Medical History:   Diagnosis Date   • Brain tumor (HCC)    • Depression    • Hearing loss    • History of cellulitis     right foot big toe   • Hypertension    • Pneumonia    • Right arm weakness     after cervial injury   • Sciatic pain     affects balance   • Thyroid disease    • Visual disturbance     due to brain tumor - right eye     Past Surgical History:   Procedure Laterality Date   • CATARACT EXTRACTION, BILATERAL     • COLONOSCOPY     • CRANIOTOMY  Bilateral 6/10/2022    Procedure: CRANIECTOMY;  Surgeon: Martell Downs MD;  Location:  [facility] OR;  Service: Neurosurgery;  Laterality: Bilateral;   • CRANIOTOMY FOR TUMOR Right 5/10/2022    Procedure: CRANIOTOMY FOR TUMOR STERIOTACTIC WITH BRAIN LAB, right;  Surgeon: Martell Downs MD;  Location:  [facility] OR;  Service: Neurosurgery;  Laterality: Right;   • ENDOSCOPY W/ PEG TUBE PLACEMENT N/A 6/2/2022    Procedure: ESOPHAGOGASTRODUODENOSCOPY WITH PERCUTANEOUS ENDOSCOPIC GASTROSTOMY TUBE INSERTION WITH ANESTHESIA;  Surgeon: Melecio Lu MD;  Location:  [facility] OR;  Service: Gastroenterology;  Laterality: N/A;   • NECK SURGERY     • SKIN CANCER EXCISION     • TONSILLECTOMY     • TRACHEOSTOMY N/A 6/2/2022    Procedure: TRACHEOSTOMY;  Surgeon: Geovany Davidson MD;  Location:  [facility];  Service: ENT;  Laterality: N/A;   •  SHUNT INSERTION Right 6/3/2022    Procedure: VENTRICULAR PERITONEAL SHUNT INSERTION WITH BRAIN LAB;  Surgeon: Martell Downs MD;  Location:  [facility] OR;  Service: Neurosurgery;  Laterality: Right;     PT Assessment (last 12 hours)     PT Evaluation and Treatment     Row Name 06/15/22 0841          Physical Therapy Time and Intention    Document Type therapy note (daily note)  -TB     Mode of Treatment physical therapy  -TB     Row Name 06/15/22 0841          General Information    Existing Precautions/Restrictions fall;oxygen therapy device and L/min  Vent, trach  -TB     Row Name 06/15/22 0841          Motor Skills    Therapeutic Exercise other (see comments)  -TB     Comments, Therapeutic Exercise PROM to all 4 exts  -TB     Row Name             Wound 05/15/22 1712 coccyx    Wound - Properties Group Placement Date: 05/15/22  -KS Placement Time: 1712 -KS Location: coccyx  -KS     Retired Wound - Properties Group Placement Date: 05/15/22  -KS Placement Time: 1712 -KS Location: coccyx  -KS     Retired Wound - Properties Group Date first assessed: 05/15/22  -KS Time  first assessed: 1712 -KS Location: coccyx  -KS     Row Name             Wound 05/17/22 1623 scalp Pressure Injury    Wound - Properties Group Placement Date: 05/17/22  -KS Placement Time: 1623  -KS Present on Hospital Admission: N  -KS Location: scalp  -KS Primary Wound Type: Pressure inj  -KS     Retired Wound - Properties Group Placement Date: 05/17/22  -KS Placement Time: 1623  -KS Present on Hospital Admission: N  -KS Location: scalp  -KS Primary Wound Type: Pressure inj  -KS     Retired Wound - Properties Group Date first assessed: 05/17/22  -KS Time first assessed: 1623  -KS Present on Hospital Admission: N  -KS Location: scalp  -KS Primary Wound Type: Pressure inj  -KS     Row Name             Wound 06/03/22 1602 abdomen Incision    Wound - Properties Group Placement Date: 06/03/22  -MK Placement Time: 1602  -MK Present on Hospital Admission: N  -MK Location: abdomen  -MK Primary Wound Type: Incision  -MK     Retired Wound - Properties Group Placement Date: 06/03/22  -MK Placement Time: 1602  -MK Present on Hospital Admission: N  -MK Location: abdomen  -MK Primary Wound Type: Incision  -MK     Retired Wound - Properties Group Date first assessed: 06/03/22  -MK Time first assessed: 1602  -MK Present on Hospital Admission: N  -MK Location: abdomen  -MK Primary Wound Type: Incision  -MK     Row Name             Wound 06/10/22 1409 Right scalp Incision    Wound - Properties Group Placement Date: 06/10/22  -PAULINE Placement Time: 1409  -PAULINE Side: Right  -PAULINE Location: scalp  -PAULINE Primary Wound Type: Incision  -PAULINE     Retired Wound - Properties Group Placement Date: 06/10/22  -PAULINE Placement Time: 1409  -PAULNIE Side: Right  -PAULINE Location: scalp  -PAULINE Primary Wound Type: Incision  -PAULINE     Retired Wound - Properties Group Date first assessed: 06/10/22  -PAULINE Time first assessed: 1409  -PAULINE Side: Right  -PAULINE Location: scalp  -PAULINE Primary Wound Type: Incision  -PAULINE     Row Name             Wound 06/11/22 2259 throat    Wound - Properties  Group Placement Date: 06/11/22  -PC Placement Time: 2259  -PC Present on Hospital Admission: N  -PC Location: throat  -PC     Retired Wound - Properties Group Placement Date: 06/11/22  -PC Placement Time: 2259  -PC Present on Hospital Admission: N  -PC Location: throat  -PC     Retired Wound - Properties Group Date first assessed: 06/11/22  -PC Time first assessed: 2259  -PC Present on Hospital Admission: N  -PC Location: throat  -PC     Row Name 06/15/22 0841          Plan of Care Review    Plan of Care Reviewed With patient  -TB     Progress no change  -TB     Outcome Evaluation Performed PROM to all 4 exts. Pt did not awake or move any exts. Will cont. POC.  -TB     Row Name 06/15/22 0841          Positioning and Restraints    Pre-Treatment Position in bed  -TB     Post Treatment Position bed  -TB     In Bed notified nsg;fowlers;patient within staff view;side rails up x3;LUE elevated;RUE elevated;legs elevated;heels elevated  -TB           User Key  (r) = Recorded By, (t) = Taken By, (c) = Cosigned By    Initials Name Provider Type    Carmen Mistry RN Registered Nurse    Surya Ball RN Registered Nurse    TB Armando Bennett, PTA Physical Therapist Assistant    PC Jayna Tan, RN Registered Nurse    Leatha Solomon RN Registered Nurse                Physical Therapy Education                 Title: PT OT SLP Therapies (In Progress)     Topic: Physical Therapy (In Progress)     Point: Mobility training (Done)     Learning Progress Summary           Patient Acceptance, E, VU,DU by YULIET at 5/11/2022 0745    Comment: benefits of activity, progression of PT POC                   Point: Home exercise program (In Progress)     Learning Progress Summary           Patient Acceptance, E, NR by YULIET at 6/7/2022 1345    Comment: Benefits of activity, B UE/LE exercises, positioning for joints and skin integrity                   Point: Body mechanics (Not Started)     Learner Progress:  Not documented in  this visit.          Point: Precautions (Not Started)     Learner Progress:  Not documented in this visit.                      User Key     Initials Effective Dates Name Provider Type Artur HORVATH 08/02/16 -  Wayne Thomas, PT DPT Physical Therapist PT              PT Recommendation and Plan     Plan of Care Reviewed With: patient  Progress: no change  Outcome Evaluation: Performed PROM to all 4 exts. Pt did not awake or move any exts. Will cont. POC.   Outcome Measures     Row Name 06/15/22 0900 06/14/22 0747 06/13/22 0800       How much help from another person do you currently need...    Turning from your back to your side while in flat bed without using bedrails? 1  -TB 1  -MF 1  -CIERA    Moving from lying on back to sitting on the side of a flat bed without bedrails? 1  -TB 1  -MF 1  -CIERA    Moving to and from a bed to a chair (including a wheelchair)? 1  -TB 1  -MF 1  -CIERA    Standing up from a chair using your arms (e.g., wheelchair, bedside chair)? 1  -TB 1  -MF 1  -CIERA    Climbing 3-5 steps with a railing? 1  -TB 1  -MF 1  -CIERA    To walk in hospital room? 1  -TB 1  -MF 1  -CIERA    AM-PAC 6 Clicks Score (PT) 6  -TB 6  -MF 6  -CIERA       Functional Assessment    Outcome Measure Options AM-PAC 6 Clicks Basic Mobility (PT)  -TB AM-PAC 6 Clicks Basic Mobility (PT)  - --          User Key  (r) = Recorded By, (t) = Taken By, (c) = Cosigned By    Initials Name Provider Type    Gloria Corley, PTA Physical Therapist Assistant     Rosaura Bravo, PTA Physical Therapist Assistant    TB Armando Bennett, PTA Physical Therapist Assistant                 Time Calculation:    PT Charges     Row Name 06/15/22 0907             Time Calculation    Start Time 0841  -TB      Stop Time 0905  -TB      Time Calculation (min) 24 min  -TB      PT Received On 06/15/22  -TB              Time Calculation- PT    Total Timed Code Minutes- PT 24 minute(s)  -TB            User Key  (r) = Recorded By, (t) = Taken By,  (c) = Cosigned By    Initials Name Provider Type    TB Armando Bennett, BRENDAN Physical Therapist Assistant              Therapy Charges for Today     Code Description Service Date Service Provider Modifiers Qty    [id number] HC PT THER PROC EA 15 MIN 6/15/2022 Armando Bennett PTA GP 2          PT G-Codes  Outcome Measure Options: AM-PAC 6 Clicks Basic Mobility (PT)  AM-PAC 6 Clicks Score (PT): 6  AM-PAC 6 Clicks Score (OT): 12    Armando Bennett PTA  6/15/2022

## 2022-06-15 NOTE — PROGRESS NOTES
PULMONARY AND CRITICAL CARE PROGRESS NOTE - [facility]    Patient: Hai Hernandez    1945    MR# [id number]    Acct# [id number]  06/15/22   12:11 CDT  Referring Provider: Martell Downs, *    Chief Complaint: Mechanically ventilated    Interval history: The patient remains intubated with trach to mechanical vent.  He is minimally responsive.  Oxygen saturation 94% on PEEP of 5 and FiO2 0.3.  ET CO2 30.  He is passively breathing.  He remains off of sedation.  Chest film reviewed.  He continues on cefepime and vancomycin.  No documented fevers.  Anticipate LTAC placement.    Meds:  carvedilol, 6.25 mg, Nasogastric, BID With Meals  cefepime, 2 g, Intravenous, Q8H  chlorhexidine, 15 mL, Mouth/Throat, Q12H  dexamethasone, 4 mg, Per PEG Tube, Q12H   Followed by  [START ON 6/16/2022] dexamethasone, 2 mg, Per PEG Tube, Q12H   Followed by  [START ON 6/18/2022] dexamethasone, 1 mg, Per PEG Tube, Q12H   Followed by  [START ON 6/20/2022] dexamethasone, 0.5 mg, Per PEG Tube, Daily  docusate, 50 mg, Oral, Daily  fosphenytoin, 150 mg PE, Intravenous, Q8H  guaiFENesin, 200 mg, Per PEG Tube, TID  heparin (porcine), 5,000 Units, Subcutaneous, Q12H  insulin detemir, 20 Units, Subcutaneous, Nightly  insulin regular, 2-7 Units, Subcutaneous, Q6H  lacosamide, 200 mg, Intravenous, Q12H  lactulose, 20 g, Per PEG Tube, Daily  lansoprazole, 30 mg, Per G Tube, QAM  latanoprost, 1 drop, Both Eyes, Nightly  levETIRAcetam, 1,000 mg, Intravenous, Q12H  levothyroxine, 125 mcg, Nasogastric, Q AM  lidocaine, 10 mL, Intradermal, Once  lidocaine, 10 mL, Infiltration, Once  liothyronine, 25 mcg, Nasogastric, Daily  lisinopril, 20 mg, Nasogastric, Q24H  mupirocin, 1 application, Topical, Q12H  Nutrisource fiber, 1 packet, Nasogastric, 4x Daily  polyethylene glycol, 17 g, Oral, Daily  ProSource TF, 45 mL, Per G Tube, BID  sodium chloride, 4 mL, Nebulization, BID - RT  vancomycin, 15 mg/kg, Intravenous,  Q12H      norepinephrine, 0.02-0.3 mcg/kg/min, Last Rate: Stopped (06/11/22 1054)      Review of Systems:     Cannot obtain due to mechanical ventilated state     Ventilator Settings:        Resp Rate (Set): 16  Pressure Support (cm H2O): 8 cm H20  FiO2 (%): 30 %  PEEP/CPAP (cm H2O): 5 cm H20  Minute Ventilation (L/min) (Obs): 15.5 L/min  Resp Rate (Observed) Vent: 24  I:E Ratio (Set): 1:0.27  I:E Ratio (Obs): 1:2.00  PIP Observed (cm H2O): 19 cm H2O  RSBI: 21.96  Physical Exam:  Temp:  [98.3 °F (36.8 °C)-99.5 °F (37.5 °C)] 98.4 °F (36.9 °C)  Heart Rate:  [55-76] 68  Resp:  [22-32] 32  BP: (129-181)/() 140/71  FiO2 (%):  [30 %-35 %] 30 %    Intake/Output Summary (Last 24 hours) at 6/15/2022 1211  Last data filed at 6/15/2022 1154  Gross per 24 hour   Intake 4623.8 ml   Output 2575 ml   Net 2048.8 ml     SpO2 Percentage    06/15/22 1000 06/15/22 1017 06/15/22 1100   SpO2: 94% 97% 95%   Body mass index is 32.69 kg/m².   Physical Exam  Constitutional:       General: He is not in acute distress.     Appearance: He is ill-appearing. He is not diaphoretic.   HENT:      Head: Normocephalic.      Comments: Craniotomy incision, healing     Nose: Nose normal.      Mouth/Throat:      Mouth: Mucous membranes are moist.   Eyes:      General: No scleral icterus.  Neck:      Comments: Trach to vent  Left IJ triple-lumen  Cardiovascular:      Rate and Rhythm: Normal rate and regular rhythm.      Comments: Rate 75  Pulmonary:      Effort: Pulmonary effort is normal. No respiratory distress.      Breath sounds: No wheezing or rhonchi.   Abdominal:      General: There is no distension.      Comments: PEG with tube feeding infusing   Genitourinary:     Comments: Mccrary catheter  Musculoskeletal:      Right lower leg: Edema present.      Left lower leg: Edema present.      Comments: SCDs   Skin:     Coloration: Skin is not pale.   Neurological:      Comments: Weakly withdraws to stimulus            Electronically signed by Leslie FRANCISCO  MARIO Sewell, 6/15/2022, 12:11 CDT      Physician Substantive Portion: Medical Decision Making    Laboratory Data:  Results from last 7 days   Lab Units 06/15/22  0426 06/14/22  0608 06/13/22  0607   WBC 10*3/mm3 8.84 9.59 10.97*   HEMOGLOBIN g/dL 7.5* 7.7* 6.8*   PLATELETS 10*3/mm3 195 198 206     Results from last 7 days   Lab Units 06/15/22  0426 06/14/22  0405 06/13/22  0607   SODIUM mmol/L 138 139 138   POTASSIUM mmol/L 3.9 4.5 4.1   BUN mg/dL 33* 36* 37*   CREATININE mg/dL 0.43* 0.54* 0.57*     Results from last 7 days   Lab Units 06/14/22  0417 06/13/22  0423 06/12/22  0412   PH, ARTERIAL pH units 7.461* 7.471* 7.488*   PCO2, ARTERIAL mm Hg 40.9 39.0 38.4   PO2 ART mm Hg 82.4* 88.1 88.2   FIO2 % 35 35 35     Wound Culture   Date Value Ref Range Status   06/10/2022 No growth at 2 days  Preliminary   06/10/2022 No growth at 2 days  Preliminary   06/10/2022 No growth at 2 days  Preliminary     Respiratory Culture   Date Value Ref Range Status   06/10/2022   Preliminary    Rare The culture consists of normal respiratory annia. This is a preliminary report; final report to follow.     Recent films:  CT Abdomen Pelvis With & Without Contrast    Result Date: 6/13/2022  EXAMINATION:  CT ABDOMEN PELVIS W WO CONTRAST-  6/13/2022 6:11 PM CDT  HISTORY: Abdominal distention. R13.10-Dysphagia, unspecified; Z01.818-Encounter for other preprocedural examination.  TECHNIQUE: Spiral CT was performed of the abdomen and pelvis without and with IV contrast. Multiplanar images were reconstructed.  DLP: 4672 mGy-cm. Automated dosage reduction technique was utilized to reduce patient dosage.  COMPARISON: 5/23/2020.  LUNG BASES: The visualized left lower lobe is completely collapsed. There is pleural effusion on the left. There appears to be some air droplets in the pleural space on the left. There is right lower lobe vascular crowding and atelectasis.  LIVER AND SPLEEN: There is increased density within the gallbladder. This could  be related to sludge. There are no dense gallstones. There is breathing motion artifact. The liver and spleen appear to be unremarkable.  PANCREAS: There is breathing motion artifact. No definite acute pancreatic abnormality is seen.  KIDNEYS, ADRENALS AND URINARY BLADDER: There is breathing motion artifact. The adrenal glands are normal in size. There are nonobstructive renal stones bilaterally. There is a probable small cyst in the upper pole left kidney measuring less than 1 cm. There is an ill-defined area of low density in the left kidney laterally in the mid to lower pole. The ureters are nondilated. There is thickening of the bladder wall. There is air in the bladder. There is a Mccrary catheter in the bladder.  BOWEL: No oral contrast was given. There is a gastrostomy tube. There is air and fluid in the stomach. Small bowel loops are nondilated. There is underdistention of portions of the colon.  OTHER: There are bilateral fat-containing inguinal hernias. There is a small fat-containing umbilical hernia. There is diffuse body wall edema. There is presacral edema. There is mild atheromatous disease of the aortoiliac vessels. There are degenerative changes of the spine. There are degenerative changes of both hips.      Impression: 1. Likely complete left lower lobe collapse/atelectasis. There is fluid and air within the pleural space at the left lung base. There may be a split pleural sign in the left lung base. The findings are worrisome for empyema. There is mild atelectasis in the right lung base. 2. The gallbladder is dense. This could be due to sludge in the gallbladder. There are no focal dense gallstones. 3. Area of ill-defined low density in the mid to lower pole left kidney laterally is nonspecific. This could represent an area of pyelonephritis. A mass is felt to be less likely. This is not present on 5/23/2022. There is a probable small cyst in the upper pole left kidney that is difficult to evaluate  due to breathing motion artifact. 4. Thickening of the bladder wall may be related to muscular hypertrophy. Inflammation and infection are also included in the differential. There is a Mccrary catheter in the bladder. There is air in the bladder. 5. Bilateral fat-containing inguinal hernias. Very small fat-containing umbilical hernia. 6. Mild to moderate diffuse body wall edema. Presacral edema. Other nonacute findings, as discussed above. This report was finalized on 06/13/2022 18:38 by Dr. Zachariah Fonseca MD.    XR Chest 1 View    Result Date: 6/15/2022  EXAMINATION: XR CHEST 1 VW-  6/15/2022 3:40 AM CDT  HISTORY: On vent; R13.10-Dysphagia, unspecified; Z01.818-Encounter for other preprocedural examination; D32.9-Benign neoplasm of meninges, unspecified; Z74.09-Other reduced mobility; Z78.9-Other specified health status; G40.901-Epilepsy, unspecified, not intractable, with status epilepticus; J96.01-Acute respiratory failure with hypoxia; G91.0-Communicating hydrocephalus; T81.40XA-Infection following a pr  1 view chest x-ray.  Comparison is made with yesterday at 10:48 AM.  Stable heart and mediastinum. Persistent left basilar consolidation. There appears to be less infiltrate in the perihilar region of the left lung.  No pneumothorax. No new lung disease.  Unchanged tracheostomy tube, left jugular central line, and right-sided  shunt tubing.  Summary: 1. Similar appearance of the chest with left basilar consolidation. Positioning is different based on the degree of rotation. There appears to be less prominent left mid lung infiltrate. This report was finalized on 06/15/2022 07:46 by Dr. Tomer Whelan MD.    XR Chest 1 View    Result Date: 6/14/2022  EXAMINATION:  XR CHEST 1 VW-  6/14/2022 11:16 AM CDT  HISTORY: Central line placement. Lung infiltrates.  COMPARISON: 6/14/2022.  TECHNIQUE: Single view AP image.  FINDINGS:  There is a new left IJ central venous catheter with catheter tip near the brachiocephalic  vein and superior vena cava junction. There is no evidence of pneumothorax. There is hypoventilation. There is consolidation in the left perihilar region and left lung base. The left heart border is partially obscured. There is mild right perihilar infiltrate. There is cardiomegaly. There is blunting of the left costophrenic angle suggesting a small pleural effusion.      Impression: 1. New left IJ central venous catheter with no evidence of pneumothorax, as described. 2. Hypoventilation. 3. Parenchymal infiltrate on the left worrisome for pneumonia. Probable small pleural effusion on the left. 4. Right perihilar opacity likely due to vascular crowding and hypoventilation. Patchy pneumonia on the right is not ruled out.   This report was finalized on 06/14/2022 12:19 by Dr. Zachariah Fonseca MD.    XR Chest 1 View    Result Date: 6/14/2022  EXAMINATION:  XR CHEST 1 VW-  6/14/2022 3:20 AM CDT  HISTORY: On ventilator.  COMPARISON: 6/13/2022.  TECHNIQUE: Single view AP image.  FINDINGS:  A tracheostomy tube remains in place. There is probable ventriculoperitoneal shunt tubing traversing the right neck and right chest. There is hypoventilation with vascular crowding. There is patchy infiltrate in the left perihilar region and left lung base. There is mild atelectasis at the right lung base. The left heart border is partially obscured. There is cardiomegaly. There are degenerative changes of the spine and shoulders.      Impression: 1. Hypoventilation with vascular crowding. 2. Patchy infiltrate in the mid and lower lung zone on the left. The left heart border is obscured. This could represent pneumonia. Atelectasis is also considered. There is mild atelectasis at the right lung base.   This report was finalized on 06/14/2022 07:04 by Dr. Zachariah Fonseca MD.    US Venous Doppler Upper Extremity Left (duplex)    Result Date: 6/14/2022  History: Pain and swelling      Impression: Impression: 1. There is no evidence of deep  venous thrombosis of the left upper extremity. 2. There is superficial thrombus in the cephalic vein.  Comments: Left upper extremity venous duplex exam was performed using color Doppler flow, Doppler wave form analysis, and grayscale imaging, with and without compression. There is no evidence of deep venous thrombosis of the internal jugular, subclavian, axillary, brachial, radial, and ulnar veins. There is superficial thrombus involving the cephalic vein.  This report was finalized on 06/14/2022 15:42 by Dr. Nate Blakely MD.      My radiograph interpretation/independent review of imaging: LLL infiltrate    Pulmonary Assessment:    1. Acute resp failure requiring vent, stable, unable to liberate  2. Encephalopathy, hx meningioma, seuzures.  3. LLL pneumonia on abx day 5  4. Hydrocephalus s/p shunt  5. Anemia severe stable, above transfusion threshold  6. Cephalic vein thrombosis    Recommend/plan:   · Continue vent without change today  · Nutrition, enteral  · continue dvt, stress ulcer prophylaxis  · Continue monitoring etco2, off daily abg      This visit was performed by both a physician and an [facility] RN.  I personally evaluated and examined the patient.  I performed all aspects of the medical decision making as documented.  Electronically signed by Carlos A Dasilva MD, 6/15/2022, 16:19 CDT

## 2022-06-15 NOTE — PROGRESS NOTES
[facility]  INPATIENT PROGRESS NOTE    Patient Name: Hia Hernandez  Date of Admission: 5/10/2022  Today's Date: 06/15/22  Length of Stay: 36  Primary Care Physician: Elisa Chacko MD    Subjective   Chief Complaint: Intubated  HPI   Intubated.  Off sedation  Afebrile        Review of Systems   Unable to perform ROS: Intubated        All pertinent negatives and positives are as above. All other systems have been reviewed and are negative unless otherwise stated.     Objective    Temp:  [98.3 °F (36.8 °C)-99.5 °F (37.5 °C)] 99.2 °F (37.3 °C)  Heart Rate:  [55-76] 68  Resp:  [22-32] 32  BP: (129-181)/() 142/70  FiO2 (%):  [30 %-35 %] 30 %  Physical Exam  Vitals reviewed.   Constitutional:       Appearance: He is well-developed.   HENT:      Head: Normocephalic and atraumatic.      Right Ear: External ear normal.      Left Ear: External ear normal.      Nose: Nose normal.   Eyes:      General: No scleral icterus.        Right eye: No discharge.         Left eye: No discharge.      Conjunctiva/sclera: Conjunctivae normal.      Pupils: Pupils are equal, round, and reactive to light.   Neck:      Thyroid: No thyromegaly.      Trachea: No tracheal deviation.   Cardiovascular:      Rate and Rhythm: Normal rate and regular rhythm.      Heart sounds: Normal heart sounds. No murmur heard.    No friction rub. No gallop.   Pulmonary:      Effort: Pulmonary effort is normal. No respiratory distress.      Breath sounds: Normal breath sounds. No stridor. No wheezing or rales.   Chest:      Chest wall: No tenderness.   Abdominal:      General: Bowel sounds are normal. There is no distension.      Palpations: Abdomen is soft. There is no mass.      Tenderness: There is no abdominal tenderness. There is no guarding or rebound.      Hernia: No hernia is present.   Musculoskeletal:         General: No deformity. Normal range of motion.      Cervical back: Normal range  of motion and neck supple.   Lymphadenopathy:      Cervical: No cervical adenopathy.   Skin:     General: Skin is warm and dry.      Coloration: Skin is not pale.      Findings: No erythema or rash.   Neurological:      Comments: Level of consciousness: arousable by verbal stimuli ,  drowsy  Off propofol.  Intermittently opens eyes to painful stimuli.  Does not follow commands.  Nonverbal.  Weakly withdraws to tactile stimuli.            Cranial Nerves      CN III, IV, VI   Pupils are equal, round, and reactive to light.  Extraocular motions are normal.      CN IX, X   CN IX normal.         Psychiatric:         Behavior: Behavior normal.         Thought Content: Thought content normal.         Judgment: Judgment normal.           Results Review:  I have reviewed the labs, radiology results, and diagnostic studies.    Laboratory Data:   Results from last 7 days   Lab Units 06/15/22  0426 06/14/22  0608 06/13/22  0607   WBC 10*3/mm3 8.84 9.59 10.97*   HEMOGLOBIN g/dL 7.5* 7.7* 6.8*   HEMATOCRIT % 24.7* 24.3* 22.5*   PLATELETS 10*3/mm3 195 198 206        Results from last 7 days   Lab Units 06/15/22  0426 06/14/22  0405 06/13/22  0607   SODIUM mmol/L 138 139 138   POTASSIUM mmol/L 3.9 4.5 4.1   CHLORIDE mmol/L 103 103 105   CO2 mmol/L 29.0 28.0 29.0   BUN mg/dL 33* 36* 37*   CREATININE mg/dL 0.43* 0.54* 0.57*   CALCIUM mg/dL 8.1* 8.4* 8.3*   BILIRUBIN mg/dL 0.4 0.6 0.4   ALK PHOS U/L 403* 472* 366*   ALT (SGPT) U/L 149* 175* 106*   AST (SGOT) U/L 148* 248* 145*   GLUCOSE mg/dL 148* 159* 165*       Culture Data:   Wound Culture   Date Value Ref Range Status   06/10/2022 No growth at 3 days  Final   06/10/2022 No growth at 3 days  Final   06/10/2022 No growth at 3 days  Final     Respiratory Culture   Date Value Ref Range Status   06/10/2022   Preliminary    Rare The culture consists of normal respiratory annia. This is a preliminary report; final report to follow.       Radiology Data:   Imaging Results (Last 24 Hours)      Procedure Component Value Units Date/Time    XR Chest 1 View [[id number]] Collected: 06/15/22 0745     Updated: 06/15/22 0749    Narrative:      EXAMINATION: XR CHEST 1 VW-     6/15/2022 3:40 AM CDT     HISTORY: On vent; R13.10-Dysphagia, unspecified; Z01.818-Encounter for  other preprocedural examination; D32.9-Benign neoplasm of meninges,  unspecified; Z74.09-Other reduced mobility; Z78.9-Other specified health  status; G40.901-Epilepsy, unspecified, not intractable, with status  epilepticus; J96.01-Acute respiratory failure with hypoxia;  G91.0-Communicating hydrocephalus; T81.40XA-Infection following a pr     1 view chest x-ray.     Comparison is made with yesterday at 10:48 AM.     Stable heart and mediastinum.  Persistent left basilar consolidation.  There appears to be less infiltrate in the perihilar region of the left  lung.     No pneumothorax.  No new lung disease.     Unchanged tracheostomy tube, left jugular central line, and right-sided   shunt tubing.     Summary:  1. Similar appearance of the chest with left basilar consolidation.  Positioning is different based on the degree of rotation. There appears  to be less prominent left mid lung infiltrate.  This report was finalized on 06/15/2022 07:46 by Dr. Tomer Whelan MD.    US Venous Doppler Upper Extremity Left (duplex) [[id number]] Collected: 06/14/22 1542     Updated: 06/14/22 1545    Narrative:      History: Pain and swelling       Impression:      Impression:  1. There is no evidence of deep venous thrombosis of the left upper  extremity.  2. There is superficial thrombus in the cephalic vein.     Comments: Left upper extremity venous duplex exam was performed using  color Doppler flow, Doppler wave form analysis, and grayscale imaging,  with and without compression. There is no evidence of deep venous  thrombosis of the internal jugular, subclavian, axillary, brachial,  radial, and ulnar veins. There is superficial thrombus involving  the  cephalic vein.     This report was finalized on 06/14/2022 15:42 by Dr. Nate Blakely MD.    XR Chest 1 View [[id number]] Collected: 06/14/22 1218     Updated: 06/14/22 1222    Narrative:      EXAMINATION:  XR CHEST 1 VW-  6/14/2022 11:16 AM CDT     HISTORY: Central line placement. Lung infiltrates.     COMPARISON: 6/14/2022.     TECHNIQUE: Single view AP image.     FINDINGS:  There is a new left IJ central venous catheter with catheter  tip near the brachiocephalic vein and superior vena cava junction. There  is no evidence of pneumothorax. There is hypoventilation. There is  consolidation in the left perihilar region and left lung base. The left  heart border is partially obscured. There is mild right perihilar  infiltrate. There is cardiomegaly. There is blunting of the left  costophrenic angle suggesting a small pleural effusion.       Impression:      1. New left IJ central venous catheter with no evidence of pneumothorax,  as described.  2. Hypoventilation.  3. Parenchymal infiltrate on the left worrisome for pneumonia. Probable  small pleural effusion on the left.  4. Right perihilar opacity likely due to vascular crowding and  hypoventilation. Patchy pneumonia on the right is not ruled out.        This report was finalized on 06/14/2022 12:19 by Dr. Zachariah Fonseca MD.          I have reviewed the patient's current medications.     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  Meningioma  -NS managing  Status post postcraniotomy for meningioma  S/P Craniectomy for cerebral edema and possible infection    2.  Status Epilepticus/Seizures  -Cerebyx  -Vimpat  -Dilantin  -Neurology following    3.  Acute Respiratory failure with prolonged mechanical ventilation s/p Trach and PEG  -Pulm following    4.  Hydrocephalus/Cerebral Edema  POD#3 (6/10/2022) Craniectomy for cerebral edema and possible infection  -NS managing  -Decadron  -Lumbar drain removed  -s/p Right Posterial parietal  shunt placement    5.  Left pleural effusion s/p Chest tube which has since been removed without issue  -monitor     6.  A-Fib  -Coreg  -Not able to tolerate AC    7.  RUE DVT  -monitor  -not able to tolerated AC     8.  LUE Superficial thrombophlebitis  -supportive care        Discharge Planning: I expect the patient to be discharged to LTAC in ? days  Electronically signed by Sha Beatty MD, 06/15/22, 10:34 CDT.

## 2022-06-15 NOTE — PLAN OF CARE
Problem: Adult Inpatient Plan of Care  Goal: Plan of Care Review  Recent Flowsheet Documentation  Taken 6/15/2022 0841 by Armando Bennett, PTA  Progress: no change  Plan of Care Reviewed With: patient  Outcome Evaluation: Performed PROM to all 4 exts. Pt did not awake or move any exts. Will cont. POC.

## 2022-06-15 NOTE — PROGRESS NOTES
Neurology Progress Note      Chief Complaint:    Postoperative seizure    Subjective     Subjective:  Patient remains status quo today.  No change in neurologic status.  Patient still is only withdrawing to noxious stimuli.  Does not arouse to verbal stimuli or sternal rub.  He does not follow commands.  No seizure activity.      Past Medical History:   Diagnosis Date   • Brain tumor (HCC)    • Depression    • Hearing loss    • History of cellulitis     right foot big toe   • Hypertension    • Pneumonia    • Right arm weakness     after cervial injury   • Sciatic pain     affects balance   • Thyroid disease    • Visual disturbance     due to brain tumor - right eye     Past Surgical History:   Procedure Laterality Date   • CATARACT EXTRACTION, BILATERAL     • COLONOSCOPY     • CRANIOTOMY Bilateral 6/10/2022    Procedure: CRANIECTOMY;  Surgeon: Martell Downs MD;  Location: [facility] OR;  Service: Neurosurgery;  Laterality: Bilateral;   • CRANIOTOMY FOR TUMOR Right 5/10/2022    Procedure: CRANIOTOMY FOR TUMOR STERIOTACTIC WITH BRAIN LAB, right;  Surgeon: Martell Downs MD;  Location: [facility] OR;  Service: Neurosurgery;  Laterality: Right;   • ENDOSCOPY W/ PEG TUBE PLACEMENT N/A 6/2/2022    Procedure: ESOPHAGOGASTRODUODENOSCOPY WITH PERCUTANEOUS ENDOSCOPIC GASTROSTOMY TUBE INSERTION WITH ANESTHESIA;  Surgeon: Melecio Lu MD;  Location: [facility] OR;  Service: Gastroenterology;  Laterality: N/A;   • NECK SURGERY     • SKIN CANCER EXCISION     • TONSILLECTOMY     • TRACHEOSTOMY N/A 6/2/2022    Procedure: TRACHEOSTOMY;  Surgeon: Geovany Davidson MD;  Location: [facility] OR;  Service: ENT;  Laterality: N/A;   •  SHUNT INSERTION Right 6/3/2022    Procedure: VENTRICULAR PERITONEAL SHUNT INSERTION WITH BRAIN LAB;  Surgeon: Martell Downs MD;  Location: [facility] OR;  Service: Neurosurgery;  Laterality: Right;     Family History   Problem Relation Age of Onset   • Cancer Sister    • Cancer Brother       Social History     Tobacco Use   • Smoking status: Never Smoker   • Smokeless tobacco: Never Used   Vaping Use   • Vaping Use: Never used   Substance Use Topics   • Alcohol use: Never   • Drug use: Never       Medications:  Current Facility-Administered Medications   Medication Dose Route Frequency Provider Last Rate Last Admin   • acetaminophen (TYLENOL) tablet 650 mg  650 mg Oral Q4H PRN Martell Downs MD   650 mg at 06/14/22 2100    Or   • acetaminophen (TYLENOL) suppository 650 mg  650 mg Rectal Q4H PRN Martell Downs MD   650 mg at 05/23/22 0016   • alteplase (CATHFLO/ACTIVASE) injection 2 mg  2 mg Intracatheter PRN Martell Downs MD   New Syringe/Cartridge at 05/28/22 1330   • bisacodyl (DULCOLAX) suppository 10 mg  10 mg Rectal Daily PRN Stevie Parsons APRN       • carvedilol (COREG) tablet 6.25 mg  6.25 mg Nasogastric BID With Meals Martell Downs MD   6.25 mg at 06/15/22 0808   • cefepime (MAXIPIME) 2 g/100 mL 0.9% NS (mbp)  2 g Intravenous Q8H Martell Downs MD   2 g at 06/15/22 0517   • chlorhexidine (PERIDEX) 0.12 % solution 15 mL  15 mL Mouth/Throat Q12H Martell Downs MD   15 mL at 06/15/22 0808   • dexamethasone (DECADRON) tablet 4 mg  4 mg Per PEG Tube Q12H Stevie Parsons APRN   4 mg at 06/15/22 0808    Followed by   • [START ON 6/16/2022] dexamethasone (DECADRON) tablet 2 mg  2 mg Per PEG Tube Q12H Stevie Parsons APRN        Followed by   • [START ON 6/18/2022] dexamethasone (DECADRON) tablet 1 mg  1 mg Per PEG Tube Q12H Stevie Parsons APRN        Followed by   • [START ON 6/20/2022] dexamethasone (DECADRON) tablet 0.5 mg  0.5 mg Per PEG Tube Daily Stevie Parsons APRN       • dextrose (D50W) (25 g/50 mL) IV injection 25 g  25 g Intravenous Q15 Min PRN Martell Downs MD   25 g at 06/09/22 1807   • dextrose (GLUTOSE) oral gel 15 g  15 g Oral Q15 Min PRN Martell Downs MD   15 g at 05/22/22 0527   • docusate (COLACE) 50 MG/5ML  liquid 50 mg  50 mg Oral Daily Sha Beatty MD   50 mg at 06/15/22 0807   • fosphenytoin (Cerebyx) 200 mg PE in sodium chloride 0.9 % 100 mL IVPB  200 mg PE Intravenous Q8H Zac Mccoy PA-C   200 mg PE at 06/15/22 0517   • glucagon (human recombinant) (GLUCAGEN DIAGNOSTIC) injection 1 mg  1 mg Intramuscular Q15 Min PRN Martell Downs MD       • guaiFENesin (ROBITUSSIN) 100 MG/5ML oral solution 200 mg  200 mg Per PEG Tube TID Daly Perea APRN   200 mg at 06/15/22 0807   • heparin (porcine) 5000 UNIT/ML injection 5,000 Units  5,000 Units Subcutaneous Q12H Martell Downs MD   5,000 Units at 06/15/22 0808   • hydrALAZINE (APRESOLINE) injection 10 mg  10 mg Intravenous Q6H PRN Martell Downs MD   10 mg at 06/15/22 0636   • insulin detemir (LEVEMIR) injection 20 Units  20 Units Subcutaneous Nightly Martell Downs MD   20 Units at 06/14/22 2028   • insulin regular (humuLIN R,novoLIN R) injection 2-7 Units  2-7 Units Subcutaneous Q6H Martell Downs MD   2 Units at 06/15/22 0012   • ipratropium-albuterol (DUO-NEB) nebulizer solution 3 mL  3 mL Nebulization Q4H PRN Martell Downs MD   3 mL at 06/15/22 0650   • labetalol (NORMODYNE,TRANDATE) injection 10 mg  10 mg Intravenous Q6H PRN Martell Downs MD   10 mg at 06/14/22 0337   • lacosamide (VIMPAT) injection 200 mg  200 mg Intravenous Q12H Martell Downs MD   200 mg at 06/15/22 0807   • lactulose solution 20 g  20 g Per PEG Tube Daily Sha Beatty MD   20 g at 06/15/22 0807   • lansoprazole (FIRST) oral suspension 30 mg  30 mg Per G Tube QAM Martell Downs MD   30 mg at 06/15/22 0810   • latanoprost (XALATAN) 0.005 % ophthalmic solution 1 drop  1 drop Both Eyes Nightly Martell Downs MD   1 drop at 06/14/22 2023   • levalbuterol (XOPENEX) nebulizer solution 0.63 mg  0.63 mg Nebulization TID PRN Martell Downs MD   0.63 mg at 06/12/22 1046   •  levETIRAcetam in NaCl 0.75% (KEPPRA) IVPB 1,000 mg  1,000 mg Intravenous Q12H Martell Downs MD   1,000 mg at 06/15/22 0808   • levothyroxine (SYNTHROID, LEVOTHROID) tablet 125 mcg  125 mcg Nasogastric Q AM Martell Downs MD   125 mcg at 06/15/22 0517   • lidocaine (XYLOCAINE) 1 % injection 10 mL  10 mL Intradermal Once Martell Downs MD       • lidocaine (XYLOCAINE) 1 % injection 10 mL  10 mL Infiltration Once Pilo Ortega MD       • liothyronine (CYTOMEL) tablet 25 mcg  25 mcg Nasogastric Daily Martell Downs MD   25 mcg at 06/15/22 0808   • lisinopril (PRINIVIL,ZESTRIL) tablet 20 mg  20 mg Nasogastric Q24H Martell Downs MD   20 mg at 06/15/22 0808   • LORazepam (ATIVAN) injection 2 mg  2 mg Intravenous Q2H PRN Martell Downs MD   2 mg at 06/14/22 0358   • mupirocin (BACTROBAN) 2 % ointment 1 application  1 application Topical Q12H Martell Downs MD   1 application at 06/15/22 0809   • norepinephrine (LEVOPHED) 8 mg in 250 mL NS infusion (premix)  0.02-0.3 mcg/kg/min Intravenous Titrated Martell Downs MD   Held at 06/11/22 1054   • Nutrisource fiber pack 1 packet  1 packet Nasogastric 4x Daily Martell Downs MD   1 packet at 06/15/22 0808   • ondansetron (ZOFRAN) tablet 4 mg  4 mg Oral Q6H PRN Martell Downs MD        Or   • ondansetron (ZOFRAN) injection 4 mg  4 mg Intravenous Q6H PRN Martell Downs MD       • oxyCODONE (ROXICODONE) 5 MG/5ML solution 5 mg  5 mg Per PEG Tube Q6H PRN Daly Perea APRN   5 mg at 06/13/22 0354   • polyethylene glycol (MIRALAX) packet 17 g  17 g Oral Daily Martell Downs MD   17 g at 06/15/22 0809   • ProSource TF oral liquid 45 mL  45 mL Per G Tube BID Martell Downs MD   45 mL at 06/15/22 0808   • sodium chloride 3 % nebulizer solution 4 mL  4 mL Nebulization BID - RT Martell Downs MD   4 mL at 06/15/22 0650   • vancomycin 1500 mg/500 mL 0.9% NS  IVPB ([facility])  15 mg/kg Intravenous Q12H Martell Downs MD   1,500 mg at 06/15/22 0046       Allergies:    Patient has no known allergies.    Review of Systems:   -A 14 point review of systems is completed and is negative.      Objective      Vital Signs  Temp:  [98.3 °F (36.8 °C)-99.5 °F (37.5 °C)] 99.2 °F (37.3 °C)  Heart Rate:  [55-76] 68  Resp:  [22-32] 32  BP: (129-181)/() 142/70  FiO2 (%):  [30 %-35 %] 30 %    Physical Exam:     HEENT:  Neck supple.  Tracheostomy in place.   CVS:  Regular rate and rhythm.  No murmurs  Carotid Examination:  No bruits  Lungs:  Clear to auscultation  Abdomen:  Non-tender, Non-distended.  PEG tube in place  Extremities: Edema of the dorsum of the bilateral hands.     Neurologic Exam:   Coughs against the vent with sternal rub and noxious stimuli  Pupils are equally reactive to light.    Gag intact.     Motor:    Did not move any extremities spontaneously  Withdrew to noxious stimuli in all extremities.     DTR:  2+ throughout in all four extremities  upgoing toe on left       Results Review:    I reviewed the patient's new clinical results and findings.    Lab Results (last 24 hours)     Procedure Component Value Units Date/Time    Respiratory Culture - Sputum, ET Suction [[id number]] Collected: 06/14/22 1632    Specimen: Sputum from ET Suction Updated: 06/15/22 0539     Gram Stain Many (4+) WBCs per low power field      Rare (1+) Epithelial cells per low power field      Few (2+) Gram positive cocci      Few (2+) Gram negative bacilli    Phenytoin Level, Total [[id number]]  (Normal) Collected: 06/15/22 0426    Specimen: Blood Updated: 06/15/22 0533     Phenytoin Level 14.9 mcg/mL     Anaerobic Culture - Tissue, Brain [[id number]] Collected: 06/10/22 1524    Specimen: Tissue from Brain Updated: 06/15/22 0529     Anaerobic Culture No anaerobes isolated at 5 days    Anaerobic Culture - Tissue, Brain, Cerebral Cortex [[id number]] Collected: 06/10/22 1327    Specimen: Tissue  from Brain, Cerebral Cortex Updated: 06/15/22 0528     Anaerobic Culture No anaerobes isolated at 5 days    Comprehensive Metabolic Panel [[id number]]  (Abnormal) Collected: 06/15/22 0426    Specimen: Blood Updated: 06/15/22 0528     Glucose 148 mg/dL      BUN 33 mg/dL      Creatinine 0.43 mg/dL      Sodium 138 mmol/L      Potassium 3.9 mmol/L      Chloride 103 mmol/L      CO2 29.0 mmol/L      Calcium 8.1 mg/dL      Total Protein 6.2 g/dL      Albumin 2.30 g/dL      ALT (SGPT) 149 U/L      AST (SGOT) 148 U/L      Alkaline Phosphatase 403 U/L      Total Bilirubin 0.4 mg/dL      Globulin 3.9 gm/dL      A/G Ratio 0.6 g/dL      BUN/Creatinine Ratio 76.7     Anion Gap 6.0 mmol/L      eGFR 109.9 mL/min/1.73      Comment: National Kidney Foundation and American Society of Nephrology (ASN) Task Force recommended calculation based on the Chronic Kidney Disease Epidemiology Collaboration (CKD-EPI) equation refit without adjustment for race.       Narrative:      GFR Normal >60  Chronic Kidney Disease <60  Kidney Failure <15      POC Glucose Once [[id number]]  (Abnormal) Collected: 06/15/22 0516    Specimen: Blood Updated: 06/15/22 0527     Glucose 139 mg/dL      Comment: : [id number] Salvador LaceyMeter ID: [id number]       CBC & Differential [[id number]]  (Abnormal) Collected: 06/15/22 0426    Specimen: Blood Updated: 06/15/22 0505    Narrative:      The following orders were created for panel order CBC & Differential.  Procedure                               Abnormality         Status                     ---------                               -----------         ------                     CBC Auto Differential[[id number]]        Abnormal            Final result                 Please view results for these tests on the individual orders.    CBC Auto Differential [[id number]]  (Abnormal) Collected: 06/15/22 0426    Specimen: Blood Updated: 06/15/22 0505     WBC 8.84 10*3/mm3      RBC 2.42 10*6/mm3      Hemoglobin 7.5 g/dL       Hematocrit 24.7 %      .1 fL      MCH 31.0 pg      MCHC 30.4 g/dL      RDW 17.5 %      RDW-SD 63.0 fl      MPV 9.9 fL      Platelets 195 10*3/mm3      Neutrophil % 70.6 %      Lymphocyte % 9.8 %      Monocyte % 13.0 %      Eosinophil % 3.7 %      Basophil % 0.8 %      Immature Grans % 2.1 %      Neutrophils, Absolute 6.23 10*3/mm3      Lymphocytes, Absolute 0.87 10*3/mm3      Monocytes, Absolute 1.15 10*3/mm3      Eosinophils, Absolute 0.33 10*3/mm3      Basophils, Absolute 0.07 10*3/mm3      Immature Grans, Absolute 0.19 10*3/mm3      nRBC 0.0 /100 WBC     POC Glucose Once [[id number]]  (Abnormal) Collected: 06/15/22 0010    Specimen: Blood Updated: 06/15/22 0021     Glucose 152 mg/dL      Comment: : [id number] Salvador LaceyMeter ID: [id number]       POC Glucose Once [[id number]]  (Abnormal) Collected: 06/14/22 2028    Specimen: Blood Updated: 06/14/22 2039     Glucose 191 mg/dL      Comment: : [id number] Salvador LaceyMeter ID: [id number]       POC Glucose Once [[id number]]  (Abnormal) Collected: 06/14/22 1637    Specimen: Blood Updated: 06/14/22 1649     Glucose 144 mg/dL      Comment: : ROMELANETA Bernardo RyanMeter ID: [id number]       POC Glucose Once [[id number]]  (Abnormal) Collected: 06/14/22 1127    Specimen: Blood Updated: 06/14/22 1138     Glucose 171 mg/dL      Comment: : ROMELANETA Bernardo RyanMeter ID: [id number]             Imaging Results (Last 24 Hours)     Procedure Component Value Units Date/Time    XR Chest 1 View [[id number]] Collected: 06/15/22 0745     Updated: 06/15/22 0749    Narrative:      EXAMINATION: XR CHEST 1 VW-     6/15/2022 3:40 AM CDT     HISTORY: On vent; R13.10-Dysphagia, unspecified; Z01.818-Encounter for  other preprocedural examination; D32.9-Benign neoplasm of meninges,  unspecified; Z74.09-Other reduced mobility; Z78.9-Other specified health  status; G40.901-Epilepsy, unspecified, not intractable, with status  epilepticus; J96.01-Acute  respiratory failure with hypoxia;  G91.0-Communicating hydrocephalus; T81.40XA-Infection following a pr     1 view chest x-ray.     Comparison is made with yesterday at 10:48 AM.     Stable heart and mediastinum.  Persistent left basilar consolidation.  There appears to be less infiltrate in the perihilar region of the left  lung.     No pneumothorax.  No new lung disease.     Unchanged tracheostomy tube, left jugular central line, and right-sided   shunt tubing.     Summary:  1. Similar appearance of the chest with left basilar consolidation.  Positioning is different based on the degree of rotation. There appears  to be less prominent left mid lung infiltrate.  This report was finalized on 06/15/2022 07:46 by Dr. Tomer Whelan MD.    US Venous Doppler Upper Extremity Left (duplex) [[id number]] Collected: 06/14/22 1542     Updated: 06/14/22 1545    Narrative:      History: Pain and swelling       Impression:      Impression:  1. There is no evidence of deep venous thrombosis of the left upper  extremity.  2. There is superficial thrombus in the cephalic vein.     Comments: Left upper extremity venous duplex exam was performed using  color Doppler flow, Doppler wave form analysis, and grayscale imaging,  with and without compression. There is no evidence of deep venous  thrombosis of the internal jugular, subclavian, axillary, brachial,  radial, and ulnar veins. There is superficial thrombus involving the  cephalic vein.     This report was finalized on 06/14/2022 15:42 by Dr. Nate Blakely MD.    XR Chest 1 View [[id number]] Collected: 06/14/22 1218     Updated: 06/14/22 1222    Narrative:      EXAMINATION:  XR CHEST 1 VW-  6/14/2022 11:16 AM CDT     HISTORY: Central line placement. Lung infiltrates.     COMPARISON: 6/14/2022.     TECHNIQUE: Single view AP image.     FINDINGS:  There is a new left IJ central venous catheter with catheter  tip near the brachiocephalic vein and superior vena cava junction.  There  is no evidence of pneumothorax. There is hypoventilation. There is  consolidation in the left perihilar region and left lung base. The left  heart border is partially obscured. There is mild right perihilar  infiltrate. There is cardiomegaly. There is blunting of the left  costophrenic angle suggesting a small pleural effusion.       Impression:      1. New left IJ central venous catheter with no evidence of pneumothorax,  as described.  2. Hypoventilation.  3. Parenchymal infiltrate on the left worrisome for pneumonia. Probable  small pleural effusion on the left.  4. Right perihilar opacity likely due to vascular crowding and  hypoventilation. Patchy pneumonia on the right is not ruled out.        This report was finalized on 06/14/2022 12:19 by Dr. Zachariah Fonseca MD.          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)    Status epilepticus (HCC)    Essential hypertension    Type 2 diabetes mellitus with hyperglycemia, without long-term current use of insulin (HCC)    Hypothyroidism (acquired)    Acute respiratory failure (secondary to status epilepticus)    Thrombocytopenia (HCC)    Cerebral parenchymal hemorrhage (HCC)    PAF (paroxysmal atrial fibrillation) (HCC)    Fever    Loculated pleural effusion    Acute deep vein thrombosis (DVT) of the ulnar and brachial veins of right upper extremity (HCC)    Dysphagia    Communicating hydrocephalus (HCC)     Impression:     · Postoperative seizure  · S/p craniectomy and debridement   · Hypoalbuminemia   · S/p  shunt on 6/3/2022  · Tracheostomy  · PEG tube        Plan:  · EEG only demonstrated background slowing and no sharp discharges.  · Continue to reduce fosphenytoin.  Eventual plan to discontinue.  · Continue Keppra 1000 mg twice daily.  Once fosphenytoin is discontinued Keppra will be the next agent to reduce and discontinue as it is likely the greatest  sedating medication of the 3 antiepileptics.  · Continue Vimpat 200 mg IV every 12 hours  · Plans are to look at LTAC placement near [place], [place].  If bed arranged, will provide a plan for documenting and discontinuing his AEDs.  · Neurology will follow peripherally.          Zac Mccoy PA-C  06/15/22  10:59 CDT

## 2022-06-15 NOTE — DISCHARGE PLACEMENT REQUEST
"Radha Wilkins (77 y.o. Male)             Date of Birth   1945    Social Security Number       Address    [address] [facility] [address]    Home Phone   [phone]    MRN   [id number]       Catholic   Other    Marital Status                               Admission Date   5/10/22    Admission Type   Elective    Admitting Provider   Martell Downs MD    Attending Provider   Martell Downs MD    Department, Room/Bed   [facility] INTENSIVE CARE, I010/1       Discharge Date       Discharge Disposition       Discharge Destination                               Attending Provider: Martell Downs MD    Allergies: No Known Allergies    Isolation: None   Infection: None   Code Status: CPR   Advance Care Planning Activity    Ht: 182.9 cm (72\")   Wt: 109 kg (241 lb)    Admission Cmt: None   Principal Problem: Meningioma (HCC) [D32.9]                 Active Insurance as of 5/10/2022     Primary Coverage     Payor Plan Insurance Group Employer/Plan Group    MEDICARE MEDICARE A & B      Payor Plan Address Payor Plan Phone Number Payor Plan Fax Number Effective Dates    [address] [phone]  3/1/2010 - None Entered    [facility] [id number]       Subscriber Name Subscriber Birth Date Member ID       RADHA WILKINS 1945 [id number]           Secondary Coverage     Payor Plan Insurance Group Employer/Plan Group     FOR LIFE  FOR LIFE  SUP       Payor Plan Address Payor Plan Phone Number Payor Plan Fax Number Effective Dates    [address] [phone]  1/5/2022 - None Entered    [facility] [address]       Subscriber Name Subscriber Birth Date Member ID       RADHA WILKINS 1945 [id number]                 Emergency Contacts      (Rel.) Home Phone Work Phone Mobile Phone    abhay fitzgerald (Daughter) -- -- [phone]    reg norman (Son) -- -- [phone]               History & Physical      Aden Kc MD at 06/08/22 " 1148              [facility] Medicine Services  INPATIENT PROGRESS NOTE    Length of Stay: 29  Date of Admission: 5/10/2022  Primary Care Physician: Elisa Chacko MD    Subjective   Chief Complaint: Respiratory failure/status post tracheotomy/status post chest tube removal    HPI   T-max 98.7.  T-current 98.2.  Blood pressure stable, slight brachycardia.  Ongoing respiratory support.  All the chest tube has been removed.    Review of Systems   Unable to assess secondary to the patient's intubated and sedated state.    All pertinent negatives and positives are as above. All other systems have been reviewed and are negative unless otherwise stated.     Objective    Temp:  [97.6 °F (36.4 °C)-98.7 °F (37.1 °C)] 98.2 °F (36.8 °C)  Heart Rate:  [55-63] 58  Resp:  [21-28] 27  BP: (125-175)/(59-80) 146/69  FiO2 (%):  [30 %-40 %] 30 %    Intake/Output Summary (Last 24 hours) at 6/8/2022 1148  Last data filed at 6/8/2022 0800  Gross per 24 hour   Intake 2239 ml   Output 550 ml   Net 1689 ml     Physical Exam  Vitals and nursing note reviewed.   HENT:      Head: Normocephalic.   Eyes:      Conjunctiva/sclera: Conjunctivae normal.      Pupils: Pupils are equal, round, and reactive to light.   Neck:      Vascular: No JVD.   Cardiovascular:      Rate and Rhythm: Normal rate and regular rhythm.      Heart sounds: Normal heart sounds.   Pulmonary:      Effort: No respiratory distress.      Breath sounds: No wheezing or rales.      Comments: Tracheotomy in place.  On the vent.  Diminished breath sound bilateral, clear.  Chest:      Chest wall: No tenderness.   Abdominal:      General: Bowel sounds are normal. There is no distension.      Palpations: Abdomen is soft.      Tenderness: There is no abdominal tenderness.      Comments: Obesity .  PEG tube in place.  Musculoskeletal:         General: No tenderness or deformity.      Cervical back: Neck supple.      Right lower leg: Edema present.       Left lower leg: Edema present.      Comments: +1 .   Skin:     General: Skin is warm and dry.      Capillary Refill: Capillary refill takes 2 to 3 seconds.      Findings: No rash.   Neurological:      Mental Status: He is oriented to person, place, and time.      Cranial Nerves: No cranial nerve deficit.      Motor: Weakness present. No abnormal muscle tone.      Coordination: Coordination abnormal.      Gait: Gait abnormal.      Deep Tendon Reflexes: Reflexes normal.   Results Review:  Lab Results (last 24 hours)     Procedure Component Value Units Date/Time    POC Glucose Once [[id number]]  (Abnormal) Collected: 06/08/22 1102    Specimen: Blood Updated: 06/08/22 1114     Glucose 136 mg/dL      Comment: : [id number] Ninirickey JustinanMeter ID: [id number]       AFB Culture - Body Fluid, Pleural Cavity [[id number]] Collected: 05/25/22 1042    Specimen: Body Fluid from Pleural Cavity Updated: 06/08/22 1102     AFB Culture No AFB isolated at 2 weeks     AFB Stain No acid fast bacilli seen on direct smear      No acid fast bacilli seen on concentrated smear    Fungus Culture - Body Fluid, Pleural Cavity [[id number]] Collected: 05/25/22 1042    Specimen: Body Fluid from Pleural Cavity Updated: 06/08/22 1102     Fungus Culture No fungus isolated at 2 weeks    POC Glucose Once [[id number]]  (Abnormal) Collected: 06/08/22 0824    Specimen: Blood Updated: 06/08/22 0835     Glucose 159 mg/dL      Comment: : [id number] Cone RobertMeter ID: [id number]       POC Glucose Once [[id number]]  (Abnormal) Collected: 06/08/22 0611    Specimen: Blood Updated: 06/08/22 0622     Glucose 186 mg/dL      Comment: : [id number] Na JamieMeter ID: [id number]       Manual Differential [[id number]]  (Abnormal) Collected: 06/08/22 0256    Specimen: Blood Updated: 06/08/22 0431     Neutrophil % 83.8 %      Lymphocyte % 5.1 %      Monocyte % 7.1 %      Eosinophil % 1.0 %      Basophil % 1.0 %      Metamyelocyte % 2.0 %      Neutrophils  Absolute 8.93 10*3/mm3      Lymphocytes Absolute 0.54 10*3/mm3      Monocytes Absolute 0.76 10*3/mm3      Eosinophils Absolute 0.11 10*3/mm3      Basophils Absolute 0.11 10*3/mm3      nRBC 1.0 /100 WBC      Anisocytosis Slight/1+     Macrocytes Slight/1+     Poikilocytes Slight/1+     Polychromasia Slight/1+     WBC Morphology Normal     Platelet Morphology Normal    CBC & Differential [[id number]]  (Abnormal) Collected: 06/08/22 0256    Specimen: Blood Updated: 06/08/22 0431    Narrative:      The following orders were created for panel order CBC & Differential.  Procedure                               Abnormality         Status                     ---------                               -----------         ------                     CBC Auto Differential[[id number]]        Abnormal            Final result                 Please view results for these tests on the individual orders.    CBC Auto Differential [[id number]]  (Abnormal) Collected: 06/08/22 0256    Specimen: Blood Updated: 06/08/22 0431     WBC 10.66 10*3/mm3      RBC 2.85 10*6/mm3      Hemoglobin 8.9 g/dL      Hematocrit 28.5 %      .0 fL      MCH 31.2 pg      MCHC 31.2 g/dL      RDW 15.1 %      RDW-SD 52.5 fl      MPV 9.3 fL      Platelets 251 10*3/mm3     Narrative:      The previously reported component NRBC is no longer being reported. Previous result was 0.4 /100 WBC (Reference Range: 0.0-0.2 /100 WBC) on 6/8/2022 at [facility].    Phenytoin Level, Total [[id number]]  (Normal) Collected: 06/08/22 0256    Specimen: Blood Updated: 06/08/22 0403     Phenytoin Level 15.1 mcg/mL     Comprehensive Metabolic Panel [[id number]]  (Abnormal) Collected: 06/08/22 0256    Specimen: Blood Updated: 06/08/22 0401     Glucose 96 mg/dL      BUN 29 mg/dL      Creatinine 0.53 mg/dL      Sodium 138 mmol/L      Potassium 4.4 mmol/L      Chloride 103 mmol/L      CO2 28.0 mmol/L      Calcium 8.4 mg/dL      Total Protein 6.3 g/dL      Albumin 2.10 g/dL      ALT (SGPT) 29  U/L      AST (SGOT) 42 U/L      Alkaline Phosphatase 188 U/L      Total Bilirubin 0.3 mg/dL      Globulin 4.2 gm/dL      A/G Ratio 0.5 g/dL      BUN/Creatinine Ratio 54.7     Anion Gap 7.0 mmol/L      eGFR 103.2 mL/min/1.73      Comment: National Kidney Foundation and American Society of Nephrology (ASN) Task Force recommended calculation based on the Chronic Kidney Disease Epidemiology Collaboration (CKD-EPI) equation refit without adjustment for race.       Narrative:      GFR Normal >60  Chronic Kidney Disease <60  Kidney Failure <15      Blood Gas, Arterial - [[id number]]  (Abnormal) Collected: 06/08/22 0346    Specimen: Arterial Blood Updated: 06/08/22 0341     Site Right Radial     Denzel's Test Positive     pH, Arterial 7.465 pH units      Comment: 83 Value above reference range        pCO2, Arterial 42.3 mm Hg      pO2, Arterial 80.0 mm Hg      Comment: 84 Value below reference range        HCO3, Arterial 30.4 mmol/L      Comment: 83 Value above reference range        Base Excess, Arterial 6.0 mmol/L      Comment: 83 Value above reference range        O2 Saturation, Arterial 97.2 %      Temperature 37.0 C      Barometric Pressure for Blood Gas 747 mmHg      Modality Ventilator     FIO2 30 %      Ventilator Mode PC     Set Mech Resp Rate 16.0     PEEP 6.0     PIP 14 cmH2O      Comment: Meter: [id number]     :  [id number]        Collected by [id number]     pCO2, Temperature Corrected 42.3 mm Hg      pH, Temp Corrected 7.465 pH Units      pO2, Temperature Corrected 80.0 mm Hg     POC Glucose Once [[id number]]  (Abnormal) Collected: 06/07/22 2333    Specimen: Blood Updated: 06/07/22 2344     Glucose 165 mg/dL      Comment: : [id number] Na JamieMeter ID: [id number]       POC Glucose Once [[id number]]  (Normal) Collected: 06/07/22 2042    Specimen: Blood Updated: 06/07/22 2053     Glucose 101 mg/dL      Comment: : [id number] Na JamieMeter ID: [id number]       POC Glucose Once [[id number]]   (Normal) Collected: 06/07/22 1835    Specimen: Blood Updated: 06/07/22 1847     Glucose 103 mg/dL      Comment: : [id number] Jay MoralesMeter ID: [id number]       POC Glucose Once [[id number]]  (Abnormal) Collected: 06/07/22 1234    Specimen: Blood Updated: 06/07/22 1245     Glucose 139 mg/dL      Comment: : [id number] Consuelo Cesar ID: [id number]              Cultures:  No results found for: BLOODCX, URINECX, WOUNDCX, MRSACX, RESPCX, STOOLCX    Radiology Data:    Imaging Results (Last 24 Hours)     Procedure Component Value Units Date/Time    XR Chest 1 View [[id number]] Collected: 06/08/22 0712     Updated: 06/08/22 0717    Narrative:      EXAM: XR CHEST 1 VW- 6/8/2022 3:15 AM CDT     HISTORY: On vent; R13.10-Dysphagia, unspecified; Z01.818-Encounter for  other preprocedural examination; D32.9-Benign neoplasm of meninges,  unspecified; Z74.09-Other reduced mobility; Z78.9-Other specified health  status; G40.901-Epilepsy, unspecified, not intractable, with status  epilepticus; J96.01-Acute respiratory failure with hypoxia;  G91.0-Communicating hydrocephalus       COMPARISON: June 7, 2022.     TECHNIQUE: Frontal radiograph of the chest     FINDINGS:   Chronic interstitial changes present in the pulmonary parenchyma..  Cardiac silhouettes mildly enlarged. Endotracheal tube is present.  Faintly visualized right ventricular shunt catheter projects over the  right chest. Feeding gastrostomy tube is present in the stomach.     The osseous structures and surrounding soft tissues demonstrate no acute  abnormality.          Impression:      1. Stable chest.        This report was finalized on 06/08/2022 07:14 by Dr. Emanuel Frederick MD.    XR Chest 1 View [[id number]] Collected: 06/07/22 1232     Updated: 06/07/22 1236    Narrative:      EXAM: XR CHEST 1 VW- 6/7/2022 12:24 PM CDT     HISTORY: post chest tube removal; R13.10-Dysphagia, unspecified;  Z01.818-Encounter for other preprocedural examination;  D32.9-Benign  neoplasm of meninges, unspecified; Z74.09-Other reduced mobility;  Z78.9-Other specified health status; G40.901-Epilepsy, unspecified, not  intractable, with status epilepticus; J96.01-Acute respiratory failure  with hypoxia; G91.0-Communicating hydrocephalus       COMPARISON: June 7, 2022 3:00 AM.     TECHNIQUE: Frontal radiograph of the chest     FINDINGS:   The lungs are clear. Removal left chest tube is noted. Tracheostomy tube  is present.. The cardiomediastinal silhouette and pulmonary vascularity  are within normal limits.      The osseous structures and surrounding soft tissues demonstrate no acute  abnormality.          Impression:      1. Stable chest with no acute cardiopulmonary process..        This report was finalized on 06/07/2022 12:33 by Dr. Emanuel Frederick MD.          No Known Allergies    Scheduled meds:   albuterol, 2.5 mg, Nebulization, Q8H - RT  carvedilol, 6.25 mg, Nasogastric, BID With Meals  chlorhexidine, 15 mL, Mouth/Throat, Q12H  fosphenytoin, 275 mg PE, Intravenous, Q8H  heparin (porcine), 5,000 Units, Subcutaneous, Q12H  insulin detemir, 20 Units, Subcutaneous, Nightly  insulin regular, 2-7 Units, Subcutaneous, Q6H  insulin regular, 8 Units, Subcutaneous, Q6H  lacosamide, 200 mg, Intravenous, Q12H  levETIRAcetam, 1,000 mg, Intravenous, Q12H  levothyroxine, 125 mcg, Nasogastric, Q AM  lidocaine, 10 mL, Intradermal, Once  liothyronine, 25 mcg, Nasogastric, Daily  lisinopril, 20 mg, Nasogastric, Q24H  mupirocin, 1 application, Topical, Q12H  Nutrisource fiber, 1 packet, Nasogastric, 4x Daily  pantoprazole, 40 mg, Intravenous, Q AM  polyethylene glycol, 17 g, Oral, Daily  ProSource TF, 1 packet, Nasogastric, BID  ProSource TF, 45 mL, Per G Tube, BID  sodium chloride, 4 mL, Nebulization, BID - RT        PRN meds:  •  acetaminophen **OR** acetaminophen  •  alteplase  •  bisacodyl  •  butalbital-acetaminophen-caffeine  •  dextrose  •  dextrose  •  glucagon (human recombinant)  •   hold  •  hydrALAZINE  •  ipratropium-albuterol  •  labetalol  •  LORazepam  •  ondansetron **OR** ondansetron    Assessment/Plan       Meningioma (HCC)    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 The patient was admitted on 5/10 by Dr. Downs with neurosurgery.  He has prior history of known meningioma that was causing compression and visual changes.  He presented for craniotomy.  Hospital medicine was originally consulted on 5/14.  The patient had developed status epilepticus postsurgically and required intubation/mechanical ventilation.      Respiratory failure/left pleural effusion. He required intubation on 5/14 for airway protection.  This was done by Dr. Gunderson.  Pulmonary has since been consulted. Continue to monitor for readiness for spontaneous breathing trials and extubation in the future. Again, he was intubated for airway protection given his status epilepticus.  Propofol drip . albuterol nebs.  DuoNebs as needed.  Morphine as needed.  Oxycodone as needed.  CT imaging of chest-  Left chest tubes remain in place with appropriate evacuation of the left pleural fluid, Only small pleural effusions seen on today's exam with minimal air present in the posterior left pleural space, Bibasilar consolidation may represent atelectasis and/or pneumonia, scattered bilateral calcified granuloma, Vascular crowding and/or mild venous  congestion, Stable cardiomegaly.  Chest x-ray-Hypoventilation with vascular crowding, Patchy infiltrates in the perihilar regions and lung bases, atelectasis versus pneumonia, Old granulomatous disease.  Status post thoracotomy tube placement 6/26/2022.   Status post tracheotomy left chest 6/2/2022.   Status post tPA through chest tube.  Chest tube removed 6/7/2022.  Ventilator- assist-control, FiO2 30, tidal volume 450, rate 16, PEEP 5.     Hypotension.  Resolved.  Levophed drip off.     Seizure . consult neurology . IV Vimpat.  IV Keppra.  IV cerebyx.      Meningioma.  Decadron.  CT scan head without contrast Right frontal temporal craniotomy- involving the  superior posterior lateral aspect of the right orbit/roof of the orbit which was not seen in the previous study, Intraventricular hemorrhage in the occipital horn of the right lateral ventricle, Poorly visualized layering hematoma in the occipital  horn of the left lateral ventricle, right parietal approach ventriculostomy catheter in place, Persistent dilatation of the ventricles- No change.  Status post craniotomy 5/10/2022. He has had a lumbar drain placed by neurosurgery.    Ventriculoperitoneal shunt placement 6/3/2022.     Thrombocytopenia. His platelet count was 217,000 on 4/20/2022. This declined to a low of 72,000 on 5/17.  Peripheral smear on 5/17 showed no schistocytes with moderate peripheral thrombocytopenia.  PTT normal, PT/INR slightly elevated, fibrin split products high, and fibrinogen high.  Neurosurgery gave him a sixpack of platelets on 5/18.  Thrombocytopenia resolved.     Fever. He had run steady fever from the afternoon of 5/21 through 5/22.  Dr. Escobar was contacted and placed him on vancomycin and cefepime.  He still has a lumbar drain and CSF was sampled on the morning of 5/22.  ..  He received vancomycin. Meropenem was started on 5/25 for 5 days.  Patient has finished vancomycin and meropenem antibiotics.  Fever  resolved.     Hypertension/atrial fibrillation. He typically takes lisinopril. Resumed per tube at a lower dose than normal on 5/18. Titrated up on 5/19. Started carvedilol on 5/19. Titrated medications up to get off Cardene.  Unable to take anticoagulation.  Lisinopril.  Hydralazine as needed.  Echocardiogram- ejection fraction 70%-hyperdynamic, mild concentric hypertrophy, tricuspid regurgitation, right ventricle cavity moderately dilated, right atrial cavity dilated, no significant valvular pathology, atrial fibrillation rhythm.     Hypothyroidism. Free T4 and free T3 were very low as well. Started levothyroxine and liothyronine on 5/19. TRH pending since 5/19!!  I rechecked TSH, free T4, and free T3 on 5/29 to see where we are at.  TSH is now 3.950.  Free T4 has improve from 0.31 to 0.53.  Free T3 has improved from less than 0.40 to 1.60.  Synthroid . Cytomel .     Diabetes.  Hemoglobin A1c 8.2.  Sliding scale.   Levemir . regular insulin.     Anemia.  Hemoglobin stable.  No sign of acute bleed.     Reflux.  Protonix.  Zofran as needed.     Constipation.  MiraLAX.  Dulcolax suppository as needed.     Headaches.  Fioricet as needed.     Obesity.  BMI 31.     SCD for DVT prophylaxis.  Heparin prophylaxis.     Nutrition.  PEG tube placement 6/2/2022.     Blood culture- no growth 5 days.  Respiratory culture- Light growth (2+) Normal respiratory annia. No S. aureus or Pseudomonas aeruginosa detected. Final report.   Urine culture>100,000 CFU/mL Klebsiella aerogenes Abnormal   AFB culture-pending.  Anaerobic culture-negative.  Body fluid culture-negative.  Fungal culture pending.  MRSA DNA probe-negative.  COVID-19-negative.    Electronically signed by Aden Kc MD, 06/08/22, 11:48 AM CDT.                    Electronically signed by Aden Kc MD at 06/08/22 1156     Geovany Davidson MD at 06/02/22 1110          H&P reviewed. The patient was examined and there are no changes to the H&P.           Electronically signed by Geovany Davidson MD at 22 [id number]   Source Note          [facility]  ENT PROGRESS NOTES  2022      Patient Identification:  Name: Hai Hernandez  Age: 77 y.o.  Sex: male  :  1945  MRN: [id number]                     Date of Admission: 5/10/2022      CC:    Respiratory insufficiency  Subjective   Tentative plans for tracheostomy have been deferred until tomorrow to have PEG tube placed in conjunction with GI medicine and secondary to tube feedings being discontinued somewhat late this morning.  Discussed with Dr. Lu.  HISTORY   HPI, ROS, PMFSHx reviewed:   No changes       Objective     PE:    Temp:  [96.6 °F (35.9 °C)-98.1 °F (36.7 °C)] 98.1 °F (36.7 °C)  Heart Rate:  [41-69] 58  Resp:  [17-24] 23  BP: ()/(40-86) 110/56  FiO2 (%):  [30 %] 30 %   Body mass index is 29.63 kg/m².     General appearance: chronically ill appearing.              Ability to Communicate: Patient is mechanically ventilated and intubated and therefore unable to communicate              Ears - right ear normal, left ear normal.              Nasal exam - normal and patent, no erythema, discharge or polyps.              Facial exam: no craniofacial deformities              Oropharyngeal exam - Endotracheal tube in place.              Neck exam - supple, no significant adenopathy.              CVS exam: normal rate and regular rhythm.              Chest: Normal chest excursion with ventilation, left thoracostomy tube in place              No lymphadenopathy in the anterior or posterior neck, supraclavicular areas.               Neurological exam reveals neck supple without rigidity.    DATA      MEDICATIONS   I have reviewed the current MAR.     LABS AND IMAGING      I have reviewed the labs and imaging data since the patient was last seen.       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)     Status epilepticus (HCC)    Essential hypertension    Type 2 diabetes mellitus with hyperglycemia, without long-term current use of insulin (HCC)    Hypothyroidism (acquired)    Acute respiratory failure (secondary to status epilepticus)    Thrombocytopenia (HCC)    Cerebral parenchymal hemorrhage (HCC)    PAF (paroxysmal atrial fibrillation) (HCC)    Fever    Loculated pleural effusion    Acute deep vein thrombosis (DVT) of the ulnar and brachial veins of right upper extremity (HCC)    Dysphagia  Respiratory insufficiency        Plan     PLAN     Plans with tracheostomy in conjunction with GI medicine PEG tube placement tomorrow early afternoon          Geovany Davidson MD  2022  13:49 CDT        Electronically signed by Geovany Davidson MD at 22 1351             Geovany Davidson MD at 22 1349          [facility]  ENT PROGRESS NOTES  2022      Patient Identification:  Name: Hai Hernandez  Age: 77 y.o.  Sex: male  :  1945  MRN: [id number]                     Date of Admission: 5/10/2022      CC:    Respiratory insufficiency  Subjective   Tentative plans for tracheostomy have been deferred until tomorrow to have PEG tube placed in conjunction with GI medicine and secondary to tube feedings being discontinued somewhat late this morning.  Discussed with Dr. Lu.  HISTORY   HPI, ROS, PMFSHx reviewed:   No changes       Objective     PE:    Temp:  [96.6 °F (35.9 °C)-98.1 °F (36.7 °C)] 98.1 °F (36.7 °C)  Heart Rate:  [41-69] 58  Resp:  [17-24] 23  BP: ()/(40-86) 110/56  FiO2 (%):  [30 %] 30 %   Body mass index is 29.63 kg/m².     General appearance: chronically ill appearing.              Ability to Communicate: Patient is mechanically ventilated and intubated and therefore unable to communicate              Ears - right ear normal, left ear normal.              Nasal exam - normal and patent, no erythema, discharge or polyps.              Facial exam: no  craniofacial deformities              Oropharyngeal exam - Endotracheal tube in place.              Neck exam - supple, no significant adenopathy.              CVS exam: normal rate and regular rhythm.              Chest: Normal chest excursion with ventilation, left thoracostomy tube in place              No lymphadenopathy in the anterior or posterior neck, supraclavicular areas.               Neurological exam reveals neck supple without rigidity.    DATA      MEDICATIONS   I have reviewed the current MAR.     LABS AND IMAGING      I have reviewed the labs and imaging data since the patient was last seen.       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)    Status epilepticus (HCC)    Essential hypertension    Type 2 diabetes mellitus with hyperglycemia, without long-term current use of insulin (HCC)    Hypothyroidism (acquired)    Acute respiratory failure (secondary to status epilepticus)    Thrombocytopenia (HCC)    Cerebral parenchymal hemorrhage (HCC)    PAF (paroxysmal atrial fibrillation) (HCC)    Fever    Loculated pleural effusion    Acute deep vein thrombosis (DVT) of the ulnar and brachial veins of right upper extremity (HCC)    Dysphagia  Respiratory insufficiency        Plan     PLAN     Plans with tracheostomy in conjunction with GI medicine PEG tube placement tomorrow early afternoon          Geovany Davidson MD  6/1/2022  13:49 CDT        Electronically signed by Geovany Davidson MD at 06/01/22 5231     Martell Downs MD at 05/10/22 0803          Chief complaint:        Chief Complaint   Patient presents with   • Meningioma       Pt is here for followup for Meningioma.               Subjective         HPI:   Interval History: Hai returns today for follow-up regarding MRI and known meningioma.  He has been doing well since I saw him last.  His pain today is 0-10.  He does have off-and-on headaches  "that are retro-orbital in nature and sometimes will be right and sometimes left.  He is in the interim had cataract surgery which improved his vision significantly.  However his vision in his right eye is not 20/20.  He is recently been seen by Dr. Gutiérrez.  His notes are not available for review right now.  He has been noting some decreased concentration.  Denies any weakness numbness tingling.  No seizure-like activity.  No other difficulties with memory.     Review of Systems   Constitutional: Negative.    Eyes: Positive for visual disturbance.   Respiratory: Negative.    Cardiovascular: Negative.    Gastrointestinal: Negative.    Endocrine: Negative.    Genitourinary: Negative.    Musculoskeletal: Negative.    Skin: Negative.    Allergic/Immunologic: Negative.    Neurological: Positive for headaches.   Hematological: Negative.    Psychiatric/Behavioral: Positive for confusion.         PFSH:  Medical History        Past Medical History:   Diagnosis Date   • Hearing loss     • Pneumonia     • Thyroid disease              Surgical History         Past Surgical History:   Procedure Laterality Date   • NECK SURGERY       • SKIN CANCER EXCISION                      Objective          Current Medications          Current Outpatient Medications   Medication Sig Dispense Refill   • levothyroxine (SYNTHROID, LEVOTHROID) 137 MCG tablet Take 137 mcg by mouth Daily.       • Lumigan 0.01 % ophthalmic drops         • vitamin D (ERGOCALCIFEROL) 1.25 MG (45428 UT) capsule capsule Take 50,000 Units by mouth 1 (One) Time Per Week.       • chlorhexidine (HIBICLENS) 4 % external liquid Shower each day with solution for 5 days beginning 5 days before surgery. 120 mL 0      No current facility-administered medications for this visit.            Vital Signs  Ht 182.9 cm (72\")   Wt 116 kg (256 lb 6.4 oz)   BMI 34.77 kg/m²   Physical Exam  Eyes:      Extraocular Movements: EOM normal.      Pupils: Pupils are equal, round, and reactive to " light.   Neurological:      Mental Status: He is oriented to person, place, and time.      Coordination: Finger-Nose-Finger Test and Heel to Shin Test normal.      Gait: Gait is intact. Tandem walk normal.      Deep Tendon Reflexes:      Reflex Scores:       Tricep reflexes are 2+ on the right side and 2+ on the left side.       Bicep reflexes are 2+ on the right side and 2+ on the left side.       Brachioradialis reflexes are 2+ on the right side and 2+ on the left side.       Patellar reflexes are 2+ on the right side and 2+ on the left side.       Achilles reflexes are 2+ on the right side and 2+ on the left side.  Psychiatric:         Speech: Speech normal.         Neurologic Exam      Mental Status   Oriented to person, place, and time.   Registration: recalls 3 of 3 objects. Recall at 5 minutes: recalls 3 of 3 objects.   Attention: normal. Concentration: normal.   Speech: speech is normal   Level of consciousness: alert  Knowledge: consistent with education.      Cranial Nerves      CN II   Visual acuity: normal     CN III, IV, VI   Pupils are equal, round, and reactive to light.  Extraocular motions are normal.   Diplopia: none     CN V   Facial sensation intact.   Right corneal reflex: normal  Left corneal reflex: normal     CN VII   Right facial weakness: none  Left facial weakness: none     CN VIII   Hearing: intact     CN IX, X   Palate: symmetric  Right gag reflex: normal  Left gag reflex: normal     CN XI   Right trapezius strength: normal  Left trapezius strength: normal     CN XII   Tongue deviation: none     Motor Exam   Muscle bulk: normal  Right arm tone: normal  Left arm tone: normal  Right arm pronator drift: absent  Left arm pronator drift: absent  Right leg tone: normal  Left leg tone: normal     Strength   Right deltoid: 5/5  Left deltoid: 5/5  Right biceps: 5/5  Left biceps: 5/5  Right triceps: 5/5  Left triceps: 5/5  Right interossei: 5/5  Left interossei: 5/5  Right iliopsoas: 5/5  Left  iliopsoas: 5/5  Right quadriceps: 5/5  Left quadriceps: 5/5  Right anterior tibial: 5/5  Left anterior tibial: 5/5  Right gastroc: 5/5  Left gastroc: 5/5Right EHL 5/5   Left EHL 5/5      Sensory Exam   Light touch normal.   Proprioception normal.      Gait, Coordination, and Reflexes      Gait  Gait: normal     Coordination   Finger to nose coordination: normal  Heel to shin coordination: normal  Tandem walking coordination: normal     Reflexes   Right brachioradialis: 2+  Left brachioradialis: 2+  Right biceps: 2+  Left biceps: 2+  Right triceps: 2+  Left triceps: 2+  Right patellar: 2+  Left patellar: 2+  Right achilles: 2+  Left achilles: 2+  Right plantar: normal  Left plantar: normal  Right Ozuna: absent  Left Ozuna: absent  Right ankle clonus: absent  Left ankle clonus: absent     (12 bullet pts)     Results Review:   No radiology results for the last 30 days.                          [id number] right planum sphenoidale meningioma.  Mild surrounding edema based on noncontrast MRI.     ASSESSMENT and PLAN  Hai Hernandez is a 77 y.o. male with a significant comorbidity of acephalic migraines, thyroid disease status post radiation as a child, basal cell and squamous cell carcinoma of the skin. He presents in FU for known meningioma found on workup for right visual field changes. Physical exam findings of neurologically intact with resolution of all symptoms and mild decreased vision in right eye.  His imaging shows 22 x 24 x 13.5 mm right planum sphenoidale mass most suggestive of meningioma.     Meningioma, right planum sphenoidale meningioma  Right progressive vision loss  Differential diagnosis includes meningioma.  Without biopsy cannot exclude other neoplasms including hemangiopericytoma.       Prognosis:  These are typically slow growing extra-axial tumors.  They are usually benign and arise from the arachnoid.  32% of incidentally discovered meningiomas do not grow over a 3-year follow-up.   Meningiomas with calcification and or hypodensity on T2 weighted MRI appear to be slower growing.  These lesions typically grow at a rate of 1 mm per year.  5-year survival rate is 91.3%.     Treatment: Treatment options include serial observation versus surgical resection versus radiation.     Surgical indications include   - documented growth on serial imaging   - symptoms referrable to the lesion,   - or suspicion of increased grade (WHO grade 2: Choroidal, clear cell, atypical, WHO grade 3: Papillary, rapidly, anaplastic) in the tumor as determined by surrounding edema.  Perioperative morbidity rate is statistically significantly higher in patients older than 70 (23%) versus younger than 70 (3.5%)     Radiotherapy is considered for patients who are not surgical candidates are of deep seeded lesions or for recurrent meningiomas or for atypical or malignant meningiomas after subtotal resection of first recurrence.  Retrospective analysis with follow-up of 5 to 15 years from [facility] revealed recurrence rate of 4% and totally resected meningiomas, 60% for partially resected meningiomas without XRT and 32% for partially resected meningiomas with XRT.     - Return I discussed the risk benefits and possible complications of surgical intervention versus serial management with MRIs.  Unfortunately the proximity to the optic nerve he is not a SRS candidate.  Given his decreased vision in his right I am most concerned for neoplastic compression of his right optic nerve.  However I would like confirmation by Dr. Gutiérrez.  Regardless he still meets the criteria for surgery based on his perilesional edema which gives me concerned that this might be a WHO grade 2 atypical meningioma.  Furthermore his current meningioma lacks calcifications which suggest a more rapid growth curve.  Based on these findings he would like to proceed with surgical intervention we will plan for right craniotomy with stereotactic resection and  decompression of the optic nerve.  In the interim I would like to obtain Dr. Gutiérrez notes particularly in regards to any visual fields testing that might have occurred.     Obesity Counseling  Never smoker  Hai's Body mass index is 35.05 kg/m²..  We spent 1 minutes in weight management counseling including discussing current weight, current diet, and exercise patterns.  Additional we set goals for weight reduction.  Therefore above normal parameters. Recommendations include: educational material and exercise counseling.         1. Meningioma (HCC)    2. Class 1 obesity due to excess calories with serious comorbidity and body mass index (BMI) of 34.0 to 34.9 in adult    3. Nonsmoker          Recommendations:  Diagnoses and all orders for this visit:     1. Meningioma (HCC) (Primary)  -     Ambulatory Referral to Family Practice  -     COVID PRE-OP / PRE-PROCEDURE SCREENING ORDER (NO ISOLATION) - Swab, Nasopharynx; Future  -     Case Request; Standing  -     CBC (No Diff); Future  -     Comprehensive Metabolic Panel; Future  -     Type & Screen; Future  -     ECG 12 Lead; Future  -     XR Chest 1 View; Future  -     Case Request     2. Class 1 obesity due to excess calories with serious comorbidity and body mass index (BMI) of 34.0 to 34.9 in adult     3. Nonsmoker     Other orders  -     Follow Anesthesia Guidelines / Protocol; Future  -     Obtain Informed Consent; Future  -     Provide NPO Instructions to Patient; Future  -     Chlorhexidine Skin Prep; Future  -     chlorhexidine (HIBICLENS) 4 % external liquid; Shower each day with solution for 5 days beginning 5 days before surgery.  Dispense: 120 mL; Refill: 0           Return for FOLLOWUP AFTER SURGERY.     I spent 85 minutes caring for Hai on this date of service. This time includes time spent by me in the following activities: preparing for the visit, reviewing tests, obtaining and/or reviewing a separately obtained history, performing a medically  appropriate examination and/or evaluation, counseling and educating the patient/family/caregiver, ordering medications, tests, or procedures, referring and communicating with other health care professionals, documenting information in the medical record, independently interpreting results and communicating that information with the patient/family/caregiver, and/or care coordination.         Thank you, for allowing me to continue to participate in the care of this patient.     Sincerely,  Martell Downs MD      Electronically signed by Martell Downs MD at 05/10/22 0758         Current Facility-Administered Medications   Medication Dose Route Frequency Provider Last Rate Last Admin   • acetaminophen (TYLENOL) tablet 650 mg  650 mg Oral Q4H PRN Martell Downs MD   650 mg at 06/14/22 2100    Or   • acetaminophen (TYLENOL) suppository 650 mg  650 mg Rectal Q4H PRN Martell Downs MD   650 mg at 05/23/22 0016   • alteplase (CATHFLO/ACTIVASE) injection 2 mg  2 mg Intracatheter PRN Martell Downs MD   New Syringe/Cartridge at 05/28/22 1330   • bisacodyl (DULCOLAX) suppository 10 mg  10 mg Rectal Daily PRN Stevie Parsons APRN       • carvedilol (COREG) tablet 6.25 mg  6.25 mg Nasogastric BID With Meals Martell Downs MD   6.25 mg at 06/15/22 0808   • cefepime (MAXIPIME) 2 g/100 mL 0.9% NS (mbp)  2 g Intravenous Q8H Martell Downs MD   2 g at 06/15/22 0517   • chlorhexidine (PERIDEX) 0.12 % solution 15 mL  15 mL Mouth/Throat Q12H Martell Downs MD   15 mL at 06/15/22 0808   • dexamethasone (DECADRON) tablet 4 mg  4 mg Per PEG Tube Q12H Stevie Parsons APRN   4 mg at 06/15/22 0808    Followed by   • [START ON 6/16/2022] dexamethasone (DECADRON) tablet 2 mg  2 mg Per PEG Tube Q12H Stevie Parsons APRN        Followed by   • [START ON 6/18/2022] dexamethasone (DECADRON) tablet 1 mg  1 mg Per PEG Tube Q12H Stevie Parsons APRN        Followed by   • [START ON  6/20/2022] dexamethasone (DECADRON) tablet 0.5 mg  0.5 mg Per PEG Tube Daily Stevie Parsons APRN       • dextrose (D50W) (25 g/50 mL) IV injection 25 g  25 g Intravenous Q15 Min PRN Martell Downs MD   25 g at 06/09/22 1807   • dextrose (GLUTOSE) oral gel 15 g  15 g Oral Q15 Min PRN Martell Downs MD   15 g at 05/22/22 0527   • docusate (COLACE) 50 MG/5ML liquid 50 mg  50 mg Oral Daily Sha Beatty MD   50 mg at 06/15/22 0807   • fosphenytoin (Cerebyx) 200 mg PE in sodium chloride 0.9 % 100 mL IVPB  200 mg PE Intravenous Q8H Zac Mccoy PA-C   200 mg PE at 06/15/22 0517   • glucagon (human recombinant) (GLUCAGEN DIAGNOSTIC) injection 1 mg  1 mg Intramuscular Q15 Min PRN Martell Downs MD       • guaiFENesin (ROBITUSSIN) 100 MG/5ML oral solution 200 mg  200 mg Per PEG Tube TID Daly Perea APRN   200 mg at 06/15/22 0807   • heparin (porcine) 5000 UNIT/ML injection 5,000 Units  5,000 Units Subcutaneous Q12H Martell Downs MD   5,000 Units at 06/15/22 0808   • hydrALAZINE (APRESOLINE) injection 10 mg  10 mg Intravenous Q6H PRN Martell Downs MD   10 mg at 06/15/22 0636   • insulin detemir (LEVEMIR) injection 20 Units  20 Units Subcutaneous Nightly Martell Downs MD   20 Units at 06/14/22 2028   • insulin regular (humuLIN R,novoLIN R) injection 2-7 Units  2-7 Units Subcutaneous Q6H Martell Downs MD   2 Units at 06/15/22 0012   • ipratropium-albuterol (DUO-NEB) nebulizer solution 3 mL  3 mL Nebulization Q4H PRN Martell Downs MD   3 mL at 06/15/22 0650   • labetalol (NORMODYNE,TRANDATE) injection 10 mg  10 mg Intravenous Q6H PRN Martell Downs MD   10 mg at 06/14/22 0337   • lacosamide (VIMPAT) injection 200 mg  200 mg Intravenous Q12H Martell Downs MD   200 mg at 06/15/22 0807   • lactulose solution 20 g  20 g Per PEG Tube Daily Sha Beatty MD   20 g at 06/15/22 0807   • lansoprazole (FIRST) oral  suspension 30 mg  30 mg Per G Tube QAM Martell Downs MD   30 mg at 06/15/22 0810   • latanoprost (XALATAN) 0.005 % ophthalmic solution 1 drop  1 drop Both Eyes Nightly Martell Downs MD   1 drop at 06/14/22 2023   • levalbuterol (XOPENEX) nebulizer solution 0.63 mg  0.63 mg Nebulization TID PRN Martell Downs MD   0.63 mg at 06/12/22 1046   • levETIRAcetam in NaCl 0.75% (KEPPRA) IVPB 1,000 mg  1,000 mg Intravenous Q12H Martell Downs MD   1,000 mg at 06/15/22 0808   • levothyroxine (SYNTHROID, LEVOTHROID) tablet 125 mcg  125 mcg Nasogastric Q AM Martell Downs MD   125 mcg at 06/15/22 0517   • lidocaine (XYLOCAINE) 1 % injection 10 mL  10 mL Intradermal Once Martell Downs MD       • lidocaine (XYLOCAINE) 1 % injection 10 mL  10 mL Infiltration Once Pilo Ortega MD       • liothyronine (CYTOMEL) tablet 25 mcg  25 mcg Nasogastric Daily Martell Downs MD   25 mcg at 06/15/22 0808   • lisinopril (PRINIVIL,ZESTRIL) tablet 20 mg  20 mg Nasogastric Q24H Martell Downs MD   20 mg at 06/15/22 0808   • LORazepam (ATIVAN) injection 2 mg  2 mg Intravenous Q2H PRN Martell Downs MD   2 mg at 06/14/22 0358   • mupirocin (BACTROBAN) 2 % ointment 1 application  1 application Topical Q12H Martell Downs MD   1 application at 06/15/22 0809   • norepinephrine (LEVOPHED) 8 mg in 250 mL NS infusion (premix)  0.02-0.3 mcg/kg/min Intravenous Titrated Martell Downs MD   Held at 06/11/22 1054   • Nutrisource fiber pack 1 packet  1 packet Nasogastric 4x Daily Martell Downs MD   1 packet at 06/15/22 0808   • ondansetron (ZOFRAN) tablet 4 mg  4 mg Oral Q6H PRN Martell Downs MD        Or   • ondansetron (ZOFRAN) injection 4 mg  4 mg Intravenous Q6H PRN Martell Downs MD       • oxyCODONE (ROXICODONE) 5 MG/5ML solution 5 mg  5 mg Per PEG Tube Q6H PRN Daly Perea APRN   5 mg at 06/13/22 0354   • polyethylene  glycol (MIRALAX) packet 17 g  17 g Oral Daily Martell Downs MD   17 g at 06/15/22 0809   • ProSource TF oral liquid 45 mL  45 mL Per G Tube BID Martell Downs MD   45 mL at 06/15/22 0808   • sodium chloride 3 % nebulizer solution 4 mL  4 mL Nebulization BID - RT Martell Downs MD   4 mL at 06/15/22 0650   • vancomycin 1500 mg/500 mL 0.9% NS IVPB ([facility])  15 mg/kg Intravenous Q12H Martell Downs MD   1,500 mg at 06/15/22 0046        Physician Progress Notes (most recent note)      Stevie Parsons, MARIO at 06/15/22 0842          NEUROSURGERY DAILY PROGRESS NOTE    ASSESSMENT:   Hai Hernandez is a 77 y.o. with a significant comorbidity of acephalic migraines, thyroid disease status post radiation as a child, basal cell and squamous cell carcinoma of the skin. He presents in FU for known meningioma found on workup for right visual field changes. Physical exam findings of neurologically intact with resolution of all symptoms and mild decreased vision in right eye.  His imaging shows 22 x 24 x 13.5 mm right planum sphenoidale mass most suggestive of meningioma.    Past Medical History:   Diagnosis Date   • Brain tumor (HCC)    • Depression    • Hearing loss    • History of cellulitis     right foot big toe   • Hypertension    • Pneumonia    • Right arm weakness     after cervial injury   • Sciatic pain     affects balance   • Thyroid disease    • Visual disturbance     due to brain tumor - right eye     Active Hospital Problems    Diagnosis    • **Meningioma (HCC)    • Acute deep vein thrombosis (DVT) of the ulnar and brachial veins of right upper extremity (HCC)    • Loculated pleural effusion    • Fever    • PAF (paroxysmal atrial fibrillation) (HCC)    • Cerebral parenchymal hemorrhage (HCC)    • Essential hypertension    • Type 2 diabetes mellitus with hyperglycemia, without long-term current use of insulin (HCC)    • Hypothyroidism (acquired)    • Acute respiratory failure (secondary  to status epilepticus)    • Thrombocytopenia (HCC)    • 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  Nonverbal.  Weakly withdraws to tactile stimuli.    Intracranial meningioma   POD #35 (5/10/2022) Status post postcraniotomy for meningioma   POD#5 (6/10/2022) Craniectomy for cerebral edema and possible infection   CT reviewed and stable.  Small blood products at site of brain biopsy   Neurochecks per policy   Start Decadron taper   Wound C,D,I   Craniectomy precautions     Hydrocephalus   CSF unremarkable from 5/17, 5/23 and 6/10.    Lumbar drain removed 6/3/2022   POD# 12 (6/3/2022) right posterior parietal  shunt placement    MEDTRONIC programmable valve set to 0.5   Shunt pumps and refills well    Postop seizures, in status   Neurology managing   Cont Keppra, Dialntin, Vimpat; PRN Ativan   Follow dilantin levels   Repeat EEG: Slowing but no epileptiform activity     CV: Cardene off   Keep SBP <160.   Intermittent use of hydralazine and labetalol PRNs  Pulm: Tracheostomy  Placed (6/2/2022).  Appreciate ENT   Left chest tubes removed  : May DC Mccrary and place [place] catheter  FEN: Poor glucose control.  Increase SSI  ENDO: Accu-Chek and treat per policy  GI: PEG tube placed (6/2/2022).  Appreciate GI   No BM in several days.  KUB concerning for ileus formation.  Increased rectal stimulation  ID: Afebrile.  Continue broad spectrum abx, Vanc and Cefepime   CSF cultures NGTD  Heme:  DVT prophylaxis with SCD's.  Can not anticoagulate   Superficial thrombus to left cephalic vein   Hemoglobin 7.5.  We will continue to monitor.  Pain: NA  Dispo: Consult Case  "management for LTAC placement near [place] per family request   Will require neurology recommendations for titrating antiepileptics prior to DC.    CHIEF COMPLAINT:   Right visual field changes    Subjective  Symptom stable    Temp:  [98.3 °F (36.8 °C)-99.5 °F (37.5 °C)] 99.2 °F (37.3 °C)  Heart Rate:  [55-76] 76  Resp:  [22-32] 32  BP: (129-181)/() 147/66  FiO2 (%):  [35 %] 35 %    Objective:  General Appearance:  Well-appearing and in no acute distress.    Vital signs: (most recent): Blood pressure 147/66, pulse 76, temperature 99.2 °F (37.3 °C), temperature source Axillary, resp. rate (!) 32, height 182.9 cm (72\"), weight 109 kg (241 lb), SpO2 94 %.      Neurologic Exam     Mental Status   Level of consciousness: arousable by verbal stimuli ,  drowsy  Off propofol.  Does not follow commands.  Nonverbal.  Weakly withdraws to tactile stimuli.     Cranial Nerves     CN III, IV, VI   Pupils are equal, round, and reactive to light.  Extraocular motions are normal.     CN IX, X   CN IX normal.     Gait, Coordination, and Reflexes     Tremor   Resting tremor: absent  Intention tremor: absent  Action tremor: absent    Drains:   Urethral Catheter Non-latex;Silicone 16 Fr. (Active)       Output by Drain (mL) 06/14/22 0701 - 06/14/22 1900 06/14/22 1901 - 06/15/22 0700 06/15/22 0701 - 06/15/22 0842 Range Total   Requested LDAs do not have output data documented.       Imaging Results (Last 24 Hours)     Procedure Component Value Units Date/Time    XR Chest 1 View [[id number]] Collected: 06/15/22 0745     Updated: 06/15/22 0749    Narrative:      EXAMINATION: XR CHEST 1 VW-     6/15/2022 3:40 AM CDT     HISTORY: On vent; R13.10-Dysphagia, unspecified; Z01.818-Encounter for  other preprocedural examination; D32.9-Benign neoplasm of meninges,  unspecified; Z74.09-Other reduced mobility; Z78.9-Other specified health  status; G40.901-Epilepsy, unspecified, not intractable, with status  epilepticus; J96.01-Acute respiratory " failure with hypoxia;  G91.0-Communicating hydrocephalus; T81.40XA-Infection following a pr     1 view chest x-ray.     Comparison is made with yesterday at 10:48 AM.     Stable heart and mediastinum.  Persistent left basilar consolidation.  There appears to be less infiltrate in the perihilar region of the left  lung.     No pneumothorax.  No new lung disease.     Unchanged tracheostomy tube, left jugular central line, and right-sided   shunt tubing.     Summary:  1. Similar appearance of the chest with left basilar consolidation.  Positioning is different based on the degree of rotation. There appears  to be less prominent left mid lung infiltrate.  This report was finalized on 06/15/2022 07:46 by Dr. Tomer Whelan MD.    US Venous Doppler Upper Extremity Left (duplex) [[id number]] Collected: 06/14/22 1542     Updated: 06/14/22 1545    Narrative:      History: Pain and swelling       Impression:      Impression:  1. There is no evidence of deep venous thrombosis of the left upper  extremity.  2. There is superficial thrombus in the cephalic vein.     Comments: Left upper extremity venous duplex exam was performed using  color Doppler flow, Doppler wave form analysis, and grayscale imaging,  with and without compression. There is no evidence of deep venous  thrombosis of the internal jugular, subclavian, axillary, brachial,  radial, and ulnar veins. There is superficial thrombus involving the  cephalic vein.     This report was finalized on 06/14/2022 15:42 by Dr. Nate Blakely MD.    XR Chest 1 View [[id number]] Collected: 06/14/22 1218     Updated: 06/14/22 1222    Narrative:      EXAMINATION:  XR CHEST 1 VW-  6/14/2022 11:16 AM CDT     HISTORY: Central line placement. Lung infiltrates.     COMPARISON: 6/14/2022.     TECHNIQUE: Single view AP image.     FINDINGS:  There is a new left IJ central venous catheter with catheter  tip near the brachiocephalic vein and superior vena cava junction. There  is no  evidence of pneumothorax. There is hypoventilation. There is  consolidation in the left perihilar region and left lung base. The left  heart border is partially obscured. There is mild right perihilar  infiltrate. There is cardiomegaly. There is blunting of the left  costophrenic angle suggesting a small pleural effusion.       Impression:      1. New left IJ central venous catheter with no evidence of pneumothorax,  as described.  2. Hypoventilation.  3. Parenchymal infiltrate on the left worrisome for pneumonia. Probable  small pleural effusion on the left.  4. Right perihilar opacity likely due to vascular crowding and  hypoventilation. Patchy pneumonia on the right is not ruled out.        This report was finalized on 06/14/2022 12:19 by Dr. Zachariah Fonseca MD.        Lab Results (last 24 hours)     Procedure Component Value Units Date/Time    Respiratory Culture - Sputum, ET Suction [[id number]] Collected: 06/14/22 1632    Specimen: Sputum from ET Suction Updated: 06/15/22 0539     Gram Stain Many (4+) WBCs per low power field      Rare (1+) Epithelial cells per low power field      Few (2+) Gram positive cocci      Few (2+) Gram negative bacilli    Phenytoin Level, Total [[id number]]  (Normal) Collected: 06/15/22 0426    Specimen: Blood Updated: 06/15/22 0533     Phenytoin Level 14.9 mcg/mL     Anaerobic Culture - Tissue, Brain [[id number]] Collected: 06/10/22 1524    Specimen: Tissue from Brain Updated: 06/15/22 0529     Anaerobic Culture No anaerobes isolated at 5 days    Anaerobic Culture - Tissue, Brain, Cerebral Cortex [[id number]] Collected: 06/10/22 1327    Specimen: Tissue from Brain, Cerebral Cortex Updated: 06/15/22 0528     Anaerobic Culture No anaerobes isolated at 5 days    Comprehensive Metabolic Panel [[id number]]  (Abnormal) Collected: 06/15/22 0426    Specimen: Blood Updated: 06/15/22 0528     Glucose 148 mg/dL      BUN 33 mg/dL      Creatinine 0.43 mg/dL      Sodium 138 mmol/L      Potassium 3.9  mmol/L      Chloride 103 mmol/L      CO2 29.0 mmol/L      Calcium 8.1 mg/dL      Total Protein 6.2 g/dL      Albumin 2.30 g/dL      ALT (SGPT) 149 U/L      AST (SGOT) 148 U/L      Alkaline Phosphatase 403 U/L      Total Bilirubin 0.4 mg/dL      Globulin 3.9 gm/dL      A/G Ratio 0.6 g/dL      BUN/Creatinine Ratio 76.7     Anion Gap 6.0 mmol/L      eGFR 109.9 mL/min/1.73      Comment: National Kidney Foundation and American Society of Nephrology (ASN) Task Force recommended calculation based on the Chronic Kidney Disease Epidemiology Collaboration (CKD-EPI) equation refit without adjustment for race.       Narrative:      GFR Normal >60  Chronic Kidney Disease <60  Kidney Failure <15      POC Glucose Once [[id number]]  (Abnormal) Collected: 06/15/22 0516    Specimen: Blood Updated: 06/15/22 0527     Glucose 139 mg/dL      Comment: : [id number] Salvador LaceyMeter ID: [id number]       CBC & Differential [[id number]]  (Abnormal) Collected: 06/15/22 0426    Specimen: Blood Updated: 06/15/22 0505    Narrative:      The following orders were created for panel order CBC & Differential.  Procedure                               Abnormality         Status                     ---------                               -----------         ------                     CBC Auto Differential[[id number]]        Abnormal            Final result                 Please view results for these tests on the individual orders.    CBC Auto Differential [[id number]]  (Abnormal) Collected: 06/15/22 0426    Specimen: Blood Updated: 06/15/22 0505     WBC 8.84 10*3/mm3      RBC 2.42 10*6/mm3      Hemoglobin 7.5 g/dL      Hematocrit 24.7 %      .1 fL      MCH 31.0 pg      MCHC 30.4 g/dL      RDW 17.5 %      RDW-SD 63.0 fl      MPV 9.9 fL      Platelets 195 10*3/mm3      Neutrophil % 70.6 %      Lymphocyte % 9.8 %      Monocyte % 13.0 %      Eosinophil % 3.7 %      Basophil % 0.8 %      Immature Grans % 2.1 %      Neutrophils, Absolute 6.23  10*3/mm3      Lymphocytes, Absolute 0.87 10*3/mm3      Monocytes, Absolute 1.15 10*3/mm3      Eosinophils, Absolute 0.33 10*3/mm3      Basophils, Absolute 0.07 10*3/mm3      Immature Grans, Absolute 0.19 10*3/mm3      nRBC 0.0 /100 WBC     POC Glucose Once [[id number]]  (Abnormal) Collected: 06/15/22 0010    Specimen: Blood Updated: 06/15/22 0021     Glucose 152 mg/dL      Comment: : [id number] Salvador LaceyMeter ID: [id number]       POC Glucose Once [[id number]]  (Abnormal) Collected: 06/14/22 2028    Specimen: Blood Updated: 06/14/22 2039     Glucose 191 mg/dL      Comment: : [id number] Salvador LaceyMeter ID: [id number]       POC Glucose Once [[id number]]  (Abnormal) Collected: 06/14/22 1637    Specimen: Blood Updated: 06/14/22 1649     Glucose 144 mg/dL      Comment: : RFANETA Bernardo RyanMeter ID: [id number]       POC Glucose Once [[id number]]  (Abnormal) Collected: 06/14/22 1127    Specimen: Blood Updated: 06/14/22 1138     Glucose 171 mg/dL      Comment: : ZACH Bernardo RyanMeter ID: [id number]           [id number]  MARIO Rangel      Electronically signed by Stevie Parsons APRN at 06/15/22 0853          Consult Notes (most recent note)      Vidya Hendrickson APRN at 06/14/22 0841     Attestation signed by Pilo Ortega MD at 06/14/22 1355    I have personally seen and examined Hai Hernandez and reviewed the record. Agree with the aforementioned plan rendered jointly with Vidya Hendrickson.    Mr. Hernandez is a 77-year-old male who is known to me.  We were consulted previously for a loculated pleural effusion.  We were asked to see him again after CT of the abdomen which was done for an ileus incidentally noted loculated pleural effusion on the left at the previous drain site.  There is a large amount of atelectasis on the CT scan and a fluid collection posteriorly with some air in it.  This area is likely secondary to recent drainage.  The patient has no signs of inflammatory  infectious process at the time although he is on broad-spectrum antibiotics for possible CNS infection.  His neuro status is essentially unchanged, he withdraws to pain but is unable to follow commands.  He is on the ventilator through tracheostomy.  Currently I do not see signs of empyema or infection of the pleural space but rather a continued loculated pleural effusion and the findings on CT scan are what I would expect after the drainage procedures were performed.  He is on minimal vent settings and is not in any worse respiratory shape.  I do not believe this would hinder him from  from the vent and currently there is not an operative indication given no obvious infection.  If he were to get more sick or show signs of sepsis please let us know and we can further discuss possible drainage.  A large portion of the findings on CT scan are atelectatic lung with a small fluid collection with some air in it but again this is likely postdrainage in nature.    Pilo Ortega M.D.  Cardiothoracic Surgeon                    Referring Provider: Dr. Beatty  Reason for Consultation: Possible empyema    Patient Care Team:  Elisa Chacko MD as PCP - General (Internal Medicine)  Stevie Parsons APRN as Nurse Practitioner (Nurse Practitioner)  Martell Downs MD as Surgeon (Neurosurgery)    Chief complaint respiratory distress    Subjective .     History of present illness: Mr. Hernandez is 77-year-old male with a pertinent past medical history of diabetes, hypertension, depression, and meningioma.  He was admitted on 5/10/2022 for craniotomy for tumor steriotactic with brain LAB on the right by Dr. Downs.  Initially postoperatively he was recovering without remark and discharge planning is in process.  On May 14, 2022 he began to have seizures and subsequently RRT was called.  He was ultimately sedated and intubated for airway protection and pulmonology was consulted for vent management.  Neurology is  also following.  Lumbar drain was ultimately placed.  He was noted to have a left loculated pleural effusion for which large bore chest tube x2 was placed per CT surgery followed by 2 pigtail catheters to replace and radiology.  The effusion was drained without remark and all chest tubes were removed.  Since that time, there is been concern for possible developing ileus for which a subsequent CT abdomen pelvis was obtained revealing left lower lobe collapse/atelectasis along with fluid and air within the pleural space at the left lung base.  Findings worrisome for empyema.  Cardiothoracic surgery has been reconsulted for management of this possible empyema.  He remains intubated, FiO2 35%, PEEP 5.  He is not sedated.  He has been started on cefepime and vancomycin.  He remains on tube feeds at 65 mL/h.  WBC is within normal range at 9.5.      History  Code Status and Medical Interventions:   Ordered at: 05/10/22 1509     Level Of Support Discussed With:    Next of Kin (If No Surrogate)    Patient     Code Status (Patient has no pulse and is not breathing):    CPR (Attempt to Resuscitate)     Medical Interventions (Patient has pulse or is breathing):    Full Support         Past Medical History:   Diagnosis Date   • Brain tumor (HCC)    • Depression    • Hearing loss    • History of cellulitis     right foot big toe   • Hypertension    • Pneumonia    • Right arm weakness     after cervial injury   • Sciatic pain     affects balance   • Thyroid disease    • Visual disturbance     due to brain tumor - right eye   ,   Past Surgical History:   Procedure Laterality Date   • CATARACT EXTRACTION, BILATERAL     • COLONOSCOPY     • CRANIOTOMY Bilateral 6/10/2022    Procedure: CRANIECTOMY;  Surgeon: Martell Downs MD;  Location: [facility];  Service: Neurosurgery;  Laterality: Bilateral;   • CRANIOTOMY FOR TUMOR Right 5/10/2022    Procedure: CRANIOTOMY FOR TUMOR STERIOTACTIC WITH BRAIN LAB, right;  Surgeon: Yareli  "Martell Nowak MD;  Location: [facility];  Service: Neurosurgery;  Laterality: Right;   • ENDOSCOPY W/ PEG TUBE PLACEMENT N/A 6/2/2022    Procedure: ESOPHAGOGASTRODUODENOSCOPY WITH PERCUTANEOUS ENDOSCOPIC GASTROSTOMY TUBE INSERTION WITH ANESTHESIA;  Surgeon: Melecio Lu MD;  Location: [facility] OR;  Service: Gastroenterology;  Laterality: N/A;   • NECK SURGERY     • SKIN CANCER EXCISION     • TONSILLECTOMY     • TRACHEOSTOMY N/A 6/2/2022    Procedure: TRACHEOSTOMY;  Surgeon: Geovany Davidson MD;  Location: [facility] OR;  Service: ENT;  Laterality: N/A;   •  SHUNT INSERTION Right 6/3/2022    Procedure: VENTRICULAR PERITONEAL SHUNT INSERTION WITH BRAIN LAB;  Surgeon: Martell Downs MD;  Location: [facility] OR;  Service: Neurosurgery;  Laterality: Right;   ,   Family History   Problem Relation Age of Onset   • Cancer Sister    • Cancer Brother    ,   Social History     Tobacco Use   • Smoking status: Never Smoker   • Smokeless tobacco: Never Used   Vaping Use   • Vaping Use: Never used   Substance Use Topics   • Alcohol use: Never   • Drug use: Never   ,   Medications Prior to Admission   Medication Sig Dispense Refill Last Dose   • levothyroxine (SYNTHROID, LEVOTHROID) 125 MCG tablet Take 125 mcg by mouth Daily.   Past Week at Unknown time   • lisinopril (PRINIVIL,ZESTRIL) 20 MG tablet Take 20 mg by mouth Daily.   5/9/2022 at Unknown time   • Lumigan 0.01 % ophthalmic drops Administer 1 drop to both eyes Every Night.   5/9/2022 at Unknown time   • vitamin D (ERGOCALCIFEROL) 1.25 MG (22249 UT) capsule capsule Take 50,000 Units by mouth 1 (One) Time Per Week.   5/6/2022 at Unknown time   , Allergies: Patient has no known allergies.    Review of Systems  Review of Systems   Reason unable to perform ROS: Unable to obtain due to sedation and mechanical ventilation.        Objective     Vital Signs   Visit Vitals  /68   Pulse 58   Temp 99.4 °F (37.4 °C) (Oral)   Resp 24   Ht 182.9 cm (72\")   Wt 109 kg (241 " lb)   SpO2 95%   BMI 32.69 kg/m²       Physical Exam  Vitals reviewed.   Constitutional:       General: He is not in acute distress.     Appearance: He is ill-appearing.      Comments: mechanically ventilated to [facility]   HENT:      Head: Normocephalic.   Eyes:      Pupils: Pupils are equal, round, and reactive to light.   Cardiovascular:      Rate and Rhythm: Normal rate and regular rhythm.      Heart sounds: Normal heart sounds. No murmur heard.  Pulmonary:      Breath sounds: No wheezing or rales.      Comments: Coarse mechanical breath sounds.  Diminished on the left.  Abdominal:      General: There is no distension.      Palpations: Abdomen is soft.      Tenderness: There is no abdominal tenderness.   Musculoskeletal:         General: Swelling present.      Right lower leg: Edema present.      Left lower leg: Edema present.   Skin:     General: Skin is warm and dry.   Neurological:      Comments: Mechanically ventilated to [facility].           LAB:       IMAGES:       Imaging Results (Last 24 Hours)     Procedure Component Value Units Date/Time    XR Chest 1 View [[id number]] Collected: 06/14/22 0702     Updated: 06/14/22 0707    Narrative:      EXAMINATION:  XR CHEST 1 VW-  6/14/2022 3:20 AM CDT     HISTORY: On ventilator.     COMPARISON: 6/13/2022.     TECHNIQUE: Single view AP image.     FINDINGS:  A tracheostomy tube remains in place. There is probable  ventriculoperitoneal shunt tubing traversing the right neck and right  chest. There is hypoventilation with vascular crowding. There is patchy  infiltrate in the left perihilar region and left lung base. There is  mild atelectasis at the right lung base. The left heart border is  partially obscured. There is cardiomegaly. There are degenerative  changes of the spine and shoulders.       Impression:      1. Hypoventilation with vascular crowding.  2. Patchy infiltrate in the mid and lower lung zone on the left. The  left heart border is obscured. This could  represent pneumonia.  Atelectasis is also considered. There is mild atelectasis at the right  lung base.        This report was finalized on 06/14/2022 07:04 by Dr. Zachariah Fonseca MD.    CT Abdomen Pelvis With & Without Contrast [[id number]] Collected: 06/13/22 1830     Updated: 06/13/22 1841    Narrative:      EXAMINATION:  CT ABDOMEN PELVIS W WO CONTRAST-  6/13/2022 6:11 PM CDT     HISTORY: Abdominal distention. R13.10-Dysphagia, unspecified;  Z01.818-Encounter for other preprocedural examination.     TECHNIQUE: Spiral CT was performed of the abdomen and pelvis without and  with IV contrast. Multiplanar images were reconstructed.     DLP: 4672 mGy-cm. Automated dosage reduction technique was utilized to  reduce patient dosage.     COMPARISON: 5/23/2020.      LUNG BASES: The visualized left lower lobe is completely collapsed.  There is pleural effusion on the left. There appears to be some air  droplets in the pleural space on the left. There is right lower lobe  vascular crowding and atelectasis.     LIVER AND SPLEEN: There is increased density within the gallbladder.  This could be related to sludge. There are no dense gallstones. There is  breathing motion artifact. The liver and spleen appear to be  unremarkable.     PANCREAS: There is breathing motion artifact. No definite acute  pancreatic abnormality is seen.     KIDNEYS, ADRENALS AND URINARY BLADDER: There is breathing motion  artifact. The adrenal glands are normal in size. There are  nonobstructive renal stones bilaterally. There is a probable small cyst  in the upper pole left kidney measuring less than 1 cm. There is an  ill-defined area of low density in the left kidney laterally in the mid  to lower pole. The ureters are nondilated. There is thickening of the  bladder wall. There is air in the bladder. There is a Mccrary catheter in  the bladder.     BOWEL: No oral contrast was given. There is a gastrostomy tube. There is  air and fluid in the stomach.  Small bowel loops are nondilated. There is  underdistention of portions of the colon.     OTHER: There are bilateral fat-containing inguinal hernias. There is a  small fat-containing umbilical hernia. There is diffuse body wall edema.  There is presacral edema. There is mild atheromatous disease of the  aortoiliac vessels. There are degenerative changes of the spine. There  are degenerative changes of both hips.       Impression:      1. Likely complete left lower lobe collapse/atelectasis. There is fluid  and air within the pleural space at the left lung base. There may be a  split pleural sign in the left lung base. The findings are worrisome for  empyema. There is mild atelectasis in the right lung base.  2. The gallbladder is dense. This could be due to sludge in the  gallbladder. There are no focal dense gallstones.  3. Area of ill-defined low density in the mid to lower pole left kidney  laterally is nonspecific. This could represent an area of  pyelonephritis. A mass is felt to be less likely. This is not present on  5/23/2022. There is a probable small cyst in the upper pole left kidney  that is difficult to evaluate due to breathing motion artifact.  4. Thickening of the bladder wall may be related to muscular  hypertrophy. Inflammation and infection are also included in the  differential. There is a Mccrary catheter in the bladder. There is air in  the bladder.  5. Bilateral fat-containing inguinal hernias. Very small fat-containing  umbilical hernia.  6. Mild to moderate diffuse body wall edema. Presacral edema. Other  nonacute findings, as discussed above.  This report was finalized on 06/13/2022 18:38 by Dr. Zachariah Fonseca MD.    XR Abdomen KUB [[id number]] Collected: 06/13/22 1011     Updated: 06/13/22 1016    Narrative:      EXAMINATION: XR ABDOMEN KUB- 6/13/2022 10:11 AM CDT     HISTORY: Tight Abdomen.; R13.10-Dysphagia, unspecified;  Z01.818-Encounter for other preprocedural examination;  D32.9-Benign  neoplasm of meninges, unspecified; Z74.09-Other reduced mobility;  Z78.9-Other specified health status; G40.901-Epilepsy, unspecified, not  intractable, with status epilepticus; J96.01-Acute respiratory failure  with hypoxia; G91.0-Communicating hydrocephalus; T81.40XA-Infection  follow.     REPORT: A supine view of the abdomen was obtained.     COMPARISON: KUB 5/15/2022.     The NG tube and feeding tube seen before are no longer identified. There  is respiratory motion artifact. Gas is seen within the GI tract  diffusely without significant distention. The pattern is relatively  nonspecific but could be related to developing adynamic ileus. There are  advanced degenerative changes throughout the lumbar spine.       Impression:      Nonspecific bowel gas pattern with questionable developing  ileus.  This report was finalized on 06/13/2022 10:13 by Dr. Antony Thomas MD.          CXR: Hypoventilation with pulmonary vascular congestion.  Noted infiltrate at the mid and left lower lung zone.  Bibasilar atelectasis.             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 Ortega.  We will plan for bedside ultrasound this afternoon to assess for possible fluid collection.  Possible placement of left pigtail catheter for drainage.    Continue daily chest  x-ray  Discussed with nursing.      Vidya Hendrickson, APRN  06/14/22  08:42 CDT    Electronically signed by Pilo Ortega MD at 06/14/22 6685         Zac Mccoy PA-C    Physician Assistant   Neurology   Progress Notes      Attested   Date of Service:  06/14/22 1419   Creation Time:  06/14/22 1419              Attested            Attestation signed by Donnell Freire MD at 06/14/22 8776     I have reviewed this documentation and agree.  Patient seen and examined by me.  Very encpehalopathic still.  EEG yesterday showed just generalized slowing.  We will continue cleaning up med list by reducing fosphenytoin and continuing other two agents.  Keppra may be our second choice to wean down as it's sedating effect may be playing the biggest part.  Neuro will follow peripherally.       Electronically signed by Donnell Freire MD, 06/14/22, 4:37 PM CDT.  .                 Expand All Collapse All          Show:Clear all  [x]Manual[x]Template[x]Copied    Added by:  [x]Zac Mccoy PA-C      []Juanis for details      Neurology Progress Note        Chief Complaint:    Postoperative seizure     Subjective      Subjective:  No new seizure events noted.  Repeat EEG study demonstrates generalized slowing but no epileptiform discharges.  Remains off sedation, but does not follow commands.  Does not open eyes with stimulation           Medical History        Past Medical History:   Diagnosis Date   • Brain tumor (HCC)     • Depression     • Hearing loss     • History of cellulitis       right foot big toe   • Hypertension     • Pneumonia     • Right arm weakness       after cervial injury   • Sciatic pain       affects balance   • Thyroid disease     • Visual disturbance       due to brain tumor - right eye         Surgical History         Past Surgical History:   Procedure Laterality Date   • CATARACT EXTRACTION, BILATERAL       • COLONOSCOPY       • CRANIOTOMY Bilateral 6/10/2022     Procedure: CRANIECTOMY;  Surgeon:  Martell Downs MD;  Location: [facility] OR;  Service: Neurosurgery;  Laterality: Bilateral;   • CRANIOTOMY FOR TUMOR Right 5/10/2022     Procedure: CRANIOTOMY FOR TUMOR STERIOTACTIC WITH BRAIN LAB, right;  Surgeon: Martell Downs MD;  Location: [facility] OR;  Service: Neurosurgery;  Laterality: Right;   • ENDOSCOPY W/ PEG TUBE PLACEMENT N/A 6/2/2022     Procedure: ESOPHAGOGASTRODUODENOSCOPY WITH PERCUTANEOUS ENDOSCOPIC GASTROSTOMY TUBE INSERTION WITH ANESTHESIA;  Surgeon: Melecio Lu MD;  Location: [facility] OR;  Service: Gastroenterology;  Laterality: N/A;   • NECK SURGERY       • SKIN CANCER EXCISION       • TONSILLECTOMY       • TRACHEOSTOMY N/A 6/2/2022     Procedure: TRACHEOSTOMY;  Surgeon: Geovany Davidson MD;  Location: [facility] OR;  Service: ENT;  Laterality: N/A;   •  SHUNT INSERTION Right 6/3/2022     Procedure: VENTRICULAR PERITONEAL SHUNT INSERTION WITH BRAIN LAB;  Surgeon: Martell Downs MD;  Location: [facility] OR;  Service: Neurosurgery;  Laterality: Right;               Family History   Problem Relation Age of Onset   • Cancer Sister     • Cancer Brother        Social History          Tobacco Use   • Smoking status: Never Smoker   • Smokeless tobacco: Never Used   Vaping Use   • Vaping Use: Never used   Substance Use Topics   • Alcohol use: Never   • Drug use: Never         Medications:  Current Medications             Current Facility-Administered Medications   Medication Dose Route Frequency Provider Last Rate Last Admin   • acetaminophen (TYLENOL) tablet 650 mg  650 mg Oral Q4H PRN Martell Downs MD   650 mg at 06/13/22 2104     Or   • acetaminophen (TYLENOL) suppository 650 mg  650 mg Rectal Q4H PRN Martell Downs MD   650 mg at 05/23/22 0016   • alteplase (CATHFLO/ACTIVASE) injection 2 mg  2 mg Intracatheter PRN Martell Downs MD   New Syringe/Cartridge at 05/28/22 1330   • bisacodyl (DULCOLAX) suppository 10 mg  10 mg Rectal Daily PRN  Martell Downs MD   10 mg at 06/03/22 0749   • carvedilol (COREG) tablet 6.25 mg  6.25 mg Nasogastric BID With Meals Martell Downs MD   6.25 mg at 06/14/22 0816   • cefepime (MAXIPIME) 2 g/100 mL 0.9% NS (mbp)  2 g Intravenous Q8H Martell Downs MD   2 g at 06/14/22 0622   • chlorhexidine (PERIDEX) 0.12 % solution 15 mL  15 mL Mouth/Throat Q12H Martell Downs MD   15 mL at 06/14/22 0815   • dexamethasone (DECADRON) tablet 4 mg  4 mg Per PEG Tube Q12H Stevie Parsons APRN   4 mg at 06/14/22 0821     Followed by   • [START ON 6/16/2022] dexamethasone (DECADRON) tablet 2 mg  2 mg Per PEG Tube Q12H Stevie Parsons APRN         Followed by   • [START ON 6/18/2022] dexamethasone (DECADRON) tablet 1 mg  1 mg Per PEG Tube Q12H Stevie Parsons APRN         Followed by   • [START ON 6/20/2022] dexamethasone (DECADRON) tablet 0.5 mg  0.5 mg Per PEG Tube Daily Stevie Parsons APRN       • dextrose (D50W) (25 g/50 mL) IV injection 25 g  25 g Intravenous Q15 Min PRN Martell Downs MD   25 g at 06/09/22 1807   • dextrose (GLUTOSE) oral gel 15 g  15 g Oral Q15 Min PRN Martell Downs MD   15 g at 05/22/22 0527   • docusate (COLACE) 50 MG/5ML liquid 50 mg  50 mg Oral Daily Sha Beatty MD   50 mg at 06/14/22 1130   • fosphenytoin (Cerebyx) 275 mg PE in sodium chloride 0.9 % 100 mL IVPB  275 mg PE Intravenous Q8H Martell Downs MD   275 mg PE at 06/14/22 1358   • glucagon (human recombinant) (GLUCAGEN DIAGNOSTIC) injection 1 mg  1 mg Intramuscular Q15 Min PRN Martell Downs MD       • guaiFENesin (ROBITUSSIN) 100 MG/5ML oral solution 200 mg  200 mg Per PEG Tube TID Daly Perea APRN   200 mg at 06/14/22 0815   • heparin (porcine) 5000 UNIT/ML injection 5,000 Units  5,000 Units Subcutaneous Q12H Martell Downs MD   5,000 Units at 06/13/22 2026   • hydrALAZINE (APRESOLINE) injection 10 mg  10 mg Intravenous Q6H PRN Martell Downs MD    10 mg at 06/14/22 0211   • insulin detemir (LEVEMIR) injection 20 Units  20 Units Subcutaneous Nightly Martell Downs MD   20 Units at 06/13/22 2036   • insulin regular (humuLIN R,novoLIN R) injection 2-7 Units  2-7 Units Subcutaneous Q6H Martell Downs MD   2 Units at 06/14/22 1141   • ipratropium-albuterol (DUO-NEB) nebulizer solution 3 mL  3 mL Nebulization Q4H PRN Martell Downs MD   3 mL at 06/14/22 0648   • labetalol (NORMODYNE,TRANDATE) injection 10 mg  10 mg Intravenous Q6H PRN Martell Downs MD   10 mg at 06/14/22 0337   • lacosamide (VIMPAT) injection 200 mg  200 mg Intravenous Q12H Martell Downs MD   200 mg at 06/14/22 0815   • lactulose solution 20 g  20 g Per PEG Tube Daily Sha Beatty MD   20 g at 06/14/22 1357   • lansoprazole (FIRST) oral suspension 30 mg  30 mg Per G Tube QAM Martell Downs MD   30 mg at 06/14/22 1007   • latanoprost (XALATAN) 0.005 % ophthalmic solution 1 drop  1 drop Both Eyes Nightly Martell Downs MD   1 drop at 06/13/22 2028   • levalbuterol (XOPENEX) nebulizer solution 0.63 mg  0.63 mg Nebulization TID PRN Martell Downs MD   0.63 mg at 06/12/22 1046   • levETIRAcetam in NaCl 0.75% (KEPPRA) IVPB 1,000 mg  1,000 mg Intravenous Q12H Martell Downs MD   1,000 mg at 06/14/22 0816   • levothyroxine (SYNTHROID, LEVOTHROID) tablet 125 mcg  125 mcg Nasogastric Q AM Martell Downs MD   125 mcg at 06/14/22 0501   • lidocaine (XYLOCAINE) 1 % injection 10 mL  10 mL Intradermal Once Martell Downs MD       • lidocaine (XYLOCAINE) 1 % injection 10 mL  10 mL Infiltration Once Pilo Ortega MD       • liothyronine (CYTOMEL) tablet 25 mcg  25 mcg Nasogastric Daily Martell Downs MD   25 mcg at 06/14/22 0816   • lisinopril (PRINIVIL,ZESTRIL) tablet 20 mg  20 mg Nasogastric Q24H Martell Downs MD   20 mg at 06/14/22 0815   • LORazepam (ATIVAN) injection 2 mg  2 mg  Intravenous Q2H PRN Martell Downs MD   2 mg at 06/14/22 0358   • mupirocin (BACTROBAN) 2 % ointment 1 application  1 application Topical Q12H Martell Downs MD   1 application at 06/14/22 0824   • norepinephrine (LEVOPHED) 8 mg in 250 mL NS infusion (premix)  0.02-0.3 mcg/kg/min Intravenous Titrated Martell Downs MD   Held at 06/11/22 1054   • Nutrisource fiber pack 1 packet  1 packet Nasogastric 4x Daily Martell Downs MD   1 packet at 06/14/22 1132   • ondansetron (ZOFRAN) tablet 4 mg  4 mg Oral Q6H PRN Martell Downs MD         Or   • ondansetron (ZOFRAN) injection 4 mg  4 mg Intravenous Q6H PRN Martell Downs MD       • oxyCODONE (ROXICODONE) 5 MG/5ML solution 5 mg  5 mg Per PEG Tube Q6H PRN Daly Perea APRN   5 mg at 06/13/22 0354   • polyethylene glycol (MIRALAX) packet 17 g  17 g Oral Daily Martell Downs MD   17 g at 06/14/22 0816   • ProSource TF oral liquid 45 mL  45 mL Per G Tube BID Martell Downs MD   45 mL at 06/14/22 0821   • sodium chloride 3 % nebulizer solution 4 mL  4 mL Nebulization BID - RT Martell Downs MD   4 mL at 06/14/22 0648   • vancomycin 1500 mg/500 mL 0.9% NS IVPB ([facility])  15 mg/kg Intravenous Q12H Martell Downs MD   1,500 mg at 06/14/22 1400            Allergies:    Patient has no known allergies.     Review of Systems:   -A 14 point review of systems is completed and is negative.        Objective       Vital Signs  Temp:  [98.2 °F (36.8 °C)-99.4 °F (37.4 °C)] 99.3 °F (37.4 °C)  Heart Rate:  [53-74] 60  Resp:  [21-36] 24  BP: (113-199)/(52-97) 146/60  FiO2 (%):  [35 %] 35 %     Physical Exam:     HEENT:  Neck supple.  Tracheostomy in place.   CVS:  Regular rate and rhythm.  No murmurs  Carotid Examination:  No bruits  Lungs:  Clear to auscultation  Abdomen:  Non-tender, Non-distended.  PEG tube in place  Extremities: Edema of the dorsum of the bilateral hands.     Neurologic Exam:   Coughs  against the vent with sternal rub and noxious stimuli  Pupils are equally reactive to light.    Gag intact.     Motor:    Did not move any extremities spontaneously  Withdrew to noxious stimuli in all extremities.     DTR:  2+ throughout in all four extremities  upgoing toe on left                   Results Review:    I reviewed the patient's new clinical results and findings.              Lab Results (last 24 hours)      Procedure Component Value Units Date/Time     POC Glucose Once [[id number]]  (Abnormal) Collected: 06/14/22 1127     Specimen: Blood Updated: 06/14/22 1138       Glucose 171 mg/dL         Comment: : [email or web address] Tova RyanMeter ID: [id number]        Anaerobic Culture - Tissue, Brain [[id number]]  (Normal) Collected: 06/10/22 1326     Specimen: Tissue from Brain Updated: 06/14/22 0645       Anaerobic Culture No anaerobes isolated at 3 days     CBC & Differential [[id number]]  (Abnormal) Collected: 06/14/22 0608     Specimen: Blood Updated: 06/14/22 0629     Narrative:       The following orders were created for panel order CBC & Differential.  Procedure                               Abnormality         Status                     ---------                               -----------         ------                     CBC Auto Differential[[id number]]        Abnormal            Final result                  Please view results for these tests on the individual orders.     CBC Auto Differential [[id number]]  (Abnormal) Collected: 06/14/22 0608     Specimen: Blood Updated: 06/14/22 0629       WBC 9.59 10*3/mm3         RBC 2.43 10*6/mm3         Hemoglobin 7.7 g/dL         Hematocrit 24.3 %         .0 fL         MCH 31.7 pg         MCHC 31.7 g/dL         RDW 18.1 %         RDW-SD 63.2 fl         MPV 9.7 fL         Platelets 198 10*3/mm3         Neutrophil % 73.1 %         Lymphocyte % 9.9 %         Monocyte % 12.8 %         Eosinophil % 1.5 %         Basophil % 0.5 %         Immature Grans % 2.2 %          Neutrophils, Absolute 7.01 10*3/mm3         Lymphocytes, Absolute 0.95 10*3/mm3         Monocytes, Absolute 1.23 10*3/mm3         Eosinophils, Absolute 0.14 10*3/mm3         Basophils, Absolute 0.05 10*3/mm3         Immature Grans, Absolute 0.21 10*3/mm3         nRBC 0.0 /100 WBC       POC Glucose Once [[id number]]  (Abnormal) Collected: 06/14/22 0500     Specimen: Blood Updated: 06/14/22 0512       Glucose 170 mg/dL         Comment: : [id number] Salvador MagallonMeter ID: [id number]        Phenytoin Level, Total [[id number]]  (Normal) Collected: 06/14/22 0405     Specimen: Blood Updated: 06/14/22 0444       Phenytoin Level 16.2 mcg/mL       Comprehensive Metabolic Panel [[id number]]  (Abnormal) Collected: 06/14/22 0405     Specimen: Blood Updated: 06/14/22 0439       Glucose 159 mg/dL         BUN 36 mg/dL         Creatinine 0.54 mg/dL         Sodium 139 mmol/L         Potassium 4.5 mmol/L         Chloride 103 mmol/L         CO2 28.0 mmol/L         Calcium 8.4 mg/dL         Total Protein 6.9 g/dL         Albumin 2.70 g/dL         ALT (SGPT) 175 U/L         AST (SGOT) 248 U/L         Alkaline Phosphatase 472 U/L         Total Bilirubin 0.6 mg/dL         Globulin 4.2 gm/dL         A/G Ratio 0.6 g/dL         BUN/Creatinine Ratio 66.7       Anion Gap 8.0 mmol/L         eGFR 102.6 mL/min/1.73         Comment: National Kidney Foundation and American Society of Nephrology (ASN) Task Force recommended calculation based on the Chronic Kidney Disease Epidemiology Collaboration (CKD-EPI) equation refit without adjustment for race.        Narrative:       GFR Normal >60  Chronic Kidney Disease <60  Kidney Failure <15        Blood Gas, Arterial - [[id number]]  (Abnormal) Collected: 06/14/22 0417     Specimen: Arterial Blood Updated: 06/14/22 0413       Site Left Radial       Denzel's Test Positive       pH, Arterial 7.461 pH units         Comment: 83 Value above reference range          pCO2, Arterial 40.9 mm Hg         pO2,  Arterial 82.4 mm Hg         Comment: 84 Value below reference range          HCO3, Arterial 29.1 mmol/L         Comment: 83 Value above reference range          Base Excess, Arterial 4.9 mmol/L         Comment: 83 Value above reference range          O2 Saturation, Arterial 97.3 %         Temperature 37.0 C         Barometric Pressure for Blood Gas 748 mmHg         Modality Ventilator       FIO2 35 %         Ventilator Mode PC       Set Mech Resp Rate 16.0       PEEP 5.0       PIP 10 cmH2O         Comment: Meter: [id number]     :  [id number]          Collected by [id number]       pCO2, Temperature Corrected 40.9 mm Hg         pH, Temp Corrected 7.461 pH Units         pO2, Temperature Corrected 82.4 mm Hg       POC Glucose Once [[id number]]  (Normal) Collected: 06/14/22 0004     Specimen: Blood Updated: 06/14/22 0015       Glucose 119 mg/dL         Comment: : [id number] Salvador LaceyMeter ID: [id number]        POC Glucose Once [[id number]]  (Normal) Collected: 06/13/22 2034     Specimen: Blood Updated: 06/13/22 2045       Glucose 118 mg/dL         Comment: : [id number] Salvador LaceyMeter ID: [id number]        POC Glucose Once [[id number]]  (Abnormal) Collected: 06/13/22 1703     Specimen: Blood Updated: 06/13/22 1714       Glucose 230 mg/dL         Comment: : [email or web address] Tova RyanMeter ID: [id number]        Vancomycin, Trough [[id number]]  (Normal) Collected: 06/13/22 1517     Specimen: Blood Updated: 06/13/22 1544       Vancomycin Trough 16.20 mcg/mL                        Imaging Results (Last 24 Hours)      Procedure Component Value Units Date/Time     US Venous Doppler Upper Extremity Left (duplex) [[id number]] Resulted: 06/14/22 1309       Updated: 06/14/22 1343     XR Chest 1 View [[id number]] Collected: 06/14/22 1218       Updated: 06/14/22 1222     Narrative:       EXAMINATION:  XR CHEST 1 VW-  6/14/2022 11:16 AM CDT     HISTORY: Central line placement. Lung infiltrates.     COMPARISON:  6/14/2022.     TECHNIQUE: Single view AP image.     FINDINGS:  There is a new left IJ central venous catheter with catheter  tip near the brachiocephalic vein and superior vena cava junction. There  is no evidence of pneumothorax. There is hypoventilation. There is  consolidation in the left perihilar region and left lung base. The left  heart border is partially obscured. There is mild right perihilar  infiltrate. There is cardiomegaly. There is blunting of the left  costophrenic angle suggesting a small pleural effusion.        Impression:       1. New left IJ central venous catheter with no evidence of pneumothorax,  as described.  2. Hypoventilation.  3. Parenchymal infiltrate on the left worrisome for pneumonia. Probable  small pleural effusion on the left.  4. Right perihilar opacity likely due to vascular crowding and  hypoventilation. Patchy pneumonia on the right is not ruled out.        This report was finalized on 06/14/2022 12:19 by Dr. Zachariah Fonseca MD.     XR Chest 1 View [[id number]] Collected: 06/14/22 0702       Updated: 06/14/22 0707     Narrative:       EXAMINATION:  XR CHEST 1 VW-  6/14/2022 3:20 AM CDT     HISTORY: On ventilator.     COMPARISON: 6/13/2022.     TECHNIQUE: Single view AP image.     FINDINGS:  A tracheostomy tube remains in place. There is probable  ventriculoperitoneal shunt tubing traversing the right neck and right  chest. There is hypoventilation with vascular crowding. There is patchy  infiltrate in the left perihilar region and left lung base. There is  mild atelectasis at the right lung base. The left heart border is  partially obscured. There is cardiomegaly. There are degenerative  changes of the spine and shoulders.        Impression:       1. Hypoventilation with vascular crowding.  2. Patchy infiltrate in the mid and lower lung zone on the left. The  left heart border is obscured. This could represent pneumonia.  Atelectasis is also considered. There is mild atelectasis  at the right  lung base.        This report was finalized on 06/14/2022 07:04 by Dr. Zachariah Fonseca MD.     CT Abdomen Pelvis With & Without Contrast [[id number]] Collected: 06/13/22 1830       Updated: 06/13/22 1841     Narrative:       EXAMINATION:  CT ABDOMEN PELVIS W WO CONTRAST-  6/13/2022 6:11 PM CDT     HISTORY: Abdominal distention. R13.10-Dysphagia, unspecified;  Z01.818-Encounter for other preprocedural examination.     TECHNIQUE: Spiral CT was performed of the abdomen and pelvis without and  with IV contrast. Multiplanar images were reconstructed.     DLP: 4672 mGy-cm. Automated dosage reduction technique was utilized to  reduce patient dosage.     COMPARISON: 5/23/2020.      LUNG BASES: The visualized left lower lobe is completely collapsed.  There is pleural effusion on the left. There appears to be some air  droplets in the pleural space on the left. There is right lower lobe  vascular crowding and atelectasis.     LIVER AND SPLEEN: There is increased density within the gallbladder.  This could be related to sludge. There are no dense gallstones. There is  breathing motion artifact. The liver and spleen appear to be  unremarkable.     PANCREAS: There is breathing motion artifact. No definite acute  pancreatic abnormality is seen.     KIDNEYS, ADRENALS AND URINARY BLADDER: There is breathing motion  artifact. The adrenal glands are normal in size. There are  nonobstructive renal stones bilaterally. There is a probable small cyst  in the upper pole left kidney measuring less than 1 cm. There is an  ill-defined area of low density in the left kidney laterally in the mid  to lower pole. The ureters are nondilated. There is thickening of the  bladder wall. There is air in the bladder. There is a Mccrary catheter in  the bladder.     BOWEL: No oral contrast was given. There is a gastrostomy tube. There is  air and fluid in the stomach. Small bowel loops are nondilated. There is  underdistention of portions of  the colon.     OTHER: There are bilateral fat-containing inguinal hernias. There is a  small fat-containing umbilical hernia. There is diffuse body wall edema.  There is presacral edema. There is mild atheromatous disease of the  aortoiliac vessels. There are degenerative changes of the spine. There  are degenerative changes of both hips.        Impression:       1. Likely complete left lower lobe collapse/atelectasis. There is fluid  and air within the pleural space at the left lung base. There may be a  split pleural sign in the left lung base. The findings are worrisome for  empyema. There is mild atelectasis in the right lung base.  2. The gallbladder is dense. This could be due to sludge in the  gallbladder. There are no focal dense gallstones.  3. Area of ill-defined low density in the mid to lower pole left kidney  laterally is nonspecific. This could represent an area of  pyelonephritis. A mass is felt to be less likely. This is not present on  5/23/2022. There is a probable small cyst in the upper pole left kidney  that is difficult to evaluate due to breathing motion artifact.  4. Thickening of the bladder wall may be related to muscular  hypertrophy. Inflammation and infection are also included in the  differential. There is a Mccrary catheter in the bladder. There is air in  the bladder.  5. Bilateral fat-containing inguinal hernias. Very small fat-containing  umbilical hernia.  6. Mild to moderate diffuse body wall edema. Presacral edema. Other  nonacute findings, as discussed above.  This report was finalized on 06/13/2022 18:38 by Dr. Zachariah Fonseca MD.             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)    Status epilepticus (HCC)    Essential hypertension    Type 2 diabetes mellitus with hyperglycemia, without long-term current use of insulin (HCC)    Hypothyroidism (acquired)    Acute respiratory  failure (secondary to status epilepticus)    Thrombocytopenia (HCC)    Cerebral parenchymal hemorrhage (HCC)    PAF (paroxysmal atrial fibrillation) (HCC)    Fever    Loculated pleural effusion    Acute deep vein thrombosis (DVT) of the ulnar and brachial veins of right upper extremity (HCC)    Dysphagia    Communicating hydrocephalus (HCC)     Impression:     · Postoperative seizure  · S/p craniectomy and debridement   · Hypoalbuminemia   · S/p  shunt on 6/3/2022  · Tracheostomy  · PEG tube        Plan:  · EEG only demonstrated background slowing and no sharp discharges.  · Continue to reduce fosphenytoin.  Eventual plan to discontinue.  · Continue Keppra 1000 mg twice daily.  · Continue Vimpat 200 mg IV every 12 hours  · Keppra would be the most likely of the 3 antiepileptics to be contributing to ongoing sedation, however, will first begin with reducing the fosphenytoin as it has the greater chance of drug-drug interactions and difficulty managing with his low albumin and nutritional state.  Ultimately, the patient is at relatively low risk for recurrent seizure and will likely be able to eventually wean off all antiepileptic medications.  This will allow him to have as clean a medication regimen as possible, decreasing risk of side effects and drug interactions.  We will also allow him opportunity to improve his level of alertness, however, his other medical comorbidities are the likely culprit behind his impaired level of consciousness at this time.  · Neurology will continue to follow             Zac Mccoy PA-C  06/14/22  14:19 CDT               Cosigned by: Donnell Freire MD at 06/14/22 1637         Case Management  Consult [[id number]] (Order [id number])  Order  Date: 6/15/2022 Department: [facility] INTENSIVE CARE Ordering/Authorizing: Stevie Parsons APRN             Standing Order Information    Remaining Occurrences Interval Last Released     0/1 Once 6/15/2022                   Order History  Inpatient  Date/Time Action Taken User Additional Information   06/15/22 0849 Sign Stevie Parsons APRN    06/15/22 0849 Release Instance Stevie Parsons APRN (auto-released) Released Order:[id number]     Order Details    Frequency Duration Priority Order Class   Once 1  occurrence Routine Hospital Performed       Comments    Will require neurology recommendations for titrating off antiepileptics prior to discharge.              Order Questions    Question Answer   Reason for Consult? TAC placement near [place] per family request.              Source Order Set / Preference List    Preference List    [facility]  FACILITY CONSULTS [[id number]]                 Consult Order Info    ID Description Priority Start Date Start Time   [id number] Case Management  Consult Routine 06/15/2022  8:50 AM   Provider Specialty Referred to   ______________________________________ _____________________________________                                   Reprint Order Requisition    Case Management  Consult (Order #[id number]) on 6/15/22       Encounter    View Encounter                  Order Provider Info        Office phone Pager E-mail   Ordering User Stevie Parsons APRN [phone] -- --   Authorizing Provider Stevie Parsons APRN [phone] -- --   Attending Provider Martell Downs [phone] -- --     Tracking Reports    Cosign Tracking Order Transmittal Tracking

## 2022-06-16 ENCOUNTER — APPOINTMENT (OUTPATIENT)
Dept: GENERAL RADIOLOGY | Facility: HOSPITAL | Age: 77
End: 2022-06-16

## 2022-06-16 LAB
ALBUMIN SERPL-MCNC: 2.4 G/DL (ref 3.5–5.2)
ALBUMIN/GLOB SERPL: 0.6 G/DL
ALP SERPL-CCNC: 412 U/L (ref 39–117)
ALT SERPL W P-5'-P-CCNC: 142 U/L (ref 1–41)
ANION GAP SERPL CALCULATED.3IONS-SCNC: 6 MMOL/L (ref 5–15)
AST SERPL-CCNC: 117 U/L (ref 1–40)
BACTERIA SPEC ANAEROBE CULT: NORMAL
BACTERIA SPEC RESP CULT: NORMAL
BASOPHILS # BLD AUTO: 0.06 10*3/MM3 (ref 0–0.2)
BASOPHILS NFR BLD AUTO: 0.6 % (ref 0–1.5)
BILIRUB SERPL-MCNC: 0.4 MG/DL (ref 0–1.2)
BUN SERPL-MCNC: 32 MG/DL (ref 8–23)
BUN/CREAT SERPL: 72.7 (ref 7–25)
CALCIUM SPEC-SCNC: 8.1 MG/DL (ref 8.6–10.5)
CHLORIDE SERPL-SCNC: 104 MMOL/L (ref 98–107)
CO2 SERPL-SCNC: 29 MMOL/L (ref 22–29)
CREAT SERPL-MCNC: 0.44 MG/DL (ref 0.76–1.27)
DEPRECATED RDW RBC AUTO: 62.4 FL (ref 37–54)
EGFRCR SERPLBLD CKD-EPI 2021: 109.2 ML/MIN/1.73
EOSINOPHIL # BLD AUTO: 0.33 10*3/MM3 (ref 0–0.4)
EOSINOPHIL NFR BLD AUTO: 3.5 % (ref 0.3–6.2)
ERYTHROCYTE [DISTWIDTH] IN BLOOD BY AUTOMATED COUNT: 17.4 % (ref 12.3–15.4)
GLOBULIN UR ELPH-MCNC: 3.7 GM/DL
GLUCOSE BLDC GLUCOMTR-MCNC: 141 MG/DL (ref 70–130)
GLUCOSE BLDC GLUCOMTR-MCNC: 142 MG/DL (ref 70–130)
GLUCOSE BLDC GLUCOMTR-MCNC: 144 MG/DL (ref 70–130)
GLUCOSE SERPL-MCNC: 145 MG/DL (ref 65–99)
GRAM STN SPEC: NORMAL
HCT VFR BLD AUTO: 25.1 % (ref 37.5–51)
HGB BLD-MCNC: 7.7 G/DL (ref 13–17.7)
IMM GRANULOCYTES # BLD AUTO: 0.25 10*3/MM3 (ref 0–0.05)
IMM GRANULOCYTES NFR BLD AUTO: 2.6 % (ref 0–0.5)
LYMPHOCYTES # BLD AUTO: 0.86 10*3/MM3 (ref 0.7–3.1)
LYMPHOCYTES NFR BLD AUTO: 9.1 % (ref 19.6–45.3)
MCH RBC QN AUTO: 31.2 PG (ref 26.6–33)
MCHC RBC AUTO-ENTMCNC: 30.7 G/DL (ref 31.5–35.7)
MCV RBC AUTO: 101.6 FL (ref 79–97)
MONOCYTES # BLD AUTO: 1.25 10*3/MM3 (ref 0.1–0.9)
MONOCYTES NFR BLD AUTO: 13.2 % (ref 5–12)
NEUTROPHILS NFR BLD AUTO: 6.73 10*3/MM3 (ref 1.7–7)
NEUTROPHILS NFR BLD AUTO: 71 % (ref 42.7–76)
NRBC BLD AUTO-RTO: 0 /100 WBC (ref 0–0.2)
PHENYTOIN SERPL-MCNC: 14 MCG/ML (ref 10–20)
PLATELET # BLD AUTO: 215 10*3/MM3 (ref 140–450)
PMV BLD AUTO: 9.7 FL (ref 6–12)
POTASSIUM SERPL-SCNC: 3.9 MMOL/L (ref 3.5–5.2)
PROT SERPL-MCNC: 6.1 G/DL (ref 6–8.5)
RBC # BLD AUTO: 2.47 10*6/MM3 (ref 4.14–5.8)
SODIUM SERPL-SCNC: 139 MMOL/L (ref 136–145)
VANCOMYCIN TROUGH SERPL-MCNC: 20.5 MCG/ML (ref 5–20)
WBC NRBC COR # BLD: 9.48 10*3/MM3 (ref 3.4–10.8)

## 2022-06-16 PROCEDURE — 99232 SBSQ HOSP IP/OBS MODERATE 35: CPT | Performed by: PHYSICIAN ASSISTANT

## 2022-06-16 PROCEDURE — 80202 ASSAY OF VANCOMYCIN: CPT | Performed by: NEUROLOGICAL SURGERY

## 2022-06-16 PROCEDURE — 82962 GLUCOSE BLOOD TEST: CPT

## 2022-06-16 PROCEDURE — 25010000002 CEFEPIME PER 500 MG: Performed by: NEUROLOGICAL SURGERY

## 2022-06-16 PROCEDURE — 25010000002 VANCOMYCIN 10 G RECONSTITUTED SOLUTION: Performed by: NEUROLOGICAL SURGERY

## 2022-06-16 PROCEDURE — 80185 ASSAY OF PHENYTOIN TOTAL: CPT | Performed by: NEUROLOGICAL SURGERY

## 2022-06-16 PROCEDURE — 0 LEVETIRACETAM IN NACL 0.75% 1000 MG/100ML SOLUTION: Performed by: NEUROLOGICAL SURGERY

## 2022-06-16 PROCEDURE — 97110 THERAPEUTIC EXERCISES: CPT

## 2022-06-16 PROCEDURE — 63710000001 INSULIN DETEMIR PER 5 UNITS: Performed by: NEUROLOGICAL SURGERY

## 2022-06-16 PROCEDURE — 25010000002 HEPARIN (PORCINE) PER 1000 UNITS: Performed by: NEUROLOGICAL SURGERY

## 2022-06-16 PROCEDURE — 25010000002 FOSPHENYTOIN 500 MG PE/10ML SOLUTION 10 ML VIAL: Performed by: PHYSICIAN ASSISTANT

## 2022-06-16 PROCEDURE — 80053 COMPREHEN METABOLIC PANEL: CPT | Performed by: NEUROLOGICAL SURGERY

## 2022-06-16 PROCEDURE — 94799 UNLISTED PULMONARY SVC/PX: CPT

## 2022-06-16 PROCEDURE — 99233 SBSQ HOSP IP/OBS HIGH 50: CPT | Performed by: INTERNAL MEDICINE

## 2022-06-16 PROCEDURE — 99233 SBSQ HOSP IP/OBS HIGH 50: CPT | Performed by: NURSE PRACTITIONER

## 2022-06-16 PROCEDURE — 99024 POSTOP FOLLOW-UP VISIT: CPT | Performed by: NURSE PRACTITIONER

## 2022-06-16 PROCEDURE — 85025 COMPLETE CBC W/AUTO DIFF WBC: CPT | Performed by: NEUROLOGICAL SURGERY

## 2022-06-16 PROCEDURE — 71045 X-RAY EXAM CHEST 1 VIEW: CPT

## 2022-06-16 PROCEDURE — 94003 VENT MGMT INPAT SUBQ DAY: CPT

## 2022-06-16 RX ORDER — ARFORMOTEROL TARTRATE 15 UG/2ML
15 SOLUTION RESPIRATORY (INHALATION)
Status: DISCONTINUED | OUTPATIENT
Start: 2022-06-16 | End: 2022-06-20 | Stop reason: HOSPADM

## 2022-06-16 RX ADMIN — LEVOTHYROXINE SODIUM 125 MCG: 125 TABLET ORAL at 05:16

## 2022-06-16 RX ADMIN — CEFEPIME 2 G: 2 INJECTION, POWDER, FOR SOLUTION INTRAVENOUS at 22:09

## 2022-06-16 RX ADMIN — SODIUM CHLORIDE SOLN NEBU 3% 4 ML: 3 NEBU SOLN at 19:12

## 2022-06-16 RX ADMIN — VANCOMYCIN HYDROCHLORIDE 1500 MG: 10 INJECTION, POWDER, LYOPHILIZED, FOR SOLUTION INTRAVENOUS at 03:06

## 2022-06-16 RX ADMIN — GUAIFENESIN 200 MG: 200 SOLUTION ORAL at 15:32

## 2022-06-16 RX ADMIN — Medication 45 ML: at 21:57

## 2022-06-16 RX ADMIN — CEFEPIME 2 G: 2 INJECTION, POWDER, FOR SOLUTION INTRAVENOUS at 14:00

## 2022-06-16 RX ADMIN — LATANOPROST 1 DROP: 50 SOLUTION OPHTHALMIC at 22:07

## 2022-06-16 RX ADMIN — MUPIROCIN 1 APPLICATION: 20 OINTMENT TOPICAL at 22:08

## 2022-06-16 RX ADMIN — Medication 1 PACKET: at 12:00

## 2022-06-16 RX ADMIN — SODIUM CHLORIDE 150 MG PE: 9 INJECTION, SOLUTION INTRAVENOUS at 05:16

## 2022-06-16 RX ADMIN — SODIUM CHLORIDE 100 MG PE: 9 INJECTION, SOLUTION INTRAVENOUS at 21:56

## 2022-06-16 RX ADMIN — LISINOPRIL 20 MG: 20 TABLET ORAL at 08:31

## 2022-06-16 RX ADMIN — ARFORMOTEROL TARTRATE 15 MCG: 15 SOLUTION RESPIRATORY (INHALATION) at 19:12

## 2022-06-16 RX ADMIN — SODIUM CHLORIDE SOLN NEBU 3% 4 ML: 3 NEBU SOLN at 07:24

## 2022-06-16 RX ADMIN — INSULIN DETEMIR 20 UNITS: 100 INJECTION, SOLUTION SUBCUTANEOUS at 21:57

## 2022-06-16 RX ADMIN — LACOSAMIDE 200 MG: 10 INJECTION, SOLUTION INTRAVENOUS at 08:32

## 2022-06-16 RX ADMIN — ARFORMOTEROL TARTRATE 15 MCG: 15 SOLUTION RESPIRATORY (INHALATION) at 09:53

## 2022-06-16 RX ADMIN — Medication 45 ML: at 08:33

## 2022-06-16 RX ADMIN — CEFEPIME 2 G: 2 INJECTION, POWDER, FOR SOLUTION INTRAVENOUS at 05:16

## 2022-06-16 RX ADMIN — SODIUM CHLORIDE 100 MG PE: 9 INJECTION, SOLUTION INTRAVENOUS at 14:00

## 2022-06-16 RX ADMIN — LIOTHYRONINE SODIUM 25 MCG: 25 TABLET ORAL at 08:31

## 2022-06-16 RX ADMIN — MUPIROCIN 1 APPLICATION: 20 OINTMENT TOPICAL at 08:32

## 2022-06-16 RX ADMIN — VANCOMYCIN HYDROCHLORIDE 1500 MG: 10 INJECTION, POWDER, LYOPHILIZED, FOR SOLUTION INTRAVENOUS at 15:32

## 2022-06-16 RX ADMIN — DOCUSATE SODIUM LIQUID 50 MG: 100 LIQUID ORAL at 08:31

## 2022-06-16 RX ADMIN — GUAIFENESIN 200 MG: 200 SOLUTION ORAL at 08:32

## 2022-06-16 RX ADMIN — Medication 1 PACKET: at 08:31

## 2022-06-16 RX ADMIN — CARVEDILOL 6.25 MG: 6.25 TABLET, FILM COATED ORAL at 08:31

## 2022-06-16 RX ADMIN — CHLORHEXIDINE GLUCONATE 15 ML: 1.2 RINSE ORAL at 08:31

## 2022-06-16 RX ADMIN — CHLORHEXIDINE GLUCONATE 15 ML: 1.2 RINSE ORAL at 21:57

## 2022-06-16 RX ADMIN — CARVEDILOL 6.25 MG: 6.25 TABLET, FILM COATED ORAL at 18:17

## 2022-06-16 RX ADMIN — Medication 1 PACKET: at 18:17

## 2022-06-16 RX ADMIN — LEVETIRACETAM 1000 MG: 10 INJECTION INTRAVENOUS at 08:31

## 2022-06-16 RX ADMIN — GUAIFENESIN 200 MG: 200 SOLUTION ORAL at 21:57

## 2022-06-16 RX ADMIN — DEXAMETHASONE 2 MG: 2 TABLET ORAL at 09:00

## 2022-06-16 RX ADMIN — HEPARIN SODIUM 5000 UNITS: 5000 INJECTION INTRAVENOUS; SUBCUTANEOUS at 08:32

## 2022-06-16 RX ADMIN — Medication 1 PACKET: at 21:56

## 2022-06-16 RX ADMIN — POLYETHYLENE GLYCOL 3350 17 G: 17 POWDER, FOR SOLUTION ORAL at 08:32

## 2022-06-16 RX ADMIN — HEPARIN SODIUM 5000 UNITS: 5000 INJECTION INTRAVENOUS; SUBCUTANEOUS at 21:56

## 2022-06-16 RX ADMIN — LEVETIRACETAM 1000 MG: 10 INJECTION INTRAVENOUS at 22:08

## 2022-06-16 RX ADMIN — LACTULOSE 20 G: 20 SOLUTION ORAL at 08:32

## 2022-06-16 RX ADMIN — DEXAMETHASONE 2 MG: 2 TABLET ORAL at 21:55

## 2022-06-16 RX ADMIN — LANSOPRAZOLE 30 MG: KIT at 06:47

## 2022-06-16 RX ADMIN — LACOSAMIDE 200 MG: 10 INJECTION, SOLUTION INTRAVENOUS at 21:56

## 2022-06-16 NOTE — NURSING NOTE
Pt not opening eyes or responding to voice. Will withdraw to pain in lower extremities. Pt up in neuro chair today x3 hours. Weight shifted in chair q 20 minutes. Tolerating tube feeds. BM x1. Good uop.

## 2022-06-16 NOTE — THERAPY TREATMENT NOTE
Acute Care - Physical Therapy Treatment Note  [facility]     Patient Name: Hai Hernandez  : 1945  MRN: [id number]  Today's Date: 2022      Visit Dx:     ICD-10-CM ICD-9-CM   1. Dysphagia, unspecified type  R13.10 787.20   2. Preop testing  Z01.818 V72.84   3. Meningioma (HCC)  D32.9 225.2   4. Impaired mobility  Z74.09 799.89   5. Decreased activities of daily living (ADL)  Z78.9 V49.89   6. Status epilepticus (HCC)  G40.901 345.3   7. Acute respiratory failure with hypoxia (HCC)  J96.01 518.81   8. Communicating hydrocephalus (HCC)  G91.0 331.3   9. Postoperative infection, unspecified type, initial encounter  T81.40XA 998.59   10. 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)   • Status epilepticus (HCC)   • Essential hypertension   • Type 2 diabetes mellitus with hyperglycemia, without long-term current use of insulin (HCC)   • Hypothyroidism (acquired)   • Acute respiratory failure (secondary to status epilepticus)   • Thrombocytopenia (HCC)   • Cerebral parenchymal hemorrhage (HCC)   • PAF (paroxysmal atrial fibrillation) (HCC)   • Fever   • Loculated pleural effusion   • Acute deep vein thrombosis (DVT) of the ulnar and brachial veins of right upper extremity (HCC)   • Dysphagia   • Communicating hydrocephalus (HCC)   • Cerebral edema (HCC)     Past Medical History:   Diagnosis Date   • Brain tumor (HCC)    • Depression    • Hearing loss    • History of cellulitis     right foot big toe   • Hypertension    • Pneumonia    • Right arm weakness     after cervial injury   • Sciatic pain     affects balance   • Thyroid disease    • Visual disturbance     due to brain tumor - right eye     Past Surgical History:   Procedure Laterality Date   • CATARACT EXTRACTION, BILATERAL     • COLONOSCOPY     • CRANIOTOMY  Bilateral 6/10/2022    Procedure: CRANIECTOMY;  Surgeon: Martell Downs MD;  Location:  [facility];  Service: Neurosurgery;  Laterality: Bilateral;   • CRANIOTOMY FOR TUMOR Right 5/10/2022    Procedure: CRANIOTOMY FOR TUMOR STERIOTACTIC WITH BRAIN LAB, right;  Surgeon: Martell Downs MD;  Location:  [facility];  Service: Neurosurgery;  Laterality: Right;   • ENDOSCOPY W/ PEG TUBE PLACEMENT N/A 6/2/2022    Procedure: ESOPHAGOGASTRODUODENOSCOPY WITH PERCUTANEOUS ENDOSCOPIC GASTROSTOMY TUBE INSERTION WITH ANESTHESIA;  Surgeon: Melecio Lu MD;  Location:  [facility];  Service: Gastroenterology;  Laterality: N/A;   • NECK SURGERY     • SKIN CANCER EXCISION     • TONSILLECTOMY     • TRACHEOSTOMY N/A 6/2/2022    Procedure: TRACHEOSTOMY;  Surgeon: Geovany Davidson MD;  Location:  [facility];  Service: ENT;  Laterality: N/A;   •  SHUNT INSERTION Right 6/3/2022    Procedure: VENTRICULAR PERITONEAL SHUNT INSERTION WITH BRAIN LAB;  Surgeon: Martell Downs MD;  Location:  [facility];  Service: Neurosurgery;  Laterality: Right;     PT Assessment (last 12 hours)     PT Evaluation and Treatment     Row Name 06/16/22 0746          Physical Therapy Time and Intention    Document Type therapy note (daily note)  -TB     Mode of Treatment physical therapy  -TB     Row Name 06/16/22 0746          General Information    Existing Precautions/Restrictions fall;oxygen therapy device and L/min  Vent, trach  -TB     Row Name 06/16/22 0746          Motor Skills    Therapeutic Exercise other (see comments)  -TB     Comments, Therapeutic Exercise PROM to all 4 exts  -TB     Row Name             Wound 05/15/22 1712 coccyx    Wound - Properties Group Placement Date: 05/15/22  -KS Placement Time: 1712 -KS Location: coccyx  -KS     Retired Wound - Properties Group Placement Date: 05/15/22  -KS Placement Time: 1712 -KS Location: coccyx  -KS     Retired Wound - Properties Group Date first assessed: 05/15/22  -KS Time  first assessed: 1712 -KS Location: coccyx  -KS     Row Name             Wound 05/17/22 1623 scalp Pressure Injury    Wound - Properties Group Placement Date: 05/17/22  -KS Placement Time: 1623  -KS Present on Hospital Admission: N  -KS Location: scalp  -KS Primary Wound Type: Pressure inj  -KS     Retired Wound - Properties Group Placement Date: 05/17/22  -KS Placement Time: 1623  -KS Present on Hospital Admission: N  -KS Location: scalp  -KS Primary Wound Type: Pressure inj  -KS     Retired Wound - Properties Group Date first assessed: 05/17/22  -KS Time first assessed: 1623  -KS Present on Hospital Admission: N  -KS Location: scalp  -KS Primary Wound Type: Pressure inj  -KS     Row Name             Wound 06/03/22 1602 abdomen Incision    Wound - Properties Group Placement Date: 06/03/22  -MK Placement Time: 1602  -MK Present on Hospital Admission: N  -MK Location: abdomen  -MK Primary Wound Type: Incision  -MK     Retired Wound - Properties Group Placement Date: 06/03/22  -MK Placement Time: 1602  -MK Present on Hospital Admission: N  -MK Location: abdomen  -MK Primary Wound Type: Incision  -MK     Retired Wound - Properties Group Date first assessed: 06/03/22  -MK Time first assessed: 1602  -MK Present on Hospital Admission: N  -MK Location: abdomen  -MK Primary Wound Type: Incision  -MK     Row Name             Wound 06/10/22 1409 Right scalp Incision    Wound - Properties Group Placement Date: 06/10/22  -PAULINE Placement Time: 1409  -PAULINE Side: Right  -PAULINE Location: scalp  -PAULINE Primary Wound Type: Incision  -PAULINE     Retired Wound - Properties Group Placement Date: 06/10/22  -PAULINE Placement Time: 1409  -PAULINE Side: Right  -PAULINE Location: scalp  -PAULINE Primary Wound Type: Incision  -PAULINE     Retired Wound - Properties Group Date first assessed: 06/10/22  -PAULINE Time first assessed: 1409  -PAULINE Side: Right  -PAULINE Location: scalp  -PAULINE Primary Wound Type: Incision  -PAULINE     Row Name             Wound 06/11/22 2259 throat    Wound - Properties  Group Placement Date: 06/11/22  -PC Placement Time: 2259  -PC Present on Hospital Admission: N  -PC Location: throat  -PC     Retired Wound - Properties Group Placement Date: 06/11/22  -PC Placement Time: 2259  -PC Present on Hospital Admission: N  -PC Location: throat  -PC     Retired Wound - Properties Group Date first assessed: 06/11/22  -PC Time first assessed: 2259  -PC Present on Hospital Admission: N  -PC Location: throat  -PC     Row Name 06/16/22 0746          Plan of Care Review    Plan of Care Reviewed With patient  -TB     Progress no change  -TB     Outcome Evaluation Pt still on vent and not awake. Performed PROM to all 4 exts. Repositioned UEs due to swelling in hands. Will cont POC.  -TB     Row Name 06/16/22 0746          Positioning and Restraints    Pre-Treatment Position in bed  -TB     Post Treatment Position bed  -TB     In Bed side lying right;patient within staff view;side rails up x3;RUE elevated;LUE elevated;SCD pump applied;legs elevated  -TB           User Key  (r) = Recorded By, (t) = Taken By, (c) = Cosigned By    Initials Name Provider Type    Carmen Mistry RN Registered Nurse    Surya Ball RN Registered Nurse    TB Armando Bennett, PTA Physical Therapist Assistant    PC Britney, Jayna, RN Registered Nurse    Leatha Solomon RN Registered Nurse                Physical Therapy Education                 Title: PT OT SLP Therapies (In Progress)     Topic: Physical Therapy (In Progress)     Point: Mobility training (Done)     Learning Progress Summary           Patient Acceptance, E, VU,DU by YULIET at 5/11/2022 0745    Comment: benefits of activity, progression of PT POC                   Point: Home exercise program (In Progress)     Learning Progress Summary           Patient Acceptance, E, NR by YULIET at 6/7/2022 1345    Comment: Benefits of activity, B UE/LE exercises, positioning for joints and skin integrity                   Point: Body mechanics (Not Started)      Learner Progress:  Not documented in this visit.          Point: Precautions (Not Started)     Learner Progress:  Not documented in this visit.                      User Key     Initials Effective Dates Name Provider Type Discipline    YULIET 08/02/16 -  Wayne Thomas PT DPT Physical Therapist PT              PT Recommendation and Plan     Plan of Care Reviewed With: patient  Progress: no change  Outcome Evaluation: Pt still on vent and not awake. Performed PROM to all 4 exts. Repositioned UEs due to swelling in hands. Will cont POC.   Outcome Measures     Row Name 06/16/22 1000 06/15/22 0900 06/14/22 0747       How much help from another person do you currently need...    Turning from your back to your side while in flat bed without using bedrails? 1  -TB 1  -TB 1  -MF    Moving from lying on back to sitting on the side of a flat bed without bedrails? 1  -TB 1  -TB 1  -MF    Moving to and from a bed to a chair (including a wheelchair)? 1  -TB 1  -TB 1  -MF    Standing up from a chair using your arms (e.g., wheelchair, bedside chair)? 1  -TB 1  -TB 1  -MF    Climbing 3-5 steps with a railing? 1  -TB 1  -TB 1  -MF    To walk in hospital room? 1  -TB 1  -TB 1  -MF    AM-PAC 6 Clicks Score (PT) 6  -TB 6  -TB 6  -MF       Functional Assessment    Outcome Measure Options AM-PAC 6 Clicks Basic Mobility (PT)  -TB AM-PAC 6 Clicks Basic Mobility (PT)  -TB AM-PAC 6 Clicks Basic Mobility (PT)  -MF          User Key  (r) = Recorded By, (t) = Taken By, (c) = Cosigned By    Initials Name Provider Type     Rosaura Bravo, PTA Physical Therapist Assistant    TB Armando Bennett, PTA Physical Therapist Assistant                 Time Calculation:    PT Charges     Row Name 06/16/22 1034             Time Calculation    Start Time 0746  -TB      Stop Time 0810  -TB      Time Calculation (min) 24 min  -TB      PT Received On 06/16/22  -TB              Time Calculation- PT    Total Timed Code Minutes- PT 24 minute(s)  -TB             User Key  (r) = Recorded By, (t) = Taken By, (c) = Cosigned By    Initials Name Provider Type    TB Armando Bennett, BRENDAN Physical Therapist Assistant              Therapy Charges for Today     Code Description Service Date Service Provider Modifiers Qty    27473808996 HC PT THER PROC EA 15 MIN 6/15/2022 Armando Bennett, BRENDAN GP 2    83116901987 HC PT THER PROC EA 15 MIN 6/16/2022 Armando Bennett, PTA GP 2          PT G-Codes  Outcome Measure Options: AM-PAC 6 Clicks Basic Mobility (PT)  AM-PAC 6 Clicks Score (PT): 6  AM-PAC 6 Clicks Score (OT): 12    Armando Bennett PTA  6/16/2022

## 2022-06-16 NOTE — PLAN OF CARE
Goal Outcome Evaluation:              Outcome Evaluation: NTN TF follow up. PEG for sole-source NTN. Met 91% EN goal volume, meeting > 75% EEN and pro needs at this time with EN formula, protein modular, and fiber supplement. Wt 109kg; stable. Continue with current TF POC. NTN following per protocol.

## 2022-06-16 NOTE — PROGRESS NOTES
[facility] Medicine Services  INPATIENT PROGRESS NOTE    Patient Name: Hai Hernandez  Date of Admission: 5/10/2022  Today's Date: 06/16/22  Length of Stay: 37  Primary Care Physician: Elisa Chacko MD    Subjective   Chief Complaint: Intubated  HPI   Intubated.  Off sedation  Afebrile         Review of Systems   Unable to perform ROS: Intubated        All pertinent negatives and positives are as above. All other systems have been reviewed and are negative unless otherwise stated.     Objective    Temp:  [98.4 °F (36.9 °C)-99.5 °F (37.5 °C)] 99.5 °F (37.5 °C)  Heart Rate:  [59-72] 65  Resp:  [22-31] 22  BP: (127-173)/() 139/79  FiO2 (%):  [30 %] 30 %  Physical Exam  Vitals reviewed.   Constitutional:       Appearance: He is well-developed.   HENT:      Head: Normocephalic and atraumatic.      Right Ear: External ear normal.      Left Ear: External ear normal.      Nose: Nose normal.   Eyes:      General: No scleral icterus.        Right eye: No discharge.         Left eye: No discharge.      Conjunctiva/sclera: Conjunctivae normal.      Pupils: Pupils are equal, round, and reactive to light.   Neck:      Thyroid: No thyromegaly.      Trachea: No tracheal deviation.   Cardiovascular:      Rate and Rhythm: Normal rate and regular rhythm.      Heart sounds: Normal heart sounds. No murmur heard.    No friction rub. No gallop.   Pulmonary:      Effort: Pulmonary effort is normal. No respiratory distress.      Breath sounds: Normal breath sounds. No stridor. No wheezing or rales.   Chest:      Chest wall: No tenderness.   Abdominal:      General: Bowel sounds are normal. There is no distension.      Palpations: Abdomen is soft. There is no mass.      Tenderness: There is no abdominal tenderness. There is no guarding or rebound.      Hernia: No hernia is present.   Musculoskeletal:         General: No deformity. Normal range of motion.      Cervical back: Normal range of  motion and neck supple.   Lymphadenopathy:      Cervical: No cervical adenopathy.   Skin:     General: Skin is warm and dry.      Coloration: Skin is not pale.      Findings: No erythema or rash.   Neurological:      Comments: Level of consciousness: arousable by verbal stimuli ,  drowsy  Off propofol.  Intermittently opens eyes to painful stimuli.  Does not follow commands.  Nonverbal.  Weakly withdraws to tactile stimuli.            Cranial Nerves      CN III, IV, VI   Pupils are equal, round, and reactive to light.  Extraocular motions are normal.      CN IX, X   CN IX normal.         Psychiatric:         Behavior: Behavior normal.         Thought Content: Thought content normal.         Judgment: Judgment normal.           Results Review:  I have reviewed the labs, radiology results, and diagnostic studies.    Laboratory Data:   Results from last 7 days   Lab Units 06/16/22  0358 06/15/22  0426 06/14/22  0608   WBC 10*3/mm3 9.48 8.84 9.59   HEMOGLOBIN g/dL 7.7* 7.5* 7.7*   HEMATOCRIT % 25.1* 24.7* 24.3*   PLATELETS 10*3/mm3 215 195 198        Results from last 7 days   Lab Units 06/16/22  0358 06/15/22  0426 06/14/22  0405   SODIUM mmol/L 139 138 139   POTASSIUM mmol/L 3.9 3.9 4.5   CHLORIDE mmol/L 104 103 103   CO2 mmol/L 29.0 29.0 28.0   BUN mg/dL 32* 33* 36*   CREATININE mg/dL 0.44* 0.43* 0.54*   CALCIUM mg/dL 8.1* 8.1* 8.4*   BILIRUBIN mg/dL 0.4 0.4 0.6   ALK PHOS U/L 412* 403* 472*   ALT (SGPT) U/L 142* 149* 175*   AST (SGOT) U/L 117* 148* 248*   GLUCOSE mg/dL 145* 148* 159*       Culture Data:   Wound Culture   Date Value Ref Range Status   06/10/2022 No growth at 3 days  Final   06/10/2022 No growth at 3 days  Final   06/10/2022 No growth at 3 days  Final     Respiratory Culture   Date Value Ref Range Status   06/10/2022   Preliminary    Rare The culture consists of normal respiratory annia. This is a preliminary report; final report to follow.       Radiology Data:   Imaging Results (Last 24 Hours)      Procedure Component Value Units Date/Time    XR Chest 1 View [[id number]] Collected: 06/16/22 0714     Updated: 06/16/22 0718    Narrative:      EXAMINATION: XR CHEST 1 VW-     6/16/2022 3:35 AM CDT     HISTORY: On vent; R13.10-Dysphagia, unspecified; Z01.818-Encounter for  other preprocedural examination; D32.9-Benign neoplasm of meninges,  unspecified; Z74.09-Other reduced mobility; Z78.9-Other specified health  status; G40.901-Epilepsy, unspecified, not intractable, with status  epilepticus; J96.01-Acute respiratory failure with hypoxia;  G91.0-Communicating hydrocephalus; T81.40XA-Infection following a pr     1 view chest x-ray.     Comparison is made with yesterday's 3:27 AM.     Unchanged tracheostomy tube, left jugular central line, and right-sided   shunt tubing.     Magnified heart size.     Persistent left basilar consolidation and left perihilar infiltrate.     No pneumothorax.     No new or increased lung disease.     Summary:  1. No change from yesterday.  This report was finalized on 06/16/2022 07:15 by Dr. Tomer Whelan MD.          I have reviewed the patient's current medications.     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  Meningioma  -NS managing  Status post postcraniotomy for meningioma  S/P Craniectomy for cerebral edema and possible infection    2.  Status Epilepticus/Seizures  -Cerebyx  -Vimpat  -Dilantin  -Neurology following    3.  Acute Respiratory failure with prolonged mechanical ventilation s/p Trach and PEG  -Pulm following    4.  Hydrocephalus/Cerebral Edema  POD#6 (6/10/2022) Craniectomy for cerebral edema and possible infection  -NS managing  -Decadron  -Lumbar drain removed  -s/p Right Posterial parietal  shunt placement    5.  Left pleural effusion s/p Chest tube which has since been removed without issue  -monitor     6.  A-Fib  -Coreg  -Not able to tolerate AC    7.  RUE DVT  -monitor  -not able to tolerated AC     8.  LUE Superficial thrombophlebitis  -supportive care        Discharge Planning: I expect the patient to be discharged to LTAC in ? days  Electronically signed by Sha Beatty MD, 06/16/22, 08:03 CDT.

## 2022-06-16 NOTE — PROGRESS NOTES
PULMONARY AND CRITICAL CARE PROGRESS NOTE - [facility]    Patient: Hai Hernandez    1945    MR# [id number]    Acct# [id number]  06/16/22   08:25 CDT  Referring Provider: Martell Downs, *    Chief Complaint: Mechanically ventilated    Interval history: The patient remains intubated with trach to mechanical vent.  He remains minimally responsive.  Oxygen saturation 96% on PEEP of 5 and FiO2 0.3.  ET CO2 30.  He is passively breathing.  He remains off of sedation.   Anticipate LTAC placement.  No other aggravating or alleviating factors.     Meds:  carvedilol, 6.25 mg, Nasogastric, BID With Meals  cefepime, 2 g, Intravenous, Q8H  chlorhexidine, 15 mL, Mouth/Throat, Q12H  dexamethasone, 2 mg, Per PEG Tube, Q12H   Followed by  [START ON 6/18/2022] dexamethasone, 1 mg, Per PEG Tube, Q12H   Followed by  [START ON 6/20/2022] dexamethasone, 0.5 mg, Per PEG Tube, Daily  docusate, 50 mg, Oral, Daily  fosphenytoin, 150 mg PE, Intravenous, Q8H  guaiFENesin, 200 mg, Per PEG Tube, TID  heparin (porcine), 5,000 Units, Subcutaneous, Q12H  insulin detemir, 20 Units, Subcutaneous, Nightly  insulin regular, 2-7 Units, Subcutaneous, Q6H  lacosamide, 200 mg, Intravenous, Q12H  lactulose, 20 g, Per PEG Tube, Daily  lansoprazole, 30 mg, Per G Tube, QAM  latanoprost, 1 drop, Both Eyes, Nightly  levETIRAcetam, 1,000 mg, Intravenous, Q12H  levothyroxine, 125 mcg, Nasogastric, Q AM  lidocaine, 10 mL, Intradermal, Once  lidocaine, 10 mL, Infiltration, Once  liothyronine, 25 mcg, Nasogastric, Daily  lisinopril, 20 mg, Nasogastric, Q24H  mupirocin, 1 application, Topical, Q12H  Nutrisource fiber, 1 packet, Nasogastric, 4x Daily  polyethylene glycol, 17 g, Oral, Daily  ProSource TF, 45 mL, Per G Tube, BID  sodium chloride, 4 mL, Nebulization, BID - RT  vancomycin, 15 mg/kg, Intravenous, Q12H      norepinephrine, 0.02-0.3 mcg/kg/min, Last Rate: Stopped (06/11/22 3173)      Review of Systems:     Cannot obtain due to  mechanical ventilated state     Ventilator Settings:        Resp Rate (Set): 16  Pressure Support (cm H2O): 8 cm H20  FiO2 (%): 30 %  PEEP/CPAP (cm H2O): 5 cm H20  Minute Ventilation (L/min) (Obs): 18.6 L/min  Resp Rate (Observed) Vent: 38  I:E Ratio (Set): 1:0.27  I:E Ratio (Obs): 1:1.10  PIP Observed (cm H2O): 20 cm H2O  RSBI: 21.96  Physical Exam:  Temp:  [98.4 °F (36.9 °C)-99.5 °F (37.5 °C)] 99.5 °F (37.5 °C)  Heart Rate:  [59-72] 65  Resp:  [22-31] 22  BP: (127-173)/() 139/79  FiO2 (%):  [30 %] 30 %    Intake/Output Summary (Last 24 hours) at 6/16/2022 0825  Last data filed at 6/16/2022 0400  Gross per 24 hour   Intake 2840.43 ml   Output 450 ml   Net 2390.43 ml     SpO2 Percentage    06/16/22 0500 06/16/22 0600 06/16/22 0733   SpO2: 97% 97% 96%   Body mass index is 32.69 kg/m².   Physical Exam  Constitutional:       General: He is not in acute distress.     Appearance: He is ill-appearing. He is not diaphoretic.   HENT:      Head: Normocephalic.      Comments: Craniotomy incision, healing     Nose: Nose normal.      Mouth/Throat:      Mouth: Mucous membranes are moist.   Eyes:      General: No scleral icterus.  Neck:      Comments: Trach to vent  Left IJ triple-lumen  Cardiovascular:      Rate and Rhythm: Normal rate.   Pulmonary:      Effort: Pulmonary effort is normal. No respiratory distress.      Breath sounds: No wheezing or rhonchi.   Abdominal:      General: There is no distension.      Comments: PEG with tube feeding infusing   Genitourinary:     Comments: Mccrary catheter  Musculoskeletal:      Right lower leg: Edema present.      Left lower leg: Edema present.      Comments: SCDs   Skin:     Coloration: Skin is not pale.   Neurological:      Comments: Weakly withdraws to stimulus        Electronically signed by MARIO Aldrich, 6/16/2022, 08:25 CDT      Physician Substantive Portion: Medical Decision Making    Laboratory Data:  Results from last 7 days   Lab Units 06/16/22  0356  06/15/22  0426 06/14/22  0608   WBC 10*3/mm3 9.48 8.84 9.59   HEMOGLOBIN g/dL 7.7* 7.5* 7.7*   PLATELETS 10*3/mm3 215 195 198     Results from last 7 days   Lab Units 06/16/22  0358 06/15/22  0426 06/14/22  0405   SODIUM mmol/L 139 138 139   POTASSIUM mmol/L 3.9 3.9 4.5   BUN mg/dL 32* 33* 36*   CREATININE mg/dL 0.44* 0.43* 0.54*     Results from last 7 days   Lab Units 06/14/22  0417 06/13/22  0423 06/12/22  0412   PH, ARTERIAL pH units 7.461* 7.471* 7.488*   PCO2, ARTERIAL mm Hg 40.9 39.0 38.4   PO2 ART mm Hg 82.4* 88.1 88.2   FIO2 % 35 35 35     Wound Culture   Date Value Ref Range Status   06/10/2022 No growth at 2 days  Preliminary   06/10/2022 No growth at 2 days  Preliminary   06/10/2022 No growth at 2 days  Preliminary     Respiratory Culture   Date Value Ref Range Status   06/10/2022   Preliminary    Rare The culture consists of normal respiratory annia. This is a preliminary report; final report to follow.     Recent films:  XR Chest 1 View    Result Date: 6/16/2022  EXAMINATION: XR CHEST 1 VW-  6/16/2022 3:35 AM CDT  HISTORY: On vent; R13.10-Dysphagia, unspecified; Z01.818-Encounter for other preprocedural examination; D32.9-Benign neoplasm of meninges, unspecified; Z74.09-Other reduced mobility; Z78.9-Other specified health status; G40.901-Epilepsy, unspecified, not intractable, with status epilepticus; J96.01-Acute respiratory failure with hypoxia; G91.0-Communicating hydrocephalus; T81.40XA-Infection following a pr  1 view chest x-ray.  Comparison is made with yesterday's 3:27 AM.  Unchanged tracheostomy tube, left jugular central line, and right-sided  shunt tubing.  Magnified heart size.  Persistent left basilar consolidation and left perihilar infiltrate.  No pneumothorax.  No new or increased lung disease.  Summary: 1. No change from yesterday. This report was finalized on 06/16/2022 07:15 by Dr. Tomer Whelan MD.    XR Chest 1 View    Result Date: 6/15/2022  EXAMINATION: XR CHEST 1 VW-   6/15/2022 3:40 AM CDT  HISTORY: On vent; R13.10-Dysphagia, unspecified; Z01.818-Encounter for other preprocedural examination; D32.9-Benign neoplasm of meninges, unspecified; Z74.09-Other reduced mobility; Z78.9-Other specified health status; G40.901-Epilepsy, unspecified, not intractable, with status epilepticus; J96.01-Acute respiratory failure with hypoxia; G91.0-Communicating hydrocephalus; T81.40XA-Infection following a pr  1 view chest x-ray.  Comparison is made with yesterday at 10:48 AM.  Stable heart and mediastinum. Persistent left basilar consolidation. There appears to be less infiltrate in the perihilar region of the left lung.  No pneumothorax. No new lung disease.  Unchanged tracheostomy tube, left jugular central line, and right-sided  shunt tubing.  Summary: 1. Similar appearance of the chest with left basilar consolidation. Positioning is different based on the degree of rotation. There appears to be less prominent left mid lung infiltrate. This report was finalized on 06/15/2022 07:46 by Dr. Tomer Whelan MD.    XR Chest 1 View    Result Date: 6/14/2022  EXAMINATION:  XR CHEST 1 VW-  6/14/2022 11:16 AM CDT  HISTORY: Central line placement. Lung infiltrates.  COMPARISON: 6/14/2022.  TECHNIQUE: Single view AP image.  FINDINGS:  There is a new left IJ central venous catheter with catheter tip near the brachiocephalic vein and superior vena cava junction. There is no evidence of pneumothorax. There is hypoventilation. There is consolidation in the left perihilar region and left lung base. The left heart border is partially obscured. There is mild right perihilar infiltrate. There is cardiomegaly. There is blunting of the left costophrenic angle suggesting a small pleural effusion.      Impression: 1. New left IJ central venous catheter with no evidence of pneumothorax, as described. 2. Hypoventilation. 3. Parenchymal infiltrate on the left worrisome for pneumonia. Probable small pleural effusion on  the left. 4. Right perihilar opacity likely due to vascular crowding and hypoventilation. Patchy pneumonia on the right is not ruled out.   This report was finalized on 06/14/2022 12:19 by Dr. Zachariah Fonseca MD.    US Venous Doppler Upper Extremity Left (duplex)    Result Date: 6/14/2022  History: Pain and swelling      Impression: Impression: 1. There is no evidence of deep venous thrombosis of the left upper extremity. 2. There is superficial thrombus in the cephalic vein.  Comments: Left upper extremity venous duplex exam was performed using color Doppler flow, Doppler wave form analysis, and grayscale imaging, with and without compression. There is no evidence of deep venous thrombosis of the internal jugular, subclavian, axillary, brachial, radial, and ulnar veins. There is superficial thrombus involving the cephalic vein.  This report was finalized on 06/14/2022 15:42 by Dr. Nate Blakely MD.      My radiograph interpretation/independent review of imaging: no change    Pulmonary Assessment:    1. Acute resp failure due to multiple factors, with hypoxia compensated by supplemental oxygen  2. Nosocomial pneumonia  3. Encephalopathy  4. Pleural effusion stable  5. Arrhythmia stable  6. Hydrocephalus stable    Recommend/plan:   · Discontinue daily cxr  · Continue suctioning  · Continue saline neb  · Add brovana neb and hold short acting neb; may help keep HR stabilized  · Continue antibiotics and complete course for presumed LLL pneumonia; day 6  · No change in vent settings  · Continue nutrition, dvt and stress ulcer prophylaxis    This visit was performed by both a physician and an [facility] RN.  I personally evaluated and examined the patient.  I performed all aspects of the medical decision making as documented.  Electronically signed by Carlos A Dasilva MD, 6/16/2022, 10:03 CDT

## 2022-06-16 NOTE — PROGRESS NOTES
"Pharmacy Dosing Service  Pharmacokinetics  Vancomycin Follow-up Evaluation    Assessment/Action/Plan:  Current Order: Vancomycin 1500 mg IVPB every 12 hours  Current end date:6/24/22    Levels: Previously on 6/13/2022 0607 Vancomycin \"trough\" = 24.1 (4.3 hours post 1500mg dose); 6/13/2022 1517 Vancomycin trough = 16.2 (13.5 hours post 1500mg) 6/12/2022 0515 Vancomycin \"trough\" = 25.6 (3.5 hours post 1500 mg dose) Vancomycin dose administered 2 hours late from schedule and \"trough\" drawn 5.25 hours early, schedule adjusted on MAR    Level today: 20.5 mcg/mL collected 10H45M post-dose 1500 mg. Expect true trough to be around 20.     Additional antimicrobial agent(s): Cefepime    Active Hospital Problems    Diagnosis  POA   • **Meningioma (HCC) [D32.9]  Yes   • Acute deep vein thrombosis (DVT) of the ulnar and brachial veins of right upper extremity ([facility]) [I82.621]  No   • Loculated pleural effusion [J90]  No   • Fever [R50.9]  No   • PAF (paroxysmal atrial fibrillation) ([facility]) [I48.0]  Yes   • Cerebral parenchymal hemorrhage ([facility]) [I61.9]  No   • Essential hypertension [I10]  Yes   • Type 2 diabetes mellitus with hyperglycemia, without long-term current use of insulin ([facility]) [E11.65]  Yes   • Hypothyroidism (acquired) [E03.9]  Yes   • Acute respiratory failure (secondary to status epilepticus) [J96.00]  No   • Thrombocytopenia (HCC) [D69.6]  No   • 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 Neurosurgery targeting higher levels. Continue current dose. Follow up levels recommended. Monitor renal function.  " "    Pharmacy will continue to follow daily and adjust dose accordingly.     Subjective:  Hai Hernandez is a 77 y.o. male currently on Vancomycin 1500 mg IV every 12 hours for the treatment of possible CNS inf, day 7 of treatment.    AUC Model Data:  Regimen: 1500 mg IV every 12 hours.  Exposure target: AUC24 (range)400-600 mg/L.hr   AUC24,ss: 562 mg/L.hr  PAUC*: 100 %  Ctrough,ss: 18.5 mg/L  Pconc*: 30 %  Tox.: 15 %      Objective:  Ht: 182.9 cm (72\"); Wt: 109 kg (241 lb)  Estimated Creatinine Clearance: 179.4 mL/min (A) (by C-G formula based on SCr of 0.44 mg/dL (L)).   Creatinine   Date Value Ref Range Status   06/16/2022 0.44 (L) 0.76 - 1.27 mg/dL Final   06/15/2022 0.43 (L) 0.76 - 1.27 mg/dL Final   06/14/2022 0.54 (L) 0.76 - 1.27 mg/dL Final   01/21/2022 1.00 0.60 - 1.30 mg/dL Final     Comment:     Serial Number: [id number]:  [id number]      Lab Results   Component Value Date    WBC 9.48 06/16/2022    WBC 8.84 06/15/2022    WBC 9.59 06/14/2022         Lab Results   Component Value Date    VANCOTROUGH 20.50 (H) 06/16/2022       Culture Results:  Microbiology Results (last 10 days)       Procedure Component Value - Date/Time    Respiratory Culture - Sputum, ET Suction [[id number]] Collected: 06/14/22 1632    Lab Status: Final result Specimen: Sputum from ET Suction Updated: 06/16/22 1042     Respiratory Culture Scant growth (1+) Normal respiratory annia. No S. aureus or Pseudomonas aeruginosa detected. Final report.     Gram Stain Many (4+) WBCs per low power field      Rare (1+) Epithelial cells per low power field      Few (2+) Gram positive cocci      Few (2+) Gram negative bacilli    Respiratory Culture - Aspirate, Bronchus [[id number]] Collected: 06/10/22 1643    Lab Status: Final result Specimen: Aspirate from Bronchus Updated: 06/13/22 1049     Respiratory Culture Rare Normal respiratory annia. No S. aureus or Pseudomonas aeruginosa detected. Final report.     Gram Stain Many (4+) WBCs per low power field "      Less than 25 Epithelial cells seen      No organisms seen    Anaerobic Culture - Tissue, Brain [[id number]] Collected: 06/10/22 1524    Lab Status: Final result Specimen: Tissue from Brain Updated: 06/15/22 0529     Anaerobic Culture No anaerobes isolated at 5 days    Fungus Culture - Tissue, Brain [[id number]] Collected: 06/10/22 1524    Lab Status: Preliminary result Specimen: Tissue from Brain Updated: 06/15/22 1633     Fungus Culture No fungus isolated at less than 1 week    Tissue / Bone Culture - Tissue, Brain [[id number]] Collected: 06/10/22 1524    Lab Status: Final result Specimen: Tissue from Brain Updated: 06/13/22 0943     Tissue Culture No growth at 3 days     Gram Stain No WBCs per low power field      No organisms seen    AFB Culture - Tissue, Brain [[id number]] Collected: 06/10/22 1524    Lab Status: Preliminary result Specimen: Tissue from Brain Updated: 06/15/22 1633     AFB Culture No AFB isolated at less than 1 week     AFB Stain No acid fast bacilli seen on direct smear      No acid fast bacilli seen on concentrated smear    Wound Culture - Wound, Head [[id number]] Collected: 06/10/22 1343    Lab Status: Final result Specimen: Wound from Head Updated: 06/13/22 0943     Wound Culture No growth at 3 days     Gram Stain Rare (1+) WBCs seen      No organisms seen    Anaerobic Culture - Tissue, Brain, Cerebral Cortex [[id number]] Collected: 06/10/22 1327    Lab Status: Final result Specimen: Tissue from Brain, Cerebral Cortex Updated: 06/15/22 0528     Anaerobic Culture No anaerobes isolated at 5 days    Fungus Culture - Tissue, Brain, Cerebral Cortex [[id number]] Collected: 06/10/22 1327    Lab Status: Preliminary result Specimen: Tissue from Brain, Cerebral Cortex Updated: 06/15/22 1434     Fungus Culture No fungus isolated at less than 1 week    Tissue / Bone Culture - Tissue, Brain, Cerebral Cortex [[id number]] Collected: 06/10/22 1327    Lab Status: Final result Specimen: Tissue from Brain,  Cerebral Cortex Updated: 06/13/22 0942     Tissue Culture No growth at 3 days     Gram Stain Moderate (3+) WBCs seen      No organisms seen    AFB Culture - Tissue, Brain, Cerebral Cortex [[id number]] Collected: 06/10/22 1327    Lab Status: Preliminary result Specimen: Tissue from Brain, Cerebral Cortex Updated: 06/15/22 1434     AFB Culture No AFB isolated at less than 1 week     AFB Stain No acid fast bacilli seen on direct smear      No acid fast bacilli seen on concentrated smear    Anaerobic Culture - Tissue, Brain [[id number]]  (Normal) Collected: 06/10/22 1326    Lab Status: Final result Specimen: Tissue from Brain Updated: 06/16/22 0542     Anaerobic Culture No anaerobes isolated at 5 days    Fungus Culture - Tissue, Brain [[id number]] Collected: 06/10/22 1326    Lab Status: Preliminary result Specimen: Tissue from Brain Updated: 06/15/22 1418     Fungus Culture No fungus isolated at less than 1 week    Tissue / Bone Culture - Tissue, Brain [[id number]] Collected: 06/10/22 1326    Lab Status: Final result Specimen: Tissue from Brain Updated: 06/13/22 0935     Tissue Culture No growth at 3 days     Gram Stain Rare (1+) WBCs per low power field      No organisms seen    AFB Culture - Tissue, Brain [[id number]] Collected: 06/10/22 1326    Lab Status: Preliminary result Specimen: Tissue from Brain Updated: 06/15/22 1418     AFB Culture No AFB isolated at less than 1 week     AFB Stain No acid fast bacilli seen on direct smear      No acid fast bacilli seen on concentrated smear    Wound Culture - Wound, Head [[id number]] Collected: 06/10/22 1304    Lab Status: Final result Specimen: Wound from Head Updated: 06/13/22 0943     Wound Culture No growth at 3 days     Gram Stain No WBCs or organisms seen    Wound Culture - Wound, Head [[id number]] Collected: 06/10/22 1226    Lab Status: Final result Specimen: Wound from Head Updated: 06/13/22 0946     Wound Culture No growth at 3 days     Gram Stain Rare (1+) WBCs seen       No organisms seen    COVID PRE-OP / PRE-PROCEDURE SCREENING ORDER (NO ISOLATION) - Swab, Nasal Cavity [[id number]]  (Normal) Collected: 06/10/22 0744    Lab Status: Final result Specimen: Swab from Nasal Cavity Updated: 06/10/22 0857    Narrative:      The following orders were created for panel order COVID PRE-OP / PRE-PROCEDURE SCREENING ORDER (NO ISOLATION) - Swab, Nasal Cavity.  Procedure                               Abnormality         Status                     ---------                               -----------         ------                     COVID-19,[facility]...[[id number]]  Normal              Final result                 Please view results for these tests on the individual orders.    COVID-19,[facility] IN-HOUSE,Nasal Swab No Transport Media 3-4 HR TAT - Swab, Nasal Cavity [[id number]]  (Normal) Collected: 06/10/22 0744    Lab Status: Final result Specimen: Swab from Nasal Cavity Updated: 06/10/22 0857     COVID19 Not Detected    Narrative:      Fact sheet for providers: https://www.fda.gov/media/030652/download     Fact sheet for patients: https://www.fda.gov/media/614490/download    Test performed by PCR.    Consider negative results in combination with clinical observations, patient history, and epidemiological information.  Fact sheet for providers: https://www.fda.gov/media/854734/download     Fact sheet for patients: https://www.fda.gov/media/938086/download    Test performed by PCR.    Consider negative results in combination with clinical observations, patient history, and epidemiological information.    Culture, CSF - Cerebrospinal Fluid,  Shunt Aspirate [[id number]] Collected: 06/10/22 0729    Lab Status: Final result Specimen: Cerebrospinal Fluid from  Shunt Aspirate Updated: 06/13/22 0936     CSF Culture No growth at 3 days     Gram Stain Moderate (3+) WBCs seen      No organisms seen            Geovany Walsh, PharmD   06/16/22 15:22 CDT

## 2022-06-16 NOTE — PROGRESS NOTES
[facility]  INPATIENT WOUND CARE    PROGRESS NOTE    Today's Date: 06/16/22    Patient Name: Hai Hernandez  MRN: [id number]  CSN: [id number]  PCP: Elisa Chacko MD  Referring Provider: Dr. Kc and Dr. Downs  Attending Provider: Martell Downs, *  Length of Stay: 37    SUBJECTIVE   Chief Complaint: Wound of trach site    HPI: Hai Hernandez continues care in ICU.  Patient remains mechanically ventilated to tracheostomy.  Patient is currently up in tilt chair.  Patient opens eyes but does not respond to any questions.  It is noted that LTAC referrals are pending in [place].  Reevaluating wounds of forehead and around tracheostomy.    Visit Dx:    ICD-10-CM ICD-9-CM   1. Dysphagia, unspecified type  R13.10 787.20   2. Preop testing  Z01.818 V72.84   3. Meningioma (HCC)  D32.9 225.2   4. Impaired mobility  Z74.09 799.89   5. Decreased activities of daily living (ADL)  Z78.9 V49.89   6. Status epilepticus (HCC)  G40.901 345.3   7. Acute respiratory failure with hypoxia (HCC)  J96.01 518.81   8. Communicating hydrocephalus (HCC)  G91.0 331.3   9. Postoperative infection, unspecified type, initial encounter  T81.40XA 998.59   10. 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)   • Status epilepticus (HCC)   • Essential hypertension   • Type 2 diabetes mellitus with hyperglycemia, without long-term current use of insulin (HCC)   • Hypothyroidism (acquired)   • Acute respiratory failure (secondary to status epilepticus)   • Thrombocytopenia (HCC)   • Cerebral parenchymal hemorrhage (HCC)   • PAF (paroxysmal atrial fibrillation) (HCC)   • Fever   • Loculated pleural effusion   • Acute deep vein thrombosis (DVT) of the ulnar and brachial veins of right upper extremity (HCC)   • Dysphagia   •  Communicating hydrocephalus (HCC)   • Cerebral edema (HCC)       History:   Past Medical History:   Diagnosis Date   • Brain tumor (HCC)    • Depression    • Hearing loss    • History of cellulitis     right foot big toe   • Hypertension    • Pneumonia    • Right arm weakness     after cervial injury   • Sciatic pain     affects balance   • Thyroid disease    • Visual disturbance     due to brain tumor - right eye     Past Surgical History:   Procedure Laterality Date   • CATARACT EXTRACTION, BILATERAL     • COLONOSCOPY     • CRANIOTOMY Bilateral 6/10/2022    Procedure: CRANIECTOMY;  Surgeon: Martell Downs MD;  Location: [facility] OR;  Service: Neurosurgery;  Laterality: Bilateral;   • CRANIOTOMY FOR TUMOR Right 5/10/2022    Procedure: CRANIOTOMY FOR TUMOR STERIOTACTIC WITH BRAIN LAB, right;  Surgeon: Martell Downs MD;  Location: [facility] OR;  Service: Neurosurgery;  Laterality: Right;   • ENDOSCOPY W/ PEG TUBE PLACEMENT N/A 6/2/2022    Procedure: ESOPHAGOGASTRODUODENOSCOPY WITH PERCUTANEOUS ENDOSCOPIC GASTROSTOMY TUBE INSERTION WITH ANESTHESIA;  Surgeon: Melecio Lu MD;  Location: [facility] OR;  Service: Gastroenterology;  Laterality: N/A;   • NECK SURGERY     • SKIN CANCER EXCISION     • TONSILLECTOMY     • TRACHEOSTOMY N/A 6/2/2022    Procedure: TRACHEOSTOMY;  Surgeon: Geovany Davidson MD;  Location: [facility] OR;  Service: ENT;  Laterality: N/A;   •  SHUNT INSERTION Right 6/3/2022    Procedure: VENTRICULAR PERITONEAL SHUNT INSERTION WITH BRAIN LAB;  Surgeon: Martell Downs MD;  Location: [facility] OR;  Service: Neurosurgery;  Laterality: Right;     Social History     Socioeconomic History   • Marital status:    Tobacco Use   • Smoking status: Never Smoker   • Smokeless tobacco: Never Used   Vaping Use   • Vaping Use: Never used   Substance and Sexual Activity   • Alcohol use: Never   • Drug use: Never   • Sexual activity: Defer       Allergies:  No Known  Allergies    Medications:    Current Facility-Administered Medications:   •  acetaminophen (TYLENOL) tablet 650 mg, 650 mg, Oral, Q4H PRN, 650 mg at 06/15/22 2017 **OR** acetaminophen (TYLENOL) suppository 650 mg, 650 mg, Rectal, Q4H PRN, Martell Downs MD, 650 mg at 05/23/22 0016  •  alteplase (CATHFLO/ACTIVASE) injection 2 mg, 2 mg, Intracatheter, PRN, Martell Downs MD, New Syringe/Cartridge at 05/28/22 1330  •  arformoterol (BROVANA) nebulizer solution 15 mcg, 15 mcg, Nebulization, BID - RT, GeraldineVidya APRN, 15 mcg at 06/16/22 0953  •  bisacodyl (DULCOLAX) suppository 10 mg, 10 mg, Rectal, Daily PRN, Stevie Parsons APRN  •  carvedilol (COREG) tablet 6.25 mg, 6.25 mg, Nasogastric, BID With Meals, Martell Downs MD, 6.25 mg at 06/16/22 0831  •  cefepime (MAXIPIME) 2 g/100 mL 0.9% NS (mbp), 2 g, Intravenous, Q8H, Martell Downs MD, 2 g at 06/16/22 0516  •  chlorhexidine (PERIDEX) 0.12 % solution 15 mL, 15 mL, Mouth/Throat, Q12H, Martell Downs MD, 15 mL at 06/16/22 0831  •  [COMPLETED] dexamethasone (DECADRON) tablet 4 mg, 4 mg, Per PEG Tube, Q12H, 4 mg at 06/15/22 2002 **FOLLOWED BY** dexamethasone (DECADRON) tablet 2 mg, 2 mg, Per PEG Tube, Q12H **FOLLOWED BY** [START ON 6/18/2022] dexamethasone (DECADRON) tablet 1 mg, 1 mg, Per PEG Tube, Q12H **FOLLOWED BY** [START ON 6/20/2022] dexamethasone (DECADRON) tablet 0.5 mg, 0.5 mg, Per PEG Tube, Daily, Rust, Stevie M, APRN  •  dextrose (D50W) (25 g/50 mL) IV injection 25 g, 25 g, Intravenous, Q15 Min PRN, Martell Downs MD, 25 g at 06/09/22 1807  •  dextrose (GLUTOSE) oral gel 15 g, 15 g, Oral, Q15 Min PRN, Martell Downs MD, 15 g at 05/22/22 0527  •  docusate (COLACE) 50 MG/5ML liquid 50 mg, 50 mg, Oral, Daily, Sha Beatty MD, 50 mg at 06/16/22 0831  •  fosphenytoin (Cerebyx) 100 mg PE in sodium chloride 0.9 % 100 mL IVPB, 100 mg PE, Intravenous, Q8H, Zac Mccoy PA-C  •   glucagon (human recombinant) (GLUCAGEN DIAGNOSTIC) injection 1 mg, 1 mg, Intramuscular, Q15 Min PRN, Martell Downs MD  •  guaiFENesin (ROBITUSSIN) 100 MG/5ML oral solution 200 mg, 200 mg, Per PEG Tube, TID, Daly Perea, APRN, 200 mg at 06/16/22 0832  •  heparin (porcine) 5000 UNIT/ML injection 5,000 Units, 5,000 Units, Subcutaneous, Q12H, Martell Downs MD, 5,000 Units at 06/16/22 0832  •  hydrALAZINE (APRESOLINE) injection 10 mg, 10 mg, Intravenous, Q6H PRN, Martell Downs MD, 10 mg at 06/15/22 2344  •  insulin detemir (LEVEMIR) injection 20 Units, 20 Units, Subcutaneous, Nightly, Martell Downs MD, 20 Units at 06/15/22 2017  •  insulin regular (humuLIN R,novoLIN R) injection 2-7 Units, 2-7 Units, Subcutaneous, Q6H, Martell Downs MD, 2 Units at 06/15/22 2345  •  ipratropium-albuterol (DUO-NEB) nebulizer solution 3 mL, 3 mL, Nebulization, Q4H PRN, Martell Downs MD, 3 mL at 06/15/22 0650  •  labetalol (NORMODYNE,TRANDATE) injection 10 mg, 10 mg, Intravenous, Q6H PRN, Martell Downs MD, 10 mg at 06/14/22 0337  •  lacosamide (VIMPAT) injection 200 mg, 200 mg, Intravenous, Q12H, Martell Downs MD, 200 mg at 06/16/22 0832  •  lactulose solution 20 g, 20 g, Per PEG Tube, Daily, Sha Beatty MD, 20 g at 06/16/22 0832  •  lansoprazole (FIRST) oral suspension 30 mg, 30 mg, Per G Tube, QAM, Martell Downs MD, 30 mg at 06/16/22 0647  •  latanoprost (XALATAN) 0.005 % ophthalmic solution 1 drop, 1 drop, Both Eyes, Nightly, Martell Downs MD, 1 drop at 06/15/22 2003  •  levalbuterol (XOPENEX) nebulizer solution 0.63 mg, 0.63 mg, Nebulization, TID PRN, Martell Downs MD, 0.63 mg at 06/12/22 1046  •  levETIRAcetam in NaCl 0.75% (KEPPRA) IVPB 1,000 mg, 1,000 mg, Intravenous, Q12H, Martell Downs MD, 1,000 mg at 06/16/22 0831  •  levothyroxine (SYNTHROID, LEVOTHROID) tablet 125 mcg, 125 mcg, Nasogastric, Q AM,  Martell Downs MD, 125 mcg at 06/16/22 0516  •  lidocaine (XYLOCAINE) 1 % injection 10 mL, 10 mL, Intradermal, Once, Martell Downs MD  •  lidocaine (XYLOCAINE) 1 % injection 10 mL, 10 mL, Infiltration, Once, Pilo Ortega MD  •  liothyronine (CYTOMEL) tablet 25 mcg, 25 mcg, Nasogastric, Daily, Martell Downs MD, 25 mcg at 06/16/22 0831  •  lisinopril (PRINIVIL,ZESTRIL) tablet 20 mg, 20 mg, Nasogastric, Q24H, Martell Downs MD, 20 mg at 06/16/22 0831  •  LORazepam (ATIVAN) injection 2 mg, 2 mg, Intravenous, Q2H PRN, Martell Downs MD, 2 mg at 06/14/22 0358  •  mupirocin (BACTROBAN) 2 % ointment 1 application, 1 application, Topical, Q12H, Martell Downs MD, 1 application at 06/16/22 0832  •  norepinephrine (LEVOPHED) 8 mg in 250 mL NS infusion (premix), 0.02-0.3 mcg/kg/min, Intravenous, Titrated, Martell Downs MD, Held at 06/11/22 1054  •  Nutrisource fiber pack 1 packet, 1 packet, Nasogastric, 4x Daily, Martell Downs MD, 1 packet at 06/16/22 0831  •  ondansetron (ZOFRAN) tablet 4 mg, 4 mg, Oral, Q6H PRN **OR** ondansetron (ZOFRAN) injection 4 mg, 4 mg, Intravenous, Q6H PRN, Martell Downs MD  •  oxyCODONE (ROXICODONE) 5 MG/5ML solution 5 mg, 5 mg, Per PEG Tube, Q6H PRN, Daly Perea APRN, 5 mg at 06/13/22 0354  •  polyethylene glycol (MIRALAX) packet 17 g, 17 g, Oral, Daily, Martell Downs MD, 17 g at 06/16/22 0832  •  ProSource TF oral liquid 45 mL, 45 mL, Per G Tube, BID, Martell Downs MD, 45 mL at 06/16/22 0833  •  sodium chloride 3 % nebulizer solution 4 mL, 4 mL, Nebulization, BID - RT, Martell Downs MD, 4 mL at 06/16/22 0724  •  vancomycin 1500 mg/500 mL 0.9% NS IVPB ([facility]), 15 mg/kg, Intravenous, Q12H, Martell Downs MD, 1,500 mg at 06/16/22 0306    Review of Systems:  Review of Systems   Unable to perform ROS: Patient unresponsive         OBJECTIVE     Vitals:    06/16/22 1000   BP:     Pulse: 65   Resp: 27   Temp:    SpO2: 95%       PHYSICAL EXAM  Physical Exam  Vitals and nursing note reviewed.   Constitutional:       Appearance: He is obese. He is ill-appearing.      Interventions: He is sedated and intubated.   HENT:      Head: Normocephalic and atraumatic.   Eyes:      General: Lids are normal.         Right eye: No discharge.         Left eye: No discharge.   Neck:      Trachea: Tracheostomy present.      Comments: Wound at tracheostomy site due to trach plate.  See skin  Cardiovascular:      Rate and Rhythm: Rate and regular rhythm.   Pulmonary:      Effort: No respiratory distress. He is intubated.   Abdominal:      General: Abdomen is protuberant. There is distension.      Comments: Concern for ileus with firm distended abdomen   Genitourinary:     Comments: Mccrary catheter in place  Skin:     General: Skin is warm and dry.      Findings: Wound present.      Comments: Wounds of forehead have healed.  Unstageable pressure injury at tracheostomy site present.  Wound base is covered with yellow adherent slough.  Edges are irregular.  No signs of infection.  Scant amount of serous drainage.  Pressure injury of sacral area continues to improve.  No signs of infection.   Neurological:      Mental Status: He is unresponsive.          Results Review:  Lab Results (last 48 hours)     Procedure Component Value Units Date/Time    POC Glucose Once [[id number]]  (Abnormal) Collected: 06/16/22 1216    Specimen: Blood Updated: 06/16/22 1227     Glucose 141 mg/dL      Comment: : [id number] Macho AshleyMeter ID: [id number]       Respiratory Culture - Sputum, ET Suction [[id number]] Collected: 06/14/22 1632    Specimen: Sputum from ET Suction Updated: 06/16/22 1042     Respiratory Culture Scant growth (1+) Normal respiratory annia. No S. aureus or Pseudomonas aeruginosa detected. Final report.     Gram Stain Many (4+) WBCs per low power field      Rare (1+) Epithelial cells per low power field      Few  (2+) Gram positive cocci      Few (2+) Gram negative bacilli    Anaerobic Culture - Tissue, Brain [[id number]]  (Normal) Collected: 06/10/22 1326    Specimen: Tissue from Brain Updated: 06/16/22 0542     Anaerobic Culture No anaerobes isolated at 5 days    POC Glucose Once [[id number]]  (Abnormal) Collected: 06/16/22 0517    Specimen: Blood Updated: 06/16/22 0528     Glucose 142 mg/dL      Comment: : [id number] Salvador LaceyMeter ID: [id number]       Phenytoin Level, Total [[id number]]  (Normal) Collected: 06/16/22 0358    Specimen: Blood Updated: 06/16/22 0437     Phenytoin Level 14.0 mcg/mL     Comprehensive Metabolic Panel [[id number]]  (Abnormal) Collected: 06/16/22 0358    Specimen: Blood Updated: 06/16/22 0434     Glucose 145 mg/dL      BUN 32 mg/dL      Creatinine 0.44 mg/dL      Sodium 139 mmol/L      Potassium 3.9 mmol/L      Chloride 104 mmol/L      CO2 29.0 mmol/L      Calcium 8.1 mg/dL      Total Protein 6.1 g/dL      Albumin 2.40 g/dL      ALT (SGPT) 142 U/L      AST (SGOT) 117 U/L      Alkaline Phosphatase 412 U/L      Total Bilirubin 0.4 mg/dL      Globulin 3.7 gm/dL      A/G Ratio 0.6 g/dL      BUN/Creatinine Ratio 72.7     Anion Gap 6.0 mmol/L      eGFR 109.2 mL/min/1.73      Comment: National Kidney Foundation and American Society of Nephrology (ASN) Task Force recommended calculation based on the Chronic Kidney Disease Epidemiology Collaboration (CKD-EPI) equation refit without adjustment for race.       Narrative:      GFR Normal >60  Chronic Kidney Disease <60  Kidney Failure <15      CBC & Differential [[id number]]  (Abnormal) Collected: 06/16/22 0358    Specimen: Blood Updated: 06/16/22 0416    Narrative:      The following orders were created for panel order CBC & Differential.  Procedure                               Abnormality         Status                     ---------                               -----------         ------                     CBC Auto Differential[[id number]]         Abnormal            Final result                 Please view results for these tests on the individual orders.    CBC Auto Differential [[id number]]  (Abnormal) Collected: 06/16/22 0358    Specimen: Blood Updated: 06/16/22 0416     WBC 9.48 10*3/mm3      RBC 2.47 10*6/mm3      Hemoglobin 7.7 g/dL      Hematocrit 25.1 %      .6 fL      MCH 31.2 pg      MCHC 30.7 g/dL      RDW 17.4 %      RDW-SD 62.4 fl      MPV 9.7 fL      Platelets 215 10*3/mm3      Neutrophil % 71.0 %      Lymphocyte % 9.1 %      Monocyte % 13.2 %      Eosinophil % 3.5 %      Basophil % 0.6 %      Immature Grans % 2.6 %      Neutrophils, Absolute 6.73 10*3/mm3      Lymphocytes, Absolute 0.86 10*3/mm3      Monocytes, Absolute 1.25 10*3/mm3      Eosinophils, Absolute 0.33 10*3/mm3      Basophils, Absolute 0.06 10*3/mm3      Immature Grans, Absolute 0.25 10*3/mm3      nRBC 0.0 /100 WBC     POC Glucose Once [[id number]]  (Abnormal) Collected: 06/15/22 2337    Specimen: Blood Updated: 06/15/22 2348     Glucose 157 mg/dL      Comment: : [id number] Chaz GarnerMeter ID: [id number]       POC Glucose Once [[id number]]  (Abnormal) Collected: 06/15/22 2015    Specimen: Blood Updated: 06/15/22 2027     Glucose 155 mg/dL      Comment: : [id number] Salvador LaceyMeter ID: [id number]       POC Glucose Once [[id number]]  (Normal) Collected: 06/15/22 1743    Specimen: Blood Updated: 06/15/22 1755     Glucose 121 mg/dL      Comment: : [id number] Cone RobertMeter ID: [id number]       Fungus Culture - Tissue, Brain [[id number]] Collected: 06/10/22 1524    Specimen: Tissue from Brain Updated: 06/15/22 1633     Fungus Culture No fungus isolated at less than 1 week    AFB Culture - Tissue, Brain [[id number]] Collected: 06/10/22 1524    Specimen: Tissue from Brain Updated: 06/15/22 1633     AFB Culture No AFB isolated at less than 1 week     AFB Stain No acid fast bacilli seen on direct smear      No acid fast bacilli seen on concentrated smear    Fungus  Culture - Tissue, Brain, Cerebral Cortex [[id number]] Collected: 06/10/22 1327    Specimen: Tissue from Brain, Cerebral Cortex Updated: 06/15/22 1434     Fungus Culture No fungus isolated at less than 1 week    AFB Culture - Tissue, Brain, Cerebral Cortex [[id number]] Collected: 06/10/22 1327    Specimen: Tissue from Brain, Cerebral Cortex Updated: 06/15/22 1434     AFB Culture No AFB isolated at less than 1 week     AFB Stain No acid fast bacilli seen on direct smear      No acid fast bacilli seen on concentrated smear    Fungus Culture - Tissue, Brain [[id number]] Collected: 06/10/22 1326    Specimen: Tissue from Brain Updated: 06/15/22 1418     Fungus Culture No fungus isolated at less than 1 week    AFB Culture - Tissue, Brain [[id number]] Collected: 06/10/22 1326    Specimen: Tissue from Brain Updated: 06/15/22 1418     AFB Culture No AFB isolated at less than 1 week     AFB Stain No acid fast bacilli seen on direct smear      No acid fast bacilli seen on concentrated smear    POC Glucose Once [[id number]]  (Abnormal) Collected: 06/15/22 1240    Specimen: Blood Updated: 06/15/22 1309     Glucose 191 mg/dL      Comment: : [id number] Consuelo Perez  JMeter ID: [id number]       POC Glucose Once [[id number]]  (Abnormal) Collected: 06/15/22 1204    Specimen: Blood Updated: 06/15/22 1215     Glucose 179 mg/dL      Comment: : [id number] Thompson KarenMeter ID: [id number]       Fungus Culture - Body Fluid, Pleural Cavity [[id number]] Collected: 05/25/22 1042    Specimen: Body Fluid from Pleural Cavity Updated: 06/15/22 1102     Fungus Culture No fungus isolated at 3 weeks    AFB Culture - Body Fluid, Pleural Cavity [[id number]] Collected: 05/25/22 1042    Specimen: Body Fluid from Pleural Cavity Updated: 06/15/22 1102     AFB Culture No AFB isolated at 3 weeks     AFB Stain No acid fast bacilli seen on direct smear      No acid fast bacilli seen on concentrated smear    Phenytoin Level, Total [[id number]]  (Normal)  Collected: 06/15/22 0426    Specimen: Blood Updated: 06/15/22 0533     Phenytoin Level 14.9 mcg/mL     Anaerobic Culture - Tissue, Brain [[id number]] Collected: 06/10/22 1524    Specimen: Tissue from Brain Updated: 06/15/22 0529     Anaerobic Culture No anaerobes isolated at 5 days    Anaerobic Culture - Tissue, Brain, Cerebral Cortex [[id number]] Collected: 06/10/22 1327    Specimen: Tissue from Brain, Cerebral Cortex Updated: 06/15/22 0528     Anaerobic Culture No anaerobes isolated at 5 days    Comprehensive Metabolic Panel [[id number]]  (Abnormal) Collected: 06/15/22 0426    Specimen: Blood Updated: 06/15/22 0528     Glucose 148 mg/dL      BUN 33 mg/dL      Creatinine 0.43 mg/dL      Sodium 138 mmol/L      Potassium 3.9 mmol/L      Chloride 103 mmol/L      CO2 29.0 mmol/L      Calcium 8.1 mg/dL      Total Protein 6.2 g/dL      Albumin 2.30 g/dL      ALT (SGPT) 149 U/L      AST (SGOT) 148 U/L      Alkaline Phosphatase 403 U/L      Total Bilirubin 0.4 mg/dL      Globulin 3.9 gm/dL      A/G Ratio 0.6 g/dL      BUN/Creatinine Ratio 76.7     Anion Gap 6.0 mmol/L      eGFR 109.9 mL/min/1.73      Comment: National Kidney Foundation and American Society of Nephrology (ASN) Task Force recommended calculation based on the Chronic Kidney Disease Epidemiology Collaboration (CKD-EPI) equation refit without adjustment for race.       Narrative:      GFR Normal >60  Chronic Kidney Disease <60  Kidney Failure <15      POC Glucose Once [[id number]]  (Abnormal) Collected: 06/15/22 0516    Specimen: Blood Updated: 06/15/22 0527     Glucose 139 mg/dL      Comment: : [id number] Salvdaor LaceyMeter ID: [id number]       CBC & Differential [[id number]]  (Abnormal) Collected: 06/15/22 0426    Specimen: Blood Updated: 06/15/22 0505    Narrative:      The following orders were created for panel order CBC & Differential.  Procedure                               Abnormality         Status                     ---------                                -----------         ------                     CBC Auto Differential[[id number]]        Abnormal            Final result                 Please view results for these tests on the individual orders.    CBC Auto Differential [[id number]]  (Abnormal) Collected: 06/15/22 0426    Specimen: Blood Updated: 06/15/22 0505     WBC 8.84 10*3/mm3      RBC 2.42 10*6/mm3      Hemoglobin 7.5 g/dL      Hematocrit 24.7 %      .1 fL      MCH 31.0 pg      MCHC 30.4 g/dL      RDW 17.5 %      RDW-SD 63.0 fl      MPV 9.9 fL      Platelets 195 10*3/mm3      Neutrophil % 70.6 %      Lymphocyte % 9.8 %      Monocyte % 13.0 %      Eosinophil % 3.7 %      Basophil % 0.8 %      Immature Grans % 2.1 %      Neutrophils, Absolute 6.23 10*3/mm3      Lymphocytes, Absolute 0.87 10*3/mm3      Monocytes, Absolute 1.15 10*3/mm3      Eosinophils, Absolute 0.33 10*3/mm3      Basophils, Absolute 0.07 10*3/mm3      Immature Grans, Absolute 0.19 10*3/mm3      nRBC 0.0 /100 WBC     POC Glucose Once [[id number]]  (Abnormal) Collected: 06/15/22 0010    Specimen: Blood Updated: 06/15/22 0021     Glucose 152 mg/dL      Comment: : [id number] Franco LaceyMeter ID: [id number]       POC Glucose Once [[id number]]  (Abnormal) Collected: 06/14/22 2028    Specimen: Blood Updated: 06/14/22 2039     Glucose 191 mg/dL      Comment: : [id number] Franco LaceyMeter ID: [id number]       POC Glucose Once [[id number]]  (Abnormal) Collected: 06/14/22 1637    Specimen: Blood Updated: 06/14/22 1649     Glucose 144 mg/dL      Comment: : [email or web address] Tova RyanMeter ID: [id number]           Imaging Results (Last 72 Hours)     Procedure Component Value Units Date/Time    XR Chest 1 View [[id number]] Collected: 06/16/22 0714     Updated: 06/16/22 0718    Narrative:      EXAMINATION: XR CHEST 1 VW-     6/16/2022 3:35 AM CDT     HISTORY: On vent; R13.10-Dysphagia, unspecified; Z01.818-Encounter for  other preprocedural examination; D32.9-Benign neoplasm of  meninges,  unspecified; Z74.09-Other reduced mobility; Z78.9-Other specified health  status; G40.901-Epilepsy, unspecified, not intractable, with status  epilepticus; J96.01-Acute respiratory failure with hypoxia;  G91.0-Communicating hydrocephalus; T81.40XA-Infection following a pr     1 view chest x-ray.     Comparison is made with yesterday's 3:27 AM.     Unchanged tracheostomy tube, left jugular central line, and right-sided   shunt tubing.     Magnified heart size.     Persistent left basilar consolidation and left perihilar infiltrate.     No pneumothorax.     No new or increased lung disease.     Summary:  1. No change from yesterday.  This report was finalized on 06/16/2022 07:15 by Dr. Tomer Whelan MD.    XR Chest 1 View [[id number]] Collected: 06/15/22 0745     Updated: 06/15/22 0749    Narrative:      EXAMINATION: XR CHEST 1 VW-     6/15/2022 3:40 AM CDT     HISTORY: On vent; R13.10-Dysphagia, unspecified; Z01.818-Encounter for  other preprocedural examination; D32.9-Benign neoplasm of meninges,  unspecified; Z74.09-Other reduced mobility; Z78.9-Other specified health  status; G40.901-Epilepsy, unspecified, not intractable, with status  epilepticus; J96.01-Acute respiratory failure with hypoxia;  G91.0-Communicating hydrocephalus; T81.40XA-Infection following a pr     1 view chest x-ray.     Comparison is made with yesterday at 10:48 AM.     Stable heart and mediastinum.  Persistent left basilar consolidation.  There appears to be less infiltrate in the perihilar region of the left  lung.     No pneumothorax.  No new lung disease.     Unchanged tracheostomy tube, left jugular central line, and right-sided   shunt tubing.     Summary:  1. Similar appearance of the chest with left basilar consolidation.  Positioning is different based on the degree of rotation. There appears  to be less prominent left mid lung infiltrate.  This report was finalized on 06/15/2022 07:46 by Dr. Tomer Whelan MD.    US  Venous Doppler Upper Extremity Left (duplex) [[id number]] Collected: 06/14/22 1542     Updated: 06/14/22 1545    Narrative:      History: Pain and swelling       Impression:      Impression:  1. There is no evidence of deep venous thrombosis of the left upper  extremity.  2. There is superficial thrombus in the cephalic vein.     Comments: Left upper extremity venous duplex exam was performed using  color Doppler flow, Doppler wave form analysis, and grayscale imaging,  with and without compression. There is no evidence of deep venous  thrombosis of the internal jugular, subclavian, axillary, brachial,  radial, and ulnar veins. There is superficial thrombus involving the  cephalic vein.     This report was finalized on 06/14/2022 15:42 by Dr. Nate Blakely MD.    XR Chest 1 View [[id number]] Collected: 06/14/22 1218     Updated: 06/14/22 1222    Narrative:      EXAMINATION:  XR CHEST 1 VW-  6/14/2022 11:16 AM CDT     HISTORY: Central line placement. Lung infiltrates.     COMPARISON: 6/14/2022.     TECHNIQUE: Single view AP image.     FINDINGS:  There is a new left IJ central venous catheter with catheter  tip near the brachiocephalic vein and superior vena cava junction. There  is no evidence of pneumothorax. There is hypoventilation. There is  consolidation in the left perihilar region and left lung base. The left  heart border is partially obscured. There is mild right perihilar  infiltrate. There is cardiomegaly. There is blunting of the left  costophrenic angle suggesting a small pleural effusion.       Impression:      1. New left IJ central venous catheter with no evidence of pneumothorax,  as described.  2. Hypoventilation.  3. Parenchymal infiltrate on the left worrisome for pneumonia. Probable  small pleural effusion on the left.  4. Right perihilar opacity likely due to vascular crowding and  hypoventilation. Patchy pneumonia on the right is not ruled out.        This report was finalized on 06/14/2022  12:19 by Dr. Zachariah Fonseca MD.    XR Chest 1 View [[id number]] Collected: 06/14/22 0702     Updated: 06/14/22 0707    Narrative:      EXAMINATION:  XR CHEST 1 VW-  6/14/2022 3:20 AM CDT     HISTORY: On ventilator.     COMPARISON: 6/13/2022.     TECHNIQUE: Single view AP image.     FINDINGS:  A tracheostomy tube remains in place. There is probable  ventriculoperitoneal shunt tubing traversing the right neck and right  chest. There is hypoventilation with vascular crowding. There is patchy  infiltrate in the left perihilar region and left lung base. There is  mild atelectasis at the right lung base. The left heart border is  partially obscured. There is cardiomegaly. There are degenerative  changes of the spine and shoulders.       Impression:      1. Hypoventilation with vascular crowding.  2. Patchy infiltrate in the mid and lower lung zone on the left. The  left heart border is obscured. This could represent pneumonia.  Atelectasis is also considered. There is mild atelectasis at the right  lung base.        This report was finalized on 06/14/2022 07:04 by Dr. Zachariah Fonseca MD.    CT Abdomen Pelvis With & Without Contrast [[id number]] Collected: 06/13/22 1830     Updated: 06/13/22 1841    Narrative:      EXAMINATION:  CT ABDOMEN PELVIS W WO CONTRAST-  6/13/2022 6:11 PM CDT     HISTORY: Abdominal distention. R13.10-Dysphagia, unspecified;  Z01.818-Encounter for other preprocedural examination.     TECHNIQUE: Spiral CT was performed of the abdomen and pelvis without and  with IV contrast. Multiplanar images were reconstructed.     DLP: 4672 mGy-cm. Automated dosage reduction technique was utilized to  reduce patient dosage.     COMPARISON: 5/23/2020.      LUNG BASES: The visualized left lower lobe is completely collapsed.  There is pleural effusion on the left. There appears to be some air  droplets in the pleural space on the left. There is right lower lobe  vascular crowding and atelectasis.     LIVER AND SPLEEN:  There is increased density within the gallbladder.  This could be related to sludge. There are no dense gallstones. There is  breathing motion artifact. The liver and spleen appear to be  unremarkable.     PANCREAS: There is breathing motion artifact. No definite acute  pancreatic abnormality is seen.     KIDNEYS, ADRENALS AND URINARY BLADDER: There is breathing motion  artifact. The adrenal glands are normal in size. There are  nonobstructive renal stones bilaterally. There is a probable small cyst  in the upper pole left kidney measuring less than 1 cm. There is an  ill-defined area of low density in the left kidney laterally in the mid  to lower pole. The ureters are nondilated. There is thickening of the  bladder wall. There is air in the bladder. There is a Mccrary catheter in  the bladder.     BOWEL: No oral contrast was given. There is a gastrostomy tube. There is  air and fluid in the stomach. Small bowel loops are nondilated. There is  underdistention of portions of the colon.     OTHER: There are bilateral fat-containing inguinal hernias. There is a  small fat-containing umbilical hernia. There is diffuse body wall edema.  There is presacral edema. There is mild atheromatous disease of the  aortoiliac vessels. There are degenerative changes of the spine. There  are degenerative changes of both hips.       Impression:      1. Likely complete left lower lobe collapse/atelectasis. There is fluid  and air within the pleural space at the left lung base. There may be a  split pleural sign in the left lung base. The findings are worrisome for  empyema. There is mild atelectasis in the right lung base.  2. The gallbladder is dense. This could be due to sludge in the  gallbladder. There are no focal dense gallstones.  3. Area of ill-defined low density in the mid to lower pole left kidney  laterally is nonspecific. This could represent an area of  pyelonephritis. A mass is felt to be less likely. This is not present  on  5/23/2022. There is a probable small cyst in the upper pole left kidney  that is difficult to evaluate due to breathing motion artifact.  4. Thickening of the bladder wall may be related to muscular  hypertrophy. Inflammation and infection are also included in the  differential. There is a Mccrary catheter in the bladder. There is air in  the bladder.  5. Bilateral fat-containing inguinal hernias. Very small fat-containing  umbilical hernia.  6. Mild to moderate diffuse body wall edema. Presacral edema. Other  nonacute findings, as discussed above.  This report was finalized on 06/13/2022 18:38 by Dr. Zachariah Fonseca MD.             ASSESSMENT/PLAN       Examination and evaluation of wound(s) was performed.    DIAGNOSIS:   Pressure injury of trach site, unstageable  Pressure injury of sacral area, stage 3  Tracheostomy  Sedated due to medication  Obesity - Body mass index is 34.98 kg/m².        PLAN:   Wounds of forehead are healed.    Patient currently has OPTi foam nonadhesive under trach plate with no Opticell AG in place per wound care order.      Start     Ordered    06/16/22 1241  Please place Opticell Ag under OPTi foam dressing under trach plate per wound care order  Nursing Communication  Continuous        Comments: Please place Opticell Ag under OPTi foam dressing under trach plate per wound care order    06/16/22 1241                     This document has been electronically signed by MARIO Montemayor on 6/16/2022 12:35 CDT       Time spent in face-to-face evaluation, chart review, planning and education 35 minutes with greater than 50% of time spent with patient and/or family and in coordination of care.  Counseling of patient and/or family includes discussing risks and benefits and Importance of compliance with chosen management options.

## 2022-06-16 NOTE — SIGNIFICANT NOTE
06/16/22 0727   Readings   PEEP Intrinsic (cm H2O) 4.7 cm H2O   Plateau Pressure (cm H2O) 17 cm H2O   Static Compliance (L/cm H2O) 35

## 2022-06-16 NOTE — PLAN OF CARE
Goal Outcome Evaluation:  Plan of Care Reviewed With: patient        Progress: no change  Outcome Evaluation: Pt still on vent and not awake. Performed PROM to all 4 exts. Repositioned UEs due to swelling in hands. Will cont POC.

## 2022-06-16 NOTE — PROGRESS NOTES
Neurology Progress Note      Chief Complaint:    Postoperative seizure    Subjective     Subjective:  Patient is more responsive today following some commands.  He does squeeze his left hand very mildly, but on command.  He did wiggle toes of both lower extremities very slow and subtle, but on command and reproducible.  I did not get him to squeeze the right upper extremity.      Past Medical History:   Diagnosis Date   • Brain tumor (HCC)    • Depression    • Hearing loss    • History of cellulitis     right foot big toe   • Hypertension    • Pneumonia    • Right arm weakness     after cervial injury   • Sciatic pain     affects balance   • Thyroid disease    • Visual disturbance     due to brain tumor - right eye     Past Surgical History:   Procedure Laterality Date   • CATARACT EXTRACTION, BILATERAL     • COLONOSCOPY     • CRANIOTOMY Bilateral 6/10/2022    Procedure: CRANIECTOMY;  Surgeon: Martell Downs MD;  Location: [facility] OR;  Service: Neurosurgery;  Laterality: Bilateral;   • CRANIOTOMY FOR TUMOR Right 5/10/2022    Procedure: CRANIOTOMY FOR TUMOR STERIOTACTIC WITH BRAIN LAB, right;  Surgeon: Martell Downs MD;  Location: [facility] OR;  Service: Neurosurgery;  Laterality: Right;   • ENDOSCOPY W/ PEG TUBE PLACEMENT N/A 6/2/2022    Procedure: ESOPHAGOGASTRODUODENOSCOPY WITH PERCUTANEOUS ENDOSCOPIC GASTROSTOMY TUBE INSERTION WITH ANESTHESIA;  Surgeon: Melecio Lu MD;  Location: [facility] OR;  Service: Gastroenterology;  Laterality: N/A;   • NECK SURGERY     • SKIN CANCER EXCISION     • TONSILLECTOMY     • TRACHEOSTOMY N/A 6/2/2022    Procedure: TRACHEOSTOMY;  Surgeon: Geovany Davidson MD;  Location: [facility] OR;  Service: ENT;  Laterality: N/A;   •  SHUNT INSERTION Right 6/3/2022    Procedure: VENTRICULAR PERITONEAL SHUNT INSERTION WITH BRAIN LAB;  Surgeon: Martell Downs MD;  Location: [facility] OR;  Service: Neurosurgery;  Laterality: Right;     Family History   Problem Relation  Age of Onset   • Cancer Sister    • Cancer Brother      Social History     Tobacco Use   • Smoking status: Never Smoker   • Smokeless tobacco: Never Used   Vaping Use   • Vaping Use: Never used   Substance Use Topics   • Alcohol use: Never   • Drug use: Never       Medications:  Current Facility-Administered Medications   Medication Dose Route Frequency Provider Last Rate Last Admin   • acetaminophen (TYLENOL) tablet 650 mg  650 mg Oral Q4H PRN Martell Downs MD   650 mg at 06/15/22 2017    Or   • acetaminophen (TYLENOL) suppository 650 mg  650 mg Rectal Q4H PRN Martell Downs MD   650 mg at 05/23/22 0016   • alteplase (CATHFLO/ACTIVASE) injection 2 mg  2 mg Intracatheter PRN Martell Downs MD   New Syringe/Cartridge at 05/28/22 1330   • arformoterol (BROVANA) nebulizer solution 15 mcg  15 mcg Nebulization BID - RT Vidya Chandler APRN   15 mcg at 06/16/22 0953   • bisacodyl (DULCOLAX) suppository 10 mg  10 mg Rectal Daily PRN Stevie Parsons APRN       • carvedilol (COREG) tablet 6.25 mg  6.25 mg Nasogastric BID With Meals Martell Downs MD   6.25 mg at 06/16/22 0831   • cefepime (MAXIPIME) 2 g/100 mL 0.9% NS (mbp)  2 g Intravenous Q8H Martell Downs MD   2 g at 06/16/22 0516   • chlorhexidine (PERIDEX) 0.12 % solution 15 mL  15 mL Mouth/Throat Q12H Martell Downs MD   15 mL at 06/16/22 0831   • dexamethasone (DECADRON) tablet 2 mg  2 mg Per PEG Tube Q12H Stevie Parsons APRN        Followed by   • [START ON 6/18/2022] dexamethasone (DECADRON) tablet 1 mg  1 mg Per PEG Tube Q12H Stevie Parsons APRN        Followed by   • [START ON 6/20/2022] dexamethasone (DECADRON) tablet 0.5 mg  0.5 mg Per PEG Tube Daily Stevie Parsons APRN       • dextrose (D50W) (25 g/50 mL) IV injection 25 g  25 g Intravenous Q15 Min PRN Martell Downs MD   25 g at 06/09/22 1807   • dextrose (GLUTOSE) oral gel 15 g  15 g Oral Q15 Min PRN Martell Downs MD   15 g at  05/22/22 0527   • docusate (COLACE) 50 MG/5ML liquid 50 mg  50 mg Oral Daily Sha Beatty MD   50 mg at 06/16/22 0831   • fosphenytoin (Cerebyx) 150 mg PE in sodium chloride 0.9 % 100 mL IVPB  150 mg PE Intravenous Q8H Zac Mccoy PA-C   150 mg PE at 06/16/22 0516   • glucagon (human recombinant) (GLUCAGEN DIAGNOSTIC) injection 1 mg  1 mg Intramuscular Q15 Min PRN Martell Downs MD       • guaiFENesin (ROBITUSSIN) 100 MG/5ML oral solution 200 mg  200 mg Per PEG Tube TID Daly Perea APRN   200 mg at 06/16/22 0832   • heparin (porcine) 5000 UNIT/ML injection 5,000 Units  5,000 Units Subcutaneous Q12H Martell Downs MD   5,000 Units at 06/16/22 0832   • hydrALAZINE (APRESOLINE) injection 10 mg  10 mg Intravenous Q6H PRN Martell Downs MD   10 mg at 06/15/22 2344   • insulin detemir (LEVEMIR) injection 20 Units  20 Units Subcutaneous Nightly Martell Downs MD   20 Units at 06/15/22 2017   • insulin regular (humuLIN R,novoLIN R) injection 2-7 Units  2-7 Units Subcutaneous Q6H Martell Downs MD   2 Units at 06/15/22 2345   • ipratropium-albuterol (DUO-NEB) nebulizer solution 3 mL  3 mL Nebulization Q4H PRN Martell Downs MD   3 mL at 06/15/22 0650   • labetalol (NORMODYNE,TRANDATE) injection 10 mg  10 mg Intravenous Q6H PRN Martell Downs MD   10 mg at 06/14/22 0337   • lacosamide (VIMPAT) injection 200 mg  200 mg Intravenous Q12H Martell Downs MD   200 mg at 06/16/22 0832   • lactulose solution 20 g  20 g Per PEG Tube Daily Sha Beatty MD   20 g at 06/16/22 0832   • lansoprazole (FIRST) oral suspension 30 mg  30 mg Per G Tube QAM Martell Downs MD   30 mg at 06/16/22 0647   • latanoprost (XALATAN) 0.005 % ophthalmic solution 1 drop  1 drop Both Eyes Nightly Martell Downs MD   1 drop at 06/15/22 2003   • levalbuterol (XOPENEX) nebulizer solution 0.63 mg  0.63 mg Nebulization TID PRN Yareli  Martell Nowak MD   0.63 mg at 06/12/22 1046   • levETIRAcetam in NaCl 0.75% (KEPPRA) IVPB 1,000 mg  1,000 mg Intravenous Q12H Martell Downs MD   1,000 mg at 06/16/22 0831   • levothyroxine (SYNTHROID, LEVOTHROID) tablet 125 mcg  125 mcg Nasogastric Q AM Martell Downs MD   125 mcg at 06/16/22 0516   • lidocaine (XYLOCAINE) 1 % injection 10 mL  10 mL Intradermal Once Martell Downs MD       • lidocaine (XYLOCAINE) 1 % injection 10 mL  10 mL Infiltration Once Pilo Ortega MD       • liothyronine (CYTOMEL) tablet 25 mcg  25 mcg Nasogastric Daily Martell Downs MD   25 mcg at 06/16/22 0831   • lisinopril (PRINIVIL,ZESTRIL) tablet 20 mg  20 mg Nasogastric Q24H Martell Downs MD   20 mg at 06/16/22 0831   • LORazepam (ATIVAN) injection 2 mg  2 mg Intravenous Q2H PRN Martell Downs MD   2 mg at 06/14/22 0358   • mupirocin (BACTROBAN) 2 % ointment 1 application  1 application Topical Q12H Martell Downs MD   1 application at 06/16/22 0832   • norepinephrine (LEVOPHED) 8 mg in 250 mL NS infusion (premix)  0.02-0.3 mcg/kg/min Intravenous Titrated Martell Downs MD   Held at 06/11/22 1054   • Nutrisource fiber pack 1 packet  1 packet Nasogastric 4x Daily Martell Downs MD   1 packet at 06/16/22 0831   • ondansetron (ZOFRAN) tablet 4 mg  4 mg Oral Q6H PRN Martell Downs MD        Or   • ondansetron (ZOFRAN) injection 4 mg  4 mg Intravenous Q6H PRN Martell Downs MD       • oxyCODONE (ROXICODONE) 5 MG/5ML solution 5 mg  5 mg Per PEG Tube Q6H PRN Daly Perea APRN   5 mg at 06/13/22 0354   • polyethylene glycol (MIRALAX) packet 17 g  17 g Oral Daily Martell Downs MD   17 g at 06/16/22 0832   • ProSource TF oral liquid 45 mL  45 mL Per G Tube BID Martell Downs MD   45 mL at 06/16/22 0833   • sodium chloride 3 % nebulizer solution 4 mL  4 mL Nebulization BID - RT Martell Downs MD   4 mL at  06/16/22 0724   • vancomycin 1500 mg/500 mL 0.9% NS IVPB ([facility])  15 mg/kg Intravenous Q12H Martell Downs MD   1,500 mg at 06/16/22 0306       Allergies:    Patient has no known allergies.    Review of Systems:   -A 14 point review of systems is completed and is negative.      Objective      Vital Signs  Temp:  [98.8 °F (37.1 °C)-99.5 °F (37.5 °C)] 99.5 °F (37.5 °C)  Heart Rate:  [59-72] 65  Resp:  [22-31] 27  BP: (127-173)/() 139/79  FiO2 (%):  [30 %] 30 %    Physical Exam:     HEENT:  Neck supple.  Tracheostomy in place.   CVS:  Regular rate and rhythm.  No murmurs  Carotid Examination:  No bruits  Lungs:  Clear to auscultation  Abdomen:  Non-tender, Non-distended.  PEG tube in place  Extremities: Edema of the dorsum of the bilateral hands.     Neurologic Exam:   Opens eyes slightly to sternal rub, but does not sustain them open.  Pupils are equally reactive to light.    Gag intact.     Motor:    Squeeze the left hand on command, but not the right.  Wiggle toes of both lower extremities on command, but very subtly.  It was reproducible.     DTR:  2+ throughout in all four extremities  upgoing toe on left       Results Review:    I reviewed the patient's new clinical results and findings.    Lab Results (last 24 hours)     Procedure Component Value Units Date/Time    Respiratory Culture - Sputum, ET Suction [[id number]] Collected: 06/14/22 1632    Specimen: Sputum from ET Suction Updated: 06/16/22 1042     Respiratory Culture Scant growth (1+) Normal respiratory annia. No S. aureus or Pseudomonas aeruginosa detected. Final report.     Gram Stain Many (4+) WBCs per low power field      Rare (1+) Epithelial cells per low power field      Few (2+) Gram positive cocci      Few (2+) Gram negative bacilli    Anaerobic Culture - Tissue, Brain [[id number]]  (Normal) Collected: 06/10/22 1326    Specimen: Tissue from Brain Updated: 06/16/22 0542     Anaerobic Culture No anaerobes isolated at 5 days    POC Glucose  Once [[id number]]  (Abnormal) Collected: 06/16/22 0517    Specimen: Blood Updated: 06/16/22 0528     Glucose 142 mg/dL      Comment: : [id number] Salavdor Freeman ID: [id number]       Phenytoin Level, Total [[id number]]  (Normal) Collected: 06/16/22 0358    Specimen: Blood Updated: 06/16/22 0437     Phenytoin Level 14.0 mcg/mL     Comprehensive Metabolic Panel [[id number]]  (Abnormal) Collected: 06/16/22 0358    Specimen: Blood Updated: 06/16/22 0434     Glucose 145 mg/dL      BUN 32 mg/dL      Creatinine 0.44 mg/dL      Sodium 139 mmol/L      Potassium 3.9 mmol/L      Chloride 104 mmol/L      CO2 29.0 mmol/L      Calcium 8.1 mg/dL      Total Protein 6.1 g/dL      Albumin 2.40 g/dL      ALT (SGPT) 142 U/L      AST (SGOT) 117 U/L      Alkaline Phosphatase 412 U/L      Total Bilirubin 0.4 mg/dL      Globulin 3.7 gm/dL      A/G Ratio 0.6 g/dL      BUN/Creatinine Ratio 72.7     Anion Gap 6.0 mmol/L      eGFR 109.2 mL/min/1.73      Comment: National Kidney Foundation and American Society of Nephrology (ASN) Task Force recommended calculation based on the Chronic Kidney Disease Epidemiology Collaboration (CKD-EPI) equation refit without adjustment for race.       Narrative:      GFR Normal >60  Chronic Kidney Disease <60  Kidney Failure <15      CBC & Differential [[id number]]  (Abnormal) Collected: 06/16/22 0358    Specimen: Blood Updated: 06/16/22 0416    Narrative:      The following orders were created for panel order CBC & Differential.  Procedure                               Abnormality         Status                     ---------                               -----------         ------                     CBC Auto Differential[[id number]]        Abnormal            Final result                 Please view results for these tests on the individual orders.    CBC Auto Differential [[id number]]  (Abnormal) Collected: 06/16/22 0358    Specimen: Blood Updated: 06/16/22 0416     WBC 9.48 10*3/mm3      RBC 2.47 10*6/mm3       Hemoglobin 7.7 g/dL      Hematocrit 25.1 %      .6 fL      MCH 31.2 pg      MCHC 30.7 g/dL      RDW 17.4 %      RDW-SD 62.4 fl      MPV 9.7 fL      Platelets 215 10*3/mm3      Neutrophil % 71.0 %      Lymphocyte % 9.1 %      Monocyte % 13.2 %      Eosinophil % 3.5 %      Basophil % 0.6 %      Immature Grans % 2.6 %      Neutrophils, Absolute 6.73 10*3/mm3      Lymphocytes, Absolute 0.86 10*3/mm3      Monocytes, Absolute 1.25 10*3/mm3      Eosinophils, Absolute 0.33 10*3/mm3      Basophils, Absolute 0.06 10*3/mm3      Immature Grans, Absolute 0.25 10*3/mm3      nRBC 0.0 /100 WBC     POC Glucose Once [[id number]]  (Abnormal) Collected: 06/15/22 2337    Specimen: Blood Updated: 06/15/22 2348     Glucose 157 mg/dL      Comment: : [id number] Chaz GarnerMeter ID: [id number]       POC Glucose Once [[id number]]  (Abnormal) Collected: 06/15/22 2015    Specimen: Blood Updated: 06/15/22 2027     Glucose 155 mg/dL      Comment: : [id number] Franco LaceyMeter ID: [id number]       POC Glucose Once [[id number]]  (Normal) Collected: 06/15/22 1743    Specimen: Blood Updated: 06/15/22 1755     Glucose 121 mg/dL      Comment: : [id number] Cone RobertMeter ID: [id number]       Fungus Culture - Tissue, Brain [[id number]] Collected: 06/10/22 1524    Specimen: Tissue from Brain Updated: 06/15/22 1633     Fungus Culture No fungus isolated at less than 1 week    AFB Culture - Tissue, Brain [[id number]] Collected: 06/10/22 1524    Specimen: Tissue from Brain Updated: 06/15/22 1633     AFB Culture No AFB isolated at less than 1 week     AFB Stain No acid fast bacilli seen on direct smear      No acid fast bacilli seen on concentrated smear    Fungus Culture - Tissue, Brain, Cerebral Cortex [[id number]] Collected: 06/10/22 1327    Specimen: Tissue from Brain, Cerebral Cortex Updated: 06/15/22 1434     Fungus Culture No fungus isolated at less than 1 week    AFB Culture - Tissue, Brain, Cerebral Cortex [[id number]]  Collected: 06/10/22 1327    Specimen: Tissue from Brain, Cerebral Cortex Updated: 06/15/22 1434     AFB Culture No AFB isolated at less than 1 week     AFB Stain No acid fast bacilli seen on direct smear      No acid fast bacilli seen on concentrated smear    Fungus Culture - Tissue, Brain [[id number]] Collected: 06/10/22 1326    Specimen: Tissue from Brain Updated: 06/15/22 1418     Fungus Culture No fungus isolated at less than 1 week    AFB Culture - Tissue, Brain [[id number]] Collected: 06/10/22 1326    Specimen: Tissue from Brain Updated: 06/15/22 1418     AFB Culture No AFB isolated at less than 1 week     AFB Stain No acid fast bacilli seen on direct smear      No acid fast bacilli seen on concentrated smear    POC Glucose Once [[id number]]  (Abnormal) Collected: 06/15/22 1240    Specimen: Blood Updated: 06/15/22 1309     Glucose 191 mg/dL      Comment: : [id number] Consuelo Perez  JMeter ID: [id number]       POC Glucose Once [[id number]]  (Abnormal) Collected: 06/15/22 1204    Specimen: Blood Updated: 06/15/22 1215     Glucose 179 mg/dL      Comment: : [id number] Thompson KarenMeter ID: [id number]             Imaging Results (Last 24 Hours)     Procedure Component Value Units Date/Time    XR Chest 1 View [[id number]] Collected: 06/16/22 0714     Updated: 06/16/22 0718    Narrative:      EXAMINATION: XR CHEST 1 VW-     6/16/2022 3:35 AM CDT     HISTORY: On vent; R13.10-Dysphagia, unspecified; Z01.818-Encounter for  other preprocedural examination; D32.9-Benign neoplasm of meninges,  unspecified; Z74.09-Other reduced mobility; Z78.9-Other specified health  status; G40.901-Epilepsy, unspecified, not intractable, with status  epilepticus; J96.01-Acute respiratory failure with hypoxia;  G91.0-Communicating hydrocephalus; T81.40XA-Infection following a pr     1 view chest x-ray.     Comparison is made with yesterday's 3:27 AM.     Unchanged tracheostomy tube, left jugular central line, and right-sided   shunt  tubing.     Magnified heart size.     Persistent left basilar consolidation and left perihilar infiltrate.     No pneumothorax.     No new or increased lung disease.     Summary:  1. No change from yesterday.  This report was finalized on 06/16/2022 07:15 by Dr. Tomer Whelan MD.          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)    Status epilepticus (HCC)    Essential hypertension    Type 2 diabetes mellitus with hyperglycemia, without long-term current use of insulin (HCC)    Hypothyroidism (acquired)    Acute respiratory failure (secondary to status epilepticus)    Thrombocytopenia (HCC)    Cerebral parenchymal hemorrhage (HCC)    PAF (paroxysmal atrial fibrillation) (HCC)    Fever    Loculated pleural effusion    Acute deep vein thrombosis (DVT) of the ulnar and brachial veins of right upper extremity (HCC)    Dysphagia    Communicating hydrocephalus (HCC)     Impression:     · Postoperative seizure  · S/p craniectomy and debridement   · Hypoalbuminemia   · S/p  shunt on 6/3/2022  · Tracheostomy  · PEG tube        Plan:  · Continue to reduce fosphenytoin.  We will decrease to 100 mg PE every 8 hours today.  Will likely discontinue tomorrow.  · Continue Keppra 1000 mg twice daily.  Once fosphenytoin is discontinued, we will then work on decreasing and discontinuing Keppra.  · Continue Vimpat 200 mg IV every 12 hours.  Would likely leave this in place as he goes to LTACH  · Plans are to look at LTACH placement near [place], [place].  If bed arranged, will provide a plan for documenting and discontinuing his AEDs.  · Neurology will follow peripherally.          Zac Mccoy PA-C  06/16/22  11:57 CDT

## 2022-06-16 NOTE — PROGRESS NOTES
NEUROSURGERY DAILY PROGRESS NOTE    ASSESSMENT:   Hai Hernandez is a 77 y.o. with a significant comorbidity of acephalic migraines, thyroid disease status post radiation as a child, basal cell and squamous cell carcinoma of the skin. He presents in FU for known meningioma found on workup for right visual field changes. Physical exam findings of neurologically intact with resolution of all symptoms and mild decreased vision in right eye.  His imaging shows 22 x 24 x 13.5 mm right planum sphenoidale mass most suggestive of meningioma.    Past Medical History:   Diagnosis Date   • Brain tumor (HCC)    • Depression    • Hearing loss    • History of cellulitis     right foot big toe   • Hypertension    • Pneumonia    • Right arm weakness     after cervial injury   • Sciatic pain     affects balance   • Thyroid disease    • Visual disturbance     due to brain tumor - right eye     Active Hospital Problems    Diagnosis    • **Meningioma (HCC)    • Acute deep vein thrombosis (DVT) of the ulnar and brachial veins of right upper extremity (HCC)    • Loculated pleural effusion    • Fever    • PAF (paroxysmal atrial fibrillation) (HCC)    • Cerebral parenchymal hemorrhage (HCC)    • Essential hypertension    • Type 2 diabetes mellitus with hyperglycemia, without long-term current use of insulin (HCC)    • Hypothyroidism (acquired)    • Acute respiratory failure (secondary to status epilepticus)    • Thrombocytopenia (HCC)    • 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  Grimaces to painful stimuli.  Does not follow commands.  Nonverbal.  Weakly withdraws to tactile  stimuli.    Intracranial meningioma   POD #36 (5/10/2022) Status post postcraniotomy for meningioma   POD#6 (6/10/2022) Craniectomy for cerebral edema and possible infection   CT reviewed and stable.  Small blood products at site of brain biopsy   Neurochecks per policy   Start Decadron taper   Wound C,D,I   Craniectomy precautions     Hydrocephalus   CSF unremarkable from 5/17, 5/23 and 6/10.    Lumbar drain removed 6/3/2022   POD# 13 (6/3/2022) right posterior parietal  shunt placement    MEDTRONIC programmable valve set to 0.5   Shunt pumps and refills well    Postop seizures, in status   Neurology managing   Cont Keppra, Dialntin, Vimpat; PRN Ativan   Follow dilantin levels   Repeat EEG: Slowing but no epileptiform activity     CV: Cardene off   Keep SBP <160.   Intermittent use of hydralazine and labetalol PRNs  Pulm: Tracheostomy  Placed (6/2/2022).  Appreciate ENT   Left chest tubes removed  : May DC Mccrary and place [place] catheter  FEN: Poor glucose control.  Increase SSI  ENDO: Accu-Chek and treat per policy  GI: PEG tube placed (6/2/2022).  Appreciate GI   No BM in several days.  KUB concerning for ileus formation.  Increased rectal stimulation  ID: Afebrile.  Continue broad spectrum abx, Vanc and Cefepime   CSF cultures NGTD  Heme:  DVT prophylaxis with SCD's.  Can not anticoagulate   Superficial thrombus to left cephalic vein   Hemoglobin 7.5.  We will continue to monitor.  Pain: NA  Dispo: Consult Case management for LTAC placement near [place] per family request   Will require neurology recommendations for titrating antiepileptics prior to DC.    CHIEF COMPLAINT:   Right visual field changes    Subjective  Symptom stable    Temp:  [98.4 °F (36.9 °C)-99.5 °F (37.5 °C)] 99.5 °F (37.5 °C)  Heart Rate:  [59-72] 65  Resp:  [22-31] 27  BP: (127-173)/() 139/79  FiO2 (%):  [30 %] 30 %    Objective:  General Appearance:  Well-appearing and in no acute distress.    Vital signs: (most recent): Blood  "pressure 139/79, pulse 65, temperature 99.5 °F (37.5 °C), temperature source Oral, resp. rate 27, height 182.9 cm (72\"), weight 109 kg (241 lb), SpO2 95 %.      Neurologic Exam     Mental Status   Level of consciousness: arousable by verbal stimuli ,  drowsy  Off propofol.  Does not follow commands.  Nonverbal.  Weakly withdraws to tactile stimuli.     Cranial Nerves     CN III, IV, VI   Pupils are equal, round, and reactive to light.  Extraocular motions are normal.     CN IX, X   CN IX normal.     Gait, Coordination, and Reflexes     Tremor   Resting tremor: absent  Intention tremor: absent  Action tremor: absent    Drains:   Urethral Catheter Non-latex;Silicone 16 Fr. (Active)       Output by Drain (mL) 06/15/22 0701 - 06/15/22 1900 06/15/22 1901 - 06/16/22 0700 06/16/22 0701 - 06/16/22 1145 Range Total   Requested LDAs do not have output data documented.       Imaging Results (Last 24 Hours)     Procedure Component Value Units Date/Time    XR Chest 1 View [[id number]] Collected: 06/16/22 0714     Updated: 06/16/22 0718    Narrative:      EXAMINATION: XR CHEST 1 VW-     6/16/2022 3:35 AM CDT     HISTORY: On vent; R13.10-Dysphagia, unspecified; Z01.818-Encounter for  other preprocedural examination; D32.9-Benign neoplasm of meninges,  unspecified; Z74.09-Other reduced mobility; Z78.9-Other specified health  status; G40.901-Epilepsy, unspecified, not intractable, with status  epilepticus; J96.01-Acute respiratory failure with hypoxia;  G91.0-Communicating hydrocephalus; T81.40XA-Infection following a pr     1 view chest x-ray.     Comparison is made with yesterday's 3:27 AM.     Unchanged tracheostomy tube, left jugular central line, and right-sided   shunt tubing.     Magnified heart size.     Persistent left basilar consolidation and left perihilar infiltrate.     No pneumothorax.     No new or increased lung disease.     Summary:  1. No change from yesterday.  This report was finalized on 06/16/2022 07:15 by " Dr. Tomer Whelan MD.        Lab Results (last 24 hours)     Procedure Component Value Units Date/Time    Respiratory Culture - Sputum, ET Suction [[id number]] Collected: 06/14/22 1632    Specimen: Sputum from ET Suction Updated: 06/16/22 1042     Respiratory Culture Scant growth (1+) Normal respiratory annia. No S. aureus or Pseudomonas aeruginosa detected. Final report.     Gram Stain Many (4+) WBCs per low power field      Rare (1+) Epithelial cells per low power field      Few (2+) Gram positive cocci      Few (2+) Gram negative bacilli    Anaerobic Culture - Tissue, Brain [[id number]]  (Normal) Collected: 06/10/22 1326    Specimen: Tissue from Brain Updated: 06/16/22 0542     Anaerobic Culture No anaerobes isolated at 5 days    POC Glucose Once [[id number]]  (Abnormal) Collected: 06/16/22 0517    Specimen: Blood Updated: 06/16/22 0528     Glucose 142 mg/dL      Comment: : [id number] Franco LaceyMeter ID: [id number]       Phenytoin Level, Total [[id number]]  (Normal) Collected: 06/16/22 0358    Specimen: Blood Updated: 06/16/22 0437     Phenytoin Level 14.0 mcg/mL     Comprehensive Metabolic Panel [[id number]]  (Abnormal) Collected: 06/16/22 0358    Specimen: Blood Updated: 06/16/22 0434     Glucose 145 mg/dL      BUN 32 mg/dL      Creatinine 0.44 mg/dL      Sodium 139 mmol/L      Potassium 3.9 mmol/L      Chloride 104 mmol/L      CO2 29.0 mmol/L      Calcium 8.1 mg/dL      Total Protein 6.1 g/dL      Albumin 2.40 g/dL      ALT (SGPT) 142 U/L      AST (SGOT) 117 U/L      Alkaline Phosphatase 412 U/L      Total Bilirubin 0.4 mg/dL      Globulin 3.7 gm/dL      A/G Ratio 0.6 g/dL      BUN/Creatinine Ratio 72.7     Anion Gap 6.0 mmol/L      eGFR 109.2 mL/min/1.73      Comment: National Kidney Foundation and American Society of Nephrology (ASN) Task Force recommended calculation based on the Chronic Kidney Disease Epidemiology Collaboration (CKD-EPI) equation refit without adjustment for race.       Narrative:       GFR Normal >60  Chronic Kidney Disease <60  Kidney Failure <15      CBC & Differential [[id number]]  (Abnormal) Collected: 06/16/22 0358    Specimen: Blood Updated: 06/16/22 0416    Narrative:      The following orders were created for panel order CBC & Differential.  Procedure                               Abnormality         Status                     ---------                               -----------         ------                     CBC Auto Differential[[id number]]        Abnormal            Final result                 Please view results for these tests on the individual orders.    CBC Auto Differential [[id number]]  (Abnormal) Collected: 06/16/22 0358    Specimen: Blood Updated: 06/16/22 0416     WBC 9.48 10*3/mm3      RBC 2.47 10*6/mm3      Hemoglobin 7.7 g/dL      Hematocrit 25.1 %      .6 fL      MCH 31.2 pg      MCHC 30.7 g/dL      RDW 17.4 %      RDW-SD 62.4 fl      MPV 9.7 fL      Platelets 215 10*3/mm3      Neutrophil % 71.0 %      Lymphocyte % 9.1 %      Monocyte % 13.2 %      Eosinophil % 3.5 %      Basophil % 0.6 %      Immature Grans % 2.6 %      Neutrophils, Absolute 6.73 10*3/mm3      Lymphocytes, Absolute 0.86 10*3/mm3      Monocytes, Absolute 1.25 10*3/mm3      Eosinophils, Absolute 0.33 10*3/mm3      Basophils, Absolute 0.06 10*3/mm3      Immature Grans, Absolute 0.25 10*3/mm3      nRBC 0.0 /100 WBC     POC Glucose Once [[id number]]  (Abnormal) Collected: 06/15/22 2337    Specimen: Blood Updated: 06/15/22 2348     Glucose 157 mg/dL      Comment: : [id number] Chaz DelvalleMeter ID: [id number]       POC Glucose Once [[id number]]  (Abnormal) Collected: 06/15/22 2015    Specimen: Blood Updated: 06/15/22 2027     Glucose 155 mg/dL      Comment: : [id number] Salvador MagallonMeter ID: [id number]       POC Glucose Once [[id number]]  (Normal) Collected: 06/15/22 1743    Specimen: Blood Updated: 06/15/22 1755     Glucose 121 mg/dL      Comment: : [id number] Jaren RobertMeter ID:  [id number]       Fungus Culture - Tissue, Brain [[id number]] Collected: 06/10/22 1524    Specimen: Tissue from Brain Updated: 06/15/22 1633     Fungus Culture No fungus isolated at less than 1 week    AFB Culture - Tissue, Brain [[id number]] Collected: 06/10/22 1524    Specimen: Tissue from Brain Updated: 06/15/22 1633     AFB Culture No AFB isolated at less than 1 week     AFB Stain No acid fast bacilli seen on direct smear      No acid fast bacilli seen on concentrated smear    Fungus Culture - Tissue, Brain, Cerebral Cortex [[id number]] Collected: 06/10/22 1327    Specimen: Tissue from Brain, Cerebral Cortex Updated: 06/15/22 1434     Fungus Culture No fungus isolated at less than 1 week    AFB Culture - Tissue, Brain, Cerebral Cortex [[id number]] Collected: 06/10/22 1327    Specimen: Tissue from Brain, Cerebral Cortex Updated: 06/15/22 1434     AFB Culture No AFB isolated at less than 1 week     AFB Stain No acid fast bacilli seen on direct smear      No acid fast bacilli seen on concentrated smear    Fungus Culture - Tissue, Brain [[id number]] Collected: 06/10/22 1326    Specimen: Tissue from Brain Updated: 06/15/22 1418     Fungus Culture No fungus isolated at less than 1 week    AFB Culture - Tissue, Brain [[id number]] Collected: 06/10/22 1326    Specimen: Tissue from Brain Updated: 06/15/22 1418     AFB Culture No AFB isolated at less than 1 week     AFB Stain No acid fast bacilli seen on direct smear      No acid fast bacilli seen on concentrated smear    POC Glucose Once [[id number]]  (Abnormal) Collected: 06/15/22 1240    Specimen: Blood Updated: 06/15/22 1309     Glucose 191 mg/dL      Comment: : [id number] Consuelo LAGOSeter ID: [id number]       POC Glucose Once [[id number]]  (Abnormal) Collected: 06/15/22 1204    Specimen: Blood Updated: 06/15/22 1215     Glucose 179 mg/dL      Comment: : [id number] Jay KarenMeter ID: [id number]           [id number]  Stevie Parsons, APRN

## 2022-06-16 NOTE — CASE MANAGEMENT/SOCIAL WORK
Continued Stay Note   [facility]     Patient Name: Hai Hernandez  MRN: [id number]  Today's Date: 6/16/2022    Admit Date: 5/10/2022     Discharge Plan     Row Name 06/16/22 0944       Plan    Plan LTAC referrals pending in [place].    Plan Comments Awaiting response from [facility].  [facility] in [place] has declined admission.  No response thus far from [facility].  Voicemail left for admissions staff at [facility] this am to confirm receipt of referral.               Discharge Codes    No documentation.                     NAVEEN CruzW

## 2022-06-17 ENCOUNTER — APPOINTMENT (OUTPATIENT)
Dept: GENERAL RADIOLOGY | Facility: HOSPITAL | Age: 77
End: 2022-06-17

## 2022-06-17 LAB
ALBUMIN SERPL-MCNC: 2.4 G/DL (ref 3.5–5.2)
ALBUMIN/GLOB SERPL: 0.6 G/DL
ALP SERPL-CCNC: 420 U/L (ref 39–117)
ALT SERPL W P-5'-P-CCNC: 129 U/L (ref 1–41)
ANION GAP SERPL CALCULATED.3IONS-SCNC: 6 MMOL/L (ref 5–15)
AST SERPL-CCNC: 100 U/L (ref 1–40)
BASOPHILS # BLD AUTO: 0.05 10*3/MM3 (ref 0–0.2)
BASOPHILS NFR BLD AUTO: 0.6 % (ref 0–1.5)
BILIRUB SERPL-MCNC: 0.5 MG/DL (ref 0–1.2)
BUN SERPL-MCNC: 26 MG/DL (ref 8–23)
BUN/CREAT SERPL: 60.5 (ref 7–25)
CALCIUM SPEC-SCNC: 8 MG/DL (ref 8.6–10.5)
CHLORIDE SERPL-SCNC: 104 MMOL/L (ref 98–107)
CO2 SERPL-SCNC: 29 MMOL/L (ref 22–29)
CREAT SERPL-MCNC: 0.43 MG/DL (ref 0.76–1.27)
DEPRECATED RDW RBC AUTO: 62.8 FL (ref 37–54)
EGFRCR SERPLBLD CKD-EPI 2021: 109.9 ML/MIN/1.73
EOSINOPHIL # BLD AUTO: 0.36 10*3/MM3 (ref 0–0.4)
EOSINOPHIL NFR BLD AUTO: 4.3 % (ref 0.3–6.2)
ERYTHROCYTE [DISTWIDTH] IN BLOOD BY AUTOMATED COUNT: 17.4 % (ref 12.3–15.4)
GLOBULIN UR ELPH-MCNC: 3.8 GM/DL
GLUCOSE BLDC GLUCOMTR-MCNC: 132 MG/DL (ref 70–130)
GLUCOSE BLDC GLUCOMTR-MCNC: 133 MG/DL (ref 70–130)
GLUCOSE BLDC GLUCOMTR-MCNC: 146 MG/DL (ref 70–130)
GLUCOSE BLDC GLUCOMTR-MCNC: 148 MG/DL (ref 70–130)
GLUCOSE SERPL-MCNC: 120 MG/DL (ref 65–99)
HCT VFR BLD AUTO: 25.4 % (ref 37.5–51)
HERPES SIMPLEX VIRUS 1 IGG AB [UNITS/VOLUME] IN CEREBRAL SPINAL FLUID: 0.04 IV
HGB BLD-MCNC: 8.1 G/DL (ref 13–17.7)
IMM GRANULOCYTES # BLD AUTO: 0.19 10*3/MM3 (ref 0–0.05)
IMM GRANULOCYTES NFR BLD AUTO: 2.3 % (ref 0–0.5)
LYMPHOCYTES # BLD AUTO: 0.83 10*3/MM3 (ref 0.7–3.1)
LYMPHOCYTES NFR BLD AUTO: 9.9 % (ref 19.6–45.3)
MCH RBC QN AUTO: 32.3 PG (ref 26.6–33)
MCHC RBC AUTO-ENTMCNC: 31.9 G/DL (ref 31.5–35.7)
MCV RBC AUTO: 101.2 FL (ref 79–97)
MONOCYTES # BLD AUTO: 1.08 10*3/MM3 (ref 0.1–0.9)
MONOCYTES NFR BLD AUTO: 12.9 % (ref 5–12)
NEUTROPHILS NFR BLD AUTO: 5.84 10*3/MM3 (ref 1.7–7)
NEUTROPHILS NFR BLD AUTO: 70 % (ref 42.7–76)
NRBC BLD AUTO-RTO: 0 /100 WBC (ref 0–0.2)
PHENYTOIN SERPL-MCNC: 12 MCG/ML (ref 10–20)
PLATELET # BLD AUTO: 219 10*3/MM3 (ref 140–450)
PMV BLD AUTO: 9.4 FL (ref 6–12)
POTASSIUM SERPL-SCNC: 3.9 MMOL/L (ref 3.5–5.2)
PROT SERPL-MCNC: 6.2 G/DL (ref 6–8.5)
RBC # BLD AUTO: 2.51 10*6/MM3 (ref 4.14–5.8)
SODIUM SERPL-SCNC: 139 MMOL/L (ref 136–145)
WBC NRBC COR # BLD: 8.35 10*3/MM3 (ref 3.4–10.8)

## 2022-06-17 PROCEDURE — 63710000001 INSULIN DETEMIR PER 5 UNITS: Performed by: NEUROLOGICAL SURGERY

## 2022-06-17 PROCEDURE — 25010000002 HEPARIN (PORCINE) PER 1000 UNITS: Performed by: NEUROLOGICAL SURGERY

## 2022-06-17 PROCEDURE — 25010000002 VANCOMYCIN 10 G RECONSTITUTED SOLUTION: Performed by: NEUROLOGICAL SURGERY

## 2022-06-17 PROCEDURE — 94003 VENT MGMT INPAT SUBQ DAY: CPT

## 2022-06-17 PROCEDURE — 85025 COMPLETE CBC W/AUTO DIFF WBC: CPT | Performed by: NEUROLOGICAL SURGERY

## 2022-06-17 PROCEDURE — 25010000002 FOSPHENYTOIN 500 MG PE/10ML SOLUTION 10 ML VIAL: Performed by: PHYSICIAN ASSISTANT

## 2022-06-17 PROCEDURE — 71045 X-RAY EXAM CHEST 1 VIEW: CPT

## 2022-06-17 PROCEDURE — 82962 GLUCOSE BLOOD TEST: CPT

## 2022-06-17 PROCEDURE — 99233 SBSQ HOSP IP/OBS HIGH 50: CPT | Performed by: INTERNAL MEDICINE

## 2022-06-17 PROCEDURE — 94799 UNLISTED PULMONARY SVC/PX: CPT

## 2022-06-17 PROCEDURE — 99233 SBSQ HOSP IP/OBS HIGH 50: CPT | Performed by: CLINICAL NURSE SPECIALIST

## 2022-06-17 PROCEDURE — 94761 N-INVAS EAR/PLS OXIMETRY MLT: CPT

## 2022-06-17 PROCEDURE — 25010000002 HYDRALAZINE PER 20 MG: Performed by: NEUROLOGICAL SURGERY

## 2022-06-17 PROCEDURE — 97164 PT RE-EVAL EST PLAN CARE: CPT

## 2022-06-17 PROCEDURE — 0 LEVETIRACETAM IN NACL 0.75% 1000 MG/100ML SOLUTION: Performed by: NEUROLOGICAL SURGERY

## 2022-06-17 PROCEDURE — 80053 COMPREHEN METABOLIC PANEL: CPT | Performed by: NEUROLOGICAL SURGERY

## 2022-06-17 PROCEDURE — 80185 ASSAY OF PHENYTOIN TOTAL: CPT | Performed by: NEUROLOGICAL SURGERY

## 2022-06-17 PROCEDURE — 25010000002 CEFEPIME PER 500 MG: Performed by: NEUROLOGICAL SURGERY

## 2022-06-17 PROCEDURE — 99024 POSTOP FOLLOW-UP VISIT: CPT | Performed by: NURSE PRACTITIONER

## 2022-06-17 RX ORDER — LISINOPRIL 20 MG/1
20 TABLET ORAL ONCE
Status: COMPLETED | OUTPATIENT
Start: 2022-06-17 | End: 2022-06-17

## 2022-06-17 RX ORDER — LISINOPRIL 20 MG/1
40 TABLET ORAL
Status: DISCONTINUED | OUTPATIENT
Start: 2022-06-18 | End: 2022-06-20 | Stop reason: HOSPADM

## 2022-06-17 RX ADMIN — LATANOPROST 1 DROP: 50 SOLUTION OPHTHALMIC at 20:57

## 2022-06-17 RX ADMIN — LIOTHYRONINE SODIUM 25 MCG: 25 TABLET ORAL at 08:01

## 2022-06-17 RX ADMIN — CEFEPIME 2 G: 2 INJECTION, POWDER, FOR SOLUTION INTRAVENOUS at 20:52

## 2022-06-17 RX ADMIN — GUAIFENESIN 400 MG: 200 SOLUTION ORAL at 20:52

## 2022-06-17 RX ADMIN — SODIUM CHLORIDE SOLN NEBU 3% 4 ML: 3 NEBU SOLN at 19:25

## 2022-06-17 RX ADMIN — Medication 1 PACKET: at 12:58

## 2022-06-17 RX ADMIN — HYDRALAZINE HYDROCHLORIDE 10 MG: 20 INJECTION INTRAMUSCULAR; INTRAVENOUS at 13:31

## 2022-06-17 RX ADMIN — LACOSAMIDE 200 MG: 10 INJECTION, SOLUTION INTRAVENOUS at 08:05

## 2022-06-17 RX ADMIN — CARVEDILOL 6.25 MG: 6.25 TABLET, FILM COATED ORAL at 18:31

## 2022-06-17 RX ADMIN — MUPIROCIN 1 APPLICATION: 20 OINTMENT TOPICAL at 08:05

## 2022-06-17 RX ADMIN — CHLORHEXIDINE GLUCONATE 15 ML: 1.2 RINSE ORAL at 08:04

## 2022-06-17 RX ADMIN — POLYETHYLENE GLYCOL 3350 17 G: 17 POWDER, FOR SOLUTION ORAL at 08:02

## 2022-06-17 RX ADMIN — Medication 1 PACKET: at 18:32

## 2022-06-17 RX ADMIN — ARFORMOTEROL TARTRATE 15 MCG: 15 SOLUTION RESPIRATORY (INHALATION) at 19:25

## 2022-06-17 RX ADMIN — OXYCODONE HYDROCHLORIDE 5 MG: 5 SOLUTION ORAL at 13:31

## 2022-06-17 RX ADMIN — LEVETIRACETAM 1000 MG: 10 INJECTION INTRAVENOUS at 20:55

## 2022-06-17 RX ADMIN — CEFEPIME 2 G: 2 INJECTION, POWDER, FOR SOLUTION INTRAVENOUS at 14:58

## 2022-06-17 RX ADMIN — MUPIROCIN 1 APPLICATION: 20 OINTMENT TOPICAL at 20:57

## 2022-06-17 RX ADMIN — LISINOPRIL 20 MG: 20 TABLET ORAL at 08:01

## 2022-06-17 RX ADMIN — LEVOTHYROXINE SODIUM 125 MCG: 125 TABLET ORAL at 06:07

## 2022-06-17 RX ADMIN — LEVETIRACETAM 1000 MG: 10 INJECTION INTRAVENOUS at 08:05

## 2022-06-17 RX ADMIN — DEXAMETHASONE 2 MG: 2 TABLET ORAL at 20:53

## 2022-06-17 RX ADMIN — GUAIFENESIN 400 MG: 200 SOLUTION ORAL at 16:40

## 2022-06-17 RX ADMIN — DEXAMETHASONE 2 MG: 2 TABLET ORAL at 08:01

## 2022-06-17 RX ADMIN — SODIUM CHLORIDE 100 MG PE: 9 INJECTION, SOLUTION INTRAVENOUS at 06:07

## 2022-06-17 RX ADMIN — HEPARIN SODIUM 5000 UNITS: 5000 INJECTION INTRAVENOUS; SUBCUTANEOUS at 08:02

## 2022-06-17 RX ADMIN — VANCOMYCIN HYDROCHLORIDE 1500 MG: 10 INJECTION, POWDER, LYOPHILIZED, FOR SOLUTION INTRAVENOUS at 16:39

## 2022-06-17 RX ADMIN — Medication 45 ML: at 20:52

## 2022-06-17 RX ADMIN — LACOSAMIDE 200 MG: 10 INJECTION, SOLUTION INTRAVENOUS at 20:56

## 2022-06-17 RX ADMIN — INSULIN DETEMIR 20 UNITS: 100 INJECTION, SOLUTION SUBCUTANEOUS at 20:54

## 2022-06-17 RX ADMIN — LABETALOL HYDROCHLORIDE 10 MG: 5 INJECTION INTRAVENOUS at 06:31

## 2022-06-17 RX ADMIN — SODIUM CHLORIDE SOLN NEBU 3% 4 ML: 3 NEBU SOLN at 07:08

## 2022-06-17 RX ADMIN — GUAIFENESIN 400 MG: 200 SOLUTION ORAL at 08:03

## 2022-06-17 RX ADMIN — HEPARIN SODIUM 5000 UNITS: 5000 INJECTION INTRAVENOUS; SUBCUTANEOUS at 20:56

## 2022-06-17 RX ADMIN — HYDRALAZINE HYDROCHLORIDE 10 MG: 20 INJECTION INTRAMUSCULAR; INTRAVENOUS at 03:28

## 2022-06-17 RX ADMIN — DOCUSATE SODIUM LIQUID 50 MG: 100 LIQUID ORAL at 08:02

## 2022-06-17 RX ADMIN — VANCOMYCIN HYDROCHLORIDE 1500 MG: 10 INJECTION, POWDER, LYOPHILIZED, FOR SOLUTION INTRAVENOUS at 04:22

## 2022-06-17 RX ADMIN — LACTULOSE 20 G: 20 SOLUTION ORAL at 08:02

## 2022-06-17 RX ADMIN — Medication 1 PACKET: at 20:52

## 2022-06-17 RX ADMIN — LISINOPRIL 20 MG: 20 TABLET ORAL at 18:31

## 2022-06-17 RX ADMIN — CARVEDILOL 6.25 MG: 6.25 TABLET, FILM COATED ORAL at 08:01

## 2022-06-17 RX ADMIN — Medication 1 PACKET: at 08:00

## 2022-06-17 RX ADMIN — ARFORMOTEROL TARTRATE 15 MCG: 15 SOLUTION RESPIRATORY (INHALATION) at 07:08

## 2022-06-17 RX ADMIN — LANSOPRAZOLE 30 MG: KIT at 07:56

## 2022-06-17 RX ADMIN — CEFEPIME 2 G: 2 INJECTION, POWDER, FOR SOLUTION INTRAVENOUS at 06:07

## 2022-06-17 RX ADMIN — Medication 45 ML: at 08:00

## 2022-06-17 RX ADMIN — CHLORHEXIDINE GLUCONATE 15 ML: 1.2 RINSE ORAL at 20:56

## 2022-06-17 NOTE — CASE MANAGEMENT/SOCIAL WORK
Continued Stay Note   [facility]     Patient Name: Hai Hernandez  MRN: [id number]  Today's Date: 6/17/2022    Admit Date: 5/10/2022     Discharge Plan     Row Name 06/17/22 0900       Plan    Plan LTAC placement in [place] area    Plan Comments No response from [facility] or [facility] yesterday.  CLOVIS spoke with Christel with [facility] this am [phone] who advised [facility] was likely wanting to set up a MD to MD call and when she knew those details she would get back with CLOVIS.  Voicemail left for [facility] admissions at [phone] to inquire about second fax of referral to [facility]: [phone].    9:35 am:  Received call from [facility] advising they have received referral and it is under review.  MD to MD information has been relayed to Dr. Downs per [facility] request.      9:45 am:  Received call from Marly with [facility] [phone] advising they do have neurology on staff, however, they do not currently have a bed available and wanted to discuss transfer on Monday.  Requested updated records be faxed to [phone] Monday am.               Discharge Codes    No documentation.                     JOSE Cruz

## 2022-06-17 NOTE — PROGRESS NOTES
PULMONARY AND CRITICAL CARE PROGRESS NOTE - [facility]    Patient: Hai Hernandez    1945    MR# [id number]    Acct# [id number]  06/17/22   08:49 CDT  Referring Provider: Martell Downs, *    Chief Complaint: Mechanically ventilated    Interval history: The patient remains intubated with trach to mechanical vent.  He remains minimally responsive.  Oxygen saturation 97% on PEEP of 5 and FiO2 0.3.  ETCO2 33.  He is passively breathing.  He remains off of sedation.  He sat in neuro chair today x3 hr.  Anticipate LTAC placement.  No other aggravating or alleviating factors.     Meds:  arformoterol, 15 mcg, Nebulization, BID - RT  carvedilol, 6.25 mg, Nasogastric, BID With Meals  cefepime, 2 g, Intravenous, Q8H  chlorhexidine, 15 mL, Mouth/Throat, Q12H  dexamethasone, 2 mg, Per PEG Tube, Q12H   Followed by  [START ON 6/18/2022] dexamethasone, 1 mg, Per PEG Tube, Q12H   Followed by  [START ON 6/20/2022] dexamethasone, 0.5 mg, Per PEG Tube, Daily  docusate, 50 mg, Oral, Daily  fosphenytoin, 100 mg PE, Intravenous, Q8H  guaiFENesin, 400 mg, Per PEG Tube, TID  heparin (porcine), 5,000 Units, Subcutaneous, Q12H  insulin detemir, 20 Units, Subcutaneous, Nightly  insulin regular, 2-7 Units, Subcutaneous, Q6H  lacosamide, 200 mg, Intravenous, Q12H  lactulose, 20 g, Per PEG Tube, Daily  lansoprazole, 30 mg, Per G Tube, QAM  latanoprost, 1 drop, Both Eyes, Nightly  levETIRAcetam, 1,000 mg, Intravenous, Q12H  levothyroxine, 125 mcg, Nasogastric, Q AM  lidocaine, 10 mL, Intradermal, Once  lidocaine, 10 mL, Infiltration, Once  liothyronine, 25 mcg, Nasogastric, Daily  lisinopril, 20 mg, Nasogastric, Q24H  mupirocin, 1 application, Topical, Q12H  Nutrisource fiber, 1 packet, Nasogastric, 4x Daily  polyethylene glycol, 17 g, Oral, Daily  ProSource TF, 45 mL, Per G Tube, BID  sodium chloride, 4 mL, Nebulization, BID - RT  vancomycin, 15 mg/kg, Intravenous, Q12H      norepinephrine, 0.02-0.3 mcg/kg/min,  Last Rate: Stopped (06/11/22 1054)      Review of Systems:   Cannot obtain due to mechanical ventilated state   Ventilator Settings:        Resp Rate (Set): 16  Pressure Support (cm H2O): 8 cm H20  FiO2 (%): 30 %  PEEP/CPAP (cm H2O): 5 cm H20  Minute Ventilation (L/min) (Obs): 7.29 L/min  Resp Rate (Observed) Vent: 34  I:E Ratio (Set): 1:0.27  I:E Ratio (Obs): 1.60:1  PIP Observed (cm H2O): 23 cm H2O  RSBI: 21.96  Physical Exam:  Temp:  [98.2 °F (36.8 °C)-99.1 °F (37.3 °C)] 99 °F (37.2 °C)  Heart Rate:  [62-78] 63  Resp:  [20-30] 30  BP: (130-186)/() 166/81  FiO2 (%):  [30 %] 30 %    Intake/Output Summary (Last 24 hours) at 6/17/2022 0849  Last data filed at 6/17/2022 0600  Gross per 24 hour   Intake 1994 ml   Output 200 ml   Net 1794 ml     SpO2 Percentage    06/17/22 0600 06/17/22 0708 06/17/22 0716   SpO2: 97% 96% 98%   Body mass index is 32.69 kg/m².   Physical Exam  Constitutional:       General: He is not in acute distress.     Appearance: He is ill-appearing. He is not diaphoretic.   HENT:      Head: Normocephalic.      Comments: Craniotomy incision, healing     Nose: Nose normal.      Mouth/Throat:      Mouth: Mucous membranes are moist.   Eyes:      General: No scleral icterus.  Neck:      Comments: Trach to vent  Left IJ triple-lumen  Cardiovascular:      Rate and Rhythm: Normal rate.   Pulmonary:      Effort: Pulmonary effort is normal. No respiratory distress.      Breath sounds: No wheezing or rhonchi.   Abdominal:      General: There is no distension.      Comments: PEG with tube feeding infusing   Genitourinary:     Comments: Mccrary catheter  Musculoskeletal:      Right lower leg: Edema present.      Left lower leg: Edema present.      Comments: SCDs   Skin:     Coloration: Skin is not pale.   Neurological:      Comments: Weakly withdraws to stimulus     Electronically signed by MARIO Aldrich, 6/17/2022, 08:49 CDT      Physician Substantive Portion: Medical Decision  Making    Laboratory Data:  Results from last 7 days   Lab Units 06/17/22  0242 06/16/22  0358 06/15/22  0426   WBC 10*3/mm3 8.35 9.48 8.84   HEMOGLOBIN g/dL 8.1* 7.7* 7.5*   PLATELETS 10*3/mm3 219 215 195     Results from last 7 days   Lab Units 06/17/22  0242 06/16/22  0358 06/15/22  0426   SODIUM mmol/L 139 139 138   POTASSIUM mmol/L 3.9 3.9 3.9   BUN mg/dL 26* 32* 33*   CREATININE mg/dL 0.43* 0.44* 0.43*     Results from last 7 days   Lab Units 06/14/22  0417 06/13/22  0423 06/12/22  0412   PH, ARTERIAL pH units 7.461* 7.471* 7.488*   PCO2, ARTERIAL mm Hg 40.9 39.0 38.4   PO2 ART mm Hg 82.4* 88.1 88.2   FIO2 % 35 35 35     Wound Culture   Date Value Ref Range Status   06/10/2022 No growth at 2 days  Preliminary   06/10/2022 No growth at 2 days  Preliminary   06/10/2022 No growth at 2 days  Preliminary     Respiratory Culture   Date Value Ref Range Status   06/10/2022   Preliminary    Rare The culture consists of normal respiratory annia. This is a preliminary report; final report to follow.     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  The lungs are poorly expanded.  Bilateral lung infiltrate persists. Left lower lung consolidation is unchanged.  There is no pneumothorax.  There is a persistent moderate cardiomegaly.  Tracheostomy tube is in place. Left internal jugular venous access line is in place. A ventriculoperitoneal shunt catheter seen projected  over the right side of the base of the neck and the chest.  No acute bony abnormality.      Impression: 1. No change. This report was finalized on 06/17/2022 07:32 by Dr. Aida Wong MD.    XR Chest 1 View    Result Date: 6/16/2022  EXAMINATION: XR CHEST 1 VW-  6/16/2022 3:35 AM CDT  HISTORY: On vent; R13.10-Dysphagia, unspecified; Z01.818-Encounter for other preprocedural examination; D32.9-Benign neoplasm of meninges, unspecified; Z74.09-Other reduced mobility; Z78.9-Other specified health status; G40.901-Epilepsy, unspecified, not intractable, with status epilepticus; J96.01-Acute respiratory failure with hypoxia; G91.0-Communicating hydrocephalus; T81.40XA-Infection following a pr  1 view chest x-ray.  Comparison is made with yesterday's 3:27 AM.  Unchanged tracheostomy tube, left jugular central line, and right-sided  shunt tubing.  Magnified heart size.  Persistent left basilar consolidation and left perihilar infiltrate.  No pneumothorax.  No new or increased lung disease.  Summary: 1. No change from yesterday. This report was finalized on 06/16/2022 07:15 by Dr. Tomer Whelan MD.      My radiograph interpretation/independent review of imaging: Reviewed and agree with current interpretation    Pulmonary Assessment:    1. Acute hypoxic respiratory failure  2. On mechanically assisted ventilation  3. Tracheostomy and PEG tube placement done for long-term care  4. Hydrocephalus requiring  shunt placement   5. Status postcraniotomy for meningioma  6. Encephalopathy status epilepticus  7. Loculated left-sided pleural effusion with multiple chest tubes removed now.   8. Healthcare associated pneumonia on antibiotics  9. Cardiac arrhythmia  10. Hypertension  11. Type 2 diabetes mellitus  12. S/p ventriculoperitoneal shunt placement  13. History of DVT in upper extremity  14. Seizure activity    Recommend/plan:   · Patient not ready for ventilator weaning and currently on assist control and pressure control  ventilation.  · Current ventilator settings are PI 12, PEEP of 5, rate 16, FiO2 30% with oxygen saturation 98%.  · Patient is getting cefepime and vancomycin.  He is afebrile.  · He is not on any sedation but is minimally responsive.  · He was accepted by LTAC without any neurology onsite and family refused to go there and waiting for another LTAC in [place] to approve.  He may go there next week.  · Neurosurgery, neurology is also following.  Continue current ventilator settings and spontaneous breathing trial when he is more stable.  · Bronchial treatment routine respiratory care and pulmonary toilet.  · DVT and stress ulcer bolus and pain and anxiety control.  · CODE STATUS: Full.  Overall prognosis: Guarded.  · We will follow.    This visit was performed by both a physician and an [facility] RN.  I personally evaluated and examined the patient.  I performed all aspects of the medical decision making as documented.    Electronically signed by     Murphy Kothari MD,  Pulmonologist/Intensivist   6/17/2022, 18:47 CDT

## 2022-06-17 NOTE — THERAPY RE-EVALUATION
Patient Name: Hai Hernandez  : 1945    MRN: [id number]                              Today's Date: 2022       Admit Date: 5/10/2022    Visit Dx:     ICD-10-CM ICD-9-CM   1. Dysphagia, unspecified type  R13.10 787.20   2. Preop testing  Z01.818 V72.84   3. Meningioma (HCC)  D32.9 225.2   4. Impaired mobility  Z74.09 799.89   5. Decreased activities of daily living (ADL)  Z78.9 V49.89   6. Status epilepticus (HCC)  G40.901 345.3   7. Acute respiratory failure with hypoxia (HCC)  J96.01 518.81   8. Communicating hydrocephalus (HCC)  G91.0 331.3   9. Postoperative infection, unspecified type, initial encounter  T81.40XA 998.59   10. 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)   • Status epilepticus (HCC)   • Essential hypertension   • Type 2 diabetes mellitus with hyperglycemia, without long-term current use of insulin (HCC)   • Hypothyroidism (acquired)   • Acute respiratory failure (secondary to status epilepticus)   • Thrombocytopenia (HCC)   • Cerebral parenchymal hemorrhage (HCC)   • PAF (paroxysmal atrial fibrillation) (HCC)   • Fever   • Loculated pleural effusion   • Acute deep vein thrombosis (DVT) of the ulnar and brachial veins of right upper extremity (HCC)   • Dysphagia   • Communicating hydrocephalus (HCC)   • Cerebral edema (HCC)     Past Medical History:   Diagnosis Date   • Brain tumor (HCC)    • Depression    • Hearing loss    • History of cellulitis     right foot big toe   • Hypertension    • Pneumonia    • Right arm weakness     after cervial injury   • Sciatic pain     affects balance   • Thyroid disease    • Visual disturbance     due to brain tumor - right eye     Past Surgical History:   Procedure Laterality Date   • CATARACT EXTRACTION, BILATERAL     • COLONOSCOPY     • CRANIOTOMY  Bilateral 6/10/2022    Procedure: CRANIECTOMY;  Surgeon: Martell Downs MD;  Location:  [facility];  Service: Neurosurgery;  Laterality: Bilateral;   • CRANIOTOMY FOR TUMOR Right 5/10/2022    Procedure: CRANIOTOMY FOR TUMOR STERIOTACTIC WITH BRAIN LAB, right;  Surgeon: Martell Downs MD;  Location:  [facility];  Service: Neurosurgery;  Laterality: Right;   • ENDOSCOPY W/ PEG TUBE PLACEMENT N/A 6/2/2022    Procedure: ESOPHAGOGASTRODUODENOSCOPY WITH PERCUTANEOUS ENDOSCOPIC GASTROSTOMY TUBE INSERTION WITH ANESTHESIA;  Surgeon: Melecio Lu MD;  Location:  [facility];  Service: Gastroenterology;  Laterality: N/A;   • NECK SURGERY     • SKIN CANCER EXCISION     • TONSILLECTOMY     • TRACHEOSTOMY N/A 6/2/2022    Procedure: TRACHEOSTOMY;  Surgeon: Geovany Davidson MD;  Location:  [facility];  Service: ENT;  Laterality: N/A;   •  SHUNT INSERTION Right 6/3/2022    Procedure: VENTRICULAR PERITONEAL SHUNT INSERTION WITH BRAIN LAB;  Surgeon: Martell Downs MD;  Location:  [facility];  Service: Neurosurgery;  Laterality: Right;      General Information     Row Name 06/17/22 0830          Physical Therapy Time and Intention    Document Type re-evaluation  -YULIET     Mode of Treatment physical therapy  -YULIET     Row Name 06/17/22 0830          General Information    Patient Profile Reviewed yes  -YULIET     Existing Precautions/Restrictions fall;oxygen therapy device and L/min  vent, trach  -YULIET     Barriers to Rehab medically complex  -YULIET     Row Name 06/17/22 0830          Cognition    Orientation Status (Cognition) unable/difficult to assess  eyes closed and did not follow any commands  -YULIET     Row Name 06/17/22 0830          Safety Issues, Functional Mobility    Safety Issues Affecting Function (Mobility) ability to follow commands  -YULIET     Impairments Affecting Function (Mobility) range of motion (ROM);strength  -YULIET           User Key  (r) = Recorded By, (t) = Taken By, (c) = Cosigned By    Initials Name  Provider Type    Wayne Sánchez PT DPT Physical Therapist               Mobility     Row Name 06/17/22 0830          Bed Mobility    Comment, (Bed Mobility) not following commands to begin bed mobility  -YULIET           User Key  (r) = Recorded By, (t) = Taken By, (c) = Cosigned By    Initials Name Provider Type    Wayne Sánchez PT DPT Physical Therapist               Obj/Interventions     Row Name 06/17/22 0830          Range of Motion Comprehensive    Comment, General Range of Motion B UE PROM impaired _ 25-50% (edema), B LE PROM imapired ~ 25% (edema)  -YULIET     Row Name 06/17/22 0830          Strength Comprehensive (MMT)    Comment, General Manual Muscle Testing (MMT) Assessment No purposeful movement observed in B UE/LE  -YULIET     [facility] Name 06/17/22 0830          Motor Skills    Therapeutic Exercise --  B UE/LE PROM x 5 reps  -YULIET           User Key  (r) = Recorded By, (t) = Taken By, (c) = Cosigned By    Initials Name Provider Type    Wayne Sánchez PT DPT Physical Therapist               Goals/Plan     Row Name 06/17/22 0830          Bed Mobility Goal 1 (PT)    Activity/Assistive Device (Bed Mobility Goal 1, PT) sit to supine/supine to sit  -YULIET     [place] Level/Cues Needed (Bed Mobility Goal 1, PT) supervision required  -YULIET     Time Frame (Bed Mobility Goal 1, PT) long term goal (LTG);10 days  -YULIET     Progress/Outcomes (Bed Mobility Goal 1, PT) goal no longer appropriate  d/c goal  -YULIET     Row Name 06/17/22 0830          Transfer Goal 1 (PT)    Activity/Assistive Device (Transfer Goal 1, PT) sit-to-stand/stand-to-sit;bed-to-chair/chair-to-bed  -YULIET     [place] Level/Cues Needed (Transfer Goal 1, PT) supervision required  -YULIET     Time Frame (Transfer Goal 1, PT) long term goal (LTG);10 days  -YULIET     Progress/Outcome (Transfer Goal 1, PT) goal no longer appropriate  d/c goal  -YULIET     Row Name 06/17/22 0830          Gait Training Goal 1 (PT)    Activity/Assistive Device (Gait Training Goal  1, PT) gait (walking locomotion);decrease fall risk;improve balance and speed;increase endurance/gait distance  -YULIET     [place] Level (Gait Training Goal 1, PT) supervision required  -YULIET     Distance (Gait Training Goal 1, PT) 200 ft  -YULIET     Time Frame (Gait Training Goal 1, PT) long term goal (LTG);10 days  -YULIET     Progress/Outcome (Gait Training Goal 1, PT) goal no longer appropriate  d/c goal  -YULIET     Row Name 06/17/22 0830          ROM Goal 1 (PT)    ROM Goal 1 (PT) B UE/LE PROM > AAROM x 20 reps, min assist.  -YULIET     Time Frame (ROM Goal 1, PT) long-term goal (LTG);10 days  -YULIET     Progress/Outcome (ROM Goal 1, PT) goal not met;goal ongoing  -YULIET     Row Name 06/17/22 0830          Stairs Goal 1 (PT)    Activity/Assistive Device (Stairs Goal 1, PT) ascending stairs;descending stairs  -YULIET     [place] Level/Cues Needed (Stairs Goal 1, PT) supervision required  -YULIET     Number of Stairs (Stairs Goal 1, PT) 3-5 steps  -YULIET     Time Frame (Stairs Goal 1, PT) long term goal (LTG);10 days  -YULIET     Progress/Outcome (Stairs Goal 1, PT) goal no longer appropriate  d/c goal  -YULIET     Row Name 06/17/22 0830          Problem Specific Goal 1 (PT)    Problem Specific Goal 1 (PT) No new evidence of skin breakdown or contractures while on the vent  -YULIET     Time Frame (Problem Specific Goal 1, PT) long-term goal (LTG)  -YULIET     Progress/Outcome (Problem Specific Goal 1, PT) good progress toward goal;goal ongoing  -YULIET     Row Name 06/17/22 0830          Therapy Assessment/Plan (PT)    Planned Therapy Interventions (PT) bed mobility training;patient/family education;ROM (range of motion);strengthening  -YULIET           User Key  (r) = Recorded By, (t) = Taken By, (c) = Cosigned By    Initials Name Provider Type    Wayne Sánchez, PT DPT Physical Therapist               Clinical Impression     Row Name 06/17/22 0830          Pain    Additional Documentation Pain Scale: FACES Pre/Post-Treatment (Group)  -YULIET     Row Name  06/17/22 0830          Pain Scale: FACES Pre/Post-Treatment    Pain: FACES Scale, Pretreatment 0-->no hurt  -YULIET     Row Name 06/17/22 0830          Plan of Care Review    Plan of Care Reviewed With patient  -YULIET     Progress no change  -YULIET     Outcome Evaluation PT completed re-eval for time. He remains on the vent and eyes closed. He made no purposeful responses or movements. PT completed PROM to B UE/LE and repositioned for comfort and edema management. He remains very swollen in all extremities. PT will cont to attempt to progress command following, ROM, strengthening, and skin/jioint integrity/management while vented. Recommend d.c to LTACH  -YULIET     Row Name 06/17/22 0830          Therapy Assessment/Plan (PT)    Patient/Family Therapy Goals Statement (PT) pt unable to state  -YULIET     Rehab Potential (PT) fair, will monitor progress closely  -YULIET     Criteria for Skilled Interventions Met (PT) yes;meets criteria  -YULIET     Therapy Frequency (PT) 2 times/day  -YULIET     Predicted Duration of Therapy Intervention (PT) until d.c  -YULIET     Row Name 06/17/22 0830          Positioning and Restraints    In Bed fowlers;call light within reach;encouraged to call for assist;RUE elevated;LUE elevated;legs elevated;waffle boots/both  -YULIET           User Key  (r) = Recorded By, (t) = Taken By, (c) = Cosigned By    Initials Name Provider Type    Wayne Sánchez, PT DPT Physical Therapist               Outcome Measures     Row Name 06/17/22 0830          How much help from another person do you currently need...    Turning from your back to your side while in flat bed without using bedrails? 1  -YULIET     Moving from lying on back to sitting on the side of a flat bed without bedrails? 1  -YULIET     Moving to and from a bed to a chair (including a wheelchair)? 1  -YULIET     Standing up from a chair using your arms (e.g., wheelchair, bedside chair)? 1  -YULIET     Climbing 3-5 steps with a railing? 1  -YULIET     To walk in hospital room? 1  -YULIET      -[facility] 6 Clicks Score (PT) 6  -YULIET     Highest level of mobility 2 --> Bed activities/dependent transfer  -     Row Name 06/17/22 0830          Functional Assessment    Outcome Measure Options AM-[facility] 6 Clicks Basic Mobility (PT)  -YULIET           User Key  (r) = Recorded By, (t) = Taken By, (c) = Cosigned By    Initials Name Provider Type    Wayne Sánchez PT DPT Physical Therapist                             Physical Therapy Education                 Title: PT OT SLP Therapies (In Progress)     Topic: Physical Therapy (In Progress)     Point: Mobility training (Done)     Learning Progress Summary           Patient Acceptance, E, VU,DU by YULIET at 5/11/2022 0745    Comment: benefits of activity, progression of PT POC                   Point: Home exercise program (In Progress)     Learning Progress Summary           Patient Acceptance, E, NR by YULIET at 6/17/2022 0830    Comment: benefits of activity, progression of PT POC    Acceptance, E, NR by YULIET at 6/7/2022 1345    Comment: Benefits of activity, B UE/LE exercises, positioning for joints and skin integrity                   Point: Body mechanics (Not Started)     Learner Progress:  Not documented in this visit.          Point: Precautions (Not Started)     Learner Progress:  Not documented in this visit.                      User Key     Initials Effective Dates Name Provider Type Discipline    YULIET 08/02/16 -  Wayne Thomas PT DPT Physical Therapist PT              PT Recommendation and Plan  Planned Therapy Interventions (PT): bed mobility training, patient/family education, ROM (range of motion), strengthening  Plan of Care Reviewed With: patient  Progress: no change  Outcome Evaluation: PT completed re-eval for time. He remains on the vent and eyes closed. He made no purposeful responses or movements. PT completed PROM to B UE/LE and repositioned for comfort and edema management. He remains very swollen in all extremities. PT will cont to attempt to  progress command following, ROM, strengthening, and skin/jioint integrity/management while vented. Recommend d.c to LTACH     Time Calculation:    PT Charges     Row Name 06/17/22 1005             Time Calculation    Start Time 0830  -YULIET      Stop Time 0900  -YULIET      Time Calculation (min) 30 min  -YULIET      PT Received On 06/17/22  -YULIET      PT Goal Re-Cert Due Date 06/27/22  -YULIET            User Key  (r) = Recorded By, (t) = Taken By, (c) = Cosigned By    Initials Name Provider Type    Wayne Sánchez, PT DPT Physical Therapist              Therapy Charges for Today     Code Description Service Date Service Provider Modifiers Qty    [id number] HC PT RE-EVAL ESTABLISHED PLAN 2 6/17/2022 Wayne Thomas PT DPT GP 1          PT G-Codes  Outcome Measure Options: AM-PAC 6 Clicks Basic Mobility (PT)  AM-PAC 6 Clicks Score (PT): 6  AM-PAC 6 Clicks Score (OT): 12    Wayne Thomas PT DPT  6/17/2022

## 2022-06-17 NOTE — DISCHARGE PLACEMENT REQUEST
"To: Laney    From: Nathaly [phone]        Radha Wilkins (77 y.o. Male)             Date of Birth   1945    Social Security Number       Address    [address] [place] [address]    Home Phone   [phone]    MRN   [id number]       Protestant   Other    Marital Status                               Admission Date   5/10/22    Admission Type   Elective    Admitting Provider   Martell Downs MD    Attending Provider   Martell Downs MD    Department, Room/Bed   [facility] INTENSIVE CARE, I010/1       Discharge Date       Discharge Disposition       Discharge Destination                               Attending Provider: Martell Downs MD    Allergies: No Known Allergies    Isolation: None   Infection: None   Code Status: CPR   Advance Care Planning Activity    Ht: 182.9 cm (72\")   Wt: 109 kg (241 lb)    Admission Cmt: None   Principal Problem: Meningioma (HCC) [D32.9]                 Active Insurance as of 5/10/2022     Primary Coverage     Payor Plan Insurance Group Employer/Plan Group    MEDICARE MEDICARE A & B      Payor Plan Address Payor Plan Phone Number Payor Plan Fax Number Effective Dates    [address] [phone]  3/1/2010 - None Entered    [facility] [id number]       Subscriber Name Subscriber Birth Date Member ID       RADHA WILKINS 1945 [id number]           Secondary Coverage     Payor Plan Insurance Group Employer/Plan Group     FOR LIFE  FOR LIFE  [id number]       Payor Plan Address Payor Plan Phone Number Payor Plan Fax Number Effective Dates    [address] [phone]  1/5/2022 - None Entered    [facility] [id number]       Subscriber Name Subscriber Birth Date Member ID       RADHA WILKINS 1945 [id number]                 Emergency Contacts      (Rel.) Home Phone Work Phone Mobile Phone    abhay fitzgerald (Daughter) -- -- [phone]    reg norman (Son) -- -- [phone]            Insurance " Information                MEDICARE/MEDICARE A & B Phone: [phone]    Subscriber: Hai Hernandez Subscriber#: [id number]    Group#: -- Precert#: --         FOR LIFE/ FOR LIFE [facility]  Phone: [phone]    Subscriber: Hai Hernandez Subscriber#: [id number]    Group#: -- Precert#: --

## 2022-06-17 NOTE — PROGRESS NOTES
NEUROSURGERY DAILY PROGRESS NOTE    ASSESSMENT:   Hai Hernandez is a 77 y.o. with a significant comorbidity of acephalic migraines, thyroid disease status post radiation as a child, basal cell and squamous cell carcinoma of the skin. He presents in FU for known meningioma found on workup for right visual field changes. Physical exam findings of neurologically intact with resolution of all symptoms and mild decreased vision in right eye.  His imaging shows 22 x 24 x 13.5 mm right planum sphenoidale mass most suggestive of meningioma.    Past Medical History:   Diagnosis Date   • Brain tumor (HCC)    • Depression    • Hearing loss    • History of cellulitis     right foot big toe   • Hypertension    • Pneumonia    • Right arm weakness     after cervial injury   • Sciatic pain     affects balance   • Thyroid disease    • Visual disturbance     due to brain tumor - right eye     Active Hospital Problems    Diagnosis    • **Meningioma (HCC)    • Acute deep vein thrombosis (DVT) of the ulnar and brachial veins of right upper extremity (HCC)    • Loculated pleural effusion    • Fever    • PAF (paroxysmal atrial fibrillation) (HCC)    • Cerebral parenchymal hemorrhage (HCC)    • Essential hypertension    • Type 2 diabetes mellitus with hyperglycemia, without long-term current use of insulin (HCC)    • Hypothyroidism (acquired)    • Acute respiratory failure (secondary to status epilepticus)    • Thrombocytopenia (HCC)    • 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  Grimaces to painful stimuli.  Does not follow commands.  Nonverbal.  Weakly withdraws to tactile  stimuli.    Intracranial meningioma   POD #37 (5/10/2022) Status post postcraniotomy for meningioma   POD#7 (6/10/2022) Craniectomy for cerebral edema and possible infection   CT reviewed and stable.  Small blood products at site of brain biopsy   Neurochecks per policy   Start Decadron taper   Wound C,D,I   Craniectomy precautions     Hydrocephalus   CSF unremarkable from 5/17, 5/23 and 6/10.    Lumbar drain removed 6/3/2022   POD# 14 (6/3/2022) right posterior parietal  shunt placement    MEDTRONIC programmable valve set to 0.5   Shunt pumps and refills well    Postop seizures, in status   Neurology managing   Cont Keppra, Dialntin, Vimpat; PRN Ativan   Follow dilantin levels   Repeat EEG: Slowing but no epileptiform activity     Antiseizure medications tapering instructions per neurology:    DC fosphenytoin upon discharge from [facility]    Change Keppra to: Keppra 750 mg twice daily by 4 days, then 500 mg twice daily by 4 days, then discontinue.    Change Vimpat to: Vimpat 200 mg twice daily per PEG tube to be continued indefinitely.    CV: Cardene off   Keep SBP <160.   Intermittent use of hydralazine and labetalol PRNs  Pulm: Tracheostomy  Placed (6/2/2022).  Appreciate ENT   Left chest tubes removed  : May DC Mccrary and place [place] catheter  FEN: Poor glucose control.  Increase SSI  ENDO: Accu-Chek and treat per policy  GI: PEG tube placed (6/2/2022).  Appreciate GI   No BM in several days.  KUB concerning for ileus formation.  Increased rectal stimulation  ID: Afebrile.  Continue broad spectrum abx, Vanc and Cefepime   CSF cultures NGTD  Heme:  DVT prophylaxis with SCD's.  Can not anticoagulate   Superficial thrombus to left cephalic vein   Hemoglobin 7.5.  We will continue to monitor.  Pain: NA  Dispo: DC pending acceptance to out-of-state [facility].    CHIEF COMPLAINT:   Right visual field changes    Subjective  Symptom stable    Temp:  [98.2 °F (36.8 °C)-99.1 °F (37.3 °C)] 99 °F (37.2 °C)  Heart Rate:  [62-78]  "63  Resp:  [20-30] 30  BP: (130-186)/() 166/81  FiO2 (%):  [30 %] 30 %    Objective:  General Appearance:  Well-appearing and in no acute distress.    Vital signs: (most recent): Blood pressure 166/81, pulse 63, temperature 99 °F (37.2 °C), temperature source Oral, resp. rate (!) 30, height 182.9 cm (72\"), weight 109 kg (241 lb), SpO2 98 %.      Neurologic Exam     Mental Status   Level of consciousness: arousable by verbal stimuli ,  drowsy  Off propofol.  Does not follow commands.  Nonverbal.  Weakly withdraws to tactile stimuli.     Cranial Nerves     CN III, IV, VI   Pupils are equal, round, and reactive to light.  Extraocular motions are normal.     CN IX, X   CN IX normal.     Gait, Coordination, and Reflexes     Tremor   Resting tremor: absent  Intention tremor: absent  Action tremor: absent    Drains:   Urethral Catheter Non-latex;Silicone 16 Fr. (Active)       Output by Drain (mL) 06/16/22 0701 - 06/16/22 1900 06/16/22 1901 - 06/17/22 0700 06/17/22 0701 - 06/17/22 0932 Range Total   Requested LDAs do not have output data documented.       Imaging Results (Last 24 Hours)     Procedure Component Value Units Date/Time    XR Chest 1 View [[id number]] Collected: 06/17/22 0729     Updated: 06/17/22 0735    Narrative:      EXAMINATION: XR CHEST 1 VW-     6/17/2022 3:30 AM CDT     HISTORY: On vent; R13.10-Dysphagia, unspecified; Z01.818-Encounter for  other preprocedural examination; D32.9-Benign neoplasm of meninges,  unspecified; Z74.09-Other reduced mobility; Z78.9-Other specified health  status; G40.901-Epilepsy, unspecified, not intractable, with status  epilepticus; J96.01-Acute respiratory failure with hypoxia;  G91.0-Communicating hydrocephalus; T81.40XA-Infection following a pr     A frontal projection of the chest is compared with the previous study  dated 6/16/2022.     The lungs are poorly expanded.     Bilateral lung infiltrate persists. Left lower lung consolidation is  unchanged.     There is no " pneumothorax.     There is a persistent moderate cardiomegaly.     Tracheostomy tube is in place. Left internal jugular venous access line  is in place. A ventriculoperitoneal shunt catheter seen projected over  the right side of the base of the neck and the chest.     No acute bony abnormality.       Impression:      1. No change.  This report was finalized on 06/17/2022 07:32 by Dr. Aida Wong MD.        Lab Results (last 24 hours)     Procedure Component Value Units Date/Time    POC Glucose Once [[id number]]  (Abnormal) Collected: 06/17/22 0625    Specimen: Blood Updated: 06/17/22 0644     Glucose 132 mg/dL      Comment: : RICHIE Arias AngelitaMeter ID: [id number]       Phenytoin Level, Total [[id number]]  (Normal) Collected: 06/17/22 0242    Specimen: Blood Updated: 06/17/22 0325     Phenytoin Level 12.0 mcg/mL     Comprehensive Metabolic Panel [[id number]]  (Abnormal) Collected: 06/17/22 0242    Specimen: Blood Updated: 06/17/22 0323     Glucose 120 mg/dL      BUN 26 mg/dL      Creatinine 0.43 mg/dL      Sodium 139 mmol/L      Potassium 3.9 mmol/L      Chloride 104 mmol/L      CO2 29.0 mmol/L      Calcium 8.0 mg/dL      Total Protein 6.2 g/dL      Albumin 2.40 g/dL      ALT (SGPT) 129 U/L      AST (SGOT) 100 U/L      Alkaline Phosphatase 420 U/L      Total Bilirubin 0.5 mg/dL      Globulin 3.8 gm/dL      A/G Ratio 0.6 g/dL      BUN/Creatinine Ratio 60.5     Anion Gap 6.0 mmol/L      eGFR 109.9 mL/min/1.73      Comment: National Kidney Foundation and American Society of Nephrology (ASN) Task Force recommended calculation based on the Chronic Kidney Disease Epidemiology Collaboration (CKD-EPI) equation refit without adjustment for race.       Narrative:      GFR Normal >60  Chronic Kidney Disease <60  Kidney Failure <15      CBC & Differential [[id number]]  (Abnormal) Collected: 06/17/22 0242    Specimen: Blood Updated: 06/17/22 0303    Narrative:      The following orders were created for panel order  CBC & Differential.  Procedure                               Abnormality         Status                     ---------                               -----------         ------                     CBC Auto Differential[[id number]]        Abnormal            Final result                 Please view results for these tests on the individual orders.    CBC Auto Differential [[id number]]  (Abnormal) Collected: 06/17/22 0242    Specimen: Blood Updated: 06/17/22 0303     WBC 8.35 10*3/mm3      RBC 2.51 10*6/mm3      Hemoglobin 8.1 g/dL      Hematocrit 25.4 %      .2 fL      MCH 32.3 pg      MCHC 31.9 g/dL      RDW 17.4 %      RDW-SD 62.8 fl      MPV 9.4 fL      Platelets 219 10*3/mm3      Neutrophil % 70.0 %      Lymphocyte % 9.9 %      Monocyte % 12.9 %      Eosinophil % 4.3 %      Basophil % 0.6 %      Immature Grans % 2.3 %      Neutrophils, Absolute 5.84 10*3/mm3      Lymphocytes, Absolute 0.83 10*3/mm3      Monocytes, Absolute 1.08 10*3/mm3      Eosinophils, Absolute 0.36 10*3/mm3      Basophils, Absolute 0.05 10*3/mm3      Immature Grans, Absolute 0.19 10*3/mm3      nRBC 0.0 /100 WBC     POC Glucose Once [[id number]]  (Abnormal) Collected: 06/17/22 0132    Specimen: Blood Updated: 06/17/22 0156     Glucose 133 mg/dL      Comment: : AWYCHE1 Juana AngelitaMeter ID: [id number]       POC Glucose Once [[id number]]  (Abnormal) Collected: 06/16/22 2200    Specimen: Blood Updated: 06/16/22 2213     Glucose 144 mg/dL      Comment: : AWYCHE1 [facility] AngelitaMeter ID: [id number]       Thyrotropin Releas. Hormone [[id number]] Collected: 05/19/22 1026    Specimen: Blood Updated: 06/16/22 1543     Thyrotropin Releasing Hormone 5 pg/mL      Comment:                             Baseline Level: Up to 40                        Levels in Thyroid Dysfunction:                               Primary Hypothyroidism:                                               40 - 400                             Pituitary Hypothyroidism:                                               100 - 600                          Hypothalamic Hypothyroidism:                                            Less than 5                                      Hyperthyroidism:                                            Less than 5       Narrative:      Performed at:  01 - [facility]  [address] [place], [place]  [id number]  : Antony Nowak [facility], Phone:  [phone]    Chylomicron Screen, Fluid - Body Fluid, [[id number]] Collected: 05/25/22 1622    Specimen: Body Fluid Updated: 06/16/22 1541     Chylomicron Scrn Fluid Absent     Comment:  Chylomicrons were not detected. This appears to be a nonchylous  fluid.  INTERPRETIVE INFORMATION: Chylomicron Screen, Body Fluid  This test was developed and its performance characteristics  determined by [facility]. It has not been cleared or  approved by the U.S. Food and Drug Administration. This test was  performed in a CLIA-certified laboratory and is intended for  clinical purposes.       Narrative:      Performed at:  01 - [facility]  [address], [place]  [id number]  : Juany Perez MD, Phone:  [phone]    Vancomycin, Trough Please collect 30-60 minutes prior to dose due 06/16/22 at 1500 [[id number]]  (Abnormal) Collected: 06/16/22 1351    Specimen: Blood Updated: 06/16/22 1456     Vancomycin Trough 20.50 mcg/mL     POC Glucose Once [[id number]]  (Abnormal) Collected: 06/16/22 1216    Specimen: Blood Updated: 06/16/22 1227     Glucose 141 mg/dL      Comment: : [id number] Macho AshleyMeter ID: [id number]       Respiratory Culture - Sputum, ET Suction [[id number]] Collected: 06/14/22 1632    Specimen: Sputum from ET Suction Updated: 06/16/22 1042     Respiratory Culture Scant growth (1+) Normal respiratory annia. No S. aureus or Pseudomonas aeruginosa detected. Final report.     Gram Stain Many (4+) WBCs per low power field      Rare (1+) Epithelial cells per low  power field      Few (2+) Gram positive cocci      Few (2+) Gram negative bacilli        [id number]  Stevie Parsons, APRN

## 2022-06-17 NOTE — PLAN OF CARE
Goal Outcome Evaluation:  Plan of Care Reviewed With: patient        Progress: no change  Outcome Evaluation: PT completed re-eval for time. He remains on the vent and eyes closed. He made no purposeful responses or movements. PT completed PROM to B UE/LE and repositioned for comfort and edema management. He remains very swollen in all extremities. PT will cont to attempt to progress command following, ROM, strengthening, and skin/jioint integrity/management while vented. Recommend d.c to LTACH

## 2022-06-17 NOTE — PROGRESS NOTES
[facility] Medicine Services  INPATIENT PROGRESS NOTE    Patient Name: Hai Hernandez  Date of Admission: 5/10/2022  Today's Date: 06/17/22  Length of Stay: 38  Primary Care Physician: Elisa Chacko MD    Subjective   Chief Complaint: Intubated  HPI   Intubated.  Off sedation  Afebrile   BP remaining elevated today, has been 160's much of the day      Review of Systems   Unable to perform ROS: Intubated        All pertinent negatives and positives are as above. All other systems have been reviewed and are negative unless otherwise stated.     Objective    Temp:  [98.2 °F (36.8 °C)-99.1 °F (37.3 °C)] 98.8 °F (37.1 °C)  Heart Rate:  [60-78] 61  Resp:  [20-30] 29  BP: (130-186)/() 141/71  FiO2 (%):  [30 %] 30 %  Physical Exam  Vitals reviewed.   Constitutional:       Appearance: He is well-developed.   HENT:      Head: Normocephalic and atraumatic.      Right Ear: External ear normal.      Left Ear: External ear normal.      Nose: Nose normal.   Eyes:      General: No scleral icterus.        Right eye: No discharge.         Left eye: No discharge.      Conjunctiva/sclera: Conjunctivae normal.      Pupils: Pupils are equal, round, and reactive to light.   Neck:      Thyroid: No thyromegaly.      Trachea: No tracheal deviation.   Cardiovascular:      Rate and Rhythm: Normal rate and regular rhythm.      Heart sounds: Normal heart sounds. No murmur heard.    No friction rub. No gallop.   Pulmonary:      Effort: Pulmonary effort is normal. No respiratory distress.      Breath sounds: Normal breath sounds. No stridor. No wheezing or rales.   Chest:      Chest wall: No tenderness.   Abdominal:      General: Bowel sounds are normal. There is no distension.      Palpations: Abdomen is soft. There is no mass.      Tenderness: There is no abdominal tenderness. There is no guarding or rebound.      Hernia: No hernia is present.   Musculoskeletal:         General: No deformity.  Normal range of motion.      Cervical back: Normal range of motion and neck supple.   Lymphadenopathy:      Cervical: No cervical adenopathy.   Skin:     General: Skin is warm and dry.      Coloration: Skin is not pale.      Findings: No erythema or rash.   Neurological:      Comments: Level of consciousness: arousable by verbal stimuli ,  drowsy  Off propofol.  Intermittently opens eyes to painful stimuli.  Does not follow commands.  Nonverbal.  Weakly withdraws to tactile stimuli.            Cranial Nerves      CN III, IV, VI   Pupils are equal, round, and reactive to light.  Extraocular motions are normal.      CN IX, X   CN IX normal.         Psychiatric:         Behavior: Behavior normal.         Thought Content: Thought content normal.         Judgment: Judgment normal.           Results Review:  I have reviewed the labs, radiology results, and diagnostic studies.    Laboratory Data:   Results from last 7 days   Lab Units 06/17/22  0242 06/16/22  0358 06/15/22  0426   WBC 10*3/mm3 8.35 9.48 8.84   HEMOGLOBIN g/dL 8.1* 7.7* 7.5*   HEMATOCRIT % 25.4* 25.1* 24.7*   PLATELETS 10*3/mm3 219 215 195        Results from last 7 days   Lab Units 06/17/22  0242 06/16/22  0358 06/15/22  0426   SODIUM mmol/L 139 139 138   POTASSIUM mmol/L 3.9 3.9 3.9   CHLORIDE mmol/L 104 104 103   CO2 mmol/L 29.0 29.0 29.0   BUN mg/dL 26* 32* 33*   CREATININE mg/dL 0.43* 0.44* 0.43*   CALCIUM mg/dL 8.0* 8.1* 8.1*   BILIRUBIN mg/dL 0.5 0.4 0.4   ALK PHOS U/L 420* 412* 403*   ALT (SGPT) U/L 129* 142* 149*   AST (SGOT) U/L 100* 117* 148*   GLUCOSE mg/dL 120* 145* 148*       Culture Data:   Wound Culture   Date Value Ref Range Status   06/10/2022 No growth at 3 days  Final   06/10/2022 No growth at 3 days  Final   06/10/2022 No growth at 3 days  Final     Respiratory Culture   Date Value Ref Range Status   06/10/2022   Preliminary    Rare The culture consists of normal respiratory annia. This is a preliminary report; final report to follow.        Radiology Data:   Imaging Results (Last 24 Hours)     Procedure Component Value Units Date/Time    XR Chest 1 View [[id number]] Collected: 06/17/22 0729     Updated: 06/17/22 0735    Narrative:      EXAMINATION: XR CHEST 1 VW-     6/17/2022 3:30 AM CDT     HISTORY: On vent; R13.10-Dysphagia, unspecified; Z01.818-Encounter for  other preprocedural examination; D32.9-Benign neoplasm of meninges,  unspecified; Z74.09-Other reduced mobility; Z78.9-Other specified health  status; G40.901-Epilepsy, unspecified, not intractable, with status  epilepticus; J96.01-Acute respiratory failure with hypoxia;  G91.0-Communicating hydrocephalus; T81.40XA-Infection following a pr     A frontal projection of the chest is compared with the previous study  dated 6/16/2022.     The lungs are poorly expanded.     Bilateral lung infiltrate persists. Left lower lung consolidation is  unchanged.     There is no pneumothorax.     There is a persistent moderate cardiomegaly.     Tracheostomy tube is in place. Left internal jugular venous access line  is in place. A ventriculoperitoneal shunt catheter seen projected over  the right side of the base of the neck and the chest.     No acute bony abnormality.       Impression:      1. No change.  This report was finalized on 06/17/2022 07:32 by Dr. Aida Wong MD.          I have reviewed the patient's current medications.     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  Meningioma  -NS managing  Status post postcraniotomy for meningioma  S/p Craniectomy for cerebral edema and possible infection    2.  Status Epilepticus/Seizures  -Cerebyx  -Vimpat  -Dilantin  -Neurology following    3.  Acute Respiratory failure with prolonged mechanical ventilation s/p Trach and PEG  -Pulm following    4.  Hydrocephalus/Cerebral Edema  S/p Craniectomy for cerebral edema and possible infection  -NS managing  -Decadron  -Lumbar drain removed  -s/p Right Posterial parietal  shunt placement    5.  Left pleural effusion s/p Chest tube which has since been removed without issue  -monitor     6.  A-Fib  -Coreg  -Not able to tolerate AC    7.  RUE DVT  -monitor  -not able to tolerated AC     8.  LUE Superficial thrombophlebitis  -supportive care    9.  HTN  -Coreg  -Increase Lisinopril      Discharge Planning: I expect the patient to be discharged to LTAC in ? days  Electronically signed by Sha Beatty MD, 06/17/22, 11:32 CDT.

## 2022-06-17 NOTE — SIGNIFICANT NOTE
06/17/22 0708   Readings   PEEP Intrinsic (cm H2O) 5 cm H2O   Plateau Pressure (cm H2O) 21 cm H2O   Driving Pressure (cm H2O) 16.6 cm H2O   Static Compliance (L/cm H2O) 46   Dynamic Compliance (L/cm H2O) 28 L/cm H2O

## 2022-06-17 NOTE — PROGRESS NOTES
Neurology Progress Note      Chief Complaint:    Postoperative seizure    Subjective     Subjective:  Patient is off all sedation. Remains ventilated with trach. He appears to be attempting to cough and does stiffen both arms at times. He did open his eyes briefly to voice but this was not reproducible. Not following commands. No seizure reported.   Dilantin level 12, albumin level, calculates to 15.8.        Past Medical History:   Diagnosis Date   • Brain tumor (HCC)    • Depression    • Hearing loss    • History of cellulitis     right foot big toe   • Hypertension    • Pneumonia    • Right arm weakness     after cervial injury   • Sciatic pain     affects balance   • Thyroid disease    • Visual disturbance     due to brain tumor - right eye     Past Surgical History:   Procedure Laterality Date   • CATARACT EXTRACTION, BILATERAL     • COLONOSCOPY     • CRANIOTOMY Bilateral 6/10/2022    Procedure: CRANIECTOMY;  Surgeon: Martell Downs MD;  Location: [facility] OR;  Service: Neurosurgery;  Laterality: Bilateral;   • CRANIOTOMY FOR TUMOR Right 5/10/2022    Procedure: CRANIOTOMY FOR TUMOR STERIOTACTIC WITH BRAIN LAB, right;  Surgeon: Martell Downs MD;  Location: [facility] OR;  Service: Neurosurgery;  Laterality: Right;   • ENDOSCOPY W/ PEG TUBE PLACEMENT N/A 6/2/2022    Procedure: ESOPHAGOGASTRODUODENOSCOPY WITH PERCUTANEOUS ENDOSCOPIC GASTROSTOMY TUBE INSERTION WITH ANESTHESIA;  Surgeon: Melecio Lu MD;  Location: [facility] OR;  Service: Gastroenterology;  Laterality: N/A;   • NECK SURGERY     • SKIN CANCER EXCISION     • TONSILLECTOMY     • TRACHEOSTOMY N/A 6/2/2022    Procedure: TRACHEOSTOMY;  Surgeon: Geovany Davidson MD;  Location: [facility] OR;  Service: ENT;  Laterality: N/A;   •  SHUNT INSERTION Right 6/3/2022    Procedure: VENTRICULAR PERITONEAL SHUNT INSERTION WITH BRAIN LAB;  Surgeon: Martell Downs MD;  Location: [facility] OR;  Service: Neurosurgery;  Laterality: Right;      Family History   Problem Relation Age of Onset   • Cancer Sister    • Cancer Brother      Social History     Tobacco Use   • Smoking status: Never Smoker   • Smokeless tobacco: Never Used   Vaping Use   • Vaping Use: Never used   Substance Use Topics   • Alcohol use: Never   • Drug use: Never       Medications:  Current Facility-Administered Medications   Medication Dose Route Frequency Provider Last Rate Last Admin   • acetaminophen (TYLENOL) tablet 650 mg  650 mg Oral Q4H PRN Martell Downs MD   650 mg at 06/15/22 2017    Or   • acetaminophen (TYLENOL) suppository 650 mg  650 mg Rectal Q4H PRN Martell Downs MD   650 mg at 05/23/22 0016   • alteplase (CATHFLO/ACTIVASE) injection 2 mg  2 mg Intracatheter PRN Martell Downs MD   New Syringe/Cartridge at 05/28/22 1330   • arformoterol (BROVANA) nebulizer solution 15 mcg  15 mcg Nebulization BID - RT Vidya Chandler APRN   15 mcg at 06/17/22 0708   • bisacodyl (DULCOLAX) suppository 10 mg  10 mg Rectal Daily PRN Stevie Parsons APRN       • carvedilol (COREG) tablet 6.25 mg  6.25 mg Nasogastric BID With Meals Martell Downs MD   6.25 mg at 06/17/22 0801   • cefepime (MAXIPIME) 2 g/100 mL 0.9% NS (mbp)  2 g Intravenous Q8H Martell Downs MD   2 g at 06/17/22 0607   • chlorhexidine (PERIDEX) 0.12 % solution 15 mL  15 mL Mouth/Throat Q12H Martell Downs MD   15 mL at 06/17/22 0804   • dexamethasone (DECADRON) tablet 2 mg  2 mg Per PEG Tube Q12H Stevie Parsons APRN   2 mg at 06/17/22 0801    Followed by   • [START ON 6/18/2022] dexamethasone (DECADRON) tablet 1 mg  1 mg Per PEG Tube Q12H Stevie Parsons APRN        Followed by   • [START ON 6/20/2022] dexamethasone (DECADRON) tablet 0.5 mg  0.5 mg Per PEG Tube Daily Rust, Stevie M, APRN       • dextrose (D50W) (25 g/50 mL) IV injection 25 g  25 g Intravenous Q15 Min PRN Martell Downs MD   25 g at 06/09/22 1807   • dextrose (GLUTOSE) oral gel 15 g  15 g  Oral Q15 Min PRN Martell Downs MD   15 g at 05/22/22 0527   • docusate (COLACE) 50 MG/5ML liquid 50 mg  50 mg Oral Daily Sha Beatty MD   50 mg at 06/17/22 0802   • glucagon (human recombinant) (GLUCAGEN DIAGNOSTIC) injection 1 mg  1 mg Intramuscular Q15 Min PRN Martell Downs MD       • guaiFENesin (ROBITUSSIN) 100 MG/5ML oral solution 400 mg  400 mg Per PEG Tube TID Vidya Chandler APRN   400 mg at 06/17/22 0803   • heparin (porcine) 5000 UNIT/ML injection 5,000 Units  5,000 Units Subcutaneous Q12H Martell Downs MD   5,000 Units at 06/17/22 0802   • hydrALAZINE (APRESOLINE) injection 10 mg  10 mg Intravenous Q6H PRN Martell Downs MD   10 mg at 06/17/22 0328   • insulin detemir (LEVEMIR) injection 20 Units  20 Units Subcutaneous Nightly Martell Downs MD   20 Units at 06/16/22 2157   • insulin regular (humuLIN R,novoLIN R) injection 2-7 Units  2-7 Units Subcutaneous Q6H Martell Downs MD   2 Units at 06/15/22 2345   • ipratropium-albuterol (DUO-NEB) nebulizer solution 3 mL  3 mL Nebulization Q4H PRN Martell Downs MD   3 mL at 06/15/22 0650   • labetalol (NORMODYNE,TRANDATE) injection 10 mg  10 mg Intravenous Q6H PRN Martell Downs MD   10 mg at 06/17/22 0631   • lacosamide (VIMPAT) injection 200 mg  200 mg Intravenous Q12H Martell Downs MD   200 mg at 06/17/22 0805   • lactulose solution 20 g  20 g Per PEG Tube Daily Sha Beatty MD   20 g at 06/17/22 0802   • lansoprazole (FIRST) oral suspension 30 mg  30 mg Per G Tube QAM Martell Downs MD   30 mg at 06/17/22 0756   • latanoprost (XALATAN) 0.005 % ophthalmic solution 1 drop  1 drop Both Eyes Nightly Martell Downs MD   1 drop at 06/16/22 2207   • levalbuterol (XOPENEX) nebulizer solution 0.63 mg  0.63 mg Nebulization TID PRN Martell Downs MD   0.63 mg at 06/12/22 1046   • levETIRAcetam in NaCl 0.75% (KEPPRA) IVPB 1,000 mg   1,000 mg Intravenous Q12H Martell Downs  mL/hr at 06/16/22 2200 1,000 mg at 06/17/22 0805   • levothyroxine (SYNTHROID, LEVOTHROID) tablet 125 mcg  125 mcg Nasogastric Q AM Martell Downs MD   125 mcg at 06/17/22 0607   • lidocaine (XYLOCAINE) 1 % injection 10 mL  10 mL Intradermal Once Martell Downs MD       • lidocaine (XYLOCAINE) 1 % injection 10 mL  10 mL Infiltration Once Pilo Ortega MD       • liothyronine (CYTOMEL) tablet 25 mcg  25 mcg Nasogastric Daily Martell Downs MD   25 mcg at 06/17/22 0801   • lisinopril (PRINIVIL,ZESTRIL) tablet 20 mg  20 mg Nasogastric Q24H Martell Downs MD   20 mg at 06/17/22 0801   • LORazepam (ATIVAN) injection 2 mg  2 mg Intravenous Q2H PRN Martell Downs MD   2 mg at 06/14/22 0358   • mupirocin (BACTROBAN) 2 % ointment 1 application  1 application Topical Q12H Martell Downs MD   1 application at 06/17/22 0805   • norepinephrine (LEVOPHED) 8 mg in 250 mL NS infusion (premix)  0.02-0.3 mcg/kg/min Intravenous Titrated Martell Downs MD   Held at 06/11/22 1054   • Nutrisource fiber pack 1 packet  1 packet Nasogastric 4x Daily Martell Downs MD   1 packet at 06/17/22 0800   • ondansetron (ZOFRAN) tablet 4 mg  4 mg Oral Q6H PRN Martell Downs MD        Or   • ondansetron (ZOFRAN) injection 4 mg  4 mg Intravenous Q6H PRN Martell Downs MD       • oxyCODONE (ROXICODONE) 5 MG/5ML solution 5 mg  5 mg Per PEG Tube Q6H PRN Daly Perea APRN   5 mg at 06/13/22 0354   • polyethylene glycol (MIRALAX) packet 17 g  17 g Oral Daily Martell Downs MD   17 g at 06/17/22 0802   • ProSource TF oral liquid 45 mL  45 mL Per G Tube BID Martell Downs MD   45 mL at 06/17/22 0800   • sodium chloride 3 % nebulizer solution 4 mL  4 mL Nebulization BID - RT Martell Downs MD   4 mL at 06/17/22 0708   • vancomycin 1500 mg/500 mL 0.9% NS IVPB ([facility])  15 mg/kg  Intravenous Q12H Martell Downs MD   1,500 mg at 06/17/22 0422       Allergies:    Patient has no known allergies.    Review of Systems:   -A 14 point review of systems is completed and is negative.      Objective      Vital Signs  Temp:  [98.2 °F (36.8 °C)-99.1 °F (37.3 °C)] 99 °F (37.2 °C)  Heart Rate:  [62-78] 62  Resp:  [20-30] 29  BP: (130-186)/() 166/81  FiO2 (%):  [30 %] 30 %    Physical Exam:     HEENT:  Neck supple.  Tracheostomy in place. Mechanically ventilated  CVS:  Regular rate and rhythm.  No murmurs  Carotid Examination:  No bruits  Lungs:  Clear to auscultation  Abdomen:  Non-tender, Non-distended.  PEG tube in place with feedings.  Extremities: Edema of the dorsum of the bilateral hands.     Neurologic Exam:   Opens eyes slightly to voice but does not remain open.  Pupils are equally reactive to light.    Gag intact.     Motor:    No purposeful movements.  Winces to noxious stimuli.    DTR:  2+ throughout in all four extremities  upgoing toe on left       Results Review:    I reviewed the patient's new clinical results and findings.    Lab Results (last 24 hours)     Procedure Component Value Units Date/Time    POC Glucose Once [[id number]]  (Abnormal) Collected: 06/17/22 0625    Specimen: Blood Updated: 06/17/22 0644     Glucose 132 mg/dL      Comment: : [email or web address] Juana AngelitaMeter ID: [id number]       Phenytoin Level, Total [[id number]]  (Normal) Collected: 06/17/22 0242    Specimen: Blood Updated: 06/17/22 0325     Phenytoin Level 12.0 mcg/mL     Comprehensive Metabolic Panel [[id number]]  (Abnormal) Collected: 06/17/22 0242    Specimen: Blood Updated: 06/17/22 0323     Glucose 120 mg/dL      BUN 26 mg/dL      Creatinine 0.43 mg/dL      Sodium 139 mmol/L      Potassium 3.9 mmol/L      Chloride 104 mmol/L      CO2 29.0 mmol/L      Calcium 8.0 mg/dL      Total Protein 6.2 g/dL      Albumin 2.40 g/dL      ALT (SGPT) 129 U/L      AST (SGOT) 100 U/L      Alkaline Phosphatase 420  U/L      Total Bilirubin 0.5 mg/dL      Globulin 3.8 gm/dL      A/G Ratio 0.6 g/dL      BUN/Creatinine Ratio 60.5     Anion Gap 6.0 mmol/L      eGFR 109.9 mL/min/1.73      Comment: National Kidney Foundation and American Society of Nephrology (ASN) Task Force recommended calculation based on the Chronic Kidney Disease Epidemiology Collaboration (CKD-EPI) equation refit without adjustment for race.       Narrative:      GFR Normal >60  Chronic Kidney Disease <60  Kidney Failure <15      CBC & Differential [[id number]]  (Abnormal) Collected: 06/17/22 0242    Specimen: Blood Updated: 06/17/22 0303    Narrative:      The following orders were created for panel order CBC & Differential.  Procedure                               Abnormality         Status                     ---------                               -----------         ------                     CBC Auto Differential[[id number]]        Abnormal            Final result                 Please view results for these tests on the individual orders.    CBC Auto Differential [[id number]]  (Abnormal) Collected: 06/17/22 0242    Specimen: Blood Updated: 06/17/22 0303     WBC 8.35 10*3/mm3      RBC 2.51 10*6/mm3      Hemoglobin 8.1 g/dL      Hematocrit 25.4 %      .2 fL      MCH 32.3 pg      MCHC 31.9 g/dL      RDW 17.4 %      RDW-SD 62.8 fl      MPV 9.4 fL      Platelets 219 10*3/mm3      Neutrophil % 70.0 %      Lymphocyte % 9.9 %      Monocyte % 12.9 %      Eosinophil % 4.3 %      Basophil % 0.6 %      Immature Grans % 2.3 %      Neutrophils, Absolute 5.84 10*3/mm3      Lymphocytes, Absolute 0.83 10*3/mm3      Monocytes, Absolute 1.08 10*3/mm3      Eosinophils, Absolute 0.36 10*3/mm3      Basophils, Absolute 0.05 10*3/mm3      Immature Grans, Absolute 0.19 10*3/mm3      nRBC 0.0 /100 WBC     POC Glucose Once [[id number]]  (Abnormal) Collected: 06/17/22 0132    Specimen: Blood Updated: 06/17/22 0156     Glucose 133 mg/dL      Comment: : [email or web address] Juana  AngelitaMeter ID: [id number]       POC Glucose Once [[id number]]  (Abnormal) Collected: 06/16/22 2200    Specimen: Blood Updated: 06/16/22 2213     Glucose 144 mg/dL      Comment: : RICHIE Arias AngelitaMeter ID: [id number]       Thyrotropin Releas. Hormone [[id number]] Collected: 05/19/22 1026    Specimen: Blood Updated: 06/16/22 1543     Thyrotropin Releasing Hormone 5 pg/mL      Comment:                             Baseline Level: Up to 40                        Levels in Thyroid Dysfunction:                               Primary Hypothyroidism:                                               40 - 400                             Pituitary Hypothyroidism:                                              100 - 600                          Hypothalamic Hypothyroidism:                                            Less than 5                                      Hyperthyroidism:                                            Less than 5       Narrative:      Performed at:  [address]  [id number]  : Antony Nowak [facility], Phone:  [phone]    Chylomicron Screen, Fluid - Body Fluid, [[id number]] Collected: 05/25/22 1622    Specimen: Body Fluid Updated: 06/16/22 1541     Chylomicron Scrn Fluid Absent     Comment:  Chylomicrons were not detected. This appears to be a nonchylous  fluid.  INTERPRETIVE INFORMATION: Chylomicron Screen, Body Fluid  This test was developed and its performance characteristics  determined by [facility]. It has not been cleared or  approved by the U.S. Food and Drug Administration. This test was  performed in a CLIA-certified laboratory and is intended for  clinical purposes.       Narrative:      Performed at:  [address] [facility] [address]  [id number]  : Juany Perez MD, Phone:  [phone]    Vancomycin, Trough Please collect 30-60 minutes prior to dose due 06/16/22 at 1500 [[id number]]   (Abnormal) Collected: 06/16/22 1351    Specimen: Blood Updated: 06/16/22 1456     Vancomycin Trough 20.50 mcg/mL     POC Glucose Once [[id number]]  (Abnormal) Collected: 06/16/22 1216    Specimen: Blood Updated: 06/16/22 1227     Glucose 141 mg/dL      Comment: : [id number] Macho SpearsMeter ID: [id number]       Respiratory Culture - Sputum, ET Suction [[id number]] Collected: 06/14/22 1632    Specimen: Sputum from ET Suction Updated: 06/16/22 1042     Respiratory Culture Scant growth (1+) Normal respiratory annia. No S. aureus or Pseudomonas aeruginosa detected. Final report.     Gram Stain Many (4+) WBCs per low power field      Rare (1+) Epithelial cells per low power field      Few (2+) Gram positive cocci      Few (2+) Gram negative bacilli          Imaging Results (Last 24 Hours)     Procedure Component Value Units Date/Time    XR Chest 1 View [[id number]] Collected: 06/17/22 0729     Updated: 06/17/22 0735    Narrative:      EXAMINATION: XR CHEST 1 VW-     6/17/2022 3:30 AM CDT     HISTORY: On vent; R13.10-Dysphagia, unspecified; Z01.818-Encounter for  other preprocedural examination; D32.9-Benign neoplasm of meninges,  unspecified; Z74.09-Other reduced mobility; Z78.9-Other specified health  status; G40.901-Epilepsy, unspecified, not intractable, with status  epilepticus; J96.01-Acute respiratory failure with hypoxia;  G91.0-Communicating hydrocephalus; T81.40XA-Infection following a pr     A frontal projection of the chest is compared with the previous study  dated 6/16/2022.     The lungs are poorly expanded.     Bilateral lung infiltrate persists. Left lower lung consolidation is  unchanged.     There is no pneumothorax.     There is a persistent moderate cardiomegaly.     Tracheostomy tube is in place. Left internal jugular venous access line  is in place. A ventriculoperitoneal shunt catheter seen projected over  the right side of the base of the neck and the chest.     No acute bony abnormality.        Impression:      1. No change.  This report was finalized on 06/17/2022 07:32 by Dr. Aida Wong MD.          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)    Status epilepticus (HCC)    Essential hypertension    Type 2 diabetes mellitus with hyperglycemia, without long-term current use of insulin (HCC)    Hypothyroidism (acquired)    Acute respiratory failure (secondary to status epilepticus)    Thrombocytopenia (HCC)    Cerebral parenchymal hemorrhage (HCC)    PAF (paroxysmal atrial fibrillation) (HCC)    Fever    Loculated pleural effusion    Acute deep vein thrombosis (DVT) of the ulnar and brachial veins of right upper extremity (HCC)    Dysphagia    Communicating hydrocephalus (HCC)     Impression:     · Postoperative seizure  · S/p craniectomy and debridement   · Hypoalbuminemia   · S/p  shunt on 6/3/2022  · Tracheostomy  · PEG tube        Plan:  · discontinue to fosphenytoin today 6/17/2022.    · Recommend repeating EEG prior to weaning of Keppra.  · Continue Keppra 1000 mg IV twice daily.  will recommend decrease Keppra to 500 mg IV BID on 6/24/2022 and continue 500 mg BID for 7 days. After 7 days Keppra can be weaned down completely.   · Continue Vimpat 200 mg IV every 12 hours.  Would likely leave this in place as he goes to LTACH  · Plans are to look at LTACH placement near [place], [place].            Di Ward, MARIO  06/17/22  10:28 CDT

## 2022-06-17 NOTE — CASE MANAGEMENT/SOCIAL WORK
Continued Stay Note   [facility]     Patient Name: Hai Hernandez  MRN: [id number]  Today's Date: 6/17/2022    Admit Date: 5/10/2022     Discharge Plan     Row Name 06/17/22 1504       Plan    Plan LTAC placement in [place]    Plan Comments Patient was offered a bed today by [facility] in [place].  Patient's daughter now has reservations as they do not have neurology on staff.  This issue was reviewed with patient's daughter earlier in week.  [facility] reached out to patient's daughter today to advise they do have neurology on staff but they do not have a bed available and MIGHT have one next week.  [facility] has also talked with patient's daughter and advised the physicians had consulted and developed a plan for patient's medications and neurology needs.  And further neurology consultation would be available.  Daughter advised Christel from [facility] she would need to speak to her spouse and was not prepared to accept bed offer.  [facility] has now released bed and will have to reconsult next week to see if there is bed availability.    Patient's daughter did call back to confirm she prefers [facility] as they have neurology.  Daughter is under the impression per [facility] they will have a bed available on Monday.                Discharge Codes    No documentation.                     JOSE Cruz

## 2022-06-18 ENCOUNTER — APPOINTMENT (OUTPATIENT)
Dept: GENERAL RADIOLOGY | Facility: HOSPITAL | Age: 77
End: 2022-06-18

## 2022-06-18 LAB
ALBUMIN SERPL-MCNC: 2.3 G/DL (ref 3.5–5.2)
ALBUMIN/GLOB SERPL: 0.6 G/DL
ALP SERPL-CCNC: 406 U/L (ref 39–117)
ALT SERPL W P-5'-P-CCNC: 113 U/L (ref 1–41)
ANION GAP SERPL CALCULATED.3IONS-SCNC: 4 MMOL/L (ref 5–15)
AST SERPL-CCNC: 84 U/L (ref 1–40)
BASOPHILS # BLD AUTO: 0.06 10*3/MM3 (ref 0–0.2)
BASOPHILS NFR BLD AUTO: 0.6 % (ref 0–1.5)
BILIRUB SERPL-MCNC: 0.4 MG/DL (ref 0–1.2)
BUN SERPL-MCNC: 27 MG/DL (ref 8–23)
BUN/CREAT SERPL: 64.3 (ref 7–25)
CALCIUM SPEC-SCNC: 8.1 MG/DL (ref 8.6–10.5)
CHLORIDE SERPL-SCNC: 103 MMOL/L (ref 98–107)
CO2 SERPL-SCNC: 29 MMOL/L (ref 22–29)
CREAT SERPL-MCNC: 0.42 MG/DL (ref 0.76–1.27)
DEPRECATED RDW RBC AUTO: 64.5 FL (ref 37–54)
EGFRCR SERPLBLD CKD-EPI 2021: 110.7 ML/MIN/1.73
EOSINOPHIL # BLD AUTO: 0.39 10*3/MM3 (ref 0–0.4)
EOSINOPHIL NFR BLD AUTO: 4.1 % (ref 0.3–6.2)
ERYTHROCYTE [DISTWIDTH] IN BLOOD BY AUTOMATED COUNT: 17.4 % (ref 12.3–15.4)
GLOBULIN UR ELPH-MCNC: 3.6 GM/DL
GLUCOSE BLDC GLUCOMTR-MCNC: 133 MG/DL (ref 70–130)
GLUCOSE BLDC GLUCOMTR-MCNC: 139 MG/DL (ref 70–130)
GLUCOSE BLDC GLUCOMTR-MCNC: 152 MG/DL (ref 70–130)
GLUCOSE BLDC GLUCOMTR-MCNC: 168 MG/DL (ref 70–130)
GLUCOSE SERPL-MCNC: 155 MG/DL (ref 65–99)
HCT VFR BLD AUTO: 24.7 % (ref 37.5–51)
HGB BLD-MCNC: 7.7 G/DL (ref 13–17.7)
HSV1+2 IGG CSF-ACNC: 58.91 IV
HSV1+2 IGM CSF-ACNC: 0.25 IV
IMM GRANULOCYTES # BLD AUTO: 0.24 10*3/MM3 (ref 0–0.05)
IMM GRANULOCYTES NFR BLD AUTO: 2.5 % (ref 0–0.5)
LYMPHOCYTES # BLD AUTO: 0.76 10*3/MM3 (ref 0.7–3.1)
LYMPHOCYTES NFR BLD AUTO: 8 % (ref 19.6–45.3)
MCH RBC QN AUTO: 31.8 PG (ref 26.6–33)
MCHC RBC AUTO-ENTMCNC: 31.2 G/DL (ref 31.5–35.7)
MCV RBC AUTO: 102.1 FL (ref 79–97)
MEV IGG CSF-ACNC: <5 AU/ML
MEV IGM CSF-ACNC: 0.46 AU (ref 0–0.79)
MONOCYTES # BLD AUTO: 1.16 10*3/MM3 (ref 0.1–0.9)
MONOCYTES NFR BLD AUTO: 12.2 % (ref 5–12)
MUV IGG CSF-ACNC: <5 AU/ML
MUV IGM CSF-ACNC: 0.37 IV
NEUTROPHILS NFR BLD AUTO: 6.87 10*3/MM3 (ref 1.7–7)
NEUTROPHILS NFR BLD AUTO: 72.6 % (ref 42.7–76)
NRBC BLD AUTO-RTO: 0 /100 WBC (ref 0–0.2)
PLATELET # BLD AUTO: 210 10*3/MM3 (ref 140–450)
PMV BLD AUTO: 9.4 FL (ref 6–12)
POTASSIUM SERPL-SCNC: 4 MMOL/L (ref 3.5–5.2)
PROT SERPL-MCNC: 5.9 G/DL (ref 6–8.5)
RBC # BLD AUTO: 2.42 10*6/MM3 (ref 4.14–5.8)
SODIUM SERPL-SCNC: 136 MMOL/L (ref 136–145)
VZV IGG CSF IA-ACNC: 60.6 IV
VZV IGM CSF-ACNC: 0 ISR
WBC NRBC COR # BLD: 9.48 10*3/MM3 (ref 3.4–10.8)
WNV IGG CSF-ACNC: 0.47 IV
WNV IGM CSF-ACNC: 0.07 IV

## 2022-06-18 PROCEDURE — 25010000002 CEFEPIME PER 500 MG: Performed by: NEUROLOGICAL SURGERY

## 2022-06-18 PROCEDURE — 99233 SBSQ HOSP IP/OBS HIGH 50: CPT | Performed by: INTERNAL MEDICINE

## 2022-06-18 PROCEDURE — 25010000002 HEPARIN (PORCINE) PER 1000 UNITS: Performed by: NEUROLOGICAL SURGERY

## 2022-06-18 PROCEDURE — 25010000002 HYDRALAZINE PER 20 MG: Performed by: NEUROLOGICAL SURGERY

## 2022-06-18 PROCEDURE — 25010000002 VANCOMYCIN 10 G RECONSTITUTED SOLUTION: Performed by: NEUROLOGICAL SURGERY

## 2022-06-18 PROCEDURE — 94003 VENT MGMT INPAT SUBQ DAY: CPT

## 2022-06-18 PROCEDURE — 0 LEVETIRACETAM IN NACL 0.75% 1000 MG/100ML SOLUTION: Performed by: NEUROLOGICAL SURGERY

## 2022-06-18 PROCEDURE — 97110 THERAPEUTIC EXERCISES: CPT

## 2022-06-18 PROCEDURE — 63710000001 INSULIN DETEMIR PER 5 UNITS: Performed by: NEUROLOGICAL SURGERY

## 2022-06-18 PROCEDURE — 94799 UNLISTED PULMONARY SVC/PX: CPT

## 2022-06-18 PROCEDURE — 85025 COMPLETE CBC W/AUTO DIFF WBC: CPT | Performed by: NEUROLOGICAL SURGERY

## 2022-06-18 PROCEDURE — 80053 COMPREHEN METABOLIC PANEL: CPT | Performed by: NEUROLOGICAL SURGERY

## 2022-06-18 PROCEDURE — 94761 N-INVAS EAR/PLS OXIMETRY MLT: CPT

## 2022-06-18 PROCEDURE — 71045 X-RAY EXAM CHEST 1 VIEW: CPT

## 2022-06-18 PROCEDURE — 82962 GLUCOSE BLOOD TEST: CPT

## 2022-06-18 PROCEDURE — 99232 SBSQ HOSP IP/OBS MODERATE 35: CPT | Performed by: CLINICAL NURSE SPECIALIST

## 2022-06-18 PROCEDURE — 63710000001 INSULIN REGULAR HUMAN PER 5 UNITS: Performed by: NEUROLOGICAL SURGERY

## 2022-06-18 PROCEDURE — 99024 POSTOP FOLLOW-UP VISIT: CPT | Performed by: NURSE PRACTITIONER

## 2022-06-18 RX ORDER — CARVEDILOL 6.25 MG/1
12.5 TABLET ORAL 2 TIMES DAILY WITH MEALS
Status: DISCONTINUED | OUTPATIENT
Start: 2022-06-18 | End: 2022-06-20

## 2022-06-18 RX ORDER — AMLODIPINE BESYLATE 10 MG/1
10 TABLET ORAL
Status: DISCONTINUED | OUTPATIENT
Start: 2022-06-18 | End: 2022-06-20 | Stop reason: HOSPADM

## 2022-06-18 RX ORDER — CARVEDILOL 6.25 MG/1
6.25 TABLET ORAL ONCE
Status: COMPLETED | OUTPATIENT
Start: 2022-06-18 | End: 2022-06-18

## 2022-06-18 RX ADMIN — SODIUM CHLORIDE SOLN NEBU 3% 4 ML: 3 NEBU SOLN at 06:58

## 2022-06-18 RX ADMIN — Medication 1 PACKET: at 20:10

## 2022-06-18 RX ADMIN — Medication 45 ML: at 20:10

## 2022-06-18 RX ADMIN — Medication 1 PACKET: at 07:19

## 2022-06-18 RX ADMIN — LANSOPRAZOLE 30 MG: KIT at 06:08

## 2022-06-18 RX ADMIN — LEVOTHYROXINE SODIUM 125 MCG: 125 TABLET ORAL at 06:08

## 2022-06-18 RX ADMIN — MUPIROCIN 1 APPLICATION: 20 OINTMENT TOPICAL at 08:01

## 2022-06-18 RX ADMIN — INSULIN HUMAN 2 UNITS: 100 INJECTION, SOLUTION PARENTERAL at 17:36

## 2022-06-18 RX ADMIN — CHLORHEXIDINE GLUCONATE 15 ML: 1.2 RINSE ORAL at 08:00

## 2022-06-18 RX ADMIN — OXYCODONE HYDROCHLORIDE 5 MG: 5 SOLUTION ORAL at 07:18

## 2022-06-18 RX ADMIN — HYDRALAZINE HYDROCHLORIDE 10 MG: 20 INJECTION INTRAMUSCULAR; INTRAVENOUS at 07:18

## 2022-06-18 RX ADMIN — SODIUM CHLORIDE 10 MG/HR: 9 INJECTION, SOLUTION INTRAVENOUS at 22:03

## 2022-06-18 RX ADMIN — Medication 1 PACKET: at 17:36

## 2022-06-18 RX ADMIN — CEFEPIME 2 G: 2 INJECTION, POWDER, FOR SOLUTION INTRAVENOUS at 06:08

## 2022-06-18 RX ADMIN — LACOSAMIDE 200 MG: 10 INJECTION, SOLUTION INTRAVENOUS at 08:00

## 2022-06-18 RX ADMIN — LEVETIRACETAM 1000 MG: 10 INJECTION INTRAVENOUS at 20:10

## 2022-06-18 RX ADMIN — HEPARIN SODIUM 5000 UNITS: 5000 INJECTION INTRAVENOUS; SUBCUTANEOUS at 20:12

## 2022-06-18 RX ADMIN — CARVEDILOL 12.5 MG: 6.25 TABLET, FILM COATED ORAL at 17:36

## 2022-06-18 RX ADMIN — ARFORMOTEROL TARTRATE 15 MCG: 15 SOLUTION RESPIRATORY (INHALATION) at 20:08

## 2022-06-18 RX ADMIN — VANCOMYCIN HYDROCHLORIDE 1500 MG: 10 INJECTION, POWDER, LYOPHILIZED, FOR SOLUTION INTRAVENOUS at 17:36

## 2022-06-18 RX ADMIN — Medication 45 ML: at 08:01

## 2022-06-18 RX ADMIN — INSULIN DETEMIR 20 UNITS: 100 INJECTION, SOLUTION SUBCUTANEOUS at 20:10

## 2022-06-18 RX ADMIN — POLYETHYLENE GLYCOL 3350 17 G: 17 POWDER, FOR SOLUTION ORAL at 08:02

## 2022-06-18 RX ADMIN — INSULIN HUMAN 2 UNITS: 100 INJECTION, SOLUTION PARENTERAL at 00:35

## 2022-06-18 RX ADMIN — LATANOPROST 1 DROP: 50 SOLUTION OPHTHALMIC at 20:09

## 2022-06-18 RX ADMIN — Medication 1 PACKET: at 12:11

## 2022-06-18 RX ADMIN — GUAIFENESIN 400 MG: 200 SOLUTION ORAL at 08:02

## 2022-06-18 RX ADMIN — CHLORHEXIDINE GLUCONATE 15 ML: 1.2 RINSE ORAL at 20:12

## 2022-06-18 RX ADMIN — DEXAMETHASONE 1 MG: 2 TABLET ORAL at 08:00

## 2022-06-18 RX ADMIN — CEFEPIME 2 G: 2 INJECTION, POWDER, FOR SOLUTION INTRAVENOUS at 20:10

## 2022-06-18 RX ADMIN — VANCOMYCIN HYDROCHLORIDE 1500 MG: 10 INJECTION, POWDER, LYOPHILIZED, FOR SOLUTION INTRAVENOUS at 04:50

## 2022-06-18 RX ADMIN — SODIUM CHLORIDE 5 MG/HR: 9 INJECTION, SOLUTION INTRAVENOUS at 16:10

## 2022-06-18 RX ADMIN — CARVEDILOL 6.25 MG: 6.25 TABLET, FILM COATED ORAL at 10:45

## 2022-06-18 RX ADMIN — SODIUM CHLORIDE SOLN NEBU 3% 4 ML: 3 NEBU SOLN at 20:08

## 2022-06-18 RX ADMIN — GUAIFENESIN 400 MG: 200 SOLUTION ORAL at 16:11

## 2022-06-18 RX ADMIN — LABETALOL HYDROCHLORIDE 10 MG: 5 INJECTION INTRAVENOUS at 04:50

## 2022-06-18 RX ADMIN — LEVETIRACETAM 1000 MG: 10 INJECTION INTRAVENOUS at 08:01

## 2022-06-18 RX ADMIN — SODIUM CHLORIDE 10 MG/HR: 9 INJECTION, SOLUTION INTRAVENOUS at 19:10

## 2022-06-18 RX ADMIN — CEFEPIME 2 G: 2 INJECTION, POWDER, FOR SOLUTION INTRAVENOUS at 15:39

## 2022-06-18 RX ADMIN — LISINOPRIL 40 MG: 20 TABLET ORAL at 08:01

## 2022-06-18 RX ADMIN — ARFORMOTEROL TARTRATE 15 MCG: 15 SOLUTION RESPIRATORY (INHALATION) at 06:58

## 2022-06-18 RX ADMIN — LACOSAMIDE 200 MG: 10 INJECTION, SOLUTION INTRAVENOUS at 20:12

## 2022-06-18 RX ADMIN — AMLODIPINE BESYLATE 10 MG: 10 TABLET ORAL at 10:42

## 2022-06-18 RX ADMIN — MUPIROCIN 1 APPLICATION: 20 OINTMENT TOPICAL at 20:13

## 2022-06-18 RX ADMIN — LACTULOSE 20 G: 20 SOLUTION ORAL at 08:00

## 2022-06-18 RX ADMIN — DEXAMETHASONE 1 MG: 2 TABLET ORAL at 20:09

## 2022-06-18 RX ADMIN — CARVEDILOL 6.25 MG: 6.25 TABLET, FILM COATED ORAL at 07:18

## 2022-06-18 RX ADMIN — HYDRALAZINE HYDROCHLORIDE 10 MG: 20 INJECTION INTRAMUSCULAR; INTRAVENOUS at 00:35

## 2022-06-18 RX ADMIN — DOCUSATE SODIUM LIQUID 50 MG: 100 LIQUID ORAL at 08:00

## 2022-06-18 RX ADMIN — GUAIFENESIN 400 MG: 200 SOLUTION ORAL at 20:09

## 2022-06-18 RX ADMIN — HEPARIN SODIUM 5000 UNITS: 5000 INJECTION INTRAVENOUS; SUBCUTANEOUS at 08:00

## 2022-06-18 RX ADMIN — LIOTHYRONINE SODIUM 25 MCG: 25 TABLET ORAL at 08:00

## 2022-06-18 NOTE — PROGRESS NOTES
Neurology Progress Note      Chief Complaint:    Postoperative seizure    Subjective     Subjective:  Patient is off all sedation. Remains ventilated with trach. No further atypical movements of bilateral upper extremities noted per nursing. He did not open eyes to voice. He resists against manual eyelid opening. Not following commands.  Systolic BP elevated this AM at 187. Multiple antihypertensive medications given and systolic .     Per social work notes: [facility] offered a bed but daughter declined as concerned neurology not on staff. [facility] does have neurology and this is daughter preference. Possible bed available on Monday.       Past Medical History:   Diagnosis Date   • Brain tumor (HCC)    • Depression    • Hearing loss    • History of cellulitis     right foot big toe   • Hypertension    • Pneumonia    • Right arm weakness     after cervial injury   • Sciatic pain     affects balance   • Thyroid disease    • Visual disturbance     due to brain tumor - right eye     Past Surgical History:   Procedure Laterality Date   • CATARACT EXTRACTION, BILATERAL     • COLONOSCOPY     • CRANIOTOMY Bilateral 6/10/2022    Procedure: CRANIECTOMY;  Surgeon: Martell Downs MD;  Location: [facility] OR;  Service: Neurosurgery;  Laterality: Bilateral;   • CRANIOTOMY FOR TUMOR Right 5/10/2022    Procedure: CRANIOTOMY FOR TUMOR STERIOTACTIC WITH BRAIN LAB, right;  Surgeon: Martell Downs MD;  Location: [facility] OR;  Service: Neurosurgery;  Laterality: Right;   • ENDOSCOPY W/ PEG TUBE PLACEMENT N/A 6/2/2022    Procedure: ESOPHAGOGASTRODUODENOSCOPY WITH PERCUTANEOUS ENDOSCOPIC GASTROSTOMY TUBE INSERTION WITH ANESTHESIA;  Surgeon: eMlecio Lu MD;  Location: [facility] OR;  Service: Gastroenterology;  Laterality: N/A;   • NECK SURGERY     • SKIN CANCER EXCISION     • TONSILLECTOMY     • TRACHEOSTOMY N/A 6/2/2022    Procedure: TRACHEOSTOMY;  Surgeon: Geovany Davidson MD;  Location: [facility]  OR;  Service: ENT;  Laterality: N/A;   •  SHUNT INSERTION Right 6/3/2022    Procedure: VENTRICULAR PERITONEAL SHUNT INSERTION WITH BRAIN LAB;  Surgeon: Martell Downs MD;  Location: [facility] OR;  Service: Neurosurgery;  Laterality: Right;     Family History   Problem Relation Age of Onset   • Cancer Sister    • Cancer Brother      Social History     Tobacco Use   • Smoking status: Never Smoker   • Smokeless tobacco: Never Used   Vaping Use   • Vaping Use: Never used   Substance Use Topics   • Alcohol use: Never   • Drug use: Never       Medications:  Current Facility-Administered Medications   Medication Dose Route Frequency Provider Last Rate Last Admin   • acetaminophen (TYLENOL) tablet 650 mg  650 mg Oral Q4H PRN Martell Downs MD   650 mg at 06/15/22 2017    Or   • acetaminophen (TYLENOL) suppository 650 mg  650 mg Rectal Q4H PRN Martell Downs MD   650 mg at 05/23/22 0016   • alteplase (CATHFLO/ACTIVASE) injection 2 mg  2 mg Intracatheter PRN Martell Downs MD   New Syringe/Cartridge at 05/28/22 1330   • arformoterol (BROVANA) nebulizer solution 15 mcg  15 mcg Nebulization BID - RT Vidya Chandler APRN   15 mcg at 06/18/22 0658   • bisacodyl (DULCOLAX) suppository 10 mg  10 mg Rectal Daily PRN Stevie Parsons APRN       • carvedilol (COREG) tablet 6.25 mg  6.25 mg Nasogastric BID With Meals Martell Downs MD   6.25 mg at 06/18/22 0718   • cefepime (MAXIPIME) 2 g/100 mL 0.9% NS (mbp)  2 g Intravenous Q8H Martell Downs MD   2 g at 06/18/22 0608   • chlorhexidine (PERIDEX) 0.12 % solution 15 mL  15 mL Mouth/Throat Q12H Martell Downs MD   15 mL at 06/18/22 0800   • dexamethasone (DECADRON) tablet 1 mg  1 mg Per PEG Tube Q12H Stevie Parsons APRN   1 mg at 06/18/22 0800    Followed by   • [START ON 6/20/2022] dexamethasone (DECADRON) tablet 0.5 mg  0.5 mg Per PEG Tube Daily Stevie Parsons APRN       • dextrose (D50W) (25 g/50 mL) IV injection 25 g   25 g Intravenous Q15 Min PRN Martell Downs MD   25 g at 06/09/22 1807   • dextrose (GLUTOSE) oral gel 15 g  15 g Oral Q15 Min PRN Martell Downs MD   15 g at 05/22/22 0527   • docusate (COLACE) 50 MG/5ML liquid 50 mg  50 mg Oral Daily Sha Beatty MD   50 mg at 06/18/22 0800   • glucagon (human recombinant) (GLUCAGEN DIAGNOSTIC) injection 1 mg  1 mg Intramuscular Q15 Min PRN Martell Downs MD       • guaiFENesin (ROBITUSSIN) 100 MG/5ML oral solution 400 mg  400 mg Per PEG Tube TID Vidya Chandler APRN   400 mg at 06/18/22 0802   • heparin (porcine) 5000 UNIT/ML injection 5,000 Units  5,000 Units Subcutaneous Q12H Martell Downs MD   5,000 Units at 06/18/22 0800   • hydrALAZINE (APRESOLINE) injection 10 mg  10 mg Intravenous Q6H PRN Martell Downs MD   10 mg at 06/18/22 0718   • insulin detemir (LEVEMIR) injection 20 Units  20 Units Subcutaneous Nightly Martell Downs MD   20 Units at 06/17/22 2054   • insulin regular (humuLIN R,novoLIN R) injection 2-7 Units  2-7 Units Subcutaneous Q6H Martell Downs MD   2 Units at 06/18/22 0035   • ipratropium-albuterol (DUO-NEB) nebulizer solution 3 mL  3 mL Nebulization Q4H PRN Martell Downs MD   3 mL at 06/15/22 0650   • labetalol (NORMODYNE,TRANDATE) injection 10 mg  10 mg Intravenous Q6H PRN Martell Downs MD   10 mg at 06/18/22 0450   • lacosamide (VIMPAT) injection 200 mg  200 mg Intravenous Q12H Martell Downs MD   200 mg at 06/18/22 0800   • lactulose solution 20 g  20 g Per PEG Tube Daily Sha Beatty MD   20 g at 06/18/22 0800   • lansoprazole (FIRST) oral suspension 30 mg  30 mg Per G Tube QAM Titsworth, Martell Eliu, MD   30 mg at 06/18/22 0608   • latanoprost (XALATAN) 0.005 % ophthalmic solution 1 drop  1 drop Both Eyes Nightly Martell Downs MD   1 drop at 06/17/22 2057   • levalbuterol (XOPENEX) nebulizer solution 0.63 mg  0.63 mg  Nebulization TID PRN Martell Downs MD   0.63 mg at 06/12/22 1046   • levETIRAcetam in NaCl 0.75% (KEPPRA) IVPB 1,000 mg  1,000 mg Intravenous Q12H Martell Downs  mL/hr at 06/16/22 2200 1,000 mg at 06/18/22 0801   • levothyroxine (SYNTHROID, LEVOTHROID) tablet 125 mcg  125 mcg Nasogastric Q AM Martell Downs MD   125 mcg at 06/18/22 0608   • lidocaine (XYLOCAINE) 1 % injection 10 mL  10 mL Intradermal Once Martell Downs MD       • lidocaine (XYLOCAINE) 1 % injection 10 mL  10 mL Infiltration Once Pilo Ortega MD       • liothyronine (CYTOMEL) tablet 25 mcg  25 mcg Nasogastric Daily Martell Downs MD   25 mcg at 06/18/22 0800   • lisinopril (PRINIVIL,ZESTRIL) tablet 40 mg  40 mg Nasogastric Q24H Sha Beatty MD   40 mg at 06/18/22 0801   • LORazepam (ATIVAN) injection 2 mg  2 mg Intravenous Q2H PRN Martell Downs MD   2 mg at 06/14/22 0358   • mupirocin (BACTROBAN) 2 % ointment 1 application  1 application Topical Q12H Martell Downs MD   1 application at 06/18/22 0801   • norepinephrine (LEVOPHED) 8 mg in 250 mL NS infusion (premix)  0.02-0.3 mcg/kg/min Intravenous Titrated Martell Downs MD   Held at 06/11/22 1054   • Nutrisource fiber pack 1 packet  1 packet Nasogastric 4x Daily Martell Downs MD   1 packet at 06/18/22 0719   • ondansetron (ZOFRAN) tablet 4 mg  4 mg Oral Q6H PRN Martell Downs MD        Or   • ondansetron (ZOFRAN) injection 4 mg  4 mg Intravenous Q6H PRN Martell Downs MD       • oxyCODONE (ROXICODONE) 5 MG/5ML solution 5 mg  5 mg Per PEG Tube Q6H PRN Daly Perea APRN   5 mg at 06/18/22 0718   • polyethylene glycol (MIRALAX) packet 17 g  17 g Oral Daily Martell Downs MD   17 g at 06/18/22 0802   • ProSource TF oral liquid 45 mL  45 mL Per G Tube BID Martell Downs MD   45 mL at 06/18/22 0801   • sodium chloride 3 % nebulizer solution 4 mL  4 mL  Nebulization BID - RT Martell Downs MD   4 mL at 06/18/22 0658   • vancomycin 1500 mg/500 mL 0.9% NS IVPB ([facility])  15 mg/kg Intravenous Q12H Martell Downs MD   1,500 mg at 06/18/22 0450       Allergies:    Patient has no known allergies.    Review of Systems:   -A 14 point review of systems is completed and is negative.      Objective      Vital Signs  Temp:  [98.4 °F (36.9 °C)-98.9 °F (37.2 °C)] 98.5 °F (36.9 °C)  Heart Rate:  [61-84] 72  Resp:  [21-28] 25  BP: (138-187)/() 149/72  FiO2 (%):  [30 %-35 %] 35 %    Physical Exam:     HEENT:  Neck supple.  Tracheostomy in place. Mechanically ventilated  CVS:  Regular rate and rhythm.  No murmurs  Carotid Examination:  No bruits  Lungs:  Clear to auscultation  Abdomen:  Non-tender, Non-distended.  PEG tube in place with feedings.  Extremities: Edema of the dorsum of the bilateral hands.     Neurologic Exam:   Did not open eyes to voice.  Resists manual eye lid opening  Pupils are equally reactive to light.  no nystagmus or roving eye movements noted.   Gag intact.     Motor:    No purposeful movements.  Winces to noxious stimuli bilateral upper and lower extremities    DTR:  2+ throughout in all four extremities  upgoing toe on left       Results Review:    I reviewed the patient's new clinical results and findings.    Lab Results (last 24 hours)     Procedure Component Value Units Date/Time    Comprehensive Metabolic Panel [[id number]]  (Abnormal) Collected: 06/18/22 0812    Specimen: Blood Updated: 06/18/22 0845     Glucose 155 mg/dL      BUN 27 mg/dL      Creatinine 0.42 mg/dL      Sodium 136 mmol/L      Potassium 4.0 mmol/L      Chloride 103 mmol/L      CO2 29.0 mmol/L      Calcium 8.1 mg/dL      Total Protein 5.9 g/dL      Albumin 2.30 g/dL      ALT (SGPT) 113 U/L      AST (SGOT) 84 U/L      Alkaline Phosphatase 406 U/L      Total Bilirubin 0.4 mg/dL      Globulin 3.6 gm/dL      A/G Ratio 0.6 g/dL      BUN/Creatinine Ratio 64.3     Anion Gap  4.0 mmol/L      eGFR 110.7 mL/min/1.73      Comment: National Kidney Foundation and American Society of Nephrology (ASN) Task Force recommended calculation based on the Chronic Kidney Disease Epidemiology Collaboration (CKD-EPI) equation refit without adjustment for race.       Narrative:      GFR Normal >60  Chronic Kidney Disease <60  Kidney Failure <15      CBC & Differential [[id number]] Updated: 06/18/22 0826    Specimen: Blood     Narrative:      The following orders were created for panel order CBC & Differential.  Procedure                               Abnormality         Status                     ---------                               -----------         ------                     CBC Auto Differential[[id number]]                                                         Please view results for these tests on the individual orders.    CBC Auto Differential [[id number]] Updated: 06/18/22 0826    Specimen: Blood     POC Glucose Once [[id number]]  (Abnormal) Collected: 06/18/22 0632    Specimen: Blood Updated: 06/18/22 0644     Glucose 133 mg/dL      Comment: : [id number] Jose Guadalupe AdamMeter ID: [id number]       POC Glucose Once [[id number]]  (Abnormal) Collected: 06/18/22 0030    Specimen: Blood Updated: 06/18/22 0041     Glucose 152 mg/dL      Comment: : [id number] Jose Guadalupe AdamMeter ID: [id number]       POC Glucose Once [[id number]]  (Abnormal) Collected: 06/17/22 1830    Specimen: Blood Updated: 06/17/22 1841     Glucose 148 mg/dL      Comment: : [id number] Rip ShahoryMeter ID: [id number]       Fungus Culture - Tissue, Brain [[id number]] Collected: 06/10/22 1524    Specimen: Tissue from Brain Updated: 06/17/22 1633     Fungus Culture No fungus isolated at 1 week    AFB Culture - Tissue, Brain [[id number]] Collected: 06/10/22 1524    Specimen: Tissue from Brain Updated: 06/17/22 1633     AFB Culture No AFB isolated at 1 week     AFB Stain No acid fast bacilli seen on direct smear      No acid fast  bacilli seen on concentrated smear    HSV Type 1 AB IgG CSF Reflexed - Cerebrospinal Fluid, Lumbar Puncture [[id number]] Collected: 06/10/22 0729    Specimen: Cerebrospinal Fluid from Lumbar Puncture Updated: 06/17/22 1512     HSV Type 1 Ab IgG, CSF 0.04 IV      Comment: Specimen is xanthochromic. Results may be adversely affected.  INTERPRETIVE INFORMATION: Herpes Simplex Virus Type 1                            Glycoprotein G-Specific Antibody,                            IgG by ANDRE, CSF    0.89 IV or Less ...... Negative: No significant level of                           detectable IgG antibody to HSV                           type 1 glycoprotein G.    0.90 - 1.10 IV ....... Equivocal: Questionable presence                           of IgG antibody to HSV type 1.                           Repeat testing in 10-14 days may                           be helpful.    1.11 IV or Greater ... Positive: IgG antibody to HSV                           type 1 glycoprotein G detected,                           which may indicate a current                           or past infection.  Individuals infected with HSV may not exhibit detectable IgG  antibody to type specific HSV antigens 1 and 2 in the early stages  of infection. Detection of antibody presence in these cases may  only be possible using a nontype-specific screening test.  The detection of antibodies to herpes simplex virus in CSF may  indicate central nervous system infection. However, consideration  must be given to possible contamination by blood or transfer of  serum antibodies across the blood-brain barrier.  Fourfold or greater rise in CSF antibodies to herpes on specimens  at least 4 weeks apart are found in 74-94 percent of patients with  herpes encephalitis. Specificity of the test based on a single CSF  testing is not established. Presently PCR is the primary means of  establishing a diagnosis of herpes encephalitis.  This test was developed and its  performance characteristics  determined by [facility]. It has not been cleared or  approved by the [place] Food and Drug Administration. This test was  performed in a CLIA certified laboratory and is intended for  clinical purposes.       Narrative:      Performed at:  01 - [facility]  [address] [place] [address]  [id number]  : Juany Perez MD, Phone:  [phone]    Fungus Culture - Tissue, Brain, Cerebral Cortex [[id number]] Collected: 06/10/22 1327    Specimen: Tissue from Brain, Cerebral Cortex Updated: 06/17/22 1433     Fungus Culture No fungus isolated at 1 week    AFB Culture - Tissue, Brain, Cerebral Cortex [[id number]] Collected: 06/10/22 1327    Specimen: Tissue from Brain, Cerebral Cortex Updated: 06/17/22 1433     AFB Culture No AFB isolated at 1 week     AFB Stain No acid fast bacilli seen on direct smear      No acid fast bacilli seen on concentrated smear    Fungus Culture - Tissue, Brain [[id number]] Collected: 06/10/22 1326    Specimen: Tissue from Brain Updated: 06/17/22 1417     Fungus Culture No fungus isolated at 1 week    AFB Culture - Tissue, Brain [[id number]] Collected: 06/10/22 1326    Specimen: Tissue from Brain Updated: 06/17/22 1417     AFB Culture No AFB isolated at 1 week     AFB Stain No acid fast bacilli seen on direct smear      No acid fast bacilli seen on concentrated smear    POC Glucose Once [[id number]]  (Abnormal) Collected: 06/17/22 1257    Specimen: Blood Updated: 06/17/22 1309     Glucose 146 mg/dL      Comment: : [id number] XieMeter ID: [id number]             Imaging Results (Last 24 Hours)     Procedure Component Value Units Date/Time    XR Chest 1 View [[id number]] Collected: 06/18/22 0819     Updated: 06/18/22 0824    Narrative:      EXAMINATION: XR CHEST 1 VW- 6/18/2022 8:19 AM CDT     HISTORY: On vent; R13.10-Dysphagia, unspecified; Z01.818-Encounter for  other preprocedural examination; D32.9-Benign neoplasm of  meninges,  unspecified; Z74.09-Other reduced mobility; Z78.9-Other specified health  status; G40.901-Epilepsy, unspecified, not intractable, with status  epilepticus; J96.01-Acute respiratory failure with hypoxia;  G91.0-Communicating hydrocephalus; T81.40XA-Infection following a pr.     REPORT: A frontal view the chest was obtained.     COMPARISON: Chest x-ray 6/17/2022 0303 hours.     The tracheostomy tube appears in satisfactory position as before. There  is a left internal jugular central line, tip projects over the mid SVC,  good position. This is unchanged. There is volume loss in the lung  bases, with partial consolidation of the left base, slightly improved.  No focal infiltrate is seen in the right lung. Heart size appears to be  normal. Pleural thickening and/or effusion is seen in the left  hemithorax. This appears stable. Numerous tiny calcified granulomas are  present throughout both lungs. A  shunt is noted over the right chest.       Impression:      Persistent but mildly improved consolidation of the medial  left lung base, with probable small persistent pleural effusion or  pleural thickening on the left. Stable satisfactory position of the  tracheostomy tube and left internal jugular central line.  This report was finalized on 06/18/2022 08:21 by Dr. Antony Thomas MD.          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)    Status epilepticus (HCC)    Essential hypertension    Type 2 diabetes mellitus with hyperglycemia, without long-term current use of insulin (HCC)    Hypothyroidism (acquired)    Acute respiratory failure (secondary to status epilepticus)    Thrombocytopenia (HCC)    Cerebral parenchymal hemorrhage (HCC)    PAF (paroxysmal atrial fibrillation) (HCC)    Fever    Loculated pleural effusion    Acute deep vein thrombosis (DVT) of the ulnar and brachial veins of right upper extremity  (HCC)    Dysphagia    Communicating hydrocephalus (HCC)     Impression:     · Postoperative seizure  · S/p craniectomy and debridement   · Hypoalbuminemia   · S/p  shunt on 6/3/2022  · Tracheostomy with mechanical ventilation.  · PEG tube        Plan:  · discontinue to fosphenytoin 6/17/2022.  Discontinue daily dilantin levels.   · Recommend repeating EEG prior to weaning of Keppra.  · Continue Keppra 1000 mg IV twice daily.  will recommend decrease Keppra to 500 mg IV BID on 6/24/2022 and continue 500 mg BID for 7 days. After 7 days Keppra can be weaned down completely.   · Continue Vimpat 200 mg IV every 12 hours.  Would likely leave this in place as he goes to LTACH  · Plans are to look at LTACH placement near [place], [place].            Di Ward, APRN  06/18/22  10:18 CDT

## 2022-06-18 NOTE — PROGRESS NOTES
PULMONARY AND CRITICAL CARE PROGRESS NOTE - [facility]    Patient: Hai Hernandez    1945    MR# [id number]    Acct# [id number]  06/18/22   08:00 CDT  Referring Provider: Martell Downs, *    Chief Complaint: Mechanically ventilated    Interval history: The patient is seen resting in bed with tracheostomy to mechanical ventilator.  Oxygen saturation 96% on PEEP of 5 and FiO2 0.35.  ET CO2 32.  Plateau pressure 25.  He is somewhat hypertensive this morning and nursing has treated bp as ordered.  Chest film reviewed and stable.  No documented fevers.  No ventilator dyssynchrony observed.  Awaiting LTAC placement.  No other issues reported.    Meds:  arformoterol, 15 mcg, Nebulization, BID - RT  carvedilol, 6.25 mg, Nasogastric, BID With Meals  cefepime, 2 g, Intravenous, Q8H  chlorhexidine, 15 mL, Mouth/Throat, Q12H  dexamethasone, 1 mg, Per PEG Tube, Q12H   Followed by  [START ON 6/20/2022] dexamethasone, 0.5 mg, Per PEG Tube, Daily  docusate, 50 mg, Oral, Daily  guaiFENesin, 400 mg, Per PEG Tube, TID  heparin (porcine), 5,000 Units, Subcutaneous, Q12H  insulin detemir, 20 Units, Subcutaneous, Nightly  insulin regular, 2-7 Units, Subcutaneous, Q6H  lacosamide, 200 mg, Intravenous, Q12H  lactulose, 20 g, Per PEG Tube, Daily  lansoprazole, 30 mg, Per G Tube, QAM  latanoprost, 1 drop, Both Eyes, Nightly  levETIRAcetam, 1,000 mg, Intravenous, Q12H  levothyroxine, 125 mcg, Nasogastric, Q AM  lidocaine, 10 mL, Intradermal, Once  lidocaine, 10 mL, Infiltration, Once  liothyronine, 25 mcg, Nasogastric, Daily  lisinopril, 40 mg, Nasogastric, Q24H  mupirocin, 1 application, Topical, Q12H  Nutrisource fiber, 1 packet, Nasogastric, 4x Daily  polyethylene glycol, 17 g, Oral, Daily  ProSource TF, 45 mL, Per G Tube, BID  sodium chloride, 4 mL, Nebulization, BID - RT  vancomycin, 15 mg/kg, Intravenous, Q12H      norepinephrine, 0.02-0.3 mcg/kg/min, Last Rate: Stopped (06/11/22 6024)      Review of  Systems:   Cannot obtain due to mechanical ventilated state   Ventilator Settings:        Resp Rate (Set): 16  Pressure Support (cm H2O): 8 cm H20  FiO2 (%): 35 %  PEEP/CPAP (cm H2O): 5 cm H20  Minute Ventilation (L/min) (Obs): 13.8 L/min  Resp Rate (Observed) Vent: 29  I:E Ratio (Set): 1:0.27  I:E Ratio (Obs): 1:2.00  PIP Observed (cm H2O): 20 cm H2O  RSBI: 21.96  Physical Exam:  Temp:  [98.4 °F (36.9 °C)-98.9 °F (37.2 °C)] 98.4 °F (36.9 °C)  Heart Rate:  [60-84] 78  Resp:  [21-29] 27  BP: (132-187)/(68-94) 187/91  FiO2 (%):  [30 %-35 %] 35 %    Intake/Output Summary (Last 24 hours) at 6/18/2022 0800  Last data filed at 6/18/2022 0608  Gross per 24 hour   Intake 1651 ml   Output 1300 ml   Net 351 ml     SpO2 Percentage    06/18/22 0659 06/18/22 0702 06/18/22 0708   SpO2: 96% 97% 98%   Body mass index is 32.69 kg/m².   Physical Exam  Constitutional:       General: He is not in acute distress.     Appearance: He is ill-appearing. He is not diaphoretic.   HENT:      Head: Normocephalic.      Comments: Craniotomy incision, healing     Nose: Nose normal.      Mouth/Throat:      Mouth: Mucous membranes are moist.   Eyes:      General: No scleral icterus.  Neck:      Comments: Trach to vent  Left IJ triple-lumen  Cardiovascular:      Rate and Rhythm: Normal rate.      Comments: Rate 79  Pulmonary:      Effort: Pulmonary effort is normal. No respiratory distress.      Breath sounds: No wheezing or rhonchi.   Abdominal:      General: There is no distension.      Comments: PEG with tube feeding infusing   Genitourinary:     Comments: Mccrary catheter  FMS  Musculoskeletal:      Right lower leg: Edema present.      Left lower leg: Edema present.      Comments: SCDs   Skin:     Coloration: Skin is not pale.   Neurological:      Comments: Weakly withdraws to stimulus     Electronically signed by MARIO Rod, 6/18/2022, 08:00 CDT      Physician Substantive Portion: Medical Decision Making    Laboratory Data:  Results from  last 7 days   Lab Units 06/17/22  0242 06/16/22  0358 06/15/22  0426   WBC 10*3/mm3 8.35 9.48 8.84   HEMOGLOBIN g/dL 8.1* 7.7* 7.5*   PLATELETS 10*3/mm3 219 215 195     Results from last 7 days   Lab Units 06/17/22  0242 06/16/22  0358 06/15/22  0426   SODIUM mmol/L 139 139 138   POTASSIUM mmol/L 3.9 3.9 3.9   BUN mg/dL 26* 32* 33*   CREATININE mg/dL 0.43* 0.44* 0.43*     Results from last 7 days   Lab Units 06/14/22  0417 06/13/22  0423 06/12/22  0412   PH, ARTERIAL pH units 7.461* 7.471* 7.488*   PCO2, ARTERIAL mm Hg 40.9 39.0 38.4   PO2 ART mm Hg 82.4* 88.1 88.2   FIO2 % 35 35 35     Wound Culture   Date Value Ref Range Status   06/10/2022 No growth at 2 days  Preliminary   06/10/2022 No growth at 2 days  Preliminary   06/10/2022 No growth at 2 days  Preliminary     Respiratory Culture   Date Value Ref Range Status   06/10/2022   Preliminary    Rare The culture consists of normal respiratory annia. This is a preliminary report; final report to follow.     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  The lungs are poorly expanded.  Bilateral lung infiltrate persists. Left lower lung consolidation is unchanged.  There is no pneumothorax.  There is a persistent moderate cardiomegaly.  Tracheostomy tube is in place. Left internal jugular venous access line is in place. A ventriculoperitoneal shunt catheter seen projected over the right side of the base of the neck  and the chest.  No acute bony abnormality.      Impression: 1. No change. This report was finalized on 06/17/2022 07:32 by Dr. Aida Wong MD.      My radiograph interpretation/independent review of imaging: Reviewed and agree with current interpretation    Pulmonary Assessment:    1. Acute hypoxic respiratory failure  2. On mechanically assisted ventilation  3. Tracheostomy and PEG tube placement done for long-term care  4. Hydrocephalus requiring  shunt placement   5. Status postcraniotomy for meningioma  6. Encephalopathy status epilepticus  7. Loculated left-sided pleural effusion with multiple chest tubes removed now.   8. Healthcare associated pneumonia on antibiotics  9. Cardiac arrhythmia  10. Hypertension  11. Type 2 diabetes mellitus  12. S/p ventriculoperitoneal shunt placement  13. History of DVT in upper extremity  14. Seizure activity    Recommend/plan:   · Continue assist control and pressure control ventilation.  Not ready for ventilator weaning.  · Current ventilator settings are PI 12, PEEP of 5, rate 16, FiO2 30% with oxygen saturation 98%.  · Patient is getting cefepime and vancomycin.   Afebrile.  · He is not on any sedation but is minimally responsive.  No seizure activity and no significant change in neurologic status.  · He was accepted by LTAC without any neurology onsite and family refused to go there and waiting for another LTAC in [place] to approve.  He may go there next week.  · Neurosurgery, neurology is also following.  Continue current ventilator settings and spontaneous breathing trial when he is more stable.  · Bronchial treatment routine respiratory care and pulmonary toilet.  · DVT and stress ulcer bolus and pain and anxiety control.  · CODE STATUS: Full.  Overall prognosis: Guarded.  · We will follow.    This visit was performed by both a physician and an [facility] RN.  I personally evaluated and examined the patient.  I performed all aspects of the medical  decision making as documented.    Electronically signed by     Murphy Kothari MD,  Pulmonologist/Intensivist   6/18/2022, 13:38 CDT

## 2022-06-18 NOTE — PROGRESS NOTES
"Pharmacy Dosing Service  Pharmacokinetics  Vancomycin Follow-up Evaluation    Assessment/Action/Plan:  Vancomycin trough evaluated/ no changes made 6-16-22.     AUC Model Data for current regimen:  Regimen: 1500 mg IV every 12 hours  Exposure target: AUC24 (range) 400-600 mg/L.hr   AUC24,ss: 596 mg/L.hr  PAUC*: 100 %  Ctrough,ss: 19.8 mg/L  Pconc*: 47 %  Tox.: 17 %    Additional antimicrobial agent(s): Cefepime    SCr=0.42 (stable). Ordered a follow-up Vancomycin trough prior to the AM dose tomorrow. Pharmacy will continue to follow daily and adjust dose accordingly.     Subjective:  Hai Hernandez is a 77 y.o. male currently on Vancomycin 1500 mg IV every 12 hours for the treatment of possible CNS infection. Current end date: 6-24-22    Objective:  Ht: 182.9 cm (72\"); Wt: 109 kg (241 lb)  Estimated Creatinine Clearance: 187.9 mL/min (A) (by C-G formula based on SCr of 0.42 mg/dL (L)).     Creatinine   Date Value Ref Range Status   06/18/2022 0.42 (L) 0.76 - 1.27 mg/dL Final   06/17/2022 0.43 (L) 0.76 - 1.27 mg/dL Final   06/16/2022 0.44 (L) 0.76 - 1.27 mg/dL Final   01/21/2022 1.00 0.60 - 1.30 mg/dL Final     Comment:     Serial Number: [id number]:  [id number]      Lab Results   Component Value Date    WBC 8.35 06/17/2022    WBC 9.48 06/16/2022    WBC 8.84 06/15/2022         Lab Results   Component Value Date    VANCOTROUGH 20.50 (H) 06/16/2022       Culture Results:  Microbiology Results (last 10 days)       Procedure Component Value - Date/Time    Respiratory Culture - Sputum, ET Suction [[id number]] Collected: 06/14/22 1632    Lab Status: Final result Specimen: Sputum from ET Suction Updated: 06/16/22 1042     Respiratory Culture Scant growth (1+) Normal respiratory annia. No S. aureus or Pseudomonas aeruginosa detected. Final report.     Gram Stain Many (4+) WBCs per low power field      Rare (1+) Epithelial cells per low power field      Few (2+) Gram positive cocci      Few (2+) Gram negative bacilli    " Respiratory Culture - Aspirate, Bronchus [[id number]] Collected: 06/10/22 1643    Lab Status: Final result Specimen: Aspirate from Bronchus Updated: 06/13/22 1049     Respiratory Culture Rare Normal respiratory annia. No S. aureus or Pseudomonas aeruginosa detected. Final report.     Gram Stain Many (4+) WBCs per low power field      Less than 25 Epithelial cells seen      No organisms seen    Anaerobic Culture - Tissue, Brain [[id number]] Collected: 06/10/22 1524    Lab Status: Final result Specimen: Tissue from Brain Updated: 06/15/22 0529     Anaerobic Culture No anaerobes isolated at 5 days    Fungus Culture - Tissue, Brain [[id number]] Collected: 06/10/22 1524    Lab Status: Preliminary result Specimen: Tissue from Brain Updated: 06/17/22 1633     Fungus Culture No fungus isolated at 1 week    Tissue / Bone Culture - Tissue, Brain [[id number]] Collected: 06/10/22 1524    Lab Status: Final result Specimen: Tissue from Brain Updated: 06/13/22 0943     Tissue Culture No growth at 3 days     Gram Stain No WBCs per low power field      No organisms seen    AFB Culture - Tissue, Brain [[id number]] Collected: 06/10/22 1524    Lab Status: Preliminary result Specimen: Tissue from Brain Updated: 06/17/22 1633     AFB Culture No AFB isolated at 1 week     AFB Stain No acid fast bacilli seen on direct smear      No acid fast bacilli seen on concentrated smear    Wound Culture - Wound, Head [[id number]] Collected: 06/10/22 1343    Lab Status: Final result Specimen: Wound from Head Updated: 06/13/22 0943     Wound Culture No growth at 3 days     Gram Stain Rare (1+) WBCs seen      No organisms seen    Anaerobic Culture - Tissue, Brain, Cerebral Cortex [[id number]] Collected: 06/10/22 1327    Lab Status: Final result Specimen: Tissue from Brain, Cerebral Cortex Updated: 06/15/22 0528     Anaerobic Culture No anaerobes isolated at 5 days    Fungus Culture - Tissue, Brain, Cerebral Cortex [[id number]] Collected: 06/10/22 1327    Lab  Status: Preliminary result Specimen: Tissue from Brain, Cerebral Cortex Updated: 06/17/22 1433     Fungus Culture No fungus isolated at 1 week    Tissue / Bone Culture - Tissue, Brain, Cerebral Cortex [[id number]] Collected: 06/10/22 1327    Lab Status: Final result Specimen: Tissue from Brain, Cerebral Cortex Updated: 06/13/22 0942     Tissue Culture No growth at 3 days     Gram Stain Moderate (3+) WBCs seen      No organisms seen    AFB Culture - Tissue, Brain, Cerebral Cortex [[id number]] Collected: 06/10/22 1327    Lab Status: Preliminary result Specimen: Tissue from Brain, Cerebral Cortex Updated: 06/17/22 1433     AFB Culture No AFB isolated at 1 week     AFB Stain No acid fast bacilli seen on direct smear      No acid fast bacilli seen on concentrated smear    Anaerobic Culture - Tissue, Brain [[id number]]  (Normal) Collected: 06/10/22 1326    Lab Status: Final result Specimen: Tissue from Brain Updated: 06/16/22 0542     Anaerobic Culture No anaerobes isolated at 5 days    Fungus Culture - Tissue, Brain [[id number]] Collected: 06/10/22 1326    Lab Status: Preliminary result Specimen: Tissue from Brain Updated: 06/17/22 1417     Fungus Culture No fungus isolated at 1 week    Tissue / Bone Culture - Tissue, Brain [[id number]] Collected: 06/10/22 1326    Lab Status: Final result Specimen: Tissue from Brain Updated: 06/13/22 0935     Tissue Culture No growth at 3 days     Gram Stain Rare (1+) WBCs per low power field      No organisms seen    AFB Culture - Tissue, Brain [[id number]] Collected: 06/10/22 1326    Lab Status: Preliminary result Specimen: Tissue from Brain Updated: 06/17/22 1417     AFB Culture No AFB isolated at 1 week     AFB Stain No acid fast bacilli seen on direct smear      No acid fast bacilli seen on concentrated smear    Wound Culture - Wound, Head [[id number]] Collected: 06/10/22 1304    Lab Status: Final result Specimen: Wound from Head Updated: 06/13/22 0943     Wound Culture No growth at 3 days      Gram Stain No WBCs or organisms seen    Wound Culture - Wound, Head [[id number]] Collected: 06/10/22 1226    Lab Status: Final result Specimen: Wound from Head Updated: 06/13/22 0946     Wound Culture No growth at 3 days     Gram Stain Rare (1+) WBCs seen      No organisms seen    COVID PRE-OP / PRE-PROCEDURE SCREENING ORDER (NO ISOLATION) - Swab, Nasal Cavity [[id number]]  (Normal) Collected: 06/10/22 0744    Lab Status: Final result Specimen: Swab from Nasal Cavity Updated: 06/10/22 0857    Narrative:      The following orders were created for panel order COVID PRE-OP / PRE-PROCEDURE SCREENING ORDER (NO ISOLATION) - Swab, Nasal Cavity.  Procedure                               Abnormality         Status                     ---------                               -----------         ------                     COVID-19,[facility]...[[id number]]  Normal              Final result                 Please view results for these tests on the individual orders.    COVID-19,[facility] IN-HOUSE,Nasal Swab No Transport Media 3-4 HR TAT - Swab, Nasal Cavity [[id number]]  (Normal) Collected: 06/10/22 0744    Lab Status: Final result Specimen: Swab from Nasal Cavity Updated: 06/10/22 0857     COVID19 Not Detected    Narrative:      Fact sheet for providers: https://www.fda.gov/media/213722/download     Fact sheet for patients: https://www.fda.gov/media/333091/download    Test performed by PCR.    Consider negative results in combination with clinical observations, patient history, and epidemiological information.  Fact sheet for providers: https://www.fda.gov/media/892716/download     Fact sheet for patients: https://www.fda.gov/media/972910/download    Test performed by PCR.    Consider negative results in combination with clinical observations, patient history, and epidemiological information.    Culture, CSF - Cerebrospinal Fluid,  Shunt Aspirate [[id number]] Collected: 06/10/22 0729    Lab Status: Final result Specimen:  Cerebrospinal Fluid from  Shunt Aspirate Updated: 06/13/22 0936     CSF Culture No growth at 3 days     Gram Stain Moderate (3+) WBCs seen      No organisms seen            Yayo Conroy, PharmD   06/18/22 10:27 CDT

## 2022-06-18 NOTE — THERAPY TREATMENT NOTE
Acute Care - Physical Therapy Treatment Note  [facility]     Patient Name: Hai Hernandez  : 1945  MRN: [id number]  Today's Date: 2022      Visit Dx:     ICD-10-CM ICD-9-CM   1. Dysphagia, unspecified type  R13.10 787.20   2. Preop testing  Z01.818 V72.84   3. Meningioma (HCC)  D32.9 225.2   4. Impaired mobility  Z74.09 799.89   5. Decreased activities of daily living (ADL)  Z78.9 V49.89   6. Status epilepticus (HCC)  G40.901 345.3   7. Acute respiratory failure with hypoxia (HCC)  J96.01 518.81   8. Communicating hydrocephalus (HCC)  G91.0 331.3   9. Postoperative infection, unspecified type, initial encounter  T81.40XA 998.59   10. 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)   • Status epilepticus (HCC)   • Essential hypertension   • Type 2 diabetes mellitus with hyperglycemia, without long-term current use of insulin (HCC)   • Hypothyroidism (acquired)   • Acute respiratory failure (secondary to status epilepticus)   • Thrombocytopenia (HCC)   • Cerebral parenchymal hemorrhage (HCC)   • PAF (paroxysmal atrial fibrillation) (HCC)   • Fever   • Loculated pleural effusion   • Acute deep vein thrombosis (DVT) of the ulnar and brachial veins of right upper extremity (HCC)   • Dysphagia   • Communicating hydrocephalus (HCC)   • Cerebral edema (HCC)     Past Medical History:   Diagnosis Date   • Brain tumor (HCC)    • Depression    • Hearing loss    • History of cellulitis     right foot big toe   • Hypertension    • Pneumonia    • Right arm weakness     after cervial injury   • Sciatic pain     affects balance   • Thyroid disease    • Visual disturbance     due to brain tumor - right eye     Past Surgical History:   Procedure Laterality Date   • CATARACT EXTRACTION, BILATERAL     • COLONOSCOPY     • CRANIOTOMY  Bilateral 6/10/2022    Procedure: CRANIECTOMY;  Surgeon: Martell Downs MD;  Location:  [facility];  Service: Neurosurgery;  Laterality: Bilateral;   • CRANIOTOMY FOR TUMOR Right 5/10/2022    Procedure: CRANIOTOMY FOR TUMOR STERIOTACTIC WITH BRAIN LAB, right;  Surgeon: Martell Downs MD;  Location:  [facility];  Service: Neurosurgery;  Laterality: Right;   • ENDOSCOPY W/ PEG TUBE PLACEMENT N/A 6/2/2022    Procedure: ESOPHAGOGASTRODUODENOSCOPY WITH PERCUTANEOUS ENDOSCOPIC GASTROSTOMY TUBE INSERTION WITH ANESTHESIA;  Surgeon: Melecio Lu MD;  Location:  [facility];  Service: Gastroenterology;  Laterality: N/A;   • NECK SURGERY     • SKIN CANCER EXCISION     • TONSILLECTOMY     • TRACHEOSTOMY N/A 6/2/2022    Procedure: TRACHEOSTOMY;  Surgeon: Geovany Davidson MD;  Location:  [facility];  Service: ENT;  Laterality: N/A;   •  SHUNT INSERTION Right 6/3/2022    Procedure: VENTRICULAR PERITONEAL SHUNT INSERTION WITH BRAIN LAB;  Surgeon: Martell Downs MD;  Location:  [facility];  Service: Neurosurgery;  Laterality: Right;     PT Assessment (last 12 hours)     PT Evaluation and Treatment     Row Name 06/18/22 1037          Physical Therapy Time and Intention    Subjective Information --  unresponsive  -PAULINE     Document Type therapy note (daily note)  -PAULINE     Mode of Treatment physical therapy  -PAULINE     Row Name 06/18/22 1037          General Information    Existing Precautions/Restrictions fall  vent trach  -PAULINE     Row Name 06/18/22 1037          Pain Scale: FACES Pre/Post-Treatment    Pain: FACES Scale, Pretreatment 0-->no hurt  -PAULINE     Posttreatment Pain Rating 0-->no hurt  -PAULINE     Row Name 06/18/22 1037          Motor Skills    Comments, Therapeutic Exercise PROM to all 4 extremities  -PAULINE     Row Name             Wound 05/15/22 1712 coccyx    Wound - Properties Group Placement Date: 05/15/22  -KS Placement Time: 1712 -[facility]  -[facility]     Retired Wound - Properties Group Placement  Date: 05/15/22  -KS Placement Time: 1712 -KS Location: coccyx  -KS     Retired Wound - Properties Group Date first assessed: 05/15/22  -KS Time first assessed: 1712 -KS Location: coccyx  -KS     Row Name             Wound 05/17/22 1623 scalp Pressure Injury    Wound - Properties Group Placement Date: 05/17/22  -KS Placement Time: 1623  -KS Present on Hospital Admission: N  -KS Location: scalp  -KS Primary Wound Type: Pressure inj  -KS     Retired Wound - Properties Group Placement Date: 05/17/22  -KS Placement Time: 1623  -KS Present on Hospital Admission: N  -KS Location: scalp  -KS Primary Wound Type: Pressure inj  -KS     Retired Wound - Properties Group Date first assessed: 05/17/22  -KS Time first assessed: 1623  -KS Present on Hospital Admission: N  -KS Location: scalp  -KS Primary Wound Type: Pressure inj  -KS     Row Name             Wound 06/03/22 1602 abdomen Incision    Wound - Properties Group Placement Date: 06/03/22  -MK Placement Time: 1602  -MK Present on Hospital Admission: N  -MK Location: abdomen  -MK Primary Wound Type: Incision  -MK     Retired Wound - Properties Group Placement Date: 06/03/22  -MK Placement Time: 1602  -MK Present on Hospital Admission: N  -MK Location: abdomen  -MK Primary Wound Type: Incision  -MK     Retired Wound - Properties Group Date first assessed: 06/03/22  -MK Time first assessed: 1602  -MK Present on Hospital Admission: N  -MK Location: abdomen  -MK Primary Wound Type: Incision  -MK     Row Name             Wound 06/10/22 1409 Right scalp Incision    Wound - Properties Group Placement Date: 06/10/22  -JBA Placement Time: 1409  -JBA Side: Right  -JBA Location: scalp  -JBA Primary Wound Type: Incision  -JBA     Retired Wound - Properties Group Placement Date: 06/10/22  -JBA Placement Time: 1409  -JBA Side: Right  -JBA Location: scalp  -JBA Primary Wound Type: Incision  -JBA     Retired Wound - Properties Group Date first assessed: 06/10/22  -JBA Time first assessed:  1409  -JBA Side: Right  -JBA Location: scalp  -JBA Primary Wound Type: Incision  -JBA     Row Name             Wound 06/11/22 2259 throat    Wound - Properties Group Placement Date: 06/11/22  -PC Placement Time: 2259 -PC Present on Hospital Admission: N  -PC Location: throat  -PC     Retired Wound - Properties Group Placement Date: 06/11/22  -PC Placement Time: 2259  -PC Present on Hospital Admission: N  -PC Location: throat  -PC     Retired Wound - Properties Group Date first assessed: 06/11/22  -PC Time first assessed: 2259 -PC Present on Hospital Admission: N  -PC Location: throat  -PC     Row Name 06/18/22 1037          Positioning and Restraints    Pre-Treatment Position in bed  -PAULINE     Post Treatment Position bed  -PAULINE     In Bed supine;patient within staff view;side rails up x2  -PAULINE           User Key  (r) = Recorded By, (t) = Taken By, (c) = Cosigned By    Initials Name Provider Type    PAULINE Scotty Calixto, BRENDAN Physical Therapist Assistant    Carmen Mistry RN Registered Nurse    Surya Darling RN Registered Nurse    Jayna Treviño RN Registered Nurse    Leatha Solomon RN Registered Nurse                Physical Therapy Education                 Title: PT OT SLP Therapies (In Progress)     Topic: Physical Therapy (In Progress)     Point: Mobility training (In Progress)     Learning Progress Summary           Patient Acceptance, E, NL by PAULINE at 6/18/2022 1035    Acceptance, E, VU,DU by YULIET at 5/11/2022 0745    Comment: benefits of activity, progression of PT POC                   Point: Home exercise program (In Progress)     Learning Progress Summary           Patient Acceptance, E, NR by YULIET at 6/17/2022 0830    Comment: benefits of activity, progression of PT POC    Acceptance, E, NR by YULIET at 6/7/2022 1345    Comment: Benefits of activity, B UE/LE exercises, positioning for joints and skin integrity                   Point: Body mechanics (Not Started)     Learner Progress:  Not documented  in this visit.          Point: Precautions (Not Started)     Learner Progress:  Not documented in this visit.                      User Key     Initials Effective Dates Name Provider Type Discipline    YULIET 08/02/16 -  Wayne Thomas, PT DPT Physical Therapist PT    PAULINE 12/08/16 -  Scotty Calixto, PTA Physical Therapist Assistant PT              PT Recommendation and Plan     Plan of Care Reviewed With: patient  Progress: no change  Outcome Evaluation: PROM performed to all BUE and BLE   Outcome Measures     Row Name 06/18/22 1037 06/16/22 1000          How much help from another person do you currently need...    Turning from your back to your side while in flat bed without using bedrails? 1  -PAULINE 1  -TB     Moving from lying on back to sitting on the side of a flat bed without bedrails? 1  -PAULINE 1  -TB     Moving to and from a bed to a chair (including a wheelchair)? 1  -PAULINE 1  -TB     Standing up from a chair using your arms (e.g., wheelchair, bedside chair)? 1  -PAULINE 1  -TB     Climbing 3-5 steps with a railing? 1  -PAULINE 1  -TB     To walk in hospital room? 1  -PAULINE 1  -TB     AM-PAC 6 Clicks Score (PT) 6  -PAULINE 6  -TB            Functional Assessment    Outcome Measure Options AM-PAC 6 Clicks Basic Mobility (PT)  -PAULINE AM-PAC 6 Clicks Basic Mobility (PT)  -TB           User Key  (r) = Recorded By, (t) = Taken By, (c) = Cosigned By    Initials Name Provider Type    PAULINE Scotty Calixto, PTA Physical Therapist Assistant    Armando Cervantes, PTA Physical Therapist Assistant                 Time Calculation:    PT Charges     Row Name 06/18/22 1037             Time Calculation    Start Time 1037  -PAULINE      Stop Time 1052  -PAULINE      Time Calculation (min) 15 min  -PAULINE      PT Received On 06/18/22  -PAULINE              Time Calculation- PT    Total Timed Code Minutes- PT 15 minute(s)  -PAULINE              Timed Charges    54785 - PT Therapeutic Exercise Minutes 15  -PAULINE              Total Minutes    Timed Charges Total Minutes 15   -PAULINE       Total Minutes 15  -PAULINE            User Key  (r) = Recorded By, (t) = Taken By, (c) = Cosigned By    Initials Name Provider Type    Scotty Palmer PTA Physical Therapist Assistant              Therapy Charges for Today     Code Description Service Date Service Provider Modifiers Qty    [id number] HC PT THER PROC EA 15 MIN 6/18/2022 Scotty Calixto PTA GP 1          PT G-Codes  Outcome Measure Options: AM-PAC 6 Clicks Basic Mobility (PT)  AM-PAC 6 Clicks Score (PT): 6  AM-PAC 6 Clicks Score (OT): 12    Scotty Calixto PTA  6/18/2022

## 2022-06-18 NOTE — PROGRESS NOTES
[facility] Medicine Services  INPATIENT PROGRESS NOTE    Patient Name: Hai Hernandez  Date of Admission: 5/10/2022  Today's Date: 06/18/22  Length of Stay: 39  Primary Care Physician: Elisa Chacko MD    Subjective   Chief Complaint: Intubated  HPI   Intubated.  Off sedation  Afebrile   BP persistently elevated in the 160's       Review of Systems   Unable to perform ROS: Intubated        All pertinent negatives and positives are as above. All other systems have been reviewed and are negative unless otherwise stated.     Objective    Temp:  [98.4 °F (36.9 °C)-98.9 °F (37.2 °C)] 98.5 °F (36.9 °C)  Heart Rate:  [61-84] 70  Resp:  [21-28] 26  BP: (140-187)/() 144/77  FiO2 (%):  [30 %-35 %] 35 %  Physical Exam  Vitals reviewed.   Constitutional:       Appearance: He is well-developed.   HENT:      Head: Normocephalic and atraumatic.      Right Ear: External ear normal.      Left Ear: External ear normal.      Nose: Nose normal.   Eyes:      General: No scleral icterus.        Right eye: No discharge.         Left eye: No discharge.      Conjunctiva/sclera: Conjunctivae normal.      Pupils: Pupils are equal, round, and reactive to light.   Neck:      Thyroid: No thyromegaly.      Trachea: No tracheal deviation.   Cardiovascular:      Rate and Rhythm: Normal rate and regular rhythm.      Heart sounds: Normal heart sounds. No murmur heard.    No friction rub. No gallop.   Pulmonary:      Effort: Pulmonary effort is normal. No respiratory distress.      Breath sounds: Normal breath sounds. No stridor. No wheezing or rales.   Chest:      Chest wall: No tenderness.   Abdominal:      General: Bowel sounds are normal. There is no distension.      Palpations: Abdomen is soft. There is no mass.      Tenderness: There is no abdominal tenderness. There is no guarding or rebound.      Hernia: No hernia is present.   Musculoskeletal:         General: No deformity. Normal range of  motion.      Cervical back: Normal range of motion and neck supple.   Lymphadenopathy:      Cervical: No cervical adenopathy.   Skin:     General: Skin is warm and dry.      Coloration: Skin is not pale.      Findings: No erythema or rash.   Neurological:      Comments: Level of consciousness: arousable by verbal stimuli ,  drowsy  Off propofol.  Intermittently opens eyes to painful stimuli.  Does not follow commands.  Nonverbal.  Weakly withdraws to tactile stimuli.            Cranial Nerves      CN III, IV, VI   Pupils are equal, round, and reactive to light.  Extraocular motions are normal.      CN IX, X   CN IX normal.         Psychiatric:         Behavior: Behavior normal.         Thought Content: Thought content normal.         Judgment: Judgment normal.           Results Review:  I have reviewed the labs, radiology results, and diagnostic studies.    Laboratory Data:   Results from last 7 days   Lab Units 06/17/22  0242 06/16/22  0358 06/15/22  0426   WBC 10*3/mm3 8.35 9.48 8.84   HEMOGLOBIN g/dL 8.1* 7.7* 7.5*   HEMATOCRIT % 25.4* 25.1* 24.7*   PLATELETS 10*3/mm3 219 215 195        Results from last 7 days   Lab Units 06/18/22  0812 06/17/22  0242 06/16/22  0358   SODIUM mmol/L 136 139 139   POTASSIUM mmol/L 4.0 3.9 3.9   CHLORIDE mmol/L 103 104 104   CO2 mmol/L 29.0 29.0 29.0   BUN mg/dL 27* 26* 32*   CREATININE mg/dL 0.42* 0.43* 0.44*   CALCIUM mg/dL 8.1* 8.0* 8.1*   BILIRUBIN mg/dL 0.4 0.5 0.4   ALK PHOS U/L 406* 420* 412*   ALT (SGPT) U/L 113* 129* 142*   AST (SGOT) U/L 84* 100* 117*   GLUCOSE mg/dL 155* 120* 145*       Culture Data:   Wound Culture   Date Value Ref Range Status   06/10/2022 No growth at 3 days  Final   06/10/2022 No growth at 3 days  Final   06/10/2022 No growth at 3 days  Final     Respiratory Culture   Date Value Ref Range Status   06/10/2022   Preliminary    Rare The culture consists of normal respiratory annia. This is a preliminary report; final report to follow.       Radiology  Data:   Imaging Results (Last 24 Hours)     Procedure Component Value Units Date/Time    XR Chest 1 View [[id number]] Collected: 06/18/22 0819     Updated: 06/18/22 0824    Narrative:      EXAMINATION: XR CHEST 1 VW- 6/18/2022 8:19 AM CDT     HISTORY: On vent; R13.10-Dysphagia, unspecified; Z01.818-Encounter for  other preprocedural examination; D32.9-Benign neoplasm of meninges,  unspecified; Z74.09-Other reduced mobility; Z78.9-Other specified health  status; G40.901-Epilepsy, unspecified, not intractable, with status  epilepticus; J96.01-Acute respiratory failure with hypoxia;  G91.0-Communicating hydrocephalus; T81.40XA-Infection following a pr.     REPORT: A frontal view the chest was obtained.     COMPARISON: Chest x-ray 6/17/2022 0303 hours.     The tracheostomy tube appears in satisfactory position as before. There  is a left internal jugular central line, tip projects over the mid SVC,  good position. This is unchanged. There is volume loss in the lung  bases, with partial consolidation of the left base, slightly improved.  No focal infiltrate is seen in the right lung. Heart size appears to be  normal. Pleural thickening and/or effusion is seen in the left  hemithorax. This appears stable. Numerous tiny calcified granulomas are  present throughout both lungs. A  shunt is noted over the right chest.       Impression:      Persistent but mildly improved consolidation of the medial  left lung base, with probable small persistent pleural effusion or  pleural thickening on the left. Stable satisfactory position of the  tracheostomy tube and left internal jugular central line.  This report was finalized on 06/18/2022 08:21 by Dr. Antony Thomas MD.          I have reviewed the patient's current medications.     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  Meningioma  -NS managing   Status post postcraniotomy for meningioma  S/p Craniectomy for cerebral edema and possible infection    2.  Status Epilepticus/Seizures  -Cerebyx  -Vimpat  -Dilantin  -Neurology following    3.  Acute Respiratory failure with prolonged mechanical ventilation s/p Trach and PEG  -Pulm following    4.  Hydrocephalus/Cerebral Edema  s/p Craniectomy for cerebral edema and possible infection  -NS managing  -Decadron  -Lumbar drain removed  -s/p Right Posterial parietal  shunt placement    5.  Left pleural effusion s/p Chest tube which has since been removed without issue  -monitor     6.  A-Fib  -Coreg  -Not able to tolerate AC    7.  RUE DVT  -monitor  -not able to tolerated AC     8.  LUE Superficial thrombophlebitis  -supportive care    9.  HTN  -Cardene  -Lisinopril  -Coreg  -Norvasc added      Discharge Planning: I expect the patient to be discharged to LTAC in ? days  Electronically signed by Sha Beatty MD, 06/18/22, 10:33 CDT.

## 2022-06-18 NOTE — PROGRESS NOTES
NEUROSURGERY DAILY PROGRESS NOTE    ASSESSMENT:   Hai Hernandez is a 77 y.o. with a significant comorbidity of acephalic migraines, thyroid disease status post radiation as a child, basal cell and squamous cell carcinoma of the skin. He presents in FU for known meningioma found on workup for right visual field changes. Physical exam findings of neurologically intact with resolution of all symptoms and mild decreased vision in right eye.  His imaging shows 22 x 24 x 13.5 mm right planum sphenoidale mass most suggestive of meningioma.    Past Medical History:   Diagnosis Date   • Brain tumor (HCC)    • Depression    • Hearing loss    • History of cellulitis     right foot big toe   • Hypertension    • Pneumonia    • Right arm weakness     after cervial injury   • Sciatic pain     affects balance   • Thyroid disease    • Visual disturbance     due to brain tumor - right eye     Active Hospital Problems    Diagnosis    • **Meningioma (HCC)    • Acute deep vein thrombosis (DVT) of the ulnar and brachial veins of right upper extremity (HCC)    • Loculated pleural effusion    • Fever    • PAF (paroxysmal atrial fibrillation) (HCC)    • Cerebral parenchymal hemorrhage (HCC)    • Essential hypertension    • Type 2 diabetes mellitus with hyperglycemia, without long-term current use of insulin (HCC)    • Hypothyroidism (acquired)    • Acute respiratory failure (secondary to status epilepticus)    • Thrombocytopenia (HCC)    • 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  Grimaces to painful stimuli.  Does not follow commands.  Nonverbal.  Weakly withdraws to tactile  stimuli.    Intracranial meningioma   POD #38 (5/10/2022) Status post postcraniotomy for meningioma   POD#8 (6/10/2022) Craniectomy for cerebral edema and possible infection   CT reviewed and stable.  Small blood products at site of brain biopsy   Neurochecks per policy   Start Decadron taper   Wound C,D,I   Craniectomy precautions     Hydrocephalus   CSF unremarkable from 5/17, 5/23 and 6/10.    Lumbar drain removed 6/3/2022   POD# 15 (6/3/2022) right posterior parietal  shunt placement    MEDTRONIC programmable valve set to 0.5   Shunt pumps and refills well    Postop seizures, in status   Neurology managing   Cont Keppra, Dialntin, Vimpat; PRN Ativan   Follow dilantin levels   Repeat EEG: Slowing but no epileptiform activity     Antiseizure medications tapering instructions per neurology:    Fosphenytoin discontinued    Change Keppra to: Keppra 750 mg twice daily by 4 days, then 500 mg twice daily by 4 days, then discontinue.    Change Vimpat to: Vimpat 200 mg twice daily per PEG tube to be continued indefinitely.    CV: Cardene off   Keep SBP <160.   Intermittent use of hydralazine and labetalol PRNs  Pulm: Tracheostomy  Placed (6/2/2022).  Appreciate ENT   Left chest tubes removed  : May DC Mccrary and place [place] catheter  FEN: Poor glucose control.  Increase SSI  ENDO: Accu-Chek and treat per policy  GI: PEG tube placed (6/2/2022).  Appreciate GI   No BM in several days.  KUB concerning for ileus formation.  Increased rectal stimulation  ID: Afebrile.  Continue broad spectrum abx, Vanc and Cefepime   CSF cultures NGTD  Heme:  DVT prophylaxis with SCD's.  Can not anticoagulate   Superficial thrombus to left cephalic vein   Hemoglobin 7.5.  We will continue to monitor.  Pain: NA  Dispo: DC pending acceptance to [facility].    CHIEF COMPLAINT:   Right visual field changes    Subjective  Symptom stable    Temp:  [98.4 °F (36.9 °C)-98.9 °F (37.2 °C)] 98.5 °F (36.9 °C)  Heart Rate:  [61-84] 81  Resp:   "[21-29] 25  BP: (138-187)/() 162/78  FiO2 (%):  [30 %-35 %] 35 %    Objective:  General Appearance:  Well-appearing and in no acute distress.    Vital signs: (most recent): Blood pressure 162/78, pulse 81, temperature 98.5 °F (36.9 °C), temperature source Axillary, resp. rate 25, height 182.9 cm (72\"), weight 109 kg (241 lb), SpO2 99 %.      Neurologic Exam     Mental Status   Level of consciousness: arousable by verbal stimuli ,  drowsy  Off propofol.  Does not follow commands.  Nonverbal.  Weakly withdraws to tactile stimuli.     Cranial Nerves     CN III, IV, VI   Pupils are equal, round, and reactive to light.  Extraocular motions are normal.     CN IX, X   CN IX normal.     Gait, Coordination, and Reflexes     Tremor   Resting tremor: absent  Intention tremor: absent  Action tremor: absent    Drains:   Urethral Catheter Non-latex;Silicone 16 Fr. (Active)       Output by Drain (mL) 06/17/22 0701 - 06/17/22 1900 06/17/22 1901 - 06/18/22 0700 06/18/22 0701 - 06/18/22 0935 Range Total   Requested LDAs do not have output data documented.       Imaging Results (Last 24 Hours)     Procedure Component Value Units Date/Time    XR Chest 1 View [[id number]] Collected: 06/18/22 0819     Updated: 06/18/22 0824    Narrative:      EXAMINATION: XR CHEST 1 VW- 6/18/2022 8:19 AM CDT     HISTORY: On vent; R13.10-Dysphagia, unspecified; Z01.818-Encounter for  other preprocedural examination; D32.9-Benign neoplasm of meninges,  unspecified; Z74.09-Other reduced mobility; Z78.9-Other specified health  status; G40.901-Epilepsy, unspecified, not intractable, with status  epilepticus; J96.01-Acute respiratory failure with hypoxia;  G91.0-Communicating hydrocephalus; T81.40XA-Infection following a pr.     REPORT: A frontal view the chest was obtained.     COMPARISON: Chest x-ray 6/17/2022 0303 hours.     The tracheostomy tube appears in satisfactory position as before. There  is a left internal jugular central line, tip projects " over the mid SVC,  good position. This is unchanged. There is volume loss in the lung  bases, with partial consolidation of the left base, slightly improved.  No focal infiltrate is seen in the right lung. Heart size appears to be  normal. Pleural thickening and/or effusion is seen in the left  hemithorax. This appears stable. Numerous tiny calcified granulomas are  present throughout both lungs. A  shunt is noted over the right chest.       Impression:      Persistent but mildly improved consolidation of the medial  left lung base, with probable small persistent pleural effusion or  pleural thickening on the left. Stable satisfactory position of the  tracheostomy tube and left internal jugular central line.  This report was finalized on 06/18/2022 08:21 by Dr. Antnoy Thomas MD.        Lab Results (last 24 hours)     Procedure Component Value Units Date/Time    Comprehensive Metabolic Panel [[id number]]  (Abnormal) Collected: 06/18/22 0812    Specimen: Blood Updated: 06/18/22 0845     Glucose 155 mg/dL      BUN 27 mg/dL      Creatinine 0.42 mg/dL      Sodium 136 mmol/L      Potassium 4.0 mmol/L      Chloride 103 mmol/L      CO2 29.0 mmol/L      Calcium 8.1 mg/dL      Total Protein 5.9 g/dL      Albumin 2.30 g/dL      ALT (SGPT) 113 U/L      AST (SGOT) 84 U/L      Alkaline Phosphatase 406 U/L      Total Bilirubin 0.4 mg/dL      Globulin 3.6 gm/dL      A/G Ratio 0.6 g/dL      BUN/Creatinine Ratio 64.3     Anion Gap 4.0 mmol/L      eGFR 110.7 mL/min/1.73      Comment: National Kidney Foundation and American Society of Nephrology (ASN) Task Force recommended calculation based on the Chronic Kidney Disease Epidemiology Collaboration (CKD-EPI) equation refit without adjustment for race.       Narrative:      GFR Normal >60  Chronic Kidney Disease <60  Kidney Failure <15      CBC & Differential [[id number]] Updated: 06/18/22 0826    Specimen: Blood     Narrative:      The following orders were created for panel order CBC  & Differential.  Procedure                               Abnormality         Status                     ---------                               -----------         ------                     CBC Auto Differential[[id number]]                                                         Please view results for these tests on the individual orders.    CBC Auto Differential [[id number]] Updated: 06/18/22 0826    Specimen: Blood     POC Glucose Once [[id number]]  (Abnormal) Collected: 06/18/22 0632    Specimen: Blood Updated: 06/18/22 0644     Glucose 133 mg/dL      Comment: : [id number] Jose Guadalupe AdamMeter ID: [id number]       POC Glucose Once [[id number]]  (Abnormal) Collected: 06/18/22 0030    Specimen: Blood Updated: 06/18/22 0041     Glucose 152 mg/dL      Comment: : [id number] Jose Guadalupe AdamMeter ID: [id number]       POC Glucose Once [[id number]]  (Abnormal) Collected: 06/17/22 1830    Specimen: Blood Updated: 06/17/22 1841     Glucose 148 mg/dL      Comment: : [id number] Rip ShahoryMeter ID: [id number]       Fungus Culture - Tissue, Brain [[id number]] Collected: 06/10/22 1524    Specimen: Tissue from Brain Updated: 06/17/22 1633     Fungus Culture No fungus isolated at 1 week    AFB Culture - Tissue, Brain [[id number]] Collected: 06/10/22 1524    Specimen: Tissue from Brain Updated: 06/17/22 1633     AFB Culture No AFB isolated at 1 week     AFB Stain No acid fast bacilli seen on direct smear      No acid fast bacilli seen on concentrated smear    HSV Type 1 AB IgG CSF Reflexed - Cerebrospinal Fluid, Lumbar Puncture [[id number]] Collected: 06/10/22 0729    Specimen: Cerebrospinal Fluid from Lumbar Puncture Updated: 06/17/22 1512     HSV Type 1 Ab IgG, CSF 0.04 IV      Comment: Specimen is xanthochromic. Results may be adversely affected.  INTERPRETIVE INFORMATION: Herpes Simplex Virus Type 1                            Glycoprotein G-Specific Antibody,                            IgG by ANDRE, CSF    0.89 IV or  Less ...... Negative: No significant level of                           detectable IgG antibody to HSV                           type 1 glycoprotein G.    0.90 - 1.10 IV ....... Equivocal: Questionable presence                           of IgG antibody to HSV type 1.                           Repeat testing in 10-14 days may                           be helpful.    1.11 IV or Greater ... Positive: IgG antibody to HSV                           type 1 glycoprotein G detected,                           which may indicate a current                           or past infection.  Individuals infected with HSV may not exhibit detectable IgG  antibody to type specific HSV antigens 1 and 2 in the early stages  of infection. Detection of antibody presence in these cases may  only be possible using a nontype-specific screening test.  The detection of antibodies to herpes simplex virus in CSF may  indicate central nervous system infection. However, consideration  must be given to possible contamination by blood or transfer of  serum antibodies across the blood-brain barrier.  Fourfold or greater rise in CSF antibodies to herpes on specimens  at least 4 weeks apart are found in 74-94 percent of patients with  herpes encephalitis. Specificity of the test based on a single CSF  testing is not established. Presently PCR is the primary means of  establishing a diagnosis of herpes encephalitis.  This test was developed and its performance characteristics  determined by [facility]. It has not been cleared or  approved by the [place] Food and Drug Administration. This test was  performed in a CLIA certified laboratory and is intended for  clinical purposes.       Narrative:      Performed at:  01 - [facility] [address]  [id number]  : Juany Perez MD, Phone:  [phone]    Fungus Culture - Tissue, Brain, Cerebral Cortex [[id number]] Collected: 06/10/22 7656    Specimen: Tissue from Brain,  Cerebral Cortex Updated: 06/17/22 1433     Fungus Culture No fungus isolated at 1 week    AFB Culture - Tissue, Brain, Cerebral Cortex [[id number]] Collected: 06/10/22 1327    Specimen: Tissue from Brain, Cerebral Cortex Updated: 06/17/22 1433     AFB Culture No AFB isolated at 1 week     AFB Stain No acid fast bacilli seen on direct smear      No acid fast bacilli seen on concentrated smear    Fungus Culture - Tissue, Brain [[id number]] Collected: 06/10/22 1326    Specimen: Tissue from Brain Updated: 06/17/22 1417     Fungus Culture No fungus isolated at 1 week    AFB Culture - Tissue, Brain [[id number]] Collected: 06/10/22 1326    Specimen: Tissue from Brain Updated: 06/17/22 1417     AFB Culture No AFB isolated at 1 week     AFB Stain No acid fast bacilli seen on direct smear      No acid fast bacilli seen on concentrated smear    POC Glucose Once [[id number]]  (Abnormal) Collected: 06/17/22 1257    Specimen: Blood Updated: 06/17/22 1309     Glucose 146 mg/dL      Comment: : [id number] Rip Jenkins ID: [id number]           [id number]  Stevie Parsons, APRN

## 2022-06-18 NOTE — PLAN OF CARE
Goal Outcome Evaluation:  Plan of Care Reviewed With: patient        Progress: no change  Outcome Evaluation: PROM performed to all BUE and BLE

## 2022-06-18 NOTE — SIGNIFICANT NOTE
06/18/22 0658   Readings   PEEP Intrinsic (cm H2O) 4.8 cm H2O   Plateau Pressure (cm H2O) 25 cm H2O   Driving Pressure (cm H2O) 20.4 cm H2O   Static Compliance (L/cm H2O) 19   Dynamic Compliance (L/cm H2O) 41 L/cm H2O

## 2022-06-19 ENCOUNTER — APPOINTMENT (OUTPATIENT)
Dept: GENERAL RADIOLOGY | Facility: HOSPITAL | Age: 77
End: 2022-06-19

## 2022-06-19 LAB
ALBUMIN SERPL-MCNC: 2.1 G/DL (ref 3.5–5.2)
ALBUMIN/GLOB SERPL: 0.5 G/DL
ALP SERPL-CCNC: 418 U/L (ref 39–117)
ALT SERPL W P-5'-P-CCNC: 131 U/L (ref 1–41)
ANION GAP SERPL CALCULATED.3IONS-SCNC: 7 MMOL/L (ref 5–15)
AST SERPL-CCNC: 103 U/L (ref 1–40)
BASOPHILS # BLD AUTO: 0.06 10*3/MM3 (ref 0–0.2)
BASOPHILS NFR BLD AUTO: 0.6 % (ref 0–1.5)
BILIRUB SERPL-MCNC: 0.4 MG/DL (ref 0–1.2)
BUN SERPL-MCNC: 24 MG/DL (ref 8–23)
BUN/CREAT SERPL: 57.1 (ref 7–25)
CALCIUM SPEC-SCNC: 7.8 MG/DL (ref 8.6–10.5)
CHLORIDE SERPL-SCNC: 103 MMOL/L (ref 98–107)
CO2 SERPL-SCNC: 27 MMOL/L (ref 22–29)
CREAT SERPL-MCNC: 0.42 MG/DL (ref 0.76–1.27)
CRP SERPL-MCNC: 13.09 MG/DL (ref 0–0.5)
DEPRECATED RDW RBC AUTO: 65.1 FL (ref 37–54)
EGFRCR SERPLBLD CKD-EPI 2021: 110.7 ML/MIN/1.73
EOSINOPHIL # BLD AUTO: 0.44 10*3/MM3 (ref 0–0.4)
EOSINOPHIL NFR BLD AUTO: 4.1 % (ref 0.3–6.2)
ERYTHROCYTE [DISTWIDTH] IN BLOOD BY AUTOMATED COUNT: 17.3 % (ref 12.3–15.4)
GLOBULIN UR ELPH-MCNC: 4 GM/DL
GLUCOSE BLDC GLUCOMTR-MCNC: 143 MG/DL (ref 70–130)
GLUCOSE BLDC GLUCOMTR-MCNC: 153 MG/DL (ref 70–130)
GLUCOSE BLDC GLUCOMTR-MCNC: 156 MG/DL (ref 70–130)
GLUCOSE BLDC GLUCOMTR-MCNC: 174 MG/DL (ref 70–130)
GLUCOSE SERPL-MCNC: 158 MG/DL (ref 65–99)
HCT VFR BLD AUTO: 27.7 % (ref 37.5–51)
HGB BLD-MCNC: 8.5 G/DL (ref 13–17.7)
IMM GRANULOCYTES # BLD AUTO: 0.23 10*3/MM3 (ref 0–0.05)
IMM GRANULOCYTES NFR BLD AUTO: 2.2 % (ref 0–0.5)
LYMPHOCYTES # BLD AUTO: 0.83 10*3/MM3 (ref 0.7–3.1)
LYMPHOCYTES NFR BLD AUTO: 7.8 % (ref 19.6–45.3)
MCH RBC QN AUTO: 32.1 PG (ref 26.6–33)
MCHC RBC AUTO-ENTMCNC: 30.7 G/DL (ref 31.5–35.7)
MCV RBC AUTO: 104.5 FL (ref 79–97)
MONOCYTES # BLD AUTO: 1.33 10*3/MM3 (ref 0.1–0.9)
MONOCYTES NFR BLD AUTO: 12.5 % (ref 5–12)
NEUTROPHILS NFR BLD AUTO: 7.76 10*3/MM3 (ref 1.7–7)
NEUTROPHILS NFR BLD AUTO: 72.8 % (ref 42.7–76)
NRBC BLD AUTO-RTO: 0 /100 WBC (ref 0–0.2)
PLATELET # BLD AUTO: 236 10*3/MM3 (ref 140–450)
PMV BLD AUTO: 9.5 FL (ref 6–12)
POTASSIUM SERPL-SCNC: 4 MMOL/L (ref 3.5–5.2)
PROT SERPL-MCNC: 6.1 G/DL (ref 6–8.5)
RBC # BLD AUTO: 2.65 10*6/MM3 (ref 4.14–5.8)
SODIUM SERPL-SCNC: 137 MMOL/L (ref 136–145)
VANCOMYCIN TROUGH SERPL-MCNC: 15.6 MCG/ML (ref 5–20)
WBC NRBC COR # BLD: 10.65 10*3/MM3 (ref 3.4–10.8)

## 2022-06-19 PROCEDURE — 99232 SBSQ HOSP IP/OBS MODERATE 35: CPT | Performed by: CLINICAL NURSE SPECIALIST

## 2022-06-19 PROCEDURE — 63710000001 INSULIN DETEMIR PER 5 UNITS: Performed by: NEUROLOGICAL SURGERY

## 2022-06-19 PROCEDURE — 25010000002 CEFEPIME PER 500 MG: Performed by: NEUROLOGICAL SURGERY

## 2022-06-19 PROCEDURE — 63710000001 INSULIN REGULAR HUMAN PER 5 UNITS: Performed by: NEUROLOGICAL SURGERY

## 2022-06-19 PROCEDURE — 25010000002 HYDRALAZINE PER 20 MG: Performed by: NEUROLOGICAL SURGERY

## 2022-06-19 PROCEDURE — 71045 X-RAY EXAM CHEST 1 VIEW: CPT

## 2022-06-19 PROCEDURE — 94799 UNLISTED PULMONARY SVC/PX: CPT

## 2022-06-19 PROCEDURE — 99024 POSTOP FOLLOW-UP VISIT: CPT | Performed by: NURSE PRACTITIONER

## 2022-06-19 PROCEDURE — 82962 GLUCOSE BLOOD TEST: CPT

## 2022-06-19 PROCEDURE — 85025 COMPLETE CBC W/AUTO DIFF WBC: CPT | Performed by: NEUROLOGICAL SURGERY

## 2022-06-19 PROCEDURE — 25010000002 VANCOMYCIN 10 G RECONSTITUTED SOLUTION: Performed by: NEUROLOGICAL SURGERY

## 2022-06-19 PROCEDURE — 94003 VENT MGMT INPAT SUBQ DAY: CPT

## 2022-06-19 PROCEDURE — 99233 SBSQ HOSP IP/OBS HIGH 50: CPT | Performed by: INTERNAL MEDICINE

## 2022-06-19 PROCEDURE — 0 LEVETIRACETAM IN NACL 0.75% 1000 MG/100ML SOLUTION: Performed by: NEUROLOGICAL SURGERY

## 2022-06-19 PROCEDURE — 86140 C-REACTIVE PROTEIN: CPT | Performed by: NEUROLOGICAL SURGERY

## 2022-06-19 PROCEDURE — 80202 ASSAY OF VANCOMYCIN: CPT | Performed by: NEUROLOGICAL SURGERY

## 2022-06-19 PROCEDURE — 80053 COMPREHEN METABOLIC PANEL: CPT | Performed by: NEUROLOGICAL SURGERY

## 2022-06-19 PROCEDURE — 25010000002 HEPARIN (PORCINE) PER 1000 UNITS: Performed by: NEUROLOGICAL SURGERY

## 2022-06-19 PROCEDURE — 94761 N-INVAS EAR/PLS OXIMETRY MLT: CPT

## 2022-06-19 RX ORDER — HYDRALAZINE HYDROCHLORIDE 25 MG/1
25 TABLET, FILM COATED ORAL EVERY 8 HOURS SCHEDULED
Status: DISCONTINUED | OUTPATIENT
Start: 2022-06-19 | End: 2022-06-20 | Stop reason: HOSPADM

## 2022-06-19 RX ADMIN — HEPARIN SODIUM 5000 UNITS: 5000 INJECTION INTRAVENOUS; SUBCUTANEOUS at 20:08

## 2022-06-19 RX ADMIN — LISINOPRIL 40 MG: 20 TABLET ORAL at 08:09

## 2022-06-19 RX ADMIN — HEPARIN SODIUM 5000 UNITS: 5000 INJECTION INTRAVENOUS; SUBCUTANEOUS at 08:10

## 2022-06-19 RX ADMIN — CARVEDILOL 12.5 MG: 6.25 TABLET, FILM COATED ORAL at 17:21

## 2022-06-19 RX ADMIN — INSULIN DETEMIR 20 UNITS: 100 INJECTION, SOLUTION SUBCUTANEOUS at 20:08

## 2022-06-19 RX ADMIN — Medication 1 PACKET: at 11:46

## 2022-06-19 RX ADMIN — HYDRALAZINE HYDROCHLORIDE 10 MG: 20 INJECTION INTRAMUSCULAR; INTRAVENOUS at 08:51

## 2022-06-19 RX ADMIN — Medication 45 ML: at 08:11

## 2022-06-19 RX ADMIN — SODIUM CHLORIDE 10 MG/HR: 9 INJECTION, SOLUTION INTRAVENOUS at 00:54

## 2022-06-19 RX ADMIN — Medication 1 PACKET: at 17:21

## 2022-06-19 RX ADMIN — MUPIROCIN 1 APPLICATION: 20 OINTMENT TOPICAL at 20:08

## 2022-06-19 RX ADMIN — HYDRALAZINE HYDROCHLORIDE 25 MG: 25 TABLET, FILM COATED ORAL at 21:40

## 2022-06-19 RX ADMIN — LEVETIRACETAM 1000 MG: 10 INJECTION INTRAVENOUS at 20:08

## 2022-06-19 RX ADMIN — AMLODIPINE BESYLATE 10 MG: 10 TABLET ORAL at 08:09

## 2022-06-19 RX ADMIN — Medication 45 ML: at 20:08

## 2022-06-19 RX ADMIN — GUAIFENESIN 400 MG: 200 SOLUTION ORAL at 17:21

## 2022-06-19 RX ADMIN — SODIUM CHLORIDE 7.5 MG/HR: 9 INJECTION, SOLUTION INTRAVENOUS at 20:07

## 2022-06-19 RX ADMIN — VANCOMYCIN HYDROCHLORIDE 1500 MG: 10 INJECTION, POWDER, LYOPHILIZED, FOR SOLUTION INTRAVENOUS at 17:21

## 2022-06-19 RX ADMIN — CHLORHEXIDINE GLUCONATE 15 ML: 1.2 RINSE ORAL at 20:09

## 2022-06-19 RX ADMIN — LACOSAMIDE 200 MG: 10 INJECTION, SOLUTION INTRAVENOUS at 08:12

## 2022-06-19 RX ADMIN — HYDRALAZINE HYDROCHLORIDE 25 MG: 25 TABLET, FILM COATED ORAL at 14:48

## 2022-06-19 RX ADMIN — SODIUM CHLORIDE 10 MG/HR: 9 INJECTION, SOLUTION INTRAVENOUS at 11:44

## 2022-06-19 RX ADMIN — DEXAMETHASONE 1 MG: 2 TABLET ORAL at 20:08

## 2022-06-19 RX ADMIN — Medication 1 PACKET: at 08:11

## 2022-06-19 RX ADMIN — MUPIROCIN 1 APPLICATION: 20 OINTMENT TOPICAL at 08:11

## 2022-06-19 RX ADMIN — LATANOPROST 1 DROP: 50 SOLUTION OPHTHALMIC at 20:10

## 2022-06-19 RX ADMIN — LANSOPRAZOLE 30 MG: KIT at 05:51

## 2022-06-19 RX ADMIN — Medication 1 PACKET: at 20:08

## 2022-06-19 RX ADMIN — ARFORMOTEROL TARTRATE 15 MCG: 15 SOLUTION RESPIRATORY (INHALATION) at 18:28

## 2022-06-19 RX ADMIN — CHLORHEXIDINE GLUCONATE 15 ML: 1.2 RINSE ORAL at 08:10

## 2022-06-19 RX ADMIN — LIOTHYRONINE SODIUM 25 MCG: 25 TABLET ORAL at 08:09

## 2022-06-19 RX ADMIN — SODIUM CHLORIDE SOLN NEBU 3% 4 ML: 3 NEBU SOLN at 07:33

## 2022-06-19 RX ADMIN — POLYETHYLENE GLYCOL 3350 17 G: 17 POWDER, FOR SOLUTION ORAL at 08:10

## 2022-06-19 RX ADMIN — GUAIFENESIN 400 MG: 200 SOLUTION ORAL at 20:10

## 2022-06-19 RX ADMIN — CEFEPIME 2 G: 2 INJECTION, POWDER, FOR SOLUTION INTRAVENOUS at 21:40

## 2022-06-19 RX ADMIN — LEVETIRACETAM 1000 MG: 10 INJECTION INTRAVENOUS at 08:12

## 2022-06-19 RX ADMIN — LACOSAMIDE 200 MG: 10 INJECTION, SOLUTION INTRAVENOUS at 20:08

## 2022-06-19 RX ADMIN — LEVOTHYROXINE SODIUM 125 MCG: 125 TABLET ORAL at 05:48

## 2022-06-19 RX ADMIN — CARVEDILOL 12.5 MG: 6.25 TABLET, FILM COATED ORAL at 08:09

## 2022-06-19 RX ADMIN — SODIUM CHLORIDE 10 MG/HR: 9 INJECTION, SOLUTION INTRAVENOUS at 17:47

## 2022-06-19 RX ADMIN — SODIUM CHLORIDE SOLN NEBU 3% 4 ML: 3 NEBU SOLN at 18:28

## 2022-06-19 RX ADMIN — SODIUM CHLORIDE 15 MG/HR: 9 INJECTION, SOLUTION INTRAVENOUS at 06:23

## 2022-06-19 RX ADMIN — VANCOMYCIN HYDROCHLORIDE 1500 MG: 10 INJECTION, POWDER, LYOPHILIZED, FOR SOLUTION INTRAVENOUS at 05:50

## 2022-06-19 RX ADMIN — SODIUM CHLORIDE 10 MG/HR: 9 INJECTION, SOLUTION INTRAVENOUS at 14:46

## 2022-06-19 RX ADMIN — CEFEPIME 2 G: 2 INJECTION, POWDER, FOR SOLUTION INTRAVENOUS at 14:47

## 2022-06-19 RX ADMIN — ARFORMOTEROL TARTRATE 15 MCG: 15 SOLUTION RESPIRATORY (INHALATION) at 07:33

## 2022-06-19 RX ADMIN — DEXAMETHASONE 1 MG: 2 TABLET ORAL at 08:09

## 2022-06-19 RX ADMIN — CEFEPIME 2 G: 2 INJECTION, POWDER, FOR SOLUTION INTRAVENOUS at 05:48

## 2022-06-19 RX ADMIN — INSULIN HUMAN 2 UNITS: 100 INJECTION, SOLUTION PARENTERAL at 11:45

## 2022-06-19 RX ADMIN — LACTULOSE 20 G: 20 SOLUTION ORAL at 08:10

## 2022-06-19 RX ADMIN — GUAIFENESIN 400 MG: 200 SOLUTION ORAL at 08:10

## 2022-06-19 RX ADMIN — DOCUSATE SODIUM LIQUID 50 MG: 100 LIQUID ORAL at 08:10

## 2022-06-19 RX ADMIN — SODIUM CHLORIDE 10 MG/HR: 9 INJECTION, SOLUTION INTRAVENOUS at 03:42

## 2022-06-19 RX ADMIN — SODIUM CHLORIDE 15 MG/HR: 9 INJECTION, SOLUTION INTRAVENOUS at 08:51

## 2022-06-19 NOTE — PROGRESS NOTES
"Pharmacy Dosing Service  Pharmacokinetics  Vancomycin Follow-up Evaluation    Assessment/Action/Plan:  Current Order: Vancomycin 1,500 mg IVPB every 12 hours  Current end date/final dose: 6/24/22 at 180  Additional antimicrobial agent(s): Cefepime    Vancomycin trough = 15.6 (~11.5 hours post dose). Continue current dose and frequency at this time. Pharmacy will continue to follow daily and adjust dose accordingly.     Subjective:  Hai Hernandez is a 77 y.o. male currently on Vancomycin 1,500 mg IV every 12 hours for the treatment of possible CNS infection, day 10 of treatment.    AUC Model Data:  Regimen: 1500 mg IV every 12 hours.  Start time: 18:00 on 06/19/2022  Exposure target: AUC24 (range)400-600 mg/L.hr   AUC24,ss: 537 mg/L.hr  PAUC*: 99 %  Ctrough,ss: 17.2 mg/L  Pconc*: 12 %  Tox.: 13 %    Objective:  Ht: 182.9 cm (72\"); Wt: 109 kg (241 lb)  Estimated Creatinine Clearance: 187.9 mL/min (A) (by C-G formula based on SCr of 0.42 mg/dL (L)).   Creatinine   Date Value Ref Range Status   06/19/2022 0.42 (L) 0.76 - 1.27 mg/dL Final   06/18/2022 0.42 (L) 0.76 - 1.27 mg/dL Final   06/17/2022 0.43 (L) 0.76 - 1.27 mg/dL Final      Lab Results   Component Value Date    WBC 10.65 06/19/2022    WBC 9.48 06/18/2022    WBC 8.35 06/17/2022         Lab Results   Component Value Date    VANCOTROUGH 15.60 06/19/2022       Culture Results:  Microbiology Results (last 10 days)       Procedure Component Value - Date/Time    Respiratory Culture - Sputum, ET Suction [[id number]] Collected: 06/14/22 1632    Lab Status: Final result Specimen: Sputum from ET Suction Updated: 06/16/22 1042     Respiratory Culture Scant growth (1+) Normal respiratory annia. No S. aureus or Pseudomonas aeruginosa detected. Final report.     Gram Stain Many (4+) WBCs per low power field      Rare (1+) Epithelial cells per low power field      Few (2+) Gram positive cocci      Few (2+) Gram negative bacilli    Respiratory Culture - Aspirate, Bronchus " [[id number]] Collected: 06/10/22 1643    Lab Status: Final result Specimen: Aspirate from Bronchus Updated: 06/13/22 1049     Respiratory Culture Rare Normal respiratory annia. No S. aureus or Pseudomonas aeruginosa detected. Final report.     Gram Stain Many (4+) WBCs per low power field      Less than 25 Epithelial cells seen      No organisms seen    Anaerobic Culture - Tissue, Brain [[id number]] Collected: 06/10/22 1524    Lab Status: Final result Specimen: Tissue from Brain Updated: 06/15/22 0529     Anaerobic Culture No anaerobes isolated at 5 days    Fungus Culture - Tissue, Brain [[id number]] Collected: 06/10/22 1524    Lab Status: Preliminary result Specimen: Tissue from Brain Updated: 06/17/22 1633     Fungus Culture No fungus isolated at 1 week    Tissue / Bone Culture - Tissue, Brain [[id number]] Collected: 06/10/22 1524    Lab Status: Final result Specimen: Tissue from Brain Updated: 06/13/22 0943     Tissue Culture No growth at 3 days     Gram Stain No WBCs per low power field      No organisms seen    AFB Culture - Tissue, Brain [[id number]] Collected: 06/10/22 1524    Lab Status: Preliminary result Specimen: Tissue from Brain Updated: 06/17/22 1633     AFB Culture No AFB isolated at 1 week     AFB Stain No acid fast bacilli seen on direct smear      No acid fast bacilli seen on concentrated smear    Wound Culture - Wound, Head [[id number]] Collected: 06/10/22 1343    Lab Status: Final result Specimen: Wound from Head Updated: 06/13/22 0943     Wound Culture No growth at 3 days     Gram Stain Rare (1+) WBCs seen      No organisms seen    Anaerobic Culture - Tissue, Brain, Cerebral Cortex [[id number]] Collected: 06/10/22 1327    Lab Status: Final result Specimen: Tissue from Brain, Cerebral Cortex Updated: 06/15/22 0528     Anaerobic Culture No anaerobes isolated at 5 days    Fungus Culture - Tissue, Brain, Cerebral Cortex [[id number]] Collected: 06/10/22 1327    Lab Status: Preliminary result Specimen: Tissue  from Brain, Cerebral Cortex Updated: 06/17/22 1433     Fungus Culture No fungus isolated at 1 week    Tissue / Bone Culture - Tissue, Brain, Cerebral Cortex [[id number]] Collected: 06/10/22 1327    Lab Status: Final result Specimen: Tissue from Brain, Cerebral Cortex Updated: 06/13/22 0942     Tissue Culture No growth at 3 days     Gram Stain Moderate (3+) WBCs seen      No organisms seen    AFB Culture - Tissue, Brain, Cerebral Cortex [[id number]] Collected: 06/10/22 1327    Lab Status: Preliminary result Specimen: Tissue from Brain, Cerebral Cortex Updated: 06/17/22 1433     AFB Culture No AFB isolated at 1 week     AFB Stain No acid fast bacilli seen on direct smear      No acid fast bacilli seen on concentrated smear    Anaerobic Culture - Tissue, Brain [[id number]]  (Normal) Collected: 06/10/22 1326    Lab Status: Final result Specimen: Tissue from Brain Updated: 06/16/22 0542     Anaerobic Culture No anaerobes isolated at 5 days    Fungus Culture - Tissue, Brain [[id number]] Collected: 06/10/22 1326    Lab Status: Preliminary result Specimen: Tissue from Brain Updated: 06/17/22 1417     Fungus Culture No fungus isolated at 1 week    Tissue / Bone Culture - Tissue, Brain [[id number]] Collected: 06/10/22 1326    Lab Status: Final result Specimen: Tissue from Brain Updated: 06/13/22 0935     Tissue Culture No growth at 3 days     Gram Stain Rare (1+) WBCs per low power field      No organisms seen    AFB Culture - Tissue, Brain [[id number]] Collected: 06/10/22 1326    Lab Status: Preliminary result Specimen: Tissue from Brain Updated: 06/17/22 1417     AFB Culture No AFB isolated at 1 week     AFB Stain No acid fast bacilli seen on direct smear      No acid fast bacilli seen on concentrated smear    Wound Culture - Wound, Head [[id number]] Collected: 06/10/22 1304    Lab Status: Final result Specimen: Wound from Head Updated: 06/13/22 0943     Wound Culture No growth at 3 days     Gram Stain No WBCs or organisms seen     Wound Culture - Wound, Head [[id number]] Collected: 06/10/22 1226    Lab Status: Final result Specimen: Wound from Head Updated: 06/13/22 0946     Wound Culture No growth at 3 days     Gram Stain Rare (1+) WBCs seen      No organisms seen    COVID PRE-OP / PRE-PROCEDURE SCREENING ORDER (NO ISOLATION) - Swab, Nasal Cavity [[id number]]  (Normal) Collected: 06/10/22 0744    Lab Status: Final result Specimen: Swab from Nasal Cavity Updated: 06/10/22 0857    Narrative:      The following orders were created for panel order COVID PRE-OP / PRE-PROCEDURE SCREENING ORDER (NO ISOLATION) - Swab, Nasal Cavity.  Procedure                               Abnormality         Status                     ---------                               -----------         ------                     COVID-19,[facility]...[[id number]]  Normal              Final result                 Please view results for these tests on the individual orders.    COVID-19,[facility] IN-HOUSE,Nasal Swab No Transport Media 3-4 HR TAT - Swab, Nasal Cavity [[id number]]  (Normal) Collected: 06/10/22 0744    Lab Status: Final result Specimen: Swab from Nasal Cavity Updated: 06/10/22 0857     COVID19 Not Detected    Narrative:      Fact sheet for providers: https://www.fda.gov/media/558065/download     Fact sheet for patients: https://www.fda.gov/media/344224/download    Test performed by PCR.    Consider negative results in combination with clinical observations, patient history, and epidemiological information.  Fact sheet for providers: https://www.fda.gov/media/857780/download     Fact sheet for patients: https://www.fda.gov/media/721248/download    Test performed by PCR.    Consider negative results in combination with clinical observations, patient history, and epidemiological information.    Culture, CSF - Cerebrospinal Fluid,  Shunt Aspirate [[id number]] Collected: 06/10/22 0729    Lab Status: Final result Specimen: Cerebrospinal Fluid from  Shunt Aspirate  Updated: 06/13/22 0936     CSF Culture No growth at 3 days     Gram Stain Moderate (3+) WBCs seen      No organisms seen            Jamaal Bauman, PharmD   06/19/22 06:06 CDT

## 2022-06-19 NOTE — SIGNIFICANT NOTE
06/19/22 1828   Readings   PEEP Intrinsic (cm H2O) 4.8 cm H2O   Plateau Pressure (cm H2O) 24 cm H2O   Driving Pressure (cm H2O) 19.6 cm H2O   Static Compliance (L/cm H2O) 0   Dynamic Compliance (L/cm H2O) 35 L/cm H2O

## 2022-06-19 NOTE — SIGNIFICANT NOTE
06/19/22 0733   Readings   PEEP Intrinsic (cm H2O) 4.3 cm H2O   Plateau Pressure (cm H2O) 28 cm H2O   Driving Pressure (cm H2O) 23.8 cm H2O   Static Compliance (L/cm H2O) 3   Dynamic Compliance (L/cm H2O) 33 L/cm H2O

## 2022-06-19 NOTE — PROGRESS NOTES
NEUROSURGERY DAILY PROGRESS NOTE    ASSESSMENT:   Hai Hernandez is a 77 y.o. with a significant comorbidity of acephalic migraines, thyroid disease status post radiation as a child, basal cell and squamous cell carcinoma of the skin. He presents in FU for known meningioma found on workup for right visual field changes. Physical exam findings of neurologically intact with resolution of all symptoms and mild decreased vision in right eye.  His imaging shows 22 x 24 x 13.5 mm right planum sphenoidale mass most suggestive of meningioma.    Past Medical History:   Diagnosis Date   • Brain tumor (HCC)    • Depression    • Hearing loss    • History of cellulitis     right foot big toe   • Hypertension    • Pneumonia    • Right arm weakness     after cervial injury   • Sciatic pain     affects balance   • Thyroid disease    • Visual disturbance     due to brain tumor - right eye     Active Hospital Problems    Diagnosis    • **Meningioma (HCC)    • Acute deep vein thrombosis (DVT) of the ulnar and brachial veins of right upper extremity (HCC)    • Loculated pleural effusion    • Fever    • PAF (paroxysmal atrial fibrillation) (HCC)    • Cerebral parenchymal hemorrhage (HCC)    • Essential hypertension    • Type 2 diabetes mellitus with hyperglycemia, without long-term current use of insulin (HCC)    • Hypothyroidism (acquired)    • Acute respiratory failure (secondary to status epilepticus)    • Thrombocytopenia (HCC)    • 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  Grimaces to painful stimuli.  Does not follow commands.  Nonverbal.  Weakly withdraws to tactile  stimuli.    Intracranial meningioma   POD #39 (5/10/2022) Status post postcraniotomy for meningioma   POD#9 (6/10/2022) Craniectomy for cerebral edema and possible infection   CT reviewed and stable.  Small blood products at site of brain biopsy   Neurochecks per policy   Start Decadron taper   Wound C,D,I   Craniectomy precautions     Hydrocephalus   CSF unremarkable from 5/17, 5/23 and 6/10.    Lumbar drain removed 6/3/2022   POD# 16 (6/3/2022) right posterior parietal  shunt placement    MEDTRONIC programmable valve set to 0.5   Shunt pumps and refills well    Postop seizures, in status   Neurology managing   Cont Keppra, Dialntin, Vimpat; PRN Ativan   Follow dilantin levels   Repeat EEG: Slowing but no epileptiform activity     Antiseizure medications tapering instructions per neurology:    Fosphenytoin discontinued    Change Keppra to: Keppra 750 mg twice daily by 4 days, then 500 mg twice daily by 4 days, then discontinue.    Change Vimpat to: Vimpat 200 mg twice daily per PEG tube to be continued indefinitely.    CV: Cardene off   Keep SBP <160.   Intermittent use of hydralazine and labetalol PRNs  Pulm: Tracheostomy  Placed (6/2/2022).  Appreciate ENT   Left chest tubes removed  : May DC Mccrary and place [place] catheter  FEN: Poor glucose control.  Increase SSI  ENDO: Accu-Chek and treat per policy  GI: PEG tube placed (6/2/2022).  Appreciate GI   No BM in several days.  KUB concerning for ileus formation.  Increased rectal stimulation  ID: Afebrile.  Continue broad spectrum abx, Vanc and Cefepime   CSF cultures NGTD  Heme:  DVT prophylaxis with SCD's.  Can not anticoagulate   Superficial thrombus to left cephalic vein   Hemoglobin 7.5.  We will continue to monitor.  Pain: NA  Dispo: DC pending acceptance to [facility]-of-state LT[facility].    CHIEF COMPLAINT:   Right visual field changes    Subjective  Symptom stable    Temp:  [98.3 °F (36.8 °C)-98.8 °F (37.1 °C)] 98.5 °F (36.9 °C)  Heart Rate:  [70-96] 81  Resp:   "[22-38] 33  BP: (135-172)/() 152/78  FiO2 (%):  [35 %] 35 %    Objective:  General Appearance:  Well-appearing and in no acute distress.    Vital signs: (most recent): Blood pressure 152/78, pulse 81, temperature 98.5 °F (36.9 °C), temperature source Axillary, resp. rate (!) 33, height 182.9 cm (72\"), weight 109 kg (240 lb 4.8 oz), SpO2 93 %.      Neurologic Exam     Mental Status   Level of consciousness: arousable by verbal stimuli ,  drowsy  Off propofol.  Does not follow commands.  Nonverbal.  Weakly withdraws to tactile stimuli.     Cranial Nerves     CN III, IV, VI   Pupils are equal, round, and reactive to light.  Extraocular motions are normal.     CN IX, X   CN IX normal.     Gait, Coordination, and Reflexes     Tremor   Resting tremor: absent  Intention tremor: absent  Action tremor: absent    Drains:   Urethral Catheter Non-latex;Silicone 16 Fr. (Active)       Output by Drain (mL) 06/18/22 0701 - 06/18/22 1900 06/18/22 1901 - 06/19/22 0700 06/19/22 0701 - 06/19/22 1028 Range Total   Requested LDAs do not have output data documented.       Imaging Results (Last 24 Hours)     Procedure Component Value Units Date/Time    XR Chest 1 View [[id number]] Collected: 06/19/22 0816     Updated: 06/19/22 0822    Narrative:      EXAMINATION: XR CHEST 1 VW- 6/19/2022 8:16 AM CDT     HISTORY: On vent; R13.10-Dysphagia, unspecified; Z01.818-Encounter for  other preprocedural examination; D32.9-Benign neoplasm of meninges,  unspecified; Z74.09-Other reduced mobility; Z78.9-Other specified health  status; G40.901-Epilepsy, unspecified, not intractable, with status  epilepticus; J96.01-Acute respiratory failure with hypoxia;  G91.0-Communicating hydrocephalus; T81.40XA-Infection following a pr.     REPORT: A frontal view of the chest was obtained.     COMPARISON: Chest x-ray 6/18/2022 0332 hours.     The tracheostomy tube and left internal jugular central line remain in  satisfactory position. The lungs are " hypoaerated, there is persistent  slightly increased consolidation of the medial left base and left  perihilar lung. Pneumonia is likely. The heart is enlarged. There is  central vascular congestion and probable mild volume overload. No  pneumothorax is identified. No other change.       Impression:      Stable satisfactory position of the tracheostomy tube and  left internal jugular central line. No pneumothorax is identified. There  is persistent increased consolidation of the left perihilar lung and  medial left base likely related to pneumonia. Probable small left  pleural effusion. Central vascular congestion as before with probable  mild volume overload.        This report was finalized on 06/19/2022 08:19 by Dr. Antony Thomas MD.        Lab Results (last 24 hours)     Procedure Component Value Units Date/Time    Vancomycin, Trough Please call results to Pharmacy prior to administering next dose [[id number]]  (Normal) Collected: 06/19/22 0511    Specimen: Blood Updated: 06/19/22 0555     Vancomycin Trough 15.60 mcg/mL     Comprehensive Metabolic Panel [[id number]]  (Abnormal) Collected: 06/19/22 0511    Specimen: Blood Updated: 06/19/22 0555     Glucose 158 mg/dL      BUN 24 mg/dL      Creatinine 0.42 mg/dL      Sodium 137 mmol/L      Potassium 4.0 mmol/L      Comment: Slight hemolysis detected by analyzer. Results may be affected.        Chloride 103 mmol/L      CO2 27.0 mmol/L      Calcium 7.8 mg/dL      Total Protein 6.1 g/dL      Albumin 2.10 g/dL      ALT (SGPT) 131 U/L      AST (SGOT) 103 U/L      Alkaline Phosphatase 418 U/L      Total Bilirubin 0.4 mg/dL      Globulin 4.0 gm/dL      A/G Ratio 0.5 g/dL      BUN/Creatinine Ratio 57.1     Anion Gap 7.0 mmol/L      eGFR 110.7 mL/min/1.73      Comment: National Kidney Foundation and American Society of Nephrology (ASN) Task Force recommended calculation based on the Chronic Kidney Disease Epidemiology Collaboration (CKD-EPI) equation refit without  adjustment for race.       Narrative:      GFR Normal >60  Chronic Kidney Disease <60  Kidney Failure <15      C-reactive Protein [[id number]]  (Abnormal) Collected: 06/19/22 0511    Specimen: Blood Updated: 06/19/22 0555     C-Reactive Protein 13.09 mg/dL     CBC & Differential [[id number]]  (Abnormal) Collected: 06/19/22 0511    Specimen: Blood Updated: 06/19/22 0532    Narrative:      The following orders were created for panel order CBC & Differential.  Procedure                               Abnormality         Status                     ---------                               -----------         ------                     CBC Auto Differential[[id number]]        Abnormal            Final result                 Please view results for these tests on the individual orders.    CBC Auto Differential [[id number]]  (Abnormal) Collected: 06/19/22 0511    Specimen: Blood Updated: 06/19/22 0532     WBC 10.65 10*3/mm3      RBC 2.65 10*6/mm3      Hemoglobin 8.5 g/dL      Hematocrit 27.7 %      .5 fL      MCH 32.1 pg      MCHC 30.7 g/dL      RDW 17.3 %      RDW-SD 65.1 fl      MPV 9.5 fL      Platelets 236 10*3/mm3      Neutrophil % 72.8 %      Lymphocyte % 7.8 %      Monocyte % 12.5 %      Eosinophil % 4.1 %      Basophil % 0.6 %      Immature Grans % 2.2 %      Neutrophils, Absolute 7.76 10*3/mm3      Lymphocytes, Absolute 0.83 10*3/mm3      Monocytes, Absolute 1.33 10*3/mm3      Eosinophils, Absolute 0.44 10*3/mm3      Basophils, Absolute 0.06 10*3/mm3      Immature Grans, Absolute 0.23 10*3/mm3      nRBC 0.0 /100 WBC     POC Glucose Once [[id number]]  (Abnormal) Collected: 06/18/22 1731    Specimen: Blood Updated: 06/18/22 1743     Glucose 168 mg/dL      Comment: : [id number] Rip XieMeter ID: [id number]       POC Glucose Once [[id number]]  (Abnormal) Collected: 06/18/22 1206    Specimen: Blood Updated: 06/18/22 1217     Glucose 139 mg/dL      Comment: : [id number] Rip ShahJibestreamMeter ID: [id number]        CBC & Differential [[id number]]  (Abnormal) Collected: 06/18/22 1039    Specimen: Blood Updated: 06/18/22 1051    Narrative:      The following orders were created for panel order CBC & Differential.  Procedure                               Abnormality         Status                     ---------                               -----------         ------                     CBC Auto Differential[[id number]]        Abnormal            Final result                 Please view results for these tests on the individual orders.    CBC Auto Differential [[id number]]  (Abnormal) Collected: 06/18/22 1039    Specimen: Blood Updated: 06/18/22 1051     WBC 9.48 10*3/mm3      RBC 2.42 10*6/mm3      Hemoglobin 7.7 g/dL      Hematocrit 24.7 %      .1 fL      MCH 31.8 pg      MCHC 31.2 g/dL      RDW 17.4 %      RDW-SD 64.5 fl      MPV 9.4 fL      Platelets 210 10*3/mm3      Neutrophil % 72.6 %      Lymphocyte % 8.0 %      Monocyte % 12.2 %      Eosinophil % 4.1 %      Basophil % 0.6 %      Immature Grans % 2.5 %      Neutrophils, Absolute 6.87 10*3/mm3      Lymphocytes, Absolute 0.76 10*3/mm3      Monocytes, Absolute 1.16 10*3/mm3      Eosinophils, Absolute 0.39 10*3/mm3      Basophils, Absolute 0.06 10*3/mm3      Immature Grans, Absolute 0.24 10*3/mm3      nRBC 0.0 /100 WBC         [id number]  Stevie Parsons, APRN

## 2022-06-19 NOTE — PROGRESS NOTES
PULMONARY AND CRITICAL CARE PROGRESS NOTE - [facility]    Patient: Hai Hernandez    1945    MR# [id number]    Acct# [id number]  06/19/22   07:56 CDT  Referring Provider: Martell Downs, *    Chief Complaint: Mechanically ventilated    Interval history: The patient is seen resting in bed with tracheostomy to mechanical ventilator in pressure control ventilation..  Oxygen saturation 94% on PEEP of 5 and FiO2 0.35.  Tidal volume 400s.  ET CO2 30.  Cardene drip has been restarted.  He remains on antibiotics.  No documented fevers.  Chest film reviewed.  No issues reported overnight.    Meds:  amLODIPine, 10 mg, Oral, Q24H  arformoterol, 15 mcg, Nebulization, BID - RT  carvedilol, 12.5 mg, Oral, BID With Meals  cefepime, 2 g, Intravenous, Q8H  chlorhexidine, 15 mL, Mouth/Throat, Q12H  dexamethasone, 1 mg, Per PEG Tube, Q12H   Followed by  [START ON 6/20/2022] dexamethasone, 0.5 mg, Per PEG Tube, Daily  docusate, 50 mg, Oral, Daily  guaiFENesin, 400 mg, Per PEG Tube, TID  heparin (porcine), 5,000 Units, Subcutaneous, Q12H  insulin detemir, 20 Units, Subcutaneous, Nightly  insulin regular, 2-7 Units, Subcutaneous, Q6H  lacosamide, 200 mg, Intravenous, Q12H  lactulose, 20 g, Per PEG Tube, Daily  lansoprazole, 30 mg, Per G Tube, QAM  latanoprost, 1 drop, Both Eyes, Nightly  levETIRAcetam, 1,000 mg, Intravenous, Q12H  levothyroxine, 125 mcg, Nasogastric, Q AM  lidocaine, 10 mL, Intradermal, Once  lidocaine, 10 mL, Infiltration, Once  liothyronine, 25 mcg, Nasogastric, Daily  lisinopril, 40 mg, Nasogastric, Q24H  mupirocin, 1 application, Topical, Q12H  Nutrisource fiber, 1 packet, Nasogastric, 4x Daily  polyethylene glycol, 17 g, Oral, Daily  ProSource TF, 45 mL, Per G Tube, BID  sodium chloride, 4 mL, Nebulization, BID - RT  vancomycin, 15 mg/kg, Intravenous, Q12H      niCARdipine, 5-15 mg/hr, Last Rate: 15 mg/hr (06/19/22 0623)  norepinephrine, 0.02-0.3 mcg/kg/min, Last Rate: Stopped  (06/11/22 1054)      Review of Systems:   Cannot obtain due to mechanical ventilated state   Ventilator Settings:        Resp Rate (Set): 16  Pressure Support (cm H2O): 8 cm H20  FiO2 (%): 35 %  PEEP/CPAP (cm H2O): 5 cm H20  Minute Ventilation (L/min) (Obs): 15.8 L/min  Resp Rate (Observed) Vent: 36  I:E Ratio (Set): 1:0.27  I:E Ratio (Obs): 1.00:1  PIP Observed (cm H2O): 21 cm H2O  RSBI: 21.96  Physical Exam:  Temp:  [98.3 °F (36.8 °C)-98.8 °F (37.1 °C)] 98.3 °F (36.8 °C)  Heart Rate:  [70-95] 95  Resp:  [22-38] 38  BP: (135-172)/() 160/81  FiO2 (%):  [35 %] 35 %    Intake/Output Summary (Last 24 hours) at 6/19/2022 0756  Last data filed at 6/19/2022 0400  Gross per 24 hour   Intake 5323 ml   Output 2000 ml   Net 3323 ml     SpO2 Percentage    06/19/22 0400 06/19/22 0733 06/19/22 0746   SpO2: 96% 93% 91%   Body mass index is 32.59 kg/m².   Physical Exam  Constitutional:       General: He is not in acute distress.     Appearance: He is ill-appearing. He is not diaphoretic.   HENT:      Head: Normocephalic.      Comments: Craniotomy incision, healing     Nose: Nose normal.      Mouth/Throat:      Mouth: Mucous membranes are moist.   Eyes:      General: No scleral icterus.  Neck:      Comments: Trach to vent  Left IJ triple-lumen  Cardiovascular:      Rate and Rhythm: Normal rate.      Comments: Rate 87  Pulmonary:      Effort: Pulmonary effort is normal. No respiratory distress.      Breath sounds: No wheezing or rhonchi.   Abdominal:      General: There is no distension.      Comments: PEG with tube feeding infusing   Genitourinary:     Comments: external catheter  FMS  Musculoskeletal:      Right lower leg: Edema present.      Left lower leg: Edema present.      Comments: SCDs   Skin:     Coloration: Skin is not pale.   Neurological:      Comments: Weakly withdraws to stimulus     Electronically signed by MARIO Rod, 6/19/2022, 07:56 CDT      Physician Substantive Portion: Medical Decision  Making    Laboratory Data:  Results from last 7 days   Lab Units 06/19/22  0511 06/18/22  1039 06/17/22  0242   WBC 10*3/mm3 10.65 9.48 8.35   HEMOGLOBIN g/dL 8.5* 7.7* 8.1*   PLATELETS 10*3/mm3 236 210 219     Results from last 7 days   Lab Units 06/19/22  0511 06/18/22  0812 06/17/22  0242   SODIUM mmol/L 137 136 139   POTASSIUM mmol/L 4.0 4.0 3.9   BUN mg/dL 24* 27* 26*   CREATININE mg/dL 0.42* 0.42* 0.43*     Results from last 7 days   Lab Units 06/14/22  0417 06/13/22  0423   PH, ARTERIAL pH units 7.461* 7.471*   PCO2, ARTERIAL mm Hg 40.9 39.0   PO2 ART mm Hg 82.4* 88.1   FIO2 % 35 35     Wound Culture   Date Value Ref Range Status   06/10/2022 No growth at 2 days  Preliminary   06/10/2022 No growth at 2 days  Preliminary   06/10/2022 No growth at 2 days  Preliminary     Respiratory Culture   Date Value Ref Range Status   06/10/2022   Preliminary    Rare The culture consists of normal respiratory annia. This is a preliminary report; final report to follow.     Recent films:  XR Chest 1 View    Result Date: 6/18/2022  EXAMINATION: XR CHEST 1 VW- 6/18/2022 8:19 AM CDT  HISTORY: On vent; R13.10-Dysphagia, unspecified; Z01.818-Encounter for other preprocedural examination; D32.9-Benign neoplasm of meninges, unspecified; Z74.09-Other reduced mobility; Z78.9-Other specified health status; G40.901-Epilepsy, unspecified, not intractable, with status epilepticus; J96.01-Acute respiratory failure with hypoxia; G91.0-Communicating hydrocephalus; T81.40XA-Infection following a pr.  REPORT: A frontal view the chest was obtained.  COMPARISON: Chest x-ray 6/17/2022 0303 hours.  The tracheostomy tube appears in satisfactory position as before. There is a left internal jugular central line, tip projects over the mid SVC, good position. This is unchanged. There is volume loss in the lung bases, with partial consolidation of the left base, slightly improved. No focal infiltrate is seen in the right lung. Heart size appears to be  normal. Pleural thickening and/or effusion is seen in the left hemithorax. This appears stable. Numerous tiny calcified granulomas are present throughout both lungs. A  shunt is noted over the right chest.      Impression: Persistent but mildly improved consolidation of the medial left lung base, with probable small persistent pleural effusion or pleural thickening on the left. Stable satisfactory position of the tracheostomy tube and left internal jugular central line. This report was finalized on 06/18/2022 08:21 by Dr. Antony Thomas MD.    My radiograph interpretation/independent review of imaging: Reviewed and agree with current interpretation.    Pulmonary Assessment:    1. Acute hypoxic respiratory failure  2. On mechanically assisted ventilation  3. Tracheostomy and PEG tube placement done for long-term care  4. Hydrocephalus requiring  shunt placement   5. Status postcraniotomy for meningioma  6. Encephalopathy status epilepticus  7. Loculated left-sided pleural effusion with multiple chest tubes removed now.   8. Healthcare associated pneumonia on antibiotics  9. Cardiac arrhythmia  10. Hypertension  11. Type 2 diabetes mellitus  12. S/p ventriculoperitoneal shunt placement  13. History of DVT in upper extremity  14. Seizure activity    Recommend/plan:   · He remains on full assist-control volume control ventilation.  He did not do much spontaneous breathing trial and did not have much change in the neurologic status   · He appears to be not ready for ventilator weaning but will try spontaneous breathing trial daily as tolerated.  · Current ventilator settings are PI 12, PEEP of 5, rate 16, FiO2 30% with oxygen saturation 98%.  · Patient is getting cefepime and vancomycin.  Afebrile.  He is on antibiotic for long.   · He may de-escalate antibiotic treatment.  White cell count is normal.  · He is off sedation and minimal response.  No seizure activity and no significant change in neurologic  status.  · He was accepted by LTAC without any neurology onsite and family refused to go there   · Currently he is waiting for another LTAC in [place] to approve.  He may go there next week.  · Neurosurgery, neurology is also following.   · Continue bronchodilator treatment and routine respiratory care and pulmonary toilet.  · Ky labs and imaging studies from time to time.  · DVT and stress ulcer bolus and pain and anxiety control.  · CODE STATUS: Full.  Overall prognosis: Guarded.  · We will follow.    This visit was performed by both a physician and an [facility] RN.  I personally evaluated and examined the patient.  I performed all aspects of the medical decision making as documented.    Electronically signed by     Murphy Kothari MD,  Pulmonologist/Intensivist   6/19/2022, 10:10 CDT

## 2022-06-19 NOTE — PROGRESS NOTES
Neurology Progress Note      Chief Complaint:    Postoperative seizure    Subjective     Subjective:  Patient is off all sedation. Remains ventilated with trach. Cardene restarted and is at max dose. Systolic BP 160s. Spoke with RN. Some spontaneous eyes opening when they were bathing patient but not able to reproduce. He did appear to attempt to open this AM to voice. Patient did have one coughing episode with posturing type movements of bilateral UE. This is only time this type of movement observed.         Per social work notes: [facility] offered a bed but daughter declined as concerned neurology not on staff. [facility] does have neurology and this is daughter preference. Possible bed available on Monday.       Past Medical History:   Diagnosis Date   • Brain tumor (HCC)    • Depression    • Hearing loss    • History of cellulitis     right foot big toe   • Hypertension    • Pneumonia    • Right arm weakness     after cervial injury   • Sciatic pain     affects balance   • Thyroid disease    • Visual disturbance     due to brain tumor - right eye     Past Surgical History:   Procedure Laterality Date   • CATARACT EXTRACTION, BILATERAL     • COLONOSCOPY     • CRANIOTOMY Bilateral 6/10/2022    Procedure: CRANIECTOMY;  Surgeon: Martell Downs MD;  Location: [facility] OR;  Service: Neurosurgery;  Laterality: Bilateral;   • CRANIOTOMY FOR TUMOR Right 5/10/2022    Procedure: CRANIOTOMY FOR TUMOR STERIOTACTIC WITH BRAIN LAB, right;  Surgeon: Martell Downs MD;  Location: [facility] OR;  Service: Neurosurgery;  Laterality: Right;   • ENDOSCOPY W/ PEG TUBE PLACEMENT N/A 6/2/2022    Procedure: ESOPHAGOGASTRODUODENOSCOPY WITH PERCUTANEOUS ENDOSCOPIC GASTROSTOMY TUBE INSERTION WITH ANESTHESIA;  Surgeon: Melecio Lu MD;  Location: [facility] OR;  Service: Gastroenterology;  Laterality: N/A;   • NECK SURGERY     • SKIN CANCER EXCISION     • TONSILLECTOMY     • TRACHEOSTOMY N/A 6/2/2022    Procedure:  TRACHEOSTOMY;  Surgeon: Geovany Davidson MD;  Location: [facility] OR;  Service: ENT;  Laterality: N/A;   •  SHUNT INSERTION Right 6/3/2022    Procedure: VENTRICULAR PERITONEAL SHUNT INSERTION WITH BRAIN LAB;  Surgeon: Martell Downs MD;  Location:  [facility] OR;  Service: Neurosurgery;  Laterality: Right;     Family History   Problem Relation Age of Onset   • Cancer Sister    • Cancer Brother      Social History     Tobacco Use   • Smoking status: Never Smoker   • Smokeless tobacco: Never Used   Vaping Use   • Vaping Use: Never used   Substance Use Topics   • Alcohol use: Never   • Drug use: Never       Medications:  Current Facility-Administered Medications   Medication Dose Route Frequency Provider Last Rate Last Admin   • acetaminophen (TYLENOL) tablet 650 mg  650 mg Oral Q4H PRN Martell Downs MD   650 mg at 06/15/22 2017    Or   • acetaminophen (TYLENOL) suppository 650 mg  650 mg Rectal Q4H PRN Martell Downs MD   650 mg at 05/23/22 0016   • alteplase (CATHFLO/ACTIVASE) injection 2 mg  2 mg Intracatheter PRN Martell Downs MD   New Syringe/Cartridge at 05/28/22 1330   • amLODIPine (NORVASC) tablet 10 mg  10 mg Oral Q24H Sha Beatty MD   10 mg at 06/19/22 0809   • arformoterol (BROVANA) nebulizer solution 15 mcg  15 mcg Nebulization BID - RT Vidya Chandler APRN   15 mcg at 06/19/22 0733   • bisacodyl (DULCOLAX) suppository 10 mg  10 mg Rectal Daily PRN Stevie Parsons APRN       • carvedilol (COREG) tablet 12.5 mg  12.5 mg Oral BID With Meals Sha Beatty MD   12.5 mg at 06/19/22 0809   • cefepime (MAXIPIME) 2 g/100 mL 0.9% NS (mbp)  2 g Intravenous Q8H Martell Downs MD   2 g at 06/19/22 0548   • chlorhexidine (PERIDEX) 0.12 % solution 15 mL  15 mL Mouth/Throat Q12H Martell Downs MD   15 mL at 06/19/22 0810   • dexamethasone (DECADRON) tablet 1 mg  1 mg Per PEG Tube Q12H Stevie Parsons APRN   1 mg at 06/19/22 0809    Followed by    • [START ON 6/20/2022] dexamethasone (DECADRON) tablet 0.5 mg  0.5 mg Per PEG Tube Daily Stevie Parsons, APRN       • dextrose (D50W) (25 g/50 mL) IV injection 25 g  25 g Intravenous Q15 Min PRN Martell Downs MD   25 g at 06/09/22 1807   • dextrose (GLUTOSE) oral gel 15 g  15 g Oral Q15 Min PRN Martell Downs MD   15 g at 05/22/22 0527   • docusate (COLACE) 50 MG/5ML liquid 50 mg  50 mg Oral Daily Sha Beatty MD   50 mg at 06/19/22 0810   • glucagon (human recombinant) (GLUCAGEN DIAGNOSTIC) injection 1 mg  1 mg Intramuscular Q15 Min PRN Martell Downs MD       • guaiFENesin (ROBITUSSIN) 100 MG/5ML oral solution 400 mg  400 mg Per PEG Tube TID Vidya Chandler APRN   400 mg at 06/19/22 0810   • heparin (porcine) 5000 UNIT/ML injection 5,000 Units  5,000 Units Subcutaneous Q12H Martell Downs MD   5,000 Units at 06/19/22 0810   • hydrALAZINE (APRESOLINE) injection 10 mg  10 mg Intravenous Q6H PRN Martell Downs MD   10 mg at 06/18/22 0718   • insulin detemir (LEVEMIR) injection 20 Units  20 Units Subcutaneous Nightly Martell Downs MD   20 Units at 06/18/22 2010   • insulin regular (humuLIN R,novoLIN R) injection 2-7 Units  2-7 Units Subcutaneous Q6H Martell Downs MD   2 Units at 06/18/22 1736   • ipratropium-albuterol (DUO-NEB) nebulizer solution 3 mL  3 mL Nebulization Q4H PRN Martell Downs MD   3 mL at 06/15/22 0650   • labetalol (NORMODYNE,TRANDATE) injection 10 mg  10 mg Intravenous Q6H PRN Martell Downs MD   10 mg at 06/18/22 0450   • lacosamide (VIMPAT) injection 200 mg  200 mg Intravenous Q12H Martell Downs MD   200 mg at 06/19/22 0812   • lactulose solution 20 g  20 g Per PEG Tube Daily Sha Beatty MD   20 g at 06/19/22 0810   • lansoprazole (FIRST) oral suspension 30 mg  30 mg Per G Tube Martell Chavez MD   30 mg at 06/19/22 0551   • latanoprost (XALATAN) 0.005 % ophthalmic  solution 1 drop  1 drop Both Eyes Nightly Martell Downs MD   1 drop at 06/18/22 2009   • levalbuterol (XOPENEX) nebulizer solution 0.63 mg  0.63 mg Nebulization TID PRN Martell Downs MD   0.63 mg at 06/12/22 1046   • levETIRAcetam in NaCl 0.75% (KEPPRA) IVPB 1,000 mg  1,000 mg Intravenous Q12H Martell Downs  mL/hr at 06/16/22 2200 1,000 mg at 06/19/22 0812   • levothyroxine (SYNTHROID, LEVOTHROID) tablet 125 mcg  125 mcg Nasogastric Q AM Martell Downs MD   125 mcg at 06/19/22 0548   • lidocaine (XYLOCAINE) 1 % injection 10 mL  10 mL Intradermal Once Martell Downs MD       • lidocaine (XYLOCAINE) 1 % injection 10 mL  10 mL Infiltration Once Pilo Ortega MD       • liothyronine (CYTOMEL) tablet 25 mcg  25 mcg Nasogastric Daily Martell Downs MD   25 mcg at 06/19/22 0809   • lisinopril (PRINIVIL,ZESTRIL) tablet 40 mg  40 mg Nasogastric Q24H Sha Beatty MD   40 mg at 06/19/22 0809   • LORazepam (ATIVAN) injection 2 mg  2 mg Intravenous Q2H PRN Martell Downs MD   2 mg at 06/14/22 0358   • mupirocin (BACTROBAN) 2 % ointment 1 application  1 application Topical Q12H Martell Downs MD   1 application at 06/19/22 0811   • niCARdipine (CARDENE) 25 mg in 250 mL NS infusion  5-15 mg/hr Intravenous Titrated Sha Beatty  mL/hr at 06/19/22 0623 15 mg/hr at 06/19/22 0623   • norepinephrine (LEVOPHED) 8 mg in 250 mL NS infusion (premix)  0.02-0.3 mcg/kg/min Intravenous Titrated Martell Downs MD   Held at 06/11/22 1054   • Nutrisource fiber pack 1 packet  1 packet Nasogastric 4x Daily Martell Downs MD   1 packet at 06/19/22 0811   • ondansetron (ZOFRAN) tablet 4 mg  4 mg Oral Q6H PRN Martell Downs MD        Or   • ondansetron (ZOFRAN) injection 4 mg  4 mg Intravenous Q6H PRN Martell Downs MD       • oxyCODONE (ROXICODONE) 5 MG/5ML solution 5 mg  5 mg Per PEG Tube Q6H PRN Brit,  MARIO Bentley   5 mg at 06/18/22 0718   • polyethylene glycol (MIRALAX) packet 17 g  17 g Oral Daily Martell Downs MD   17 g at 06/19/22 0810   • ProSource TF oral liquid 45 mL  45 mL Per G Tube BID Martell Downs MD   45 mL at 06/19/22 0811   • sodium chloride 3 % nebulizer solution 4 mL  4 mL Nebulization BID - RT Martell Downs MD   4 mL at 06/19/22 0733   • vancomycin 1500 mg/500 mL 0.9% NS IVPB ([facility])  15 mg/kg Intravenous Q12H Martell Downs MD   1,500 mg at 06/19/22 0550       Allergies:    Patient has no known allergies.    Review of Systems:   -A 14 point review of systems is completed and is negative.      Objective      Vital Signs  Temp:  [98.3 °F (36.8 °C)-98.8 °F (37.1 °C)] 98.3 °F (36.8 °C)  Heart Rate:  [70-95] 95  Resp:  [22-38] 38  BP: (135-172)/() 160/81  FiO2 (%):  [35 %] 35 %    Physical Exam:     HEENT:  Neck supple.  Tracheostomy in place. Mechanically ventilated  CVS:  Regular rate and rhythm.  No murmurs  Carotid Examination:  No bruits  Lungs:  Clear to auscultation  Abdomen:  Non-tender, Non-distended.  PEG tube in place with feedings.  Extremities: Edema of the dorsum of the bilateral hands.     Neurologic Exam:   Did not open eyes to voice.  Resists manual eye lid opening  Pupils are equally reactive to light.  no nystagmus or roving eye movements noted.   Gag intact.     Motor:    No purposeful movements.  Winces to noxious stimuli bilateral upper and lower extremities    DTR:  2+ throughout in all four extremities  upgoing toe on left       Results Review:    I reviewed the patient's new clinical results and findings.    Lab Results (last 24 hours)     Procedure Component Value Units Date/Time    Vancomycin, Trough Please call results to Pharmacy prior to administering next dose [[id number]]  (Normal) Collected: 06/19/22 0511    Specimen: Blood Updated: 06/19/22 0555     Vancomycin Trough 15.60 mcg/mL     Comprehensive Metabolic Panel  [[id number]]  (Abnormal) Collected: 06/19/22 0511    Specimen: Blood Updated: 06/19/22 0555     Glucose 158 mg/dL      BUN 24 mg/dL      Creatinine 0.42 mg/dL      Sodium 137 mmol/L      Potassium 4.0 mmol/L      Comment: Slight hemolysis detected by analyzer. Results may be affected.        Chloride 103 mmol/L      CO2 27.0 mmol/L      Calcium 7.8 mg/dL      Total Protein 6.1 g/dL      Albumin 2.10 g/dL      ALT (SGPT) 131 U/L      AST (SGOT) 103 U/L      Alkaline Phosphatase 418 U/L      Total Bilirubin 0.4 mg/dL      Globulin 4.0 gm/dL      A/G Ratio 0.5 g/dL      BUN/Creatinine Ratio 57.1     Anion Gap 7.0 mmol/L      eGFR 110.7 mL/min/1.73      Comment: National Kidney Foundation and American Society of Nephrology (ASN) Task Force recommended calculation based on the Chronic Kidney Disease Epidemiology Collaboration (CKD-EPI) equation refit without adjustment for race.       Narrative:      GFR Normal >60  Chronic Kidney Disease <60  Kidney Failure <15      C-reactive Protein [[id number]]  (Abnormal) Collected: 06/19/22 0511    Specimen: Blood Updated: 06/19/22 0555     C-Reactive Protein 13.09 mg/dL     CBC & Differential [[id number]]  (Abnormal) Collected: 06/19/22 0511    Specimen: Blood Updated: 06/19/22 0532    Narrative:      The following orders were created for panel order CBC & Differential.  Procedure                               Abnormality         Status                     ---------                               -----------         ------                     CBC Auto Differential[[id number]]        Abnormal            Final result                 Please view results for these tests on the individual orders.    CBC Auto Differential [[id number]]  (Abnormal) Collected: 06/19/22 0511    Specimen: Blood Updated: 06/19/22 0532     WBC 10.65 10*3/mm3      RBC 2.65 10*6/mm3      Hemoglobin 8.5 g/dL      Hematocrit 27.7 %      .5 fL      MCH 32.1 pg      MCHC 30.7 g/dL      RDW 17.3 %      RDW-SD 65.1  fl      MPV 9.5 fL      Platelets 236 10*3/mm3      Neutrophil % 72.8 %      Lymphocyte % 7.8 %      Monocyte % 12.5 %      Eosinophil % 4.1 %      Basophil % 0.6 %      Immature Grans % 2.2 %      Neutrophils, Absolute 7.76 10*3/mm3      Lymphocytes, Absolute 0.83 10*3/mm3      Monocytes, Absolute 1.33 10*3/mm3      Eosinophils, Absolute 0.44 10*3/mm3      Basophils, Absolute 0.06 10*3/mm3      Immature Grans, Absolute 0.23 10*3/mm3      nRBC 0.0 /100 WBC     POC Glucose Once [[id number]]  (Abnormal) Collected: 06/18/22 1731    Specimen: Blood Updated: 06/18/22 1743     Glucose 168 mg/dL      Comment: : [id number] Rip ShahChannel IQMeter ID: [id number]       POC Glucose Once [[id number]]  (Abnormal) Collected: 06/18/22 1206    Specimen: Blood Updated: 06/18/22 1217     Glucose 139 mg/dL      Comment: : [id number] Rip ShahChannel IQMeter ID: [id number]       CBC & Differential [[id number]]  (Abnormal) Collected: 06/18/22 1039    Specimen: Blood Updated: 06/18/22 1051    Narrative:      The following orders were created for panel order CBC & Differential.  Procedure                               Abnormality         Status                     ---------                               -----------         ------                     CBC Auto Differential[[id number]]        Abnormal            Final result                 Please view results for these tests on the individual orders.    CBC Auto Differential [[id number]]  (Abnormal) Collected: 06/18/22 1039    Specimen: Blood Updated: 06/18/22 1051     WBC 9.48 10*3/mm3      RBC 2.42 10*6/mm3      Hemoglobin 7.7 g/dL      Hematocrit 24.7 %      .1 fL      MCH 31.8 pg      MCHC 31.2 g/dL      RDW 17.4 %      RDW-SD 64.5 fl      MPV 9.4 fL      Platelets 210 10*3/mm3      Neutrophil % 72.6 %      Lymphocyte % 8.0 %      Monocyte % 12.2 %      Eosinophil % 4.1 %      Basophil % 0.6 %      Immature Grans % 2.5 %      Neutrophils, Absolute 6.87 10*3/mm3      Lymphocytes, Absolute  0.76 10*3/mm3      Monocytes, Absolute 1.16 10*3/mm3      Eosinophils, Absolute 0.39 10*3/mm3      Basophils, Absolute 0.06 10*3/mm3      Immature Grans, Absolute 0.24 10*3/mm3      nRBC 0.0 /100 WBC     Comprehensive Metabolic Panel [[id number]]  (Abnormal) Collected: 06/18/22 0812    Specimen: Blood Updated: 06/18/22 0845     Glucose 155 mg/dL      BUN 27 mg/dL      Creatinine 0.42 mg/dL      Sodium 136 mmol/L      Potassium 4.0 mmol/L      Chloride 103 mmol/L      CO2 29.0 mmol/L      Calcium 8.1 mg/dL      Total Protein 5.9 g/dL      Albumin 2.30 g/dL      ALT (SGPT) 113 U/L      AST (SGOT) 84 U/L      Alkaline Phosphatase 406 U/L      Total Bilirubin 0.4 mg/dL      Globulin 3.6 gm/dL      A/G Ratio 0.6 g/dL      BUN/Creatinine Ratio 64.3     Anion Gap 4.0 mmol/L      eGFR 110.7 mL/min/1.73      Comment: National Kidney Foundation and American Society of Nephrology (ASN) Task Force recommended calculation based on the Chronic Kidney Disease Epidemiology Collaboration (CKD-EPI) equation refit without adjustment for race.       Narrative:      GFR Normal >60  Chronic Kidney Disease <60  Kidney Failure <15            Imaging Results (Last 24 Hours)     Procedure Component Value Units Date/Time    XR Chest 1 View [[id number]] Collected: 06/19/22 0816     Updated: 06/19/22 0822    Narrative:      EXAMINATION: XR CHEST 1 VW- 6/19/2022 8:16 AM CDT     HISTORY: On vent; R13.10-Dysphagia, unspecified; Z01.818-Encounter for  other preprocedural examination; D32.9-Benign neoplasm of meninges,  unspecified; Z74.09-Other reduced mobility; Z78.9-Other specified health  status; G40.901-Epilepsy, unspecified, not intractable, with status  epilepticus; J96.01-Acute respiratory failure with hypoxia;  G91.0-Communicating hydrocephalus; T81.40XA-Infection following a pr.     REPORT: A frontal view of the chest was obtained.     COMPARISON: Chest x-ray 6/18/2022 0332 hours.     The tracheostomy tube and left internal jugular central  line remain in  satisfactory position. The lungs are hypoaerated, there is persistent  slightly increased consolidation of the medial left base and left  perihilar lung. Pneumonia is likely. The heart is enlarged. There is  central vascular congestion and probable mild volume overload. No  pneumothorax is identified. No other change.       Impression:      Stable satisfactory position of the tracheostomy tube and  left internal jugular central line. No pneumothorax is identified. There  is persistent increased consolidation of the left perihilar lung and  medial left base likely related to pneumonia. Probable small left  pleural effusion. Central vascular congestion as before with probable  mild volume overload.        This report was finalized on 06/19/2022 08:19 by Dr. Antony Thomas MD.          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)    Status epilepticus (HCC)    Essential hypertension    Type 2 diabetes mellitus with hyperglycemia, without long-term current use of insulin (HCC)    Hypothyroidism (acquired)    Acute respiratory failure (secondary to status epilepticus)    Thrombocytopenia (HCC)    Cerebral parenchymal hemorrhage (HCC)    PAF (paroxysmal atrial fibrillation) (HCC)    Fever    Loculated pleural effusion    Acute deep vein thrombosis (DVT) of the ulnar and brachial veins of right upper extremity (HCC)    Dysphagia    Communicating hydrocephalus (HCC)     Impression:     · Postoperative seizure  · S/p craniectomy and debridement   · Hypoalbuminemia   · S/p  shunt on 6/3/2022  · Tracheostomy with mechanical ventilation.  · PEG tube        Plan:  · discontinue to fosphenytoin 6/17/2022.  Discontinue daily dilantin levels.   · Recommend repeating EEG prior to weaning of Keppra.  · Continue Keppra 1000 mg IV twice daily.  will recommend decrease Keppra to 500 mg IV BID on 6/24/2022 and continue 500 mg  BID for 7 days. After 7 days Keppra can be weaned down completely.   · Continue Vimpat 200 mg IV every 12 hours.  Would likely leave this in place as he goes to LTACH  · Plans are to look at LTACH placement near [place], [place].            Di Ward, APRN  06/19/22  08:40 CDT

## 2022-06-20 ENCOUNTER — APPOINTMENT (OUTPATIENT)
Dept: GENERAL RADIOLOGY | Facility: HOSPITAL | Age: 77
End: 2022-06-20

## 2022-06-20 VITALS
SYSTOLIC BLOOD PRESSURE: 150 MMHG | BODY MASS INDEX: 33.58 KG/M2 | HEART RATE: 69 BPM | WEIGHT: 247.9 LBS | OXYGEN SATURATION: 98 % | HEIGHT: 72 IN | DIASTOLIC BLOOD PRESSURE: 88 MMHG | TEMPERATURE: 97.2 F | RESPIRATION RATE: 27 BRPM

## 2022-06-20 LAB
ALBUMIN SERPL-MCNC: 2.3 G/DL (ref 3.5–5.2)
ALBUMIN SERPL-MCNC: 2.3 G/DL (ref 3.5–5.2)
ALBUMIN/GLOB SERPL: 0.7 G/DL
ALBUMIN/GLOB SERPL: 0.7 G/DL
ALP SERPL-CCNC: 393 U/L (ref 39–117)
ALP SERPL-CCNC: 434 U/L (ref 39–117)
ALT SERPL W P-5'-P-CCNC: 144 U/L (ref 1–41)
ALT SERPL W P-5'-P-CCNC: 160 U/L (ref 1–41)
ANION GAP SERPL CALCULATED.3IONS-SCNC: 5 MMOL/L (ref 5–15)
ANION GAP SERPL CALCULATED.3IONS-SCNC: 5 MMOL/L (ref 5–15)
ARTERIAL PATENCY WRIST A: POSITIVE
ARTERIAL PATENCY WRIST A: POSITIVE
AST SERPL-CCNC: 116 U/L (ref 1–40)
AST SERPL-CCNC: 140 U/L (ref 1–40)
ATMOSPHERIC PRESS: 754 MMHG
ATMOSPHERIC PRESS: 755 MMHG
BASE EXCESS BLDA CALC-SCNC: 3.5 MMOL/L (ref 0–2)
BASE EXCESS BLDA CALC-SCNC: 3.8 MMOL/L (ref 0–2)
BASOPHILS # BLD AUTO: 0.05 10*3/MM3 (ref 0–0.2)
BASOPHILS # BLD AUTO: 0.07 10*3/MM3 (ref 0–0.2)
BASOPHILS NFR BLD AUTO: 0.4 % (ref 0–1.5)
BASOPHILS NFR BLD AUTO: 0.6 % (ref 0–1.5)
BDY SITE: ABNORMAL
BDY SITE: ABNORMAL
BILIRUB SERPL-MCNC: 0.4 MG/DL (ref 0–1.2)
BILIRUB SERPL-MCNC: 0.5 MG/DL (ref 0–1.2)
BODY TEMPERATURE: 37 C
BODY TEMPERATURE: 37 C
BUN SERPL-MCNC: 27 MG/DL (ref 8–23)
BUN SERPL-MCNC: 27 MG/DL (ref 8–23)
BUN/CREAT SERPL: 73 (ref 7–25)
BUN/CREAT SERPL: 75 (ref 7–25)
CALCIUM SPEC-SCNC: 8 MG/DL (ref 8.6–10.5)
CALCIUM SPEC-SCNC: 8.1 MG/DL (ref 8.6–10.5)
CHLORIDE SERPL-SCNC: 105 MMOL/L (ref 98–107)
CHLORIDE SERPL-SCNC: 107 MMOL/L (ref 98–107)
CO2 SERPL-SCNC: 28 MMOL/L (ref 22–29)
CO2 SERPL-SCNC: 29 MMOL/L (ref 22–29)
CREAT SERPL-MCNC: 0.36 MG/DL (ref 0.76–1.27)
CREAT SERPL-MCNC: 0.37 MG/DL (ref 0.76–1.27)
D-LACTATE SERPL-SCNC: 0.6 MMOL/L (ref 0.5–2)
DEPRECATED RDW RBC AUTO: 62.7 FL (ref 37–54)
DEPRECATED RDW RBC AUTO: 63.7 FL (ref 37–54)
EGFRCR SERPLBLD CKD-EPI 2021: 115.1 ML/MIN/1.73
EGFRCR SERPLBLD CKD-EPI 2021: 116 ML/MIN/1.73
EOSINOPHIL # BLD AUTO: 0.44 10*3/MM3 (ref 0–0.4)
EOSINOPHIL # BLD AUTO: 0.6 10*3/MM3 (ref 0–0.4)
EOSINOPHIL NFR BLD AUTO: 3.7 % (ref 0.3–6.2)
EOSINOPHIL NFR BLD AUTO: 5.3 % (ref 0.3–6.2)
ERYTHROCYTE [DISTWIDTH] IN BLOOD BY AUTOMATED COUNT: 16.8 % (ref 12.3–15.4)
ERYTHROCYTE [DISTWIDTH] IN BLOOD BY AUTOMATED COUNT: 17.3 % (ref 12.3–15.4)
GLOBULIN UR ELPH-MCNC: 3.4 GM/DL
GLOBULIN UR ELPH-MCNC: 3.5 GM/DL
GLUCOSE BLDC GLUCOMTR-MCNC: 140 MG/DL (ref 70–130)
GLUCOSE BLDC GLUCOMTR-MCNC: 146 MG/DL (ref 70–130)
GLUCOSE SERPL-MCNC: 149 MG/DL (ref 65–99)
GLUCOSE SERPL-MCNC: 151 MG/DL (ref 65–99)
HCO3 BLDA-SCNC: 28.3 MMOL/L (ref 20–26)
HCO3 BLDA-SCNC: 28.6 MMOL/L (ref 20–26)
HCT VFR BLD AUTO: 26.7 % (ref 37.5–51)
HCT VFR BLD AUTO: 27.9 % (ref 37.5–51)
HGB BLD-MCNC: 8.2 G/DL (ref 13–17.7)
HGB BLD-MCNC: 8.8 G/DL (ref 13–17.7)
IMM GRANULOCYTES # BLD AUTO: 0.19 10*3/MM3 (ref 0–0.05)
IMM GRANULOCYTES # BLD AUTO: 0.25 10*3/MM3 (ref 0–0.05)
IMM GRANULOCYTES NFR BLD AUTO: 1.7 % (ref 0–0.5)
IMM GRANULOCYTES NFR BLD AUTO: 2.1 % (ref 0–0.5)
INHALED O2 CONCENTRATION: 35 %
INHALED O2 CONCENTRATION: 35 %
LYMPHOCYTES # BLD AUTO: 0.7 10*3/MM3 (ref 0.7–3.1)
LYMPHOCYTES # BLD AUTO: 0.87 10*3/MM3 (ref 0.7–3.1)
LYMPHOCYTES NFR BLD AUTO: 6.2 % (ref 19.6–45.3)
LYMPHOCYTES NFR BLD AUTO: 7.3 % (ref 19.6–45.3)
Lab: ABNORMAL
Lab: ABNORMAL
MCH RBC QN AUTO: 31.7 PG (ref 26.6–33)
MCH RBC QN AUTO: 32.1 PG (ref 26.6–33)
MCHC RBC AUTO-ENTMCNC: 30.7 G/DL (ref 31.5–35.7)
MCHC RBC AUTO-ENTMCNC: 31.5 G/DL (ref 31.5–35.7)
MCV RBC AUTO: 101.8 FL (ref 79–97)
MCV RBC AUTO: 103.1 FL (ref 79–97)
MODALITY: ABNORMAL
MODALITY: ABNORMAL
MONOCYTES # BLD AUTO: 1.29 10*3/MM3 (ref 0.1–0.9)
MONOCYTES # BLD AUTO: 1.38 10*3/MM3 (ref 0.1–0.9)
MONOCYTES NFR BLD AUTO: 11.5 % (ref 5–12)
MONOCYTES NFR BLD AUTO: 11.5 % (ref 5–12)
NEUTROPHILS NFR BLD AUTO: 74.8 % (ref 42.7–76)
NEUTROPHILS NFR BLD AUTO: 74.9 % (ref 42.7–76)
NEUTROPHILS NFR BLD AUTO: 8.43 10*3/MM3 (ref 1.7–7)
NEUTROPHILS NFR BLD AUTO: 8.97 10*3/MM3 (ref 1.7–7)
NRBC BLD AUTO-RTO: 0 /100 WBC (ref 0–0.2)
NRBC BLD AUTO-RTO: 0 /100 WBC (ref 0–0.2)
PAW @ PEAK INSP FLOW SETTING VENT: 12 CMH2O
PAW @ PEAK INSP FLOW SETTING VENT: 17 CMH2O
PCO2 BLDA: 41.5 MM HG (ref 35–45)
PCO2 BLDA: 45.4 MM HG (ref 35–45)
PCO2 TEMP ADJ BLD: 41.5 MM HG (ref 35–45)
PCO2 TEMP ADJ BLD: 45.4 MM HG (ref 35–45)
PEEP RESPIRATORY: 5 CM[H2O]
PEEP RESPIRATORY: 5 CM[H2O]
PH BLDA: 7.41 PH UNITS (ref 7.35–7.45)
PH BLDA: 7.44 PH UNITS (ref 7.35–7.45)
PH, TEMP CORRECTED: 7.41 PH UNITS (ref 7.35–7.45)
PH, TEMP CORRECTED: 7.44 PH UNITS (ref 7.35–7.45)
PLATELET # BLD AUTO: 237 10*3/MM3 (ref 140–450)
PLATELET # BLD AUTO: 274 10*3/MM3 (ref 140–450)
PMV BLD AUTO: 9.3 FL (ref 6–12)
PMV BLD AUTO: 9.5 FL (ref 6–12)
PO2 BLDA: 62.3 MM HG (ref 83–108)
PO2 BLDA: 69.4 MM HG (ref 83–108)
PO2 TEMP ADJ BLD: 62.3 MM HG (ref 83–108)
PO2 TEMP ADJ BLD: 69.4 MM HG (ref 83–108)
POTASSIUM SERPL-SCNC: 3.7 MMOL/L (ref 3.5–5.2)
POTASSIUM SERPL-SCNC: 4.3 MMOL/L (ref 3.5–5.2)
PROT SERPL-MCNC: 5.7 G/DL (ref 6–8.5)
PROT SERPL-MCNC: 5.8 G/DL (ref 6–8.5)
RBC # BLD AUTO: 2.59 10*6/MM3 (ref 4.14–5.8)
RBC # BLD AUTO: 2.74 10*6/MM3 (ref 4.14–5.8)
SAO2 % BLDCOA: 92.3 % (ref 94–99)
SAO2 % BLDCOA: 95.3 % (ref 94–99)
SET MECH RESP RATE: 16
SET MECH RESP RATE: 16
SODIUM SERPL-SCNC: 139 MMOL/L (ref 136–145)
SODIUM SERPL-SCNC: 140 MMOL/L (ref 136–145)
VENTILATOR MODE: ABNORMAL
VENTILATOR MODE: ABNORMAL
WBC NRBC COR # BLD: 11.26 10*3/MM3 (ref 3.4–10.8)
WBC NRBC COR # BLD: 11.98 10*3/MM3 (ref 3.4–10.8)

## 2022-06-20 PROCEDURE — 25010000002 LORAZEPAM PER 2 MG: Performed by: NEUROLOGICAL SURGERY

## 2022-06-20 PROCEDURE — 99024 POSTOP FOLLOW-UP VISIT: CPT | Performed by: NURSE PRACTITIONER

## 2022-06-20 PROCEDURE — 94799 UNLISTED PULMONARY SVC/PX: CPT

## 2022-06-20 PROCEDURE — 25010000002 VANCOMYCIN 10 G RECONSTITUTED SOLUTION: Performed by: NEUROLOGICAL SURGERY

## 2022-06-20 PROCEDURE — 36600 WITHDRAWAL OF ARTERIAL BLOOD: CPT

## 2022-06-20 PROCEDURE — 25010000002 CEFEPIME PER 500 MG: Performed by: NEUROLOGICAL SURGERY

## 2022-06-20 PROCEDURE — 63710000001 INSULIN REGULAR HUMAN PER 5 UNITS: Performed by: NEUROLOGICAL SURGERY

## 2022-06-20 PROCEDURE — 85025 COMPLETE CBC W/AUTO DIFF WBC: CPT

## 2022-06-20 PROCEDURE — 99233 SBSQ HOSP IP/OBS HIGH 50: CPT | Performed by: INTERNAL MEDICINE

## 2022-06-20 PROCEDURE — 71045 X-RAY EXAM CHEST 1 VIEW: CPT

## 2022-06-20 PROCEDURE — 99222 1ST HOSP IP/OBS MODERATE 55: CPT | Performed by: INTERNAL MEDICINE

## 2022-06-20 PROCEDURE — 82962 GLUCOSE BLOOD TEST: CPT

## 2022-06-20 PROCEDURE — 0 LEVETIRACETAM IN NACL 0.75% 1000 MG/100ML SOLUTION: Performed by: NEUROLOGICAL SURGERY

## 2022-06-20 PROCEDURE — 25010000002 HYDRALAZINE PER 20 MG: Performed by: NEUROLOGICAL SURGERY

## 2022-06-20 PROCEDURE — 25010000002 HEPARIN (PORCINE) PER 1000 UNITS: Performed by: NEUROLOGICAL SURGERY

## 2022-06-20 PROCEDURE — 93005 ELECTROCARDIOGRAM TRACING: CPT | Performed by: NEUROLOGICAL SURGERY

## 2022-06-20 PROCEDURE — 82803 BLOOD GASES ANY COMBINATION: CPT

## 2022-06-20 PROCEDURE — 85025 COMPLETE CBC W/AUTO DIFF WBC: CPT | Performed by: NEUROLOGICAL SURGERY

## 2022-06-20 PROCEDURE — 93010 ELECTROCARDIOGRAM REPORT: CPT | Performed by: INTERNAL MEDICINE

## 2022-06-20 PROCEDURE — 97110 THERAPEUTIC EXERCISES: CPT

## 2022-06-20 PROCEDURE — 80053 COMPREHEN METABOLIC PANEL: CPT

## 2022-06-20 PROCEDURE — 80053 COMPREHEN METABOLIC PANEL: CPT | Performed by: NEUROLOGICAL SURGERY

## 2022-06-20 PROCEDURE — 83605 ASSAY OF LACTIC ACID: CPT

## 2022-06-20 PROCEDURE — 94003 VENT MGMT INPAT SUBQ DAY: CPT

## 2022-06-20 PROCEDURE — 99232 SBSQ HOSP IP/OBS MODERATE 35: CPT | Performed by: CLINICAL NURSE SPECIALIST

## 2022-06-20 RX ORDER — LACTULOSE 20 G/30ML
20 SOLUTION ORAL DAILY
Qty: 450 ML
Start: 2022-06-21

## 2022-06-20 RX ORDER — HEPARIN SODIUM 5000 [USP'U]/ML
5000 INJECTION, SOLUTION INTRAVENOUS; SUBCUTANEOUS EVERY 12 HOURS SCHEDULED
Start: 2022-06-20

## 2022-06-20 RX ORDER — LEVETIRACETAM 10 MG/ML
1000 INJECTION INTRAVASCULAR EVERY 12 HOURS SCHEDULED
Qty: 2000 ML
Start: 2022-06-20

## 2022-06-20 RX ORDER — DEXAMETHASONE 0.5 MG/1
0.5 TABLET ORAL DAILY
Qty: 1 TABLET | Refills: 0
Start: 2022-06-21 | End: 2022-06-22

## 2022-06-20 RX ORDER — NICOTINE POLACRILEX 4 MG
15 LOZENGE BUCCAL
Start: 2022-06-20

## 2022-06-20 RX ORDER — LIOTHYRONINE SODIUM 25 UG/1
25 TABLET ORAL DAILY
Start: 2022-06-21

## 2022-06-20 RX ORDER — BISACODYL 10 MG
10 SUPPOSITORY, RECTAL RECTAL DAILY PRN
Start: 2022-06-20

## 2022-06-20 RX ORDER — NOREPINEPHRINE BIT/0.9 % NACL 8 MG/250ML
.02-.3 INFUSION BOTTLE (ML) INTRAVENOUS
Start: 2022-06-20

## 2022-06-20 RX ORDER — ARFORMOTEROL TARTRATE 15 UG/2ML
15 SOLUTION RESPIRATORY (INHALATION)
Qty: 120 ML
Start: 2022-06-20

## 2022-06-20 RX ORDER — SODIUM CHLORIDE FOR INHALATION 3 %
4 VIAL, NEBULIZER (ML) INHALATION
Refills: 12
Start: 2022-06-20

## 2022-06-20 RX ORDER — HYDRALAZINE HYDROCHLORIDE 25 MG/1
25 TABLET, FILM COATED ORAL EVERY 8 HOURS SCHEDULED
Start: 2022-06-20

## 2022-06-20 RX ORDER — AMLODIPINE BESYLATE 10 MG/1
10 TABLET ORAL
Start: 2022-06-21

## 2022-06-20 RX ORDER — LORAZEPAM 2 MG/ML
2 INJECTION INTRAMUSCULAR
Start: 2022-06-20

## 2022-06-20 RX ORDER — LACOSAMIDE 10 MG/ML
200 INJECTION, SOLUTION INTRAVENOUS EVERY 12 HOURS
Qty: 600 ML
Start: 2022-06-20

## 2022-06-20 RX ORDER — CARVEDILOL 6.25 MG/1
6.25 TABLET ORAL 2 TIMES DAILY WITH MEALS
Start: 2022-06-20

## 2022-06-20 RX ORDER — CARVEDILOL 6.25 MG/1
6.25 TABLET ORAL 2 TIMES DAILY WITH MEALS
Status: DISCONTINUED | OUTPATIENT
Start: 2022-06-20 | End: 2022-06-20 | Stop reason: HOSPADM

## 2022-06-20 RX ORDER — POLYETHYLENE GLYCOL 3350 17 G/17G
17 POWDER, FOR SOLUTION ORAL DAILY
Start: 2022-06-21

## 2022-06-20 RX ORDER — LATANOPROST 50 UG/ML
1 SOLUTION/ DROPS OPHTHALMIC NIGHTLY
Refills: 12
Start: 2022-06-20

## 2022-06-20 RX ORDER — CHLORHEXIDINE GLUCONATE 0.12 MG/ML
15 RINSE ORAL EVERY 12 HOURS SCHEDULED
Start: 2022-06-20

## 2022-06-20 RX ORDER — GUAR GUM
1 PACKET (EA) ORAL 4 TIMES DAILY
Start: 2022-06-20

## 2022-06-20 RX ORDER — LANSOPRAZOLE
30 KIT EVERY MORNING
Qty: 300 ML
Start: 2022-06-21

## 2022-06-20 RX ORDER — LABETALOL HYDROCHLORIDE 5 MG/ML
10 INJECTION, SOLUTION INTRAVENOUS EVERY 6 HOURS PRN
Start: 2022-06-20

## 2022-06-20 RX ORDER — LEVALBUTEROL INHALATION SOLUTION 0.63 MG/3ML
0.63 SOLUTION RESPIRATORY (INHALATION) 3 TIMES DAILY PRN
Refills: 12
Start: 2022-06-20

## 2022-06-20 RX ORDER — AMINO AC/PROTEIN HYDR/WHEY PRO 10G-100/30
45 LIQUID (ML) ORAL 2 TIMES DAILY
Start: 2022-06-20

## 2022-06-20 RX ORDER — IPRATROPIUM BROMIDE AND ALBUTEROL SULFATE 2.5; .5 MG/3ML; MG/3ML
3 SOLUTION RESPIRATORY (INHALATION) EVERY 4 HOURS PRN
Qty: 360 ML
Start: 2022-06-20

## 2022-06-20 RX ORDER — DEXTROSE MONOHYDRATE 25 G/50ML
25 INJECTION, SOLUTION INTRAVENOUS
Start: 2022-06-20

## 2022-06-20 RX ADMIN — LANSOPRAZOLE 30 MG: KIT at 06:02

## 2022-06-20 RX ADMIN — SODIUM CHLORIDE SOLN NEBU 3% 4 ML: 3 NEBU SOLN at 06:47

## 2022-06-20 RX ADMIN — LEVOTHYROXINE SODIUM 125 MCG: 125 TABLET ORAL at 05:51

## 2022-06-20 RX ADMIN — LEVETIRACETAM 1000 MG: 10 INJECTION INTRAVENOUS at 10:10

## 2022-06-20 RX ADMIN — LORAZEPAM 2 MG: 2 INJECTION INTRAMUSCULAR at 02:26

## 2022-06-20 RX ADMIN — DEXAMETHASONE 0.5 MG: 0.5 TABLET ORAL at 09:34

## 2022-06-20 RX ADMIN — ARFORMOTEROL TARTRATE 15 MCG: 15 SOLUTION RESPIRATORY (INHALATION) at 06:47

## 2022-06-20 RX ADMIN — SODIUM CHLORIDE 10 MG/HR: 9 INJECTION, SOLUTION INTRAVENOUS at 05:51

## 2022-06-20 RX ADMIN — ACETAMINOPHEN 650 MG: 325 TABLET ORAL at 09:38

## 2022-06-20 RX ADMIN — CEFEPIME 2 G: 2 INJECTION, POWDER, FOR SOLUTION INTRAVENOUS at 05:52

## 2022-06-20 RX ADMIN — LACTULOSE 20 G: 20 SOLUTION ORAL at 09:36

## 2022-06-20 RX ADMIN — INSULIN HUMAN 2 UNITS: 100 INJECTION, SOLUTION PARENTERAL at 00:17

## 2022-06-20 RX ADMIN — LISINOPRIL 40 MG: 20 TABLET ORAL at 09:35

## 2022-06-20 RX ADMIN — Medication 1 PACKET: at 12:09

## 2022-06-20 RX ADMIN — Medication 45 ML: at 09:29

## 2022-06-20 RX ADMIN — SODIUM CHLORIDE 10 MG/HR: 9 INJECTION, SOLUTION INTRAVENOUS at 10:11

## 2022-06-20 RX ADMIN — LABETALOL HYDROCHLORIDE 10 MG: 5 INJECTION INTRAVENOUS at 02:02

## 2022-06-20 RX ADMIN — GUAIFENESIN 400 MG: 200 SOLUTION ORAL at 09:36

## 2022-06-20 RX ADMIN — HYDRALAZINE HYDROCHLORIDE 10 MG: 20 INJECTION INTRAMUSCULAR; INTRAVENOUS at 01:36

## 2022-06-20 RX ADMIN — AMLODIPINE BESYLATE 10 MG: 10 TABLET ORAL at 09:37

## 2022-06-20 RX ADMIN — Medication 1 PACKET: at 10:11

## 2022-06-20 RX ADMIN — HEPARIN SODIUM 5000 UNITS: 5000 INJECTION INTRAVENOUS; SUBCUTANEOUS at 09:34

## 2022-06-20 RX ADMIN — HYDRALAZINE HYDROCHLORIDE 25 MG: 25 TABLET, FILM COATED ORAL at 05:51

## 2022-06-20 RX ADMIN — CEFEPIME 2 G: 2 INJECTION, POWDER, FOR SOLUTION INTRAVENOUS at 14:19

## 2022-06-20 RX ADMIN — POLYETHYLENE GLYCOL 3350 17 G: 17 POWDER, FOR SOLUTION ORAL at 09:38

## 2022-06-20 RX ADMIN — DOCUSATE SODIUM LIQUID 50 MG: 100 LIQUID ORAL at 09:37

## 2022-06-20 RX ADMIN — MUPIROCIN 1 APPLICATION: 20 OINTMENT TOPICAL at 09:33

## 2022-06-20 RX ADMIN — LIOTHYRONINE SODIUM 25 MCG: 25 TABLET ORAL at 10:10

## 2022-06-20 RX ADMIN — CHLORHEXIDINE GLUCONATE 15 ML: 1.2 RINSE ORAL at 09:30

## 2022-06-20 RX ADMIN — LACOSAMIDE 200 MG: 10 INJECTION, SOLUTION INTRAVENOUS at 09:30

## 2022-06-20 RX ADMIN — HYDRALAZINE HYDROCHLORIDE 25 MG: 25 TABLET, FILM COATED ORAL at 14:19

## 2022-06-20 RX ADMIN — CARVEDILOL 12.5 MG: 6.25 TABLET, FILM COATED ORAL at 09:29

## 2022-06-20 RX ADMIN — SODIUM CHLORIDE 10 MG/HR: 9 INJECTION, SOLUTION INTRAVENOUS at 03:27

## 2022-06-20 RX ADMIN — VANCOMYCIN HYDROCHLORIDE 1500 MG: 10 INJECTION, POWDER, LYOPHILIZED, FOR SOLUTION INTRAVENOUS at 05:52

## 2022-06-20 RX ADMIN — SODIUM CHLORIDE 7.5 MG/HR: 9 INJECTION, SOLUTION INTRAVENOUS at 00:17

## 2022-06-20 NOTE — PROGRESS NOTES
[facility] Medicine Services  INPATIENT PROGRESS NOTE    Patient Name: Hai Hernandez  Date of Admission: 5/10/2022  Today's Date: 06/20/22  Length of Stay: 41  Primary Care Physician: Elisa Chacko MD    Subjective   Chief Complaint:   Postop craniotomy for meningioma    HPI   Patient was admitted by Dr. Downs for craniotomy for meningioma removal.  Patient was intubated by Dr. Snow for status epilepticus on 5/14/2022.  Neurology been continuing to follow and modify the antiepileptic regimen as appropriate.      Review of Systems   Unable to perform ROS: Intubated        All pertinent negatives and positives are as above. All other systems have been reviewed and are negative unless otherwise stated.     Objective    Temp:  [97.2 °F (36.2 °C)-98.5 °F (36.9 °C)] 97.2 °F (36.2 °C)  Heart Rate:  [] 75  Resp:  [27-38] 27  BP: (120-164)/() 143/86  FiO2 (%):  [35 %] 35 %  Physical Exam  Constitutional:       Comments: Currently intubated   HENT:      Head: Normocephalic and atraumatic.      Nose: Nose normal. No congestion or rhinorrhea.      Mouth/Throat:      Mouth: Mucous membranes are dry.      Pharynx: Oropharynx is clear. No oropharyngeal exudate or posterior oropharyngeal erythema.   Eyes:      General: No scleral icterus.     Extraocular Movements: Extraocular movements intact.      Conjunctiva/sclera: Conjunctivae normal.      Pupils: Pupils are equal, round, and reactive to light.   Cardiovascular:      Rate and Rhythm: Normal rate and regular rhythm.      Pulses: Normal pulses.      Heart sounds: Normal heart sounds. No murmur heard.    No gallop.   Pulmonary:      Comments: Patient has some crackles in both bases with coarse crackles more evident in the left lung fields.  He also has some transmitted sounds more prominent on the right lung fields.  The patient is currently intubated  Abdominal:      General: Abdomen is flat. There is no  distension.      Palpations: Abdomen is soft. There is no mass.      Hernia: No hernia is present.   Musculoskeletal:         General: No swelling.      Right lower leg: No edema.      Left lower leg: No edema.   Skin:     Capillary Refill: Capillary refill takes less than 2 seconds.      Coloration: Skin is not jaundiced.      Findings: No bruising, erythema or rash.   Neurological:      Comments: Currently intubated         Results Review:  I have reviewed the labs, radiology results, and diagnostic studies.    Laboratory Data:   Results from last 7 days   Lab Units 06/20/22  1114 06/20/22  0401 06/19/22  0511   WBC 10*3/mm3 11.26* 11.98* 10.65   HEMOGLOBIN g/dL 8.2* 8.8* 8.5*   HEMATOCRIT % 26.7* 27.9* 27.7*   PLATELETS 10*3/mm3 237 274 236        Results from last 7 days   Lab Units 06/20/22  1114 06/20/22  0617 06/19/22  0511   SODIUM mmol/L 140 139 137   POTASSIUM mmol/L 3.7 4.3 4.0   CHLORIDE mmol/L 107 105 103   CO2 mmol/L 28.0 29.0 27.0   BUN mg/dL 27* 27* 24*   CREATININE mg/dL 0.37* 0.36* 0.42*   CALCIUM mg/dL 8.1* 8.0* 7.8*   BILIRUBIN mg/dL 0.5 0.4 0.4   ALK PHOS U/L 393* 434* 418*   ALT (SGPT) U/L 144* 160* 131*   AST (SGOT) U/L 116* 140* 103*   GLUCOSE mg/dL 151* 149* 158*       Culture Data:   Respiratory Culture   Date Value Ref Range Status   06/14/2022   Final    Scant growth (1+) Normal respiratory annai. No S. aureus or Pseudomonas aeruginosa detected. Final report.       Radiology Data:   Imaging Results (Last 24 Hours)     ** No results found for the last 24 hours. **          I have reviewed the patient's current medications.     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  Dr. Myrick has already seen the patient today and we reduce the Coreg to 50% of its dose and reduce it further as needed  2.  Things are safe from neurosurgical standpoint we can consider introducing anticoagulation to cover for the patient's atrial fibrillation  3.  We will continue to titrate patient's BP medications to maintain this within a normal acceptable range  4.  Chest x-ray  5.  Await planned LTAC placement  6.  Grateful for input of neurology to provide optimal antiepileptic regimen      Electronically signed by Rusty Cox MD, 06/20/22, 12:43 CDT.

## 2022-06-20 NOTE — DISCHARGE PLACEMENT REQUEST
"Radha Wilkins (77 y.o. Male)             Date of Birth   1945    Social Security Number       Address    [address] [address]    Home Phone   [phone]    MRN   [id number]       Jehovah's witness   Other    Marital Status                               Admission Date   5/10/22    Admission Type   Elective    Admitting Provider   Martell Downs MD    Attending Provider   Martell Downs MD    Department, Room/Bed   [facility] INTENSIVE CARE, I010/1       Discharge Date       Discharge Disposition       Discharge Destination                               Attending Provider: Martell Downs MD    Allergies: No Known Allergies    Isolation: None   Infection: None   Code Status: CPR   Advance Care Planning Activity    Ht: 182.9 cm (72\")   Wt: 112 kg (247 lb 14.4 oz)    Admission Cmt: None   Principal Problem: Meningioma (HCC) [D32.9]                 Active Insurance as of 5/10/2022     Primary Coverage     Payor Plan Insurance Group Employer/Plan Group    MEDICARE MEDICARE A & B      Payor Plan Address Payor Plan Phone Number Payor Plan Fax Number Effective Dates    [address] [phone]  3/1/2010 - None Entered    [facility] [id number]       Subscriber Name Subscriber Birth Date Member ID       RADHA WILKINS 1945 [id number]           Secondary Coverage     Payor Plan Insurance Group Employer/Plan Group     FOR LIFE  FOR LIFE  SUP       Payor Plan Address Payor Plan Phone Number Payor Plan Fax Number Effective Dates    [address] [phone]  1/5/2022 - None Entered    [facility] [address]       Subscriber Name Subscriber Birth Date Member ID       RADHA WILKINS 1945 [id number]                 Emergency Contacts      (Rel.) Home Phone Work Phone Mobile Phone    abhay fitzgerald (Daughter) -- -- [phone]    gopireg aguirre (Son) -- -- [phone]              Current Facility-Administered Medications "   Medication Dose Route Frequency Provider Last Rate Last Admin   • acetaminophen (TYLENOL) tablet 650 mg  650 mg Oral Q4H PRN Martell Downs MD   650 mg at 06/20/22 0938    Or   • acetaminophen (TYLENOL) suppository 650 mg  650 mg Rectal Q4H PRN Martell Downs MD   650 mg at 05/23/22 0016   • alteplase (CATHFLO/ACTIVASE) injection 2 mg  2 mg Intracatheter PRN Martell Downs MD   New Syringe/Cartridge at 05/28/22 1330   • amLODIPine (NORVASC) tablet 10 mg  10 mg Oral Q24H Sha Beatty MD   10 mg at 06/20/22 0937   • arformoterol (BROVANA) nebulizer solution 15 mcg  15 mcg Nebulization BID - RT Vidya Chandler APRN   15 mcg at 06/20/22 0647   • bisacodyl (DULCOLAX) suppository 10 mg  10 mg Rectal Daily PRN Stevie Parsons APRN       • carvedilol (COREG) tablet 12.5 mg  12.5 mg Oral BID With Meals Sha Beatty MD   12.5 mg at 06/20/22 0929   • cefepime (MAXIPIME) 2 g/100 mL 0.9% NS (mbp)  2 g Intravenous Q8H Martell Downs MD   2 g at 06/20/22 0552   • chlorhexidine (PERIDEX) 0.12 % solution 15 mL  15 mL Mouth/Throat Q12H Martell Downs MD   15 mL at 06/20/22 0930   • dexamethasone (DECADRON) tablet 0.5 mg  0.5 mg Per PEG Tube Daily Stevie Parsons APRN   0.5 mg at 06/20/22 0934   • dextrose (D50W) (25 g/50 mL) IV injection 25 g  25 g Intravenous Q15 Min PRN Martell Downs MD   25 g at 06/09/22 1807   • dextrose (GLUTOSE) oral gel 15 g  15 g Oral Q15 Min PRN Martell Downs MD   15 g at 05/22/22 0527   • docusate (COLACE) 50 MG/5ML liquid 50 mg  50 mg Oral Daily Sha Beatty MD   50 mg at 06/20/22 0937   • glucagon (human recombinant) (GLUCAGEN DIAGNOSTIC) injection 1 mg  1 mg Intramuscular Q15 Min PRN Martell Downs MD       • guaiFENesin (ROBITUSSIN) 100 MG/5ML oral solution 400 mg  400 mg Per PEG Tube TID Vidya Chandler APRN   400 mg at 06/20/22 0936   • heparin (porcine) 5000 UNIT/ML injection 5,000  Units  5,000 Units Subcutaneous Q12H Martell Downs MD   5,000 Units at 06/20/22 0934   • hydrALAZINE (APRESOLINE) injection 10 mg  10 mg Intravenous Q6H PRN Martell Downs MD   10 mg at 06/20/22 0136   • hydrALAZINE (APRESOLINE) tablet 25 mg  25 mg Oral Q8H Sha Beatty MD   25 mg at 06/20/22 0551   • insulin detemir (LEVEMIR) injection 20 Units  20 Units Subcutaneous Nightly Martell Downs MD   20 Units at 06/19/22 2008   • insulin regular (humuLIN R,novoLIN R) injection 2-7 Units  2-7 Units Subcutaneous Q6H Martell Downs MD   2 Units at 06/20/22 0017   • ipratropium-albuterol (DUO-NEB) nebulizer solution 3 mL  3 mL Nebulization Q4H PRN Martell Downs MD   3 mL at 06/15/22 0650   • labetalol (NORMODYNE,TRANDATE) injection 10 mg  10 mg Intravenous Q6H PRN Martell Downs MD   10 mg at 06/20/22 0202   • lacosamide (VIMPAT) injection 200 mg  200 mg Intravenous Q12H Martell Downs MD   200 mg at 06/20/22 0930   • lactulose solution 20 g  20 g Per PEG Tube Daily Sha Beatty MD   20 g at 06/20/22 0936   • lansoprazole (FIRST) oral suspension 30 mg  30 mg Per G Tube QAM Martell Downs MD   30 mg at 06/20/22 0602   • latanoprost (XALATAN) 0.005 % ophthalmic solution 1 drop  1 drop Both Eyes Nightly Martell Downs MD   1 drop at 06/19/22 2010   • levalbuterol (XOPENEX) nebulizer solution 0.63 mg  0.63 mg Nebulization TID PRN Martell Downs MD   0.63 mg at 06/12/22 1046   • levETIRAcetam in NaCl 0.75% (KEPPRA) IVPB 1,000 mg  1,000 mg Intravenous Q12H Martell Downs  mL/hr at 06/16/22 2200 1,000 mg at 06/20/22 1010   • levothyroxine (SYNTHROID, LEVOTHROID) tablet 125 mcg  125 mcg Nasogastric Q AM Martell Downs MD   125 mcg at 06/20/22 0551   • lidocaine (XYLOCAINE) 1 % injection 10 mL  10 mL Intradermal Once Martell Downs MD       • lidocaine (XYLOCAINE) 1 % injection 10 mL  10 mL  Infiltration Once Ortega, Pilo NICOLE MD       • liothyronine (CYTOMEL) tablet 25 mcg  25 mcg Nasogastric Daily Martell Downs MD   25 mcg at 06/20/22 1010   • lisinopril (PRINIVIL,ZESTRIL) tablet 40 mg  40 mg Nasogastric Q24H Sha Beatty MD   40 mg at 06/20/22 0935   • LORazepam (ATIVAN) injection 2 mg  2 mg Intravenous Q2H PRN Martell Downs MD   2 mg at 06/20/22 0226   • mupirocin (BACTROBAN) 2 % ointment 1 application  1 application Topical Q12H Martell Downs MD   1 application at 06/20/22 0933   • niCARdipine (CARDENE) 25 mg in 250 mL NS infusion  5-15 mg/hr Intravenous Titrated Sha Beatty  mL/hr at 06/20/22 1011 10 mg/hr at 06/20/22 1011   • norepinephrine (LEVOPHED) 8 mg in 250 mL NS infusion (premix)  0.02-0.3 mcg/kg/min Intravenous Titrated Martell Downs MD   Held at 06/11/22 1054   • Nutrisource fiber pack 1 packet  1 packet Nasogastric 4x Daily Martell Downs MD   1 packet at 06/20/22 1011   • ondansetron (ZOFRAN) tablet 4 mg  4 mg Oral Q6H PRN Martell Downs MD        Or   • ondansetron (ZOFRAN) injection 4 mg  4 mg Intravenous Q6H PRN Martell Downs MD       • polyethylene glycol (MIRALAX) packet 17 g  17 g Oral Daily Martell Downs MD   17 g at 06/20/22 0938   • ProSource TF oral liquid 45 mL  45 mL Per G Tube BID Martell Downs MD   45 mL at 06/20/22 0929   • sodium chloride 3 % nebulizer solution 4 mL  4 mL Nebulization BID - RT Martell Downs MD   4 mL at 06/20/22 0647   • vancomycin 1500 mg/500 mL 0.9% NS IVPB ([facility])  15 mg/kg Intravenous Q12H Martell Downs MD   1,500 mg at 06/20/22 0552       Bonita Paige, SKYLER    Registered Dietitian   Nutrition   Plan of Care      Signed   Date of Service:  06/16/22 1345   Creation Time:  06/16/22 1345              Signed            Goal Outcome Evaluation:  Outcome Evaluation: NTN TF follow up. PEG for sole-source NTN. Met 91% EN goal  volume, meeting > 75% EEN and pro needs at this time with EN formula, protein modular, and fiber supplement. Wt 109kg; stable. Continue with current TF POC. NTN following per protocol.

## 2022-06-20 NOTE — PLAN OF CARE
Problem: Adult Inpatient Plan of Care  Goal: Plan of Care Review  Recent Flowsheet Documentation  Taken 6/20/2022 0807 by Sonya Dasilva, BRENDAN  Progress: no change  Plan of Care Reviewed With: patient  Outcome Evaluation: PROM for BLE and BUE 20 reps. Pt opened his eye at times but was unable to follow commands.

## 2022-06-20 NOTE — PROGRESS NOTES
PULMONARY AND CRITICAL CARE PROGRESS NOTE - [facility]    Patient: Hai Hernandez    1945    MR# [id number]    Acct# [id number]  06/20/22   07:15 CDT  Referring Provider: Martell Downs, *    Chief Complaint: Mechanically ventilated    Interval history: The patient is seen resting in bed with tracheostomy to mechanical ventilator in pressure control ventilation..  Oxygen saturation 94% on PEEP of 5 and FiO2 0.35.  Tidal volume 350 -400s. MV 10.3.  ET CO2 30. Assisting the vent. Cardene drip remains with SBP of 162. He remains on antibiotics. He is afebrile. He withdrew to touch to soul of foot however did not open eyes to voice or squeeze hands. No new chest x-ray. ABGs reviewed. No vent changes made.   No issues reported overnight.    Meds:  amLODIPine, 10 mg, Oral, Q24H  arformoterol, 15 mcg, Nebulization, BID - RT  carvedilol, 12.5 mg, Oral, BID With Meals  cefepime, 2 g, Intravenous, Q8H  chlorhexidine, 15 mL, Mouth/Throat, Q12H  dexamethasone, 0.5 mg, Per PEG Tube, Daily  docusate, 50 mg, Oral, Daily  guaiFENesin, 400 mg, Per PEG Tube, TID  heparin (porcine), 5,000 Units, Subcutaneous, Q12H  hydrALAZINE, 25 mg, Oral, Q8H  insulin detemir, 20 Units, Subcutaneous, Nightly  insulin regular, 2-7 Units, Subcutaneous, Q6H  lacosamide, 200 mg, Intravenous, Q12H  lactulose, 20 g, Per PEG Tube, Daily  lansoprazole, 30 mg, Per G Tube, QAM  latanoprost, 1 drop, Both Eyes, Nightly  levETIRAcetam, 1,000 mg, Intravenous, Q12H  levothyroxine, 125 mcg, Nasogastric, Q AM  lidocaine, 10 mL, Intradermal, Once  lidocaine, 10 mL, Infiltration, Once  liothyronine, 25 mcg, Nasogastric, Daily  lisinopril, 40 mg, Nasogastric, Q24H  mupirocin, 1 application, Topical, Q12H  Nutrisource fiber, 1 packet, Nasogastric, 4x Daily  polyethylene glycol, 17 g, Oral, Daily  ProSource TF, 45 mL, Per G Tube, BID  sodium chloride, 4 mL, Nebulization, BID - RT  vancomycin, 15 mg/kg, Intravenous, Q12H      niCARdipine,  5-15 mg/hr, Last Rate: 10 mg/hr (06/20/22 0551)  norepinephrine, 0.02-0.3 mcg/kg/min, Last Rate: Stopped (06/11/22 1054)      Review of Systems:   Cannot obtain due to mechanical ventilated state   Ventilator Settings:        Resp Rate (Set): 16  Pressure Support (cm H2O): 8 cm H20  FiO2 (%): 35 %  PEEP/CPAP (cm H2O): 5 cm H20  Minute Ventilation (L/min) (Obs): 13.5 L/min  Resp Rate (Observed) Vent: 28  I:E Ratio (Set): 1:0.27  I:E Ratio (Obs): 1:2.20  PIP Observed (cm H2O): 23 cm H2O  RSBI: 21.96  Physical Exam:  Temp:  [97.7 °F (36.5 °C)-98.5 °F (36.9 °C)] 97.7 °F (36.5 °C)  Heart Rate:  [69-98] 98  Resp:  [27-38] 27  BP: (121-171)/(58-95) 151/82  FiO2 (%):  [35 %] 35 %    Intake/Output Summary (Last 24 hours) at 6/20/2022 0715  Last data filed at 6/20/2022 0400  Gross per 24 hour   Intake 6688.92 ml   Output 1900 ml   Net 4788.92 ml     SpO2 Percentage    06/20/22 0500 06/20/22 0600 06/20/22 0647   SpO2: 91% 97% 98%   Body mass index is 33.62 kg/m².   Physical Exam  Constitutional:       General: He is not in acute distress.     Appearance: He is ill-appearing. He is not diaphoretic.   HENT:      Head: Normocephalic.      Comments: Craniotomy incision, healing     Nose: Nose normal.      Mouth/Throat:      Mouth: Mucous membranes are moist.   Eyes:      General: No scleral icterus.  Neck:      Comments: Trach to vent  Left IJ triple-lumen  Cardiovascular:      Rate and Rhythm: Normal rate.      Comments: Rate 91  Pulmonary:      Effort: Pulmonary effort is normal. No respiratory distress.      Breath sounds: No wheezing or rhonchi.   Abdominal:      General: There is no distension.      Comments: PEG with tube feeding infusing   Genitourinary:     Comments: external catheter  FMS  Musculoskeletal:      Right lower leg: Edema present.      Left lower leg: Edema present.      Comments: SCDs   Skin:     Coloration: Skin is not pale.   Neurological:      Comments: Weakly withdraws to stimulus     Electronically  signed by MARIO Nicholson, 6/20/2022, 07:15 CDT      Physician Substantive Portion: Medical Decision Making    Laboratory Data:  Results from last 7 days   Lab Units 06/20/22  0401 06/19/22  0511 06/18/22  1039   WBC 10*3/mm3 11.98* 10.65 9.48   HEMOGLOBIN g/dL 8.8* 8.5* 7.7*   PLATELETS 10*3/mm3 274 236 210     Results from last 7 days   Lab Units 06/20/22  0617 06/19/22  0511 06/18/22  0812   SODIUM mmol/L 139 137 136   POTASSIUM mmol/L 4.3 4.0 4.0   BUN mg/dL 27* 24* 27*   CREATININE mg/dL 0.36* 0.42* 0.42*     Results from last 7 days   Lab Units 06/20/22  0452 06/14/22  0417   PH, ARTERIAL pH units 7.408 7.461*   PCO2, ARTERIAL mm Hg 45.4* 40.9   PO2 ART mm Hg 62.3* 82.4*   FIO2 % 35 35     Wound Culture   Date Value Ref Range Status   06/10/2022 No growth at 2 days  Preliminary   06/10/2022 No growth at 2 days  Preliminary   06/10/2022 No growth at 2 days  Preliminary     Respiratory Culture   Date Value Ref Range Status   06/10/2022   Preliminary    Rare The culture consists of normal respiratory annia. This is a preliminary report; final report to follow.     Recent films:  XR Chest 1 View    Result Date: 6/19/2022  EXAMINATION: XR CHEST 1 VW- 6/19/2022 8:16 AM CDT  HISTORY: On vent; R13.10-Dysphagia, unspecified; Z01.818-Encounter for other preprocedural examination; D32.9-Benign neoplasm of meninges, unspecified; Z74.09-Other reduced mobility; Z78.9-Other specified health status; G40.901-Epilepsy, unspecified, not intractable, with status epilepticus; J96.01-Acute respiratory failure with hypoxia; G91.0-Communicating hydrocephalus; T81.40XA-Infection following a pr.  REPORT: A frontal view of the chest was obtained.  COMPARISON: Chest x-ray 6/18/2022 0332 hours.  The tracheostomy tube and left internal jugular central line remain in satisfactory position. The lungs are hypoaerated, there is persistent slightly increased consolidation of the medial left base and left perihilar lung. Pneumonia is  likely. The heart is enlarged. There is central vascular congestion and probable mild volume overload. No pneumothorax is identified. No other change.      Impression: Stable satisfactory position of the tracheostomy tube and left internal jugular central line. No pneumothorax is identified. There is persistent increased consolidation of the left perihilar lung and medial left base likely related to pneumonia. Probable small left pleural effusion. Central vascular congestion as before with probable mild volume overload.   This report was finalized on 06/19/2022 08:19 by Dr. Antony Thomas MD.    My radiograph interpretation/independent review of imaging: LLL atelectasis, trache tube ok    Pulmonary Assessment:    1. Persistent acute resp failure requiring mechanical ventilation  2. Hx meningioma  3. Metabolic encephalopathy, off sedatives for extended time  4. Sinus pauses noted on follow up visit mid day  5. Anemia stable    Recommend/plan:   · Continue vent without change; good synchrony, abg ok  · Not ready for spontaneous breathing trials    · Monitor rhythm, defer antiarrhythmic therapy to cardiology    This visit was performed by both a physician and an [facility] RN.  I personally evaluated and examined the patient.  I performed all aspects of the medical decision making as documented.  Electronically signed by Carlos A Dasilva MD, 6/20/2022, 20:35 CDT

## 2022-06-20 NOTE — CASE MANAGEMENT/SOCIAL WORK
Continued Stay Note  [facility]     Patient Name: Hai Hernandez  MRN: [id number]  Today's Date: 6/20/2022    Admit Date: 5/10/2022     Discharge Plan     Row Name 06/20/22 0859       Plan    Plan Comments CLOVIS has contacted Marly with [facility] [phone]. She has been sent updated clinicals. Marly has advised that she will check bed availability and will review updated clinicals. She states she will call CLOVIS back once she has reviewed. Will follow.               Discharge Codes    No documentation.                     VENITA Marcelo

## 2022-06-20 NOTE — CONSULTS
Subjective   Hai Hernandez is a 77 y.o. male is being seen for consultation today at the request of Martell Downs, *    Chief Complaint: meningioma    Reason for consult: Pauses    HPI: Mr. Hernandez is a 77-year-old male with significant past medical history of meningioma status post recent resection, paroxysmal atrial fibrillation, hypertension, epilepsy, diabetes, depression, and obesity.  He has had a complicated hospital course requiring multiple surgeries and procedures.  He has been in and out of atrial fibrillation during hospitalization.  Most recently he has been having pauses on telemetry.  These appear to be primarily in the 4 to 5-second range.  He is receiving carvedilol 12.5 mg twice daily.  He is on a Cardene drip to help with his blood pressure.  He has not had any hypotensive episodes or other evidence asymptomatic from any of the positives.      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)   • Status epilepticus (HCC)   • Essential hypertension   • Type 2 diabetes mellitus with hyperglycemia, without long-term current use of insulin (HCC)   • Hypothyroidism (acquired)   • Acute respiratory failure (secondary to status epilepticus)   • Thrombocytopenia (HCC)   • Cerebral parenchymal hemorrhage (HCC)   • PAF (paroxysmal atrial fibrillation) (HCC)   • Fever   • Loculated pleural effusion   • Acute deep vein thrombosis (DVT) of the ulnar and brachial veins of right upper extremity (HCC)   • Dysphagia   • Communicating hydrocephalus (HCC)   • Cerebral edema (HCC)       Past Medical History:   Diagnosis Date   • Brain tumor (HCC)    • Depression    • Hearing loss    • History of cellulitis     right foot big toe   • Hypertension    • Pneumonia    • Right arm weakness     after cervial injury   • Sciatic pain     affects balance   • Thyroid disease     • Visual disturbance     due to brain tumor - right eye     Past Surgical History:   Procedure Laterality Date   • CATARACT EXTRACTION, BILATERAL     • COLONOSCOPY     • CRANIOTOMY Bilateral 6/10/2022    Procedure: CRANIECTOMY;  Surgeon: Martell Downs MD;  Location: [facility] OR;  Service: Neurosurgery;  Laterality: Bilateral;   • CRANIOTOMY FOR TUMOR Right 5/10/2022    Procedure: CRANIOTOMY FOR TUMOR STERIOTACTIC WITH BRAIN LAB, right;  Surgeon: Martell Downs MD;  Location: [facility] OR;  Service: Neurosurgery;  Laterality: Right;   • ENDOSCOPY W/ PEG TUBE PLACEMENT N/A 6/2/2022    Procedure: ESOPHAGOGASTRODUODENOSCOPY WITH PERCUTANEOUS ENDOSCOPIC GASTROSTOMY TUBE INSERTION WITH ANESTHESIA;  Surgeon: Melecio Lu MD;  Location: [facility] OR;  Service: Gastroenterology;  Laterality: N/A;   • NECK SURGERY     • SKIN CANCER EXCISION     • TONSILLECTOMY     • TRACHEOSTOMY N/A 6/2/2022    Procedure: TRACHEOSTOMY;  Surgeon: Geovany Davidson MD;  Location: [facility] OR;  Service: ENT;  Laterality: N/A;   •  SHUNT INSERTION Right 6/3/2022    Procedure: VENTRICULAR PERITONEAL SHUNT INSERTION WITH BRAIN LAB;  Surgeon: Martell Downs MD;  Location: [facility] OR;  Service: Neurosurgery;  Laterality: Right;       The following portions of the patient's history were reviewed and updated as appropriate: allergies, current medications, past family history, past medical history, past social history, past surgical history and problem list.    Social History     Socioeconomic History   • Marital status:    Tobacco Use   • Smoking status: Never Smoker   • Smokeless tobacco: Never Used   Vaping Use   • Vaping Use: Never used   Substance and Sexual Activity   • Alcohol use: Never   • Drug use: Never   • Sexual activity: Defer       Family History   Problem Relation Age of Onset   • Cancer Sister    • Cancer Brother        Review of Systems: Unable to obtain secondary to intubation and mental  "status.    Objective   /98   Pulse 95   Temp 97.2 °F (36.2 °C) (Axillary)   Resp 27   Ht 182.9 cm (72\")   Wt 112 kg (247 lb 14.4 oz)   SpO2 95%   BMI 33.62 kg/m²     Physical Exam:  Constitutional:       Appearance: Well-developed. Obese.   Eyes:      Conjunctiva/sclera: Conjunctivae normal.      Pupils: Pupils are equal, round, and reactive to light.   HENT:      Head: Normocephalic.     Neck:      Thyroid: No thyromegaly.      Vascular: No JVD.     Pulmonary:      Effort: Pulmonary effort is normal.      Breath sounds: Normal breath sounds. No wheezing. No rales.   Cardiovascular:      Normal rate. Irregularly irregular rhythm.      Murmurs: There is no murmur.      No gallop.   Edema:     Peripheral edema present.  Abdominal:      General: Bowel sounds are decreased. There is distension.      Palpations: Abdomen is soft.      Tenderness: There is no abdominal tenderness.       Musculoskeletal: Normal range of motion.      Cervical back: Normal range of motion and neck supple. Skin:     General: Skin is warm and dry.      Findings: No rash.   Neurological:      Mental Status: Unresponsive.      Coordination: Coordination normal.   Psychiatric:      Comments: Unable to obtain         No results found for: CKTOTAL, CKMB, CKMBINDEX, TROPONINI, TROPONINT  Lab Results   Component Value Date    GLUCOSE 151 (H) 06/20/2022    CALCIUM 8.1 (L) 06/20/2022     06/20/2022    K 3.7 06/20/2022    CO2 28.0 06/20/2022     06/20/2022    BUN 27 (H) 06/20/2022    CREATININE 0.37 (L) 06/20/2022    BCR 73.0 (H) 06/20/2022    ANIONGAP 5.0 06/20/2022     Lab Results   Component Value Date    WBC 11.26 (H) 06/20/2022    HGB 8.2 (L) 06/20/2022    HCT 26.7 (L) 06/20/2022    .1 (H) 06/20/2022     06/20/2022     CHADS-VASc Risk Assessment            4 Total Score    1 Hypertension    2 Age >/= 75    1 DM        Criteria that do not apply:    CHF    PRIOR STROKE/TIA/THROMBO    Vascular Disease    Age " 65-74    Sex: Female           -Echo (5/21/2022) EF > 70%, mild LVH, moderate RV dilation, normal systolic function, RA dilation, rhythm is atrial fibrillation    Assessment:  1.  Meningioma status postresection  2.  Paroxysmal atrial fibrillation  3.  Pauses: No evidence that these are symptomatic.  Blood pressure remained stable.  Not considered significant in atrial fibrillation unless pauses are greater than 6 seconds.  4.  Epilepsy  5.  Cerebral parenchymal hemorrhage  6.  Acute respiratory failure: Currently trached and pegged  7.  Essential hypertension: Currently on a Cardene drip.      Plan:  -Decrease carvedilol to 6.25 mg.  Can adjust further as needed for rate control/bradycardic episodes.  -Consider anticoagulation in the future when felt safe from a neurosurgical standpoint.  -Continue to adjust blood pressure medications as needed.

## 2022-06-20 NOTE — DISCHARGE PLACEMENT REQUEST
"Radha Wilkins (77 y.o. Male)             Date of Birth   1945    Social Security Number       Address    [address] [address]    Home Phone   [phone]    MRN   [id number]       Anglican   Other    Marital Status                               Admission Date   5/10/22    Admission Type   Elective    Admitting Provider   Martell Downs MD    Attending Provider   Martell Downs MD    Department, Room/Bed   [facility] INTENSIVE CARE, I010/1       Discharge Date       Discharge Disposition       Discharge Destination                               Attending Provider: Martell Downs MD    Allergies: No Known Allergies    Isolation: None   Infection: None   Code Status: CPR   Advance Care Planning Activity    Ht: 182.9 cm (72\")   Wt: 112 kg (247 lb 14.4 oz)    Admission Cmt: None   Principal Problem: Meningioma (HCC) [D32.9]                 Active Insurance as of 5/10/2022     Primary Coverage     Payor Plan Insurance Group Employer/Plan Group    MEDICARE MEDICARE A & B      Payor Plan Address Payor Plan Phone Number Payor Plan Fax Number Effective Dates    [address] [phone]  3/1/2010 - None Entered    [facility] [id number]       Subscriber Name Subscriber Birth Date Member ID       RADHA WILKINS 1945 [id number]           Secondary Coverage     Payor Plan Insurance Group Employer/Plan Group     FOR LIFE  FOR LIFE  SUP       Payor Plan Address Payor Plan Phone Number Payor Plan Fax Number Effective Dates    [address] [phone]  1/5/2022 - None Entered    [facility] [address]       Subscriber Name Subscriber Birth Date Member ID       RADHA WILKINS 1945 [id number]                 Emergency Contacts      (Rel.) Home Phone Work Phone Mobile Phone    abhay fitzgerald (Daughter) -- -- [phone]    reg norman (Son) -- -- [phone]            "

## 2022-06-20 NOTE — PLAN OF CARE
Patient withdrawing to pain, will grimace to pain, attempts to open eyes, pupils ERR, does not follow commands.  Ativan given x1 d/t increased coughing, RR, grimacing w/ good effect.  Patient voiding frequently w/o difficulty.  FMS draining large amounts of liquid stool.  UOP monitored as best as possible, multiple leaks from male external cath.  Oral care and turns performed Q2.  Meds administered as ordered.  Trach care completed by RT, settings monitored and adjusted.  Copious in-line secretions noted.  Patient tolerating TF.  Fall and safety precautions maintained.      Goal Outcome Evaluation:  Plan of Care Reviewed With: patient        Progress: no change

## 2022-06-20 NOTE — PROGRESS NOTES
[facility] Medicine Services  INPATIENT PROGRESS NOTE    Patient Name: Hai Hernandez  Date of Admission: 5/10/2022  Today's Date: 06/19/22  Length of Stay: 40  Primary Care Physician: Elisa Chacko MD    Subjective   Chief Complaint: Intubated  HPI   Intubated.  Off sedation  Afebrile   BP persistently elevated in the 160's despite being on max dose Cardene drip      Review of Systems   Unable to perform ROS: Intubated        All pertinent negatives and positives are as above. All other systems have been reviewed and are negative unless otherwise stated.     Objective    Temp:  [98.3 °F (36.8 °C)-98.5 °F (36.9 °C)] 98.4 °F (36.9 °C)  Heart Rate:  [69-96] 82  Resp:  [28-38] 35  BP: (121-171)/() 155/78  FiO2 (%):  [35 %] 35 %  Physical Exam  Vitals reviewed.   Constitutional:       Appearance: He is well-developed.   HENT:      Head: Normocephalic and atraumatic.      Right Ear: External ear normal.      Left Ear: External ear normal.      Nose: Nose normal.   Eyes:      General: No scleral icterus.        Right eye: No discharge.         Left eye: No discharge.      Conjunctiva/sclera: Conjunctivae normal.      Pupils: Pupils are equal, round, and reactive to light.   Neck:      Thyroid: No thyromegaly.      Trachea: No tracheal deviation.   Cardiovascular:      Rate and Rhythm: Normal rate and regular rhythm.      Heart sounds: Normal heart sounds. No murmur heard.    No friction rub. No gallop.   Pulmonary:      Effort: Pulmonary effort is normal. No respiratory distress.      Breath sounds: Normal breath sounds. No stridor. No wheezing or rales.   Chest:      Chest wall: No tenderness.   Abdominal:      General: Bowel sounds are normal. There is no distension.      Palpations: Abdomen is soft. There is no mass.      Tenderness: There is no abdominal tenderness. There is no guarding or rebound.      Hernia: No hernia is present.   Musculoskeletal:         General:  No deformity. Normal range of motion.      Cervical back: Normal range of motion and neck supple.   Lymphadenopathy:      Cervical: No cervical adenopathy.   Skin:     General: Skin is warm and dry.      Coloration: Skin is not pale.      Findings: No erythema or rash.   Neurological:      Comments: Level of consciousness: arousable by verbal stimuli ,  drowsy  Off propofol.  Intermittently opens eyes to painful stimuli.  Does not follow commands.  Nonverbal.  Weakly withdraws to tactile stimuli.            Cranial Nerves      CN III, IV, VI   Pupils are equal, round, and reactive to light.  Extraocular motions are normal.      CN IX, X   CN IX normal.         Psychiatric:         Behavior: Behavior normal.         Thought Content: Thought content normal.         Judgment: Judgment normal.           Results Review:  I have reviewed the labs, radiology results, and diagnostic studies.    Laboratory Data:   Results from last 7 days   Lab Units 06/19/22  0511 06/18/22  1039 06/17/22  0242   WBC 10*3/mm3 10.65 9.48 8.35   HEMOGLOBIN g/dL 8.5* 7.7* 8.1*   HEMATOCRIT % 27.7* 24.7* 25.4*   PLATELETS 10*3/mm3 236 210 219        Results from last 7 days   Lab Units 06/19/22  0511 06/18/22  0812 06/17/22  0242   SODIUM mmol/L 137 136 139   POTASSIUM mmol/L 4.0 4.0 3.9   CHLORIDE mmol/L 103 103 104   CO2 mmol/L 27.0 29.0 29.0   BUN mg/dL 24* 27* 26*   CREATININE mg/dL 0.42* 0.42* 0.43*   CALCIUM mg/dL 7.8* 8.1* 8.0*   BILIRUBIN mg/dL 0.4 0.4 0.5   ALK PHOS U/L 418* 406* 420*   ALT (SGPT) U/L 131* 113* 129*   AST (SGOT) U/L 103* 84* 100*   GLUCOSE mg/dL 158* 155* 120*       Culture Data:   Wound Culture   Date Value Ref Range Status   06/10/2022 No growth at 3 days  Final   06/10/2022 No growth at 3 days  Final   06/10/2022 No growth at 3 days  Final     Respiratory Culture   Date Value Ref Range Status   06/10/2022   Preliminary    Rare The culture consists of normal respiratory annia. This is a preliminary report; final  report to follow.       Radiology Data:   Imaging Results (Last 24 Hours)     Procedure Component Value Units Date/Time    XR Chest 1 View [[id number]] Collected: 06/19/22 0816     Updated: 06/19/22 0822    Narrative:      EXAMINATION: XR CHEST 1 VW- 6/19/2022 8:16 AM CDT     HISTORY: On vent; R13.10-Dysphagia, unspecified; Z01.818-Encounter for  other preprocedural examination; D32.9-Benign neoplasm of meninges,  unspecified; Z74.09-Other reduced mobility; Z78.9-Other specified health  status; G40.901-Epilepsy, unspecified, not intractable, with status  epilepticus; J96.01-Acute respiratory failure with hypoxia;  G91.0-Communicating hydrocephalus; T81.40XA-Infection following a pr.     REPORT: A frontal view of the chest was obtained.     COMPARISON: Chest x-ray 6/18/2022 0332 hours.     The tracheostomy tube and left internal jugular central line remain in  satisfactory position. The lungs are hypoaerated, there is persistent  slightly increased consolidation of the medial left base and left  perihilar lung. Pneumonia is likely. The heart is enlarged. There is  central vascular congestion and probable mild volume overload. No  pneumothorax is identified. No other change.       Impression:      Stable satisfactory position of the tracheostomy tube and  left internal jugular central line. No pneumothorax is identified. There  is persistent increased consolidation of the left perihilar lung and  medial left base likely related to pneumonia. Probable small left  pleural effusion. Central vascular congestion as before with probable  mild volume overload.        This report was finalized on 06/19/2022 08:19 by Dr. Antony Thomas MD.          I have reviewed the patient's current medications.     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  Meningioma  -NS managing  Status post postcraniotomy for meningioma              s/p  Craniectomy for cerebral edema and possible infection    2.  Status Epilepticus/Seizures  -Cerebyx  -Vimpat  -Dilantin  -Neurology following    3.  Acute Respiratory failure with prolonged mechanical ventilation s/p Trach and PEG  -Pulm following    4.  Hydrocephalus/Cerebral Edema  s/p Craniectomy for cerebral edema and possible infection  -NS managing  -Decadron  -Lumbar drain removed  -s/p Right Posterial parietal  shunt placement    5.  Left pleural effusion s/p Chest tube which has since been removed without issue  -monitor     6.  A-Fib  -Coreg  -Not able to tolerate AC    7.  RUE DVT  -monitor  -not able to tolerated AC     8.  LUE Superficial thrombophlebitis  -supportive care    9.  HTN  -Cardene  -Lisinopril  -Coreg  -Norvasc added      Discharge Planning: I expect the patient to be discharged to LT[facility] in ? days  Electronically signed by Sah Beatty MD, 06/19/22, 23:10 CDT.

## 2022-06-20 NOTE — DISCHARGE PLACEMENT REQUEST
"Radha Wilkins (77 y.o. Male)             Date of Birth   1945    Social Security Number       Address    [place] [address]    Home Phone   [phone]    MRN   [id number]       Bahai   Other    Marital Status                               Admission Date   5/10/22    Admission Type   Elective    Admitting Provider   Martell Downs MD    Attending Provider   Martell Downs MD    Department, Room/Bed   [facility] INTENSIVE CARE, I010/1       Discharge Date       Discharge Disposition       Discharge Destination                               Attending Provider: Martell Downs MD    Allergies: No Known Allergies    Isolation: None   Infection: None   Code Status: CPR   Advance Care Planning Activity    Ht: 182.9 cm (72\")   Wt: 112 kg (247 lb 14.4 oz)    Admission Cmt: None   Principal Problem: Meningioma (HCC) [D32.9]                 Active Insurance as of 5/10/2022     Primary Coverage     Payor Plan Insurance Group Employer/Plan Group    MEDICARE MEDICARE A & B      Payor Plan Address Payor Plan Phone Number Payor Plan Fax Number Effective Dates    [address] [phone]  3/1/2010 - None Entered    [facility] [id number]       Subscriber Name Subscriber Birth Date Member ID       RADHA WILKINS 1945 [id number]           Secondary Coverage     Payor Plan Insurance Group Employer/Plan Group     FOR LIFE  FOR LIFE  SUP       Payor Plan Address Payor Plan Phone Number Payor Plan Fax Number Effective Dates    [address] [phone]  1/5/2022 - None Entered    [facility] [address]       Subscriber Name Subscriber Birth Date Member ID       RADHA WILKINS 1945 [id number]                 Emergency Contacts      (Rel.) Home Phone Work Phone Mobile Phone    abhay fitzgerald (Daughter) -- -- [phone]    gopireg aguirre (Son) -- -- [phone]               Physician Progress Notes (last 24 hours)    "   Stevie Parsons, APRN at 06/20/22 0828          NEUROSURGERY DAILY PROGRESS NOTE    ASSESSMENT:   Hai Hernandez is a 77 y.o. with a significant comorbidity of acephalic migraines, thyroid disease status post radiation as a child, basal cell and squamous cell carcinoma of the skin. He presents in FU for known meningioma found on workup for right visual field changes. Physical exam findings of neurologically intact with resolution of all symptoms and mild decreased vision in right eye.  His imaging shows 22 x 24 x 13.5 mm right planum sphenoidale mass most suggestive of meningioma.    Past Medical History:   Diagnosis Date   • Brain tumor (HCC)    • Depression    • Hearing loss    • History of cellulitis     right foot big toe   • Hypertension    • Pneumonia    • Right arm weakness     after cervial injury   • Sciatic pain     affects balance   • Thyroid disease    • Visual disturbance     due to brain tumor - right eye     Active Hospital Problems    Diagnosis    • **Meningioma (HCC)    • Acute deep vein thrombosis (DVT) of the ulnar and brachial veins of right upper extremity (HCC)    • Loculated pleural effusion    • Fever    • PAF (paroxysmal atrial fibrillation) (HCC)    • Cerebral parenchymal hemorrhage (HCC)    • Essential hypertension    • Type 2 diabetes mellitus with hyperglycemia, without long-term current use of insulin (HCC)    • Hypothyroidism (acquired)    • Acute respiratory failure (secondary to status epilepticus)    • Thrombocytopenia (HCC)    • 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  Grimaces to painful stimuli.  Does  not follow commands.  Nonverbal.  Does not withdraws to tactile stimuli.    Intracranial meningioma   POD #40 (5/10/2022) Status post postcraniotomy for meningioma   POD#10 (6/10/2022) Craniectomy for cerebral edema and possible infection   CT reviewed and stable.  Small blood products at site of brain biopsy   Neurochecks per policy   Start Decadron taper   Wound C,D,I   Craniectomy precautions     Hydrocephalus   CSF unremarkable from 5/17, 5/23 and 6/10.    Lumbar drain removed 6/3/2022   POD# 17 (6/3/2022) right posterior parietal  shunt placement    MEDTRONIC programmable valve set to 0.5   Shunt pumps and refills well    Postop seizures, in status   Neurology managing   Cont Keppra, Dialntin, Vimpat; PRN Ativan   Follow dilantin levels   Repeat EEG: Slowing but no epileptiform activity     Antiseizure medications tapering instructions per neurology:    Fosphenytoin discontinued    Change Keppra to: Keppra 750 mg twice daily by 4 days, then 500 mg twice daily by 4 days, then discontinue.    Change Vimpat to: Vimpat 200 mg twice daily per PEG tube to be continued indefinitely.    CV: Cardene off   Keep SBP <160.   Intermittent use of hydralazine and labetalol PRNs  Pulm: Tracheostomy  Placed (6/2/2022).  Appreciate ENT   Left chest tubes removed  : May DC Mccrary and place [place] catheter  FEN: Poor glucose control.  Increase SSI  ENDO: Accu-Chek and treat per policy  GI: PEG tube placed (6/2/2022).  Appreciate GI   No BM in several days.  KUB concerning for ileus formation.  Increased rectal stimulation  ID: Afebrile.  Continue broad spectrum abx, Vanc and Cefepime   CSF cultures NGTD  Heme:  DVT prophylaxis with SCD's.  Can not anticoagulate   Superficial thrombus to left cephalic vein   Hemoglobin 7.5.  We will continue to monitor.  Pain: NA  Dispo: DC pending acceptance to [facility]-of-[facility].    CHIEF COMPLAINT:   Right visual field changes    Subjective  Symptom stable    Temp:  [97.2 °F (36.2 °C)-98.5 °F  "(36.9 °C)] 97.2 °F (36.2 °C)  Heart Rate:  [69-98] 91  Resp:  [27-38] 27  BP: (121-164)/(58-98) 139/98  FiO2 (%):  [35 %] 35 %    Objective:  General Appearance:  Well-appearing and in no acute distress.    Vital signs: (most recent): Blood pressure 139/98, pulse 91, temperature 97.2 °F (36.2 °C), temperature source Axillary, resp. rate 27, height 182.9 cm (72\"), weight 112 kg (247 lb 14.4 oz), SpO2 92 %.      Neurologic Exam     Mental Status   Level of consciousness: arousable by verbal stimuli ,  drowsy  Opens eyes to verbal and tactile stimuli today.  Grimaces to painful stimuli.  Does not follow commands.  Nonverbal.  Does not withdraws to tactile stimuli.     Cranial Nerves     CN III, IV, VI   Pupils are equal, round, and reactive to light.  Extraocular motions are normal.     CN IX, X   CN IX normal.     Gait, Coordination, and Reflexes     Tremor   Resting tremor: absent  Intention tremor: absent  Action tremor: absent    Drains:   Urethral Catheter Non-latex;Silicone 16 Fr. (Active)       Output by Drain (mL) 06/19/22 0701 - 06/19/22 1900 06/19/22 1901 - 06/20/22 0700 06/20/22 0701 - 06/20/22 0828 Range Total   Requested LDAs do not have output data documented.       Imaging Results (Last 24 Hours)     ** No results found for the last 24 hours. **        Lab Results (last 24 hours)     Procedure Component Value Units Date/Time    Comprehensive Metabolic Panel [[id number]]  (Abnormal) Collected: 06/20/22 0617    Specimen: Blood Updated: 06/20/22 0649     Glucose 149 mg/dL      BUN 27 mg/dL      Creatinine 0.36 mg/dL      Sodium 139 mmol/L      Potassium 4.3 mmol/L      Comment: Slight hemolysis detected by analyzer. Results may be affected.        Chloride 105 mmol/L      CO2 29.0 mmol/L      Calcium 8.0 mg/dL      Total Protein 5.7 g/dL      Albumin 2.30 g/dL      ALT (SGPT) 160 U/L      AST (SGOT) 140 U/L      Alkaline Phosphatase 434 U/L      Total Bilirubin 0.4 mg/dL      Globulin 3.4 gm/dL      A/G " Ratio 0.7 g/dL      BUN/Creatinine Ratio 75.0     Anion Gap 5.0 mmol/L      eGFR 116.0 mL/min/1.73      Comment: National Kidney Foundation and American Society of Nephrology (ASN) Task Force recommended calculation based on the Chronic Kidney Disease Epidemiology Collaboration (CKD-EPI) equation refit without adjustment for race.       Narrative:      GFR Normal >60  Chronic Kidney Disease <60  Kidney Failure <15      POC Glucose Once [[id number]]  (Abnormal) Collected: 06/20/22 0548    Specimen: Blood Updated: 06/20/22 0600     Glucose 140 mg/dL      Comment: : [id number] Renaldo Smith ID: [id number]       Blood Gas, Arterial - [[id number]]  (Abnormal) Collected: 06/20/22 0452    Specimen: Arterial Blood Updated: 06/20/22 0448     Site Left Radial     Denzel's Test Positive     pH, Arterial 7.408 pH units      pCO2, Arterial 45.4 mm Hg      Comment: 83 Value above reference range        pO2, Arterial 62.3 mm Hg      Comment: 84 Value below reference range        HCO3, Arterial 28.6 mmol/L      Comment: 83 Value above reference range        Base Excess, Arterial 3.5 mmol/L      Comment: 83 Value above reference range        O2 Saturation, Arterial 92.3 %      Comment: 84 Value below reference range        Temperature 37.0 C      Barometric Pressure for Blood Gas 754 mmHg      Modality Ventilator     FIO2 35 %      Ventilator Mode PC     Set [facility] Resp Rate 16.0     PEEP 5.0     PIP 17 cmH2O      Comment: Meter: [id number]     :  [id number]        Collected by [id number]     pCO2, Temperature Corrected 45.4 mm Hg      pH, Temp Corrected 7.408 pH Units      pO2, Temperature Corrected 62.3 mm Hg     CBC & Differential [[id number]]  (Abnormal) Collected: 06/20/22 0401    Specimen: Blood Updated: 06/20/22 0418    Narrative:      The following orders were created for panel order CBC & Differential.  Procedure                               Abnormality         Status                     ---------                                -----------         ------                     CBC Auto Differential[[id number]]        Abnormal            Final result                 Please view results for these tests on the individual orders.    CBC Auto Differential [[id number]]  (Abnormal) Collected: 06/20/22 0401    Specimen: Blood Updated: 06/20/22 0418     WBC 11.98 10*3/mm3      RBC 2.74 10*6/mm3      Hemoglobin 8.8 g/dL      Hematocrit 27.9 %      .8 fL      MCH 32.1 pg      MCHC 31.5 g/dL      RDW 17.3 %      RDW-SD 63.7 fl      MPV 9.5 fL      Platelets 274 10*3/mm3      Neutrophil % 74.8 %      Lymphocyte % 7.3 %      Monocyte % 11.5 %      Eosinophil % 3.7 %      Basophil % 0.6 %      Immature Grans % 2.1 %      Neutrophils, Absolute 8.97 10*3/mm3      Lymphocytes, Absolute 0.87 10*3/mm3      Monocytes, Absolute 1.38 10*3/mm3      Eosinophils, Absolute 0.44 10*3/mm3      Basophils, Absolute 0.07 10*3/mm3      Immature Grans, Absolute 0.25 10*3/mm3      nRBC 0.0 /100 WBC     POC Glucose Once [[id number]]  (Abnormal) Collected: 06/19/22 2340    Specimen: Blood Updated: 06/19/22 2354     Glucose 174 mg/dL      Comment: : [id number] Renaldo Smith ID: [id number]       POC Glucose Once [[id number]]  (Abnormal) Collected: 06/19/22 2007    Specimen: Blood Updated: 06/19/22 2018     Glucose 156 mg/dL      Comment: : [id number] Renaldo Smith ID: [id number]       POC Glucose Once [[id number]]  (Abnormal) Collected: 06/19/22 1723    Specimen: Blood Updated: 06/19/22 1734     Glucose 143 mg/dL      Comment: : [id number] Ripchase XieMeter ID: [id number]       POC Glucose Once [[id number]]  (Abnormal) Collected: 06/19/22 1143    Specimen: Blood Updated: 06/19/22 1154     Glucose 153 mg/dL      Comment: : [id number] Rip XieMeter ID: [id number]           [id number]  MARIO Rangel      Electronically signed by Stevie Parsons APRN at 06/20/22 0876     Sha Beatty MD at 06/19/22 0567               [facility] Medicine Services  INPATIENT PROGRESS NOTE    Patient Name: Hai Hernandez  Date of Admission: 5/10/2022  Today's Date: 06/19/22  Length of Stay: 40  Primary Care Physician: Elisa Chacko MD    Subjective   Chief Complaint: Intubated  HPI   Intubated.  Off sedation  Afebrile   BP persistently elevated in the 160's despite being on max dose Cardene drip      Review of Systems   Unable to perform ROS: Intubated        All pertinent negatives and positives are as above. All other systems have been reviewed and are negative unless otherwise stated.     Objective    Temp:  [98.3 °F (36.8 °C)-98.5 °F (36.9 °C)] 98.4 °F (36.9 °C)  Heart Rate:  [69-96] 82  Resp:  [28-38] 35  BP: (121-171)/() 155/78  FiO2 (%):  [35 %] 35 %  Physical Exam  Vitals reviewed.   Constitutional:       Appearance: He is well-developed.   HENT:      Head: Normocephalic and atraumatic.      Right Ear: External ear normal.      Left Ear: External ear normal.      Nose: Nose normal.   Eyes:      General: No scleral icterus.        Right eye: No discharge.         Left eye: No discharge.      Conjunctiva/sclera: Conjunctivae normal.      Pupils: Pupils are equal, round, and reactive to light.   Neck:      Thyroid: No thyromegaly.      Trachea: No tracheal deviation.   Cardiovascular:      Rate and Rhythm: Normal rate and regular rhythm.      Heart sounds: Normal heart sounds. No murmur heard.    No friction rub. No gallop.   Pulmonary:      Effort: Pulmonary effort is normal. No respiratory distress.      Breath sounds: Normal breath sounds. No stridor. No wheezing or rales.   Chest:      Chest wall: No tenderness.   Abdominal:      General: Bowel sounds are normal. There is no distension.      Palpations: Abdomen is soft. There is no mass.      Tenderness: There is no abdominal tenderness. There is no guarding or rebound.      Hernia: No hernia is present.   Musculoskeletal:          General: No deformity. Normal range of motion.      Cervical back: Normal range of motion and neck supple.   Lymphadenopathy:      Cervical: No cervical adenopathy.   Skin:     General: Skin is warm and dry.      Coloration: Skin is not pale.      Findings: No erythema or rash.   Neurological:      Comments: Level of consciousness: arousable by verbal stimuli ,  drowsy  Off propofol.  Intermittently opens eyes to painful stimuli.  Does not follow commands.  Nonverbal.  Weakly withdraws to tactile stimuli.            Cranial Nerves      CN III, IV, VI   Pupils are equal, round, and reactive to light.  Extraocular motions are normal.      CN IX, X   CN IX normal.         Psychiatric:         Behavior: Behavior normal.         Thought Content: Thought content normal.         Judgment: Judgment normal.           Results Review:  I have reviewed the labs, radiology results, and diagnostic studies.    Laboratory Data:   Results from last 7 days   Lab Units 06/19/22  0511 06/18/22  1039 06/17/22  0242   WBC 10*3/mm3 10.65 9.48 8.35   HEMOGLOBIN g/dL 8.5* 7.7* 8.1*   HEMATOCRIT % 27.7* 24.7* 25.4*   PLATELETS 10*3/mm3 236 210 219        Results from last 7 days   Lab Units 06/19/22  0511 06/18/22  0812 06/17/22  0242   SODIUM mmol/L 137 136 139   POTASSIUM mmol/L 4.0 4.0 3.9   CHLORIDE mmol/L 103 103 104   CO2 mmol/L 27.0 29.0 29.0   BUN mg/dL 24* 27* 26*   CREATININE mg/dL 0.42* 0.42* 0.43*   CALCIUM mg/dL 7.8* 8.1* 8.0*   BILIRUBIN mg/dL 0.4 0.4 0.5   ALK PHOS U/L 418* 406* 420*   ALT (SGPT) U/L 131* 113* 129*   AST (SGOT) U/L 103* 84* 100*   GLUCOSE mg/dL 158* 155* 120*       Culture Data:   Wound Culture   Date Value Ref Range Status   06/10/2022 No growth at 3 days  Final   06/10/2022 No growth at 3 days  Final   06/10/2022 No growth at 3 days  Final     Respiratory Culture   Date Value Ref Range Status   06/10/2022   Preliminary    Rare The culture consists of normal respiratory annia. This is a preliminary report;  final report to follow.       Radiology Data:   Imaging Results (Last 24 Hours)     Procedure Component Value Units Date/Time    XR Chest 1 View [[id number]] Collected: 06/19/22 0816     Updated: 06/19/22 0822    Narrative:      EXAMINATION: XR CHEST 1 VW- 6/19/2022 8:16 AM CDT     HISTORY: On vent; R13.10-Dysphagia, unspecified; Z01.818-Encounter for  other preprocedural examination; D32.9-Benign neoplasm of meninges,  unspecified; Z74.09-Other reduced mobility; Z78.9-Other specified health  status; G40.901-Epilepsy, unspecified, not intractable, with status  epilepticus; J96.01-Acute respiratory failure with hypoxia;  G91.0-Communicating hydrocephalus; T81.40XA-Infection following a pr.     REPORT: A frontal view of the chest was obtained.     COMPARISON: Chest x-ray 6/18/2022 0332 hours.     The tracheostomy tube and left internal jugular central line remain in  satisfactory position. The lungs are hypoaerated, there is persistent  slightly increased consolidation of the medial left base and left  perihilar lung. Pneumonia is likely. The heart is enlarged. There is  central vascular congestion and probable mild volume overload. No  pneumothorax is identified. No other change.       Impression:      Stable satisfactory position of the tracheostomy tube and  left internal jugular central line. No pneumothorax is identified. There  is persistent increased consolidation of the left perihilar lung and  medial left base likely related to pneumonia. Probable small left  pleural effusion. Central vascular congestion as before with probable  mild volume overload.        This report was finalized on 06/19/2022 08:19 by Dr. Antony Thomas MD.          I have reviewed the patient's current medications.     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  Meningioma  -NS managing  Status post postcraniotomy for meningioma              s/p  Craniectomy for cerebral edema and possible infection    2.  Status Epilepticus/Seizures  -Cerebyx  -Vimpat  -Dilantin  -Neurology following    3.  Acute Respiratory failure with prolonged mechanical ventilation s/p Trach and PEG  -Pulm following    4.  Hydrocephalus/Cerebral Edema  s/p Craniectomy for cerebral edema and possible infection  -NS managing  -Decadron  -Lumbar drain removed  -s/p Right Posterial parietal  shunt placement    5.  Left pleural effusion s/p Chest tube which has since been removed without issue  -monitor     6.  A-Fib  -Coreg  -Not able to tolerate AC    7.  RUE DVT  -monitor  -not able to tolerated AC     8.  LUE Superficial thrombophlebitis  -supportive care    9.  HTN  -Cardene  -Lisinopril  -Coreg  -Norvasc added      Discharge Planning: I expect the patient to be discharged to LTAC in ? days  Electronically signed by Sha Beatty MD, 06/19/22, 23:10 CDT.      Electronically signed by Sha Beatty MD at 06/19/22 4971     Stevie Parsons APRN at 06/19/22 1028          NEUROSURGERY DAILY PROGRESS NOTE    ASSESSMENT:   Hai Hernandez is a 77 y.o. with a significant comorbidity of  acephalic migraines, thyroid disease status post radiation as a child, basal cell and squamous cell carcinoma of the skin. He presents in FU for known meningioma found on workup for right visual field changes. Physical exam findings of neurologically intact with resolution of all symptoms and mild decreased vision in right eye.  His imaging shows 22 x 24 x 13.5 mm right planum sphenoidale mass most suggestive of meningioma.    Past Medical History:   Diagnosis Date   • Brain tumor (HCC)    • Depression    • Hearing loss    • History of cellulitis     right foot big toe   • Hypertension    • Pneumonia    • Right arm weakness     after cervial injury   • Sciatic pain     affects balance   • Thyroid disease    • Visual disturbance     due to brain tumor - right eye     Active Hospital Problems    Diagnosis    • **Meningioma (HCC)    • Acute deep vein thrombosis (DVT) of the ulnar and brachial veins of right upper extremity (HCC)    • Loculated pleural effusion    • Fever    • PAF (paroxysmal atrial fibrillation) (HCC)    • Cerebral parenchymal hemorrhage (HCC)    • Essential hypertension    • Type 2 diabetes mellitus with hyperglycemia, without long-term current use of insulin (HCC)    • Hypothyroidism (acquired)    • Acute respiratory failure (secondary to status epilepticus)    • Thrombocytopenia (HCC)    • 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  Grimaces to painful stimuli.  Does not follow commands.  Nonverbal.  Weakly withdraws to tactile stimuli.    Intracranial meningioma   POD #39 (5/10/2022) Status post postcraniotomy for meningioma   POD#9 (6/10/2022)  Craniectomy for cerebral edema and possible infection   CT reviewed and stable.  Small blood products at site of brain biopsy   Neurochecks per policy   Start Decadron taper   Wound C,D,I   Craniectomy precautions     Hydrocephalus   CSF unremarkable from 5/17, 5/23 and 6/10.    Lumbar drain removed 6/3/2022   POD# 16 (6/3/2022) right posterior parietal  shunt placement    MEDTRONIC programmable valve set to 0.5   Shunt pumps and refills well    Postop seizures, in status   Neurology managing   Cont Keppra, Dialntin, Vimpat; PRN Ativan   Follow dilantin levels   Repeat EEG: Slowing but no epileptiform activity     Antiseizure medications tapering instructions per neurology:    Fosphenytoin discontinued    Change Keppra to: Keppra 750 mg twice daily by 4 days, then 500 mg twice daily by 4 days, then discontinue.    Change Vimpat to: Vimpat 200 mg twice daily per PEG tube to be continued indefinitely.    CV: Cardene off   Keep SBP <160.   Intermittent use of hydralazine and labetalol PRNs  Pulm: Tracheostomy  Placed (6/2/2022).  Appreciate ENT   Left chest tubes removed  : May DC Mccrary and place [place] catheter  FEN: Poor glucose control.  Increase SSI  ENDO: Accu-Chek and treat per policy  GI: PEG tube placed (6/2/2022).  Appreciate GI   No BM in several days.  KUB concerning for ileus formation.  Increased rectal stimulation  ID: Afebrile.  Continue broad spectrum abx, Vanc and Cefepime   CSF cultures NGTD  Heme:  DVT prophylaxis with SCD's.  Can not anticoagulate   Superficial thrombus to left cephalic vein   Hemoglobin 7.5.  We will continue to monitor.  Pain: NA  Dispo: DC pending acceptance to out-of-state LTAC.    CHIEF COMPLAINT:   Right visual field changes    Subjective  Symptom stable    Temp:  [98.3 °F (36.8 °C)-98.8 °F (37.1 °C)] 98.5 °F (36.9 °C)  Heart Rate:  [70-96] 81  Resp:  [22-38] 33  BP: (135-172)/() 152/78  FiO2 (%):  [35 %] 35 %    Objective:  General Appearance:  Well-appearing and in  "no acute distress.    Vital signs: (most recent): Blood pressure 152/78, pulse 81, temperature 98.5 °F (36.9 °C), temperature source Axillary, resp. rate (!) 33, height 182.9 cm (72\"), weight 109 kg (240 lb 4.8 oz), SpO2 93 %.      Neurologic Exam     Mental Status   Level of consciousness: arousable by verbal stimuli ,  drowsy  Off propofol.  Does not follow commands.  Nonverbal.  Weakly withdraws to tactile stimuli.     Cranial Nerves     CN III, IV, VI   Pupils are equal, round, and reactive to light.  Extraocular motions are normal.     CN IX, X   CN IX normal.     Gait, Coordination, and Reflexes     Tremor   Resting tremor: absent  Intention tremor: absent  Action tremor: absent    Drains:   Urethral Catheter Non-latex;Silicone 16 Fr. (Active)       Output by Drain (mL) 06/18/22 0701 - 06/18/22 1900 06/18/22 1901 - 06/19/22 0700 06/19/22 0701 - 06/19/22 1028 Range Total   Requested LDAs do not have output data documented.       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     REPORT: A frontal view of the chest was obtained.     COMPARISON: Chest x-ray 6/18/2022 0332 hours.     The tracheostomy tube and left internal jugular central line remain in  satisfactory position. The lungs are hypoaerated, there is persistent  slightly increased consolidation of the medial left base and left  perihilar lung. Pneumonia is " likely. The heart is enlarged. There is  central vascular congestion and probable mild volume overload. No  pneumothorax is identified. No other change.       Impression:      Stable satisfactory position of the tracheostomy tube and  left internal jugular central line. No pneumothorax is identified. There  is persistent increased consolidation of the left perihilar lung and  medial left base likely related to pneumonia. Probable small left  pleural effusion. Central vascular congestion as before with probable  mild volume overload.        This report was finalized on 06/19/2022 08:19 by Dr. Antony Thomas MD.        Lab Results (last 24 hours)     Procedure Component Value Units Date/Time    Vancomycin, Trough Please call results to Pharmacy prior to administering next dose [[id number]]  (Normal) Collected: 06/19/22 0511    Specimen: Blood Updated: 06/19/22 0555     Vancomycin Trough 15.60 mcg/mL     Comprehensive Metabolic Panel [[id number]]  (Abnormal) Collected: 06/19/22 0511    Specimen: Blood Updated: 06/19/22 0555     Glucose 158 mg/dL      BUN 24 mg/dL      Creatinine 0.42 mg/dL      Sodium 137 mmol/L      Potassium 4.0 mmol/L      Comment: Slight hemolysis detected by analyzer. Results may be affected.        Chloride 103 mmol/L      CO2 27.0 mmol/L      Calcium 7.8 mg/dL      Total Protein 6.1 g/dL      Albumin 2.10 g/dL      ALT (SGPT) 131 U/L      AST (SGOT) 103 U/L      Alkaline Phosphatase 418 U/L      Total Bilirubin 0.4 mg/dL      Globulin 4.0 gm/dL      A/G Ratio 0.5 g/dL      BUN/Creatinine Ratio 57.1     Anion Gap 7.0 mmol/L      eGFR 110.7 mL/min/1.73      Comment: National Kidney Foundation and American Society of Nephrology (ASN) Task Force recommended calculation based on the Chronic Kidney Disease Epidemiology Collaboration (CKD-EPI) equation refit without adjustment for race.       Narrative:      GFR Normal >60  Chronic Kidney Disease <60  Kidney Failure <15      C-reactive Protein  [[id number]]  (Abnormal) Collected: 06/19/22 0511    Specimen: Blood Updated: 06/19/22 0555     C-Reactive Protein 13.09 mg/dL     CBC & Differential [[id number]]  (Abnormal) Collected: 06/19/22 0511    Specimen: Blood Updated: 06/19/22 0532    Narrative:      The following orders were created for panel order CBC & Differential.  Procedure                               Abnormality         Status                     ---------                               -----------         ------                     CBC Auto Differential[[id number]]        Abnormal            Final result                 Please view results for these tests on the individual orders.    CBC Auto Differential [[id number]]  (Abnormal) Collected: 06/19/22 0511    Specimen: Blood Updated: 06/19/22 0532     WBC 10.65 10*3/mm3      RBC 2.65 10*6/mm3      Hemoglobin 8.5 g/dL      Hematocrit 27.7 %      .5 fL      MCH 32.1 pg      MCHC 30.7 g/dL      RDW 17.3 %      RDW-SD 65.1 fl      MPV 9.5 fL      Platelets 236 10*3/mm3      Neutrophil % 72.8 %      Lymphocyte % 7.8 %      Monocyte % 12.5 %      Eosinophil % 4.1 %      Basophil % 0.6 %      Immature Grans % 2.2 %      Neutrophils, Absolute 7.76 10*3/mm3      Lymphocytes, Absolute 0.83 10*3/mm3      Monocytes, Absolute 1.33 10*3/mm3      Eosinophils, Absolute 0.44 10*3/mm3      Basophils, Absolute 0.06 10*3/mm3      Immature Grans, Absolute 0.23 10*3/mm3      nRBC 0.0 /100 WBC     POC Glucose Once [[id number]]  (Abnormal) Collected: 06/18/22 1731    Specimen: Blood Updated: 06/18/22 1743     Glucose 168 mg/dL      Comment: : [id number] Rip ShahoryMeter ID: [id number]       POC Glucose Once [[id number]]  (Abnormal) Collected: 06/18/22 1206    Specimen: Blood Updated: 06/18/22 1217     Glucose 139 mg/dL      Comment: : [id number] Ripchase ShahoryMeter ID: [id number]       CBC & Differential [[id number]]  (Abnormal) Collected: 06/18/22 1039    Specimen: Blood Updated: 06/18/22 1051    Narrative:       The following orders were created for panel order CBC & Differential.  Procedure                               Abnormality         Status                     ---------                               -----------         ------                     CBC Auto Differential[[id number]]        Abnormal            Final result                 Please view results for these tests on the individual orders.    CBC Auto Differential [[id number]]  (Abnormal) Collected: 06/18/22 1039    Specimen: Blood Updated: 06/18/22 1051     WBC 9.48 10*3/mm3      RBC 2.42 10*6/mm3      Hemoglobin 7.7 g/dL      Hematocrit 24.7 %      .1 fL      MCH 31.8 pg      MCHC 31.2 g/dL      RDW 17.4 %      RDW-SD 64.5 fl      MPV 9.4 fL      Platelets 210 10*3/mm3      Neutrophil % 72.6 %      Lymphocyte % 8.0 %      Monocyte % 12.2 %      Eosinophil % 4.1 %      Basophil % 0.6 %      Immature Grans % 2.5 %      Neutrophils, Absolute 6.87 10*3/mm3      Lymphocytes, Absolute 0.76 10*3/mm3      Monocytes, Absolute 1.16 10*3/mm3      Eosinophils, Absolute 0.39 10*3/mm3      Basophils, Absolute 0.06 10*3/mm3      Immature Grans, Absolute 0.24 10*3/mm3      nRBC 0.0 /100 WBC         [id number]  MARIO Rangel      Electronically signed by Stevie Parsons APRN at 06/19/22 1029           Murphy Kothari MD    Physician   Pulmonology   Progress Notes      Signed   Date of Service:  06/19/22 0720   Creation Time:  06/19/22 0756              Signed        Expand All Collapse All        Show:Clear all  [x]Manual[x]Template[x]Copied    Added by:  [x]Leslie Sewell APRN[x]Murphy Kothari MD      []Juanis for details         PULMONARY AND CRITICAL CARE PROGRESS NOTE - [facility]     Patient: Hai Hernandez    1945    MR# [id number]    Acct# [id number]  06/19/22   07:56 CDT  Referring Provider: Martell Downs, *     Chief Complaint: Mechanically ventilated     Interval history: The patient is seen resting in bed with tracheostomy to  mechanical ventilator in pressure control ventilation..  Oxygen saturation 94% on PEEP of 5 and FiO2 0.35.  Tidal volume 400s.  ET CO2 30.  Cardene drip has been restarted.  He remains on antibiotics.  No documented fevers.  Chest film reviewed.  No issues reported overnight.     Meds:  amLODIPine, 10 mg, Oral, Q24H  arformoterol, 15 mcg, Nebulization, BID - RT  carvedilol, 12.5 mg, Oral, BID With Meals  cefepime, 2 g, Intravenous, Q8H  chlorhexidine, 15 mL, Mouth/Throat, Q12H  dexamethasone, 1 mg, Per PEG Tube, Q12H   Followed by  [START ON 6/20/2022] dexamethasone, 0.5 mg, Per PEG Tube, Daily  docusate, 50 mg, Oral, Daily  guaiFENesin, 400 mg, Per PEG Tube, TID  heparin (porcine), 5,000 Units, Subcutaneous, Q12H  insulin detemir, 20 Units, Subcutaneous, Nightly  insulin regular, 2-7 Units, Subcutaneous, Q6H  lacosamide, 200 mg, Intravenous, Q12H  lactulose, 20 g, Per PEG Tube, Daily  lansoprazole, 30 mg, Per G Tube, QAM  latanoprost, 1 drop, Both Eyes, Nightly  levETIRAcetam, 1,000 mg, Intravenous, Q12H  levothyroxine, 125 mcg, Nasogastric, Q AM  lidocaine, 10 mL, Intradermal, Once  lidocaine, 10 mL, Infiltration, Once  liothyronine, 25 mcg, Nasogastric, Daily  lisinopril, 40 mg, Nasogastric, Q24H  mupirocin, 1 application, Topical, Q12H  Nutrisource fiber, 1 packet, Nasogastric, 4x Daily  polyethylene glycol, 17 g, Oral, Daily  ProSource TF, 45 mL, Per G Tube, BID  sodium chloride, 4 mL, Nebulization, BID - RT  vancomycin, 15 mg/kg, Intravenous, Q12H        niCARdipine, 5-15 mg/hr, Last Rate: 15 mg/hr (06/19/22 0623)  norepinephrine, 0.02-0.3 mcg/kg/min, Last Rate: Stopped (06/11/22 1054)        Review of Systems:   Cannot obtain due to mechanical ventilated state   Ventilator Settings:  Resp Rate (Set): 16  Pressure Support (cm H2O): 8 cm H20  FiO2 (%): 35 %  PEEP/CPAP (cm H2O): 5 cm H20  Minute Ventilation (L/min) (Obs): 15.8 L/min  Resp Rate (Observed) Vent: 36  I:E Ratio (Set): 1:0.27  I:E Ratio (Obs):  1.00:1  PIP Observed (cm H2O): 21 cm H2O  RSBI: 21.96  Physical Exam:  Temp:  [98.3 °F (36.8 °C)-98.8 °F (37.1 °C)] 98.3 °F (36.8 °C)  Heart Rate:  [70-95] 95  Resp:  [22-38] 38  BP: (135-172)/() 160/81  FiO2 (%):  [35 %] 35 %     Intake/Output Summary (Last 24 hours) at 6/19/2022 0756  Last data filed at 6/19/2022 0400  Gross per 24 hour   Intake 5323 ml   Output 2000 ml   Net 3323 ml            SpO2 Percentage     06/19/22 0400 06/19/22 0733 06/19/22 0746   SpO2: 96% 93% 91%   Body mass index is 32.59 kg/m².   Physical Exam  Constitutional:       General: He is not in acute distress.     Appearance: He is ill-appearing. He is not diaphoretic.   HENT:      Head: Normocephalic.      Comments: Craniotomy incision, healing     Nose: Nose normal.      Mouth/Throat:      Mouth: Mucous membranes are moist.   Eyes:      General: No scleral icterus.  Neck:      Comments: Trach to vent  Left IJ triple-lumen  Cardiovascular:      Rate and Rhythm: Normal rate.      Comments: Rate 87  Pulmonary:      Effort: Pulmonary effort is normal. No respiratory distress.      Breath sounds: No wheezing or rhonchi.   Abdominal:      General: There is no distension.      Comments: PEG with tube feeding infusing   Genitourinary:     Comments: external catheter  FMS  Musculoskeletal:      Right lower leg: Edema present.      Left lower leg: Edema present.      Comments: SCDs   Skin:     Coloration: Skin is not pale.   Neurological:      Comments: Weakly withdraws to stimulus    Electronically signed by MARIO Rod, 6/19/2022, 07:56 CDT       Physician Substantive Portion: Medical Decision Making     Laboratory Data:         Results from last 7 days   Lab Units 06/19/22  0511 06/18/22  1039 06/17/22  0242   WBC 10*3/mm3 10.65 9.48 8.35   HEMOGLOBIN g/dL 8.5* 7.7* 8.1*   PLATELETS 10*3/mm3 236 210 219             Results from last 7 days   Lab Units 06/19/22  0511 06/18/22  0812 06/17/22  0242   SODIUM mmol/L 137 136 139    POTASSIUM mmol/L 4.0 4.0 3.9   BUN mg/dL 24* 27* 26*   CREATININE mg/dL 0.42* 0.42* 0.43*            Results from last 7 days   Lab Units 06/14/22  0417 06/13/22  0423   PH, ARTERIAL pH units 7.461* 7.471*   PCO2, ARTERIAL mm Hg 40.9 39.0   PO2 ART mm Hg 82.4* 88.1   FIO2 % 35 35            Wound Culture   Date Value Ref Range Status   06/10/2022 No growth at 2 days   Preliminary   06/10/2022 No growth at 2 days   Preliminary   06/10/2022 No growth at 2 days   Preliminary             Respiratory Culture   Date Value Ref Range Status   06/10/2022     Preliminary     Rare The culture consists of normal respiratory annia. This is a preliminary report; final report to follow.      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  REPORT: A frontal view the chest was obtained.  COMPARISON: Chest x-ray 6/17/2022 0303 hours.  The tracheostomy tube appears in satisfactory position as before. There is a left internal jugular central line, tip projects over the mid SVC, good position. This is unchanged. There is volume loss in the lung bases, with partial consolidation of the left base, slightly improved. No focal infiltrate is seen in the right lung. Heart size appears to be normal. Pleural thickening and/or effusion is seen in the left hemithorax. This appears stable. Numerous tiny calcified granulomas are present throughout both lungs. A  shunt is noted over the right chest.       Impression: Persistent but  mildly improved consolidation of the medial left lung base, with probable small persistent pleural effusion or pleural thickening on the left. Stable satisfactory position of the tracheostomy tube and left internal jugular central line. This report was finalized on 06/18/2022 08:21 by Dr. Antony Thomas MD.      My radiograph interpretation/independent review of imaging: Reviewed and agree with current interpretation.     Pulmonary Assessment:     1. Acute hypoxic respiratory failure  2. On mechanically assisted ventilation  3. Tracheostomy and PEG tube placement done for long-term care  4. Hydrocephalus requiring  shunt placement   5. Status postcraniotomy for meningioma  6. Encephalopathy status epilepticus  7. Loculated left-sided pleural effusion with multiple chest tubes removed now.   8. Healthcare associated pneumonia on antibiotics  9. Cardiac arrhythmia  10. Hypertension  11. Type 2 diabetes mellitus  12. S/p ventriculoperitoneal shunt placement  13. History of DVT in upper extremity  14. Seizure activity     Recommend/plan:   · He remains on full assist-control volume control ventilation.  He did not do much spontaneous breathing trial and did not have much change in the neurologic status   · He appears to be not ready for ventilator weaning but will try spontaneous breathing trial daily as tolerated.  · Current ventilator settings are PI 12, PEEP of 5, rate 16, FiO2 30% with oxygen saturation 98%.  · Patient is getting cefepime and vancomycin.  Afebrile.  He is on antibiotic for long.   · He may de-escalate antibiotic treatment.  White cell count is normal.  · He is off sedation and minimal response.  No seizure activity and no significant change in neurologic status.  · He was accepted by LTAC without any neurology onsite and family refused to go there   · Currently he is waiting for another LTAC in [place] to approve.  He may go there next week.  · Neurosurgery, neurology is also following.    · Continue bronchodilator treatment and routine respiratory care and pulmonary toilet.  · Ky labs and imaging studies from time to time.  · DVT and stress ulcer bolus and pain and anxiety control.  · CODE STATUS: Full.  Overall prognosis: Guarded.  · We will follow.     This visit was performed by both a physician and an [facility] RN.  I personally evaluated and examined the patient.  I performed all aspects of the medical decision making as documented.     Electronically signed by      Murphy Kothari MD,  Pulmonologist/Intensivist   6/19/2022, 10:10 CDT

## 2022-06-20 NOTE — DISCHARGE SUMMARY
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)   • Status epilepticus (HCC)   • Essential hypertension   • Type 2 diabetes mellitus with hyperglycemia, without long-term current use of insulin (HCC)   • Hypothyroidism (acquired)   • Acute respiratory failure (secondary to status epilepticus)   • Thrombocytopenia (HCC)   • Cerebral parenchymal hemorrhage (HCC)   • PAF (paroxysmal atrial fibrillation) (HCC)   • Fever   • Loculated pleural effusion   • Acute deep vein thrombosis (DVT) of the ulnar and brachial veins of right upper extremity (HCC)   • Dysphagia   • Communicating hydrocephalus (HCC)   • Cerebral edema (HCC)     Discharge Diagnosis/ Active Hospital Problems:   Active Hospital Problems    Diagnosis    • **Meningioma (HCC)    • Acute deep vein thrombosis (DVT) of the ulnar and brachial veins of right upper extremity (HCC)    • Loculated pleural effusion    • Fever    • PAF (paroxysmal atrial fibrillation) (HCC)    • Cerebral parenchymal hemorrhage (HCC)    • Essential hypertension    • Type 2 diabetes mellitus with hyperglycemia, without long-term current use of insulin (HCC)    • Hypothyroidism (acquired)    • Acute respiratory failure (secondary to status epilepticus)    • Thrombocytopenia (HCC)    • 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 male presented with a significant medical history of acephalic migraines, thyroid disease status post radiation as a child, basal cell and squamous cell carcinoma of the skin. He presents in FU for known meningioma found on workup for right visual field changes. Physical exam findings of neurologically intact with resolution of all symptoms and mild decreased vision in right eye.  His imaging shows 22 x 24 x 13.5 mm right planum sphenoidale mass most suggestive of meningioma.      On 5/10/2022 the patient was brought to the main operating room where he underwent an uneventful craniotomy for tumor.  Postoperatively he did extremely well and his neurological exam remained unchanged.  He was evaluated by both physical therapy and Occupational Therapy and deemed safe for discharge home.  Unfortunately, on day of discharge, 5/13/2022, Mr. Hernandez began to have seizure-like activity and became unresponsive.  He was intubated and transferred to the ICU for close neurologic monitoring.  He was evaluated by neurology and placed on an appropriate course of antiepileptics.      Due to inability to wean from ventilator, on 6/2/2022 Mr. Hernandez return to the main operating room for uneventful tracheostomy placement by ENT and PEG tube placement by GI.  Due to concern for communicating hydrocephalus, on 5/17/2022 a lumbar drain was placed.  His CSF was routinely monitored and found to be unremarkable.  He returned to the main operating room on 6/3/2022 for a right  shunt ([employer] strata valve) placement with current performance setting at 0.5 cm H2O, and again on 6/10/2022 for cranioplasty for surgical washout due to concern for infection.    Since his return to the ICU has been routinely evaluated by neurosurgery, neurology, and hospital services.  His current medications will be continued upon discharge, to include, not limited to antiepileptic medications with neurology  recommendations as follows... Recommend repeating EEG prior to weaning of Keppra.  Continue Keppra 1000 mg IV twice daily.  Recommend decrease Keppra to 500 mg IV BID on 6/24/2022 and continue 500 mg BID for 7 days. After 7 days Keppra can be weaned down completely.  Continue Vimpat 200 mg IV every 12 hours.  Would likely leave this in place as he goes to [facility].    Procedures Performed  Procedure(s):  CRANIECTOMY     Consults:   Consults     Date and Time Order Name Status Description    6/20/2022 10:54 AM Inpatient Cardiology Consult      6/14/2022  7:07 AM Inpatient Cardiothoracic Surgery Consult      6/12/2022  4:27 PM Inpatient Wound Care Provider Consult      5/30/2022 12:25 PM Inpatient Gastroenterology Consult Completed     5/29/2022 10:24 AM Inpatient ENT Consult Completed     5/17/2022 12:35 PM Inpatient Wound Care Provider Consult Completed     5/14/2022  4:30 PM Inpatient Pulmonology Consult Completed     5/14/2022  3:28 PM Inpatient Hospitalist Consult      5/14/2022  8:46 AM Inpatient Neurology Consult General Completed         Pertinent Test Results:   CT Abdomen Pelvis With & Without Contrast    Result Date: 6/13/2022  1. Likely complete left lower lobe collapse/atelectasis. There is fluid and air within the pleural space at the left lung base. There may be a split pleural sign in the left lung base. The findings are worrisome for empyema. There is mild atelectasis in the right lung base. 2. The gallbladder is dense. This could be due to sludge in the gallbladder. There are no focal dense gallstones. 3. Area of ill-defined low density in the mid to lower pole left kidney laterally is nonspecific. This could represent an area of pyelonephritis. A mass is felt to be less likely. This is not present on 5/23/2022. There is a probable small cyst in the upper pole left kidney that is difficult to evaluate due to breathing motion artifact. 4. Thickening of the bladder wall may be related to muscular  hypertrophy. Inflammation and infection are also included in the differential. There is a Mccrary catheter in the bladder. There is air in the bladder. 5. Bilateral fat-containing inguinal hernias. Very small fat-containing umbilical hernia. 6. Mild to moderate diffuse body wall edema. Presacral edema. Other nonacute findings, as discussed above. This report was finalized on 06/13/2022 18:38 by Dr. Zachariah Fonseca MD.    CT Head Without Contrast    Result Date: 6/6/2022  1. A very limited diagnostic study due to extensive artifacts as detailed above. 2. Right frontal temporal craniotomy. It appears to be involving the superior posterior lateral aspect of the right orbit/roof of the orbit which was not seen in the previous study. 3. Intraventricular hemorrhage in the occipital horn of the right lateral ventricle. Poorly visualized layering hematoma in the occipital horn of the left lateral ventricle. 4. A right parietal approach ventriculostomy catheter in place. 5. Persistent dilatation of the ventricles. No change.          This report was finalized on 06/06/2022 16:39 by Dr. Aida Wong MD.    CT Head Without Contrast    Result Date: 6/3/2022  1.. Ventriculoperitoneal shunt placed via right posterior parietal approach with the catheter traversing the posterior horn and body of the right lateral ventricle with the tip near the midline. Ventricle size is stable from the previous exam. There is no evidence of complications. 2. Intraventricular hemorrhage is stable from the previous exam. There is again a heterogeneous masslike density in the right frontal lobe with recent craniotomy and mild mass effect with shift of the midline from right to left within the frontal lobe. This is stable from the previous exam. This report was finalized on 06/03/2022 17:55 by Dr. Guillaume Bey MD.    CT Head Without Contrast    Result Date: 5/27/2022  Postsurgical change from right frontal craniotomy. 2. Small residual right 5 mm  subdural hematoma hygroma.   3. Residual hemorrhage in the occipital horns similar to prior exam. 4. Previously described hemorrhage in the right frontal lobe is not present on this exam 5. Dilated ventricular system. This report was finalized on 05/27/2022 18:05 by Dr. Emanuel Frederick MD.    CT Chest Without Contrast Diagnostic    Result Date: 6/5/2022  1. Left chest tubes remain in place with appropriate evacuation of the left pleural fluid. Only small pleural effusions seen on today's exam with minimal air present in the posterior left pleural space. 2. Bibasilar consolidation may represent atelectasis and/or pneumonia. Scattered bilateral calcified granuloma. Vascular crowding and/or mild venous congestion. Stable cardiomegaly. This report was finalized on 06/05/2022 07:12 by Dr. Christel Feliciano MD.    CT Chest With Contrast Diagnostic    Result Date: 5/23/2022  1. Moderate-sized left pleural effusion with extensive compressive atelectasis of the left lung, it is not possible to exclude pneumonia within the collapsed segments of the left lung. There is mild right basilar atelectasis with no significant effusion and no focal infiltrate is seen in the right lung. No intrathoracic lymphadenopathy is identified. The endotracheal tube, right-sided PICC and feeding tube appear to be in satisfactory position.     This report was finalized on 05/23/2022 12:02 by Dr. Antony Thomas MD.    MRI Brain Without Contrast    Result Date: 6/9/2022  1. Residual blood products along the inferior aspect of the right frontal lobe measuring about 2.6 cm transversely. This causes some continued mass effect along the inferior aspect of the right frontal lobe with mild shifting of structures to the left. 2. Residual subdural blood products in the right frontal region laterally. 3. New areas of T2 high signal along the inferolateral right frontal lobe and along the anterior tip of the right temporal lobe are nonspecific. This likely  represents edema. Infection is considered less likely given the absence of diffusion signal abnormality and diffusion restriction. Correlate clinically.   This report was finalized on 06/09/2022 17:20 by Dr. Zachariah Fonseca MD.    MRI Brain With & Without Contrast    Result Date: 5/21/2022  1. Right craniotomy changes. Residual blood products within/adjacent the inferior right frontal lobe with no convincing residual nodular enhancement separate from the blood products. Small right subdural hematoma with minimal subdural blood suspected along the left cerebellar tentorium. Stable small volume intraventricular hemorrhage within the occipital horns. Ventricles remain similar in size and configuration compared to CT 5/19/2022. 2. Stable right superior orbital roof defect with fluid attenuated collection, meningocele considered. 3. Opacified mastoid air cells. This report was finalized on 05/21/2022 16:51 by Dr. Christel Feliciano MD.    CT Abdomen Pelvis With Contrast    Result Date: 5/23/2022  1. 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 No intra-abdominal mass, abscess, free fluid or free air is identified. The appendix appears normal. 2. Feeding tube is present, as well as a rectal tube. The bladder is decompressed by a Mccrary catheter. There is bladder wall thickening which is probably artifactual. No prostate enlargement. 3. Moderate sized left pleural effusion with compressive atelectasis of the left lung.   This report was finalized on 05/23/2022 12:07 by Dr. Antony Thomas MD.    XR Chest 1 View    Result Date: 6/19/2022  Stable satisfactory position of the tracheostomy tube and left internal jugular central line. No pneumothorax is identified. There is persistent increased consolidation of the left perihilar lung and medial left base likely related to pneumonia. Probable small left pleural  effusion. Central vascular congestion as before with probable mild volume overload.   This report was finalized on 06/19/2022 08:19 by Dr. Antony Thomas MD.    XR Chest 1 View    Result Date: 6/18/2022  Persistent but mildly improved consolidation of the medial left lung base, with probable small persistent pleural effusion or pleural thickening on the left. Stable satisfactory position of the tracheostomy tube and left internal jugular central line. This report was finalized on 06/18/2022 08:21 by Dr. Antony Thomas MD.    XR Chest 1 View    Result Date: 6/17/2022  1. No change. This report was finalized on 06/17/2022 07:32 by Dr. Aida Wong MD.    XR Chest 1 View    Result Date: 6/14/2022  1. New left IJ central venous catheter with no evidence of pneumothorax, as described. 2. Hypoventilation. 3. Parenchymal infiltrate on the left worrisome for pneumonia. Probable small pleural effusion on the left. 4. Right perihilar opacity likely due to vascular crowding and hypoventilation. Patchy pneumonia on the right is not ruled out.   This report was finalized on 06/14/2022 12:19 by Dr. Zachariah Fonseca MD.    XR Chest 1 View    Result Date: 6/14/2022  1. Hypoventilation with vascular crowding. 2. Patchy infiltrate in the mid and lower lung zone on the left. The left heart border is obscured. This could represent pneumonia. Atelectasis is also considered. There is mild atelectasis at the right lung base.   This report was finalized on 06/14/2022 07:04 by Dr. Zachariah Fonseca MD.    XR Chest 1 View    Result Date: 6/13/2022  1. Slightly increased basilar atelectasis. Otherwise stable.   This report was finalized on 06/13/2022 07:42 by Dr Travis Lorenzo, .    XR Chest 1 View    Result Date: 6/10/2022  1. Left lower lung consolidation and left basilar pleural effusion. 2. Chronic granulomatous lung disease. This report was finalized on 06/10/2022 07:25 by Dr. Aida Wong MD.    XR Chest 1 View    Result Date:  6/9/2022  1. Well-positioned life support lines. 2. Hypoventilated lungs. Cardiomegaly with vascular crowding and/or perihilar edema. Small left pleural effusion with left lower lobe atelectasis versus pneumonia. This report was finalized on 06/09/2022 07:16 by Dr. Christel Feliciano MD.    XR Chest 1 View    Result Date: 6/8/2022  1. Stable chest.   This report was finalized on 06/08/2022 07:14 by Dr. Emanuel Frederick MD.    XR Chest 1 View    Result Date: 6/7/2022  1. Stable chest with no acute cardiopulmonary process..   This report was finalized on 06/07/2022 12:33 by Dr. Emanuel Frederick MD.    XR Chest 1 View    Result Date: 6/7/2022  1. Hypoventilation with vascular crowding. 2. Patchy infiltrates in the perihilar regions and lung bases. Atelectasis versus pneumonia. 3. Old granulomatous disease.   This report was finalized on 06/07/2022 07:09 by Dr. Zachariah Fonseca MD.    XR Chest 1 View    Result Date: 6/6/2022  1. Similar positioning of the medial left pleural pigtail catheters. No pneumothorax. Hypoventilated lungs with vascular crowding and basilar atelectasis versus pneumonia. Trace pleural effusions. This report was finalized on 06/06/2022 07:23 by Dr. Christel Feliciano MD.    XR Chest 1 View    Result Date: 6/5/2022  1. Removal of the two large left thoracotomy chest tubes with retention of the medial left pleural pigtail catheters. No measurable pneumothorax. Small left pleural fluid collection. Bibasilar opacities. This report was finalized on 06/05/2022 14:46 by Dr. Christel Feliciano MD.    XR Chest 1 View    Result Date: 6/5/2022  1. Hypoventilated lungs. Vascular crowding with likely pulmonary venous congestion/mild edema. Multiple left-sided chest tubes similar in position with no appreciable pneumothorax. Very small pleural effusions suspected. Scattered calcified granuloma. Appropriate positioning of tracheostomy tube. This report was finalized on 06/05/2022 07:53 by Dr. Christel Feliciano MD.    XR Chest 1  View    Result Date: 6/4/2022  . 1. No change in the left-sided thoracostomy tubes. There is no appreciable pneumothorax. 2. Improved aeration of the lungs but with persistent bilateral lower lobe atelectasis. This report was finalized on 06/04/2022 21:04 by Dr. Guillaume Bey MD.    XR Chest 1 View    Result Date: 6/4/2022  1. Appropriate positioning of tracheostomy tube. Similar positioning of the left-sided pleural catheters with small left pleural fluid collection. Probable left greater than right basilar atelectasis. No appreciable pneumothorax. This report was finalized on 06/04/2022 08:22 by Dr. Christel Feliciano MD.    XR Chest 1 View    Result Date: 6/3/2022   Interval placement of 2 pigtail drainage catheters in the medial LEFT posterior chest. Otherwise, no change in appearance the chest. This report was finalized on 06/03/2022 07:40 by Dr. Aristides Amor MD.    XR Chest 1 View    Result Date: 6/2/2022  1. 2 left chest tubes are present. There is persistent opacification the left chest is likely atelectasis left lower lobe 2. Support tubes are satisfactorily positioned..   This report was finalized on 06/02/2022 07:21 by Dr. Emanuel Frederick MD.    XR Chest 1 View    Result Date: 6/1/2022  1. Persistent left lower lobar consolidation and a small left basal pleural effusion. 2. Another ill-defined opacity in the left upper and mid lung which may represent an area of discoid atelectasis or consolidation. 3. Multiple small old healed granulomatous in both lungs. 4. The tubes and lines in place. This report was finalized on 06/01/2022 06:50 by Dr. Aida Wong MD.    XR Chest 1 View    Result Date: 5/30/2022  1. Hypoventilation with vascular crowding. 2. Left chest tubes remain in place. Trace amount of pneumothorax at the left lung apex. 3. Patchy infiltrates in both lung bases and left perihilar region, stable.   This report was finalized on 05/30/2022 07:39 by Dr. Zachariah Fonseca MD.    XR Chest 1  View    Result Date: 5/29/2022  1.. No interval line changes. 2. Previously noted left-sided pneumothorax is no longer visualized. There is some peripheral developing opacity within the lateral left upper chest prepped representing some developing effusion. There is increasing left perihilar consolidation with differentials above. This report was finalized on 05/29/2022 08:46 by Dr. Guillaume Bey MD.    XR Chest 1 View    Result Date: 5/28/2022  1.. No interval line changes. 2 left-sided thoracostomy tubes remain in place. 2. Small left apical pneumothorax increased from yesterday's exam. There is persistent bibasilar subsegmental atelectasis. This report was finalized on 05/28/2022 08:37 by Dr. Guillaume Bey MD.    XR Chest 1 View    Result Date: 5/27/2022  1. Improved expansion of the left lung with only small slitlike left lateral pneumothorax. 2. Residual atelectasis is noted left lower lobe.   This report was finalized on 05/27/2022 07:06 by Dr. Emanuel Frederick MD.    XR Chest 1 View    Result Date: 5/26/2022  1. There is been insertion of a second left basilar chest tube, which appears in satisfactory position. The residual left chest tube appears in good position. There is decreased size of left lateral pneumothorax. No tension pneumothorax is identified. Diffuse airspace opacities within the left lung may represent atelectasis or reexpansion edema. There appears to be a small residual left pleural effusion at the costophrenic angle. The endotracheal tube and other life-support lines appear in good position as before. This report was finalized on 05/26/2022 13:43 by Dr. Antony Thomas MD.    XR Chest 1 View    Result Date: 5/26/2022   1.  Stable support lines/tubes. 2.  Increase in small to moderate LEFT pleural effusion with overlying atelectasis. 3.  No visible pneumothorax. This report was finalized on 05/26/2022 07:24 by Dr. Aristides Amor MD.    XR Chest 1 View    Result Date: 5/25/2022  1.  Prominent left lateral pneumothorax stable and unchanged. A left chest tube is present. Lack of reexpansion may represent trapped lung..   This report was finalized on 05/25/2022 12:58 by Dr. Emanuel Frederick MD.    XR Chest 1 View    Result Date: 5/25/2022  Interval insertion of a left basilar chest tube which appears in satisfactory position, with evacuation of the large left pleural effusion, there is an extra vacuo left lateral pneumothorax with a width of 25 mm. No tension pneumothorax is identified. This report was finalized on 05/25/2022 11:53 by Dr. Antony Thomas MD.    XR Chest 1 View    Result Date: 5/25/2022  Stable chest x-ray with a large left pleural effusion, the life-support lines appear in satisfactory position as before, including the endotracheal tube. This report was finalized on 05/25/2022 10:07 by Dr. Antony Thomas MD.    XR Chest 1 View    Result Date: 5/25/2022   1.  Marked increase in large loculated LEFT pleural effusion. 2.  Support lines/tubes appear stable.  This report was finalized on 05/25/2022 07:16 by Dr. Aristides Amor MD.    XR Chest 1 View    Result Date: 5/24/2022  1. Moderate left lateral and posterior pleural effusion. This is a change from May 23, 2022.   This report was finalized on 05/24/2022 07:14 by Dr. Emanuel Frederick MD.    XR Chest 1 View    Result Date: 5/23/2022  1. Basilar atelectasis or small left pleural effusion 2. Limited exam due to artifact projecting over entire image.   This report was finalized on 05/23/2022 07:27 by Dr. Emanuel Frederick MD.    XR Chest 1 View    Result Date: 5/22/2022  1. Stable 1 day view the chest. This report was finalized on 05/22/2022 09:10 by Dr. Christel Feliciano MD.    XR Chest 1 View    Result Date: 5/21/2022  1. Stable life support lines. Increasing right greater than left infrahilar opacities concerning for patchy atelectasis or pneumonia. Probable trace left pleural effusion. This report was finalized on 05/21/2022 13:46 by   Christel Feliciano MD.    XR Chest 1 View    Result Date: 5/21/2022  1. Life support lines described above. 2. Hypoventilated lungs. Scattered calcified granuloma with questionable venous congestion This report was finalized on 05/21/2022 08:41 by Dr. Christel Feliciano MD.    US Venous Doppler Upper Extremity Left (duplex)    Result Date: 6/14/2022  Impression: 1. There is no evidence of deep venous thrombosis of the left upper extremity. 2. There is superficial thrombus in the cephalic vein.  Comments: Left upper extremity venous duplex exam was performed using color Doppler flow, Doppler wave form analysis, and grayscale imaging, with and without compression. There is no evidence of deep venous thrombosis of the internal jugular, subclavian, axillary, brachial, radial, and ulnar veins. There is superficial thrombus involving the cephalic vein.  This report was finalized on 06/14/2022 15:42 by Dr. Nate Blakely MD.    US Venous Doppler Upper Extremity Right (duplex)    Result Date: 5/30/2022  Impression: There is evidence of deep venous thrombosis of the right upper extremity.  Comments: Right upper extremity venous duplex exam was performed using color Doppler flow, Doppler wave form analysis, and grayscale imaging, with and without compression. There is evidence of deep venous thrombosis involving the right brachial and ulnar veins. The more proximal axillary and subclavian veins were not visualized. There is no evidence of superficial thrombophlebitis.  This report was finalized on 05/30/2022 09:32 by Dr. Stevie Guzmán MD.    XR Abdomen KUB    Result Date: 6/13/2022  Nonspecific bowel gas pattern with questionable developing ileus. This report was finalized on 06/13/2022 10:13 by Dr. Antony Thomas MD.    CBC:   Results from last 7 days   Lab Units 06/20/22  1114 06/20/22  0401 06/19/22  0511 06/18/22  1039 06/17/22  0242 06/16/22  0358 06/15/22  0426   WBC 10*3/mm3 11.26* 11.98* 10.65 9.48 8.35 9.48 8.84    HEMOGLOBIN g/dL 8.2* 8.8* 8.5* 7.7* 8.1* 7.7* 7.5*   HEMATOCRIT % 26.7* 27.9* 27.7* 24.7* 25.4* 25.1* 24.7*   PLATELETS 10*3/mm3 237 274 236 210 219 215 195     BMP:  Results from last 7 days   Lab Units 06/20/22  1114 06/20/22  0617 06/19/22  0511 06/18/22  0812 06/17/22  0242 06/16/22  0358 06/15/22  0426   SODIUM mmol/L 140 139 137 136 139 139 138   POTASSIUM mmol/L 3.7 4.3 4.0 4.0 3.9 3.9 3.9   CHLORIDE mmol/L 107 105 103 103 104 104 103   CO2 mmol/L 28.0 29.0 27.0 29.0 29.0 29.0 29.0   BUN mg/dL 27* 27* 24* 27* 26* 32* 33*   CREATININE mg/dL 0.37* 0.36* 0.42* 0.42* 0.43* 0.44* 0.43*   GLUCOSE mg/dL 151* 149* 158* 155* 120* 145* 148*   CALCIUM mg/dL 8.1* 8.0* 7.8* 8.1* 8.0* 8.1* 8.1*   ALT (SGPT) U/L 144* 160* 131* 113* 129* 142* 149*     Culture Results:   Respiratory Culture   Date Value Ref Range Status   06/14/2022   Final    Scant growth (1+) Normal respiratory annia. No S. aureus or Pseudomonas aeruginosa detected. Final report.     CSF  Lab Results   Component Value Date    APPEARCSF Clear 06/10/2022    COLORCSF Yellow (A) 06/10/2022    RBCCSF 505 (H) 06/10/2022    SUPERCLRCSF Xanthrochromic (A) 06/10/2022    TNUCCELLS 31 (H) 06/10/2022    PROTEINCSF 135.3 (H) 06/10/2022    GRAMSTAIN Many (4+) WBCs per low power field 06/14/2022    GRAMSTAIN Rare (1+) Epithelial cells per low power field 06/14/2022    GRAMSTAIN Few (2+) Gram positive cocci 06/14/2022    GRAMSTAIN Few (2+) Gram negative bacilli 06/14/2022     Lab Results   Component Value Date    CSFCX No growth at 3 days 06/10/2022    CSFCX No growth at 3 days 05/22/2022    CSFCX No growth at 3 days 05/17/2022     SURGICAL PATHOLOGY RESULTS  Lab Results   Component Value Date    FINALDX  06/10/2022     1-2.  Brain, cerebrum, craniotomy:  Severe mixed inflammation, necrosis, and reactive gliosis.  No evidence of malignancy.  GMS stain is negative for fungal organisms.      SYNOPTIC  05/10/2022     CNS: Histological Assessment  CNS: HISTOLOGICAL  ASSESSMENT - All Specimens  Protocol posted: 2/26/2020    SPECIMEN     Procedure:    Resection      Specimen Size, Gross Description:    Greatest Dimension (Centimeters): 2.1 cm       Additional Dimension (Centimeters):    1.8 cm       Additional Dimension (Centimeters):    0.8 cm    TUMOR     Tumor Site:    Leptomeninges        Precise Location:    Right anterior cranial fossa planum sphenoidal      Tumor Laterality:    Right      Tumor Focality:    Unifocal      Histologic Type:           :    Angiomatous meningioma      Histologic Grade:    WHO Grade I       INTRAOP  06/10/2022         FROZEN SECTION DIAGNOSIS:   Cerebrum, FS1A:  No malignancy identified.  No evidence inflammation or active infection.    Reported to Dr. Martell Downs on 6/10/2022 at 13:57 CDT by Dioni Morgan MD.      GROSSDES  06/10/2022     1. Brain, Cerebral Cortex.  Received fresh for frozen section and cultures labeled with the patient name, date of birth, and designated cerebrum.  The specimen consists of a Telfa pad with 2 fragments of yellow-pink soft tissue aggregating to 0.9 x 0.7 x 0.2 cm.  1 fragment as large amounts of tan-brown possible blood clot.  The fragment with blood clot is sterilely removed from the container and a representative section is submitted for frozen section.  The frozen section remnant is wrapped in lens paper and submitted in block 1A.  The remaining tissue is wrapped in lens paper and submitted in block 1B.     The remaining tissue is sent to microbiology.      2. Brain.  Received fresh from microbiology labeled with the patient name, date of birth, designated cerebrum.  The specimen consists of a Telfa pad with 3 red-tan soft tissue fragments aggregating to 0.9 x 0.8 x 0.2 cm.  The fragments are wrapped in lens paper and submitted in block 2A.          MICRO  06/10/2022     Microscopic examination performed.       Condition on Discharge:  Stable    Vital Signs  Temp:  [97.2 °F (36.2 °C)-98.5 °F (36.9  °C)] 97.2 °F (36.2 °C)  Heart Rate:  [] 75  Resp:  [27-38] 27  BP: (120-164)/() 143/86  FiO2 (%):  [35 %] 35 %    Physical Exam:   Physical Exam  Vitals and nursing note reviewed.   Constitutional:       Appearance: He is well-developed.   HENT:      Head: Normocephalic and atraumatic.     Eyes:      Extraocular Movements: EOM normal.      Conjunctiva/sclera: Conjunctivae normal.      Pupils: Pupils are equal, round, and reactive to light.   Neck:      Comments:   Tracheostomy in place  Pulmonary:      Effort: Pulmonary effort is normal. No tachypnea, bradypnea, accessory muscle usage or respiratory distress.   Abdominal:      Palpations: Abdomen is soft.      Comments:   G-tube in place   Genitourinary:     Comments:   Catheter in place  Skin:     General: Skin is warm and dry.   Neurological:      GCS: GCS eye subscore is 4. GCS verbal subscore is 5. GCS motor subscore is 6.   Psychiatric:         Speech: Speech normal.         Behavior: Behavior normal.          Neurologic Exam     Mental Status   Speech: speech is normal   Level of consciousness: arousable by verbal stimuli ,  drowsy  Opens eyes to verbal and tactile stimuli today.  Grimaces to painful stimuli.  Does not follow commands.  Nonverbal.  Does not withdraws to tactile stimuli.     Cranial Nerves     CN III, IV, VI   Pupils are equal, round, and reactive to light.  Extraocular motions are normal.     CN IX, X   CN IX normal.     Gait, Coordination, and Reflexes     Tremor   Resting tremor: absent  Intention tremor: absent  Action tremor: absent    Discharge Disposition  Long Term Care (DC - External)    Discharge Medications     Discharge Medications      New Medications      Instructions Start Date   alteplase 2 MG injection  Commonly known as: CATHFLO/ACTIVASE   2 mg, Intracatheter, As Needed      amLODIPine 10 MG tablet  Commonly known as: NORVASC   10 mg, Oral, Every 24 Hours Scheduled   Start Date: June 21, 2022     arformoterol 15  MCG/2ML nebulizer solution  Commonly known as: BROVANA   15 mcg, Nebulization, 2 Times Daily - RT      bisacodyl 10 MG suppository  Commonly known as: DULCOLAX   10 mg, Rectal, Daily PRN      butalbital-acetaminophen-caffeine -40 MG per tablet  Commonly known as: FIORICET, ESGIC   1 tablet, Oral, Every 4 Hours PRN      carvedilol 6.25 MG tablet  Commonly known as: COREG   6.25 mg, Oral, 2 Times Daily With Meals      cefepime 2 G/ ML solution  Commonly known as: MAXIPIME   2 g, Intravenous, Every 8 Hours      chlorhexidine 0.12 % solution  Commonly known as: PERIDEX   15 mL, Mouth/Throat, Every 12 Hours Scheduled      dexamethasone 0.5 MG tablet  Commonly known as: DECADRON   0.5 mg, Per G Tube, Daily   Start Date: June 21, 2022     dextrose 40 % gel  Commonly known as: GLUTOSE   15 g, Oral, Every 15 Minutes PRN      dextrose 50 % solution  Commonly known as: D50W   25 g, Intravenous, Every 15 Minutes PRN      docusate 50 MG/5ML liquid  Commonly known as: COLACE   50 mg, Oral, Daily   Start Date: June 21, 2022     glucagon (human recombinant) 1 MG injection  Commonly known as: GLUCAGEN DIAGNOSTIC   1 mg, Intramuscular, Every 15 Minutes PRN      guaiFENesin 100 MG/5ML solution oral solution  Commonly known as: ROBITUSSIN   400 mg, Per PEG Tube, 3 Times Daily      heparin (porcine) 5000 UNIT/ML injection   5,000 Units, Subcutaneous, Every 12 Hours Scheduled      hydrALAZINE 25 MG tablet  Commonly known as: APRESOLINE   25 mg, Oral, Every 8 Hours Scheduled      insulin detemir 100 UNIT/ML injection  Commonly known as: LEVEMIR   20 Units, Subcutaneous, Nightly      insulin regular 100 UNIT/ML injection  Commonly known as: humuLIN R,novoLIN R   2-7 Units, Subcutaneous, Every 6 Hours Scheduled      ipratropium-albuterol 0.5-2.5 mg/3 ml nebulizer  Commonly known as: DUO-NEB   3 mL, Nebulization, Every 4 Hours PRN      labetalol 5 MG/ML injection  Commonly known as: NORMODYNE,TRANDATE   10 mg, Intravenous, Every  6 Hours PRN      lacosamide 200 MG/20ML injection  Commonly known as: VIMPAT   200 mg, Intravenous, Every 12 Hours      lactulose 20 GM/30ML solution solution   20 g, Per PEG Tube, Daily   Start Date: June 21, 2022     lansoprazole 3 MG/ML suspension oral suspension   30 mg, Per G Tube, Every Morning   Start Date: June 21, 2022     latanoprost 0.005 % ophthalmic solution  Commonly known as: XALATAN   1 drop, Both Eyes, Nightly      levalbuterol 0.63 MG/3ML nebulizer solution  Commonly known as: XOPENEX   0.63 mg, Nebulization, 3 Times Daily PRN      levETIRAcetam 500 MG tablet  Commonly known as: Keppra   500 mg, Oral, 2 Times Daily      levETIRAcetam in NaCl 0.75% 1000 MG/100ML IVPB  Commonly known as: KEPPRA   1,000 mg, Intravenous, Every 12 Hours Scheduled      liothyronine 25 MCG tablet  Commonly known as: CYTOMEL   25 mcg, Per G Tube, Daily   Start Date: June 21, 2022     LORazepam 2 MG/ML injection  Commonly known as: ATIVAN   2 mg, Intravenous, Every 2 Hours PRN      mupirocin 2 % ointment  Commonly known as: BACTROBAN   1 application, Topical, Every 12 Hours Scheduled      niCARdipine  Commonly known as: CARDENE   5-15 mg/hr (5-15 mg/hr), Intravenous, Titrated      Norepinephrine-Sodium Chloride 8-0.9 MG/250ML-% solution infusion  Commonly known as: LEVOPHED   0.02-0.3 mcg/kg/min (2.12-31.8 mcg/min), Intravenous, Titrated      Nutrisource fiber pack pack   1 packet, Nasogastric, 4 Times Daily      pantoprazole 40 MG EC tablet  Commonly known as: PROTONIX   40 mg, Oral, Daily      polyethylene glycol 17 g packet  Commonly known as: MIRALAX   17 g, Oral, Daily   Start Date: June 21, 2022     ProSource TF liquid oral liquid   45 mL, Per G Tube, 2 Times Daily      sodium chloride 3 % nebulizer solution   4 mL, Nebulization, 2 Times Daily - RT      vancomycin   1,500 mg, Intravenous, Every 12 Hours         Continue These Medications      Instructions Start Date   levothyroxine 125 MCG tablet  Commonly known as:  SYNTHROID, LEVOTHROID   125 mcg, Oral, Daily      lisinopril 20 MG tablet  Commonly known as: PRINIVIL,ZESTRIL   20 mg, Oral, Daily      Lumigan 0.01 % ophthalmic drops  Generic drug: bimatoprost   1 drop, Both Eyes, Nightly      vitamin D 1.25 MG (66413 UT) capsule capsule  Commonly known as: ERGOCALCIFEROL  Notes to patient: Weekly as per home dose   50,000 Units, Oral, Weekly         ASK your doctor about these medications      Instructions Start Date        oxyCODONE-acetaminophen 7.5-325 MG per tablet  Commonly known as: PERCOCET  Ask about: Should I take this medication?   1 tablet, Oral, Every 6 Hours PRN           Discharge Diet:   Diet Instructions     Advance Diet As Tolerated           Activity at Discharge:   Activity Instructions     Activity as Tolerated      Driving Restrictions      Type of Restriction: Driving    Driving Restrictions: No Driving While Taking Narcotics    Gradually Increase Activity Until at Pre-Hospitalization Level      Lifting Restrictions      Type of Restriction: Lifting    Lifting Restrictions: Lifting Restriction (Indicate Limit)    Weight Limit (Pounds): 8    Length of Lifting Restriction: 2-3 WEEKS         Follow-up Appointments  Future Appointments   Date Time Provider Department Center   7/20/2022  4:00 PM Martell Downs MD [facility]     Additional Instructions for the Follow-ups that You Need to Schedule     Call MD With Problems / Concerns   As directed      Instructions: CALL WITH ANY NEW OR ADDITIONAL CONCERNS.    Order Comments: Instructions: CALL WITH ANY NEW OR ADDITIONAL CONCERNS.          Discharge Follow-up with Specified Provider: Dr. Downs; 2 Weeks   As directed      To: Dr. Downs    Follow Up: 2 Weeks    Follow Up Details: Follow-up in 2 weeks or when discharged for LTACH             Test Results Pending at Discharge  Pending Labs     Order Current Status    Encephalitis CSF - Cerebrospinal Fluid, Lumbar Puncture In process    AFB Culture  - Body Fluid, Pleural Cavity Preliminary result    AFB Culture - Tissue, Brain Preliminary result    AFB Culture - Tissue, Brain Preliminary result    AFB Culture - Tissue, Brain, Cerebral Cortex Preliminary result    Fungus Culture - Body Fluid, Pleural Cavity Preliminary result    Fungus Culture - Tissue, Brain Preliminary result    Fungus Culture - Tissue, Brain Preliminary result    Fungus Culture - Tissue, Brain, Cerebral Cortex Preliminary result        Stevie Parsons, APRN 06/20/22  12:52 CDT    [id number]

## 2022-06-20 NOTE — CASE MANAGEMENT/SOCIAL WORK
Continued Stay Note   [place]     Patient Name: Hai Hernandez  MRN: [id number]  Today's Date: 6/20/2022    Admit Date: 5/10/2022     Discharge Plan     Row Name 06/20/22 1055       Plan    Plan Comments PT has a bed offer with [facility] in [place]. CLOVIS has spoken with PT's daughter, Sana. She has accepted the bed offer and has asked how and PT will transfer. She also had questions about assisting PT with his bills and other things. CLOVIS recommended that when PT arives in [place], she can file for temporary guardianship. She plans to do this once PT arrives in [place]. CLOVIS will notify [facility] of the transfer and have them on will call. CLOVIS is awaiting a return call from [facility] with the number to call report.     CLOVIS has faxed a demographic page and called [facility] to notify of needed transport. Will await return call.     Row Name 06/20/22 5020       Plan    Plan Comments CLOVIS has contacted Marly with [facility] [phone]. She has been sent updated clinicals. Marly has advised that she will check bed availability and will review updated clinicals. She states she will call  back once she has reviewed. Will follow.               Discharge Codes    No documentation.                     VENITA Marcelo

## 2022-06-20 NOTE — CASE MANAGEMENT/SOCIAL WORK
Continued Stay Note   [place]     Patient Name: Hai Hernandez  MRN: [id number]  Today's Date: 6/20/2022    Admit Date: 5/10/2022     Discharge Plan     Row Name 06/20/22 1323       Plan    Plan Comments Marly with [facility] has called back and advised that PT will be going to room 571. RN report may be called to [phone] and dc summary to be faxed to [phone]. Dr. deya Lopez report to be called to Dr. Shankar at [phone]. [facility] is requesting that a packet be sent with PT to include H&P, last two days progress/consult notes and DC Summary. They have also requested a disc with the most recent imaging. CLOVIS has not heard back from [facility] at this time regarding transport. Will continue to follow and assist as needed.    Final Discharge Disposition Code 63 - [facility]    Row Name 06/20/22 1055       Plan    Plan Comments PT has a bed offer with [facility] in [place]. CLOVIS has spoken with PT's daughter, Sana. She has accepted the bed offer and has asked how and PT will transfer. She also had questions about assisting PT with his bills and other things. CLOVIS recommended that when PT arives in [place], she can file for temporary guardianship. She plans to do this once PT arrives in [place]. [facility] of [facility] and have them on will call. CLOVIS is awaiting a return call from [facility] with the number to call report.               Discharge Codes    No documentation.               Expected Discharge Date and Time     Expected Discharge Date Expected Discharge Time    Jun 20, 2022             VENITA Marcelo

## 2022-06-20 NOTE — PROGRESS NOTES
Neurology Progress Note      Chief Complaint:    Postoperative seizure    Subjective     Subjective:  Patient is off all sedation. Remains ventilated with trach. Cardene continues. Systolic BP 140s. He opened his eyes to voice and to touch. Grimaced to touch on right arm. Not following commands. Afebrile.         Past Medical History:   Diagnosis Date   • Brain tumor (HCC)    • Depression    • Hearing loss    • History of cellulitis     right foot big toe   • Hypertension    • Pneumonia    • Right arm weakness     after cervial injury   • Sciatic pain     affects balance   • Thyroid disease    • Visual disturbance     due to brain tumor - right eye     Past Surgical History:   Procedure Laterality Date   • CATARACT EXTRACTION, BILATERAL     • COLONOSCOPY     • CRANIOTOMY Bilateral 6/10/2022    Procedure: CRANIECTOMY;  Surgeon: Martell Downs MD;  Location: [facility] OR;  Service: Neurosurgery;  Laterality: Bilateral;   • CRANIOTOMY FOR TUMOR Right 5/10/2022    Procedure: CRANIOTOMY FOR TUMOR STERIOTACTIC WITH BRAIN LAB, right;  Surgeon: Martell Downs MD;  Location: [facility] OR;  Service: Neurosurgery;  Laterality: Right;   • ENDOSCOPY W/ PEG TUBE PLACEMENT N/A 6/2/2022    Procedure: ESOPHAGOGASTRODUODENOSCOPY WITH PERCUTANEOUS ENDOSCOPIC GASTROSTOMY TUBE INSERTION WITH ANESTHESIA;  Surgeon: Melecio Lu MD;  Location: [facility] OR;  Service: Gastroenterology;  Laterality: N/A;   • NECK SURGERY     • SKIN CANCER EXCISION     • TONSILLECTOMY     • TRACHEOSTOMY N/A 6/2/2022    Procedure: TRACHEOSTOMY;  Surgeon: Geovany Davidson MD;  Location:  [facility] OR;  Service: ENT;  Laterality: N/A;   •  SHUNT INSERTION Right 6/3/2022    Procedure: VENTRICULAR PERITONEAL SHUNT INSERTION WITH BRAIN LAB;  Surgeon: Martell Downs MD;  Location:  [facility] OR;  Service: Neurosurgery;  Laterality: Right;     Family History   Problem Relation Age of Onset   • Cancer Sister    • Cancer Brother      Social  History     Tobacco Use   • Smoking status: Never Smoker   • Smokeless tobacco: Never Used   Vaping Use   • Vaping Use: Never used   Substance Use Topics   • Alcohol use: Never   • Drug use: Never       Medications:  Current Facility-Administered Medications   Medication Dose Route Frequency Provider Last Rate Last Admin   • acetaminophen (TYLENOL) tablet 650 mg  650 mg Oral Q4H PRN Martell Downs MD   650 mg at 06/15/22 2017    Or   • acetaminophen (TYLENOL) suppository 650 mg  650 mg Rectal Q4H PRN Martell Downs MD   650 mg at 05/23/22 0016   • alteplase (CATHFLO/ACTIVASE) injection 2 mg  2 mg Intracatheter PRN Martell Downs MD   New Syringe/Cartridge at 05/28/22 1330   • amLODIPine (NORVASC) tablet 10 mg  10 mg Oral Q24H Sha Beatty MD   10 mg at 06/19/22 0809   • arformoterol (BROVANA) nebulizer solution 15 mcg  15 mcg Nebulization BID - RT Vidya Chandler APRN   15 mcg at 06/20/22 0647   • bisacodyl (DULCOLAX) suppository 10 mg  10 mg Rectal Daily PRN Stevie Parsons APRN       • carvedilol (COREG) tablet 12.5 mg  12.5 mg Oral BID With Meals Sha Beatty MD   12.5 mg at 06/19/22 1721   • cefepime (MAXIPIME) 2 g/100 mL 0.9% NS (mbp)  2 g Intravenous Q8H Martell Downs MD   2 g at 06/20/22 0552   • chlorhexidine (PERIDEX) 0.12 % solution 15 mL  15 mL Mouth/Throat Q12H Martell Downs MD   15 mL at 06/19/22 2009   • dexamethasone (DECADRON) tablet 0.5 mg  0.5 mg Per PEG Tube Daily Stevie Parsons, APRN       • dextrose (D50W) (25 g/50 mL) IV injection 25 g  25 g Intravenous Q15 Min PRN Martell Downs MD   25 g at 06/09/22 1807   • dextrose (GLUTOSE) oral gel 15 g  15 g Oral Q15 Min PRN Martell Downs MD   15 g at 05/22/22 0527   • docusate (COLACE) 50 MG/5ML liquid 50 mg  50 mg Oral Daily Sha Beatty MD   50 mg at 06/19/22 0810   • glucagon (human recombinant) (GLUCAGEN DIAGNOSTIC) injection 1 mg  1 mg Intramuscular  Q15 Min PRN Martell Downs MD       • guaiFENesin (ROBITUSSIN) 100 MG/5ML oral solution 400 mg  400 mg Per PEG Tube TID Vidya Chandler APRN   400 mg at 06/19/22 2010   • heparin (porcine) 5000 UNIT/ML injection 5,000 Units  5,000 Units Subcutaneous Q12H Martell Downs MD   5,000 Units at 06/19/22 2008   • hydrALAZINE (APRESOLINE) injection 10 mg  10 mg Intravenous Q6H PRN Martell Downs MD   10 mg at 06/20/22 0136   • hydrALAZINE (APRESOLINE) tablet 25 mg  25 mg Oral Q8H Sha Beatty MD   25 mg at 06/20/22 0551   • insulin detemir (LEVEMIR) injection 20 Units  20 Units Subcutaneous Nightly Martell Downs MD   20 Units at 06/19/22 2008   • insulin regular (humuLIN R,novoLIN R) injection 2-7 Units  2-7 Units Subcutaneous Q6H Martell Downs MD   2 Units at 06/20/22 0017   • ipratropium-albuterol (DUO-NEB) nebulizer solution 3 mL  3 mL Nebulization Q4H PRN Martell Downs MD   3 mL at 06/15/22 0650   • labetalol (NORMODYNE,TRANDATE) injection 10 mg  10 mg Intravenous Q6H PRN Martell Downs MD   10 mg at 06/20/22 0202   • lacosamide (VIMPAT) injection 200 mg  200 mg Intravenous Q12H Martell Downs MD   200 mg at 06/19/22 2008   • lactulose solution 20 g  20 g Per PEG Tube Daily Sha Beatty MD   20 g at 06/19/22 0810   • lansoprazole (FIRST) oral suspension 30 mg  30 mg Per G Tube QAM Martell Downs MD   30 mg at 06/20/22 0602   • latanoprost (XALATAN) 0.005 % ophthalmic solution 1 drop  1 drop Both Eyes Nightly Martell Downs MD   1 drop at 06/19/22 2010   • levalbuterol (XOPENEX) nebulizer solution 0.63 mg  0.63 mg Nebulization TID PRN Martell Downs MD   0.63 mg at 06/12/22 1046   • levETIRAcetam in NaCl 0.75% (KEPPRA) IVPB 1,000 mg  1,000 mg Intravenous Q12H Martell Downs  mL/hr at 06/16/22 2200 1,000 mg at 06/19/22 2008   • levothyroxine (SYNTHROID, LEVOTHROID) tablet 125 mcg   125 mcg Nasogastric Q AM Martell Downs MD   125 mcg at 06/20/22 0551   • lidocaine (XYLOCAINE) 1 % injection 10 mL  10 mL Intradermal Once Martell Downs MD       • lidocaine (XYLOCAINE) 1 % injection 10 mL  10 mL Infiltration Once Pilo Ortega MD       • liothyronine (CYTOMEL) tablet 25 mcg  25 mcg Nasogastric Daily Martell Downs MD   25 mcg at 06/19/22 0809   • lisinopril (PRINIVIL,ZESTRIL) tablet 40 mg  40 mg Nasogastric Q24H Sha Beatty MD   40 mg at 06/19/22 0809   • LORazepam (ATIVAN) injection 2 mg  2 mg Intravenous Q2H PRN Martell Downs MD   2 mg at 06/20/22 0226   • mupirocin (BACTROBAN) 2 % ointment 1 application  1 application Topical Q12H Martell Downs MD   1 application at 06/19/22 2008   • niCARdipine (CARDENE) 25 mg in 250 mL NS infusion  5-15 mg/hr Intravenous Titrated Sha Beatty  mL/hr at 06/20/22 0551 10 mg/hr at 06/20/22 0551   • norepinephrine (LEVOPHED) 8 mg in 250 mL NS infusion (premix)  0.02-0.3 mcg/kg/min Intravenous Titrated Martell Downs MD   Held at 06/11/22 1054   • Nutrisource fiber pack 1 packet  1 packet Nasogastric 4x Daily Martell Downs MD   1 packet at 06/19/22 2008   • ondansetron (ZOFRAN) tablet 4 mg  4 mg Oral Q6H PRN Martell Downs MD        Or   • ondansetron (ZOFRAN) injection 4 mg  4 mg Intravenous Q6H PRN Martell Downs MD       • polyethylene glycol (MIRALAX) packet 17 g  17 g Oral Daily Martell Downs MD   17 g at 06/19/22 0810   • ProSource TF oral liquid 45 mL  45 mL Per G Tube BID Martell Downs MD   45 mL at 06/19/22 2008   • sodium chloride 3 % nebulizer solution 4 mL  4 mL Nebulization BID - RT Martell Downs MD   4 mL at 06/20/22 0608   • vancomycin 1500 mg/500 mL 0.9% NS IVPB ([facility])  15 mg/kg Intravenous Q12H Martell Downs MD   1,500 mg at 06/20/22 1336       Allergies:    Patient has no known  allergies.    Review of Systems:   -A 14 point review of systems is completed and is negative.      Objective      Vital Signs  Temp:  [97.2 °F (36.2 °C)-98.5 °F (36.9 °C)] 97.2 °F (36.2 °C)  Heart Rate:  [69-98] 91  Resp:  [27-38] 27  BP: (121-164)/(61-98) 139/98  FiO2 (%):  [35 %] 35 %    Physical Exam:     HEENT:  Neck supple.  Tracheostomy in place. Mechanically ventilated. White, thick secretions around trach.  CVS:  Regular rate and rhythm.  No murmurs  Carotid Examination:  No bruits  Lungs:  Clear to auscultation  Abdomen:  Non-tender, Non-distended.  PEG tube in place with feedings.  Extremities:  diffuse edema of bilateral upper and lower extremities.     Neurologic Exam:   Opened eyes to voice and kept eyes open during exam.  No blink to threat on right. Blink to threat on left  Pupils are equally reactive to light.  no nystagmus or roving eye movements noted.   Gag intact.  Coughing at times     Motor:    No purposeful movements.  Winces to noxious stimuli bilateral upper and lower extremities    DTR:  2+ throughout in all four extremities  upgoing toe on left       Results Review:    I reviewed the patient's new clinical results and findings.    Lab Results (last 24 hours)     Procedure Component Value Units Date/Time    Comprehensive Metabolic Panel [[id number]]  (Abnormal) Collected: 06/20/22 0617    Specimen: Blood Updated: 06/20/22 0649     Glucose 149 mg/dL      BUN 27 mg/dL      Creatinine 0.36 mg/dL      Sodium 139 mmol/L      Potassium 4.3 mmol/L      Comment: Slight hemolysis detected by analyzer. Results may be affected.        Chloride 105 mmol/L      CO2 29.0 mmol/L      Calcium 8.0 mg/dL      Total Protein 5.7 g/dL      Albumin 2.30 g/dL      ALT (SGPT) 160 U/L      AST (SGOT) 140 U/L      Alkaline Phosphatase 434 U/L      Total Bilirubin 0.4 mg/dL      Globulin 3.4 gm/dL      A/G Ratio 0.7 g/dL      BUN/Creatinine Ratio 75.0     Anion Gap 5.0 mmol/L      eGFR 116.0 mL/min/1.73      Comment:  National Kidney Foundation and American Society of Nephrology (ASN) Task Force recommended calculation based on the Chronic Kidney Disease Epidemiology Collaboration (CKD-EPI) equation refit without adjustment for race.       Narrative:      GFR Normal >60  Chronic Kidney Disease <60  Kidney Failure <15      POC Glucose Once [[id number]]  (Abnormal) Collected: 06/20/22 0548    Specimen: Blood Updated: 06/20/22 0600     Glucose 140 mg/dL      Comment: : [id number] Renaldo Smith ID: [id number]       Blood Gas, Arterial - [[id number]]  (Abnormal) Collected: 06/20/22 0452    Specimen: Arterial Blood Updated: 06/20/22 0448     Site Left Radial     Denzel's Test Positive     pH, Arterial 7.408 pH units      pCO2, Arterial 45.4 mm Hg      Comment: 83 Value above reference range        pO2, Arterial 62.3 mm Hg      Comment: 84 Value below reference range        HCO3, Arterial 28.6 mmol/L      Comment: 83 Value above reference range        Base Excess, Arterial 3.5 mmol/L      Comment: 83 Value above reference range        O2 Saturation, Arterial 92.3 %      Comment: 84 Value below reference range        Temperature 37.0 C      Barometric Pressure for Blood Gas 754 mmHg      Modality Ventilator     FIO2 35 %      Ventilator Mode PC     Set Mech Resp Rate 16.0     PEEP 5.0     PIP 17 cmH2O      Comment: Meter: [id number]     :  [id number]        Collected by [id number]     pCO2, Temperature Corrected 45.4 mm Hg      pH, Temp Corrected 7.408 pH Units      pO2, Temperature Corrected 62.3 mm Hg     CBC & Differential [[id number]]  (Abnormal) Collected: 06/20/22 0401    Specimen: Blood Updated: 06/20/22 0418    Narrative:      The following orders were created for panel order CBC & Differential.  Procedure                               Abnormality         Status                     ---------                               -----------         ------                     CBC Auto Differential[[id number]]        Abnormal             Final result                 Please view results for these tests on the individual orders.    CBC Auto Differential [[id number]]  (Abnormal) Collected: 06/20/22 0401    Specimen: Blood Updated: 06/20/22 0418     WBC 11.98 10*3/mm3      RBC 2.74 10*6/mm3      Hemoglobin 8.8 g/dL      Hematocrit 27.9 %      .8 fL      MCH 32.1 pg      MCHC 31.5 g/dL      RDW 17.3 %      RDW-SD 63.7 fl      MPV 9.5 fL      Platelets 274 10*3/mm3      Neutrophil % 74.8 %      Lymphocyte % 7.3 %      Monocyte % 11.5 %      Eosinophil % 3.7 %      Basophil % 0.6 %      Immature Grans % 2.1 %      Neutrophils, Absolute 8.97 10*3/mm3      Lymphocytes, Absolute 0.87 10*3/mm3      Monocytes, Absolute 1.38 10*3/mm3      Eosinophils, Absolute 0.44 10*3/mm3      Basophils, Absolute 0.07 10*3/mm3      Immature Grans, Absolute 0.25 10*3/mm3      nRBC 0.0 /100 WBC     POC Glucose Once [[id number]]  (Abnormal) Collected: 06/19/22 2340    Specimen: Blood Updated: 06/19/22 2354     Glucose 174 mg/dL      Comment: : [id number] Renaldo BLACKeter ID: [id number]       POC Glucose Once [[id number]]  (Abnormal) Collected: 06/19/22 2007    Specimen: Blood Updated: 06/19/22 2018     Glucose 156 mg/dL      Comment: : [id number] MartaBethanyajay  KMeter ID: [id number]       POC Glucose Once [[id number]]  (Abnormal) Collected: 06/19/22 1723    Specimen: Blood Updated: 06/19/22 1734     Glucose 143 mg/dL      Comment: : [id number] Ripchase XieMeter ID: [id number]       POC Glucose Once [[id number]]  (Abnormal) Collected: 06/19/22 1143    Specimen: Blood Updated: 06/19/22 1154     Glucose 153 mg/dL      Comment: : [id number] Ripchase XieMeter ID: [id number]             Imaging Results (Last 24 Hours)     ** No results found for the last 24 hours. 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)    Status epilepticus (HCC)    Essential  hypertension    Type 2 diabetes mellitus with hyperglycemia, without long-term current use of insulin (HCC)    Hypothyroidism (acquired)    Acute respiratory failure (secondary to status epilepticus)    Thrombocytopenia (HCC)    Cerebral parenchymal hemorrhage (HCC)    PAF (paroxysmal atrial fibrillation) (HCC)    Fever    Loculated pleural effusion    Acute deep vein thrombosis (DVT) of the ulnar and brachial veins of right upper extremity (HCC)    Dysphagia    Communicating hydrocephalus (HCC)     Impression:     · Postoperative seizure  · S/p craniectomy and debridement   · Hypoalbuminemia   · S/p  shunt on 6/3/2022  · Tracheostomy with mechanical ventilation.  · PEG tube        Plan:  · discontinue to fosphenytoin 6/17/2022.  Discontinue daily dilantin levels.   · Recommend repeating EEG prior to weaning of Keppra.  · Continue Keppra 1000 mg IV twice daily.  will recommend decrease Keppra to 500 mg IV BID on 6/24/2022 and continue 500 mg BID for 7 days. After 7 days Keppra can be weaned down completely.   · Continue Vimpat 200 mg IV every 12 hours.  Would likely leave this in place as he goes to LTACH  · Plans are to look at LTACH placement near [place], [place].            Di Ward, APRN  06/20/22  09:10 CDT

## 2022-06-20 NOTE — THERAPY TREATMENT NOTE
Acute Care - Physical Therapy Treatment Note  [facility]     Patient Name: Hai Hernandez  : 1945  MRN: [id number]  Today's Date: 2022      Visit Dx:     ICD-10-CM ICD-9-CM   1. Dysphagia, unspecified type  R13.10 787.20   2. Preop testing  Z01.818 V72.84   3. Meningioma (HCC)  D32.9 225.2   4. Impaired mobility  Z74.09 799.89   5. Decreased activities of daily living (ADL)  Z78.9 V49.89   6. Status epilepticus (HCC)  G40.901 345.3   7. Acute respiratory failure with hypoxia (HCC)  J96.01 518.81   8. Communicating hydrocephalus (HCC)  G91.0 331.3   9. Postoperative infection, unspecified type, initial encounter  T81.40XA 998.59   10. 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)   • Status epilepticus (HCC)   • Essential hypertension   • Type 2 diabetes mellitus with hyperglycemia, without long-term current use of insulin (HCC)   • Hypothyroidism (acquired)   • Acute respiratory failure (secondary to status epilepticus)   • Thrombocytopenia (HCC)   • Cerebral parenchymal hemorrhage (HCC)   • PAF (paroxysmal atrial fibrillation) (HCC)   • Fever   • Loculated pleural effusion   • Acute deep vein thrombosis (DVT) of the ulnar and brachial veins of right upper extremity (HCC)   • Dysphagia   • Communicating hydrocephalus (HCC)   • Cerebral edema (HCC)     Past Medical History:   Diagnosis Date   • Brain tumor (HCC)    • Depression    • Hearing loss    • History of cellulitis     right foot big toe   • Hypertension    • Pneumonia    • Right arm weakness     after cervial injury   • Sciatic pain     affects balance   • Thyroid disease    • Visual disturbance     due to brain tumor - right eye     Past Surgical History:   Procedure Laterality Date   • CATARACT EXTRACTION, BILATERAL     • COLONOSCOPY     • CRANIOTOMY  Bilateral 6/10/2022    Procedure: CRANIECTOMY;  Surgeon: Martell Downs MD;  Location:  [facility];  Service: Neurosurgery;  Laterality: Bilateral;   • CRANIOTOMY FOR TUMOR Right 5/10/2022    Procedure: CRANIOTOMY FOR TUMOR STERIOTACTIC WITH BRAIN LAB, right;  Surgeon: Martell Downs MD;  Location:  [facility];  Service: Neurosurgery;  Laterality: Right;   • ENDOSCOPY W/ PEG TUBE PLACEMENT N/A 6/2/2022    Procedure: ESOPHAGOGASTRODUODENOSCOPY WITH PERCUTANEOUS ENDOSCOPIC GASTROSTOMY TUBE INSERTION WITH ANESTHESIA;  Surgeon: Melecio Lu MD;  Location:  [facility];  Service: Gastroenterology;  Laterality: N/A;   • NECK SURGERY     • SKIN CANCER EXCISION     • TONSILLECTOMY     • TRACHEOSTOMY N/A 6/2/2022    Procedure: TRACHEOSTOMY;  Surgeon: Geovany Davidson MD;  Location:  [facility];  Service: ENT;  Laterality: N/A;   •  SHUNT INSERTION Right 6/3/2022    Procedure: VENTRICULAR PERITONEAL SHUNT INSERTION WITH BRAIN LAB;  Surgeon: Martell Downs MD;  Location:  [facility];  Service: Neurosurgery;  Laterality: Right;     PT Assessment (last 12 hours)     PT Evaluation and Treatment     Row Name 06/20/22 0807          Physical Therapy Time and Intention    Subjective Information --  vent and unresponsive, eyes open at times  -WK     Document Type therapy note (daily note)  -WK     Mode of Treatment physical therapy  -WK     Row Name 06/20/22 0807          General Information    Existing Precautions/Restrictions fall  vent, trach  -WK     Row Name 06/20/22 0807          Aerobic Exercise    Comment, Aerobic Exercise (Therapeutic Exercise) PROM for BLE BUE 20 reps  -WK     Row Name             Wound 05/15/22 1712 coccyx    Wound - Properties Group Placement Date: 05/15/22  -KS Placement Time: 1712 -KS Location: coccyx  -KS     Retired Wound - Properties Group Placement Date: 05/15/22  -KS Placement Time: 1712 -KS Location: coccyx  -KS     Retired Wound - Properties Group Date first assessed:  05/15/22  -KS Time first assessed: 1712  -KS Location: coccyx  -KS     Row Name             Wound 05/17/22 1623 scalp Pressure Injury    Wound - Properties Group Placement Date: 05/17/22  -KS Placement Time: 1623  -KS Present on Hospital Admission: N  -KS Location: scalp  -KS Primary Wound Type: Pressure inj  -KS     Retired Wound - Properties Group Placement Date: 05/17/22  -KS Placement Time: 1623  -KS Present on Hospital Admission: N  -KS Location: scalp  -KS Primary Wound Type: Pressure inj  -KS     Retired Wound - Properties Group Date first assessed: 05/17/22  -KS Time first assessed: 1623  -KS Present on Hospital Admission: N  -KS Location: scalp  -KS Primary Wound Type: Pressure inj  -KS     Row Name             Wound 06/03/22 1602 abdomen Incision    Wound - Properties Group Placement Date: 06/03/22  -MK Placement Time: 1602  -MK Present on Hospital Admission: N  -MK Location: abdomen  -MK Primary Wound Type: Incision  -MK     Retired Wound - Properties Group Placement Date: 06/03/22  -MK Placement Time: 1602  -MK Present on Hospital Admission: N  -MK Location: abdomen  -MK Primary Wound Type: Incision  -MK     Retired Wound - Properties Group Date first assessed: 06/03/22  -MK Time first assessed: 1602  -MK Present on Hospital Admission: N  -MK Location: abdomen  -MK Primary Wound Type: Incision  -MK     Row Name             Wound 06/10/22 1409 Right scalp Incision    Wound - Properties Group Placement Date: 06/10/22  -PAULINE Placement Time: 1409  -PAULINE Side: Right  -PAULINE Location: scalp  -PAULINE Primary Wound Type: Incision  -PAULINE     Retired Wound - Properties Group Placement Date: 06/10/22  -PAULINE Placement Time: 1409  -PAULINE Side: Right  -PAULINE Location: scalp  -PAULINE Primary Wound Type: Incision  -PAULINE     Retired Wound - Properties Group Date first assessed: 06/10/22  -PAULINE Time first assessed: 1409  -PAULINE Side: Right  -PAULINE Location: scalp  -PAULINE Primary Wound Type: Incision  -PAULINE     Row Name             Wound 06/11/22 2259 throat     Wound - Properties Group Placement Date: 06/11/22  -PC Placement Time: 2259  -PC Present on Hospital Admission: N  -PC Location: throat  -PC     Retired Wound - Properties Group Placement Date: 06/11/22  -PC Placement Time: 2259  -PC Present on Hospital Admission: N  -PC Location: throat  -PC     Retired Wound - Properties Group Date first assessed: 06/11/22  -PC Time first assessed: 2259  -PC Present on Hospital Admission: N  -PC Location: throat  -PC     Row Name 06/20/22 0807          Plan of Care Review    Plan of Care Reviewed With patient  -WK     Progress no change  -WK     Outcome Evaluation PROM for BLE and BUE 20 reps. Pt opened his eye at times but was unable to follow commands.  -WK     Row Name 06/20/22 0807          Positioning and Restraints    Pre-Treatment Position in bed  -WK     Post Treatment Position bed  -WK     In Bed fowlers;patient within staff view  -WK           User Key  (r) = Recorded By, (t) = Taken By, (c) = Cosigned By    Initials Name Provider Type    Carmen Mistry RN Registered Nurse    Surya Ball RN Registered Nurse    WK Sonya Dasilva PTA Physical Therapist Assistant    PC Jayna Tan, RN Registered Nurse    Leatha Solomon RN Registered Nurse                Physical Therapy Education                 Title: PT OT SLP Therapies (In Progress)     Topic: Physical Therapy (In Progress)     Point: Mobility training (In Progress)     Learning Progress Summary           Patient Acceptance, E, NL by PAULINE at 6/18/2022 1035    Acceptance, E, VU,DU by YULIET at 5/11/2022 0745    Comment: benefits of activity, progression of PT POC                   Point: Home exercise program (In Progress)     Learning Progress Summary           Patient Acceptance, E, NR by YULIET at 6/17/2022 0830    Comment: benefits of activity, progression of PT POC    Acceptance, E, NR by YULIET at 6/7/2022 1345    Comment: Benefits of activity, B UE/LE exercises, positioning for joints and skin integrity                    Point: Body mechanics (Not Started)     Learner Progress:  Not documented in this visit.          Point: Precautions (Not Started)     Learner Progress:  Not documented in this visit.                      User Key     Initials Effective Dates Name Provider Type Discipline    YULIET 08/02/16 -  Wayne Thomas, PT DPT Physical Therapist PT    PAULINE 12/08/16 -  Scotty Calixto PTA Physical Therapist Assistant PT              PT Recommendation and Plan     Plan of Care Reviewed With: patient  Progress: no change  Outcome Evaluation: PROM for BLE and BUE 20 reps. Pt opened his eye at times but was unable to follow commands.   Outcome Measures     Row Name 06/20/22 0807 06/18/22 1037          How much help from another person do you currently need...    Turning from your back to your side while in flat bed without using bedrails? 1  -WK 1  -PAULINE     Moving from lying on back to sitting on the side of a flat bed without bedrails? 1  -WK 1  -PAULINE     Moving to and from a bed to a chair (including a wheelchair)? 1  -WK 1  -PAULINE     Standing up from a chair using your arms (e.g., wheelchair, bedside chair)? 1  -WK 1  -PAULINE     Climbing 3-5 steps with a railing? 1  -WK 1  -PAULINE     To walk in hospital room? 1  -WK 1  -PAULINE     AM-PAC 6 Clicks Score (PT) 6  -WK 6  -PAULINE            Functional Assessment    Outcome Measure Options AM-PAC 6 Clicks Basic Mobility (PT)  -WK AM-PAC 6 Clicks Basic Mobility (PT)  -PAULINE           User Key  (r) = Recorded By, (t) = Taken By, (c) = Cosigned By    Initials Name Provider Type    Scotty Palmer PTA Physical Therapist Assistant    WK Sonya Dasilva PTA Physical Therapist Assistant                 Time Calculation:    PT Charges     Row Name 06/20/22 0830             Time Calculation    Start Time 0807  -WK      Stop Time 0830  -WK      Time Calculation (min) 23 min  -WK      PT Received On 06/20/22  -WK              Time Calculation- PT    Total Timed Code Minutes- PT 23 minute(s)   -WK            User Key  (r) = Recorded By, (t) = Taken By, (c) = Cosigned By    Initials Name Provider Type    WK Sonya Dasilva PTA Physical Therapist Assistant              Therapy Charges for Today     Code Description Service Date Service Provider Modifiers Qty    12353249991 HC PT THER PROC EA 15 MIN 6/20/2022 Sonya Dasilva PTA GP 2          PT G-Codes  Outcome Measure Options: AM-PAC 6 Clicks Basic Mobility (PT)  AM-PAC 6 Clicks Score (PT): 6  AM-PAC 6 Clicks Score (OT): 12    Sonya Dasilva PTA  6/20/2022

## 2022-06-20 NOTE — PROGRESS NOTES
NEUROSURGERY DAILY PROGRESS NOTE    ASSESSMENT:   Hai Hernandez is a 77 y.o. with a significant comorbidity of acephalic migraines, thyroid disease status post radiation as a child, basal cell and squamous cell carcinoma of the skin. He presents in FU for known meningioma found on workup for right visual field changes. Physical exam findings of neurologically intact with resolution of all symptoms and mild decreased vision in right eye.  His imaging shows 22 x 24 x 13.5 mm right planum sphenoidale mass most suggestive of meningioma.    Past Medical History:   Diagnosis Date   • Brain tumor (HCC)    • Depression    • Hearing loss    • History of cellulitis     right foot big toe   • Hypertension    • Pneumonia    • Right arm weakness     after cervial injury   • Sciatic pain     affects balance   • Thyroid disease    • Visual disturbance     due to brain tumor - right eye     Active Hospital Problems    Diagnosis    • **Meningioma (HCC)    • Acute deep vein thrombosis (DVT) of the ulnar and brachial veins of right upper extremity (HCC)    • Loculated pleural effusion    • Fever    • PAF (paroxysmal atrial fibrillation) (HCC)    • Cerebral parenchymal hemorrhage (HCC)    • Essential hypertension    • Type 2 diabetes mellitus with hyperglycemia, without long-term current use of insulin (HCC)    • Hypothyroidism (acquired)    • Acute respiratory failure (secondary to status epilepticus)    • Thrombocytopenia (HCC)    • 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  Grimaces to painful stimuli.  Does not follow commands.  Nonverbal.  Does not  withdraws to tactile stimuli.    Intracranial meningioma   POD #40 (5/10/2022) Status post postcraniotomy for meningioma   POD#10 (6/10/2022) Craniectomy for cerebral edema and possible infection   CT reviewed and stable.  Small blood products at site of brain biopsy   Neurochecks per policy   Start Decadron taper   Wound C,D,I   Craniectomy precautions     Hydrocephalus   CSF unremarkable from 5/17, 5/23 and 6/10.    Lumbar drain removed 6/3/2022   POD# 17 (6/3/2022) right posterior parietal  shunt placement    [employer] programmable valve set to 0.5   Shunt pumps and refills well    Postop seizures, in status   Neurology managing   Cont Keppra, Dialntin, Vimpat; PRN Ativan   Follow dilantin levels   Repeat EEG: Slowing but no epileptiform activity     Antiseizure medications tapering instructions per neurology:  · Fosphenytoin discontinued  · Recommend repeating EEG prior to weaning of Keppra.  · Continue Keppra 1000 mg IV twice daily.  will recommend decrease Keppra to 500 mg IV BID on 6/24/2022 and continue 500 mg BID for 7 days. After 7 days Keppra can be weaned down completely.   · Continue Vimpat 200 mg IV every 12 hours.  Would likely leave this in place as he goes to [facility]    CV: Cardene off   Keep SBP <160.   Intermittent use of hydralazine and labetalol PRNs  Pulm: Tracheostomy  Placed (6/2/2022).  Appreciate ENT   Left chest tubes removed  : May DC Mccrary and place [place] catheter  FEN: Poor glucose control.  Increase SSI  ENDO: Accu-Chek and treat per policy  GI: PEG tube placed (6/2/2022).  Appreciate GI   No BM in several days.  KUB concerning for ileus formation.  Increased rectal stimulation  ID: Afebrile.  Continue broad spectrum abx, Vanc and Cefepime   CSF cultures NGTD  Heme:  DVT prophylaxis with SCD's.  Can not anticoagulate   Superficial thrombus to left cephalic vein   Hemoglobin 7.5.  We will continue to monitor.  Pain: NA  Dispo: DC pending acceptance to [facility].    CHIEF  "COMPLAINT:   Right visual field changes    Subjective  Symptom stable    Temp:  [97.2 °F (36.2 °C)-98.5 °F (36.9 °C)] 97.2 °F (36.2 °C)  Heart Rate:  [69-98] 91  Resp:  [27-38] 27  BP: (121-164)/(58-98) 139/98  FiO2 (%):  [35 %] 35 %    Objective:  General Appearance:  Well-appearing and in no acute distress.    Vital signs: (most recent): Blood pressure 139/98, pulse 91, temperature 97.2 °F (36.2 °C), temperature source Axillary, resp. rate 27, height 182.9 cm (72\"), weight 112 kg (247 lb 14.4 oz), SpO2 92 %.      Neurologic Exam     Mental Status   Level of consciousness: arousable by verbal stimuli ,  drowsy  Opens eyes to verbal and tactile stimuli today.  Grimaces to painful stimuli.  Does not follow commands.  Nonverbal.  Does not withdraws to tactile stimuli.     Cranial Nerves     CN III, IV, VI   Pupils are equal, round, and reactive to light.  Extraocular motions are normal.     CN IX, X   CN IX normal.     Gait, Coordination, and Reflexes     Tremor   Resting tremor: absent  Intention tremor: absent  Action tremor: absent    Drains:   Urethral Catheter Non-latex;Silicone 16 Fr. (Active)       Output by Drain (mL) 06/19/22 0701 - 06/19/22 1900 06/19/22 1901 - 06/20/22 0700 06/20/22 0701 - 06/20/22 0828 Range Total   Requested LDAs do not have output data documented.       Imaging Results (Last 24 Hours)     ** No results found for the last 24 hours. **        Lab Results (last 24 hours)     Procedure Component Value Units Date/Time    Comprehensive Metabolic Panel [[id number]]  (Abnormal) Collected: 06/20/22 0617    Specimen: Blood Updated: 06/20/22 0649     Glucose 149 mg/dL      BUN 27 mg/dL      Creatinine 0.36 mg/dL      Sodium 139 mmol/L      Potassium 4.3 mmol/L      Comment: Slight hemolysis detected by analyzer. Results may be affected.        Chloride 105 mmol/L      CO2 29.0 mmol/L      Calcium 8.0 mg/dL      Total Protein 5.7 g/dL      Albumin 2.30 g/dL      ALT (SGPT) 160 U/L      AST (SGOT) 140 " U/L      Alkaline Phosphatase 434 U/L      Total Bilirubin 0.4 mg/dL      Globulin 3.4 gm/dL      A/G Ratio 0.7 g/dL      BUN/Creatinine Ratio 75.0     Anion Gap 5.0 mmol/L      eGFR 116.0 mL/min/1.73      Comment: National Kidney Foundation and American Society of Nephrology (ASN) Task Force recommended calculation based on the Chronic Kidney Disease Epidemiology Collaboration (CKD-EPI) equation refit without adjustment for race.       Narrative:      GFR Normal >60  Chronic Kidney Disease <60  Kidney Failure <15      POC Glucose Once [[id number]]  (Abnormal) Collected: 06/20/22 0548    Specimen: Blood Updated: 06/20/22 0600     Glucose 140 mg/dL      Comment: : [id number] CadenJose Gdonovan Simth ID: [id number]       Blood Gas, Arterial - [[id number]]  (Abnormal) Collected: 06/20/22 0452    Specimen: Arterial Blood Updated: 06/20/22 0448     Site Left Radial     Denzel's Test Positive     pH, Arterial 7.408 pH units      pCO2, Arterial 45.4 mm Hg      Comment: 83 Value above reference range        pO2, Arterial 62.3 mm Hg      Comment: 84 Value below reference range        HCO3, Arterial 28.6 mmol/L      Comment: 83 Value above reference range        Base Excess, Arterial 3.5 mmol/L      Comment: 83 Value above reference range        O2 Saturation, Arterial 92.3 %      Comment: 84 Value below reference range        Temperature 37.0 C      Barometric Pressure for Blood Gas 754 mmHg      Modality Ventilator     FIO2 35 %      Ventilator Mode PC     Set Mech Resp Rate 16.0     PEEP 5.0     PIP 17 cmH2O      Comment: Meter: [id number]     :  [id number]        Collected by [id number]     pCO2, Temperature Corrected 45.4 mm Hg      pH, Temp Corrected 7.408 pH Units      pO2, Temperature Corrected 62.3 mm Hg     CBC & Differential [[id number]]  (Abnormal) Collected: 06/20/22 0401    Specimen: Blood Updated: 06/20/22 0418    Narrative:      The following orders were created for panel order CBC &  Differential.  Procedure                               Abnormality         Status                     ---------                               -----------         ------                     CBC Auto Differential[[id number]]        Abnormal            Final result                 Please view results for these tests on the individual orders.    CBC Auto Differential [[id number]]  (Abnormal) Collected: 06/20/22 0401    Specimen: Blood Updated: 06/20/22 0418     WBC 11.98 10*3/mm3      RBC 2.74 10*6/mm3      Hemoglobin 8.8 g/dL      Hematocrit 27.9 %      .8 fL      MCH 32.1 pg      MCHC 31.5 g/dL      RDW 17.3 %      RDW-SD 63.7 fl      MPV 9.5 fL      Platelets 274 10*3/mm3      Neutrophil % 74.8 %      Lymphocyte % 7.3 %      Monocyte % 11.5 %      Eosinophil % 3.7 %      Basophil % 0.6 %      Immature Grans % 2.1 %      Neutrophils, Absolute 8.97 10*3/mm3      Lymphocytes, Absolute 0.87 10*3/mm3      Monocytes, Absolute 1.38 10*3/mm3      Eosinophils, Absolute 0.44 10*3/mm3      Basophils, Absolute 0.07 10*3/mm3      Immature Grans, Absolute 0.25 10*3/mm3      nRBC 0.0 /100 WBC     POC Glucose Once [[id number]]  (Abnormal) Collected: 06/19/22 2340    Specimen: Blood Updated: 06/19/22 2354     Glucose 174 mg/dL      Comment: : [id number] Renaldo  "[employer]eter ID: [id number]       POC Glucose Once [[id number]]  (Abnormal) Collected: 06/19/22 2007    Specimen: Blood Updated: 06/19/22 2018     Glucose 156 mg/dL      Comment: : [id number] MartaMageddonovan  KMeter ID: [id number]       POC Glucose Once [[id number]]  (Abnormal) Collected: 06/19/22 1723    Specimen: Blood Updated: 06/19/22 1734     Glucose 143 mg/dL      Comment: : [id number] Ripchase XieMeter ID: [id number]       POC Glucose Once [[id number]]  (Abnormal) Collected: 06/19/22 1143    Specimen: Blood Updated: 06/19/22 1154     Glucose 153 mg/dL      Comment: : [id number] Rip GregoryMeter ID: [id number]           [id number]  Stevie Parsons, APRN

## 2022-06-21 LAB — HERPES SIMPLEX VIRUS 2 IGG AB [PRESENCE] IN CEREBRAL SPINAL FLUID: 10.83 IV

## 2022-06-21 NOTE — THERAPY DISCHARGE NOTE
Acute Care - Physical Therapy Discharge Summary  [facility]       Patient Name: Hai Hernandez  : 1945  MRN: [id number]    Today's Date: 2022                 Admit Date: 5/10/2022      PT Recommendation and Plan    Visit Dx:    ICD-10-CM ICD-9-CM   1. Dysphagia, unspecified type  R13.10 787.20   2. Preop testing  Z01.818 V72.84   3. Meningioma (HCC)  D32.9 225.2   4. Impaired mobility  Z74.09 799.89   5. Decreased activities of daily living (ADL)  Z78.9 V49.89   6. Status epilepticus (HCC)  G40.901 345.3   7. Acute respiratory failure with hypoxia (HCC)  J96.01 518.81   8. Communicating hydrocephalus (HCC)  G91.0 331.3   9. Postoperative infection, unspecified type, initial encounter  T81.40XA 998.59   10. Difficult intravenous access  Z78.9 V49.89        Outcome Measures     Row Name 22 0807 22 1037          How much help from another person do you currently need...    Turning from your back to your side while in flat bed without using bedrails? 1  -WK 1  -PAULINE     Moving from lying on back to sitting on the side of a flat bed without bedrails? 1  -WK 1  -PAULINE     Moving to and from a bed to a chair (including a wheelchair)? 1  -WK 1  -PAULINE     Standing up from a chair using your arms (e.g., wheelchair, bedside chair)? 1  -WK 1  -PAULINE     Climbing 3-5 steps with a railing? 1  -WK 1  -PAULINE     To walk in hospital room? 1  -WK 1  -PAULINE     AM-PAC 6 Clicks Score (PT) 6  -WK 6  -PAULINE            Functional Assessment    Outcome Measure Options AM-PAC 6 Clicks Basic Mobility (PT)  -WK AM-PAC 6 Clicks Basic Mobility (PT)  -PAULINE           User Key  (r) = Recorded By, (t) = Taken By, (c) = Cosigned By    Initials Name Provider Type    Scotty Palmer, PTA Physical Therapist Assistant    Sonya Stroud PTA Physical Therapist Assistant                     PT Rehab Goals     Row Name 22 0700             Bed Mobility Goal 1 (PT)    Activity/Assistive Device (Bed Mobility Goal 1, PT) sit to  supine/supine to sit  -AB      [place] Level/Cues Needed (Bed Mobility Goal 1, PT) supervision required  -AB      Time Frame (Bed Mobility Goal 1, PT) long term goal (LTG);10 days  -AB      Progress/Outcomes (Bed Mobility Goal 1, PT) goal no longer appropriate  d/c goal  -AB              Transfer Goal 1 (PT)    Activity/Assistive Device (Transfer Goal 1, PT) sit-to-stand/stand-to-sit;bed-to-chair/chair-to-bed  -AB      [place] Level/Cues Needed (Transfer Goal 1, PT) supervision required  -AB      Time Frame (Transfer Goal 1, PT) long term goal (LTG);10 days  -AB      Progress/Outcome (Transfer Goal 1, PT) goal no longer appropriate  d/c goal  -AB              Gait Training Goal 1 (PT)    Activity/Assistive Device (Gait Training Goal 1, PT) gait (walking locomotion);decrease fall risk;improve balance and speed;increase endurance/gait distance  -AB      [place] Level (Gait Training Goal 1, PT) supervision required  -AB      Distance (Gait Training Goal 1, PT) 200 ft  -AB      Time Frame (Gait Training Goal 1, PT) long term goal (LTG);10 days  -AB      Progress/Outcome (Gait Training Goal 1, PT) goal no longer appropriate  d/c goal  -AB              ROM Goal 1 (PT)    ROM Goal 1 (PT) B UE/LE PROM > AAROM x 20 reps, min assist.  -AB      Time Frame (ROM Goal 1, PT) long-term goal (LTG);10 days  -AB      Progress/Outcome (ROM Goal 1, PT) goal not met;goal ongoing  -AB              Stairs Goal 1 (PT)    Activity/Assistive Device (Stairs Goal 1, PT) ascending stairs;descending stairs  -AB      [place] Level/Cues Needed (Stairs Goal 1, PT) supervision required  -AB      Number of Stairs (Stairs Goal 1, PT) 3-5 steps  -AB      Time Frame (Stairs Goal 1, PT) long term goal (LTG);10 days  -AB      Progress/Outcome (Stairs Goal 1, PT) goal no longer appropriate  d/c goal  -AB              Problem Specific Goal 1 (PT)    Problem Specific Goal 1 (PT) No new evidence of skin breakdown or contractures while on  the vent  -AB      Time Frame (Problem Specific Goal 1, PT) long-term goal (LTG)  -AB      Progress/Outcome (Problem Specific Goal 1, PT) goal met  -AB            User Key  (r) = Recorded By, (t) = Taken By, (c) = Cosigned By    Initials Name Provider Type Discipline    Tory Varghese, PTA Physical Therapist Assistant PT                    PT Discharge Summary  Anticipated Discharge Disposition (PT): [facility]  Reason for Discharge: Discharge from facility  Outcomes Achieved: Refer to plan of care for updates on goals achieved  Discharge Destination: [facility]      Tory Mendes, BRENDAN   6/21/2022

## 2022-06-24 LAB
QT INTERVAL: 384 MS
QT INTERVAL: 408 MS
QTC INTERVAL: 364 MS
QTC INTERVAL: 432 MS

## 2022-07-05 LAB — FUNGUS WND CULT: NORMAL

## 2022-07-06 LAB
MYCOBACTERIUM SPEC CULT: NORMAL
NIGHT BLUE STAIN TISS: NORMAL
NIGHT BLUE STAIN TISS: NORMAL

## 2022-07-18 LAB
FUNGUS WND CULT: NORMAL

## 2022-07-22 LAB
MYCOBACTERIUM SPEC CULT: NORMAL
NIGHT BLUE STAIN TISS: NORMAL

## 2023-01-13 NOTE — TELEPHONE ENCOUNTER
Left voicemail with patient and added a result note to the chart.  Yes - the patient is able to be screened

## (undated) DEVICE — SCRW PLT NEURO LEFORTE L/P SLF/DRL 1.4X3.7MM SLVR
Type: IMPLANTABLE DEVICE | Site: CRANIAL | Status: NON-FUNCTIONAL
Removed: 2022-06-10

## (undated) DEVICE — 1016 S-DRAPE IRRIG POUCH 10/BOX: Brand: STERI-DRAPE™

## (undated) DEVICE — CONMED SCOPE SAVER BITE BLOCK, 20X27 MM: Brand: SCOPE SAVER

## (undated) DEVICE — DURAHOOK 1/4HOOK PK/6

## (undated) DEVICE — PK TURNOVER RM ADV

## (undated) DEVICE — SWAB CULT AERO TWIN MD

## (undated) DEVICE — ELECTRD BLD EZ CLN MOD XLNG 2.75IN

## (undated) DEVICE — DRN JP FLT NO TROC SIL FUL/PERF 7MM

## (undated) DEVICE — 2.3MM TAPERED ROUTER

## (undated) DEVICE — TBG PENCL TELESCP MEGADYNE SMOKE EVAC 10FT

## (undated) DEVICE — PROXIMATE RH ROTATING HEAD SKIN STAPLERS (35 WIDE) CONTAINS 35 STAINLESS STEEL STAPLES: Brand: PROXIMATE

## (undated) DEVICE — TBG SMPL FLTR LINE NASL 02/C02 A/ BX/100

## (undated) DEVICE — KT PEG ENDOVIVE ENFIT SFTY PULL 20F 1P/U

## (undated) DEVICE — DRSNG GZ PETROLTM CURAD 3X9IN STRL

## (undated) DEVICE — SCANLAN® SURG-I-PAW® INSTRUMENT COVERS - RED, 1/10" X 5"/ 3 MMX13 CM (2 - 5" PCS /PKG): Brand: SCANLAN® SURG-I-PAW® INSTRUMENT COVERS

## (undated) DEVICE — SUT NUROLON 4/0 TF18 CR8 I8IN C584D

## (undated) DEVICE — 3M™ IOBAN™ 2 ANTIMICROBIAL INCISE DRAPE 6651EZ: Brand: IOBAN™ 2

## (undated) DEVICE — SUT SILK 2/0 FS BLK 18IN 685G

## (undated) DEVICE — TBG FEED PULL FLOW20 NO/DRUG 20F 4.47MM 150CM

## (undated) DEVICE — GLV SURG BIOGEL M LTX PF 7 1/2

## (undated) DEVICE — MONOPOLAR METZENBAUM SCISSOR, MINI BLADE TIP, DISPOSABLE: Brand: MONOPOLAR METZENBAUM SCISSOR, MINI BLADE TIP, DISPOSABLE

## (undated) DEVICE — SUT SILK 2/0 SUTUPAK TIES 24IN SA75H

## (undated) DEVICE — 3M™ IOBAN™ 2 ANTIMICROBIAL INCISE DRAPE 6650EZ: Brand: IOBAN™ 2

## (undated) DEVICE — 1.7MM PRECISION NEURO (MATCH HEAD)

## (undated) DEVICE — NDL HYPO ECLPS SFTY 25G 1 1/2IN

## (undated) DEVICE — BANDAGE,GAUZE,BULKEE II,4.5"X4.1YD,STRL: Brand: MEDLINE

## (undated) DEVICE — 3.0MM PRECISION NEURO (MATCH HEAD)

## (undated) DEVICE — ADHS SKIN PREMIERPRO EXOFIN TOPICAL HI/VISC .5ML

## (undated) DEVICE — PAD GRND REM POLYHESIVE A/ DISP

## (undated) DEVICE — CONN FLX BREATHE CIRCT

## (undated) DEVICE — GLV SURG DERMASSURE GRN LF PF 8.0

## (undated) DEVICE — ADHS LIQ MASTISOL 2/3ML

## (undated) DEVICE — GLV SURG SENSICARE W/ALOE PF LF 7.5 STRL

## (undated) DEVICE — ANTIBACTERIAL VIOLET BRAIDED (POLYGLACTIN 910), SYNTHETIC ABSORBABLE SUTURE: Brand: COATED VICRYL

## (undated) DEVICE — PK CRANI 30

## (undated) DEVICE — KT ANTI FOG W/FLD AND SPNG

## (undated) DEVICE — ANTIBACTERIAL UNDYED BRAIDED (POLYGLACTIN 910), SYNTHETIC ABSORBABLE SUTURE: Brand: COATED VICRYL

## (undated) DEVICE — INTENDED FOR TISSUE SEPARATION, AND OTHER PROCEDURES THAT REQUIRE A SHARP SURGICAL BLADE TO PUNCTURE OR CUT.: Brand: BARD-PARKER ® STAINLESS STEEL BLADES

## (undated) DEVICE — VAGINAL PREP TRAY: Brand: MEDLINE INDUSTRIES, INC.

## (undated) DEVICE — THE CHANNEL CLEANING BRUSH IS A NYLON FLEXI BRUSH ATTACHED TO A FLEXIBLE PLASTIC SHEATH DESIGNED TO SAFELY REMOVE DEBRIS FROM FLEXIBLE ENDOSCOPES.

## (undated) DEVICE — CUFF,BP,DISP,1 TUBE,ADULT,HP: Brand: MEDLINE

## (undated) DEVICE — APPL DURAPREP IODOPHOR APL 26ML

## (undated) DEVICE — GLV SURG BIOGEL LTX PF 6 1/2

## (undated) DEVICE — 2, DISPOSABLE SUCTION/IRRIGATOR WITHOUT DISPOSABLE TIP: Brand: STRYKEFLOW

## (undated) DEVICE — RETR STAY ELNG BLNT 12MM CA/PK/4

## (undated) DEVICE — STY PRE CALIB DISP

## (undated) DEVICE — CLTH CLENS READYCLEANSE PERI CARE PK/5

## (undated) DEVICE — PROXIMATE RH ROTATING HEAD SKIN STAPLERS (35 REGULAR) CONTAINS 35 STAINLESS STEEL STAPLES: Brand: PROXIMATE

## (undated) DEVICE — Device: Brand: DEFENDO AIR/WATER/SUCTION AND BIOPSY VALVE

## (undated) DEVICE — PIN SKULL A/ W/PROTECT CAP PK/3

## (undated) DEVICE — SUT ETHIB 0 MO7 18IN CX41D

## (undated) DEVICE — ENDOPATH XCEL WITH OPTIVIEW TECHNOLOGY UNIVERSAL TROCAR STABILITY SLEEVES: Brand: ENDOPATH XCEL OPTIVIEW

## (undated) DEVICE — DISPOSABLE TUBING SET AND EXTENDER FILTER TUBING

## (undated) DEVICE — TOTAL TRAY, 16FR 10ML SIL FOLEY, URN: Brand: MEDLINE

## (undated) DEVICE — ENDOPATH XCEL WITH OPTIVIEW TECHNOLOGY BLADELESS TROCARS WITH STABILITY SLEEVES: Brand: ENDOPATH XCEL OPTIVIEW

## (undated) DEVICE — STRAIGHT TIP, UNIVERSAL

## (undated) DEVICE — CODMAN® DISPOSABLE CATHETER PASSER: Brand: CODMAN®

## (undated) DEVICE — SYR LUERLOK 5CC

## (undated) DEVICE — SUT SILK 3/0 SUTUPAK TIES 24IN SA74H

## (undated) DEVICE — Device

## (undated) DEVICE — TOOL 75BA30DLF LGND 7.5CM 3MM BALL D LF: Brand: MIDAS REX®

## (undated) DEVICE — SCRWDRVR SMARTO BATRY/PWR TORQ/45NCM 210RPM DISP STRL

## (undated) DEVICE — TRAP FLD MINIVAC MEGADYNE 100ML

## (undated) DEVICE — CODMAN® SURGICAL PATTIES 1/4" X 1/4" (0.64CM X 0.64CM): Brand: CODMAN®

## (undated) DEVICE — CODMAN® SURGICAL STRIPS 1" X 6" (25MM X 152MM): Brand: CODMAN®

## (undated) DEVICE — SENSR O2 OXIMAX FNGR A/ 18IN NONSTR

## (undated) DEVICE — CATH IV ANGIO FEP 12G 3IN LTBLU 10PK

## (undated) DEVICE — SUT MNCRYL 4/0 PS2 27IN UD MCP426H

## (undated) DEVICE — RESERVOIR,SUCTION,100CC,SILICONE: Brand: MEDLINE

## (undated) DEVICE — ENDOPATH XCEL WITH OPTIVIEW TECHNOLOGY DILATING TIP TROCARS WITH STABILITY SLEEVES: Brand: ENDOPATH XCEL OPTIVIEW

## (undated) DEVICE — Device: Brand: CENTURION

## (undated) DEVICE — PK ENT HD AND NK 30

## (undated) DEVICE — 4-PORT MANIFOLD: Brand: NEPTUNE 2

## (undated) DEVICE — 3M™ STERI-STRIP™ REINFORCED ADHESIVE SKIN CLOSURES, R1547, 1/2 IN X 4 IN (12 MM X 100 MM), 6 STRIPS/ENVELOPE: Brand: 3M™ STERI-STRIP™

## (undated) DEVICE — SUT SILK 0 SUTUPAK TIES 60IN SA6H

## (undated) DEVICE — DRSNG TRACH POLYMEM FEN 3.5X3.5IN

## (undated) DEVICE — YANKAUER,BULB TIP WITH VENT: Brand: ARGYLE

## (undated) DEVICE — 3M™ STERI-DRAPE™ CRANIOTOMY DRAPE WITH IOBAN™ 2 INCISE POUCH 6687: Brand: STERI-DRAPE™ IOBAN™ 2

## (undated) DEVICE — SPONGE,NEURO,0.5"X3",XR,STRL,LF,10/PK: Brand: MEDLINE

## (undated) DEVICE — TBG INSUFFLATION LUER LOCK: Brand: MEDLINE INDUSTRIES, INC.

## (undated) DEVICE — ENDOPATH PNEUMONEEDLE INSUFFLATION NEEDLES WITH LUER LOCK CONNECTORS 120MM: Brand: ENDOPATH

## (undated) DEVICE — SPHR MARKR LOCATION CI SYS REFL 3PK 30BX

## (undated) DEVICE — CVR HNDL LIGHT RIGID

## (undated) DEVICE — SPNG GZ 2S 2X2 8PLY STRL PK/2

## (undated) DEVICE — DRSNG SURESITE WNDW 2.38X2.75